# Patient Record
Sex: MALE | Race: WHITE | NOT HISPANIC OR LATINO | Employment: OTHER | URBAN - METROPOLITAN AREA
[De-identification: names, ages, dates, MRNs, and addresses within clinical notes are randomized per-mention and may not be internally consistent; named-entity substitution may affect disease eponyms.]

---

## 2017-02-11 ENCOUNTER — TRANSCRIBE ORDERS (OUTPATIENT)
Dept: ADMINISTRATIVE | Age: 82
End: 2017-02-11

## 2017-02-11 ENCOUNTER — LAB (OUTPATIENT)
Dept: LAB | Age: 82
End: 2017-02-11
Payer: COMMERCIAL

## 2017-02-11 DIAGNOSIS — I10 ESSENTIAL HYPERTENSION, MALIGNANT: ICD-10-CM

## 2017-02-11 DIAGNOSIS — I48.91 ATRIAL FIBRILLATION, UNSPECIFIED TYPE (HCC): Primary | ICD-10-CM

## 2017-02-11 DIAGNOSIS — E78.5 HYPERLIPIDEMIA, UNSPECIFIED HYPERLIPIDEMIA TYPE: ICD-10-CM

## 2017-02-11 DIAGNOSIS — E11.65 UNCONTROLLED TYPE 2 DIABETES MELLITUS WITH COMPLICATION, UNSPECIFIED LONG TERM INSULIN USE STATUS: Primary | ICD-10-CM

## 2017-02-11 DIAGNOSIS — E11.65 UNCONTROLLED TYPE 2 DIABETES MELLITUS WITH COMPLICATION, UNSPECIFIED LONG TERM INSULIN USE STATUS: ICD-10-CM

## 2017-02-11 DIAGNOSIS — I48.91 ATRIAL FIBRILLATION, UNSPECIFIED TYPE (HCC): ICD-10-CM

## 2017-02-11 DIAGNOSIS — E11.8 UNCONTROLLED TYPE 2 DIABETES MELLITUS WITH COMPLICATION, UNSPECIFIED LONG TERM INSULIN USE STATUS: ICD-10-CM

## 2017-02-11 DIAGNOSIS — E11.8 UNCONTROLLED TYPE 2 DIABETES MELLITUS WITH COMPLICATION, UNSPECIFIED LONG TERM INSULIN USE STATUS: Primary | ICD-10-CM

## 2017-02-11 LAB
ALBUMIN SERPL BCP-MCNC: 4 G/DL (ref 3.5–5)
ALP SERPL-CCNC: 65 U/L (ref 46–116)
ALT SERPL W P-5'-P-CCNC: 29 U/L (ref 12–78)
ANION GAP SERPL CALCULATED.3IONS-SCNC: 6 MMOL/L (ref 4–13)
AST SERPL W P-5'-P-CCNC: 17 U/L (ref 5–45)
BILIRUB SERPL-MCNC: 0.52 MG/DL (ref 0.2–1)
BUN SERPL-MCNC: 27 MG/DL (ref 5–25)
CALCIUM SERPL-MCNC: 9.2 MG/DL (ref 8.3–10.1)
CHLORIDE SERPL-SCNC: 103 MMOL/L (ref 100–108)
CHOLEST SERPL-MCNC: 180 MG/DL (ref 50–200)
CO2 SERPL-SCNC: 31 MMOL/L (ref 21–32)
CREAT SERPL-MCNC: 2.39 MG/DL (ref 0.6–1.3)
CRP SERPL QL: <3 MG/L
EST. AVERAGE GLUCOSE BLD GHB EST-MCNC: 186 MG/DL
GFR SERPL CREATININE-BSD FRML MDRD: 26.2 ML/MIN/1.73SQ M
GLUCOSE SERPL-MCNC: 183 MG/DL (ref 65–140)
HBA1C MFR BLD: 8.1 % (ref 4.2–6.3)
HDLC SERPL-MCNC: 29 MG/DL (ref 40–60)
INR PPP: 1.61 (ref 0.86–1.16)
POTASSIUM SERPL-SCNC: 4.9 MMOL/L (ref 3.5–5.3)
PROT SERPL-MCNC: 8.1 G/DL (ref 6.4–8.2)
PROTHROMBIN TIME: 19.1 SECONDS (ref 12–14.3)
SODIUM SERPL-SCNC: 140 MMOL/L (ref 136–145)
TRIGL SERPL-MCNC: 666 MG/DL
TSH SERPL DL<=0.05 MIU/L-ACNC: 3.91 UIU/ML (ref 0.36–3.74)

## 2017-02-11 PROCEDURE — 80061 LIPID PANEL: CPT

## 2017-02-11 PROCEDURE — 80053 COMPREHEN METABOLIC PANEL: CPT

## 2017-02-11 PROCEDURE — 84443 ASSAY THYROID STIM HORMONE: CPT

## 2017-02-11 PROCEDURE — 85610 PROTHROMBIN TIME: CPT

## 2017-02-11 PROCEDURE — 86140 C-REACTIVE PROTEIN: CPT

## 2017-02-11 PROCEDURE — 36415 COLL VENOUS BLD VENIPUNCTURE: CPT

## 2017-02-11 PROCEDURE — 83036 HEMOGLOBIN GLYCOSYLATED A1C: CPT

## 2017-02-22 ENCOUNTER — HOSPITAL ENCOUNTER (OUTPATIENT)
Dept: SLEEP CENTER | Facility: CLINIC | Age: 82
Discharge: HOME | End: 2017-02-22
Payer: COMMERCIAL

## 2017-02-22 ENCOUNTER — TRANSCRIBE ORDERS (OUTPATIENT)
Dept: SLEEP CENTER | Facility: CLINIC | Age: 82
End: 2017-02-22

## 2017-02-22 DIAGNOSIS — G47.33 OSA (OBSTRUCTIVE SLEEP APNEA): Primary | ICD-10-CM

## 2017-02-22 DIAGNOSIS — G47.33 OSA (OBSTRUCTIVE SLEEP APNEA): ICD-10-CM

## 2017-03-01 ENCOUNTER — LAB REQUISITION (OUTPATIENT)
Dept: LAB | Facility: HOSPITAL | Age: 82
End: 2017-03-01
Payer: COMMERCIAL

## 2017-03-01 DIAGNOSIS — D48.5 NEOPLASM OF UNCERTAIN BEHAVIOR OF SKIN: ICD-10-CM

## 2017-03-01 PROCEDURE — 88305 TISSUE EXAM BY PATHOLOGIST: CPT | Performed by: SPECIALIST

## 2017-03-07 ENCOUNTER — HOSPITAL ENCOUNTER (OUTPATIENT)
Dept: SLEEP CENTER | Facility: CLINIC | Age: 82
Discharge: HOME | End: 2017-03-07
Payer: MEDICARE

## 2017-03-07 DIAGNOSIS — G47.33 OSA (OBSTRUCTIVE SLEEP APNEA): ICD-10-CM

## 2017-03-11 ENCOUNTER — APPOINTMENT (OUTPATIENT)
Dept: LAB | Age: 82
End: 2017-03-11
Payer: COMMERCIAL

## 2017-03-11 DIAGNOSIS — N18.4 CHRONIC KIDNEY DISEASE, STAGE IV (SEVERE) (HCC): ICD-10-CM

## 2017-03-11 LAB
ALBUMIN SERPL BCP-MCNC: 3.7 G/DL (ref 3.5–5)
ANION GAP SERPL CALCULATED.3IONS-SCNC: 6 MMOL/L (ref 4–13)
BUN SERPL-MCNC: 33 MG/DL (ref 5–25)
CALCIUM SERPL-MCNC: 9.1 MG/DL (ref 8.3–10.1)
CHLORIDE SERPL-SCNC: 106 MMOL/L (ref 100–108)
CO2 SERPL-SCNC: 29 MMOL/L (ref 21–32)
CREAT SERPL-MCNC: 2.43 MG/DL (ref 0.6–1.3)
CREAT UR-MCNC: 120 MG/DL
ERYTHROCYTE [DISTWIDTH] IN BLOOD BY AUTOMATED COUNT: 13.3 % (ref 11.6–15.1)
GFR SERPL CREATININE-BSD FRML MDRD: 25.7 ML/MIN/1.73SQ M
GLUCOSE SERPL-MCNC: 151 MG/DL (ref 65–140)
HCT VFR BLD AUTO: 41.1 % (ref 36.5–49.3)
HGB BLD-MCNC: 14 G/DL (ref 12–17)
MCH RBC QN AUTO: 29.7 PG (ref 26.8–34.3)
MCHC RBC AUTO-ENTMCNC: 34.1 G/DL (ref 31.4–37.4)
MCV RBC AUTO: 87 FL (ref 82–98)
MICROALBUMIN UR-MCNC: 37.9 MG/L (ref 0–20)
MICROALBUMIN/CREAT 24H UR: 32 MG/G CREATININE (ref 0–30)
PHOSPHATE SERPL-MCNC: 3.1 MG/DL (ref 2.3–4.1)
PLATELET # BLD AUTO: 221 THOUSANDS/UL (ref 149–390)
PMV BLD AUTO: 10.4 FL (ref 8.9–12.7)
POTASSIUM SERPL-SCNC: 4.6 MMOL/L (ref 3.5–5.3)
PTH-INTACT SERPL-MCNC: 30.6 PG/ML (ref 14–72)
RBC # BLD AUTO: 4.72 MILLION/UL (ref 3.88–5.62)
SODIUM SERPL-SCNC: 141 MMOL/L (ref 136–145)
URATE SERPL-MCNC: 5.4 MG/DL (ref 4.2–8)
WBC # BLD AUTO: 5.49 THOUSAND/UL (ref 4.31–10.16)

## 2017-03-11 PROCEDURE — 82043 UR ALBUMIN QUANTITATIVE: CPT

## 2017-03-11 PROCEDURE — 82570 ASSAY OF URINE CREATININE: CPT

## 2017-03-11 PROCEDURE — 83970 ASSAY OF PARATHORMONE: CPT

## 2017-03-11 PROCEDURE — 85027 COMPLETE CBC AUTOMATED: CPT

## 2017-03-11 PROCEDURE — 84550 ASSAY OF BLOOD/URIC ACID: CPT

## 2017-03-11 PROCEDURE — 80069 RENAL FUNCTION PANEL: CPT

## 2017-03-18 ENCOUNTER — LAB (OUTPATIENT)
Dept: LAB | Age: 82
End: 2017-03-18
Payer: COMMERCIAL

## 2017-03-18 ENCOUNTER — TRANSCRIBE ORDERS (OUTPATIENT)
Dept: ADMINISTRATIVE | Age: 82
End: 2017-03-18

## 2017-03-18 DIAGNOSIS — I48.91 ATRIAL FIBRILLATION, UNSPECIFIED TYPE (HCC): ICD-10-CM

## 2017-03-18 DIAGNOSIS — I48.91 ATRIAL FIBRILLATION, UNSPECIFIED TYPE (HCC): Primary | ICD-10-CM

## 2017-03-18 LAB
INR PPP: 1.52 (ref 0.86–1.16)
PROTHROMBIN TIME: 18.3 SECONDS (ref 12–14.3)

## 2017-03-18 PROCEDURE — 36415 COLL VENOUS BLD VENIPUNCTURE: CPT

## 2017-03-18 PROCEDURE — 85610 PROTHROMBIN TIME: CPT

## 2017-03-20 ENCOUNTER — ALLSCRIPTS OFFICE VISIT (OUTPATIENT)
Dept: OTHER | Facility: OTHER | Age: 82
End: 2017-03-20

## 2017-03-21 ENCOUNTER — GENERIC CONVERSION - ENCOUNTER (OUTPATIENT)
Dept: OTHER | Facility: OTHER | Age: 82
End: 2017-03-21

## 2017-04-12 ENCOUNTER — HOSPITAL ENCOUNTER (OUTPATIENT)
Dept: SLEEP CENTER | Facility: CLINIC | Age: 82
Discharge: HOME | End: 2017-04-12
Payer: MEDICARE

## 2017-04-12 ENCOUNTER — TRANSCRIBE ORDERS (OUTPATIENT)
Dept: SLEEP CENTER | Facility: CLINIC | Age: 82
End: 2017-04-12

## 2017-04-12 DIAGNOSIS — G47.33 OSA (OBSTRUCTIVE SLEEP APNEA): Primary | ICD-10-CM

## 2017-04-12 DIAGNOSIS — G47.33 OSA (OBSTRUCTIVE SLEEP APNEA): ICD-10-CM

## 2017-04-21 ENCOUNTER — LAB (OUTPATIENT)
Dept: LAB | Age: 82
End: 2017-04-21
Payer: COMMERCIAL

## 2017-04-21 ENCOUNTER — TRANSCRIBE ORDERS (OUTPATIENT)
Dept: ADMINISTRATIVE | Age: 82
End: 2017-04-21

## 2017-04-21 DIAGNOSIS — I48.91 ATRIAL FIBRILLATION, UNSPECIFIED TYPE (HCC): ICD-10-CM

## 2017-04-21 DIAGNOSIS — I48.91 ATRIAL FIBRILLATION, UNSPECIFIED TYPE (HCC): Primary | ICD-10-CM

## 2017-04-21 LAB
INR PPP: 2.04 (ref 0.86–1.16)
PROTHROMBIN TIME: 22.8 SECONDS (ref 12–14.3)

## 2017-04-21 PROCEDURE — 36415 COLL VENOUS BLD VENIPUNCTURE: CPT

## 2017-04-21 PROCEDURE — 85610 PROTHROMBIN TIME: CPT

## 2017-05-01 ENCOUNTER — ALLSCRIPTS OFFICE VISIT (OUTPATIENT)
Dept: OTHER | Facility: OTHER | Age: 82
End: 2017-05-01

## 2017-05-10 ENCOUNTER — LAB REQUISITION (OUTPATIENT)
Dept: LAB | Facility: HOSPITAL | Age: 82
End: 2017-05-10
Payer: COMMERCIAL

## 2017-05-10 DIAGNOSIS — B35.2 TINEA MANUUM: ICD-10-CM

## 2017-05-10 PROCEDURE — 88341 IMHCHEM/IMCYTCHM EA ADD ANTB: CPT | Performed by: SPECIALIST

## 2017-05-10 PROCEDURE — 88342 IMHCHEM/IMCYTCHM 1ST ANTB: CPT | Performed by: SPECIALIST

## 2017-05-10 PROCEDURE — 88305 TISSUE EXAM BY PATHOLOGIST: CPT | Performed by: SPECIALIST

## 2017-05-10 PROCEDURE — 88312 SPECIAL STAINS GROUP 1: CPT | Performed by: SPECIALIST

## 2017-05-27 ENCOUNTER — TRANSCRIBE ORDERS (OUTPATIENT)
Dept: ADMINISTRATIVE | Age: 82
End: 2017-05-27

## 2017-05-27 ENCOUNTER — LAB (OUTPATIENT)
Dept: LAB | Age: 82
End: 2017-05-27
Payer: COMMERCIAL

## 2017-05-27 DIAGNOSIS — I48.91 ATRIAL FIBRILLATION, UNSPECIFIED TYPE (HCC): Primary | ICD-10-CM

## 2017-05-27 DIAGNOSIS — E78.5 OTHER AND UNSPECIFIED HYPERLIPIDEMIA: Primary | ICD-10-CM

## 2017-05-27 DIAGNOSIS — I48.91 ATRIAL FIBRILLATION, UNSPECIFIED TYPE (HCC): ICD-10-CM

## 2017-05-27 DIAGNOSIS — IMO0002 UNCONTROLLED TYPE 2 DIABETES MELLITUS WITH OTHER SPECIFIED COMPLICATION: ICD-10-CM

## 2017-05-27 DIAGNOSIS — E78.5 OTHER AND UNSPECIFIED HYPERLIPIDEMIA: ICD-10-CM

## 2017-05-27 LAB
ALBUMIN SERPL BCP-MCNC: 3.7 G/DL (ref 3.5–5)
ALP SERPL-CCNC: 56 U/L (ref 46–116)
ALT SERPL W P-5'-P-CCNC: 33 U/L (ref 12–78)
ANION GAP SERPL CALCULATED.3IONS-SCNC: 6 MMOL/L (ref 4–13)
AST SERPL W P-5'-P-CCNC: 19 U/L (ref 5–45)
BILIRUB SERPL-MCNC: 0.58 MG/DL (ref 0.2–1)
BUN SERPL-MCNC: 28 MG/DL (ref 5–25)
CALCIUM SERPL-MCNC: 9 MG/DL (ref 8.3–10.1)
CHLORIDE SERPL-SCNC: 103 MMOL/L (ref 100–108)
CHOLEST SERPL-MCNC: 174 MG/DL (ref 50–200)
CO2 SERPL-SCNC: 28 MMOL/L (ref 21–32)
CREAT SERPL-MCNC: 2.23 MG/DL (ref 0.6–1.3)
EST. AVERAGE GLUCOSE BLD GHB EST-MCNC: 194 MG/DL
GFR SERPL CREATININE-BSD FRML MDRD: 28.4 ML/MIN/1.73SQ M
GLUCOSE P FAST SERPL-MCNC: 153 MG/DL (ref 65–99)
HBA1C MFR BLD: 8.4 % (ref 4.2–6.3)
HDLC SERPL-MCNC: 27 MG/DL (ref 40–60)
INR PPP: 1.33 (ref 0.86–1.16)
POTASSIUM SERPL-SCNC: 4.4 MMOL/L (ref 3.5–5.3)
PROT SERPL-MCNC: 7.7 G/DL (ref 6.4–8.2)
PROTHROMBIN TIME: 16.6 SECONDS (ref 12.1–14.4)
SODIUM SERPL-SCNC: 137 MMOL/L (ref 136–145)
TRIGL SERPL-MCNC: 497 MG/DL

## 2017-05-27 PROCEDURE — 83036 HEMOGLOBIN GLYCOSYLATED A1C: CPT

## 2017-05-27 PROCEDURE — 80061 LIPID PANEL: CPT

## 2017-05-27 PROCEDURE — 85610 PROTHROMBIN TIME: CPT

## 2017-05-27 PROCEDURE — 80053 COMPREHEN METABOLIC PANEL: CPT

## 2017-05-27 PROCEDURE — 36415 COLL VENOUS BLD VENIPUNCTURE: CPT

## 2017-06-05 ENCOUNTER — TRANSCRIBE ORDERS (OUTPATIENT)
Dept: ADMINISTRATIVE | Age: 82
End: 2017-06-05

## 2017-06-05 ENCOUNTER — LAB (OUTPATIENT)
Dept: LAB | Age: 82
End: 2017-06-05
Payer: COMMERCIAL

## 2017-06-05 DIAGNOSIS — D63.8 CHRONIC DISEASE ANEMIA: ICD-10-CM

## 2017-06-05 DIAGNOSIS — D63.8 CHRONIC DISEASE ANEMIA: Primary | ICD-10-CM

## 2017-06-05 LAB
ERYTHROCYTE [DISTWIDTH] IN BLOOD BY AUTOMATED COUNT: 13.3 % (ref 11.6–15.1)
HCT VFR BLD AUTO: 37.5 % (ref 36.5–49.3)
HGB BLD-MCNC: 12.8 G/DL (ref 12–17)
MCH RBC QN AUTO: 29.5 PG (ref 26.8–34.3)
MCHC RBC AUTO-ENTMCNC: 34.1 G/DL (ref 31.4–37.4)
MCV RBC AUTO: 86 FL (ref 82–98)
PLATELET # BLD AUTO: 217 THOUSANDS/UL (ref 149–390)
PMV BLD AUTO: 10.6 FL (ref 8.9–12.7)
RBC # BLD AUTO: 4.34 MILLION/UL (ref 3.88–5.62)
WBC # BLD AUTO: 5.01 THOUSAND/UL (ref 4.31–10.16)

## 2017-06-05 PROCEDURE — 85027 COMPLETE CBC AUTOMATED: CPT

## 2017-06-05 PROCEDURE — 36415 COLL VENOUS BLD VENIPUNCTURE: CPT

## 2017-06-30 ENCOUNTER — TRANSCRIBE ORDERS (OUTPATIENT)
Dept: ADMINISTRATIVE | Age: 82
End: 2017-06-30

## 2017-06-30 ENCOUNTER — APPOINTMENT (OUTPATIENT)
Dept: LAB | Age: 82
End: 2017-06-30
Payer: COMMERCIAL

## 2017-06-30 DIAGNOSIS — I48.91 ATRIAL FIBRILLATION, UNSPECIFIED TYPE (HCC): Primary | ICD-10-CM

## 2017-06-30 DIAGNOSIS — I48.91 ATRIAL FIBRILLATION, UNSPECIFIED TYPE (HCC): ICD-10-CM

## 2017-06-30 LAB
INR PPP: 1.54 (ref 0.86–1.16)
PROTHROMBIN TIME: 18.6 SECONDS (ref 12.1–14.4)

## 2017-06-30 PROCEDURE — 85610 PROTHROMBIN TIME: CPT

## 2017-06-30 PROCEDURE — 36415 COLL VENOUS BLD VENIPUNCTURE: CPT

## 2017-07-03 DIAGNOSIS — N18.4 CHRONIC KIDNEY DISEASE, STAGE IV (SEVERE) (HCC): ICD-10-CM

## 2017-07-21 ENCOUNTER — TRANSCRIBE ORDERS (OUTPATIENT)
Dept: ADMINISTRATIVE | Age: 82
End: 2017-07-21

## 2017-07-21 ENCOUNTER — LAB (OUTPATIENT)
Dept: LAB | Age: 82
End: 2017-07-21
Payer: COMMERCIAL

## 2017-07-21 DIAGNOSIS — N18.4 CHRONIC KIDNEY DISEASE, STAGE IV (SEVERE) (HCC): ICD-10-CM

## 2017-07-21 LAB
ALBUMIN SERPL BCP-MCNC: 3.6 G/DL (ref 3.5–5)
ANION GAP SERPL CALCULATED.3IONS-SCNC: 9 MMOL/L (ref 4–13)
BUN SERPL-MCNC: 32 MG/DL (ref 5–25)
CALCIUM SERPL-MCNC: 9.1 MG/DL (ref 8.3–10.1)
CHLORIDE SERPL-SCNC: 102 MMOL/L (ref 100–108)
CO2 SERPL-SCNC: 26 MMOL/L (ref 21–32)
CREAT SERPL-MCNC: 2.59 MG/DL (ref 0.6–1.3)
CREAT UR-MCNC: 138 MG/DL
ERYTHROCYTE [DISTWIDTH] IN BLOOD BY AUTOMATED COUNT: 13.1 % (ref 11.6–15.1)
GFR SERPL CREATININE-BSD FRML MDRD: 23.9 ML/MIN/1.73SQ M
GLUCOSE P FAST SERPL-MCNC: 286 MG/DL (ref 65–99)
HCT VFR BLD AUTO: 38.3 % (ref 36.5–49.3)
HGB BLD-MCNC: 13.2 G/DL (ref 12–17)
MCH RBC QN AUTO: 29.6 PG (ref 26.8–34.3)
MCHC RBC AUTO-ENTMCNC: 34.5 G/DL (ref 31.4–37.4)
MCV RBC AUTO: 86 FL (ref 82–98)
MICROALBUMIN UR-MCNC: 34.1 MG/L (ref 0–20)
MICROALBUMIN/CREAT 24H UR: 25 MG/G CREATININE (ref 0–30)
PHOSPHATE SERPL-MCNC: 2.6 MG/DL (ref 2.3–4.1)
PLATELET # BLD AUTO: 267 THOUSANDS/UL (ref 149–390)
PMV BLD AUTO: 10.4 FL (ref 8.9–12.7)
POTASSIUM SERPL-SCNC: 4.7 MMOL/L (ref 3.5–5.3)
PTH-INTACT SERPL-MCNC: 26.2 PG/ML (ref 14–72)
RBC # BLD AUTO: 4.46 MILLION/UL (ref 3.88–5.62)
SODIUM SERPL-SCNC: 137 MMOL/L (ref 136–145)
URATE SERPL-MCNC: 5.5 MG/DL (ref 4.2–8)
WBC # BLD AUTO: 6.32 THOUSAND/UL (ref 4.31–10.16)

## 2017-07-21 PROCEDURE — 84550 ASSAY OF BLOOD/URIC ACID: CPT

## 2017-07-21 PROCEDURE — 36415 COLL VENOUS BLD VENIPUNCTURE: CPT

## 2017-07-21 PROCEDURE — 85027 COMPLETE CBC AUTOMATED: CPT

## 2017-07-21 PROCEDURE — 80069 RENAL FUNCTION PANEL: CPT

## 2017-07-21 PROCEDURE — 82570 ASSAY OF URINE CREATININE: CPT

## 2017-07-21 PROCEDURE — 82043 UR ALBUMIN QUANTITATIVE: CPT

## 2017-07-21 PROCEDURE — 83970 ASSAY OF PARATHORMONE: CPT

## 2017-07-24 ENCOUNTER — ALLSCRIPTS OFFICE VISIT (OUTPATIENT)
Dept: OTHER | Facility: OTHER | Age: 82
End: 2017-07-24

## 2017-10-07 ENCOUNTER — TRANSCRIBE ORDERS (OUTPATIENT)
Dept: ADMINISTRATIVE | Age: 82
End: 2017-10-07

## 2017-10-07 ENCOUNTER — LAB (OUTPATIENT)
Dept: LAB | Age: 82
End: 2017-10-07
Payer: COMMERCIAL

## 2017-10-07 DIAGNOSIS — I48.91 ATRIAL FIBRILLATION, UNSPECIFIED TYPE (HCC): ICD-10-CM

## 2017-10-07 DIAGNOSIS — I48.91 ATRIAL FIBRILLATION, UNSPECIFIED TYPE (HCC): Primary | ICD-10-CM

## 2017-10-07 LAB
INR PPP: 1.42 (ref 0.86–1.16)
PROTHROMBIN TIME: 17.4 SECONDS (ref 12.1–14.4)

## 2017-10-07 PROCEDURE — 36415 COLL VENOUS BLD VENIPUNCTURE: CPT

## 2017-10-07 PROCEDURE — 85610 PROTHROMBIN TIME: CPT

## 2017-10-14 ENCOUNTER — LAB (OUTPATIENT)
Dept: LAB | Age: 82
End: 2017-10-14
Payer: COMMERCIAL

## 2017-10-14 ENCOUNTER — TRANSCRIBE ORDERS (OUTPATIENT)
Dept: ADMINISTRATIVE | Age: 82
End: 2017-10-14

## 2017-10-14 DIAGNOSIS — E78.5 HYPERLIPEMIA, IDIOPATHIC FAMILIAL: ICD-10-CM

## 2017-10-14 DIAGNOSIS — I10 ESSENTIAL HYPERTENSION, MALIGNANT: ICD-10-CM

## 2017-10-14 DIAGNOSIS — E78.5 HYPERLIPEMIA, IDIOPATHIC FAMILIAL: Primary | ICD-10-CM

## 2017-10-14 LAB
ALBUMIN SERPL BCP-MCNC: 3.7 G/DL (ref 3.5–5)
ALP SERPL-CCNC: 58 U/L (ref 46–116)
ALT SERPL W P-5'-P-CCNC: 26 U/L (ref 12–78)
ANION GAP SERPL CALCULATED.3IONS-SCNC: 6 MMOL/L (ref 4–13)
AST SERPL W P-5'-P-CCNC: 18 U/L (ref 5–45)
BILIRUB SERPL-MCNC: 0.55 MG/DL (ref 0.2–1)
BUN SERPL-MCNC: 27 MG/DL (ref 5–25)
CALCIUM SERPL-MCNC: 9 MG/DL (ref 8.3–10.1)
CHLORIDE SERPL-SCNC: 103 MMOL/L (ref 100–108)
CHOLEST SERPL-MCNC: 157 MG/DL (ref 50–200)
CO2 SERPL-SCNC: 28 MMOL/L (ref 21–32)
CREAT SERPL-MCNC: 2.3 MG/DL (ref 0.6–1.3)
ERYTHROCYTE [DISTWIDTH] IN BLOOD BY AUTOMATED COUNT: 13.2 % (ref 11.6–15.1)
GFR SERPL CREATININE-BSD FRML MDRD: 25 ML/MIN/1.73SQ M
GLUCOSE P FAST SERPL-MCNC: 174 MG/DL (ref 65–99)
HCT VFR BLD AUTO: 39.6 % (ref 36.5–49.3)
HDLC SERPL-MCNC: 31 MG/DL (ref 40–60)
HGB BLD-MCNC: 13.9 G/DL (ref 12–17)
MCH RBC QN AUTO: 29.8 PG (ref 26.8–34.3)
MCHC RBC AUTO-ENTMCNC: 35.1 G/DL (ref 31.4–37.4)
MCV RBC AUTO: 85 FL (ref 82–98)
PLATELET # BLD AUTO: 261 THOUSANDS/UL (ref 149–390)
PMV BLD AUTO: 10.2 FL (ref 8.9–12.7)
POTASSIUM SERPL-SCNC: 4.5 MMOL/L (ref 3.5–5.3)
PROT SERPL-MCNC: 7.8 G/DL (ref 6.4–8.2)
RBC # BLD AUTO: 4.67 MILLION/UL (ref 3.88–5.62)
SODIUM SERPL-SCNC: 137 MMOL/L (ref 136–145)
TRIGL SERPL-MCNC: 421 MG/DL
WBC # BLD AUTO: 6.16 THOUSAND/UL (ref 4.31–10.16)

## 2017-10-14 PROCEDURE — 85027 COMPLETE CBC AUTOMATED: CPT

## 2017-10-14 PROCEDURE — 80061 LIPID PANEL: CPT

## 2017-10-14 PROCEDURE — 36415 COLL VENOUS BLD VENIPUNCTURE: CPT

## 2017-10-14 PROCEDURE — 80053 COMPREHEN METABOLIC PANEL: CPT

## 2017-11-04 ENCOUNTER — TRANSCRIBE ORDERS (OUTPATIENT)
Dept: ADMINISTRATIVE | Age: 82
End: 2017-11-04

## 2017-11-04 ENCOUNTER — LAB (OUTPATIENT)
Dept: LAB | Age: 82
End: 2017-11-04
Payer: COMMERCIAL

## 2017-11-04 DIAGNOSIS — N18.4 CHRONIC KIDNEY DISEASE, STAGE IV (SEVERE) (HCC): ICD-10-CM

## 2017-11-04 LAB
ALBUMIN SERPL BCP-MCNC: 3.5 G/DL (ref 3.5–5)
ANION GAP SERPL CALCULATED.3IONS-SCNC: 4 MMOL/L (ref 4–13)
BUN SERPL-MCNC: 31 MG/DL (ref 5–25)
CALCIUM SERPL-MCNC: 9 MG/DL (ref 8.3–10.1)
CHLORIDE SERPL-SCNC: 103 MMOL/L (ref 100–108)
CO2 SERPL-SCNC: 28 MMOL/L (ref 21–32)
CREAT SERPL-MCNC: 2.53 MG/DL (ref 0.6–1.3)
CREAT UR-MCNC: 101 MG/DL
ERYTHROCYTE [DISTWIDTH] IN BLOOD BY AUTOMATED COUNT: 13.1 % (ref 11.6–15.1)
GFR SERPL CREATININE-BSD FRML MDRD: 23 ML/MIN/1.73SQ M
GLUCOSE P FAST SERPL-MCNC: 315 MG/DL (ref 65–99)
HCT VFR BLD AUTO: 38.9 % (ref 36.5–49.3)
HGB BLD-MCNC: 13.1 G/DL (ref 12–17)
MCH RBC QN AUTO: 29 PG (ref 26.8–34.3)
MCHC RBC AUTO-ENTMCNC: 33.7 G/DL (ref 31.4–37.4)
MCV RBC AUTO: 86 FL (ref 82–98)
MICROALBUMIN UR-MCNC: 43.2 MG/L (ref 0–20)
MICROALBUMIN/CREAT 24H UR: 43 MG/G CREATININE (ref 0–30)
PHOSPHATE SERPL-MCNC: 2.6 MG/DL (ref 2.3–4.1)
PLATELET # BLD AUTO: 240 THOUSANDS/UL (ref 149–390)
PMV BLD AUTO: 10.2 FL (ref 8.9–12.7)
POTASSIUM SERPL-SCNC: 4.8 MMOL/L (ref 3.5–5.3)
PTH-INTACT SERPL-MCNC: 24.6 PG/ML (ref 14–72)
RBC # BLD AUTO: 4.51 MILLION/UL (ref 3.88–5.62)
SODIUM SERPL-SCNC: 135 MMOL/L (ref 136–145)
URATE SERPL-MCNC: 5.2 MG/DL (ref 4.2–8)
WBC # BLD AUTO: 6.21 THOUSAND/UL (ref 4.31–10.16)

## 2017-11-04 PROCEDURE — 82043 UR ALBUMIN QUANTITATIVE: CPT

## 2017-11-04 PROCEDURE — 84550 ASSAY OF BLOOD/URIC ACID: CPT

## 2017-11-04 PROCEDURE — 83970 ASSAY OF PARATHORMONE: CPT

## 2017-11-04 PROCEDURE — 85027 COMPLETE CBC AUTOMATED: CPT

## 2017-11-04 PROCEDURE — 36415 COLL VENOUS BLD VENIPUNCTURE: CPT

## 2017-11-04 PROCEDURE — 82570 ASSAY OF URINE CREATININE: CPT

## 2017-11-04 PROCEDURE — 80069 RENAL FUNCTION PANEL: CPT

## 2017-11-06 DIAGNOSIS — N18.4 CHRONIC KIDNEY DISEASE, STAGE IV (SEVERE) (HCC): ICD-10-CM

## 2017-11-08 ENCOUNTER — ALLSCRIPTS OFFICE VISIT (OUTPATIENT)
Dept: OTHER | Facility: OTHER | Age: 82
End: 2017-11-08

## 2017-11-10 NOTE — PROGRESS NOTES
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)    Plan  Depression    · Sertraline HCl - 50 MG Oral Tablet; TAKE 1 TABLET DAILY AS DIRECTED   Rx By: Yolanda Abrams; Dispense: 90 Days ; #:90 Tablet; Refill: 3;Depression; EVER = N; Verified Transmission to [address]; Last Updated By: System, [employer]; 11/8/2017 11:19:48 AM  Frontotemporal dementia    · Follow-up visit in 6 months Evaluation and Treatment  Follow-up  Status: Complete Done: [id number]   Ordered;Frontotemporal dementia; Ordered By: Yolanda Abrams Performed:  Due: 50WDT9562; Last Updated By: Charmaine Albert; 11/8/2017 11:17:15 AM    Discussion/Summary  Discussion Summary:   Pt has been stable since his last appt  He will continue donepezil, Namenda XR and sertraline  He will continue to stay active physically, mentally and socially  He will follow-up in 6 months  Counseling Documentation With Imm: The patient, patient's family was counseled regarding instructions for management,-- risk factor reductions,-- prognosis,-- patient and family education,-- impressions,-- risks and benefits of treatment options,-- importance of compliance with treatment  Chief Complaint  Chief Complaint Free Text Note Form: Patient present for follow up appointment regarding dementia      History of Present Illness  HPI: Floresita Tenorio is an 80year old right handed man with frontotemporal dementia who presents for follow up  To review, symptoms of memory loss began gradually and progressed  He was started on Aricept 5mg daily in 2009  Neuropsychological testing revealed changes suggestive of possible Alzheimer's disease versus frontotemporal dementia with the latter being more likely  FDG PET scan actually showed normal uptake, mild atrophy  is remains on Aricept 10mg daily and Namenda XR 28mg, and sertraline for anxiety and mood  Switching medications to am has helped with vivid dreams  He is tolerating the medications   His wife reports that short term memory is stable  He needs reminders  His wife administers his medications  His wife leaves ready made meals for him  He burned his hand in early April while trying to make spaghetti on the stove  He has no difficulty with bathing or dressing  He has not had any wandering or hallucinations  He is restless in sleep but not as bad as prior  He does nap during the day  He is no longer driving or handling finances  His wife does make sure he walks  He does sundown at times and gets carlos  Their house is up for sale with a plan to go into a smaller place  She would like him to go to a [facility] during the day to spend time with people but she doesnât have any way to get him there and back  wife is concerned about his renal function  She states that he does not watch what he eats when she is not home and his blood sugars have been poorly controlled  He does follow with Dr Brown See  PMH, PSH, SH, FH and ROS  Review of Systems  Neurological ROS:  Constitutional: recent weight gain-- and-- fatigue  HEENT: sinus problems,-- hearing loss-- and-- tinnitus  Cardiovascular:  no chest pain or pressure, no palpitations present, the heart rate was not rapid or irregular, no swelling in the arms or legs, no poor circulation  Respiratory:  no unusual or persistant cough, no shortness of breath with or without exertion  Gastrointestinal:  no nausea, no vomiting, no diarrhea, no abdominal pain, no changes in bowel habits, no melena, no loss of bowel control  Genitourinary: increased frequency  Musculoskeletal:  no arthralgias, no myalgias, no immobility or loss of function, no head/neck/back pain, no pain while walking  Integumentary  no masses, no rash, no skin lesions, no livedo reticularis  Psychiatric: mood swings  Endocrine   no unusual weight loss or gain, no excessive urination, no excessive thirst, no hair loss or gain, no hot or cold intolerance, no menstrual period change or irregularity, no loss of sexual ability or drive, no erection difficulty, no nipple discharge  Hematologic/Lymphatic:  no unusual bleeding, no tendency for easy bruising, no clotting skin or lumps  Neurological General: increased sleepiness  Neurological Mental Status: memory problems  Neurological Cranial Nerves:  no blurry or double vision, no loss of vision, no face drooping, no facial numbness or weakness, no taste or smell loss/changes, no hearing loss or ringing, no vertigo or dizziness, no dysphagia, no slurred speech  Neurological Motor findings include:  no tremor, no twitching, no cramping(pre/post exercise), no atrophy  Neurological Coordination:  no unsteadiness, no vertigo or dizziness, no clumsiness, no problems reaching for objects  Neurological Sensory:  no numbness, no pain, no tingling, does not fall when eyes closed or taking a shower  Neurological Gait:  no difficulty walking, not falling to one side, no sensation of being pushed, has not had falls  ROS Reviewed:   ROS reviewed  Active Problems  1  Acute URI (465 9) (J06 9)   2  Aftercare following surgery of the musculoskeletal system (V58 78) (Z47 89)   3  Allergic rhinitis (477 9) (J30 9)   4  Anticoagulant long-term use (V58 61) (Z79 01)   5  Arthritis (716 90) (M19 90)   6  Asthma (493 90) (J45 909)   7  Atrial fibrillation (427 31) (I48 91)   8  B12 deficiency (266 2) (E53 8)   9  Back pain (724 5) (M54 9)   10  Benign essential hypertension (401 1) (I10)   11  Benign prostate hyperplasia (600 00) (N40 0)   12  Bursitis of hip, unspecified laterality   13  Chronic kidney disease, stage 4 (severe) (585 4) (N18 4)   14  Chronic lumbar radiculopathy (724 4) (M54 16)   15  Controlled type 2 diabetes with renal manifestation (250 40) (E11 29)   16  Depression (311) (F32 9)   17  Diabetes mellitus (250 00) (E11 9)   18  Diabetic nephropathy (250 40,583 81) (E11 21)   19  Elevated prostate specific antigen (PSA) (790 93) (R97 20)   20   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,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)   21  GERD (gastroesophageal reflux disease) (530 81) (K21 9)   22  Hearing loss (389 9) (H91 90)   23  Hiatal hernia (553 3) (K44 9)   24  Hyperlipidemia (272 4) (E78 5)   25  Hypertension (401 9) (I10)   26  Limb pain (729 5) (M79 609)   27  Lower back pain (724 2) (M54 5)   28  Lower back pain (724 2) (M54 5)   29  Lumbar canal stenosis (724 02) (M48 061)   30  Nephropathy (583 9) (N28 9)   31  Nephrosclerosis (403 90,587) (I12 9)   32  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)   33  Organic impotence (607 84) (N52 9)   34  Piriformis syndrome, unspecified laterality (355 0) (G57 00)   35  Renal cell carcinoma, unspecified laterality (189 0) (C64 9)   36  Renal Insufficiency (593 9)   37  Sacral disorder (724 6) (M53 3)   38  Sacral insufficiency fracture (733 13) (M84 48XA)   39  Sacroiliitis (720 2) (M46 1)   40  Seasonal allergies (477 9) (J30 2)   41  Sleep apnea (780 57) (G47 30)   42  Solitary kidney, acquired (V45 73) (Z90 5)    Past Medical History  1  History of Anxiety (300 00) (F41 9)   2  History of Asthma (493 90) (J45 909)   3  History of Coronary Artery Disease (V12 59)   4  History of Diabetes Mellitus (250 00)   5  History of hypercholesterolemia (V12 29) (Z86 39)   6  History of hypertension (V12 59) (Z86 79)   7  History of kidney disease (V13 09) (Z87 448)   8  History of malignant melanoma of skin (V10 82) (Z85 820)   9  Personal history of kidney cancer (V10 52) (R48 061)    Surgical History  1  History of Cholecystectomy   2  History of Hernia Repair   3  History of Nephrectomy Left   4  History of Pacemaker Placement   5  History of Rotator Cuff Repair    Family History  Father    1  Family history of Brain Cancer (V16 8)   2  Family history of Lung Cancer (V16 1)  Family History    3  Family history of Diabetes Mellitus (V18 0)   4  Family history of Heart Disease (V17 49)   5  Family history of Hypertension (V17 49)   6   Family history of Pain Syndromes   7  Family history of Renal Cell Carcinoma (V16 51)   8  Family history of Stroke Syndrome (V17 1)    Social History     · Caffeine use (V49 89) (F15 90)   · Hearing loss (389 9) (H91 90)   · Marital History - Currently    · Never A Smoker   · Never Drank Alcohol   · No drug use   · Occupation: Retired    Current Meds   1  Claritin 10 MG Oral Tablet; TAKE 1 TABLET DAILY; Therapy: (Recorded:08Nov2017) to Recorded   2  Coconut Oil 1000 MG Oral Capsule; take 1 capsule daily; Therapy: (Recorded:08Nov2017) to Recorded   3  Coumadin 5 MG Oral Tablet; 2 5mg mon and fri, 5mg all other days; Therapy: (0472 51 11 42) to Recorded   4  Daily Multivitamin TABS; TAKE 1 TABLET DAILY; Therapy: (0472 51 11 42) to Recorded   5  Diovan 80 MG CAPS; TAKE 2 CAPSULE at night; Therapy: (0472 51 11 42) to Recorded   6  Donepezil HCl - 5 MG Oral Tablet; TAKE 1 TABLET TWICE DAILY  Requested for: 03Jpq9626; Last Rx:88Zef2183 Ordered   7  Fenofibrate 145 MG Oral Tablet; TAKE 1 TABLET DAILY; Therapy: (0472 51 11 42) to Recorded   8  Ferrous Sulfate 325 (65 Fe) MG Oral Tablet; TAKE 1 TABLET DAILY WITH FOOD; Therapy: (0472 51 11 42) to Recorded   9  Flonase 50 MCG/ACT SUSP; USE 2 SPRAYS IN EACH NOSTRIL; Therapy: (0472 51 11 42) to Recorded   10  Lantus 100 UNIT/ML Subcutaneous Solution; 30 units twice daily; Therapy: 77EWJ4286 to (Evaluate:55Odc3893)  Requested for: 35FJZ9488 Recorded   11  Lipitor 10 MG Oral Tablet; TAKE 1 TABLET DAILY; Therapy: (0472 51 11 42) to Recorded   12  Lovaza 1 GM Oral Capsule; TAKE 4 CAPSULES DAILY; Therapy: 37ZMZ3641 to Recorded   13  Magnesium 500 MG Oral Capsule; Take 1 tablet daily; Therapy: (0472 51 11 42) to Recorded   14  Metoprolol Succinate  MG Oral Tablet Extended Release 24 Hour; TAKE 1/2  TABLET IN THE AM AND 1 TABLET IN THE PM;  Therapy: (Recorded:08Nov2017) to Recorded   15   Namenda XR 28 MG Oral Capsule Extended Release 24 Hour; TAKE ONE CAPSULE BY  MOUTH EVERY DAY; Therapy: 01Apr2015 to (Last Rx:27Jun2017)  Requested for: 27Jun2017 Ordered   16  Norvasc 5 MG Oral Tablet; TAKE 1 TABLET TWICE DAILY; Therapy: 30TUZ6676 to Recorded   17  NovoLOG 100 UNIT/ML Subcutaneous Solution; 8 units twice daily; Therapy: (0523-7600548) to Recorded   18  Probiotic Oral Capsule; take 1 capsule daily; Therapy: 80PKS0988 to Recorded   19  Propafenone HCl - 225 MG Oral Tablet; TAKE 3 TABLET Daily; Therapy: (0523-7600548) to Recorded   20  Sertraline HCl - 50 MG Oral Tablet; TAKE 1 TABLET DAILY AS DIRECTED; Therapy: 83RPT0594 to [phone])  Requested for: 80QER3309; Last  DE:13ETE8448 Ordered   21  Vitamin B12 TABS; TAKE 1 TABLET DAILY AS DIRECTED; Therapy: (0523-7600548) to Recorded   22  Vitamin D 1000 UNIT CAPS; TAKE TWO TABLETS DAILY; Therapy: (0523-7600548) to Recorded   23  Xopenex 0 63 MG/3ML Inhalation Nebulization Solution; PRN; Therapy: (Recorded:08Nov2017) to Recorded    Allergies    1  Bextra TABS   2  Ambien TABS   3  Lyrica CAPS    Vitals  Signs   Recorded: 54AVO1700 10:51AM   Heart Rate: 90  Systolic: 604, LUE, Sitting  Diastolic: 72, LUE, Sitting  Height: 5 ft 11 in  Weight: 225 lb 3 oz  BMI Calculated: 31 41  BSA Calculated: 2 22  O2 Saturation: 95    Physical Exam   Constitutional  General appearance: No acute distress, well appearing and well nourished  Eyes  Ophthalmoscopic examination: Vision is grossly normal  Gross visual field testing by confrontation shows no abnormalities  EOMI in both eyes  Conjunctivae clear  Eyelids normal palpebral fissures equal  Orbits exhibit normal position  No discharge from the eyes  PERRL  Musculoskeletal  Gait and station: Normal gait, stance and balance  Muscle strength: Normal strength throughout  Muscle tone: No atrophy, abnormal movements, flaccidity, cogwheeling or spasticity  Involuntary movements: None observed     Neurologic  Orientation to person, place, and time: Normal    Recent and remote memory: Demonstrates normal memory  Attention span and concentration: Normal thought process and attention span  Language: Abnormal  -- MOCA 23/30(9/25/13); 22/30 ; 21/30 (3/26/13) Previously 23/30; 26/30 10/1/14; 21/30 (4/1/15); 20/30 (10/7/15); 22 (5/1/17)  Fund of knowledge: Normal vocabulary with appropriate knowledge of current events and past history  2nd cranial nerve: Normal    3rd, 4th, and 6th cranial nerves: Normal    5th cranial nerve: Normal    7th cranial nerve: Normal    8th cranial nerve: Normal    9th cranial nerve: Normal    11th cranial nerve: Normal    12th cranial nerve: Normal    Sensation: Normal   Sensory exam: intact pain and temperature sensation  Reflexes: Normal   Superficial/Primitive Reflexes: primitive reflexes were absent  Coordination: Normal   Coordination: no past-pointing-- and-- no finger to nose dysmetria  Attending Note  Collaborating Physician Note: Collaborating Note: I agree with the Advanced Practitioner note  I discussed the case with the Advanced Practitioner and reviewed the AP note      Future Appointments    Date/Time Provider Specialty Site   02/27/2018 08:30 AM Cardiology, [address] Namita Carney [facility]   12/12/2017 03:00 PM Cardiology, [facility]   11/30/2017 09:00 AM JASS Renner   Orthopedic Surgery [facility]   11/20/2017 09:00 AM Jessica Rodriguez DO Nephrology [place] [address] [address]       Signatures   Electronically signed by : Nighat Ellis [facility]; Nov 8 2017  2:53PM EST                       (Author)    Electronically signed by : Laure Salvador MD; Nov 9 2017  8:29AM EST                       (Author)

## 2017-11-20 ENCOUNTER — ALLSCRIPTS OFFICE VISIT (OUTPATIENT)
Dept: OTHER | Facility: OTHER | Age: 82
End: 2017-11-20

## 2017-11-21 NOTE — PROGRESS NOTES
Assessment  1  Chronic kidney disease, stage 4 (severe) (585 4) (N18 4)   2  Controlled type 2 diabetes with renal manifestation (250 40) (E11 29)   3  Nephrosclerosis (403 90,587) (I12 9)   4  Solitary kidney, acquired (V45 73) (Z90 5)    Plan  Chronic kidney disease, stage 4 (severe)    · (1) CBC/ PLT (NO DIFF); Status:Active; Requested for:05Mar2018; Perform:[facility]; ALJ:30UCR3617;BQNIVQP; Priority; For:Chronic kidney disease, stage 4 (severe); Ordered By:Filipe Valle;   · (1) PTH N-TERMINAL (INTACT); Status:Active; Requested for:05Mar2018; Perform:[facility]; KAMRON:78EDY9839;TWJBIZV; Priority; For:Chronic kidney disease, stage 4 (severe); Ordered By:Filipe Valle;   · (1) RENAL FUNCTION PANEL; Status:Active; Requested for:05Mar2018; Perform:[facility]; YTD:48BEK9169;ZQDFSPU; Priority; For:Chronic kidney disease, stage 4 (severe); Ordered By:Filipe Valle;   · (1) URINE PROTEIN CREATININE RATIO; Status:Active; Requested for:05Mar2018; Perform:[facility]; YEW:29XXE2201;FCTRFKZ; Priority;kidney disease, stage 4 (severe); Ordered By:Filipe Valle;   · Follow-up visit in 4 Months Evaluation and Treatment  Follow-up  Status: Hold For -Scheduling  Requested for: 30UUY4598   Ordered Priority;Chronic kidney disease, stage 4 (severe); Ordered By: Felisa Bernal Performed:  Due: 51ZQV5463    Discussion/Summary    Chronic kidney disease, stage IV, baseline creatinine between 2 3 in 2 5, current GFR 23  his kidney function has been fairly stable, again GFR between 23 and 25  His underlying disease is most likely due to diabetes as well as solitary kidney function  In any case his electrolytes appear fairly stable, his blood pressure is within goal  He has no evidence of anemia  His PTH is also within normal limits  At this point time I have not changed his current regimen   We will continue following serially, plan to see him in approximately 4 months with repeat laboratory studies that time  The patient was counseled regarding instructions for management,-- impressions  The patient has the current Goals: Continued stability of his chronic kidney disease  The patent has the current Barriers: Blood sugar control  Patient is unable to Self-Care:   Possible side effects of new medications were reviewed with the patient/guardian today  The treatment plan was reviewed with the patient/guardian  The patient/guardian understands and agrees with the treatment plan      Reason For Visit  chronic kidney disease      History of Present Illness  We had the pleasure of seeing Colleen Dejesus for nephrology follow-up secondary to previous diagnosis of stage 4 chronic kidney disease  to his wife, he has been doing well  There has been no hospitalizations or illnesses  His appetite has been normal  His blood sugars have been somewhat high, wife states that he snacks quite a bit at home  Denies any dizziness, lightheadedness or falls  No significant lower extremity swelling  Review of Systems   Constitutional: no fever-- and-- no fatigue  Integumentary: no rashes  Gastrointestinal: no nausea,-- no diarrhea-- and-- no vomiting  Respiratory: no shortness of breath  Cardiovascular: no chest pain-- and-- no lower extremity edema  Neurological: no lightheadedness-- and-- no dizziness  Genitourinary: no dysuria  Psychiatric: significant short-term memory loss, underlying dementia  Current Meds   1  Claritin 10 MG Oral Tablet; TAKE 1 TABLET DAILY; Therapy: (Recorded:08Nov2017) to Recorded   2  Coconut Oil 1000 MG Oral Capsule; take 1 capsule daily; Therapy: (Recorded:08Nov2017) to Recorded   3  Coumadin 5 MG Oral Tablet (Warfarin Sodium); 2 5mg mon and fri, 5mg all other days; Therapy: (0664 313 06 34) to Recorded   4  Daily Multivitamin TABS; TAKE 1 TABLET DAILY; Therapy: (0664 313 06 34) to Recorded   5  Diovan 80 MG CAPS; TAKE 2 CAPSULE at night;  Therapy: (450 45 295) to Recorded   6  Donepezil HCl - 5 MG Oral Tablet (Aricept); TAKE 1 TABLET TWICE DAILY  Requested for: 33SIV4203; Last Rx:11Gmk0096 Ordered   7  Fenofibrate 145 MG Oral Tablet; TAKE 1 TABLET DAILY; Therapy: (450 45 295) to Recorded   8  Ferrous Sulfate 325 (65 Fe) MG Oral Tablet; TAKE 1 TABLET DAILY WITH FOOD; Therapy: (450 45 295) to Recorded   9  Flonase 50 MCG/ACT SUSP (Fluticasone Propionate); USE 2 SPRAYS IN EACH NOSTRIL; Therapy: (450 45 295) to Recorded   10  Lantus 100 UNIT/ML Subcutaneous Solution; 30 units twice daily; Therapy: 18AKI5049 to (Evaluate:89Uln9442)  Requested for: 08KIH7982 Recorded   11  Lipitor 10 MG Oral Tablet (Atorvastatin Calcium); TAKE 1 TABLET DAILY; Therapy: (450 45 295) to Recorded   12  Lovaza 1 GM Oral Capsule (Omega-3-acid Ethyl Esters); TAKE 4 CAPSULES DAILY; Therapy: 93GWF9085 to Recorded   13  Magnesium 500 MG Oral Capsule; Take 1 tablet daily; Therapy: (450 45 295) to Recorded   14  Metoprolol Succinate  MG Oral Tablet Extended Release 24 Hour; TAKE 1/2  TABLET IN THE AM AND 1 TABLET IN THE PM;  Therapy: (Recorded:08Nov2017) to Recorded   15  Namenda XR 28 MG Oral Capsule Extended Release 24 Hour; TAKE ONE CAPSULE BY  MOUTH EVERY DAY; Therapy: 01Apr2015 to (Last Rx:27Jun2017)  Requested for: 83RQL7884 Ordered   16  Norvasc 5 MG Oral Tablet (AmLODIPine Besylate); TAKE 1 TABLET TWICE DAILY; Therapy: 77OTN2965 to Recorded   17  NovoLOG 100 UNIT/ML Subcutaneous Solution; 8 units twice daily; Therapy: (450 45 295) to Recorded   18  Probiotic Oral Capsule; take 1 capsule daily; Therapy: 17NMZ7009 to Recorded   19  Propafenone HCl - 225 MG Oral Tablet; TAKE 3 TABLET Daily; Therapy: (450 45 295) to Recorded   20  Sertraline HCl - 50 MG Oral Tablet; TAKE 1 TABLET DAILY AS DIRECTED; Therapy: 60IXP5050 to (Bernardine Myrtle)  Requested for: 84ISC0690; Last  Rx:08Nov2017 Ordered   21   Vitamin B12 TABS; TAKE 1 TABLET DAILY AS DIRECTED; Therapy: [phone]) to Recorded   22  Vitamin D 1000 UNIT CAPS; TAKE TWO TABLETS DAILY; Therapy: [phone]) to Recorded   23  Xopenex 0 63 MG/3ML Inhalation Nebulization Solution (Levalbuterol HCl); PRN; Therapy: [phone]) to Recorded    The medication list was reviewed and updated today  Allergies  1  Bextra TABS   2  Ambien TABS   3  Lyrica CAPS  4  Dust   5  Grass   6  Mold   7  Trees    Vitals  Vital Signs    Recorded: 20Nov2017 09:10AM   Heart Rate 78   Respiration 16   Systolic 154, LUE, Sitting   Diastolic 84, LUE, Sitting   Height 5 ft 11 in   Weight 225 lb 4 00 oz   BMI Calculated 31 42   BSA Calculated 2 21       Physical Exam   Constitutional: General appearance: No acute distress, well appearing and well nourished  Eyes: Anicteric sclerae  JVD:  No JVD present  Pulmonary: Respiratory effort: No increased work of breathing or signs of respiratory distress  -- Auscultation of lungs: Clear to auscultation  Cardiovascular: Auscultation of heart: Normal rate and rhythm, normal S1 and S2, without murmurs  Abdomen: Non-tender, no masses  Extremities: No cyanosis, clubbing or edema  Rash: No rash present  Neurologic: Non Focal     Psychiatric: Orientation to person, place, and time: Normal  -- and-- Mood and affect: Normal        Future Appointments    Date/Time Provider Specialty Site   02/27/2018 08:30 AM Cardiology, [address] Namita Carney [facility]   12/12/2017 03:00 PM Cardiology, [address] [place] [address]   11/30/2017 09:00 AM JASS Lock   Orthopedic Surgery [facility]       Signatures   Electronically signed by : Monica Bailey DO; Nov 20 2017  9:50AM EST                       (Author)

## 2017-12-12 ENCOUNTER — GENERIC CONVERSION - ENCOUNTER (OUTPATIENT)
Dept: OTHER | Facility: OTHER | Age: 82
End: 2017-12-12

## 2017-12-16 ENCOUNTER — OFFICE VISIT (OUTPATIENT)
Dept: URGENT CARE | Age: 82
End: 2017-12-16
Payer: COMMERCIAL

## 2017-12-16 ENCOUNTER — LAB (OUTPATIENT)
Dept: LAB | Age: 82
End: 2017-12-16
Payer: COMMERCIAL

## 2017-12-16 ENCOUNTER — TRANSCRIBE ORDERS (OUTPATIENT)
Dept: ADMINISTRATIVE | Age: 82
End: 2017-12-16

## 2017-12-16 DIAGNOSIS — R97.20 ELEVATED PROSTATE SPECIFIC ANTIGEN (PSA): ICD-10-CM

## 2017-12-16 DIAGNOSIS — Z79.02 LONG TERM (CURRENT) USE OF ANTITHROMBOTICS/ANTIPLATELETS: Primary | ICD-10-CM

## 2017-12-16 DIAGNOSIS — Z79.02 LONG TERM (CURRENT) USE OF ANTITHROMBOTICS/ANTIPLATELETS: ICD-10-CM

## 2017-12-16 LAB
INR PPP: 1.04 (ref 0.86–1.16)
PROTHROMBIN TIME: 13.6 SECONDS (ref 12.1–14.4)
PSA SERPL-MCNC: 3.5 NG/ML (ref 0–4)

## 2017-12-16 PROCEDURE — 99213 OFFICE O/P EST LOW 20 MIN: CPT | Performed by: FAMILY MEDICINE

## 2017-12-16 PROCEDURE — 84153 ASSAY OF PSA TOTAL: CPT

## 2017-12-16 PROCEDURE — 36415 COLL VENOUS BLD VENIPUNCTURE: CPT

## 2017-12-16 PROCEDURE — G0463 HOSPITAL OUTPT CLINIC VISIT: HCPCS | Performed by: FAMILY MEDICINE

## 2017-12-16 PROCEDURE — 85610 PROTHROMBIN TIME: CPT

## 2017-12-19 NOTE — PROGRESS NOTES
Assessment  1  URTI (acute upper respiratory infection) (465 9) (J06 9)   2  Sinusitis, acute (461 9) (J01 90)    Plan  Sinusitis, acute    · Amoxicillin-Pot Clavulanate 500-125 MG Oral Tablet; TAKE 1 TABLET EVERY 12HOURS UNTIL GONE   · Drink plenty of fluids ; Status:Complete;   Done: [id number]   · Resume activity to your tolerance ; Status:Complete;   Done: 90OJJ3207   · Use a cool mist humidifier in the room ; Status:Complete;   Done: 56HEL7236   · We suggest that you try a probiotic supplement ; Status:Complete;   Done: 77FYG6093    Discussion/Summary  Discussion Summary:   Continue over-the-counter medications as needed for symptomatic care  Medication Side Effects Reviewed: Possible side effects of new medications were reviewed with the patient/guardian today  Understands and agrees with treatment plan: The treatment plan was reviewed with the patient/guardian  The patient/guardian understands and agrees with the treatment plan   Counseling Documentation With Imm: The patient, patient's family was counseled regarding instructions for management,-- prognosis,-- patient and family education,-- impressions,-- risks and benefits of treatment options,-- importance of compliance with treatment  Follow Up Instructions: Follow Up with your Primary Care Provider in 3-4 days  If your symptoms worsen, go to the nearest [place] [address] Emergency Department  Chief Complaint  1  Cold Symptoms  Chief Complaint Free Text Note Form: pt reports   sinusitis 3 weeks ago and now has cold S/S with sinus congestion andpressure for several days      History of Present Illness  HPI: The patient was seen by his primary care physician about 3-4 weeks ago and diagnosed sinusitis and was given amoxicillin  Symptoms did improve slightly but they never fully resolved  He is having more congestion postnasal drip and sinus pressure  The patient is in chronic renal failure  The recent labs reviewed     Hospital Based Practices Required Assessment:  Pain Assessment  the patient states they have pain  The pain is located in the sinus pressure  The patient describes the pain as aching  (on a scale of 0 to 10, the patient rates the pain at 5 )  Abuse And Domestic Violence Screen   Yes, the patient is safe at home  -- The patient states no one is hurting them  Depression And Suicide Screen  No, the patient has not had thoughts of hurting themself  No, the patient has not felt depressed in the past 7 days  Prefered Language is  [place]  Primary Language is  English  Cold Symptoms: Natasha Christopher presents with complaints of cold symptoms  Associated symptoms include nasal congestion,-- post nasal drainage,-- scratchy throat,-- dry cough-- and-- facial pressure, but-- no sneezing,-- no runny nose,-- no sore throat,-- no hoarseness,-- no productive cough,-- no facial pain,-- no headache,-- no plugged ear(s),-- no ear pain,-- no swollen lymph nodes,-- no wheezing,-- no shortness of breath,-- no fatigue,-- no weakness,-- no nausea,-- no vomiting,-- no diarrhea,-- no fever-- and-- no chills  Review of Systems  Focused-Male:  Constitutional: as noted in HPI   ENT: as noted in HPI  Respiratory: as noted in HPI  Active Problems  1  Acute URI (465 9) (J06 9)   2  Aftercare following surgery of the musculoskeletal system (V58 78) (Z47 89)   3  Allergic rhinitis (477 9) (J30 9)   4  Anticoagulant long-term use (V58 61) (Z79 01)   5  Arthritis (716 90) (M19 90)   6  Asthma (493 90) (J45 909)   7  Atrial fibrillation (427 31) (I48 91)   8  B12 deficiency (266 2) (E53 8)   9  Back pain (724 5) (M54 9)   10  Benign essential hypertension (401 1) (I10)   11  Benign prostate hyperplasia (600 00) (N40 0)   12  Bursitis of hip, unspecified laterality   13  Chronic kidney disease, stage 4 (severe) (585 4) (N18 4)   14  Chronic lumbar radiculopathy (724 4) (M54 16)   15  Controlled type 2 diabetes with renal manifestation (250 40) (E11 29)   16  Depression (311) (F32 9)   17  Diabetes mellitus (250 00) (E11 9)   18  Diabetic nephropathy (250 40,583 81) (E11 21)   19  Elevated prostate specific antigen (PSA) (790 93) (R97 20)   20  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,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)   21  GERD (gastroesophageal reflux disease) (530 81) (K21 9)   22  Hearing loss (389 9) (H91 90)   23  Hiatal hernia (553 3) (K44 9)   24  Hyperlipidemia (272 4) (E78 5)   25  Hypertension (401 9) (I10)   26  Limb pain (729 5) (M79 609)   27  Lower back pain (724 2) (M54 5)   28  Lower back pain (724 2) (M54 5)   29  Lumbar canal stenosis (724 02) (M48 061)   30  Nephropathy (583 9) (N28 9)   31  Nephrosclerosis (403 90,587) (I12 9)   32  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)   33  Organic impotence (607 84) (N52 9)   34  Piriformis syndrome, unspecified laterality (355 0) (G57 00)   35  Renal cell carcinoma, unspecified laterality (189 0) (C64 9)   36  Renal Insufficiency (593 9)   37  Sacral disorder (724 6) (M53 3)   38  Sacral insufficiency fracture (733 13) (M84 48XA)   39  Sacroiliitis (720 2) (M46 1)   40  Seasonal allergies (477 9) (J30 2)   41  Sleep apnea (780 57) (G47 30)   42  Solitary kidney, acquired (V45 73) (Z90 5)    Past Medical History  1  History of Anxiety (300 00) (F41 9)   2  History of Asthma (493 90) (J45 909)   3  History of Coronary Artery Disease (V12 59)   4  History of Diabetes Mellitus (250 00)   5  History of hypercholesterolemia (V12 29) (Z86 39)   6  History of hypertension (V12 59) (Z86 79)   7  History of kidney disease (V13 09) (Z87 448)   8  History of malignant melanoma of skin (V10 82) (Z85 820)   9  Personal history of kidney cancer (V10 52) (Z85 528)  Active Problems And Past Medical History Reviewed: The active problems and past medical history were reviewed and updated today  Family History  Father    1  Family history of Brain Cancer (V16 8)   2  Family history of Lung Cancer (V16 1)  Family History    3   Family history of Diabetes Mellitus (V18 0)   4  Family history of Heart Disease (V17 49)   5  Family history of Hypertension (V17 49)   6  Family history of Pain Syndromes   7  Family history of Renal Cell Carcinoma (V16 51)   8  Family history of Stroke Syndrome (V17 1)  Family History Reviewed: The family history was reviewed and updated today  Social History   · Caffeine use (V49 89) (F15 90)   · Hearing loss (389 9) (H91 90)   · Marital History - Currently    · Never A Smoker   · Never Drank Alcohol   · No drug use   · Occupation: Retired  Social History Reviewed: The social history was reviewed and updated today  Surgical History  1  History of Cholecystectomy   2  History of Colonoscopy   3  History of Colonoscopy   4  History of Colonoscopy   5  History of Colonoscopy   6  History of Colonoscopy   7  History of Hernia Repair   8  History of Nephrectomy Left   9  History of Pacemaker Placement   10  History of Rotator Cuff Repair  Surgical History Reviewed: The surgical history was reviewed and updated today  Current Meds   1  Claritin 10 MG Oral Tablet; TAKE 1 TABLET DAILY; Therapy: (Recorded:08Nov2017) to Recorded   2  Coconut Oil 1000 MG Oral Capsule; take 1 capsule daily; Therapy: (Recorded:08Nov2017) to Recorded   3  Coumadin 5 MG Oral Tablet; 2 5mg mon and fri, 5mg all other days; Therapy: [phone]) to Recorded   4  Daily Multivitamin TABS; TAKE 1 TABLET DAILY; Therapy: [phone]) to Recorded   5  Diovan 80 MG CAPS; TAKE 2 CAPSULE at night; Therapy: [phone]) to Recorded   6  Donepezil HCl - 5 MG Oral Tablet; TAKE 1 TABLET TWICE DAILY  Requested for: 36TUC1360; Last Rx:67Egh1578 Ordered   7  Fenofibrate 145 MG Oral Tablet; TAKE 1 TABLET DAILY; Therapy: [phone]) to Recorded   8  Ferrous Sulfate 325 (65 Fe) MG Oral Tablet; TAKE 1 TABLET DAILY WITH FOOD; Therapy: [phone]) to Recorded   9   Flonase 50 MCG/ACT SUSP; USE 2 SPRAYS IN [facility] NOSTRIL; Therapy: (0914-6873026) to Recorded   10  Lantus 100 UNIT/ML Subcutaneous Solution; 30 units twice daily; Therapy: 94CMS9137 to (Evaluate:43Skh5319)  Requested for: 55JAF7252 Recorded   11  Lipitor 10 MG Oral Tablet; TAKE 1 TABLET DAILY; Therapy: (0914-6873026) to Recorded   12  Lovaza 1 GM Oral Capsule; TAKE 4 CAPSULES DAILY; Therapy: 85BQQ9601 to Recorded   13  Magnesium 500 MG Oral Capsule; Take 1 tablet daily; Therapy: (0914-6873026) to Recorded   14  Metoprolol Succinate  MG Oral Tablet Extended Release 24 Hour; TAKE 1/2  TABLET IN THE AM AND 1 TABLET IN THE PM;  Therapy: (Recorded:08Nov2017) to Recorded   15  Namenda XR 28 MG Oral Capsule Extended Release 24 Hour; TAKE ONE CAPSULE BY  MOUTH EVERY DAY; Therapy: 01Apr2015 to (Last Rx:27Jun2017)  Requested for: 44XPG2751 Ordered   16  Norvasc 5 MG Oral Tablet; TAKE 1 TABLET TWICE DAILY; Therapy: 36VYZ6424 to Recorded   17  NovoLOG 100 UNIT/ML Subcutaneous Solution; 8 units twice daily; Therapy: (0914-6873026) to Recorded   18  Probiotic Oral Capsule; take 1 capsule daily; Therapy: 96SSL9259 to Recorded   19  Propafenone HCl - 225 MG Oral Tablet; TAKE 3 TABLET Daily; Therapy: (0914-6873026) to Recorded   20  Sertraline HCl - 50 MG Oral Tablet; TAKE 1 TABLET DAILY AS DIRECTED; Therapy: 04RYK6757 to (Radha Hamilton)  Requested for: 58FZE6908; Last  Rx:08Nov2017 Ordered   21  Vitamin B12 TABS; TAKE 1 TABLET DAILY AS DIRECTED; Therapy: (0914-6873026) to Recorded   22  Vitamin D 1000 UNIT CAPS; TAKE TWO TABLETS DAILY; Therapy: (0914-6873026) to Recorded   23  Xopenex 0 63 MG/3ML Inhalation Nebulization Solution; PRN; Therapy: (0914-6873026) to Recorded  Medication List Reviewed: The medication list was reviewed and updated today  Allergies  1  Bextra TABS   2  Ambien TABS   3  Lyrica CAPS  4  Dust   5  Grass   6  Mold   7   Trees    Vitals  Signs   Recorded: 43HCG8277 11:40AM Temperature: 97 1 F, Tympanic  Heart Rate: 64, L Radial  Pulse Quality: Regular, L Radial  Respiration Quality: Normal  Respiration: 18  Systolic: 648, LUE, Sitting  Diastolic: 78, LUE, Sitting  Height: 5 ft 11 in  Weight: 225 lb   BMI Calculated: 31 38  BSA Calculated: 2 22  O2 Saturation: 95, RA  Pain Scale: 5/10    Physical Exam   Constitutional  General appearance: No acute distress, well appearing and well nourished  Eyes  Conjunctiva and lids: No swelling, erythema, or discharge  Pupils and irises: Equal, round and reactive to light  Ears, Nose, Mouth, and Throat  External inspection of ears and nose: Normal  -- TMs intact bilaterally with clear fluid in the middle ear bilaterally  No erythema  -- Bilateral nasal congestion and erythema  Mucopurulent drainage present bilaterally  -- Bilateral tonsillar erythema with no soft tissue swelling no exudate  Pulmonary  Respiratory effort: No increased work of breathing or signs of respiratory distress  Auscultation of lungs: Clear to auscultation  Lymphatic  Palpation of lymph nodes in neck: Abnormal   bilateral anterior cervical node enlargement, but-- no posterior cervical node enlargement,-- no submandibular node enlargement-- and-- no supraclavicular node enlargement  Psychiatric  Orientation to person, place and time: Normal    Mood and affect: Normal        Provider Comments  Provider Comments:   Patient placed on lower dose Augmentin 500 mg twice daily due to his estimated GFR at 26      Future Appointments    Date/Time Provider Specialty Site   02/27/2018 08:30 AM Cardiology, 2021 Namita Carney [facility]   03/01/2018 04:45 PM JASS Marion   Urology [facility] [place]     Signatures   Electronically signed by : Ace Velasquez, Puja Rosenberg; Dec 16 2017 12:37PM EST                       (Author)    Electronically signed by : Enmanuel Hewitt DO; Dec 18 2017  3:35PM EST                       (Co-author)

## 2018-01-06 ENCOUNTER — LAB (OUTPATIENT)
Dept: LAB | Age: 83
End: 2018-01-06
Payer: COMMERCIAL

## 2018-01-06 ENCOUNTER — TRANSCRIBE ORDERS (OUTPATIENT)
Dept: ADMINISTRATIVE | Age: 83
End: 2018-01-06

## 2018-01-06 DIAGNOSIS — Z92.29 HISTORY OF COUMADIN THERAPY: ICD-10-CM

## 2018-01-06 DIAGNOSIS — I48.0 PAF (PAROXYSMAL ATRIAL FIBRILLATION) (HCC): ICD-10-CM

## 2018-01-06 DIAGNOSIS — I48.0 PAF (PAROXYSMAL ATRIAL FIBRILLATION) (HCC): Primary | ICD-10-CM

## 2018-01-06 LAB
INR PPP: 2.37 (ref 0.86–1.16)
PROTHROMBIN TIME: 26.2 SECONDS (ref 12.1–14.4)

## 2018-01-06 PROCEDURE — 85610 PROTHROMBIN TIME: CPT

## 2018-01-06 PROCEDURE — 36415 COLL VENOUS BLD VENIPUNCTURE: CPT

## 2018-01-12 VITALS
WEIGHT: 222.38 LBS | SYSTOLIC BLOOD PRESSURE: 142 MMHG | DIASTOLIC BLOOD PRESSURE: 76 MMHG | BODY MASS INDEX: 31.13 KG/M2 | HEIGHT: 71 IN

## 2018-01-13 VITALS
DIASTOLIC BLOOD PRESSURE: 72 MMHG | BODY MASS INDEX: 31.52 KG/M2 | OXYGEN SATURATION: 95 % | WEIGHT: 225.19 LBS | HEART RATE: 90 BPM | SYSTOLIC BLOOD PRESSURE: 124 MMHG | HEIGHT: 71 IN

## 2018-01-13 VITALS
DIASTOLIC BLOOD PRESSURE: 78 MMHG | SYSTOLIC BLOOD PRESSURE: 122 MMHG | RESPIRATION RATE: 16 BRPM | HEART RATE: 72 BPM | HEIGHT: 71 IN | BODY MASS INDEX: 30.96 KG/M2 | WEIGHT: 221.13 LBS

## 2018-01-14 VITALS
SYSTOLIC BLOOD PRESSURE: 130 MMHG | DIASTOLIC BLOOD PRESSURE: 64 MMHG | HEIGHT: 71 IN | BODY MASS INDEX: 31.78 KG/M2 | HEART RATE: 75 BPM | WEIGHT: 227 LBS

## 2018-01-14 VITALS
HEIGHT: 71 IN | BODY MASS INDEX: 31.53 KG/M2 | SYSTOLIC BLOOD PRESSURE: 136 MMHG | RESPIRATION RATE: 16 BRPM | DIASTOLIC BLOOD PRESSURE: 84 MMHG | WEIGHT: 225.25 LBS | HEART RATE: 78 BPM

## 2018-01-18 NOTE — RESULT NOTES
Verified Results  [facility] [id number] 08:19AM Cristopher Gardiner Order Number: [id number]    - Patient Instructions: To schedule this appointment, please contact Central Scheduling at [phone]   Order Number: [id number]    - Patient Instructions: To schedule this appointment, please contact Central Scheduling at [phone]  Test Name Result Flag Reference   US KIDNEY AND BLADDER (Report)     RENAL ULTRASOUND     INDICATION: History of left nephrectomy for renal cell carcinoma  COMPARISON: 11/23/2011     TECHNIQUE:  Ultrasound of the retroperitoneum was performed with a curvilinear transducer utilizing volumetric sweeps and still imaging techniques  FINDINGS:     KIDNEYS:         Right kidney: 13 0 x 5 4 cm  Normal echogenicity and contour  No suspicious masses detected  Right renal cysts measuring up to 30 mm noted  No hydronephrosis  No shadowing calculi  No perinephric fluid collections  Left kidney: Surgically absent  Left renal bed grossly unremarkable  URETERS:   Nonvisualized  BLADDER:    Normally distended  No focal thickening or mass lesions  Right ureteral jet noted  Mild prostatomegaly is present  IMPRESSION:     Stable appearance status post left nephrectomy  Multiple right renal cysts  Workstation performed: [id number]     Signed by:    Rocael Schaefer MD   10/4/16

## 2018-01-23 VITALS
OXYGEN SATURATION: 95 % | SYSTOLIC BLOOD PRESSURE: 124 MMHG | WEIGHT: 225 LBS | RESPIRATION RATE: 18 BRPM | TEMPERATURE: 97.1 F | HEIGHT: 71 IN | HEART RATE: 64 BPM | BODY MASS INDEX: 31.5 KG/M2 | DIASTOLIC BLOOD PRESSURE: 78 MMHG

## 2018-02-07 ENCOUNTER — ANESTHESIA (OUTPATIENT)
Dept: GASTROENTEROLOGY | Facility: HOSPITAL | Age: 83
End: 2018-02-07
Payer: COMMERCIAL

## 2018-02-07 ENCOUNTER — ANESTHESIA EVENT (OUTPATIENT)
Dept: GASTROENTEROLOGY | Facility: HOSPITAL | Age: 83
End: 2018-02-07
Payer: COMMERCIAL

## 2018-02-07 ENCOUNTER — HOSPITAL ENCOUNTER (OUTPATIENT)
Facility: HOSPITAL | Age: 83
Setting detail: OUTPATIENT SURGERY
Discharge: HOME | End: 2018-02-07
Attending: INTERNAL MEDICINE | Admitting: INTERNAL MEDICINE
Payer: COMMERCIAL

## 2018-02-07 VITALS
TEMPERATURE: 97.7 F | WEIGHT: 216 LBS | HEIGHT: 71 IN | HEART RATE: 60 BPM | DIASTOLIC BLOOD PRESSURE: 62 MMHG | BODY MASS INDEX: 30.24 KG/M2 | RESPIRATION RATE: 20 BRPM | OXYGEN SATURATION: 93 % | SYSTOLIC BLOOD PRESSURE: 122 MMHG

## 2018-02-07 LAB — GLUCOSE SERPL-MCNC: 255 MG/DL (ref 65–140)

## 2018-02-07 PROCEDURE — 82948 REAGENT STRIP/BLOOD GLUCOSE: CPT

## 2018-02-07 PROCEDURE — PBSUR PB OPERATIVE NOTE CHARGE PLACEHOLDER: Performed by: INTERNAL MEDICINE

## 2018-02-07 RX ORDER — OMEGA-3-ACID ETHYL ESTERS 1 G/1
1 CAPSULE, LIQUID FILLED ORAL 4 TIMES DAILY
COMMUNITY
End: 2020-11-09

## 2018-02-07 RX ORDER — CHOLECALCIFEROL (VITAMIN D3) 125 MCG
500 CAPSULE ORAL DAILY
COMMUNITY
End: 2022-06-29 | Stop reason: ALTCHOICE

## 2018-02-07 RX ORDER — PROPOFOL 10 MG/ML
INJECTION, EMULSION INTRAVENOUS CONTINUOUS PRN
Status: DISCONTINUED | OUTPATIENT
Start: 2018-02-07 | End: 2018-02-07 | Stop reason: SURG

## 2018-02-07 RX ORDER — MELATONIN
2000 DAILY
COMMUNITY

## 2018-02-07 RX ORDER — PROPOFOL 10 MG/ML
INJECTION, EMULSION INTRAVENOUS AS NEEDED
Status: DISCONTINUED | OUTPATIENT
Start: 2018-02-07 | End: 2018-02-07 | Stop reason: SURG

## 2018-02-07 RX ORDER — METOPROLOL TARTRATE 100 MG/1
100 TABLET ORAL EVERY MORNING
COMMUNITY
End: 2020-07-13 | Stop reason: ALTCHOICE

## 2018-02-07 RX ORDER — AMLODIPINE BESYLATE 5 MG/1
2.5 TABLET ORAL DAILY
COMMUNITY
End: 2019-12-18 | Stop reason: SDUPTHER

## 2018-02-07 RX ORDER — SODIUM CHLORIDE 9 MG/ML
125 INJECTION, SOLUTION INTRAVENOUS CONTINUOUS
Status: DISCONTINUED | OUTPATIENT
Start: 2018-02-07 | End: 2018-02-07 | Stop reason: HOSPADM

## 2018-02-07 RX ORDER — AMOXICILLIN 500 MG/1
500 CAPSULE ORAL EVERY 12 HOURS SCHEDULED
COMMUNITY
End: 2019-05-06 | Stop reason: ALTCHOICE

## 2018-02-07 RX ORDER — MULTIVITAMIN
1 CAPSULE ORAL DAILY
COMMUNITY
End: 2020-11-10

## 2018-02-07 RX ORDER — FLUTICASONE PROPIONATE 50 MCG
1 SPRAY, SUSPENSION (ML) NASAL DAILY
COMMUNITY
End: 2022-06-29 | Stop reason: CLARIF

## 2018-02-07 RX ADMIN — PROPOFOL 100 MCG/KG/MIN: 10 INJECTION, EMULSION INTRAVENOUS at 09:04

## 2018-02-07 RX ADMIN — PROPOFOL 80 MG: 10 INJECTION, EMULSION INTRAVENOUS at 09:04

## 2018-02-07 RX ADMIN — SODIUM CHLORIDE 125 ML/HR: 0.9 INJECTION, SOLUTION INTRAVENOUS at 07:44

## 2018-02-07 RX ADMIN — TOPICAL ANESTHETIC 1 SPRAY: 200 SPRAY DENTAL; PERIODONTAL at 09:04

## 2018-02-07 RX ADMIN — PROPOFOL 20 MG: 10 INJECTION, EMULSION INTRAVENOUS at 09:06

## 2018-02-07 NOTE — ANESTHESIA PREPROCEDURE EVALUATION
Review of Systems/Medical History  Patient summary reviewed  Chart reviewed  No history of anesthetic complications     Cardiovascular  EKG reviewed, Pacemaker/AICD, Hyperlipidemia, Hypertension ,    Pulmonary  Asthma: , Sleep apnea CPAP,        GI/Hepatic       Single kidney,        Endo/Other  Diabetes type 2 ,      GYN       Hematology   Musculoskeletal       Neurology    TIA,   Comment: Frontal lobe dementia   Psychology           Physical Exam    Airway    Mallampati score: II  TM Distance: >3 FB  Neck ROM: full     Dental   No notable dental hx     Cardiovascular  Cardiovascular exam normal    Pulmonary  Pulmonary exam normal Breath sounds clear to auscultation,     Other Findings        Anesthesia Plan  ASA Score- 3     Anesthesia Type- IV sedation with anesthesia with ASA Monitors  Additional Monitors:   Airway Plan:         Plan Factors- Patient instructed to abstain from smoking on day of procedure  Patient did not smoke on day of surgery  Induction- intravenous  Postoperative Plan-     Informed Consent- Anesthetic plan and risks discussed with patient  I personally reviewed this patient with the CRNA  Discussed and agreed on the Anesthesia Plan with the CRNA             Lab Results   Component Value Date    WBC 6 21 11/04/2017    HGB 13 1 11/04/2017    HCT 38 9 11/04/2017    MCV 86 11/04/2017     11/04/2017     Lab Results   Component Value Date    GLUCOSE 151 (H) 03/11/2017    CALCIUM 9 0 11/04/2017     (L) 11/04/2017    K 4 8 11/04/2017    CO2 28 11/04/2017     11/04/2017    BUN 31 (H) 11/04/2017    CREATININE 2 53 (H) 11/04/2017     Lab Results   Component Value Date    INR 2 37 (H) 01/06/2018    INR 1 04 12/16/2017    INR 1 42 (H) 10/07/2017    PROTIME 26 2 (H) 01/06/2018    PROTIME 13 6 12/16/2017    PROTIME 17 4 (H) 10/07/2017     Lab Results   Component Value Date    PTT 64 (H) 08/08/2014     Type and Screen:  O        I, Dr Nima Fernandez, the attending physician, have personally seen and evaluated the patient prior to anesthetic care  I have reviewed the pre-anesthetic record, and other medical records if appropriate to the anesthetic care  If a CRNA is involved in the case, I have reviewed the CRNA assessment, if present, and agree  The patient is in a suitable condition to proceed with my formulated anesthetic plan

## 2018-02-07 NOTE — H&P
H&P EXAM - Outpatient Endoscopy   Hans Pelayo 80 y o  male MRN: [id number]    [address] LAB PRE/POST   Encounter: [id number]        John Le[address]  Plan:egd    Chief Complaint: difffucult swallowing    Physical Exam: nl   Chest: clear   Heart: regular    Achalasia

## 2018-02-07 NOTE — OP NOTE
**** GI/ENDOSCOPY REPORT ****     PATIENT NAME: Francine Melgar - VISIT ID:  Patient ID: [id number] YOB: 1935     INTRODUCTION: Esophagogastroduodenoscopy - A 80 male patient presents for   an outpatient Esophagogastroduodenoscopy at [address] [place] [address]  INDICATIONS: Botox for achalasia in the esophagus  CONSENT: The benefits, risks, and alternatives to the procedure were   discussed and informed consent was obtained from the patient  PREPARATION:  EKG, pulse, pulse oximetry and blood pressure were monitored   throughout the procedure  MEDICATIONS:     PROCEDURE:  The endoscope was passed without difficulty through the mouth   under direct visualization and advanced to the 2nd portion of the   duodenum  The scope was withdrawn and the mucosa was carefully examined  FINDINGS:   Esophagus: Achalasia  Drug delivery for therapy was performed   using 200u in20 injestions botulinum toxin (BOTOX)  Stomach: The stomach   appeared to be normal   Duodenum: The duodenum appeared to be normal      COMPLICATIONS: There were no complications  IMPRESSIONS: Achalasia  Drug delivery was performed  Normal stomach  Normal duodenum  RECOMMENDATIONS: Discharge home when standard parameters are met  Resume   regular diet as tolerated  Follow-up appointment with Dr Anselmo Valencia  ESTIMATED BLOOD LOSS:     PATHOLOGY SPECIMENS:     PROCEDURE CODES:     ICD-9 Codes:     ICD-10 Codes:     PERFORMED BY: JASS Sanderson  on 02/07/2018  Version 1, electronically signed by JASS Mackay , D O  on   02/07/2018 at 09:19

## 2018-02-07 NOTE — ANESTHESIA POSTPROCEDURE EVALUATION
Post-Op Assessment Note      CV Status:  Stable    Mental Status:  Lethargic    Hydration Status:  Euvolemic    PONV Controlled:  Controlled    Airway Patency:  Patent    Post Op Vitals Reviewed: Yes          Staff: AnesthesiologistKEENA           /68 (02/07/18 0923)    Temp      Pulse 66 (02/07/18 0923)   Resp 20 (02/07/18 0923)    SpO2 95 % (02/07/18 0923)

## 2018-02-17 ENCOUNTER — TRANSCRIBE ORDERS (OUTPATIENT)
Dept: ADMINISTRATIVE | Age: 83
End: 2018-02-17

## 2018-02-17 ENCOUNTER — LAB (OUTPATIENT)
Dept: LAB | Age: 83
End: 2018-02-17
Payer: COMMERCIAL

## 2018-02-17 DIAGNOSIS — I48.0 PAF (PAROXYSMAL ATRIAL FIBRILLATION) (HCC): ICD-10-CM

## 2018-02-17 DIAGNOSIS — I10 ESSENTIAL HYPERTENSION, MALIGNANT: ICD-10-CM

## 2018-02-17 DIAGNOSIS — E78.5 HYPERLIPIDEMIA, UNSPECIFIED HYPERLIPIDEMIA TYPE: Primary | ICD-10-CM

## 2018-02-17 DIAGNOSIS — E78.5 HYPERLIPIDEMIA, UNSPECIFIED HYPERLIPIDEMIA TYPE: ICD-10-CM

## 2018-02-17 LAB
ALBUMIN SERPL BCP-MCNC: 3.6 G/DL (ref 3.5–5)
ALP SERPL-CCNC: 59 U/L (ref 46–116)
ALT SERPL W P-5'-P-CCNC: 31 U/L (ref 12–78)
ANION GAP SERPL CALCULATED.3IONS-SCNC: 5 MMOL/L (ref 4–13)
AST SERPL W P-5'-P-CCNC: 19 U/L (ref 5–45)
BILIRUB SERPL-MCNC: 0.44 MG/DL (ref 0.2–1)
BUN SERPL-MCNC: 28 MG/DL (ref 5–25)
CALCIUM SERPL-MCNC: 9.3 MG/DL (ref 8.3–10.1)
CHLORIDE SERPL-SCNC: 103 MMOL/L (ref 100–108)
CHOLEST SERPL-MCNC: 164 MG/DL (ref 50–200)
CO2 SERPL-SCNC: 30 MMOL/L (ref 21–32)
CREAT SERPL-MCNC: 2.35 MG/DL (ref 0.6–1.3)
ERYTHROCYTE [DISTWIDTH] IN BLOOD BY AUTOMATED COUNT: 13.4 % (ref 11.6–15.1)
EST. AVERAGE GLUCOSE BLD GHB EST-MCNC: 200 MG/DL
GFR SERPL CREATININE-BSD FRML MDRD: 25 ML/MIN/1.73SQ M
GLUCOSE P FAST SERPL-MCNC: 192 MG/DL (ref 65–99)
HBA1C MFR BLD: 8.6 % (ref 4.2–6.3)
HCT VFR BLD AUTO: 40.3 % (ref 36.5–49.3)
HDLC SERPL-MCNC: 26 MG/DL (ref 40–60)
HGB BLD-MCNC: 14 G/DL (ref 12–17)
INR PPP: 2.22 (ref 0.86–1.16)
MCH RBC QN AUTO: 29.9 PG (ref 26.8–34.3)
MCHC RBC AUTO-ENTMCNC: 34.7 G/DL (ref 31.4–37.4)
MCV RBC AUTO: 86 FL (ref 82–98)
PLATELET # BLD AUTO: 243 THOUSANDS/UL (ref 149–390)
PMV BLD AUTO: 10.4 FL (ref 8.9–12.7)
POTASSIUM SERPL-SCNC: 4.5 MMOL/L (ref 3.5–5.3)
PROT SERPL-MCNC: 8.1 G/DL (ref 6.4–8.2)
PROTHROMBIN TIME: 24.9 SECONDS (ref 12.1–14.4)
RBC # BLD AUTO: 4.68 MILLION/UL (ref 3.88–5.62)
SODIUM SERPL-SCNC: 138 MMOL/L (ref 136–145)
TRIGL SERPL-MCNC: 490 MG/DL
WBC # BLD AUTO: 6.11 THOUSAND/UL (ref 4.31–10.16)

## 2018-02-17 PROCEDURE — 85610 PROTHROMBIN TIME: CPT

## 2018-02-17 PROCEDURE — 80061 LIPID PANEL: CPT

## 2018-02-17 PROCEDURE — 85027 COMPLETE CBC AUTOMATED: CPT

## 2018-02-17 PROCEDURE — 80053 COMPREHEN METABOLIC PANEL: CPT

## 2018-02-17 PROCEDURE — 83036 HEMOGLOBIN GLYCOSYLATED A1C: CPT

## 2018-02-17 PROCEDURE — 36415 COLL VENOUS BLD VENIPUNCTURE: CPT

## 2018-02-27 ENCOUNTER — IN-CLINIC DEVICE VISIT (OUTPATIENT)
Dept: CARDIOLOGY CLINIC | Facility: CLINIC | Age: 83
End: 2018-02-27
Payer: COMMERCIAL

## 2018-02-27 DIAGNOSIS — Z95.0 PRESENCE OF CARDIAC PACEMAKER: ICD-10-CM

## 2018-02-27 DIAGNOSIS — I49.5 SICK SINUS SYNDROME (HCC): Primary | ICD-10-CM

## 2018-02-27 PROCEDURE — 93280 PM DEVICE PROGR EVAL DUAL: CPT | Performed by: INTERNAL MEDICINE

## 2018-03-05 DIAGNOSIS — N18.4 CHRONIC KIDNEY DISEASE, STAGE IV (SEVERE) (HCC): ICD-10-CM

## 2018-03-09 ENCOUNTER — TELEPHONE (OUTPATIENT)
Dept: NEUROLOGY | Facility: CLINIC | Age: 83
End: 2018-03-09

## 2018-03-12 DIAGNOSIS — F03.90 DEMENTIA WITHOUT BEHAVIORAL DISTURBANCE, UNSPECIFIED DEMENTIA TYPE (HCC): Primary | ICD-10-CM

## 2018-03-12 RX ORDER — MEMANTINE HYDROCHLORIDE 28 MG/1
CAPSULE, EXTENDED RELEASE ORAL
Qty: 90 CAPSULE | Refills: 3 | Status: SHIPPED | OUTPATIENT
Start: 2018-03-12 | End: 2019-06-11 | Stop reason: SDUPTHER

## 2018-03-17 ENCOUNTER — LAB (OUTPATIENT)
Dept: LAB | Age: 83
End: 2018-03-17
Payer: COMMERCIAL

## 2018-03-17 ENCOUNTER — TRANSCRIBE ORDERS (OUTPATIENT)
Dept: ADMINISTRATIVE | Age: 83
End: 2018-03-17

## 2018-03-17 DIAGNOSIS — N18.4 CHRONIC KIDNEY DISEASE, STAGE IV (SEVERE) (HCC): ICD-10-CM

## 2018-03-17 DIAGNOSIS — I48.0 PAF (PAROXYSMAL ATRIAL FIBRILLATION) (HCC): ICD-10-CM

## 2018-03-17 DIAGNOSIS — I48.0 PAF (PAROXYSMAL ATRIAL FIBRILLATION) (HCC): Primary | ICD-10-CM

## 2018-03-17 LAB
ALBUMIN SERPL BCP-MCNC: 3.5 G/DL (ref 3.5–5)
ANION GAP SERPL CALCULATED.3IONS-SCNC: 7 MMOL/L (ref 4–13)
BUN SERPL-MCNC: 27 MG/DL (ref 5–25)
CALCIUM SERPL-MCNC: 9 MG/DL (ref 8.3–10.1)
CHLORIDE SERPL-SCNC: 102 MMOL/L (ref 100–108)
CO2 SERPL-SCNC: 27 MMOL/L (ref 21–32)
CREAT SERPL-MCNC: 2.31 MG/DL (ref 0.6–1.3)
CREAT UR-MCNC: 114 MG/DL
ERYTHROCYTE [DISTWIDTH] IN BLOOD BY AUTOMATED COUNT: 13.1 % (ref 11.6–15.1)
GFR SERPL CREATININE-BSD FRML MDRD: 25 ML/MIN/1.73SQ M
GLUCOSE SERPL-MCNC: 214 MG/DL (ref 65–140)
HCT VFR BLD AUTO: 38.8 % (ref 36.5–49.3)
HGB BLD-MCNC: 13.4 G/DL (ref 12–17)
INR PPP: 2.22 (ref 0.86–1.16)
MCH RBC QN AUTO: 29.3 PG (ref 26.8–34.3)
MCHC RBC AUTO-ENTMCNC: 34.5 G/DL (ref 31.4–37.4)
MCV RBC AUTO: 85 FL (ref 82–98)
PHOSPHATE SERPL-MCNC: 3 MG/DL (ref 2.3–4.1)
PLATELET # BLD AUTO: 230 THOUSANDS/UL (ref 149–390)
PMV BLD AUTO: 10.8 FL (ref 8.9–12.7)
POTASSIUM SERPL-SCNC: 4.3 MMOL/L (ref 3.5–5.3)
PROT UR-MCNC: 38 MG/DL
PROT/CREAT UR: 0.33 MG/G{CREAT} (ref 0–0.1)
PROTHROMBIN TIME: 24.9 SECONDS (ref 12.1–14.4)
PTH-INTACT SERPL-MCNC: 33.8 PG/ML (ref 18.4–80.1)
RBC # BLD AUTO: 4.58 MILLION/UL (ref 3.88–5.62)
SODIUM SERPL-SCNC: 136 MMOL/L (ref 136–145)
WBC # BLD AUTO: 6.35 THOUSAND/UL (ref 4.31–10.16)

## 2018-03-17 PROCEDURE — 83970 ASSAY OF PARATHORMONE: CPT

## 2018-03-17 PROCEDURE — 36415 COLL VENOUS BLD VENIPUNCTURE: CPT

## 2018-03-17 PROCEDURE — 84156 ASSAY OF PROTEIN URINE: CPT

## 2018-03-17 PROCEDURE — 82570 ASSAY OF URINE CREATININE: CPT

## 2018-03-17 PROCEDURE — 80069 RENAL FUNCTION PANEL: CPT

## 2018-03-17 PROCEDURE — 85610 PROTHROMBIN TIME: CPT

## 2018-03-17 PROCEDURE — 85027 COMPLETE CBC AUTOMATED: CPT

## 2018-03-27 ENCOUNTER — OFFICE VISIT (OUTPATIENT)
Dept: UROLOGY | Facility: AMBULATORY SURGERY CENTER | Age: 83
End: 2018-03-27
Payer: COMMERCIAL

## 2018-03-27 VITALS
HEART RATE: 90 BPM | SYSTOLIC BLOOD PRESSURE: 128 MMHG | DIASTOLIC BLOOD PRESSURE: 80 MMHG | HEIGHT: 71 IN | WEIGHT: 224.2 LBS | BODY MASS INDEX: 31.39 KG/M2

## 2018-03-27 DIAGNOSIS — N40.1 BENIGN PROSTATIC HYPERPLASIA WITH LOWER URINARY TRACT SYMPTOMS, SYMPTOM DETAILS UNSPECIFIED: Primary | ICD-10-CM

## 2018-03-27 PROCEDURE — 99214 OFFICE O/P EST MOD 30 MIN: CPT | Performed by: UROLOGY

## 2018-03-27 RX ORDER — METOPROLOL SUCCINATE 100 MG/1
TABLET, EXTENDED RELEASE ORAL
COMMUNITY
Start: 2018-01-31 | End: 2019-04-16 | Stop reason: SDUPTHER

## 2018-03-27 NOTE — PROGRESS NOTES
3/27/2018    Jesus Alberto Beavers  1935  [id number]    Discussion and Plan    Patient continues to do well with stable urinary pattern  PSA results are reviewed with patient and wife which does show gradual progression however given his age and medical comorbidities this will be observed  Examination was otherwise normal   Plan will be for follow-up in 1 year with PSA prior to visit  All questions answered at this time  1  Benign prostatic hyperplasia with lower urinary tract symptoms, symptom details unspecified  - PSA Total, Diagnostic; Future      Assessment      Patient Active Problem List   Diagnosis    Benign prostate hyperplasia    Elevated prostate specific antigen (PSA)       History of Present Illness    Conner Shankar is a 80 y o  male seen today in regards to a history of elevated PSA and BPH  Current PSA is 3 5  He has been doing well  He denies any hematuria, dysuria or nocturia  He reports his stream is good and he feels empty when he voids  Otherwise, he has no changes in his health  He has a history of a left nephrectomy secondary to [address] in 2005      Urinary Symptom Assessment        Past Medical History  Past Medical History:   Diagnosis Date    Anxiety     Aspiration of liquid     and food per wife if he is eating too fast or talking when eating    Asthma     as a child    Atrial fibrillation ([address]     CAD (coronary artery disease)     Cancer of kidney ([address] )     CPAP (continuous positive airway pressure) dependence     Dementia     Frontal lobe    Diabetes mellitus ([address] )     Hypercholesterolemia     Hyperlipidemia     Hypertension     Kidney disease     Melanoma ([address] )     left leg    Sleep apnea     wears CPAP    TIA (transient ischemic attack)        Past Social History  Past Surgical History:   Procedure Laterality Date    CARDIAC PACEMAKER PLACEMENT      CHOLECYSTECTOMY      COLONOSCOPY      HERNIA REPAIR      NEPHRECTOMY Left 2005    [place] ESOPHAGOGASTRODUODENOSCOPY SUBMUCOSAL INJECTION N/A 9/21/2016    Procedure: ESOPHAGOGASTRODUODENOSCOPY (EGD); Surgeon: Tadeo Mercado MD;  Location: [facility]; Service: Gastroenterology    [place] ESOPHAGOGASTRODUODENOSCOPY SUBMUCOSAL INJECTION N/A 2/7/2018    Procedure: ESOPHAGOGASTRODUODENOSCOPY (EGD); Surgeon: Tadeo Mercado MD;  Location: [facility]; Service: Gastroenterology    ROTATOR CUFF REPAIR      TONSILLECTOMY         Past Family History  Family History   Problem Relation Age of Onset    Brain cancer Father     Lung cancer Father     Diabetes Family     Heart disease Family     Hypertension Family     Cancer Family      renal cell carcinoma    Stroke Family        Past Social history  Social History     Social History    Marital status: /Civil Union     Spouse name: N/A    Number of children: N/A    Years of education: N/A     Occupational History    Not on file  Social History Main Topics    Smoking status: Never Smoker    Smokeless tobacco: Never Used    Alcohol use No    Drug use: No    Sexual activity: Not on file     Other Topics Concern    Not on file     Social History Narrative    No narrative on file       Current Medications  Current Outpatient Prescriptions   Medication Sig Dispense Refill    sertraline (ZOLOFT) 50 mg tablet Take 1 tablet by mouth daily      amLODIPine (NORVASC) 5 mg tablet Take 5 mg by mouth 2 (two) times a day      amoxicillin (AMOXIL) 500 mg capsule Take 500 mg by mouth every 12 (twelve) hours      atorvastatin (LIPITOR) 10 mg tablet Take 10 mg by mouth daily   cholecalciferol (VITAMIN D3) 1,000 units tablet Take 2,000 Units by mouth daily      cyanocobalamin (VITAMIN B-12) 500 mcg tablet Take 500 mcg by mouth daily      docusate sodium (COLACE) 100 mg capsule Take 100 mg by mouth 3 (three) times a day   donepezil (ARICEPT) 5 mg tablet Take 10 mg by mouth daily        fenofibrate (TRICOR) 145 mg tablet Take 145 mg by mouth daily        ferrous sulfate 325 (65 Fe) mg tablet Take 325 mg by mouth daily with breakfast       fluticasone (FLONASE) 50 mcg/act nasal spray 1 spray into each nostril daily      insulin aspart (NovoLOG) 100 units/mL injection Inject 8 Units under the skin 2 (two) times a day before meals   insulin glargine (LANTUS) 100 units/mL subcutaneous injection Inject 30 Units under the skin 2 (two) times a day        ipratropium (ATROVENT HFA) 17 mcg/act inhaler Inhale 2 puffs every 6 (six) hours   levalbuterol (XOPENEX) 0 63 mg/3 mL nebulizer solution Take 1 ampule by nebulization 2 (two) times a day as needed for wheezing   loratadine (CLARITIN) 10 mg tablet Take 10 mg by mouth daily   magnesium gluconate (MAGONATE) 500 mg tablet Take 500 mg by mouth daily   Memantine HCl ER (NAMENDA XR) 28 MG CP24 1 po qd 90 capsule 3    metoprolol succinate (TOPROL-XL) 100 mg 24 hr tablet       metoprolol tartrate (LOPRESSOR) 100 mg tablet Take 100 mg by mouth every evening        metoprolol tartrate (LOPRESSOR) 100 mg tablet Take 25 mg by mouth every morning      Multiple Vitamin (MULTIVITAMIN) capsule Take 1 capsule by mouth daily      omega-3-acid ethyl esters (LOVAZA) 1 g capsule Take 360 mg by mouth daily      Probiotic Product (PROBIOTIC COLON SUPPORT PO) Take 1 tablet by mouth daily      propafenone (RYTHMOL) 225 mg tablet Take 225 mg by mouth every 8 (eight) hours   valsartan (DIOVAN) 80 mg tablet Take 160 mg by mouth daily        warfarin (COUMADIN) 2 5 mg tablet Take 2 5 mg by mouth 3 (three) times a week Indications: Mon wed Fri  Aileen Valenzuela warfarin (COUMADIN) 5 mg tablet Take 5 mg by mouth 4 (four) times a day Indications: Tues  Thurs  , Sat , Sun  No current facility-administered medications for this visit          Allergies  Allergies   Allergen Reactions    Ambien [Zolpidem Tartrate]     Bextra [Valdecoxib] Hives    Dust Mite Extract     Lyrica [Pregabalin]     Mold Extract  [Trichophyton]     Pollen Extract     Tree Extract        Past Medical History, Social History, Family History, medications and allergies were reviewed  Review of Systems  Review of Systems   Constitutional: Negative  HENT: Negative  Eyes: Negative  Respiratory: Negative  Cardiovascular: Negative  Gastrointestinal: Negative  Endocrine: Negative  Genitourinary: Negative for decreased urine volume, difficulty urinating, hematuria and urgency  Musculoskeletal: Negative  Skin: Negative  Neurological: Negative  Hematological: Negative  Psychiatric/Behavioral: Negative  Vitals  There were no vitals filed for this visit  Physical Exam    Physical Exam   Constitutional: He is oriented to person, place, and time  He appears well-developed and well-nourished  HENT:   Head: Normocephalic and atraumatic  Eyes: Pupils are equal, round, and reactive to light  Neck: Normal range of motion  Cardiovascular: Normal rate, regular rhythm and normal heart sounds  Pulmonary/Chest: Effort normal and breath sounds normal  No accessory muscle usage  No respiratory distress  Abdominal: Soft  Normal appearance and bowel sounds are normal  There is no tenderness  Genitourinary: Rectum normal, prostate normal and penis normal  No penile tenderness  Genitourinary Comments: Greater than 40 g  No nodules   Musculoskeletal: Normal range of motion  Neurological: He is alert and oriented to person, place, and time  Skin: Skin is warm, dry and intact  Psychiatric: He has a normal mood and affect  His speech is normal  Cognition and memory are normal    Nursing note and vitals reviewed        Results    Lab Results   Component Value Date/Time    PSA 3 5 12/16/2017 11:12 AM    PSA 1 8 10/22/2015 12:34 PM     Lab Results   Component Value Date    GLUCOSE 214 (H) 03/17/2018    CALCIUM 9 0 03/17/2018     03/17/2018    K 4 3 03/17/2018    CO2 27 03/17/2018     03/17/2018    BUN 27 (H) 03/17/2018    CREATININE 2 31 (H) 03/17/2018     Lab Results   Component Value Date    WBC 6 35 03/17/2018    HGB 13 4 03/17/2018    HCT 38 8 03/17/2018    MCV 85 03/17/2018     03/17/2018       No results found for this or any previous visit (from the past 1 hour(s)) ]

## 2018-03-30 ENCOUNTER — OFFICE VISIT (OUTPATIENT)
Dept: NEPHROLOGY | Facility: CLINIC | Age: 83
End: 2018-03-30
Payer: COMMERCIAL

## 2018-03-30 VITALS
BODY MASS INDEX: 26.99 KG/M2 | HEIGHT: 71 IN | DIASTOLIC BLOOD PRESSURE: 68 MMHG | WEIGHT: 192.8 LBS | HEART RATE: 76 BPM | SYSTOLIC BLOOD PRESSURE: 122 MMHG

## 2018-03-30 DIAGNOSIS — N18.4 CKD (CHRONIC KIDNEY DISEASE), STAGE IV (HCC): Primary | ICD-10-CM

## 2018-03-30 DIAGNOSIS — R80.8 OTHER PROTEINURIA: ICD-10-CM

## 2018-03-30 DIAGNOSIS — IMO0002 SOLITARY KIDNEY: ICD-10-CM

## 2018-03-30 DIAGNOSIS — I10 ESSENTIAL HYPERTENSION: ICD-10-CM

## 2018-03-30 PROCEDURE — 99213 OFFICE O/P EST LOW 20 MIN: CPT | Performed by: PHYSICIAN ASSISTANT

## 2018-03-30 NOTE — PROGRESS NOTES
OFFICE FOLLOW UP - Nephrology   Dede Brito 80 y o  male MRN: [id number]       ASSESSMENT and PLAN:  Diagnoses and all orders for this visit:    CKD (chronic kidney disease), stage IV ([facility])  -     Protein / creatinine ratio, urine; Future  -     Phosphorus; Future  -     Basic metabolic panel; Future  -     PTH, intact; Future  -     CBC; Future    Essential hypertension    Other proteinuria    Solitary kidney      1  CKD stage 4:  Baseline creatinine around 2 3-2 5  He had recent blood work done on 03/17 and his renal function was stable with a creatinine of 2 31 and GFR stable at 25  His CKD is likely related to diabetic nephropathy in the setting of a solitary kidney  2  Hypertension:  Blood pressure well controlled  3  Proteinuria:  At goal with 0 3 g on last urine protein creatinine ratio  4  Mineral bone disease of CKD:  PTH and phosphorus acceptable  5  Solitary kidney:  Status post nephrectomy in 2005  6  Diabetes:  Uncontrolled with sugars ranging 200-300 recently at home  His wife is doing her best to control his diet but he will frequently snacks throughout the day  She is going to try to hide the snacks and sugary drinks so he cannot have access to them  Patient will follow up in four months Dr Madhu Mike with repeat labs prior to next visit       HPI: Dede Brito is a 80 y o  male who is here for CKD follow-up  According to the patient's wife he has been doing fairly well since our last visit except that his blood sugars have been uncontrolled  She states that she tries to leave out healthy snacks for him throughout the day and good meals but while she is at work he will often not eat his meals and just snack  She has been giving him extra insulin when his sugars very high  Blood pressure has been well controlled at home in the 120s/80s  No recent issues with edema  Wife is also hoping he can get out and walk more once the weather gets nicer    No urinary issues except he did have 1 episode of incontinence while she was in the grocery store the other day  ROS:   A complete review of systems was done  Pertinent positives and negatives as noted in the HPI, otherwise the review of systems is negative  Allergies: Ambien [zolpidem tartrate]; Bextra [valdecoxib]; Dust mite extract; Lyrica [pregabalin]; Mold extract  [trichophyton]; Pollen extract; and Tree extract    Medications:   Current Outpatient Prescriptions:     amLODIPine (NORVASC) 5 mg tablet, Take 5 mg by mouth 2 (two) times a day, Disp: , Rfl:     atorvastatin (LIPITOR) 10 mg tablet, Take 10 mg by mouth daily  , Disp: , Rfl:     cholecalciferol (VITAMIN D3) 1,000 units tablet, Take 2,000 Units by mouth daily, Disp: , Rfl:     cyanocobalamin (VITAMIN B-12) 500 mcg tablet, Take 500 mcg by mouth daily, Disp: , Rfl:     donepezil (ARICEPT) 5 mg tablet, Take 10 mg by mouth daily  , Disp: , Rfl:     fenofibrate (TRICOR) 145 mg tablet, Take 145 mg by mouth daily  , Disp: , Rfl:     ferrous sulfate 325 (65 Fe) mg tablet, Take 325 mg by mouth daily with breakfast , Disp: , Rfl:     fluticasone (FLONASE) 50 mcg/act nasal spray, 1 spray into each nostril daily, Disp: , Rfl:     insulin aspart (NovoLOG) 100 units/mL injection, Inject 8 Units under the skin 2 (two) times a day before meals  , Disp: , Rfl:     insulin glargine (LANTUS) 100 units/mL subcutaneous injection, Inject 30 Units under the skin 2 (two) times a day  , Disp: , Rfl:     loratadine (CLARITIN) 10 mg tablet, Take 10 mg by mouth daily  , Disp: , Rfl:     magnesium gluconate (MAGONATE) 500 mg tablet, Take 500 mg by mouth daily  , Disp: , Rfl:     Memantine HCl ER (NAMENDA XR) 28 MG CP24, 1 po qd, Disp: 90 capsule, Rfl: 3    metoprolol succinate (TOPROL-XL) 100 mg 24 hr tablet, , Disp: , Rfl:     metoprolol tartrate (LOPRESSOR) 100 mg tablet, Take 100 mg by mouth every evening  , Disp: , Rfl:     metoprolol tartrate (LOPRESSOR) 100 mg tablet, Take 25 mg by mouth every morning, Disp: , Rfl:     Multiple Vitamin (MULTIVITAMIN) capsule, Take 1 capsule by mouth daily, Disp: , Rfl:     omega-3-acid ethyl esters (LOVAZA) 1 g capsule, Take 360 mg by mouth daily, Disp: , Rfl:     Probiotic Product (PROBIOTIC COLON SUPPORT PO), Take 1 tablet by mouth daily, Disp: , Rfl:     propafenone (RYTHMOL) 225 mg tablet, Take 225 mg by mouth every 8 (eight) hours  , Disp: , Rfl:     sertraline (ZOLOFT) 50 mg tablet, Take 1 tablet by mouth daily, Disp: , Rfl:     valsartan (DIOVAN) 80 mg tablet, Take 160 mg by mouth daily  , Disp: , Rfl:     warfarin (COUMADIN) 2 5 mg tablet, Take 2 5 mg by mouth 3 (three) times a week Indications: Mon wed Fri   , Disp: , Rfl:     warfarin (COUMADIN) 5 mg tablet, Take 5 mg by mouth 4 (four) times a day Indications: Tues  Thurs  , Sat , Sun , Disp: , Rfl:     amoxicillin (AMOXIL) 500 mg capsule, Take 500 mg by mouth every 12 (twelve) hours, Disp: , Rfl:     docusate sodium (COLACE) 100 mg capsule, Take 100 mg by mouth 3 (three) times a day , Disp: , Rfl:     ipratropium (ATROVENT HFA) 17 mcg/act inhaler, Inhale 2 puffs every 6 (six) hours  , Disp: , Rfl:     levalbuterol (XOPENEX) 0 63 mg/3 mL nebulizer solution, Take 1 ampule by nebulization 2 (two) times a day as needed for wheezing , Disp: , Rfl:     Past Medical History:   Diagnosis Date    Anxiety     Aspiration of liquid     and food per wife if he is eating too fast or talking when eating    Asthma     as a child    Atrial fibrillation ([address]     CAD (coronary artery disease)     Cancer of kidney ([address]     CPAP (continuous positive airway pressure) dependence     Dementia     Frontal lobe    Diabetes mellitus ([address] )     Hypercholesterolemia     Hyperlipidemia     Hypertension     Kidney disease     [facility]     left leg    Sleep apnea     wears CPAP    TIA (transient ischemic attack)      Past Surgical History:   Procedure Laterality Date    CARDIAC PACEMAKER PLACEMENT      CHOLECYSTECTOMY      COLONOSCOPY      HERNIA REPAIR      NEPHRECTOMY Left 2005    [place] ESOPHAGOGASTRODUODENOSCOPY SUBMUCOSAL INJECTION N/A 9/21/2016    Procedure: ESOPHAGOGASTRODUODENOSCOPY (EGD); Surgeon: Harry Solorio MD;  [facility]; Service: Gastroenterology    [place] ESOPHAGOGASTRODUODENOSCOPY SUBMUCOSAL INJECTION N/A 2/7/2018    Procedure: ESOPHAGOGASTRODUODENOSCOPY (EGD); Surgeon: Harry Solorio MD;  [facility]; Service: Gastroenterology    ROTATOR CUFF REPAIR      TONSILLECTOMY       Family History   Problem Relation Age of Onset    Brain cancer Father     Lung cancer Father     Diabetes Family     Heart disease Family     Hypertension Family     Cancer Family      renal cell carcinoma    Stroke Family       reports that he has never smoked  He has never used smokeless tobacco  He reports that he does not drink alcohol or use drugs  Physical Exam:   Vitals:    03/30/18 1003   BP: 122/68   Pulse: 76   Weight: 87 5 kg (192 lb 12 8 oz)   Height: 5' 10 5" (1 791 m)     Body mass index is 27 27 kg/m²      General: no acute distress   Eyes: conjunctivae pink, anicteric sclerae  ENT: mucous membranes moist  Neck: supple, no JVD  Chest: clear to auscultation bilaterally with no wheezes, rale or rhochi  CVS: regular rate and rhythm   Abdomen: soft, non-tender, non-distended  Extremities: no lower extremity edema   Skin: no rash  Neuro: awake and alert       Lab Results:  Results for orders placed or performed in visit on 03/17/18   CBC   Result Value Ref Range    WBC 6 35 4 31 - 10 16 Thousand/uL    RBC 4 58 3 88 - 5 62 Million/uL    Hemoglobin 13 4 12 0 - 17 0 g/dL    Hematocrit 38 8 36 5 - 49 3 %    MCV 85 82 - 98 fL    MCH 29 3 26 8 - 34 3 pg    MCHC 34 5 31 4 - 37 4 g/dL    RDW 13 1 11 6 - 15 1 %    Platelets 154 269 - 881 Thousands/uL    MPV 10 8 8 9 - 12 7 fL   PTH, intact   Result Value Ref Range    PTH 33 8 18 4 - 80 1 pg/mL   Renal function panel   Result Value Ref Range Albumin 3 5 3 5 - 5 0 g/dL    Calcium 9 0 8 3 - 10 1 mg/dL    Phosphorus 3 0 2 3 - 4 1 mg/dL    Glucose 214 (H) 65 - 140 mg/dL    BUN 27 (H) 5 - 25 mg/dL    Creatinine 2 31 (H) 0 60 - 1 30 mg/dL    Sodium 136 136 - 145 mmol/L    Potassium 4 3 3 5 - 5 3 mmol/L    Chloride 102 100 - 108 mmol/L    CO2 27 21 - 32 mmol/L    Anion Gap 7 4 - 13 mmol/L    eGFR 25 ml/min/1 73sq m   Protein / creatinine ratio, urine   Result Value Ref Range    Creatinine, Ur 114 0 mg/dL    Protein Urine Random 38 mg/dL    Prot/Creat Ratio, Ur 0 33 (H) 0 00 - 0 10   Protime-INR   Result Value Ref Range    Protime 24 9 (H) 12 1 - 14 4 seconds    INR 2 22 (H) 0 86 - 1 16               Portions of the record may have been created with voice recognition software  Occasional wrong word or "sound a like" substitutions may have occurred due to the inherent limitations of voice recognition software  Read the chart carefully and recognize, using context, where substitutions have occurred  If you have any questions, please contact the dictating provider

## 2018-03-30 NOTE — LETTER
March 30, 2018     Lashnoda Wiley MD  [address] [address]    Patient: Ofelia Lou   YOB: 1935   Date of Visit: 3/30/2018       Dear Dr Eric Walker: Thank you for referring Donnel Goodpasture to me for evaluation  Below are my notes for this consultation  If you have questions, please do not hesitate to call me  I look forward to following your patient along with you  Sincerely,        Adria Dodson PA-C        CC: No Recipients  Adria Dodson PA-C  3/30/2018 10:41 AM  Sign at close encounter  OFFICE FOLLOW UP - Nephrology   Ofelia Lou 80 y o  male MRN: [id number]       ASSESSMENT and PLAN:  Diagnoses and all orders for this visit:    CKD (chronic kidney disease), stage IV ([facility])  -     Protein / creatinine ratio, urine; Future  -     Phosphorus; Future  -     Basic metabolic panel; Future  -     PTH, intact; Future  -     CBC; Future    Essential hypertension    Other proteinuria    Solitary kidney      1  CKD stage 4:  Baseline creatinine around 2 3-2 5  He had recent blood work done on 03/17 and his renal function was stable with a creatinine of 2 31 and GFR stable at 25  His CKD is likely related to diabetic nephropathy in the setting of a solitary kidney  2  Hypertension:  Blood pressure well controlled  3  Proteinuria:  At goal with 0 3 g on last urine protein creatinine ratio  4  Mineral bone disease of CKD:  PTH and phosphorus acceptable  5  Solitary kidney:  Status post nephrectomy in 2005  6  Diabetes:  Uncontrolled with sugars ranging 200-300 recently at home  His wife is doing her best to control his diet but he will frequently snacks throughout the day  She is going to try to hide the snacks and sugary drinks so he cannot have access to them  Patient will follow up in four months Dr Piedad Saldivar with repeat labs prior to next visit       HPI: Ofelia Lou is a 80 y o  male who is here for CKD follow-up      According to the patient's wife he has been doing fairly well since our last visit except that his blood sugars have been uncontrolled  She states that she tries to leave out healthy snacks for him throughout the day and good meals but while she is at work he will often not eat his meals and just snack  She has been giving him extra insulin when his sugars very high  Blood pressure has been well controlled at home in the 120s/80s  No recent issues with edema  Wife is also hoping he can get out and walk more once the weather gets nicer  No urinary issues except he did have 1 episode of incontinence while she was in the grocery store the other day  ROS:   A complete review of systems was done  Pertinent positives and negatives as noted in the HPI, otherwise the review of systems is negative  Allergies: Ambien [zolpidem tartrate]; Bextra [valdecoxib]; Dust mite extract; Lyrica [pregabalin]; Mold extract  [trichophyton]; Pollen extract; and Tree extract    Medications:   Current Outpatient Prescriptions:     amLODIPine (NORVASC) 5 mg tablet, Take 5 mg by mouth 2 (two) times a day, Disp: , Rfl:     atorvastatin (LIPITOR) 10 mg tablet, Take 10 mg by mouth daily  , Disp: , Rfl:     cholecalciferol (VITAMIN D3) 1,000 units tablet, Take 2,000 Units by mouth daily, Disp: , Rfl:     cyanocobalamin (VITAMIN B-12) 500 mcg tablet, Take 500 mcg by mouth daily, Disp: , Rfl:     donepezil (ARICEPT) 5 mg tablet, Take 10 mg by mouth daily  , Disp: , Rfl:     fenofibrate (TRICOR) 145 mg tablet, Take 145 mg by mouth daily  , Disp: , Rfl:     ferrous sulfate 325 (65 Fe) mg tablet, Take 325 mg by mouth daily with breakfast , Disp: , Rfl:     fluticasone (FLONASE) 50 mcg/act nasal spray, 1 spray into each nostril daily, Disp: , Rfl:     insulin aspart (NovoLOG) 100 units/mL injection, Inject 8 Units under the skin 2 (two) times a day before meals  , Disp: , Rfl:     insulin glargine (LANTUS) 100 units/mL subcutaneous injection, Inject 30 Units under the skin 2 (two) times a day  , Disp: , Rfl:     loratadine (CLARITIN) 10 mg tablet, Take 10 mg by mouth daily  , Disp: , Rfl:     magnesium gluconate (MAGONATE) 500 mg tablet, Take 500 mg by mouth daily  , Disp: , Rfl:     Memantine HCl ER (NAMENDA XR) 28 MG CP24, 1 po qd, Disp: 90 capsule, Rfl: 3    metoprolol succinate (TOPROL-XL) 100 mg 24 hr tablet, , Disp: , Rfl:     metoprolol tartrate (LOPRESSOR) 100 mg tablet, Take 100 mg by mouth every evening  , Disp: , Rfl:     metoprolol tartrate (LOPRESSOR) 100 mg tablet, Take 25 mg by mouth every morning, Disp: , Rfl:     Multiple Vitamin (MULTIVITAMIN) capsule, Take 1 capsule by mouth daily, Disp: , Rfl:     omega-3-acid ethyl esters (LOVAZA) 1 g capsule, Take 360 mg by mouth daily, Disp: , Rfl:     Probiotic Product (PROBIOTIC COLON SUPPORT PO), Take 1 tablet by mouth daily, Disp: , Rfl:     propafenone (RYTHMOL) 225 mg tablet, Take 225 mg by mouth every 8 (eight) hours  , Disp: , Rfl:     sertraline (ZOLOFT) 50 mg tablet, Take 1 tablet by mouth daily, Disp: , Rfl:     valsartan (DIOVAN) 80 mg tablet, Take 160 mg by mouth daily  , Disp: , Rfl:     warfarin (COUMADIN) 2 5 mg tablet, Take 2 5 mg by mouth 3 (three) times a week Indications: Mon wed Fri   , Disp: , Rfl:     warfarin (COUMADIN) 5 mg tablet, Take 5 mg by mouth 4 (four) times a day Indications: Tues Thurs  , Sat , Sun , Disp: , Rfl:     amoxicillin (AMOXIL) 500 mg capsule, Take 500 mg by mouth every 12 (twelve) hours, Disp: , Rfl:     docusate sodium (COLACE) 100 mg capsule, Take 100 mg by mouth 3 (three) times a day , Disp: , Rfl:     ipratropium (ATROVENT HFA) 17 mcg/act inhaler, Inhale 2 puffs every 6 (six) hours  , Disp: , Rfl:     levalbuterol (XOPENEX) 0 63 mg/3 mL nebulizer solution, Take 1 ampule by nebulization 2 (two) times a day as needed for wheezing , Disp: , Rfl:     Past Medical History:   Diagnosis Date    Anxiety     Aspiration of liquid     and food per wife if he is eating too fast or talking when eating    Asthma     as a child    [facility]     CAD (coronary artery disease)     Cancer of kidney [facility]     CPAP (continuous positive airway pressure) dependence     Dementia     Frontal lobe    Diabetes mellitus ([facility]     Hypercholesterolemia     Hyperlipidemia     Hypertension     Kidney disease     Melanoma ([facility]     left leg    Sleep apnea     wears CPAP    TIA (transient ischemic attack)      Past Surgical History:   Procedure Laterality Date    CARDIAC PACEMAKER PLACEMENT      CHOLECYSTECTOMY      COLONOSCOPY      HERNIA REPAIR      NEPHRECTOMY Left 2005    [place] ESOPHAGOGASTRODUODENOSCOPY SUBMUCOSAL INJECTION N/A 9/21/2016    Procedure: ESOPHAGOGASTRODUODENOSCOPY (EGD); Surgeon: Tadeo Mercado MD;  [facility]; Service: Gastroenterology    OK ESOPHAGOGASTRODUODENOSCOPY SUBMUCOSAL INJECTION N/A 2/7/2018    Procedure: ESOPHAGOGASTRODUODENOSCOPY (EGD); Surgeon: Tadeo Mercado MD;  [facility]; Service: Gastroenterology    ROTATOR CUFF REPAIR      TONSILLECTOMY       Family History   Problem Relation Age of Onset    Brain cancer Father     Lung cancer Father     Diabetes Family     Heart disease Family     Hypertension Family     Cancer Family      renal cell carcinoma    Stroke Family       reports that he has never smoked  He has never used smokeless tobacco  He reports that he does not drink alcohol or use drugs  Physical Exam:   Vitals:    03/30/18 1003   BP: 122/68   Pulse: 76   Weight: 87 5 kg (192 lb 12 8 oz)   Height: 5' 10 5" (1 791 m)     Body mass index is 27 27 kg/m²      General: no acute distress   Eyes: conjunctivae pink, anicteric sclerae  ENT: mucous membranes moist  Neck: supple, no JVD  Chest: clear to auscultation bilaterally with no wheezes, rale or rhochi  CVS: regular rate and rhythm   Abdomen: soft, non-tender, non-distended  Extremities: no lower extremity edema   Skin: no rash  Neuro: awake and alert Lab Results:  Results for orders placed or performed in visit on 03/17/18   CBC   Result Value Ref Range    WBC 6 35 4 31 - 10 16 Thousand/uL    RBC 4 58 3 88 - 5 62 Million/uL    Hemoglobin 13 4 12 0 - 17 0 g/dL    Hematocrit 38 8 36 5 - 49 3 %    MCV 85 82 - 98 fL    MCH 29 3 26 8 - 34 3 pg    MCHC 34 5 31 4 - 37 4 g/dL    RDW 13 1 11 6 - 15 1 %    Platelets 427 322 - 425 Thousands/uL    MPV 10 8 8 9 - 12 7 fL   PTH, intact   Result Value Ref Range    PTH 33 8 18 4 - 80 1 pg/mL   Renal function panel   Result Value Ref Range    Albumin 3 5 3 5 - 5 0 g/dL    Calcium 9 0 8 3 - 10 1 mg/dL    Phosphorus 3 0 2 3 - 4 1 mg/dL    Glucose 214 (H) 65 - 140 mg/dL    BUN 27 (H) 5 - 25 mg/dL    Creatinine 2 31 (H) 0 60 - 1 30 mg/dL    Sodium 136 136 - 145 mmol/L    Potassium 4 3 3 5 - 5 3 mmol/L    Chloride 102 100 - 108 mmol/L    CO2 27 21 - 32 mmol/L    Anion Gap 7 4 - 13 mmol/L    eGFR 25 ml/min/1 73sq m   Protein / creatinine ratio, urine   Result Value Ref Range    Creatinine, Ur 114 0 mg/dL    Protein Urine Random 38 mg/dL    Prot/Creat Ratio, Ur 0 33 (H) 0 00 - 0 10   Protime-INR   Result Value Ref Range    Protime 24 9 (H) 12 1 - 14 4 seconds    INR 2 22 (H) 0 86 - 1 16               Portions of the record may have been created with voice recognition software  Occasional wrong word or "sound a like" substitutions may have occurred due to the inherent limitations of voice recognition software  Read the chart carefully and recognize, using context, where substitutions have occurred  If you have any questions, please contact the dictating provider

## 2018-04-11 ENCOUNTER — LAB (OUTPATIENT)
Dept: LAB | Age: 83
End: 2018-04-11
Payer: COMMERCIAL

## 2018-04-11 DIAGNOSIS — I48.0 PAF (PAROXYSMAL ATRIAL FIBRILLATION) (HCC): ICD-10-CM

## 2018-04-11 LAB
INR PPP: 2.63 (ref 0.86–1.16)
PROTHROMBIN TIME: 28.4 SECONDS (ref 12.1–14.4)

## 2018-04-11 PROCEDURE — 85610 PROTHROMBIN TIME: CPT

## 2018-04-11 PROCEDURE — 36415 COLL VENOUS BLD VENIPUNCTURE: CPT

## 2018-04-16 ENCOUNTER — OFFICE VISIT (OUTPATIENT)
Dept: NEUROLOGY | Facility: CLINIC | Age: 83
End: 2018-04-16
Payer: COMMERCIAL

## 2018-04-16 VITALS
SYSTOLIC BLOOD PRESSURE: 147 MMHG | BODY MASS INDEX: 31.83 KG/M2 | WEIGHT: 225 LBS | HEART RATE: 101 BPM | DIASTOLIC BLOOD PRESSURE: 80 MMHG

## 2018-04-16 DIAGNOSIS — G31.09 FRONTOTEMPORAL DEMENTIA (HCC): Primary | ICD-10-CM

## 2018-04-16 DIAGNOSIS — F02.80 FRONTOTEMPORAL DEMENTIA (HCC): Primary | ICD-10-CM

## 2018-04-16 PROCEDURE — 99214 OFFICE O/P EST MOD 30 MIN: CPT | Performed by: NURSE PRACTITIONER

## 2018-04-16 NOTE — ASSESSMENT & PLAN NOTE
MOCA today was 16/30  He is overall stable and will remain on aricept, namenda, and sertraline  His wife is noting some increased mood swings, but does not feel that these are severe or frequent enough to warrant a change or new medication  Discussed possibility of increasing sertraline or adding another agent such as seroquel if needed, but she would like to hold off for now  She is considering having him go to an adult day center 1-2 days per week, which I think would be great as he is a very social person  She was encouraged to call with any new or worsening symptoms

## 2018-04-16 NOTE — PROGRESS NOTES
Patient ID: Michele Weaver is a 80 y o  male  Assessment/Plan:    Frontotemporal dementia  MOCA today was 16/30  He is overall stable and will remain on aricept, namenda, and sertraline  His wife is noting some increased mood swings, but does not feel that these are severe or frequent enough to warrant a change or new medication  Discussed possibility of increasing sertraline or adding another agent such as seroquel if needed, but she would like to hold off for now  She is considering having him go to an adult day center 1-2 days per week, which I think would be great as he is a very social person  She was encouraged to call with any new or worsening symptoms  Subjective:    Baltazar Ann is an 80year old right handed man with frontotemporal dementia who presents for follow up  To review, symptoms of memory loss began gradually and progressed  He was started on Aricept 5mg daily in 2009  Neuropsychological testing revealed changes suggestive of possible Alzheimer's disease versus frontotemporal dementia with the latter being more likely  FDG PET scan actually showed normal uptake, mild atrophy  [address] 23/30(9/25/13); 22/30 ; 21/30 (3/26/13) Previously 23/30; 26/30 10/1/14; 21/30 (4/1/15); 20/30 (10/7/15); 22 (5/1/17)  At his last visit he was felt to be stable and continued on Aricept, Namenda, and sertraline  Today Mr Honey Gomez presents for follow-up accompanied by his wife  They report that things are overall stable  He has some increase in his short term memory loss-he will need reminders as to where things are in the kitchen, when he previously could find things on his own  He remains independent with all ADLs, only needing occasional reminders  He is having some mood swings, which his wife can correlate to his not eating when she's not home  She is looking in to meals on wheels to come and bring him lunch  He did recently have an EGD with dilation, and he has had less choking while eating   He is sleeping well  No safety concerns at home currently  No changes in his walking and no recent falls  Objective:    Blood pressure 147/80, pulse 101, weight 102 kg (225 lb)  Physical Exam   Constitutional: He appears well-developed and well-nourished  HENT:   Head: Normocephalic  Eyes: EOM are normal  Pupils are equal, round, and reactive to light  Neurological: He has normal strength  Gait normal    Psychiatric: He has a normal mood and affect  His speech is normal and behavior is normal    Vitals reviewed  Neurological Exam    Mental Status  The patient is alert  His speech is normal  He has normal attention span and concentration  He follows multi-step commands with prompting  Cranial Nerves    CN II: The patient's visual acuity and visual fields are normal   CN III, IV, VI: The patient's pupils are equally round and reactive to light and ocular movements are normal   CN V: The patient has normal facial sensation  CN VII:  The patient has symmetric facial movement  CN VIII:  The patient's hearing is normal   CN IX, X: The patient has symmetric palate movement and normal gag reflex  CN XI: The patient's shoulder shrug strength is normal   CN XII: The patient's tongue is midline without atrophy or fasciculations  Motor   His strength is 5/5 throughout all four extremities  No bradykinesia or rigidity     Sensory  The patient's sensation is to light touch  Gait and Coordination  The patient has normal gait and station  ROS:    Review of Systems   Constitutional: Negative  HENT: Negative  Eyes: Negative  Respiratory: Negative  Cardiovascular: Negative  Gastrointestinal: Negative  Endocrine: Negative  Genitourinary: Negative  Musculoskeletal: Positive for back pain  Skin: Negative  Allergic/Immunologic: Negative  Neurological: Negative  Hematological: Negative  Psychiatric/Behavioral: Negative           Mood Swings

## 2018-04-16 NOTE — PATIENT INSTRUCTIONS
No changes to your medications today  Continue to keep your mind stimulated  Call with any new or worsening symptoms

## 2018-05-16 DIAGNOSIS — F03.90 DEMENTIA WITHOUT BEHAVIORAL DISTURBANCE, UNSPECIFIED DEMENTIA TYPE (HCC): Primary | ICD-10-CM

## 2018-05-17 RX ORDER — DONEPEZIL HYDROCHLORIDE 5 MG/1
TABLET, FILM COATED ORAL
Qty: 180 TABLET | Refills: 0 | Status: SHIPPED | OUTPATIENT
Start: 2018-05-17 | End: 2018-08-27 | Stop reason: SDUPTHER

## 2018-05-20 ENCOUNTER — TRANSCRIBE ORDERS (OUTPATIENT)
Dept: ADMINISTRATIVE | Age: 83
End: 2018-05-20

## 2018-05-20 ENCOUNTER — LAB (OUTPATIENT)
Dept: LAB | Age: 83
End: 2018-05-20
Payer: COMMERCIAL

## 2018-05-20 DIAGNOSIS — I48.0 PAF (PAROXYSMAL ATRIAL FIBRILLATION) (HCC): Primary | ICD-10-CM

## 2018-05-20 DIAGNOSIS — I48.0 PAF (PAROXYSMAL ATRIAL FIBRILLATION) (HCC): ICD-10-CM

## 2018-05-20 LAB
INR PPP: 2.89 (ref 0.86–1.17)
PROTHROMBIN TIME: 30.3 SECONDS (ref 11.8–14.2)

## 2018-05-20 PROCEDURE — 85610 PROTHROMBIN TIME: CPT

## 2018-05-20 PROCEDURE — 36415 COLL VENOUS BLD VENIPUNCTURE: CPT

## 2018-06-01 ENCOUNTER — IN-CLINIC DEVICE VISIT (OUTPATIENT)
Dept: CARDIOLOGY CLINIC | Facility: CLINIC | Age: 83
End: 2018-06-01
Payer: COMMERCIAL

## 2018-06-01 DIAGNOSIS — I49.5 SICK SINUS SYNDROME (HCC): Primary | ICD-10-CM

## 2018-06-01 DIAGNOSIS — I48.91 ATRIAL FIBRILLATION, UNSPECIFIED TYPE (HCC): ICD-10-CM

## 2018-06-01 DIAGNOSIS — Z95.0 CARDIAC PACEMAKER IN SITU: ICD-10-CM

## 2018-06-01 PROCEDURE — 93296 REM INTERROG EVL PM/IDS: CPT | Performed by: INTERNAL MEDICINE

## 2018-06-01 NOTE — PROGRESS NOTES
Results for orders placed or performed in visit on 06/01/18   Cardiac EP device report    Narrative    MDT DUAL PM - Lazara Salts DR FLANNERY: BATTERY VOLTAGE ADEQUATE (2 5 YRS)  AP-99%, -5%  ALL AVAILABLE LEAD PARAMETERS WITHIN NORMAL LIMITS  NO SIGNIFICANT HIGH RATE EPISODES  NORMAL DEVICE FUNCTION   GV

## 2018-06-04 NOTE — PROGRESS NOTES
[facility] Patient  ALMA DELIA/  PM     DEVICE INTERROGATED IN THE [place] OFFICE: BATTERY VOLTAGE ADEQUATE  AP 99 6%  0 5%  ALL LEAD PARAMETERS & TRENDS WITHIN NORMAL LIMITS  3 NEW AHRS NOTED, NO AFIB  PT ON PROPAFENONE, WARFARIN, & METO TART  EF 65% (2015)  NO PROGRAMMING CHANGES MADE TO DEVICE PARAMETERS  NORMAL DEVICE FUNCTION   NC

## 2018-06-06 ENCOUNTER — OFFICE VISIT (OUTPATIENT)
Dept: SLEEP CENTER | Facility: CLINIC | Age: 83
End: 2018-06-06
Payer: COMMERCIAL

## 2018-06-06 VITALS
BODY MASS INDEX: 30.66 KG/M2 | HEIGHT: 71 IN | WEIGHT: 219 LBS | HEART RATE: 69 BPM | SYSTOLIC BLOOD PRESSURE: 140 MMHG | DIASTOLIC BLOOD PRESSURE: 76 MMHG

## 2018-06-06 DIAGNOSIS — J31.0 RHINITIS, UNSPECIFIED TYPE: ICD-10-CM

## 2018-06-06 DIAGNOSIS — G47.33 OSA (OBSTRUCTIVE SLEEP APNEA): Primary | ICD-10-CM

## 2018-06-06 PROCEDURE — 99214 OFFICE O/P EST MOD 30 MIN: CPT | Performed by: INTERNAL MEDICINE

## 2018-06-06 RX ORDER — IPRATROPIUM BROMIDE 21 UG/1
2 SPRAY, METERED NASAL EVERY 12 HOURS
Qty: 30 ML | Refills: 0 | Status: SHIPPED | OUTPATIENT
Start: 2018-06-06 | End: 2020-08-13

## 2018-06-06 NOTE — PROGRESS NOTES
Progress Note - Sleep Center   Mike Williamson HVY:[id number] MRN: [id number]      Reason for Visit:  80 y o male here for annual follow-up    Assessment:  Doing well on current therapy of BiPAP 19/14 cm for mild MELQUIADES (AHI = 9 5)  The patient also has chronic rhinorrhea of unclear cause  I will start him on ipratropium nasal 0 03% every 6 hr as needed  Plan:  Continue same BiPAP  Ipratropium nasal 0 03% every 6 hr as needed for rhinorrhea    Follow up: One year    History of Present Illness:  History of MELQUIADES on PAP therapy  Fully compliant and deriving benefit  Historical Information    Past Medical History:   Past Medical History:   Diagnosis Date    Anxiety     Aspiration of liquid     and food per wife if he is eating too fast or talking when eating    Asthma     as a child    [facility]rial [facility]     CAD (coronary artery disease)     Cancer of kidney ([facility]     CPAP (continuous positive airway pressure) dependence     Dementia     Frontal lobe    Diabetes mellitus ([address]     Hypercholesterolemia     Hyperlipidemia     Hypertension     Kidney disease     Melanoma ([facility]     left leg    Sleep apnea     wears CPAP    TIA (transient ischemic attack)          Past Surgical History:   Past Surgical History:   Procedure Laterality Date    CARDIAC PACEMAKER PLACEMENT      CHOLECYSTECTOMY      COLONOSCOPY      HERNIA REPAIR      NEPHRECTOMY Left 2005    [place] ESOPHAGOGASTRODUODENOSCOPY SUBMUCOSAL INJECTION N/A 9/21/2016    Procedure: ESOPHAGOGASTRODUODENOSCOPY (EGD); Surgeon: Ward Archer MD;  [facility]; Service: Gastroenterology    SC ESOPHAGOGASTRODUODENOSCOPY SUBMUCOSAL INJECTION N/A 2/7/2018    Procedure: ESOPHAGOGASTRODUODENOSCOPY (EGD); Surgeon: Ward Archer MD;  [facility];   Service: Gastroenterology    ROTATOR CUFF REPAIR      TONSILLECTOMY         Social History:   Social History     Social History    Marital status: /Civil Union     Spouse name: N/A  Number of children: N/A    Years of education: N/A     Social History Main Topics    Smoking status: Never Smoker    Smokeless tobacco: Never Used    Alcohol use No    Drug use: No    Sexual activity: Not Asked     Other Topics Concern    None     Social History Narrative    None       Family History:   Family History   Problem Relation Age of Onset    Brain cancer Father     Lung cancer Father     Diabetes Family     Heart disease Family     Hypertension Family     Cancer Family      renal cell carcinoma    Stroke Family        Medications/Allergies:      Current Outpatient Prescriptions:     amLODIPine (NORVASC) 5 mg tablet, Take 5 mg by mouth 2 (two) times a day, Disp: , Rfl:     amoxicillin (AMOXIL) 500 mg capsule, Take 500 mg by mouth every 12 (twelve) hours, Disp: , Rfl:     atorvastatin (LIPITOR) 10 mg tablet, Take 10 mg by mouth daily  , Disp: , Rfl:     cholecalciferol (VITAMIN D3) 1,000 units tablet, Take 2,000 Units by mouth daily, Disp: , Rfl:     cyanocobalamin (VITAMIN B-12) 500 mcg tablet, Take 500 mcg by mouth daily, Disp: , Rfl:     docusate sodium (COLACE) 100 mg capsule, Take 100 mg by mouth 3 (three) times a day , Disp: , Rfl:     donepezil (ARICEPT) 5 mg tablet, TAKE 1 TABLET BY MOUTH TWICE DAILY  , Disp: 180 tablet, Rfl: 0    fenofibrate (TRICOR) 145 mg tablet, Take 145 mg by mouth daily  , Disp: , Rfl:     ferrous sulfate 325 (65 Fe) mg tablet, Take 325 mg by mouth daily with breakfast , Disp: , Rfl:     fluticasone (FLONASE) 50 mcg/act nasal spray, 1 spray into each nostril daily, Disp: , Rfl:     insulin aspart (NovoLOG) 100 units/mL injection, Inject 8 Units under the skin 2 (two) times a day before meals  , Disp: , Rfl:     insulin glargine (LANTUS) 100 units/mL subcutaneous injection, Inject 30 Units under the skin 2 (two) times a day  , Disp: , Rfl:     ipratropium (ATROVENT HFA) 17 mcg/act inhaler, Inhale 2 puffs every 6 (six) hours  , Disp: , Rfl:    levalbuterol (XOPENEX) 0 63 mg/3 mL nebulizer solution, Take 1 ampule by nebulization 2 (two) times a day as needed for wheezing , Disp: , Rfl:     loratadine (CLARITIN) 10 mg tablet, Take 10 mg by mouth daily  , Disp: , Rfl:     magnesium gluconate (MAGONATE) 500 mg tablet, Take 500 mg by mouth daily  , Disp: , Rfl:     Memantine HCl ER (NAMENDA XR) 28 MG CP24, 1 po qd, Disp: 90 capsule, Rfl: 3    metoprolol succinate (TOPROL-XL) 100 mg 24 hr tablet, , Disp: , Rfl:     metoprolol tartrate (LOPRESSOR) 100 mg tablet, Take 100 mg by mouth every evening  , Disp: , Rfl:     metoprolol tartrate (LOPRESSOR) 100 mg tablet, Take 25 mg by mouth every morning, Disp: , Rfl:     Multiple Vitamin (MULTIVITAMIN) capsule, Take 1 capsule by mouth daily, Disp: , Rfl:     omega-3-acid ethyl esters (LOVAZA) 1 g capsule, Take 360 mg by mouth daily, Disp: , Rfl:     Probiotic Product (PROBIOTIC COLON SUPPORT PO), Take 1 tablet by mouth daily, Disp: , Rfl:     propafenone (RYTHMOL) 225 mg tablet, Take 225 mg by mouth every 8 (eight) hours  , Disp: , Rfl:     sertraline (ZOLOFT) 50 mg tablet, Take 1 tablet by mouth daily, Disp: , Rfl:     valsartan (DIOVAN) 80 mg tablet, Take 160 mg by mouth daily  , Disp: , Rfl:     warfarin (COUMADIN) 2 5 mg tablet, Take 2 5 mg by mouth 3 (three) times a week Indications: Mon wed Fri   , Disp: , Rfl:     warfarin (COUMADIN) 5 mg tablet, Take 5 mg by mouth 4 (four) times a day Indications: Tues  Thurs  , Sat , Sun , Disp: , Rfl:     ipratropium (ATROVENT) 0 03 % nasal spray, 2 sprays into each nostril every 12 (twelve) hours, Disp: 30 mL, Rfl: 0      Review of Systems      Genitourinary difficulty with erection   Cardiology none   Gastrointestinal frequent heartburn/acid reflux   Neurology forgetfulness, poor concentration or confusion,  and difficulty with memory   Constitutional fatigue   Integumentary none   Psychiatry depression and mood change   Musculoskeletal muscle aches and back pain Pulmonary frequent cough and snoring   ENT throat clearing   Endocrine none   Hematological none           Objective      Vital Signs:   Vitals:    06/06/18 1100   BP: 140/76   Pulse: 69     [place] Sleepiness Scale: Total score: 4        Physical Exam:    General: Alert, appropriate, cooperative, overweight    Head: NC/AT    Skin: Warm, dry    Neuro: No motor abnormalities, cranial nerves appear intact    Extremity: No clubbing, cyanosis    PAP setting:  BiPAP 19/14 cm  DME Provider: [employer]    Counseling / Coordination of Care  Total clinic time spent today 15 minutes  Greater than 50% of total time was spent with the patient and / or family counseling and / or coordination of care  A description of the counseling / coordination of care: Discussed equipment and response to treatment  JASS Machado    Board Certified Sleep Specialist

## 2018-06-24 ENCOUNTER — APPOINTMENT (OUTPATIENT)
Dept: LAB | Age: 83
End: 2018-06-24
Payer: COMMERCIAL

## 2018-06-24 ENCOUNTER — TRANSCRIBE ORDERS (OUTPATIENT)
Dept: ADMINISTRATIVE | Age: 83
End: 2018-06-24

## 2018-06-24 ENCOUNTER — LAB (OUTPATIENT)
Dept: LAB | Age: 83
End: 2018-06-24
Payer: COMMERCIAL

## 2018-06-24 DIAGNOSIS — Z79.899 ENCOUNTER FOR MEDICATION MANAGEMENT: ICD-10-CM

## 2018-06-24 DIAGNOSIS — N18.30 CHRONIC KIDNEY DISEASE, STAGE III (MODERATE) (HCC): ICD-10-CM

## 2018-06-24 DIAGNOSIS — E11.65 UNCONTROLLED TYPE 2 DIABETES MELLITUS WITH HYPERGLYCEMIA, UNSPECIFIED WHETHER LONG TERM INSULIN USE: ICD-10-CM

## 2018-06-24 DIAGNOSIS — M54.5 LOW BACK PAIN, UNSPECIFIED BACK PAIN LATERALITY, UNSPECIFIED CHRONICITY, WITH SCIATICA PRESENCE UNSPECIFIED: Primary | ICD-10-CM

## 2018-06-24 DIAGNOSIS — E78.5 HYPERLIPIDEMIA, UNSPECIFIED HYPERLIPIDEMIA TYPE: ICD-10-CM

## 2018-06-24 DIAGNOSIS — Z79.899 ENCOUNTER FOR MEDICATION MANAGEMENT: Primary | ICD-10-CM

## 2018-06-24 LAB
ALBUMIN SERPL BCP-MCNC: 3.6 G/DL (ref 3.5–5)
ALP SERPL-CCNC: 53 U/L (ref 46–116)
ALT SERPL W P-5'-P-CCNC: 28 U/L (ref 12–78)
ANION GAP SERPL CALCULATED.3IONS-SCNC: 9 MMOL/L (ref 4–13)
AST SERPL W P-5'-P-CCNC: 20 U/L (ref 5–45)
BASOPHILS # BLD AUTO: 0.05 THOUSANDS/ΜL (ref 0–0.1)
BASOPHILS NFR BLD AUTO: 1 % (ref 0–1)
BILIRUB SERPL-MCNC: 0.53 MG/DL (ref 0.2–1)
BUN SERPL-MCNC: 26 MG/DL (ref 5–25)
CALCIUM SERPL-MCNC: 9 MG/DL (ref 8.3–10.1)
CHLORIDE SERPL-SCNC: 103 MMOL/L (ref 100–108)
CHOLEST SERPL-MCNC: 175 MG/DL (ref 50–200)
CO2 SERPL-SCNC: 27 MMOL/L (ref 21–32)
CREAT SERPL-MCNC: 2.22 MG/DL (ref 0.6–1.3)
EOSINOPHIL # BLD AUTO: 0.25 THOUSAND/ΜL (ref 0–0.61)
EOSINOPHIL NFR BLD AUTO: 4 % (ref 0–6)
ERYTHROCYTE [DISTWIDTH] IN BLOOD BY AUTOMATED COUNT: 12.8 % (ref 11.6–15.1)
EST. AVERAGE GLUCOSE BLD GHB EST-MCNC: 200 MG/DL
GFR SERPL CREATININE-BSD FRML MDRD: 26 ML/MIN/1.73SQ M
GLUCOSE P FAST SERPL-MCNC: 136 MG/DL (ref 65–99)
HBA1C MFR BLD: 8.6 % (ref 4.2–6.3)
HCT VFR BLD AUTO: 44.9 % (ref 36.5–49.3)
HDLC SERPL-MCNC: 25 MG/DL (ref 40–60)
HGB BLD-MCNC: 14.7 G/DL (ref 12–17)
IMM GRANULOCYTES # BLD AUTO: 0.01 THOUSAND/UL (ref 0–0.2)
IMM GRANULOCYTES NFR BLD AUTO: 0 % (ref 0–2)
INR PPP: 3.59 (ref 0.86–1.17)
LYMPHOCYTES # BLD AUTO: 1.89 THOUSANDS/ΜL (ref 0.6–4.47)
LYMPHOCYTES NFR BLD AUTO: 33 % (ref 14–44)
MCH RBC QN AUTO: 29 PG (ref 26.8–34.3)
MCHC RBC AUTO-ENTMCNC: 32.7 G/DL (ref 31.4–37.4)
MCV RBC AUTO: 89 FL (ref 82–98)
MONOCYTES # BLD AUTO: 0.47 THOUSAND/ΜL (ref 0.17–1.22)
MONOCYTES NFR BLD AUTO: 8 % (ref 4–12)
NEUTROPHILS # BLD AUTO: 3.01 THOUSANDS/ΜL (ref 1.85–7.62)
NEUTS SEG NFR BLD AUTO: 54 % (ref 43–75)
NONHDLC SERPL-MCNC: 150 MG/DL
NRBC BLD AUTO-RTO: 0 /100 WBCS
PLATELET # BLD AUTO: 264 THOUSANDS/UL (ref 149–390)
PMV BLD AUTO: 10.3 FL (ref 8.9–12.7)
POTASSIUM SERPL-SCNC: 4.3 MMOL/L (ref 3.5–5.3)
PROT SERPL-MCNC: 7.9 G/DL (ref 6.4–8.2)
PROTHROMBIN TIME: 35.8 SECONDS (ref 11.8–14.2)
RBC # BLD AUTO: 5.07 MILLION/UL (ref 3.88–5.62)
SODIUM SERPL-SCNC: 139 MMOL/L (ref 136–145)
TRIGL SERPL-MCNC: 567 MG/DL
WBC # BLD AUTO: 5.68 THOUSAND/UL (ref 4.31–10.16)

## 2018-06-24 PROCEDURE — 83036 HEMOGLOBIN GLYCOSYLATED A1C: CPT

## 2018-06-24 PROCEDURE — 85610 PROTHROMBIN TIME: CPT

## 2018-06-24 PROCEDURE — 85025 COMPLETE CBC W/AUTO DIFF WBC: CPT

## 2018-06-24 PROCEDURE — 36415 COLL VENOUS BLD VENIPUNCTURE: CPT

## 2018-06-24 PROCEDURE — 80061 LIPID PANEL: CPT

## 2018-06-24 PROCEDURE — 80053 COMPREHEN METABOLIC PANEL: CPT

## 2018-06-26 ENCOUNTER — EVALUATION (OUTPATIENT)
Dept: PHYSICAL THERAPY | Age: 83
End: 2018-06-26
Payer: COMMERCIAL

## 2018-06-26 DIAGNOSIS — M54.5 LOW BACK PAIN, UNSPECIFIED BACK PAIN LATERALITY, UNSPECIFIED CHRONICITY, WITH SCIATICA PRESENCE UNSPECIFIED: Primary | ICD-10-CM

## 2018-06-26 PROCEDURE — 97161 PT EVAL LOW COMPLEX 20 MIN: CPT | Performed by: PHYSICAL MEDICINE & REHABILITATION

## 2018-06-26 PROCEDURE — G8991 OTHER PT/OT GOAL STATUS: HCPCS | Performed by: PHYSICAL MEDICINE & REHABILITATION

## 2018-06-26 PROCEDURE — G8990 OTHER PT/OT CURRENT STATUS: HCPCS | Performed by: PHYSICAL MEDICINE & REHABILITATION

## 2018-06-26 NOTE — LETTER
2018    Robyn Acosta MD  [address] [address]    Patient: Farrukh Gonzales   YOB: 1935   Date of Visit: 2018     Encounter Diagnosis     ICD-10-CM    1  Low back pain, unspecified back pain laterality, unspecified chronicity, with sciatica presence unspecified M54 5        Dear Dr Tamara Pagan:    Please review the attached Plan of Care from Maria Luisa Benitez recent visit  Please verify that you agree therapy should continue by signing the attached document and sending it back to our office  If you have any questions or concerns, please don't hesitate to call  Sincerely,    Rickie Villagran, PT      Referring Provider:      I certify that I have read the below Plan of Care and certify the need for these services furnished under this plan of treatment while under my care  Robyn Acosta MD  [address] [place] [address] [facility]: [phone]          PT Evaluation     Today's date: 2018  Patient name: Farrukh Gonzales  : 1935  MRN: [id number]  Referring provider: Mitchell Frost MD  Dx:   Encounter Diagnosis     ICD-10-CM    1  Low back pain, unspecified back pain laterality, unspecified chronicity, with sciatica presence unspecified M54 5                   Assessment  Impairments: impaired physical strength and lacks appropriate home exercise program    Assessment details: Patient is a 80 y o  male presenting to therapy with pain, soft tissue restrictions, and decreased strength  Pattern of hip tightness/weakness suggests inactivity as contributing factor  Current deficits limit functional activity tolerance and ADL performance  Patient would benefit from skilled intervention to address current deficits and maximize function, maintain safe mobility   Thank you for the referral     Understanding of Dx/Px/POC: good   Prognosis: good    Goals  - Patient will report improved pain by at least 2 levels within 4 weeks  - Patient will transfer without pain increase  - Patient will achieve 90/90 with knee flexion within 10-15 degrees  - Patient will be independent with HEP by discharge  - Patient will improve FOTO score by at least 7 points by discharge        Subjective Evaluation    History of Present Illness  Date of surgery: 4 years  Mechanism of injury: Patient presents with complaints of low back pain of insidious onset  Per patient's wife patient spends much of the day in reclined position, recent decrease in general activity level  Laminectomy performed 4 years ago with notable improvement in previous symptoms  Currently patient reports right-sided LBP increased with transfers, prolonged positioning  Heat application reduces pain  Patient PMH significant for frontal lobe dementia, DM, h/o kidney CA     Quality of life: good    Pain  Pain scale: "not much"  Pain scale at highest: "when I get up, it hurts"    Patient Goals  Patient goals for therapy: decreased pain          Objective     Palpation     Additional Palpation Details  TTP and increased tone through right lumbar paraspinals, no TTP through posterior hip or change in sxs with PA glide through sacrum    Active Range of Motion     Lumbar   Flexion: 80 degrees   Extension: 30 degrees with pain    Additional Active Range of Motion Details  Lateral flexion and rotation grossly WFL without pain    General Comments     Lumbar Comments  Strength: R knee ext 4+, hip flex 4 LLE WNL  (+) carlie's, ely's positive for anterior hip tightness, increased lumbar rotation with mid-range hip IR/ER in prone  Hip: PROM ext to neutral, unable to isolate glut med during s/l abduction, increased TFL activation  Gait: Patient appears safe without AD during ambulation, slight trunk flexion and decreased heel strike noted  Balance: NBOS firm with minimal sway, minimal change in balance control with dual task          Precautions: Frontal lobe dementia, DM, h/o kidney CA    Daily Treatment Diary Manual              B hip PROM                                                                     Exercise Diary              Nustep             Sit to stands             Standing hip abd/ext             Lumbar roll outs with pball             HS stretch             Gastroc stretch             SLS             Clamshells                                                                                                                                                                             Modalities

## 2018-06-26 NOTE — PROGRESS NOTES
PT Evaluation     Today's date: 2018  Patient name: Renay Colon  : 1935  MRN: [id number]  Referring provider: Shelly Barrera MD  Dx:   Encounter Diagnosis     ICD-10-CM    1  Low back pain, unspecified back pain laterality, unspecified chronicity, with sciatica presence unspecified M54 5                   Assessment  Impairments: impaired physical strength and lacks appropriate home exercise program    Assessment details: Patient is a 80 y o  male presenting to therapy with pain, soft tissue restrictions, and decreased strength  Pattern of hip tightness/weakness suggests inactivity as contributing factor  Current deficits limit functional activity tolerance and ADL performance  Patient would benefit from skilled intervention to address current deficits and maximize function, maintain safe mobility  Thank you for the referral     Understanding of Dx/Px/POC: good   Prognosis: good    Goals  - Patient will report improved pain by at least 2 levels within 4 weeks  - Patient will transfer without pain increase  - Patient will achieve 90/90 with knee flexion within 10-15 degrees  - Patient will be independent with HEP by discharge  - Patient will improve FOTO score by at least 7 points by discharge        Subjective Evaluation    History of Present Illness  Date of surgery: 4 years  Mechanism of injury: Patient presents with complaints of low back pain of insidious onset  Per patient's wife patient spends much of the day in reclined position, recent decrease in general activity level  Laminectomy performed 4 years ago with notable improvement in previous symptoms  Currently patient reports right-sided LBP increased with transfers, prolonged positioning  Heat application reduces pain  Patient PMH significant for frontal lobe dementia, DM, h/o kidney CA     Quality of life: good    Pain  Pain scale: "not much"  Pain scale at highest: "when I get up, it hurts"    Patient Goals  Patient goals for therapy: decreased pain          Objective     Palpation     Additional Palpation Details  TTP and increased tone through right lumbar paraspinals, no TTP through posterior hip or change in sxs with PA glide through sacrum    Active Range of Motion     Lumbar   Flexion: 80 degrees   Extension: 30 degrees with pain    Additional Active Range of Motion Details  Lateral flexion and rotation grossly WFL without pain    General Comments     Lumbar Comments  Strength: R knee ext 4+, hip flex 4 LLE WNL  (+) carlie's, ely's positive for anterior hip tightness, increased lumbar rotation with mid-range hip IR/ER in prone  Hip: PROM ext to neutral, unable to isolate glut med during s/l abduction, increased TFL activation  Gait: Patient appears safe without AD during ambulation, slight trunk flexion and decreased heel strike noted  Balance: NBOS firm with minimal sway, minimal change in balance control with dual task          Precautions: Frontal lobe dementia, DM, h/o kidney CA    Daily Treatment Diary     Manual              B hip PROM                                                                     Exercise Diary              Nustep             Sit to stands             Standing hip abd/ext             Lumbar roll outs with pball             HS stretch             Gastroc stretch             SLS             Clamshells                                                                                                                                                                             Modalities

## 2018-06-29 ENCOUNTER — APPOINTMENT (OUTPATIENT)
Dept: PHYSICAL THERAPY | Age: 83
End: 2018-06-29
Payer: COMMERCIAL

## 2018-07-03 ENCOUNTER — OFFICE VISIT (OUTPATIENT)
Dept: PHYSICAL THERAPY | Age: 83
End: 2018-07-03
Payer: COMMERCIAL

## 2018-07-03 DIAGNOSIS — M54.5 LOW BACK PAIN, UNSPECIFIED BACK PAIN LATERALITY, UNSPECIFIED CHRONICITY, WITH SCIATICA PRESENCE UNSPECIFIED: Primary | ICD-10-CM

## 2018-07-03 PROCEDURE — 97140 MANUAL THERAPY 1/> REGIONS: CPT

## 2018-07-03 PROCEDURE — 97110 THERAPEUTIC EXERCISES: CPT

## 2018-07-06 ENCOUNTER — OFFICE VISIT (OUTPATIENT)
Dept: PHYSICAL THERAPY | Age: 83
End: 2018-07-06
Payer: COMMERCIAL

## 2018-07-06 DIAGNOSIS — M54.5 LOW BACK PAIN, UNSPECIFIED BACK PAIN LATERALITY, UNSPECIFIED CHRONICITY, WITH SCIATICA PRESENCE UNSPECIFIED: Primary | ICD-10-CM

## 2018-07-06 PROCEDURE — 97140 MANUAL THERAPY 1/> REGIONS: CPT

## 2018-07-06 PROCEDURE — 97110 THERAPEUTIC EXERCISES: CPT

## 2018-07-06 NOTE — PROGRESS NOTES
Daily Note     Today's date: 2018  Patient name: Arabella Brantley  : 1935  MRN: [id number]  Referring provider: Remi Shepherd MD  Dx:   Encounter Diagnosis     ICD-10-CM    1  Low back pain, unspecified back pain laterality, unspecified chronicity, with sciatica presence unspecified M54 5                   Subjective: Reports R shooting pain at times after sleeping with legs crossed  Objective: See treatment diary below  Manual   7/3  7/6                   B hip PROM  10min  10min                    s/l hip flex/ quad stretch  [id number]  [id number]                    90/90 stretch  man 10  man 10                                                                         Exercise Diary   7/3  7/6                   Lifecycle  10min  10m                   Sit to stands                       Standing hip abd/ext    2# x10 reps                   Lumbar roll outs with pball                       HS stretch  90/90 x10  90/90 x10                   Gastroc stretch                       SLS                       Clamshells                        LTR  10  10                    sktc  10sec x10  [id number]                    seated LB flexion  5sec x10                                                                                                                                                                                                                                                   Modalities                                                                                            Assessment: Tolerated treatment well  No LBP after session of R LE pain as well   Will monitor pt response and add LE strengthening as able for home      Plan: Progress as able

## 2018-07-11 ENCOUNTER — OFFICE VISIT (OUTPATIENT)
Dept: PHYSICAL THERAPY | Age: 83
End: 2018-07-11
Payer: COMMERCIAL

## 2018-07-11 DIAGNOSIS — M54.5 LOW BACK PAIN, UNSPECIFIED BACK PAIN LATERALITY, UNSPECIFIED CHRONICITY, WITH SCIATICA PRESENCE UNSPECIFIED: Primary | ICD-10-CM

## 2018-07-11 PROCEDURE — 97110 THERAPEUTIC EXERCISES: CPT | Performed by: PHYSICAL THERAPIST

## 2018-07-11 PROCEDURE — 97140 MANUAL THERAPY 1/> REGIONS: CPT | Performed by: PHYSICAL THERAPIST

## 2018-07-11 NOTE — PROGRESS NOTES
Daily Note     Today's date: 2018  Patient name: Kasi Quinones  : 1935  MRN: [id number]  Referring provider: Juan Helton MD  Dx:   Encounter Diagnosis     ICD-10-CM    1  Low back pain, unspecified back pain laterality, unspecified chronicity, with sciatica presence unspecified M54 5                   Subjective: No new complaints        Objective: See treatment diary below  Manual   7/3  7/6  7/11                 B hip PROM  10min  10min  10 min                  s/l hip flex/ quad stretch  [id number]  [id number]  15s x5                  90/90 stretch  man 10  man 10  man 10                                                                       Exercise Diary   7/3  7/6  7/11                 Lifecycle  10min  10m  10m                 Sit to stands                       Standing hip abd/ext    2# x10 reps  2# 2x15                 Lumbar roll outs with pball                       HS stretch  90/90 x10  90/90 x10  90/90 x10                 Gastroc stretch                       SLS                       Clamshells                        LTR  10  10  10                  sktc  10sec x10  [id number]  10s x10                  seated LB flexion  5sec x10    5s x10                                                                                                                                                                                                                                               Modalities                                                                                            Assessment: Good tolerance to ther ex      Plan: Progress as able

## 2018-07-13 ENCOUNTER — OFFICE VISIT (OUTPATIENT)
Dept: PHYSICAL THERAPY | Age: 83
End: 2018-07-13
Payer: COMMERCIAL

## 2018-07-13 DIAGNOSIS — M54.5 LOW BACK PAIN, UNSPECIFIED BACK PAIN LATERALITY, UNSPECIFIED CHRONICITY, WITH SCIATICA PRESENCE UNSPECIFIED: Primary | ICD-10-CM

## 2018-07-13 PROCEDURE — 97140 MANUAL THERAPY 1/> REGIONS: CPT | Performed by: PHYSICAL THERAPIST

## 2018-07-13 PROCEDURE — 97110 THERAPEUTIC EXERCISES: CPT | Performed by: PHYSICAL THERAPIST

## 2018-07-13 NOTE — PROGRESS NOTES
Daily Note     Today's date: 2018  Patient name: Cecy Shaikh  : 1935  MRN: [id number]  Referring provider: Cici Fox MD  Dx:   Encounter Diagnosis     ICD-10-CM    1  Low back pain, unspecified back pain laterality, unspecified chronicity, with sciatica presence unspecified M54 5                   Subjective: No new complaints        Objective: See treatment diary below  Manual   7/3  7/6  7/11  7/13               B hip PROM  10min  10min  10 min  10m                s/l hip flex/ quad stretch  30mmwe0  [id number]  15s x5  30s x5                90/90 stretch  man 10  man 10  man 10  10                                                                     Exercise Diary   7/3  7/6  7/11  7/13               Lifecycle  10min  10m  10m  12m               Sit to stands                       Standing hip abd/ext    2# x10 reps  2# 2x15  2# 2x15               Lumbar roll outs with pball                       HS stretch  90/90 x10  90/90 x10  90/90 x10                 Gastroc stretch                       SLS                       Clamshells                        LTR  10  10  10  10                sktc  10sec x10  [id number]  10s x10  10s x10                seated LB flexion  5sec x10    5s x10  5s x10                squats       2x10                                                                                                                                                                                                                     Modalities                                                                                            Assessment: Struggled with squats    Plan: Progress as able

## 2018-07-14 ENCOUNTER — LAB (OUTPATIENT)
Dept: LAB | Age: 83
End: 2018-07-14
Payer: COMMERCIAL

## 2018-07-14 DIAGNOSIS — N18.4 CKD (CHRONIC KIDNEY DISEASE), STAGE IV (HCC): ICD-10-CM

## 2018-07-14 DIAGNOSIS — Z79.899 ENCOUNTER FOR MEDICATION MANAGEMENT: ICD-10-CM

## 2018-07-14 LAB
ANION GAP SERPL CALCULATED.3IONS-SCNC: 6 MMOL/L (ref 4–13)
BUN SERPL-MCNC: 29 MG/DL (ref 5–25)
CALCIUM SERPL-MCNC: 9 MG/DL (ref 8.3–10.1)
CHLORIDE SERPL-SCNC: 103 MMOL/L (ref 100–108)
CO2 SERPL-SCNC: 28 MMOL/L (ref 21–32)
CREAT SERPL-MCNC: 2.27 MG/DL (ref 0.6–1.3)
ERYTHROCYTE [DISTWIDTH] IN BLOOD BY AUTOMATED COUNT: 12.8 % (ref 11.6–15.1)
GFR SERPL CREATININE-BSD FRML MDRD: 26 ML/MIN/1.73SQ M
GLUCOSE SERPL-MCNC: 300 MG/DL (ref 65–140)
HCT VFR BLD AUTO: 40.9 % (ref 36.5–49.3)
HGB BLD-MCNC: 13.4 G/DL (ref 12–17)
INR PPP: 2.59 (ref 0.86–1.17)
MCH RBC QN AUTO: 29.1 PG (ref 26.8–34.3)
MCHC RBC AUTO-ENTMCNC: 32.8 G/DL (ref 31.4–37.4)
MCV RBC AUTO: 89 FL (ref 82–98)
PHOSPHATE SERPL-MCNC: 3 MG/DL (ref 2.3–4.1)
PLATELET # BLD AUTO: 219 THOUSANDS/UL (ref 149–390)
PMV BLD AUTO: 10.4 FL (ref 8.9–12.7)
POTASSIUM SERPL-SCNC: 4.8 MMOL/L (ref 3.5–5.3)
PROTHROMBIN TIME: 27.8 SECONDS (ref 11.8–14.2)
PTH-INTACT SERPL-MCNC: 42.3 PG/ML (ref 18.4–80.1)
RBC # BLD AUTO: 4.6 MILLION/UL (ref 3.88–5.62)
SODIUM SERPL-SCNC: 137 MMOL/L (ref 136–145)
WBC # BLD AUTO: 5.85 THOUSAND/UL (ref 4.31–10.16)

## 2018-07-14 PROCEDURE — 85027 COMPLETE CBC AUTOMATED: CPT

## 2018-07-14 PROCEDURE — 85610 PROTHROMBIN TIME: CPT

## 2018-07-14 PROCEDURE — 84100 ASSAY OF PHOSPHORUS: CPT

## 2018-07-14 PROCEDURE — 36415 COLL VENOUS BLD VENIPUNCTURE: CPT

## 2018-07-14 PROCEDURE — 80048 BASIC METABOLIC PNL TOTAL CA: CPT

## 2018-07-14 PROCEDURE — 83970 ASSAY OF PARATHORMONE: CPT

## 2018-07-17 ENCOUNTER — OFFICE VISIT (OUTPATIENT)
Dept: PHYSICAL THERAPY | Age: 83
End: 2018-07-17
Payer: COMMERCIAL

## 2018-07-17 DIAGNOSIS — M54.5 LOW BACK PAIN, UNSPECIFIED BACK PAIN LATERALITY, UNSPECIFIED CHRONICITY, WITH SCIATICA PRESENCE UNSPECIFIED: Primary | ICD-10-CM

## 2018-07-17 PROCEDURE — 97110 THERAPEUTIC EXERCISES: CPT

## 2018-07-17 PROCEDURE — 97140 MANUAL THERAPY 1/> REGIONS: CPT

## 2018-07-17 NOTE — PROGRESS NOTES
Daily Note     Today's date: 2018  Patient name: Mayo Reid  : 1935  MRN: [id number]  Referring provider: Zina Barrera MD  Dx:   Encounter Diagnosis     ICD-10-CM    1  Low back pain, unspecified back pain laterality, unspecified chronicity, with sciatica presence unspecified M54 5                   Subjective: No new complaints  Reports LBP improved      Objective: See treatment diary below  Manual   7/3  7/6  7/11  7/13  7/17             B hip PROM  10min  10min  10 min  10m  10m              s/l hip flex/ quad stretch  [id number]  29wptl2  15s x5  30s x5  30sx5              90/90 stretch  man 10  man 10  man 10  10  10                                                                   Exercise Diary   7/3  7/6  7/11  7/13  7/17             Lifecycle  10min  10m  10m  12m  12m             Sit to stands                       Standing hip abd/ext    2# x10 reps  2# 2x15  2# 2x15  21/2# 2x15             Lumbar roll outs with pball                       HS stretch  90/90 x10  90/90 x10  90/90 x10    x10             Gastroc stretch                       SLS                       Clamshells                        LTR  10  10  10  10  10              sktc  10sec x10  [id number]  10s x10  10s x10  10sec x10              seated LB flexion  5sec x10    5s x10  5s x10  5sx10              squats       2x10  2x10                                                                                                                                                                                                                   Modalities                                                                                            Assessment: Struggled with squats   Pt with some leg buckling due to fatigue after LE ex    Plan: Progress as able

## 2018-07-20 ENCOUNTER — APPOINTMENT (OUTPATIENT)
Dept: PHYSICAL THERAPY | Age: 83
End: 2018-07-20
Payer: COMMERCIAL

## 2018-07-23 ENCOUNTER — OFFICE VISIT (OUTPATIENT)
Dept: NEPHROLOGY | Facility: CLINIC | Age: 83
End: 2018-07-23
Payer: COMMERCIAL

## 2018-07-23 VITALS
HEART RATE: 86 BPM | BODY MASS INDEX: 31 KG/M2 | WEIGHT: 221.4 LBS | HEIGHT: 71 IN | DIASTOLIC BLOOD PRESSURE: 70 MMHG | SYSTOLIC BLOOD PRESSURE: 137 MMHG

## 2018-07-23 DIAGNOSIS — N18.4 CKD (CHRONIC KIDNEY DISEASE), STAGE IV (HCC): Primary | ICD-10-CM

## 2018-07-23 DIAGNOSIS — E11.21 DIABETIC NEPHROPATHY ASSOCIATED WITH TYPE 2 DIABETES MELLITUS (HCC): ICD-10-CM

## 2018-07-23 DIAGNOSIS — I12.9 HYPERTENSIVE CHRONIC KIDNEY DISEASE WITH STAGE 1 THROUGH STAGE 4 CHRONIC KIDNEY DISEASE, OR UNSPECIFIED CHRONIC KIDNEY DISEASE: ICD-10-CM

## 2018-07-23 DIAGNOSIS — IMO0002 SOLITARY KIDNEY: ICD-10-CM

## 2018-07-23 DIAGNOSIS — N28.1 CYST OF KIDNEY, ACQUIRED: ICD-10-CM

## 2018-07-23 PROCEDURE — 99214 OFFICE O/P EST MOD 30 MIN: CPT | Performed by: INTERNAL MEDICINE

## 2018-07-23 NOTE — PROGRESS NOTES
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, previous protein creatinine ratio 0 33   3  Hypertension, under good control  4  Solitary kidney function, noted right renal cysts, repeat renal ultrasound    · Overall renal function remains fairly stable   · Continue with current antihypertensive regimen although they are going to check an to the valsartan to see if that is the 1 has been recalled  If it is will likely change him to either losartan or olmesartan  ·  given stability in renal function, plan to seem approximately 6 months with repeat laboratory studies at that time   · Given solitary kidney function, right renal cysts, repeat renal ultrasound      SUBJECTIVE / INTERVAL HISTORY:   he has been doing reasonably well  No recent illnesses or hospitalizations  Denies any significant chest pain shortness of breath or lower extremity swelling  Blood sugars at home have been running in the low 200s  Some slight worsening of his underlying dementia  Review of Systems      OBJECTIVE:  /70 (BP Location: Right arm, Patient Position: Sitting, Cuff Size: Large)   Pulse 86   Ht 5' 11" (1 803 m)   Wt 100 kg (221 lb 6 4 oz)   BMI 30 88 kg/m²     Physical Exam   Constitutional: He is oriented to person, place, and time  No distress  HENT:   Head: Normocephalic  Eyes: Conjunctivae are normal    Neck: Neck supple  Cardiovascular: Normal rate and regular rhythm  Pulmonary/Chest: Effort normal and breath sounds normal    Abdominal: Soft  He exhibits no distension  Musculoskeletal: He exhibits no edema  Neurological: He is alert and oriented to person, place, and time  Skin: Skin is warm and dry  Psychiatric: He has a normal mood and affect  Medications:    Current Outpatient Prescriptions:     amLODIPine (NORVASC) 5 mg tablet, Take 5 mg by mouth 2 (two) times a day, Disp: , Rfl:     amoxicillin (AMOXIL) 500 mg capsule, Take 500 mg by mouth every 12 (twelve) hours, Disp: , Rfl:     atorvastatin (LIPITOR) 10 mg tablet, Take 10 mg by mouth daily  , Disp: , Rfl:     cholecalciferol (VITAMIN D3) 1,000 units tablet, Take 2,000 Units by mouth daily, Disp: , Rfl:     cyanocobalamin (VITAMIN B-12) 500 mcg tablet, Take 500 mcg by mouth daily, Disp: , Rfl:     docusate sodium (COLACE) 100 mg capsule, Take 100 mg by mouth 3 (three) times a day , Disp: , Rfl:     donepezil (ARICEPT) 5 mg tablet, TAKE 1 TABLET BY MOUTH TWICE DAILY  , Disp: 180 tablet, Rfl: 0    fenofibrate (TRICOR) 145 mg tablet, Take 145 mg by mouth daily  , Disp: , Rfl:     ferrous sulfate 325 (65 Fe) mg tablet, Take 325 mg by mouth daily with breakfast , Disp: , Rfl:     fluticasone (FLONASE) 50 mcg/act nasal spray, 1 spray into each nostril daily, Disp: , Rfl:     insulin aspart (NovoLOG) 100 units/mL injection, Inject 8 Units under the skin 2 (two) times a day before meals  , Disp: , Rfl:     insulin glargine (LANTUS) 100 units/mL subcutaneous injection, Inject 30 Units under the skin 2 (two) times a day  , Disp: , Rfl:     ipratropium (ATROVENT HFA) 17 mcg/act inhaler, Inhale 2 puffs every 6 (six) hours  , Disp: , Rfl:     ipratropium (ATROVENT) 0 03 % nasal spray, 2 sprays into each nostril every 12 (twelve) hours, Disp: 30 mL, Rfl: 0    levalbuterol (XOPENEX) 0 63 mg/3 mL nebulizer solution, Take 1 ampule by nebulization 2 (two) times a day as needed for wheezing , Disp: , Rfl:     loratadine (CLARITIN) 10 mg tablet, Take 10 mg by mouth daily  , Disp: , Rfl:     magnesium gluconate (MAGONATE) 500 mg tablet, Take 500 mg by mouth daily  , Disp: , Rfl:     Memantine HCl ER (NAMENDA XR) 28 MG CP24, 1 po qd, Disp: 90 capsule, Rfl: 3    metoprolol succinate (TOPROL-XL) 100 mg 24 hr tablet, , Disp: , Rfl:     metoprolol tartrate (LOPRESSOR) 100 mg tablet, Take 100 mg by mouth every evening  , Disp: , Rfl:     metoprolol tartrate (LOPRESSOR) 100 mg tablet, Take 25 mg by mouth every morning, Disp: , Rfl:     Multiple Vitamin (MULTIVITAMIN) capsule, Take 1 capsule by mouth daily, Disp: , Rfl:     omega-3-acid ethyl esters (LOVAZA) 1 g capsule, Take 360 mg by mouth daily, Disp: , Rfl:     Probiotic Product (PROBIOTIC COLON SUPPORT PO), Take 1 tablet by mouth daily, Disp: , Rfl:     propafenone (RYTHMOL) 225 mg tablet, Take 225 mg by mouth every 8 (eight) hours  , Disp: , Rfl:     sertraline (ZOLOFT) 50 mg tablet, Take 1 tablet by mouth daily, Disp: , Rfl:     valsartan (DIOVAN) 80 mg tablet, Take 160 mg by mouth daily  , Disp: , Rfl:     warfarin (COUMADIN) 2 5 mg tablet, Take 2 5 mg by mouth 3 (three) times a week Indications: Mon wed Fri   , Disp: , Rfl:     warfarin (COUMADIN) 5 mg tablet, Take 5 mg by mouth 4 (four) times a day Indications: Tues Thurs  , Sat , Sun , Disp: , Rfl:     Laboratory Results:  Results for orders placed or performed in visit on 07/14/18   Phosphorus   Result Value Ref Range    Phosphorus 3 0 2 3 - 4 1 mg/dL   Basic metabolic panel   Result Value Ref Range    Sodium 137 136 - 145 mmol/L    Potassium 4 8 3 5 - 5 3 mmol/L    Chloride 103 100 - 108 mmol/L    CO2 28 21 - 32 mmol/L    Anion Gap 6 4 - 13 mmol/L    BUN 29 (H) 5 - 25 mg/dL    Creatinine 2 27 (H) 0 60 - 1 30 mg/dL    Glucose 300 (H) 65 - 140 mg/dL    Calcium 9 0 8 3 - 10 1 mg/dL    eGFR 26 ml/min/1 73sq m   PTH, intact   Result Value Ref Range    PTH 42 3 18 4 - 80 1 pg/mL   CBC   Result Value Ref Range    WBC 5 85 4 31 - 10 16 Thousand/uL    RBC 4 60 3 88 - 5 62 Million/uL    Hemoglobin 13 4 12 0 - 17 0 g/dL    Hematocrit 40 9 36 5 - 49 3 %    MCV 89 82 - 98 fL    MCH 29 1 26 8 - 34 3 pg    MCHC 32 8 31 4 - 37 4 g/dL    RDW 12 8 11 6 - 15 1 %    Platelets 645 879 - 168 Thousands/uL    MPV 10 4 8 9 - 12 7 fL   Protime-INR   Result Value Ref Range    Protime 27 8 (H) 11 8 - 14 2 seconds    INR 2 59 (H) 0 86 - 1 17

## 2018-07-23 NOTE — PATIENT INSTRUCTIONS
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, previous protein creatinine ratio 0 33   3  Hypertension, under good control  4  Solitary kidney function, noted right renal cysts, repeat renal ultrasound    · Overall renal function remains fairly stable   · Continue with current antihypertensive regimen although they are going to check an to the valsartan to see if that is the 1 has been recalled    If it is will likely change him to either losartan or olmesartan  ·  given stability in renal function, plan to seem approximately 6 months with repeat laboratory studies at that time   · Given solitary kidney function, right renal cysts, repeat renal ultrasound

## 2018-07-23 NOTE — LETTER
July 23, 2018     Leigh Ann Leach MD  [address] [address]    Patient: Cinthia Ulloa   YOB: 1935   Date of Visit: 7/23/2018     Dear Dr Oneil Valdovinos      Thank you for referring Kassidy Singleton to me for evaluation  Below are the relevant portions of my assessment and plan of care  If you have questions, please do not hesitate to call me  I look forward to following Mady Grant along with you  Sincerely,        Ayaan Lawrence,         CC: No Recipients    Progress Notes:    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, previous protein creatinine ratio 0 33   3  Hypertension, under good control  4  Solitary kidney function, noted right renal cysts, repeat renal ultrasound    · Overall renal function remains fairly stable   · Continue with current antihypertensive regimen although they are going to check an to the valsartan to see if that is the 1 has been recalled    If it is will likely change him to either losartan or olmesartan  ·  given stability in renal function, plan to seem approximately 6 months with repeat laboratory studies at that time   · Given solitary kidney function, right renal cysts, repeat renal ultrasound

## 2018-07-24 ENCOUNTER — TELEPHONE (OUTPATIENT)
Dept: NEPHROLOGY | Facility: CLINIC | Age: 83
End: 2018-07-24

## 2018-07-24 ENCOUNTER — OFFICE VISIT (OUTPATIENT)
Dept: PHYSICAL THERAPY | Age: 83
End: 2018-07-24
Payer: COMMERCIAL

## 2018-07-24 DIAGNOSIS — M54.5 LOW BACK PAIN, UNSPECIFIED BACK PAIN LATERALITY, UNSPECIFIED CHRONICITY, WITH SCIATICA PRESENCE UNSPECIFIED: Primary | ICD-10-CM

## 2018-07-24 PROCEDURE — 97110 THERAPEUTIC EXERCISES: CPT | Performed by: PHYSICAL THERAPIST

## 2018-07-24 PROCEDURE — 97140 MANUAL THERAPY 1/> REGIONS: CPT | Performed by: PHYSICAL THERAPIST

## 2018-07-24 PROCEDURE — G8991 OTHER PT/OT GOAL STATUS: HCPCS

## 2018-07-24 PROCEDURE — G8990 OTHER PT/OT CURRENT STATUS: HCPCS

## 2018-07-24 NOTE — PROGRESS NOTES
Daily Note     Today's date: 2018  Patient name: Bisi Friday  : 1935  MRN: [id number]  Referring provider: Delbert Guajardo MD  Dx:   Encounter Diagnosis     ICD-10-CM    1  Low back pain, unspecified back pain laterality, unspecified chronicity, with sciatica presence unspecified M54 5                   Subjective: No new complaints  Reports LBP improved  Gait normalized      Objective: See treatment diary below  Manual   7/3  7/6  7/11  7/13  7/17  7/24           B hip PROM  10min  10min  10 min  10m  10m  10m            s/l hip flex/ quad stretch  25oycb4  [id number]  15s x5  30s x5  30sx5  30sx5            90/90 stretch  man 10  man 10  man 10  10  10  10                                                                 Exercise Diary   7/3  7/6  7/11  7/13  7/17  7/24           Lifecycle  10min  10m  10m  12m  12m  15m           Sit to stands                       Standing hip abd/ext    2# x10 reps  2# 2x15  2# 2x15  21/2# 2x15  3# 2x15           Lumbar roll outs with pball                       HS stretch  90/90 x10  90/90 x10  90/90 x10    x10  10           Gastroc stretch                       SLS                       Clamshells                        LTR  10  10  10  10  10  10            sktc  10sec x10  [id number]  10s x10  10s x10  10sec x10  10sec x10            seated LB flexion  5sec x10    5s x10  5s x10  5sx10              squats       2x10  2x10              SAQ            # 4 x30                                                                                                                                                                                         Modalities                                                                                            Assessment:  Pt with some leg buckling due to fatigue after LE ex  No LBP noted with session      Plan: Progress as able

## 2018-07-24 NOTE — TELEPHONE ENCOUNTER
Received a call from [phone] [address] Rosenberg  She wanted to let you know that Dr Sheldon Bal with Cardiology prescribed Sheran Kobus Losartan 25mg daily at bedtime   If you have any additional questions/adjustments, she asked that you give her a call at work [phone] (direct line)

## 2018-07-27 ENCOUNTER — OFFICE VISIT (OUTPATIENT)
Dept: PHYSICAL THERAPY | Age: 83
End: 2018-07-27
Payer: COMMERCIAL

## 2018-07-27 DIAGNOSIS — M54.5 LOW BACK PAIN, UNSPECIFIED BACK PAIN LATERALITY, UNSPECIFIED CHRONICITY, WITH SCIATICA PRESENCE UNSPECIFIED: Primary | ICD-10-CM

## 2018-07-27 PROCEDURE — 97110 THERAPEUTIC EXERCISES: CPT | Performed by: PHYSICAL THERAPIST

## 2018-07-27 PROCEDURE — 97140 MANUAL THERAPY 1/> REGIONS: CPT | Performed by: PHYSICAL THERAPIST

## 2018-07-27 NOTE — PROGRESS NOTES
Daily Note     Today's date: 2018  Patient name: Nathaly Khalil  : 1935  MRN: [id number]  Referring provider: Alma Delia Paredes MD  Dx:   Encounter Diagnosis     ICD-10-CM    1  Low back pain, unspecified back pain laterality, unspecified chronicity, with sciatica presence unspecified M54 5        Start Time: 6845  Stop Time: 0947  Total time in clinic (min): 42 minutes    Subjective: Patient notes he is doing okay today overall, no new complaints  Objective: See treatment diary below  Precautions: Frontal lobe dementia, DM, h/o kidney CA    Manual   7/3  7/6  7/11  7/13  7/17  7/24  7/27         B hip PROM  10min  10min  10 min  10m  10m  10m  8'          s/l hip flex/ quad stretch  37bpjj6  06bkqk0  15s x5  30s x5  30sx5  30sx5  30" x5          90/90 stretch  man 10  man 10  man 10  10  10  10  10                                                               Exercise Diary   7/3  7/6  7/11  7/13  7/17  7/24  7/27         Lifecycle  10min  10m  10m  12m  12m  15m  12'         Sit to stands                       Standing hip abd/ext    2# x10 reps  2# 2x15  2# 2x15  21/2# 2x15  3# 2x15  3# 2x15         Lumbar roll outs with pball                       HS stretch  90/90 x10  90/90 x10  90/90 x10    x10  10  10         Gastroc stretch                       SLS                       Clamshells                        LTR  10  10  10  10  10  10  10          sktc  10sec x10  [id number]  10s x10  10s x10  10sec x10  10sec x10  10 x10"          seated LB flexion  5sec x10    5s x10  5s x10  5sx10              squats       2x10  2x10              SAQ            # 4 x30  4# x30                                                                                                                                                                                       Modalities                                                                                                       Assessment: Tolerated treatment well  Patient would benefit from continued PT  Plan: Continue per plan of care

## 2018-07-28 DIAGNOSIS — F32.A DEPRESSION, UNSPECIFIED DEPRESSION TYPE: Primary | ICD-10-CM

## 2018-07-31 ENCOUNTER — OFFICE VISIT (OUTPATIENT)
Dept: PHYSICAL THERAPY | Age: 83
End: 2018-07-31
Payer: COMMERCIAL

## 2018-07-31 DIAGNOSIS — M54.5 LOW BACK PAIN, UNSPECIFIED BACK PAIN LATERALITY, UNSPECIFIED CHRONICITY, WITH SCIATICA PRESENCE UNSPECIFIED: Primary | ICD-10-CM

## 2018-07-31 PROCEDURE — 97110 THERAPEUTIC EXERCISES: CPT

## 2018-07-31 PROCEDURE — 97140 MANUAL THERAPY 1/> REGIONS: CPT

## 2018-07-31 NOTE — PROGRESS NOTES
Daily Note     Today's date: 2018  Patient name: Rick Singh  : 1935  MRN: [id number]  Referring provider: Say Gabriel MD  Dx:   Encounter Diagnosis     ICD-10-CM    1  Low back pain, unspecified back pain laterality, unspecified chronicity, with sciatica presence unspecified M54 5                   Subjective: Patient wishes his wife would stretch his legs at home      Objective: See treatment diary below  Precautions: Frontal lobe dementia, DM, h/o kidney CA    Manual   7/3  7/6  7/11  7/13  7/17  7/24  7/27  7/31       B hip PROM  10min  10min  10 min  10m  10m  10m  8'  8"        s/l hip flex/ quad stretch  30ymqw3  24dygd0  15s x5  30s x5  30sx5  30sx5  30" x5  30"x5        90/90 stretch  man 10  man 10  man 10  10  10  10  10  10                                                             Exercise Diary   7/3  7/6  7/11  7/13  7/17  7/24  7/27  7/31       Lifecycle  10min  10m  10m  12m  12m  15m  12'  12       Sit to stands                       Standing hip abd/ext    2# x10 reps  2# 2x15  2# 2x15  21/2# 2x15  3# 2x15  3# 2x15  3# 2x15       Lumbar roll outs with pball                       HS stretch  90/90 x10  90/90 x10  90/90 x10    x10  10  10  man       Gastroc stretch                       SLS                       Clamshells                        LTR  10  10  10  10  10  10  10  10        sktc  10sec x10  [id number]  10s x10  10s x10  10sec x10  10sec x10  10 x10"  [id number]        seated LB flexion  5sec x10    5s x10  5s x10  5sx10  10  10  10        squats       2x10  2x10 20  20  20        SAQ            # 4 x30  4# x30  4#x30                                                                                                                                                                                     Modalities                                                                                                       Assessment: Tolerated treatment well   Patient would benefit from continued PT  Showing less loss of balance with household activities according to wife      Plan: Continue per plan of care

## 2018-08-02 ENCOUNTER — OFFICE VISIT (OUTPATIENT)
Dept: PHYSICAL THERAPY | Age: 83
End: 2018-08-02
Payer: COMMERCIAL

## 2018-08-02 DIAGNOSIS — M54.5 LOW BACK PAIN, UNSPECIFIED BACK PAIN LATERALITY, UNSPECIFIED CHRONICITY, WITH SCIATICA PRESENCE UNSPECIFIED: Primary | ICD-10-CM

## 2018-08-02 PROCEDURE — 97110 THERAPEUTIC EXERCISES: CPT | Performed by: PHYSICAL THERAPIST

## 2018-08-02 PROCEDURE — 97140 MANUAL THERAPY 1/> REGIONS: CPT | Performed by: PHYSICAL THERAPIST

## 2018-08-02 NOTE — PROGRESS NOTES
Daily Note     Today's date: 2018  Patient name: Eric Suggs  : 1935  MRN: [id number]  Referring provider: Courtney Celestin MD  Dx:   Encounter Diagnosis     ICD-10-CM    1  Low back pain, unspecified back pain laterality, unspecified chronicity, with sciatica presence unspecified M54 5                   Subjective: No new complaints  Wife reports he doesn't complain about his back anymore          Objective: See treatment diary below  Precautions: Frontal lobe dementia, DM, h/o kidney CA    Manual   7/3  7/6  7/11  7/13  7/17  7/24  7/27  7/31  8/2     B hip PROM  10min  10min  10 min  10m  10m  10m  8'  8"  10      s/l hip flex/ quad stretch  90eggz6  11zhyg8  15s x5  30s x5  30sx5  30sx5  30" x5  30"x5  30s x5      90/90 stretch  man 10  man 10  man 10  10  10  10  10  10  x10                                                           Exercise Diary   7/3  7/6  7/11  7/13  7/17  7/24  7/27  7/31  8/2     Lifecycle  10min  10m  10m  12m  12m  15m  12'  12 12min     Sit to stands                       Standing hip abd/ext    2# x10 reps  2# 2x15  2# 2x15  21/2# 2x15  3# 2x15  3# 2x15  3# 2x15  3# 3x10     Lumbar roll outs with pball                       HS stretch  90/90 x10  90/90 x10  90/90 x10    x10  10  10  man  man     Gastroc stretch                       SLS                       Clamshells                        LTR  10  10  10  10  10  10  10  10  10      sktc  10sec x10  [id number]  10s x10  10s x10  10sec x10  10sec x10  10 x10"  50mfij48  10s x10      seated LB flexion  5sec x10    5s x10  5s x10  5sx10  10  10  10  10      squats       2x10  2x10 20  20  20  20      SAQ            # 4 x30  4# x30  4#x30  4# 3x10      side stepping                  3# x10                                                                                                                                                           Modalities                                                                                                       Assessment: Good progress noted with all aspects of PT  Plan: Continue per plan of care

## 2018-08-09 ENCOUNTER — APPOINTMENT (OUTPATIENT)
Dept: PHYSICAL THERAPY | Age: 83
End: 2018-08-09
Payer: COMMERCIAL

## 2018-08-16 ENCOUNTER — APPOINTMENT (OUTPATIENT)
Dept: PHYSICAL THERAPY | Age: 83
End: 2018-08-16
Payer: COMMERCIAL

## 2018-08-17 ENCOUNTER — LAB (OUTPATIENT)
Dept: LAB | Age: 83
End: 2018-08-17
Payer: COMMERCIAL

## 2018-08-17 ENCOUNTER — TRANSCRIBE ORDERS (OUTPATIENT)
Dept: ADMINISTRATIVE | Age: 83
End: 2018-08-17

## 2018-08-17 DIAGNOSIS — Z79.899 NEED FOR PROPHYLACTIC CHEMOTHERAPY: ICD-10-CM

## 2018-08-17 DIAGNOSIS — Z79.899 NEED FOR PROPHYLACTIC CHEMOTHERAPY: Primary | ICD-10-CM

## 2018-08-17 LAB
INR PPP: 2.6 (ref 0.86–1.17)
PROTHROMBIN TIME: 27.9 SECONDS (ref 11.8–14.2)

## 2018-08-17 PROCEDURE — 85610 PROTHROMBIN TIME: CPT

## 2018-08-17 PROCEDURE — 36415 COLL VENOUS BLD VENIPUNCTURE: CPT

## 2018-08-22 ENCOUNTER — OFFICE VISIT (OUTPATIENT)
Dept: PHYSICAL THERAPY | Age: 83
End: 2018-08-22
Payer: COMMERCIAL

## 2018-08-22 DIAGNOSIS — M54.5 LOW BACK PAIN, UNSPECIFIED BACK PAIN LATERALITY, UNSPECIFIED CHRONICITY, WITH SCIATICA PRESENCE UNSPECIFIED: Primary | ICD-10-CM

## 2018-08-22 PROCEDURE — 97140 MANUAL THERAPY 1/> REGIONS: CPT | Performed by: PHYSICAL THERAPIST

## 2018-08-22 PROCEDURE — 97110 THERAPEUTIC EXERCISES: CPT | Performed by: PHYSICAL THERAPIST

## 2018-08-22 NOTE — PROGRESS NOTES
Daily Note     Today's date: 2018  Patient name: Michele Weaver  : 1935  MRN: [id number]  Referring provider: Andrew Go MD  Dx:   Encounter Diagnosis     ICD-10-CM    1  Low back pain, unspecified back pain laterality, unspecified chronicity, with sciatica presence unspecified M54 5                   Subjective: No new complaints          Objective: See treatment diary below  Precautions: Frontal lobe dementia, DM, h/o kidney CA    Manual   7/3  7/6  7/11  7/13  7/17  7/24  7/27  7/31  8/2  8/22   B hip PROM  10min  10min  10 min  10m  10m  10m  8'  8"  10      s/l hip flex/ quad stretch  91yhhh9  34uess3  15s x5  30s x5  30sx5  30sx5  30" x5  30"x5  30s x5  30s x5    90/90 stretch  man 10  man 10  man 10  10  10  10  10  10  x10  B 20                                                         Exercise Diary   7/3  7/6  7/11  7/13  7/17  7/24  7/27  7/31  8/2  8/22   Lifecycle  10min  10m  10m  12m  12m  15m  12'  12 12min  12m   Sit to stands                       Standing hip abd/ext    2# x10 reps  2# 2x15  2# 2x15  21/2# 2x15  3# 2x15  3# 2x15  3# 2x15  3# 3x10  3# 3x10   Lumbar roll outs with pball                       HS stretch  90/90 x10  90/90 x10  90/90 x10    x10  10  10  man  man  man   Gastroc stretch                       SLS                       Clamshells                        LTR  10  10  10  10  10  10  10  10  10  10    sktc  10sec x10  [id number]  10s x10  10s x10  10sec x10  10sec x10  10 x10"  03umqu18  10s x10  10s x10    seated LB flexion  5sec x10    5s x10  5s x10  5sx10  10  10  10  10  10    squats       2x10  2x10 20  20  20  20  2x10    SAQ            # 4 x30  4# x30  4#x30  4# 3x10  4# 3x10    side stepping                  3# x10  3# x3                                                                                                                                                         Modalities                                                                                                       Assessment: Good tolerance after absence  Plan: Continue per plan of care

## 2018-08-27 DIAGNOSIS — F03.90 DEMENTIA WITHOUT BEHAVIORAL DISTURBANCE, UNSPECIFIED DEMENTIA TYPE (HCC): ICD-10-CM

## 2018-08-27 RX ORDER — DONEPEZIL HYDROCHLORIDE 10 MG/1
10 TABLET, FILM COATED ORAL
Qty: 90 TABLET | Refills: 3 | Status: SHIPPED | OUTPATIENT
Start: 2018-08-27 | End: 2019-06-13 | Stop reason: SDUPTHER

## 2018-08-30 ENCOUNTER — OFFICE VISIT (OUTPATIENT)
Dept: PHYSICAL THERAPY | Age: 83
End: 2018-08-30
Payer: COMMERCIAL

## 2018-08-30 DIAGNOSIS — M54.5 LOW BACK PAIN, UNSPECIFIED BACK PAIN LATERALITY, UNSPECIFIED CHRONICITY, WITH SCIATICA PRESENCE UNSPECIFIED: Primary | ICD-10-CM

## 2018-08-30 PROCEDURE — G8992 OTHER PT/OT  D/C STATUS: HCPCS | Performed by: PHYSICAL THERAPIST

## 2018-08-30 PROCEDURE — G8991 OTHER PT/OT GOAL STATUS: HCPCS | Performed by: PHYSICAL THERAPIST

## 2018-08-30 PROCEDURE — 97140 MANUAL THERAPY 1/> REGIONS: CPT | Performed by: PHYSICAL THERAPIST

## 2018-08-30 PROCEDURE — 97110 THERAPEUTIC EXERCISES: CPT | Performed by: PHYSICAL THERAPIST

## 2018-08-30 NOTE — PROGRESS NOTES
Daily Note     Today's date: 2018  Patient name: Aruna Bishop  : 1935  MRN: [id number]  Referring provider: Whitney Wheat MD  Dx:   Encounter Diagnosis     ICD-10-CM    1  Low back pain, unspecified back pain laterality, unspecified chronicity, with sciatica presence unspecified M54 5                   Subjective: Denies LBP    Objective: See treatment diary below  Precautions: Frontal lobe dementia, DM, h/o kidney CA    Manual      B hip PROM    10min  10 min  10m  10m  10m  8'  8"  10      s/l hip flex/ quad stretch  45gftf4  68xmqs4  15s x5  30s x5  30sx5  30sx5  30" x5  30"x5  30s x5  30s x5    90/90 stretch  man 10  man 10  man 10  10  10  10  10  10  x10  B 20                                                         Exercise Diary      Lifecycle  10min  10m  10m  12m  12m  15m  12'  12 12min  12m   Sit to stands                       Standing hip abd/ext  3# 3x10  2# x10 reps  2# 2x15  2# 2x15  21/2# 2x15  3# 2x15  3# 2x15  3# 2x15  3# 3x10  3# 3x10   Lumbar roll outs with pball                       HS stretch  90/90 x10  90/90 x10  90/90 x10    x10  10  10  man  man  man   Gastroc stretch                       SLS                       Clamshells                        LTR  10  10  10  10  10  10  10  10  10  10    sktc  10sec x10  [id number]  10s x10  10s x10  10sec x10  10sec x10  10 x10"  84qajq15  10s x10  10s x10    seated LB flexion  5sec x10    5s x10  5s x10  5sx10  10  10  10  10  10    squats       2x10  2x10 20  20  20  20  2x10    SAQ  4# 3x10          # 4 x30  4# x30  4#x30  4# 3x10  4# 3x10    side stepping                  3# x10  3# x3                                                                                                                                                         Modalities                                                                                                       Assessment: All formal PT goals have been achieved  Plan: D/C to [facility]

## 2018-09-04 ENCOUNTER — REMOTE DEVICE CLINIC VISIT (OUTPATIENT)
Dept: CARDIOLOGY CLINIC | Facility: CLINIC | Age: 83
End: 2018-09-04
Payer: COMMERCIAL

## 2018-09-04 DIAGNOSIS — Z95.0 CARDIAC PACEMAKER IN SITU: ICD-10-CM

## 2018-09-04 DIAGNOSIS — I49.5 SICK SINUS SYNDROME (HCC): ICD-10-CM

## 2018-09-04 DIAGNOSIS — I48.91 ATRIAL FIBRILLATION, UNSPECIFIED TYPE (HCC): Primary | ICD-10-CM

## 2018-09-04 PROCEDURE — 93296 REM INTERROG EVL PM/IDS: CPT | Performed by: INTERNAL MEDICINE

## 2018-09-04 NOTE — PROGRESS NOTES
Results for orders placed or performed in visit on 09/04/18   Cardiac EP device report    Narrative    MDT DUAL PM - Pavan FLANNERY: BATTERY VOLTAGE ADEQUATE (2 5 YRS)  AP-99%, -3%  ALL AVAILABLE LEAD PARAMETERS WITHIN NORMAL LIMITS  NO SIGNIFICANT HIGH RATE EPISODES  NORMAL DEVICE FUNCTION   GV

## 2018-09-29 ENCOUNTER — APPOINTMENT (OUTPATIENT)
Dept: LAB | Age: 83
End: 2018-09-29
Payer: COMMERCIAL

## 2018-09-29 ENCOUNTER — LAB (OUTPATIENT)
Dept: LAB | Age: 83
End: 2018-09-29
Payer: COMMERCIAL

## 2018-09-29 ENCOUNTER — TRANSCRIBE ORDERS (OUTPATIENT)
Dept: ADMINISTRATIVE | Age: 83
End: 2018-09-29

## 2018-09-29 DIAGNOSIS — I48.0 PAF (PAROXYSMAL ATRIAL FIBRILLATION) (HCC): ICD-10-CM

## 2018-09-29 DIAGNOSIS — I48.0 PAF (PAROXYSMAL ATRIAL FIBRILLATION) (HCC): Primary | ICD-10-CM

## 2018-09-29 LAB
INR PPP: 2.75 (ref 0.86–1.17)
INR PPP: 2.75 (ref 0.86–1.17)
PROTHROMBIN TIME: 29.1 SECONDS (ref 11.8–14.2)

## 2018-09-29 PROCEDURE — 36415 COLL VENOUS BLD VENIPUNCTURE: CPT

## 2018-09-29 PROCEDURE — 85610 PROTHROMBIN TIME: CPT

## 2018-10-11 RX ORDER — BLOOD SUGAR DIAGNOSTIC
STRIP MISCELLANEOUS
COMMUNITY
Start: 2018-07-30 | End: 2021-01-11

## 2018-10-11 RX ORDER — LOSARTAN POTASSIUM 25 MG/1
TABLET ORAL
COMMUNITY
Start: 2018-07-24 | End: 2019-04-21 | Stop reason: SDUPTHER

## 2018-10-11 RX ORDER — INSULIN ASPART 100 [IU]/ML
8 INJECTION, SOLUTION INTRAVENOUS; SUBCUTANEOUS 2 TIMES DAILY WITH MEALS
COMMUNITY
Start: 2018-07-18 | End: 2021-06-22 | Stop reason: SDUPTHER

## 2018-10-11 RX ORDER — LANCETS 33 GAUGE
EACH MISCELLANEOUS
COMMUNITY
Start: 2018-07-08 | End: 2022-06-29 | Stop reason: CLARIF

## 2018-10-16 ENCOUNTER — OFFICE VISIT (OUTPATIENT)
Dept: NEUROLOGY | Facility: CLINIC | Age: 83
End: 2018-10-16
Payer: COMMERCIAL

## 2018-10-16 VITALS
BODY MASS INDEX: 30.65 KG/M2 | HEART RATE: 97 BPM | SYSTOLIC BLOOD PRESSURE: 128 MMHG | HEIGHT: 71 IN | WEIGHT: 218.9 LBS | DIASTOLIC BLOOD PRESSURE: 70 MMHG

## 2018-10-16 DIAGNOSIS — F02.80 FRONTOTEMPORAL DEMENTIA (HCC): Primary | ICD-10-CM

## 2018-10-16 DIAGNOSIS — G31.09 FRONTOTEMPORAL DEMENTIA (HCC): Primary | ICD-10-CM

## 2018-10-16 PROCEDURE — 99214 OFFICE O/P EST MOD 30 MIN: CPT | Performed by: NURSE PRACTITIONER

## 2018-10-16 NOTE — PROGRESS NOTES
Patient ID: Jesus Alberto Beavers is a 80 y o  male  Assessment/Plan:    Frontotemporal dementia  MOCA improved today at 20/30, was 16/30 at his last visit  His wife is noticing some continued short term memory deficits, but he remains independent in self care and ADLs and can help around the house/follow directions without reminders or prompts  No safety concerns currently  His wife is noticing that he is moodier than he had been, but overall remains pleasant with a good sense of humor for the majority  He is currently having issues with his blood sugars being high as well as a continued decline in his kidney function, which may also be contributing  Will hold off on any adjustments in medications at this time, but if mood continues to be an issue and his sugars get under better control, could consider increasing zoloft  He will remain on aricept and namenda  He was encouraged to keep his mind active and engaged and to call with any new or worsening symptoms  Subjective:    Randee Osgood is an 80year old right handed man with frontotemporal dementia who presents for follow up  To review, symptoms of memory loss began gradually and progressed  He was started on Aricept 5mg daily in 2009  Neuropsychological testing revealed changes suggestive of possible Alzheimer's disease versus frontotemporal dementia with the latter being more likely  FDG PET scan actually showed normal uptake, mild atrophy  [address] 23/30(9/25/13); 22/30 ; 21/30 (3/26/13) Previously 23/30; 26/30 10/1/14; 21/30 (4/1/15); 20/30 (10/7/15); 22 (5/1/17)  At his last visit he was felt to be stable and continued on Aricept, Namenda, and sertraline  His MOCA was 16/30  His wife was noting some increased mood swings and we discussed possibly increasing sertraline or adding another agent such as seroquel, but she wanted to hold off on any changes at that time  Today Mr Ned Sullivan presents for follow-up accompanied by his wife   He is doing ok but his wife tells me that he is having more difficulty with short term memory  He asks her daily if his mother and father   He is having some increased mood changes as well but she also notes that he has been having a lot of difficulty with his blood sugars (they have been high), but this is being addressed by his endocrinologist  He is also having decreased kidney function, and is being watched closely by his nephrologist  He is crankier at night, but not necessarily more confused  He remains independent in ADLs and self care, only needing occasional reminders  He sleeps well but does have some vivid dreams  He is not scared by his dreams and they do not wake him up  He moves his legs a lot  They are giving his the aricept at night which his wife feels has helped with daytime sleepiness  He continues to do crossword puzzles and read the paper during the day  He helps with household chores and can follow directions  He continues to have a good sense of humor  No safety concerns  He is not wandering out of the house  No hallucinations  The following portions of the patient's history were reviewed and updated as appropriate: past family history, past social history and past surgical history  Objective:    Blood pressure 128/70, pulse 97, height 5' 10 5" (1 791 m), weight 99 3 kg (218 lb 14 4 oz)  Physical Exam   Constitutional: He appears well-developed and well-nourished  HENT:   Head: Normocephalic  Eyes: Pupils are equal, round, and reactive to light  Lids are normal    Neurological: He is alert  He has normal strength  Psychiatric: He has a normal mood and affect  His speech is normal and behavior is normal    Pleasant, very witty and joking   Vitals reviewed  Neurological Exam  Mental Status  Awake and alert  Speech is normal  Language is fluent with no aphasia  Attention and concentration are normal  Fund of knowledge is appropriate for level of education      Cranial Nerves  CN II: Visual acuity is normal  Visual fields full to confrontation  CN III, IV, VI: Extraocular movements intact bilaterally  Normal lids and orbits bilaterally  Pupils equal round and reactive to light bilaterally  CN V: Facial sensation is normal   CN VII: Full and symmetric facial movement  CN VIII: Hearing is normal   CN IX, X: Palate elevates symmetrically  Normal gag reflex  CN XI: Shoulder shrug strength is normal   CN XII: Tongue midline without atrophy or fasciculations  Motor   Strength is 5/5 throughout all four extremities  Sensory  Light touch is normal in upper and lower extremities  Coordination  Right: Finger-to-nose normal   Left: Finger-to-nose normal     Gait  Casual gait is normal including stance, stride, and arm swing  Able to rise from chair without using arms  ROS:    Review of Systems   Constitutional: Negative  HENT: Positive for congestion, ear discharge (bleeding out of left ear seeing ent ), hearing loss and postnasal drip  Eyes: Negative  Respiratory: Positive for cough  Cardiovascular: Negative  Gastrointestinal: Negative  Endocrine: Negative  Genitourinary: Positive for frequency (due to kidney issues )  Musculoskeletal: Positive for gait problem (stiffness)  Skin: Negative  Allergic/Immunologic: Positive for environmental allergies (seasonal)  Hematological: Bruises/bleeds easily  Psychiatric/Behavioral: Positive for agitation

## 2018-10-16 NOTE — PATIENT INSTRUCTIONS
1  Continue on Namenda XR 28mg daily  2  Continue on aricept 10mg daily at bedtime  3  Continue on sertraline 50mg daily  If mood continues to be an issue after his sugars are straightened out, we can consider increasing this  4  Continue to keep your mind active and engaged  Consider looking into an adult day program to keep him busy and socially engaged  5  Call with any new or worsening symptoms

## 2018-10-16 NOTE — ASSESSMENT & PLAN NOTE
MOCA improved today at 20/30, was 16/30 at his last visit  His wife is noticing some continued short term memory deficits, but he remains independent in self care and ADLs and can help around the house/follow directions without reminders or prompts  No safety concerns currently  His wife is noticing that he is moodier than he had been, but overall remains pleasant with a good sense of humor for the majority  He is currently having issues with his blood sugars being high as well as a continued decline in his kidney function, which may also be contributing  Will hold off on any adjustments in medications at this time, but if mood continues to be an issue and his sugars get under better control, could consider increasing zoloft  He will remain on aricept and namenda  He was encouraged to keep his mind active and engaged and to call with any new or worsening symptoms

## 2018-10-20 ENCOUNTER — TRANSCRIBE ORDERS (OUTPATIENT)
Dept: ADMINISTRATIVE | Age: 83
End: 2018-10-20

## 2018-10-20 ENCOUNTER — LAB (OUTPATIENT)
Dept: LAB | Age: 83
End: 2018-10-20
Payer: COMMERCIAL

## 2018-10-20 DIAGNOSIS — E78.5 HYPERLIPIDEMIA, UNSPECIFIED HYPERLIPIDEMIA TYPE: Primary | ICD-10-CM

## 2018-10-20 DIAGNOSIS — E78.5 HYPERLIPIDEMIA, UNSPECIFIED HYPERLIPIDEMIA TYPE: ICD-10-CM

## 2018-10-20 LAB
BASOPHILS # BLD AUTO: 0.06 THOUSANDS/ΜL (ref 0–0.1)
BASOPHILS NFR BLD AUTO: 1 % (ref 0–1)
CHOLEST SERPL-MCNC: 137 MG/DL (ref 50–200)
EOSINOPHIL # BLD AUTO: 0.27 THOUSAND/ΜL (ref 0–0.61)
EOSINOPHIL NFR BLD AUTO: 4 % (ref 0–6)
ERYTHROCYTE [DISTWIDTH] IN BLOOD BY AUTOMATED COUNT: 12.3 % (ref 11.6–15.1)
HCT VFR BLD AUTO: 43.2 % (ref 36.5–49.3)
HDLC SERPL-MCNC: 23 MG/DL (ref 40–60)
HGB BLD-MCNC: 14.5 G/DL (ref 12–17)
IMM GRANULOCYTES # BLD AUTO: 0.01 THOUSAND/UL (ref 0–0.2)
IMM GRANULOCYTES NFR BLD AUTO: 0 % (ref 0–2)
INR PPP: 2.64 (ref 0.86–1.17)
LYMPHOCYTES # BLD AUTO: 1.78 THOUSANDS/ΜL (ref 0.6–4.47)
LYMPHOCYTES NFR BLD AUTO: 29 % (ref 14–44)
MCH RBC QN AUTO: 29.4 PG (ref 26.8–34.3)
MCHC RBC AUTO-ENTMCNC: 33.6 G/DL (ref 31.4–37.4)
MCV RBC AUTO: 88 FL (ref 82–98)
MONOCYTES # BLD AUTO: 0.55 THOUSAND/ΜL (ref 0.17–1.22)
MONOCYTES NFR BLD AUTO: 9 % (ref 4–12)
NEUTROPHILS # BLD AUTO: 3.49 THOUSANDS/ΜL (ref 1.85–7.62)
NEUTS SEG NFR BLD AUTO: 57 % (ref 43–75)
NONHDLC SERPL-MCNC: 114 MG/DL
NRBC BLD AUTO-RTO: 0 /100 WBCS
PLATELET # BLD AUTO: 311 THOUSANDS/UL (ref 149–390)
PMV BLD AUTO: 10.3 FL (ref 8.9–12.7)
PROTHROMBIN TIME: 28.2 SECONDS (ref 11.8–14.2)
RBC # BLD AUTO: 4.93 MILLION/UL (ref 3.88–5.62)
TRIGL SERPL-MCNC: 457 MG/DL
WBC # BLD AUTO: 6.16 THOUSAND/UL (ref 4.31–10.16)

## 2018-10-20 PROCEDURE — 36415 COLL VENOUS BLD VENIPUNCTURE: CPT

## 2018-10-20 PROCEDURE — 85025 COMPLETE CBC W/AUTO DIFF WBC: CPT

## 2018-10-20 PROCEDURE — 85610 PROTHROMBIN TIME: CPT

## 2018-10-20 PROCEDURE — 80061 LIPID PANEL: CPT

## 2018-11-05 ENCOUNTER — ANTICOAG VISIT (OUTPATIENT)
Dept: CARDIOLOGY CLINIC | Facility: CLINIC | Age: 83
End: 2018-11-05

## 2018-11-06 DIAGNOSIS — F32.A DEPRESSION, UNSPECIFIED DEPRESSION TYPE: ICD-10-CM

## 2018-11-16 ENCOUNTER — TELEPHONE (OUTPATIENT)
Dept: NEUROLOGY | Facility: CLINIC | Age: 83
End: 2018-11-16

## 2018-11-16 NOTE — TELEPHONE ENCOUNTER
Patient's wife states his behavior has gotten more irritable and things he says "aren't very nice" and very carlos, ongoing for a few months but has gotten even worse since last office visit in October  Denies any recent illness  Denies any hallucinations  Denies any other sxs  She thinks he's "sun downing" at night and is requesting a med for this  Current meds:  zoloft 50 mg in Am  Donepezil 1 tab at bed  memantine 28 mg at bed    Please advise    [phone]

## 2018-11-19 NOTE — TELEPHONE ENCOUNTER
Stuart Su,    It is not a new complaint and often times even when they bring it up, they are Susan March with the exact details and how often it is happening  The only thing that I had discussed at the last visit was possibly increasing the zoloft  Otherwise, I do not think there is much else to do at this time

## 2018-11-19 NOTE — TELEPHONE ENCOUNTER
Azael Hollow, I haven't met these folks yet  It doesn't sound like this is new at all  Wasn't sure if you discussed anything specific with them or if you think this can wait until I meet them in clinic

## 2018-11-20 ENCOUNTER — LAB (OUTPATIENT)
Dept: LAB | Age: 83
End: 2018-11-20
Payer: COMMERCIAL

## 2018-11-20 ENCOUNTER — TRANSCRIBE ORDERS (OUTPATIENT)
Dept: ADMINISTRATIVE | Age: 83
End: 2018-11-20

## 2018-11-20 DIAGNOSIS — I48.91 ATRIAL FIBRILLATION, UNSPECIFIED TYPE (HCC): Primary | ICD-10-CM

## 2018-11-20 DIAGNOSIS — F32.A DEPRESSION, UNSPECIFIED DEPRESSION TYPE: ICD-10-CM

## 2018-11-20 DIAGNOSIS — I48.91 ATRIAL FIBRILLATION, UNSPECIFIED TYPE (HCC): ICD-10-CM

## 2018-11-20 LAB
INR PPP: 2.35 (ref 0.86–1.17)
PROTHROMBIN TIME: 25.8 SECONDS (ref 11.8–14.2)

## 2018-11-20 PROCEDURE — 36415 COLL VENOUS BLD VENIPUNCTURE: CPT

## 2018-11-20 PROCEDURE — 85610 PROTHROMBIN TIME: CPT

## 2018-11-20 NOTE — TELEPHONE ENCOUNTER
Pt's wife returned call  Agreeable to increase  Requesting Rx be sent to [facility]

## 2018-11-28 ENCOUNTER — TELEPHONE (OUTPATIENT)
Dept: CARDIOLOGY CLINIC | Facility: CLINIC | Age: 83
End: 2018-11-28

## 2018-11-28 NOTE — TELEPHONE ENCOUNTER
PATIENT'S WIFE CALLED WITH UPDATED BLOOD PRESSURE READINGS  11/26/18    143/77  PULSE 80  11/27/18    158/81  PULSE 77  11/28/18    144/80  PULSE 83  THESE READINGS ARE ALL IN THE MORNING  SHE IS ASKING IF SHE SHOULD BE GIVING HIM ALL HIS BP MEDICATIONS IN THE EVENING OR SPLIT THEM MORNING AND EVENING    PLEASE CALL HER

## 2018-11-29 ENCOUNTER — TELEPHONE (OUTPATIENT)
Dept: CARDIOLOGY CLINIC | Facility: CLINIC | Age: 83
End: 2018-11-29

## 2018-11-29 NOTE — TELEPHONE ENCOUNTER
PATIENT'S WIFE CALLED AGAIN THIS MORNING HIS BLOOD PRESSURE /79 PULSE 83  SHOULD SHE CHANGE TIMES SHE GIVES HIM THE BP MEDICATIONS    PLEASE CALL HER AT HOME OR LEAVE A MESSAGE

## 2018-12-04 ENCOUNTER — REMOTE DEVICE CLINIC VISIT (OUTPATIENT)
Dept: CARDIOLOGY CLINIC | Facility: CLINIC | Age: 83
End: 2018-12-04
Payer: COMMERCIAL

## 2018-12-04 DIAGNOSIS — Z95.0 CARDIAC PACEMAKER: ICD-10-CM

## 2018-12-04 DIAGNOSIS — I48.0 PAROXYSMAL ATRIAL FIBRILLATION (HCC): Primary | ICD-10-CM

## 2018-12-04 DIAGNOSIS — I49.5 SICK SINUS SYNDROME (HCC): ICD-10-CM

## 2018-12-04 PROCEDURE — 93296 REM INTERROG EVL PM/IDS: CPT | Performed by: INTERNAL MEDICINE

## 2018-12-04 PROCEDURE — 93294 REM INTERROG EVL PM/LDLS PM: CPT | Performed by: INTERNAL MEDICINE

## 2018-12-04 NOTE — PROGRESS NOTES
Results for orders placed or performed in visit on 12/04/18   Cardiac EP device report    Narrative    MDT DUAL PM - ALMA DELIA PT   Cynthia Primer TRANSMISSION: BATTERY VOLTAGE ADEQUATE (2 5 YRS)  AP - 99%  - 2%  ALL AVAILABLE LEAD PARAMETERS APPEAR WITHIN NORMAL LIMITS & STABLE  2 AHR EPISODES WITH LONGEST DURATION @ ~ 42 HRS  AVAIL EGRM/MARKERS SHOWING COMP  ATRIAL PACING, PAT  HX: PAF & PT ON WARFARIN, METOPROLOL SUCC  TOTAL AF BURDEN <0 1%  NORMAL DEVICE FUNCTION    eb

## 2018-12-12 ENCOUNTER — OFFICE VISIT (OUTPATIENT)
Dept: CARDIOLOGY CLINIC | Facility: CLINIC | Age: 83
End: 2018-12-12
Payer: COMMERCIAL

## 2018-12-12 VITALS
RESPIRATION RATE: 18 BRPM | HEIGHT: 70 IN | DIASTOLIC BLOOD PRESSURE: 75 MMHG | SYSTOLIC BLOOD PRESSURE: 125 MMHG | BODY MASS INDEX: 31.78 KG/M2 | WEIGHT: 222 LBS | HEART RATE: 70 BPM

## 2018-12-12 DIAGNOSIS — I48.0 PAROXYSMAL ATRIAL FIBRILLATION (HCC): ICD-10-CM

## 2018-12-12 DIAGNOSIS — I10 ESSENTIAL HYPERTENSION: Primary | ICD-10-CM

## 2018-12-12 DIAGNOSIS — E78.2 MIXED HYPERLIPIDEMIA: ICD-10-CM

## 2018-12-12 PROCEDURE — 99213 OFFICE O/P EST LOW 20 MIN: CPT | Performed by: INTERNAL MEDICINE

## 2018-12-12 NOTE — ASSESSMENT & PLAN NOTE
Paroxysmal atrial fibrillation  Currently in sinus rhythm  Patient will continue the same regimen of medication in this regard

## 2018-12-12 NOTE — LETTER
December 12, 2018     Cecilio Rodríguez MD  [address] [address]    Patient: Adina Elmore   YOB: 1935   Date of Visit: 12/12/2018       Dear Dr Nirav Pimentel: Thank you for referring Fernando Vo to me for evaluation  Below are my notes for this consultation  If you have questions, please do not hesitate to call me  I look forward to following your patient along with you  Sincerely,        Geovany Ventura MD        CC: No Recipients  Geovany Ventura MD  12/12/2018  9:38 AM  Sign at close encounter  Assessment/Plan:    Essential hypertension  Hypertension, stable  Patient will continue current medications  Paroxysmal atrial fibrillation (HCC)  Paroxysmal atrial fibrillation  Currently in sinus rhythm  Patient will continue the same regimen of medication in this regard  Mixed hyperlipidemia  Hyperlipidemia, stable on current regimen of medications  Diagnoses and all orders for this visit:    Essential hypertension    Paroxysmal atrial fibrillation ([facility] [address] )    Mixed hyperlipidemia          Subjective:  Feels well  Patient ID: Adina Elmore is a 80 y o  male  Patient presented to this office for cardiac follow-up  He is known to have a history of paroxysmal atrial fibrillation, hypertension and hyperlipidemia  He also has chronic renal insufficiency  He denies any symptoms of chest pain, shortness of breath, palpitation, dizziness or lightheadedness  He denies any leg edema  Atrial Fibrillation   Symptoms are negative for chest pain, dizziness, palpitations and shortness of breath  Past medical history includes atrial fibrillation         The following portions of the patient's history were reviewed and updated as appropriate: allergies, current medications, past family history, past medical history, past social history, past surgical history and problem list     Review of Systems   Respiratory: Negative for apnea, cough, chest tightness, shortness of breath and wheezing  Cardiovascular: Negative for chest pain, palpitations and leg swelling  Gastrointestinal: Negative for abdominal pain  Neurological: Negative for dizziness and light-headedness  Objective:  Stable cardiac-wise  /75   Pulse 70   Resp 18   Ht 5' 10" (1 778 m)   Wt 101 kg (222 lb)   BMI 31 85 kg/m²           Physical Exam   Constitutional: He is oriented to person, place, and time  He appears well-developed and well-nourished  No distress  HENT:   Head: Normocephalic  Eyes: Pupils are equal, round, and reactive to light  Neck: Normal range of motion  No JVD present  No thyromegaly present  Cardiovascular: Normal rate, regular rhythm, S1 normal and S2 normal   Exam reveals no gallop and no friction rub  Murmur heard  Crescendo systolic murmur is present with a grade of 1/6   Pulmonary/Chest: Effort normal and breath sounds normal  No respiratory distress  He has no wheezes  He has no rales  He exhibits no tenderness  Abdominal: Soft  Musculoskeletal: Normal range of motion  He exhibits no edema, tenderness or deformity  Neurological: He is alert and oriented to person, place, and time  Skin: Skin is warm and dry  He is not diaphoretic  Psychiatric: He has a normal mood and affect  Vitals reviewed

## 2018-12-26 ENCOUNTER — ANTICOAG VISIT (OUTPATIENT)
Dept: CARDIOLOGY CLINIC | Facility: CLINIC | Age: 83
End: 2018-12-26

## 2018-12-26 DIAGNOSIS — I48.0 PAROXYSMAL ATRIAL FIBRILLATION (HCC): ICD-10-CM

## 2018-12-26 NOTE — PROGRESS NOTES
12/28/18, tc to pt, spoke with wife, reports pt had inr done 11/20/18  Will continue current dose, inr due 1 month   tuyet

## 2018-12-29 ENCOUNTER — LAB (OUTPATIENT)
Dept: LAB | Age: 83
End: 2018-12-29
Payer: COMMERCIAL

## 2018-12-29 DIAGNOSIS — I48.0 PAF (PAROXYSMAL ATRIAL FIBRILLATION) (HCC): ICD-10-CM

## 2018-12-29 LAB
INR PPP: 2.58 (ref 0.86–1.17)
PROTHROMBIN TIME: 27.7 SECONDS (ref 11.8–14.2)

## 2018-12-29 PROCEDURE — 85610 PROTHROMBIN TIME: CPT

## 2018-12-29 PROCEDURE — 36415 COLL VENOUS BLD VENIPUNCTURE: CPT

## 2018-12-31 ENCOUNTER — ANTICOAG VISIT (OUTPATIENT)
Dept: CARDIOLOGY CLINIC | Facility: CLINIC | Age: 83
End: 2018-12-31

## 2018-12-31 DIAGNOSIS — I48.0 PAROXYSMAL ATRIAL FIBRILLATION (HCC): ICD-10-CM

## 2018-12-31 NOTE — PROGRESS NOTES
12/31/18, TC TO PT'S WIFE KEN  CALLED WORK PHONE, CONTINUE CURRENT DOSE, INR DUE 1 MONTH  ALSO CALLED HER CELL, LEFT SAME INSTRUCTIONS ON CELL PHONE  AS WELL AS , SPOKE WITH PATIENT ON HIS HOME PHONE NUMBER   tuyet

## 2019-01-07 ENCOUNTER — TELEPHONE (OUTPATIENT)
Dept: CARDIOLOGY CLINIC | Facility: CLINIC | Age: 84
End: 2019-01-07

## 2019-01-07 NOTE — TELEPHONE ENCOUNTER
PATIENTS WIFE CALLED MR JIM'S BP HAS BEEN HIGH IN THE MORNING   150/87 /82    SHE IS ASKING SHOULD SHE GIVE HIM MEDICATIONS IN THE MORNING  PLEASE CALL HER AT WORK [phone]

## 2019-01-24 ENCOUNTER — APPOINTMENT (OUTPATIENT)
Dept: LAB | Age: 84
End: 2019-01-24
Payer: COMMERCIAL

## 2019-01-24 ENCOUNTER — ANTICOAG VISIT (OUTPATIENT)
Dept: CARDIOLOGY CLINIC | Facility: CLINIC | Age: 84
End: 2019-01-24

## 2019-01-24 ENCOUNTER — TRANSCRIBE ORDERS (OUTPATIENT)
Dept: ADMINISTRATIVE | Age: 84
End: 2019-01-24

## 2019-01-24 ENCOUNTER — LAB (OUTPATIENT)
Dept: LAB | Age: 84
End: 2019-01-24
Payer: COMMERCIAL

## 2019-01-24 ENCOUNTER — HOSPITAL ENCOUNTER (OUTPATIENT)
Dept: RADIOLOGY | Age: 84
Discharge: HOME | End: 2019-01-24
Payer: COMMERCIAL

## 2019-01-24 DIAGNOSIS — I48.0 PAROXYSMAL ATRIAL FIBRILLATION (HCC): ICD-10-CM

## 2019-01-24 DIAGNOSIS — I48.91 ATRIAL FIBRILLATION, UNSPECIFIED TYPE (HCC): ICD-10-CM

## 2019-01-24 DIAGNOSIS — I12.9 HYPERTENSIVE CHRONIC KIDNEY DISEASE WITH STAGE 1 THROUGH STAGE 4 CHRONIC KIDNEY DISEASE, OR UNSPECIFIED CHRONIC KIDNEY DISEASE: ICD-10-CM

## 2019-01-24 DIAGNOSIS — N28.1 CYST OF KIDNEY, ACQUIRED: ICD-10-CM

## 2019-01-24 DIAGNOSIS — N18.4 CKD (CHRONIC KIDNEY DISEASE), STAGE IV (HCC): ICD-10-CM

## 2019-01-24 DIAGNOSIS — I48.91 ATRIAL FIBRILLATION, UNSPECIFIED TYPE (HCC): Primary | ICD-10-CM

## 2019-01-24 DIAGNOSIS — IMO0002 SOLITARY KIDNEY: ICD-10-CM

## 2019-01-24 DIAGNOSIS — E11.21 DIABETIC NEPHROPATHY ASSOCIATED WITH TYPE 2 DIABETES MELLITUS (HCC): ICD-10-CM

## 2019-01-24 LAB
ALBUMIN SERPL BCP-MCNC: 3.5 G/DL (ref 3.5–5)
ANION GAP SERPL CALCULATED.3IONS-SCNC: 5 MMOL/L (ref 4–13)
BUN SERPL-MCNC: 33 MG/DL (ref 5–25)
CALCIUM SERPL-MCNC: 8.8 MG/DL (ref 8.3–10.1)
CHLORIDE SERPL-SCNC: 101 MMOL/L (ref 100–108)
CO2 SERPL-SCNC: 26 MMOL/L (ref 21–32)
CREAT SERPL-MCNC: 2.61 MG/DL (ref 0.6–1.3)
CREAT UR-MCNC: 112 MG/DL
ERYTHROCYTE [DISTWIDTH] IN BLOOD BY AUTOMATED COUNT: 12.6 % (ref 11.6–15.1)
GFR SERPL CREATININE-BSD FRML MDRD: 22 ML/MIN/1.73SQ M
GLUCOSE P FAST SERPL-MCNC: 247 MG/DL (ref 65–99)
HCT VFR BLD AUTO: 41.2 % (ref 36.5–49.3)
HGB BLD-MCNC: 13.9 G/DL (ref 12–17)
INR PPP: 2.34 (ref 0.86–1.17)
MCH RBC QN AUTO: 29.8 PG (ref 26.8–34.3)
MCHC RBC AUTO-ENTMCNC: 33.7 G/DL (ref 31.4–37.4)
MCV RBC AUTO: 88 FL (ref 82–98)
MICROALBUMIN UR-MCNC: 39.9 MG/L (ref 0–20)
MICROALBUMIN/CREAT 24H UR: 36 MG/G CREATININE (ref 0–30)
PHOSPHATE SERPL-MCNC: 3.2 MG/DL (ref 2.3–4.1)
PLATELET # BLD AUTO: 243 THOUSANDS/UL (ref 149–390)
PMV BLD AUTO: 10.6 FL (ref 8.9–12.7)
POTASSIUM SERPL-SCNC: 4.6 MMOL/L (ref 3.5–5.3)
PROTHROMBIN TIME: 25.7 SECONDS (ref 11.8–14.2)
PTH-INTACT SERPL-MCNC: 34.2 PG/ML (ref 18.4–80.1)
RBC # BLD AUTO: 4.67 MILLION/UL (ref 3.88–5.62)
SODIUM SERPL-SCNC: 132 MMOL/L (ref 136–145)
WBC # BLD AUTO: 6.17 THOUSAND/UL (ref 4.31–10.16)

## 2019-01-24 PROCEDURE — 82570 ASSAY OF URINE CREATININE: CPT

## 2019-01-24 PROCEDURE — 85027 COMPLETE CBC AUTOMATED: CPT

## 2019-01-24 PROCEDURE — 83970 ASSAY OF PARATHORMONE: CPT

## 2019-01-24 PROCEDURE — 80069 RENAL FUNCTION PANEL: CPT

## 2019-01-24 PROCEDURE — 36415 COLL VENOUS BLD VENIPUNCTURE: CPT

## 2019-01-24 PROCEDURE — 76770 US EXAM ABDO BACK WALL COMP: CPT

## 2019-01-24 PROCEDURE — 82043 UR ALBUMIN QUANTITATIVE: CPT

## 2019-01-24 PROCEDURE — 85610 PROTHROMBIN TIME: CPT

## 2019-01-24 NOTE — PROGRESS NOTES
1/24/19, tc to pt's wife, continue current dose, inr due 4 weeks  Pt to call if any chnages in health , medication or bleeding   tuyet

## 2019-01-30 ENCOUNTER — TELEPHONE (OUTPATIENT)
Dept: SLEEP CENTER | Facility: CLINIC | Age: 84
End: 2019-01-30

## 2019-01-30 NOTE — TELEPHONE ENCOUNTER
Pt wife called, asking for a script for a new CPAP machine for patient  States he received his machine 2014, Jostin Palomo advised her to call to get our office for script     Next OV 6/5/19    Please advise

## 2019-01-31 DIAGNOSIS — G47.33 OSA (OBSTRUCTIVE SLEEP APNEA): Primary | ICD-10-CM

## 2019-02-01 ENCOUNTER — TELEPHONE (OUTPATIENT)
Dept: NEPHROLOGY | Facility: CLINIC | Age: 84
End: 2019-02-01

## 2019-02-01 NOTE — TELEPHONE ENCOUNTER
LM for Cuco Cooley (wife) that script was available, questioning if she would like us to fax to [place]?

## 2019-02-01 NOTE — TELEPHONE ENCOUNTER
I spoke with Abdon Chau, she is requesting we fax script to [place]      Script, demographics, reports and office note faxed to [place] at [phone]

## 2019-02-04 ENCOUNTER — OFFICE VISIT (OUTPATIENT)
Dept: NEPHROLOGY | Facility: CLINIC | Age: 84
End: 2019-02-04
Payer: COMMERCIAL

## 2019-02-04 VITALS
SYSTOLIC BLOOD PRESSURE: 130 MMHG | DIASTOLIC BLOOD PRESSURE: 72 MMHG | BODY MASS INDEX: 31.78 KG/M2 | HEIGHT: 70 IN | WEIGHT: 222 LBS

## 2019-02-04 DIAGNOSIS — N18.4 TYPE 2 DIABETES MELLITUS WITH STAGE 4 CHRONIC KIDNEY DISEASE, WITH LONG-TERM CURRENT USE OF INSULIN (HCC): ICD-10-CM

## 2019-02-04 DIAGNOSIS — IMO0002 SOLITARY KIDNEY: ICD-10-CM

## 2019-02-04 DIAGNOSIS — Z79.4 TYPE 2 DIABETES MELLITUS WITH STAGE 4 CHRONIC KIDNEY DISEASE, WITH LONG-TERM CURRENT USE OF INSULIN (HCC): ICD-10-CM

## 2019-02-04 DIAGNOSIS — I12.9 HYPERTENSIVE CHRONIC KIDNEY DISEASE WITH STAGE 1 THROUGH STAGE 4 CHRONIC KIDNEY DISEASE, OR UNSPECIFIED CHRONIC KIDNEY DISEASE: ICD-10-CM

## 2019-02-04 DIAGNOSIS — E11.22 TYPE 2 DIABETES MELLITUS WITH STAGE 4 CHRONIC KIDNEY DISEASE, WITH LONG-TERM CURRENT USE OF INSULIN (HCC): ICD-10-CM

## 2019-02-04 DIAGNOSIS — N18.4 CKD (CHRONIC KIDNEY DISEASE), STAGE IV (HCC): Primary | ICD-10-CM

## 2019-02-04 PROBLEM — E11.29 TYPE 2 DIABETES MELLITUS WITH KIDNEY COMPLICATION, WITH LONG-TERM CURRENT USE OF INSULIN (HCC): Status: ACTIVE | Noted: 2019-02-04

## 2019-02-04 PROBLEM — E11.9 DIABETES MELLITUS (HCC): Status: ACTIVE | Noted: 2019-02-04

## 2019-02-04 PROCEDURE — 99214 OFFICE O/P EST MOD 30 MIN: CPT | Performed by: INTERNAL MEDICINE

## 2019-02-04 RX ORDER — ECHINACEA PURPUREA EXTRACT 125 MG
1 TABLET ORAL
COMMUNITY
Start: 2018-10-31 | End: 2022-08-09

## 2019-02-04 NOTE — PATIENT INSTRUCTIONS
ASSESSMENT and PLAN:  1  Chronic kidney disease stage 4, baseline creatinine low to mid 2s, most recent creatinine 2 6 with an estimated GFR of 22  2  Diabetic nephropathy with associated proteinuria, last hemoglobin A1c 8 6  3  Hypertension, stable, continue with current regimen  4  Frontotemporal dementia  5  Bilateral renal cysts, recent ultrasound shows stability    · Unfortunately renal function has worsened, creatinine at 2 6 with an estimated GFR of 22 again most likely secondary to diabetic nephropathy  · Blood pressure appears fairly stable would continue with current regimen  · Continue to work on better blood sugar control  · No changes in current regimen, follow-up in 3 months  · We did speak about dialysis, given his dementia and age, would not proceed with dialysis if renal function continues to decline, wife is agreeable at this time

## 2019-02-04 NOTE — PROGRESS NOTES
NEPHROLOGY OUTPATIENT PROGRESS NOTE   Jayda Monsalve 80 y o  male MRN: [id number]  Reason for visit:  Chronic kidney disease    ASSESSMENT and PLAN:  1  Chronic kidney disease stage 4, baseline creatinine low to mid 2s, most recent creatinine 2 6 with an estimated GFR of 22  2  Diabetic nephropathy with associated proteinuria, last hemoglobin A1c 8 6  3  Hypertension, stable, continue with current regimen  4  Frontotemporal dementia  5  Bilateral renal cysts, recent ultrasound shows stability    · Unfortunately renal function has worsened, creatinine at 2 6 with an estimated GFR of 22 again most likely secondary to diabetic nephropathy  · Blood pressure appears fairly stable would continue with current regimen  · Continue to work on better blood sugar control  · No changes in current regimen, follow-up in 3 months  · We did speak about dialysis, given his dementia and age, would not proceed with dialysis, wife is agreeable at this time  SUBJECTIVE / INTERVAL HISTORY:  He has been doing reasonably well  He denies any recent hospitalizations or illnesses  His sugars unfortunately have been high at home, does eat a fair amount of sweets  Denies any chest pain shortness of breath  Denies any lower extremity swelling  Denies any abdominal pain  Review of Systems      OBJECTIVE:  /72 (BP Location: Left arm, Patient Position: Sitting, Cuff Size: Large)   Ht 5' 10" (1 778 m)   Wt 101 kg (222 lb)   BMI 31 85 kg/m²   Vitals:    02/04/19 1001   Weight: 101 kg (222 lb)       Physical Exam   Constitutional: No distress  HENT:   Head: Normocephalic  Eyes: No scleral icterus  Neck: Neck supple  Cardiovascular: Normal rate and regular rhythm  Pulmonary/Chest: Effort normal and breath sounds normal    Abdominal: Soft  He exhibits no distension  Musculoskeletal: He exhibits no edema  Neurological: He is alert  Skin: Skin is warm and dry           Medications:    Current Outpatient Prescriptions:   amLODIPine (NORVASC) 5 mg tablet, Take 2 5 mg by mouth daily  , Disp: , Rfl:     amoxicillin (AMOXIL) 500 mg capsule, Take 500 mg by mouth every 12 (twelve) hours, Disp: , Rfl:     atorvastatin (LIPITOR) 10 mg tablet, Take 10 mg by mouth daily  , Disp: , Rfl:     cholecalciferol (VITAMIN D3) 1,000 units tablet, Take 2,000 Units by mouth daily, Disp: , Rfl:     cyanocobalamin (VITAMIN B-12) 500 mcg tablet, Take 500 mcg by mouth daily, Disp: , Rfl:     donepezil (ARICEPT) 10 mg tablet, Take 1 tablet (10 mg total) by mouth daily at bedtime, Disp: 90 tablet, Rfl: 3    fenofibrate (TRICOR) 145 mg tablet, Take 145 mg by mouth daily  , Disp: , Rfl:     ferrous sulfate 325 (65 Fe) mg tablet, Take 325 mg by mouth daily with breakfast , Disp: , Rfl:     fluticasone (FLONASE) 50 mcg/act nasal spray, 1 spray into each nostril daily, Disp: , Rfl:     insulin aspart (NovoLOG) 100 units/mL injection, Inject 8 Units under the skin 2 (two) times a day before meals  , Disp: , Rfl:     insulin glargine (LANTUS) 100 units/mL subcutaneous injection, Inject 30 Units under the skin 2 (two) times a day  , Disp: , Rfl:     ipratropium (ATROVENT HFA) 17 mcg/act inhaler, Inhale 2 puffs every 6 (six) hours  , Disp: , Rfl:     ipratropium (ATROVENT) 0 03 % nasal spray, 2 sprays into each nostril every 12 (twelve) hours, Disp: 30 mL, Rfl: 0    losartan (COZAAR) 25 mg tablet, , Disp: , Rfl:     magnesium gluconate (MAGONATE) 500 mg tablet, Take 500 mg by mouth daily  , Disp: , Rfl:     Memantine HCl ER (NAMENDA XR) 28 MG CP24, 1 po qd, Disp: 90 capsule, Rfl: 3    metoprolol succinate (TOPROL-XL) 100 mg 24 hr tablet, , Disp: , Rfl:     metoprolol tartrate (LOPRESSOR) 100 mg tablet, Take by mouth every morning  , Disp: , Rfl:     Multiple Vitamin (MULTIVITAMIN) capsule, Take 1 capsule by mouth daily, Disp: , Rfl:     NOVOFINE 32G X 6 MM MISC, , Disp: , Rfl:     NOVOLOG FLEXPEN 100 units/mL injection pen, , Disp: , Rfl:    omega-3-acid ethyl esters (LOVAZA) 1 g capsule, Take 360 mg by mouth daily, Disp: , Rfl:     ONETOUCH DELICA LANCETS 87X MISC, , Disp: , Rfl:     ONETOUCH VERIO test strip, , Disp: , Rfl:     Probiotic Product (PROBIOTIC COLON SUPPORT PO), Take 1 tablet by mouth daily, Disp: , Rfl:     propafenone (RYTHMOL) 225 mg tablet, Take 225 mg by mouth every 8 (eight) hours  , Disp: , Rfl:     sertraline (ZOLOFT) 50 mg tablet, Take 1 5 tablets (75 mg total) by mouth daily, Disp: 135 tablet, Rfl: 3    sodium chloride (OCEAN) 0 65 % nasal spray, 1 spray into each nostril, Disp: , Rfl:     warfarin (COUMADIN) 2 5 mg tablet, Take 2 5 mg by mouth 3 (three) times a week Indications: Mon  wed  Fri   , Disp: , Rfl:     warfarin (COUMADIN) 5 mg tablet, Take 5 mg by mouth 4 (four) times a day Indications: Tues  Thurs  , Sat , Sun , Disp: , Rfl:     docusate sodium (COLACE) 100 mg capsule, Take 100 mg by mouth 3 (three) times a day , Disp: , Rfl:     levalbuterol (XOPENEX) 0 63 mg/3 mL nebulizer solution, Take 1 ampule by nebulization 2 (two) times a day as needed for wheezing , Disp: , Rfl:     loratadine (CLARITIN) 10 mg tablet, Take 10 mg by mouth daily  , Disp: , Rfl:     metoprolol tartrate (LOPRESSOR) 100 mg tablet, Take 100 mg by mouth every evening  , Disp: , Rfl:     valsartan (DIOVAN) 80 mg tablet, Take 160 mg by mouth daily  , Disp: , Rfl:     Laboratory Results:  Results for orders placed or performed in visit on 01/24/19   Protime-INR   Result Value Ref Range    Protime 25 7 (H) 11 8 - 14 2 seconds    INR 2 34 (H) 0 86 - 1 17

## 2019-02-04 NOTE — LETTER
February 4, 2019     Ripley Hammans, MD  [address] [address]    Patient: Jesus Alberto Beavers   YOB: 1935   Date of Visit: 2/4/2019     Dear Dr Mariana Gutierrez Recipients      Thank you for referring Joanie Bowmangood to me for evaluation  Below are the relevant portions of my assessment and plan of care  If you have questions, please do not hesitate to call me  I look forward to following Conner Shankar along with you  Sincerely,        Peterson Camacho,         CC: No Recipients    Progress Notes:    ASSESSMENT and PLAN:  1  Chronic kidney disease stage 4, baseline creatinine low to mid 2s, most recent creatinine 2 6 with an estimated GFR of 22  2  Diabetic nephropathy with associated proteinuria, last hemoglobin A1c 8 6  3  Hypertension, stable, continue with current regimen  4  Frontotemporal dementia  5  Bilateral renal cysts, recent ultrasound shows stability    · Unfortunately renal function has worsened, creatinine at 2 6 with an estimated GFR of 22 again most likely secondary to diabetic nephropathy  · Blood pressure appears fairly stable would continue with current regimen  · Continue to work on better blood sugar control  · No changes in current regimen, follow-up in 3 months  · We did speak about dialysis, given his dementia and age, would not proceed with dialysis, wife is agreeable at this time

## 2019-02-05 ENCOUNTER — TELEPHONE (OUTPATIENT)
Dept: CARDIOLOGY CLINIC | Facility: CLINIC | Age: 84
End: 2019-02-05

## 2019-02-05 NOTE — TELEPHONE ENCOUNTER
PATIENT'S BLOOD PRESSURE HAS BEEN RUNNING 152/78 HR 85, 136/79  HR 83  HE ALSO SAW DR Sarah Gambino THIS WEEK AND HIS KIDNEY FUNCTION IS 22%  PLEASE CALL HIS WIFE KEN WHO WANTS TO TALK TO YOU ABOUT HIS AMLODIPINE DOSE

## 2019-02-27 ENCOUNTER — TRANSCRIBE ORDERS (OUTPATIENT)
Dept: ADMINISTRATIVE | Age: 84
End: 2019-02-27

## 2019-02-27 ENCOUNTER — APPOINTMENT (OUTPATIENT)
Dept: LAB | Age: 84
End: 2019-02-27
Payer: COMMERCIAL

## 2019-02-27 DIAGNOSIS — D68.9 COUMADIN RESISTANCE (HCC): Primary | ICD-10-CM

## 2019-02-27 DIAGNOSIS — D68.9 COUMADIN RESISTANCE (HCC): ICD-10-CM

## 2019-02-27 DIAGNOSIS — Z79.01 COUMADIN RESISTANCE (HCC): Primary | ICD-10-CM

## 2019-02-27 DIAGNOSIS — Z79.01 COUMADIN RESISTANCE (HCC): ICD-10-CM

## 2019-02-27 LAB
INR PPP: 2.4 (ref 0.86–1.17)
INR PPP: 2.43 (ref 0.86–1.17)
PROTHROMBIN TIME: 26.5 SECONDS (ref 11.8–14.2)

## 2019-02-27 PROCEDURE — 85610 PROTHROMBIN TIME: CPT

## 2019-02-27 PROCEDURE — 36415 COLL VENOUS BLD VENIPUNCTURE: CPT

## 2019-02-28 ENCOUNTER — ANTICOAG VISIT (OUTPATIENT)
Dept: CARDIOLOGY CLINIC | Facility: CLINIC | Age: 84
End: 2019-02-28

## 2019-02-28 DIAGNOSIS — I48.0 PAROXYSMAL ATRIAL FIBRILLATION (HCC): ICD-10-CM

## 2019-02-28 NOTE — PROGRESS NOTES
2/28/19, tc from wife, glenna, she will continue current dose, of 5 mg m/f, 2 5 mg other days of the week, inr due 4 weeks   tuyet

## 2019-02-28 NOTE — PROGRESS NOTES
Phone call to wife who was not at home and  states she is still at work   I called her work number and left message for [place] Ifrahmadalyn to continue same dose of coumadin and another INR in one month   nelson

## 2019-03-05 ENCOUNTER — IN-CLINIC DEVICE VISIT (OUTPATIENT)
Dept: CARDIOLOGY CLINIC | Facility: CLINIC | Age: 84
End: 2019-03-05
Payer: COMMERCIAL

## 2019-03-05 DIAGNOSIS — Z95.0 CARDIAC PACEMAKER IN SITU: ICD-10-CM

## 2019-03-05 DIAGNOSIS — I48.0 PAROXYSMAL ATRIAL FIBRILLATION (HCC): Primary | ICD-10-CM

## 2019-03-05 PROCEDURE — 93280 PM DEVICE PROGR EVAL DUAL: CPT | Performed by: INTERNAL MEDICINE

## 2019-03-05 NOTE — PROGRESS NOTES
Results for orders placed or performed in visit on 03/05/19   Cardiac EP device report    Narrative    MDT DUAL PM - [facility] PT   DEVICE INTERROGATED IN THE [place] OFFICE: BATTERY VOLTAGE ADEQUATE  AP 99%  2%  ALL AVAILABLE LEAD PARAMETERS WITHIN NORMAL LIMITS  NO NEW SIGNIFICANT HIGH RATE EPISODES  NO PROGRAMMING CHANGES MADE TO DEVICE PARAMETERS  NORMAL DEVICE FUNCTION   NC

## 2019-03-12 ENCOUNTER — OFFICE VISIT (OUTPATIENT)
Dept: CARDIOLOGY CLINIC | Facility: CLINIC | Age: 84
End: 2019-03-12
Payer: COMMERCIAL

## 2019-03-12 VITALS
BODY MASS INDEX: 31.98 KG/M2 | DIASTOLIC BLOOD PRESSURE: 70 MMHG | HEART RATE: 70 BPM | SYSTOLIC BLOOD PRESSURE: 120 MMHG | WEIGHT: 223.4 LBS | HEIGHT: 70 IN

## 2019-03-12 DIAGNOSIS — E78.2 MIXED HYPERLIPIDEMIA: ICD-10-CM

## 2019-03-12 DIAGNOSIS — I48.0 PAROXYSMAL ATRIAL FIBRILLATION (HCC): ICD-10-CM

## 2019-03-12 DIAGNOSIS — I10 ESSENTIAL HYPERTENSION: Primary | ICD-10-CM

## 2019-03-12 DIAGNOSIS — I25.10 CORONARY ARTERY DISEASE INVOLVING NATIVE CORONARY ARTERY OF NATIVE HEART WITHOUT ANGINA PECTORIS: ICD-10-CM

## 2019-03-12 PROCEDURE — 99213 OFFICE O/P EST LOW 20 MIN: CPT | Performed by: INTERNAL MEDICINE

## 2019-03-12 NOTE — PROGRESS NOTES
Assessment/Plan:    Coronary artery disease involving native coronary artery of native heart without angina pectoris  Coronary artery disease, stable with no symptoms of angina or any signs of overt congestive heart failure  We will continue the same medications  Paroxysmal atrial fibrillation (HCC)  Paroxysmal atrial fibrillation, currently in regular rhythm  We will continue the same meds    Essential hypertension  Hypertension, adequately controlled  Mixed hyperlipidemia  Hyperlipidemia, stable  We will continue atorvastatin  Diagnoses and all orders for this visit:    Essential hypertension    Paroxysmal atrial fibrillation ([address] )    Coronary artery disease involving native coronary artery of native heart without angina pectoris    Mixed hyperlipidemia          Subjective:  Feels well  Patient ID: Alan Amado is a 80 y o  male  The patient presented to this office for the purpose of cardiac follow-up  He has a history of coronary artery disease and paroxysmal atrial fibrillation as well as hypertension and hyperlipidemia  He has been feeling well from the cardiac standpoint denying any symptoms of chest pain, shortness of breath, palpitation, dizziness or lightheadedness  He has no leg edema  The following portions of the patient's history were reviewed and updated as appropriate: allergies, current medications, past family history, past medical history, past social history, past surgical history and problem list     Review of Systems   Respiratory: Negative for apnea, cough, chest tightness, shortness of breath and wheezing  Cardiovascular: Negative for chest pain, palpitations and leg swelling  Gastrointestinal: Negative for abdominal pain  Neurological: Negative for dizziness and light-headedness  Objective:  Stable cardiac-wise        /70 (BP Location: Left arm, Patient Position: Sitting)   Pulse 70   Ht 5' 10" (1 778 m)   Wt 101 kg (223 lb 6 4 oz) BMI 32 05 kg/m²          Physical Exam   Constitutional: He is oriented to person, place, and time  He appears well-developed and well-nourished  No distress  HENT:   Head: Normocephalic  Eyes: Pupils are equal, round, and reactive to light  Neck: Normal range of motion  No JVD present  No thyromegaly present  Cardiovascular: Normal rate, regular rhythm, S1 normal and S2 normal  Exam reveals no gallop and no friction rub  Murmur heard  Crescendo systolic murmur is present with a grade of 1/6  Pulmonary/Chest: Effort normal and breath sounds normal  No respiratory distress  He has no wheezes  He has no rales  He exhibits no tenderness  Abdominal: Soft  Musculoskeletal: Normal range of motion  He exhibits no edema, tenderness or deformity  Neurological: He is alert and oriented to person, place, and time  Skin: Skin is warm and dry  He is not diaphoretic  Psychiatric: He has a normal mood and affect  Vitals reviewed

## 2019-03-12 NOTE — LETTER
March 12, 2019     Jigar Lee MD  [address] [address]    Patient: Mariposa Kiran   YOB: 1935   Date of Visit: 3/12/2019       Dear Dr Meghna Packer: Thank you for referring Esteban Tucker to me for evaluation  Below are my notes for this consultation  If you have questions, please do not hesitate to call me  I look forward to following your patient along with you  Sincerely,        Hill Moreland MD        CC: No Recipients  Hill Moreland MD  3/12/2019  9:39 AM  Sign at close encounter  Assessment/Plan:    Coronary artery disease involving native coronary artery of native heart without angina pectoris  Coronary artery disease, stable with no symptoms of angina or any signs of overt congestive heart failure  We will continue the same medications  Paroxysmal atrial fibrillation (HCC)  Paroxysmal atrial fibrillation, currently in regular rhythm  We will continue the same meds    Essential hypertension  Hypertension, adequately controlled  Mixed hyperlipidemia  Hyperlipidemia, stable  We will continue atorvastatin  Diagnoses and all orders for this visit:    Essential hypertension    Paroxysmal atrial fibrillation ([address] )    Coronary artery disease involving native coronary artery of native heart without angina pectoris    Mixed hyperlipidemia          Subjective:  Feels well  Patient ID: Mariposa Kiran is a 80 y o  male  The patient presented to this office for the purpose of cardiac follow-up  He has a history of coronary artery disease and paroxysmal atrial fibrillation as well as hypertension and hyperlipidemia  He has been feeling well from the cardiac standpoint denying any symptoms of chest pain, shortness of breath, palpitation, dizziness or lightheadedness  He has no leg edema        The following portions of the patient's history were reviewed and updated as appropriate: allergies, current medications, past family history, past medical history, past social history, past surgical history and problem list     Review of Systems   Respiratory: Negative for apnea, cough, chest tightness, shortness of breath and wheezing  Cardiovascular: Negative for chest pain, palpitations and leg swelling  Gastrointestinal: Negative for abdominal pain  Neurological: Negative for dizziness and light-headedness  Objective:  Stable cardiac-wise  /70 (BP Location: Left arm, Patient Position: Sitting)   Pulse 70   Ht 5' 10" (1 778 m)   Wt 101 kg (223 lb 6 4 oz)   BMI 32 05 kg/m²           Physical Exam   Constitutional: He is oriented to person, place, and time  He appears well-developed and well-nourished  No distress  HENT:   Head: Normocephalic  Eyes: Pupils are equal, round, and reactive to light  Neck: Normal range of motion  No JVD present  No thyromegaly present  Cardiovascular: Normal rate, regular rhythm, S1 normal and S2 normal  Exam reveals no gallop and no friction rub  Murmur heard  Crescendo systolic murmur is present with a grade of 1/6  Pulmonary/Chest: Effort normal and breath sounds normal  No respiratory distress  He has no wheezes  He has no rales  He exhibits no tenderness  Abdominal: Soft  Musculoskeletal: Normal range of motion  He exhibits no edema, tenderness or deformity  Neurological: He is alert and oriented to person, place, and time  Skin: Skin is warm and dry  He is not diaphoretic  Psychiatric: He has a normal mood and affect  Vitals reviewed

## 2019-03-12 NOTE — ASSESSMENT & PLAN NOTE
Coronary artery disease, stable with no symptoms of angina or any signs of overt congestive heart failure  We will continue the same medications

## 2019-03-23 ENCOUNTER — APPOINTMENT (OUTPATIENT)
Dept: LAB | Age: 84
End: 2019-03-23
Payer: COMMERCIAL

## 2019-03-23 DIAGNOSIS — D68.9 COUMADIN RESISTANCE (HCC): ICD-10-CM

## 2019-03-23 DIAGNOSIS — Z79.01 COUMADIN RESISTANCE (HCC): ICD-10-CM

## 2019-03-23 DIAGNOSIS — N40.1 BENIGN PROSTATIC HYPERPLASIA WITH LOWER URINARY TRACT SYMPTOMS, SYMPTOM DETAILS UNSPECIFIED: ICD-10-CM

## 2019-03-23 LAB
INR PPP: 2.55 (ref 0.86–1.17)
PROTHROMBIN TIME: 27.5 SECONDS (ref 11.8–14.2)
PSA SERPL-MCNC: 1.8 NG/ML (ref 0–4)

## 2019-03-23 PROCEDURE — 85610 PROTHROMBIN TIME: CPT

## 2019-03-23 PROCEDURE — 84153 ASSAY OF PSA TOTAL: CPT

## 2019-03-23 PROCEDURE — 36415 COLL VENOUS BLD VENIPUNCTURE: CPT

## 2019-03-25 ENCOUNTER — ANTICOAG VISIT (OUTPATIENT)
Dept: CARDIOLOGY CLINIC | Facility: CLINIC | Age: 84
End: 2019-03-25

## 2019-03-25 DIAGNOSIS — I48.0 PAROXYSMAL ATRIAL FIBRILLATION (HCC): ICD-10-CM

## 2019-03-25 NOTE — PROGRESS NOTES
3/25/19, tc to pt's wife glenna, attempted call x 2 , lm to call our office regarding inr results  Called to home phone, left message on answering machine, continue current dose, inr due 4 weeks   tuyet

## 2019-03-25 NOTE — PROGRESS NOTES
3/26/2019    Rosi Wesley  1935  [id number]      Assessment  -BPH with lower urinary tract symptoms  -Routine prostate cancer screening  -History of left RCC s/p nephrectomy (2005)    Discussion/Plan  Chris Acosta is a 80 y o  male being managed by Dr Karen Sanchez        -We reviewed results of recent PSA which is 1 8, previously 3 5 (12/2017)  His PSA has returned back to baseline  Patient doing well without any urinary complaints  We discussed that given his stable PSA and advanced aged, we can discontinue routine prostate cancer screening per AUA guidelines  Patient wife are amenable with this plan  He will continue to follow under nephrology for management of CKD 4 and history of left nephrectomy  Patient was instructed to call with any issues  -All questions answered, patient and wife agrees with plan      History of Present Illness  80 y o  male with a history of BPH with LUTS and routine prostate cancer screening presents today for follow up  His last PSA 12/16/17 was 3 5  Patient reports occasional lower urinary tract symptoms of frequency, however he does not find bothersome  He denies any episodes of gross hematuria or dysuria  He continues to follow under nephrology for management of chronic kidney disease stage 4  No overall changes to his health since last office visit  Review of Systems  Review of Systems   Constitutional: Negative  HENT: Negative  Respiratory: Negative  Cardiovascular: Negative  Gastrointestinal: Negative  Genitourinary: Negative for decreased urine volume, difficulty urinating, dysuria, flank pain, hematuria and urgency  Frequency: Occasional    Musculoskeletal: Negative  Skin: Negative  Neurological: Negative  Psychiatric/Behavioral: Negative  AUA SYMPTOM SCORE      Most Recent Value   AUA SYMPTOM SCORE   How often have you had a sensation of not emptying your bladder completely after you finished urinating?   4   How often have you had to urinate again less than two hours after you finished urinating? 3   How often have you found you stopped and started again several times when you urinate? 1   How often have you found it difficult to postpone urination? 0   How often have you had a weak urinary stream?  1   How often have you had to push or strain to begin urination? 0   How many times did you most typically get up to urinate from the time you went to bed at night until the time you got up in the morning? 1   Quality of Life: If you were to spend the rest of your life with your urinary condition just the way it is now, how would you feel about that?  2   AUA SYMPTOM SCORE  10            Past Medical History  Past Medical History:   Diagnosis Date    Anxiety     Aspiration of liquid     and food per wife if he is eating too fast or talking when eating    Asthma     as a child    [facility]rial fibrillation ([facility] )     CAD (coronary artery disease)     [facility] )     COPD (chronic obstructive pulmonary disease) ([facility] )     CPAP (continuous positive airway pressure) dependence     Dementia     Frontal lobe    Diabetes mellitus ([facility] )     DJD (degenerative joint disease)     Hypercholesterolemia     Hyperlipidemia     Hypertension     Kidney disease     Melanoma ([facility] )     left leg    Obesity     Sleep apnea     wears CPAP    TIA (transient ischemic attack)        Past Social History  Past Surgical History:   Procedure Laterality Date    CARDIAC PACEMAKER PLACEMENT      CHOLECYSTECTOMY      COLONOSCOPY      HERNIA REPAIR      NEPHRECTOMY Left 2005    VT ESOPHAGOGASTRODUODENOSCOPY SUBMUCOSAL INJECTION N/A 9/21/2016    Procedure: ESOPHAGOGASTRODUODENOSCOPY (EGD); Surgeon: Dee Dee Patterson MD;  [facility]; Service: Gastroenterology    VT ESOPHAGOGASTRODUODENOSCOPY SUBMUCOSAL INJECTION N/A 2/7/2018    Procedure: ESOPHAGOGASTRODUODENOSCOPY (EGD); Surgeon: Dee Dee Patterson MD;  [facility];   Service: Gastroenterology  ROTATOR CUFF REPAIR      TONSILLECTOMY         Past Family History  Family History   Problem Relation Age of Onset    Brain cancer Father     Lung cancer Father     Diabetes Family     Heart disease Family     Hypertension Family     Cancer Family         renal cell carcinoma    Stroke Family        Past Social history  Social History     Socioeconomic History    Marital status: /Civil Union     Spouse name: Not on file    Number of children: Not on file    Years of education: Not on file    Highest education level: Not on file   Occupational History    Not on file   Social Needs    Financial resource strain: Not on file    Food insecurity:     Worry: Not on file     Inability: Not on file    Transportation needs:     Medical: Not on file     Non-medical: Not on file   Tobacco Use    Smoking status: Never Smoker    Smokeless tobacco: Never Used   Substance and Sexual Activity    Alcohol use: No    Drug use: No    Sexual activity: Not on file   Lifestyle    Physical activity:     Days per week: Not on file     Minutes per session: Not on file    Stress: Not on file   Relationships    Social connections:     Talks on phone: Not on file     Gets together: Not on file     Attends Sikh service: Not on file     Active member of club or organization: Not on file     Attends meetings of clubs or organizations: Not on file     Relationship status: Not on file    Intimate partner violence:     Fear of current or ex partner: Not on file     Emotionally abused: Not on file     Physically abused: Not on file     Forced sexual activity: Not on file   Other Topics Concern    Not on file   Social History Narrative    Not on file       Current Medications  Current Outpatient Medications   Medication Sig Dispense Refill    amLODIPine (NORVASC) 5 mg tablet Take 2 5 mg by mouth daily        amoxicillin (AMOXIL) 500 mg capsule Take 500 mg by mouth every 12 (twelve) hours      atorvastatin (LIPITOR) 10 mg tablet Take 10 mg by mouth daily   cholecalciferol (VITAMIN D3) 1,000 units tablet Take 2,000 Units by mouth daily      cyanocobalamin (VITAMIN B-12) 500 mcg tablet Take 500 mcg by mouth daily      docusate sodium (COLACE) 100 mg capsule Take 100 mg by mouth 3 (three) times a day   donepezil (ARICEPT) 10 mg tablet Take 1 tablet (10 mg total) by mouth daily at bedtime 90 tablet 3    fenofibrate (TRICOR) 145 mg tablet Take 145 mg by mouth daily   ferrous sulfate 325 (65 Fe) mg tablet Take 325 mg by mouth daily with breakfast       fluticasone (FLONASE) 50 mcg/act nasal spray 1 spray into each nostril daily      insulin aspart (NovoLOG) 100 units/mL injection Inject 8 Units under the skin 2 (two) times a day before meals   insulin glargine (LANTUS) 100 units/mL subcutaneous injection Inject 30 Units under the skin 2 (two) times a day        ipratropium (ATROVENT HFA) 17 mcg/act inhaler Inhale 2 puffs every 6 (six) hours   ipratropium (ATROVENT) 0 03 % nasal spray 2 sprays into each nostril every 12 (twelve) hours 30 mL 0    levalbuterol (XOPENEX) 0 63 mg/3 mL nebulizer solution Take 1 ampule by nebulization 2 (two) times a day as needed for wheezing   loratadine (CLARITIN) 10 mg tablet Take 10 mg by mouth daily   losartan (COZAAR) 25 mg tablet       magnesium gluconate (MAGONATE) 500 mg tablet Take 500 mg by mouth daily        Memantine HCl ER (NAMENDA XR) 28 MG CP24 1 po qd 90 capsule 3    metoprolol succinate (TOPROL-XL) 100 mg 24 hr tablet       metoprolol tartrate (LOPRESSOR) 100 mg tablet Take by mouth every morning        Multiple Vitamin (MULTIVITAMIN) capsule Take 1 capsule by mouth daily      NOVOFINE 32G X 6 MM MISC       NOVOLOG FLEXPEN 100 units/mL injection pen       omega-3-acid ethyl esters (LOVAZA) 1 g capsule Take 360 mg by mouth daily      ONETOUCH DELICA LANCETS 85E MISC       ONETOUCH VERIO test strip       Probiotic Product (PROBIOTIC COLON SUPPORT PO) Take 1 tablet by mouth daily      propafenone (RYTHMOL) 225 mg tablet Take 225 mg by mouth every 8 (eight) hours   sertraline (ZOLOFT) 50 mg tablet Take 1 5 tablets (75 mg total) by mouth daily 135 tablet 3    sodium chloride (OCEAN) 0 65 % nasal spray 1 spray into each nostril      valsartan (DIOVAN) 80 mg tablet Take 160 mg by mouth daily        warfarin (COUMADIN) 2 5 mg tablet Take 2 5 mg by mouth 3 (three) times a week Indications: Mon wed Fri  Dolph Aw warfarin (COUMADIN) 5 mg tablet Take 5 mg by mouth 4 (four) times a day Indications: Tues  Thurs  , Sat , Sun  No current facility-administered medications for this visit  Allergies  Allergies   Allergen Reactions    Ambien [Zolpidem Tartrate]     Bextra [Valdecoxib] Hives    Dust Mite Extract     Lyrica [Pregabalin]     Mold Extract  [Trichophyton]     Pollen Extract     Tree Extract        Past Medical History, Social History, Family History, medications and allergies were reviewed  Vitals  There were no vitals filed for this visit  Physical Exam  Physical Exam   Constitutional: He is oriented to person, place, and time  He appears well-developed and well-nourished  HENT:   Head: Normocephalic  [place]     Eyes: Pupils are equal, round, and reactive to light  Neck: Normal range of motion  Cardiovascular: Normal rate and regular rhythm  Pulmonary/Chest: Effort normal    Abdominal: Soft  Normal appearance  There is no CVA tenderness  Musculoskeletal: Normal range of motion  Neurological: He is alert and oriented to person, place, and time  Forgetful     Skin: Skin is warm and dry  Psychiatric: He has a normal mood and affect   His behavior is normal  Judgment and thought content normal        Results    I have personally reviewed all pertinent lab results and reviewed with patient  Lab Results   Component Value Date    PSA 1 8 03/23/2019    PSA 3 5 12/16/2017    PSA 2 4 11/12/2016 Lab Results   Component Value Date    GLUCOSE 157 (H) 01/02/2016    CALCIUM 8 8 01/24/2019     01/02/2016    K 4 6 01/24/2019    CO2 26 01/24/2019     01/24/2019    BUN 33 (H) 01/24/2019    CREATININE 2 61 (H) 01/24/2019     Lab Results   Component Value Date    WBC 6 17 01/24/2019    HGB 13 9 01/24/2019    HCT 41 2 01/24/2019    MCV 88 01/24/2019     01/24/2019     No results found for this or any previous visit (from the past 1 hour(s))

## 2019-03-26 ENCOUNTER — OFFICE VISIT (OUTPATIENT)
Dept: UROLOGY | Facility: AMBULATORY SURGERY CENTER | Age: 84
End: 2019-03-26
Payer: COMMERCIAL

## 2019-03-26 ENCOUNTER — TELEPHONE (OUTPATIENT)
Dept: CARDIOLOGY CLINIC | Facility: CLINIC | Age: 84
End: 2019-03-26

## 2019-03-26 VITALS
SYSTOLIC BLOOD PRESSURE: 118 MMHG | BODY MASS INDEX: 31.78 KG/M2 | HEIGHT: 70 IN | HEART RATE: 80 BPM | WEIGHT: 222 LBS | DIASTOLIC BLOOD PRESSURE: 72 MMHG

## 2019-03-26 DIAGNOSIS — N40.1 BENIGN PROSTATIC HYPERPLASIA WITH LOWER URINARY TRACT SYMPTOMS, SYMPTOM DETAILS UNSPECIFIED: Primary | ICD-10-CM

## 2019-03-26 DIAGNOSIS — Z90.5 HISTORY OF NEPHRECTOMY: ICD-10-CM

## 2019-03-26 DIAGNOSIS — R97.20 ELEVATED PSA: ICD-10-CM

## 2019-03-26 PROCEDURE — 99213 OFFICE O/P EST LOW 20 MIN: CPT | Performed by: NURSE PRACTITIONER

## 2019-03-26 NOTE — TELEPHONE ENCOUNTER
Patient is having an EGD with Botox and Dr Colleen Paulson would like a note stating it is okay for Dr Monica Valdez to hold his Coumadin 2 days prior  Please fax to  [phone]    Thank you

## 2019-03-26 NOTE — LETTER
March 26, 2019     DO Deedee Rich [address]  [address]  [address] [place] [address]    Patient: Kirti Teague   YOB: 1935   Date of Visit: 3/26/2019       Dear Dr Malu Woods:    Thank you for referring Adenike Hickman to me for evaluation  Below are my notes for this consultation  If you have questions, please do not hesitate to call me  I look forward to following your patient along with you  Sincerely,        SHADY Fleming        CC: No Recipients  SHADY Fleming  3/26/2019 10:06 AM  Sign at close encounter  3/26/2019    Kirti Teague  1935  [id number]      Assessment  -BPH with lower urinary tract symptoms  -Routine prostate cancer screening  -History of left RCC s/p nephrectomy (2005)    Helen Mayer is a 80 y o  male being managed by Dr Catherine Penny        -We reviewed results of recent PSA which is 1 8, previously 3 5 (12/2017)  His PSA has returned back to baseline  Patient doing well without any urinary complaints  We discussed that given his stable PSA and advanced aged, we can discontinue routine prostate cancer screening per AUA guidelines  Patient wife are amenable with this plan  He will continue to follow under nephrology for management of CKD 4 and history of left nephrectomy  Patient was instructed to call with any issues  -All questions answered, patient and wife agrees with plan      History of Present Illness  80 y o  male with a history of BPH with LUTS and routine prostate cancer screening presents today for follow up  His last PSA 12/16/17 was 3 5  Patient reports occasional lower urinary tract symptoms of frequency, however he does not find bothersome  He denies any episodes of gross hematuria or dysuria  He continues to follow under nephrology for management of chronic kidney disease stage 4  No overall changes to his health since last office visit  Review of Systems  Review of Systems   Constitutional: Negative  HENT: Negative  Respiratory: Negative  Cardiovascular: Negative  Gastrointestinal: Negative  Genitourinary: Negative for decreased urine volume, difficulty urinating, dysuria, flank pain, hematuria and urgency  Frequency: Occasional    Musculoskeletal: Negative  Skin: Negative  Neurological: Negative  Psychiatric/Behavioral: Negative  AUA SYMPTOM SCORE      Most Recent Value   AUA SYMPTOM SCORE   How often have you had a sensation of not emptying your bladder completely after you finished urinating? 4   How often have you had to urinate again less than two hours after you finished urinating? 3   How often have you found you stopped and started again several times when you urinate? 1   How often have you found it difficult to postpone urination? 0   How often have you had a weak urinary stream?  1   How often have you had to push or strain to begin urination? 0   How many times did you most typically get up to urinate from the time you went to bed at night until the time you got up in the morning?   1   Quality of Life: If you were to spend the rest of your life with your urinary condition just the way it is now, how would you feel about that?  2   AUA SYMPTOM SCORE  10            Past Medical History  Past Medical History:   Diagnosis Date    Anxiety     Aspiration of liquid     and food per wife if he is eating too fast or talking when eating    Asthma     as a child    [facility]     CAD (coronary artery disease)     Cancer of kidney ([facility] )     COPD (chronic obstructive pulmonary disease) ([facility] )     CPAP (continuous positive airway pressure) dependence     Dementia     Frontal lobe    Diabetes mellitus ([facility] )     DJD (degenerative joint disease)     Hypercholesterolemia     Hyperlipidemia     Hypertension     Kidney disease     Melanoma ([facility] )     left leg    Obesity     Sleep apnea     wears CPAP    TIA (transient ischemic attack)        Past Social History  Past Surgical History:   Procedure Laterality Date    CARDIAC PACEMAKER PLACEMENT      CHOLECYSTECTOMY      COLONOSCOPY      HERNIA REPAIR      NEPHRECTOMY Left 2005    AR ESOPHAGOGASTRODUODENOSCOPY SUBMUCOSAL INJECTION N/A 9/21/2016    Procedure: ESOPHAGOGASTRODUODENOSCOPY (EGD); Surgeon: Smiley Gerardo MD;  [facility]; Service: Gastroenterology    AR ESOPHAGOGASTRODUODENOSCOPY SUBMUCOSAL INJECTION N/A 2/7/2018    Procedure: ESOPHAGOGASTRODUODENOSCOPY (EGD); Surgeon: Smiley Gerardo MD;  [facility];   Service: Gastroenterology    ROTATOR CUFF REPAIR      TONSILLECTOMY         Past Family History  Family History   Problem Relation Age of Onset    Brain cancer Father     Lung cancer Father     Diabetes Family     Heart disease Family     Hypertension Family     Cancer Family         renal cell carcinoma    Stroke Family        Past Social history  Social History     Socioeconomic History    Marital status: /Civil Union     Spouse name: Not on file    Number of children: Not on file    Years of education: Not on file    Highest education level: Not on file   Occupational History    Not on file   Social Needs    Financial resource strain: Not on file    Food insecurity:     Worry: Not on file     Inability: Not on file    Transportation needs:     Medical: Not on file     Non-medical: Not on file   Tobacco Use    Smoking status: Never Smoker    Smokeless tobacco: Never Used   Substance and Sexual Activity    Alcohol use: No    Drug use: No    Sexual activity: Not on file   Lifestyle    Physical activity:     Days per week: Not on file     Minutes per session: Not on file    Stress: Not on file   Relationships    Social connections:     Talks on phone: Not on file     Gets together: Not on file     Attends Advent service: Not on file     Active member of club or organization: Not on file     Attends meetings of clubs or organizations: Not on file Relationship status: Not on file    Intimate partner violence:     Fear of current or ex partner: Not on file     Emotionally abused: Not on file     Physically abused: Not on file     Forced sexual activity: Not on file   Other Topics Concern    Not on file   Social History Narrative    Not on file       Current Medications  Current Outpatient Medications   Medication Sig Dispense Refill    amLODIPine (NORVASC) 5 mg tablet Take 2 5 mg by mouth daily        amoxicillin (AMOXIL) 500 mg capsule Take 500 mg by mouth every 12 (twelve) hours      atorvastatin (LIPITOR) 10 mg tablet Take 10 mg by mouth daily   cholecalciferol (VITAMIN D3) 1,000 units tablet Take 2,000 Units by mouth daily      cyanocobalamin (VITAMIN B-12) 500 mcg tablet Take 500 mcg by mouth daily      docusate sodium (COLACE) 100 mg capsule Take 100 mg by mouth 3 (three) times a day   donepezil (ARICEPT) 10 mg tablet Take 1 tablet (10 mg total) by mouth daily at bedtime 90 tablet 3    fenofibrate (TRICOR) 145 mg tablet Take 145 mg by mouth daily   ferrous sulfate 325 (65 Fe) mg tablet Take 325 mg by mouth daily with breakfast       fluticasone (FLONASE) 50 mcg/act nasal spray 1 spray into each nostril daily      insulin aspart (NovoLOG) 100 units/mL injection Inject 8 Units under the skin 2 (two) times a day before meals   insulin glargine (LANTUS) 100 units/mL subcutaneous injection Inject 30 Units under the skin 2 (two) times a day        ipratropium (ATROVENT HFA) 17 mcg/act inhaler Inhale 2 puffs every 6 (six) hours   ipratropium (ATROVENT) 0 03 % nasal spray 2 sprays into each nostril every 12 (twelve) hours 30 mL 0    levalbuterol (XOPENEX) 0 63 mg/3 mL nebulizer solution Take 1 ampule by nebulization 2 (two) times a day as needed for wheezing   loratadine (CLARITIN) 10 mg tablet Take 10 mg by mouth daily        losartan (COZAAR) 25 mg tablet       magnesium gluconate (MAGONATE) 500 mg tablet Take 500 mg by mouth daily   Memantine HCl ER (NAMENDA XR) 28 MG CP24 1 po qd 90 capsule 3    metoprolol succinate (TOPROL-XL) 100 mg 24 hr tablet       metoprolol tartrate (LOPRESSOR) 100 mg tablet Take by mouth every morning        Multiple Vitamin (MULTIVITAMIN) capsule Take 1 capsule by mouth daily      NOVOFINE 32G X 6 MM MISC       NOVOLOG FLEXPEN 100 units/mL injection pen       omega-3-acid ethyl esters (LOVAZA) 1 g capsule Take 360 mg by mouth daily      ONETOUCH DELICA LANCETS 81X MISC       ONETOUCH VERIO test strip       Probiotic Product (PROBIOTIC COLON SUPPORT PO) Take 1 tablet by mouth daily      propafenone (RYTHMOL) 225 mg tablet Take 225 mg by mouth every 8 (eight) hours   sertraline (ZOLOFT) 50 mg tablet Take 1 5 tablets (75 mg total) by mouth daily 135 tablet 3    sodium chloride (OCEAN) 0 65 % nasal spray 1 spray into each nostril      valsartan (DIOVAN) 80 mg tablet Take 160 mg by mouth daily        warfarin (COUMADIN) 2 5 mg tablet Take 2 5 mg by mouth 3 (three) times a week Indications: Mon  wed  Fri  William Last warfarin (COUMADIN) 5 mg tablet Take 5 mg by mouth 4 (four) times a day Indications: Tues  Thurs  , Sat , Sun  No current facility-administered medications for this visit  Allergies  Allergies   Allergen Reactions    Ambien [Zolpidem Tartrate]     Bextra [Valdecoxib] Hives    Dust Mite Extract     Lyrica [Pregabalin]     Mold Extract  [Trichophyton]     Pollen Extract     Tree Extract        Past Medical History, Social History, Family History, medications and allergies were reviewed  Vitals  There were no vitals filed for this visit  Physical Exam  Physical Exam   Constitutional: He is oriented to person, place, and time  He appears well-developed and well-nourished  HENT:   Head: Normocephalic  [place]     Eyes: Pupils are equal, round, and reactive to light  Neck: Normal range of motion  Cardiovascular: Normal rate and regular rhythm  Pulmonary/Chest: Effort normal    Abdominal: Soft  Normal appearance  There is no CVA tenderness  Musculoskeletal: Normal range of motion  Neurological: He is alert and oriented to person, place, and time  Forgetful     Skin: Skin is warm and dry  Psychiatric: He has a normal mood and affect  His behavior is normal  Judgment and thought content normal        Results    I have personally reviewed all pertinent lab results and reviewed with patient  Lab Results   Component Value Date    PSA 1 8 03/23/2019    PSA 3 5 12/16/2017    PSA 2 4 11/12/2016     Lab Results   Component Value Date    GLUCOSE 157 (H) 01/02/2016    CALCIUM 8 8 01/24/2019     01/02/2016    K 4 6 01/24/2019    CO2 26 01/24/2019     01/24/2019    BUN 33 (H) 01/24/2019    CREATININE 2 61 (H) 01/24/2019     Lab Results   Component Value Date    WBC 6 17 01/24/2019    HGB 13 9 01/24/2019    HCT 41 2 01/24/2019    MCV 88 01/24/2019     01/24/2019     No results found for this or any previous visit (from the past 1 hour(s))

## 2019-03-26 NOTE — PROGRESS NOTES
3/26/19, reviewed chart, noted pt is planning egd 4/3  Per dr Ziyad Thomas, ok to hold up to 5 days before egd   tuyet

## 2019-03-30 ENCOUNTER — TRANSCRIBE ORDERS (OUTPATIENT)
Dept: ADMINISTRATIVE | Age: 84
End: 2019-03-30

## 2019-03-30 ENCOUNTER — APPOINTMENT (OUTPATIENT)
Dept: LAB | Age: 84
End: 2019-03-30
Payer: COMMERCIAL

## 2019-03-30 DIAGNOSIS — I15.9 SECONDARY HYPERTENSION: ICD-10-CM

## 2019-03-30 DIAGNOSIS — E78.5 HYPERLIPIDEMIA, UNSPECIFIED HYPERLIPIDEMIA TYPE: Primary | ICD-10-CM

## 2019-03-30 DIAGNOSIS — E78.5 HYPERLIPIDEMIA, UNSPECIFIED HYPERLIPIDEMIA TYPE: ICD-10-CM

## 2019-03-30 LAB
ALBUMIN SERPL BCP-MCNC: 3.6 G/DL (ref 3.5–5)
ALP SERPL-CCNC: 55 U/L (ref 46–116)
ALT SERPL W P-5'-P-CCNC: 34 U/L (ref 12–78)
ANION GAP SERPL CALCULATED.3IONS-SCNC: 4 MMOL/L (ref 4–13)
AST SERPL W P-5'-P-CCNC: 23 U/L (ref 5–45)
BILIRUB SERPL-MCNC: 0.56 MG/DL (ref 0.2–1)
BUN SERPL-MCNC: 28 MG/DL (ref 5–25)
CALCIUM SERPL-MCNC: 9.1 MG/DL (ref 8.3–10.1)
CHLORIDE SERPL-SCNC: 106 MMOL/L (ref 100–108)
CHOLEST SERPL-MCNC: 176 MG/DL (ref 50–200)
CO2 SERPL-SCNC: 27 MMOL/L (ref 21–32)
CREAT SERPL-MCNC: 2.36 MG/DL (ref 0.6–1.3)
GFR SERPL CREATININE-BSD FRML MDRD: 25 ML/MIN/1.73SQ M
GLUCOSE P FAST SERPL-MCNC: 175 MG/DL (ref 65–99)
HDLC SERPL-MCNC: 24 MG/DL (ref 40–60)
NONHDLC SERPL-MCNC: 152 MG/DL
POTASSIUM SERPL-SCNC: 4.2 MMOL/L (ref 3.5–5.3)
PROT SERPL-MCNC: 7.7 G/DL (ref 6.4–8.2)
SODIUM SERPL-SCNC: 137 MMOL/L (ref 136–145)
TRIGL SERPL-MCNC: 630 MG/DL

## 2019-03-30 PROCEDURE — 80061 LIPID PANEL: CPT

## 2019-03-30 PROCEDURE — 36415 COLL VENOUS BLD VENIPUNCTURE: CPT

## 2019-03-30 PROCEDURE — 80053 COMPREHEN METABOLIC PANEL: CPT

## 2019-04-02 ENCOUNTER — ANESTHESIA EVENT (OUTPATIENT)
Dept: GASTROENTEROLOGY | Facility: HOSPITAL | Age: 84
End: 2019-04-02
Payer: COMMERCIAL

## 2019-04-03 ENCOUNTER — HOSPITAL ENCOUNTER (OUTPATIENT)
Facility: HOSPITAL | Age: 84
Setting detail: OUTPATIENT SURGERY
Discharge: HOME | End: 2019-04-03
Attending: INTERNAL MEDICINE | Admitting: INTERNAL MEDICINE
Payer: COMMERCIAL

## 2019-04-03 ENCOUNTER — ANESTHESIA (OUTPATIENT)
Dept: GASTROENTEROLOGY | Facility: HOSPITAL | Age: 84
End: 2019-04-03
Payer: COMMERCIAL

## 2019-04-03 VITALS
DIASTOLIC BLOOD PRESSURE: 69 MMHG | HEIGHT: 70 IN | WEIGHT: 223 LBS | HEART RATE: 60 BPM | OXYGEN SATURATION: 96 % | RESPIRATION RATE: 18 BRPM | TEMPERATURE: 98 F | BODY MASS INDEX: 31.92 KG/M2 | SYSTOLIC BLOOD PRESSURE: 132 MMHG

## 2019-04-03 LAB — GLUCOSE SERPL-MCNC: 137 MG/DL (ref 65–140)

## 2019-04-03 PROCEDURE — 82948 REAGENT STRIP/BLOOD GLUCOSE: CPT

## 2019-04-03 RX ORDER — SODIUM CHLORIDE 9 MG/ML
50 INJECTION, SOLUTION INTRAVENOUS CONTINUOUS
Status: DISCONTINUED | OUTPATIENT
Start: 2019-04-03 | End: 2019-04-03 | Stop reason: HOSPADM

## 2019-04-03 RX ORDER — LIDOCAINE HYDROCHLORIDE 10 MG/ML
0.5 INJECTION, SOLUTION INFILTRATION; PERINEURAL ONCE AS NEEDED
Status: DISCONTINUED | OUTPATIENT
Start: 2019-04-03 | End: 2019-04-03 | Stop reason: HOSPADM

## 2019-04-03 RX ORDER — PROPOFOL 10 MG/ML
INJECTION, EMULSION INTRAVENOUS AS NEEDED
Status: DISCONTINUED | OUTPATIENT
Start: 2019-04-03 | End: 2019-04-03 | Stop reason: SURG

## 2019-04-03 RX ORDER — PROPOFOL 10 MG/ML
INJECTION, EMULSION INTRAVENOUS CONTINUOUS PRN
Status: DISCONTINUED | OUTPATIENT
Start: 2019-04-03 | End: 2019-04-03 | Stop reason: SURG

## 2019-04-03 RX ORDER — LIDOCAINE HYDROCHLORIDE 10 MG/ML
INJECTION, SOLUTION INFILTRATION; PERINEURAL AS NEEDED
Status: DISCONTINUED | OUTPATIENT
Start: 2019-04-03 | End: 2019-04-03 | Stop reason: SURG

## 2019-04-03 RX ADMIN — PROPOFOL 120 MCG/KG/MIN: 10 INJECTION, EMULSION INTRAVENOUS at 11:38

## 2019-04-03 RX ADMIN — LIDOCAINE HYDROCHLORIDE 70 MG: 10 INJECTION, SOLUTION INFILTRATION; PERINEURAL at 11:37

## 2019-04-03 RX ADMIN — PROPOFOL 80 MG: 10 INJECTION, EMULSION INTRAVENOUS at 11:37

## 2019-04-03 RX ADMIN — SODIUM CHLORIDE 50 ML/HR: 0.9 INJECTION, SOLUTION INTRAVENOUS at 11:26

## 2019-04-03 RX ADMIN — SODIUM CHLORIDE 200 UNITS: 9 INJECTION, SOLUTION INTRAMUSCULAR; INTRAVENOUS; SUBCUTANEOUS at 11:50

## 2019-04-15 ENCOUNTER — TELEPHONE (OUTPATIENT)
Dept: CARDIOLOGY CLINIC | Facility: CLINIC | Age: 84
End: 2019-04-15

## 2019-04-16 DIAGNOSIS — I48.0 PAROXYSMAL ATRIAL FIBRILLATION (HCC): Primary | ICD-10-CM

## 2019-04-17 ENCOUNTER — OFFICE VISIT (OUTPATIENT)
Dept: NEUROLOGY | Facility: CLINIC | Age: 84
End: 2019-04-17
Payer: COMMERCIAL

## 2019-04-17 VITALS
SYSTOLIC BLOOD PRESSURE: 130 MMHG | DIASTOLIC BLOOD PRESSURE: 64 MMHG | HEART RATE: 94 BPM | HEIGHT: 70 IN | WEIGHT: 221 LBS | BODY MASS INDEX: 31.64 KG/M2

## 2019-04-17 DIAGNOSIS — F02.80 FRONTOTEMPORAL DEMENTIA (HCC): Primary | ICD-10-CM

## 2019-04-17 DIAGNOSIS — G31.09 FRONTOTEMPORAL DEMENTIA (HCC): Primary | ICD-10-CM

## 2019-04-17 DIAGNOSIS — F33.0 MILD EPISODE OF RECURRENT MAJOR DEPRESSIVE DISORDER (HCC): ICD-10-CM

## 2019-04-17 PROCEDURE — 99214 OFFICE O/P EST MOD 30 MIN: CPT | Performed by: PSYCHIATRY & NEUROLOGY

## 2019-04-17 RX ORDER — SERTRALINE HYDROCHLORIDE 100 MG/1
100 TABLET, FILM COATED ORAL DAILY
Qty: 30 TABLET | Refills: 5 | Status: SHIPPED | OUTPATIENT
Start: 2019-04-17 | End: 2019-10-06 | Stop reason: SDUPTHER

## 2019-04-17 RX ORDER — MULTIVITAMIN WITH IRON
500 TABLET ORAL DAILY
COMMUNITY

## 2019-04-17 RX ORDER — METOPROLOL SUCCINATE 100 MG/1
TABLET, EXTENDED RELEASE ORAL
Qty: 135 TABLET | Refills: 0 | Status: SHIPPED | OUTPATIENT
Start: 2019-04-17 | End: 2019-05-06 | Stop reason: SDUPTHER

## 2019-04-21 DIAGNOSIS — I10 ESSENTIAL HYPERTENSION: Primary | ICD-10-CM

## 2019-04-22 RX ORDER — LOSARTAN POTASSIUM 25 MG/1
TABLET ORAL
Qty: 90 TABLET | Refills: 0 | Status: SHIPPED | OUTPATIENT
Start: 2019-04-22 | End: 2019-10-16 | Stop reason: SDUPTHER

## 2019-04-23 ENCOUNTER — TELEPHONE (OUTPATIENT)
Dept: CARDIOLOGY CLINIC | Facility: CLINIC | Age: 84
End: 2019-04-23

## 2019-04-28 ENCOUNTER — TRANSCRIBE ORDERS (OUTPATIENT)
Dept: ADMINISTRATIVE | Age: 84
End: 2019-04-28

## 2019-04-28 ENCOUNTER — LAB (OUTPATIENT)
Dept: LAB | Age: 84
End: 2019-04-28
Payer: COMMERCIAL

## 2019-04-28 DIAGNOSIS — I48.0 PAF (PAROXYSMAL ATRIAL FIBRILLATION) (HCC): Primary | ICD-10-CM

## 2019-04-28 DIAGNOSIS — I48.0 PAF (PAROXYSMAL ATRIAL FIBRILLATION) (HCC): ICD-10-CM

## 2019-04-28 LAB
INR PPP: 2.99 (ref 0.86–1.17)
PROTHROMBIN TIME: 31.1 SECONDS (ref 11.8–14.2)

## 2019-04-28 PROCEDURE — 36415 COLL VENOUS BLD VENIPUNCTURE: CPT

## 2019-04-28 PROCEDURE — 85610 PROTHROMBIN TIME: CPT

## 2019-04-29 ENCOUNTER — ANTICOAG VISIT (OUTPATIENT)
Dept: CARDIOLOGY CLINIC | Facility: CLINIC | Age: 84
End: 2019-04-29

## 2019-04-29 DIAGNOSIS — I48.0 PAROXYSMAL ATRIAL FIBRILLATION (HCC): ICD-10-CM

## 2019-05-04 ENCOUNTER — LAB (OUTPATIENT)
Dept: LAB | Age: 84
End: 2019-05-04
Payer: COMMERCIAL

## 2019-05-04 DIAGNOSIS — IMO0002 SOLITARY KIDNEY: ICD-10-CM

## 2019-05-04 DIAGNOSIS — N18.4 CKD (CHRONIC KIDNEY DISEASE), STAGE IV (HCC): ICD-10-CM

## 2019-05-04 DIAGNOSIS — I12.9 HYPERTENSIVE CHRONIC KIDNEY DISEASE WITH STAGE 1 THROUGH STAGE 4 CHRONIC KIDNEY DISEASE, OR UNSPECIFIED CHRONIC KIDNEY DISEASE: ICD-10-CM

## 2019-05-04 DIAGNOSIS — Z79.4 TYPE 2 DIABETES MELLITUS WITH STAGE 4 CHRONIC KIDNEY DISEASE, WITH LONG-TERM CURRENT USE OF INSULIN (HCC): ICD-10-CM

## 2019-05-04 DIAGNOSIS — N18.4 TYPE 2 DIABETES MELLITUS WITH STAGE 4 CHRONIC KIDNEY DISEASE, WITH LONG-TERM CURRENT USE OF INSULIN (HCC): ICD-10-CM

## 2019-05-04 DIAGNOSIS — E11.22 TYPE 2 DIABETES MELLITUS WITH STAGE 4 CHRONIC KIDNEY DISEASE, WITH LONG-TERM CURRENT USE OF INSULIN (HCC): ICD-10-CM

## 2019-05-04 LAB
ALBUMIN SERPL BCP-MCNC: 3.7 G/DL (ref 3.5–5)
ANION GAP SERPL CALCULATED.3IONS-SCNC: 4 MMOL/L (ref 4–13)
BUN SERPL-MCNC: 34 MG/DL (ref 5–25)
CALCIUM SERPL-MCNC: 9.1 MG/DL (ref 8.3–10.1)
CHLORIDE SERPL-SCNC: 105 MMOL/L (ref 100–108)
CO2 SERPL-SCNC: 27 MMOL/L (ref 21–32)
CREAT SERPL-MCNC: 2.63 MG/DL (ref 0.6–1.3)
CREAT UR-MCNC: 97.4 MG/DL
ERYTHROCYTE [DISTWIDTH] IN BLOOD BY AUTOMATED COUNT: 12.6 % (ref 11.6–15.1)
GFR SERPL CREATININE-BSD FRML MDRD: 22 ML/MIN/1.73SQ M
GLUCOSE P FAST SERPL-MCNC: 205 MG/DL (ref 65–99)
HCT VFR BLD AUTO: 41.7 % (ref 36.5–49.3)
HGB BLD-MCNC: 14.1 G/DL (ref 12–17)
MCH RBC QN AUTO: 29.9 PG (ref 26.8–34.3)
MCHC RBC AUTO-ENTMCNC: 33.8 G/DL (ref 31.4–37.4)
MCV RBC AUTO: 89 FL (ref 82–98)
MICROALBUMIN UR-MCNC: 61.5 MG/L (ref 0–20)
MICROALBUMIN/CREAT 24H UR: 63 MG/G CREATININE (ref 0–30)
PHOSPHATE SERPL-MCNC: 3.3 MG/DL (ref 2.3–4.1)
PLATELET # BLD AUTO: 226 THOUSANDS/UL (ref 149–390)
PMV BLD AUTO: 10.5 FL (ref 8.9–12.7)
POTASSIUM SERPL-SCNC: 4.3 MMOL/L (ref 3.5–5.3)
PTH-INTACT SERPL-MCNC: 35.9 PG/ML (ref 18.4–80.1)
RBC # BLD AUTO: 4.71 MILLION/UL (ref 3.88–5.62)
SODIUM SERPL-SCNC: 136 MMOL/L (ref 136–145)
URATE SERPL-MCNC: 5 MG/DL (ref 4.2–8)
WBC # BLD AUTO: 5.61 THOUSAND/UL (ref 4.31–10.16)

## 2019-05-04 PROCEDURE — 36415 COLL VENOUS BLD VENIPUNCTURE: CPT

## 2019-05-04 PROCEDURE — 82570 ASSAY OF URINE CREATININE: CPT

## 2019-05-04 PROCEDURE — 83970 ASSAY OF PARATHORMONE: CPT

## 2019-05-04 PROCEDURE — 85027 COMPLETE CBC AUTOMATED: CPT

## 2019-05-04 PROCEDURE — 82043 UR ALBUMIN QUANTITATIVE: CPT

## 2019-05-04 PROCEDURE — 84550 ASSAY OF BLOOD/URIC ACID: CPT

## 2019-05-04 PROCEDURE — 80069 RENAL FUNCTION PANEL: CPT

## 2019-05-06 ENCOUNTER — OFFICE VISIT (OUTPATIENT)
Dept: NEPHROLOGY | Facility: CLINIC | Age: 84
End: 2019-05-06
Payer: COMMERCIAL

## 2019-05-06 VITALS
HEART RATE: 79 BPM | SYSTOLIC BLOOD PRESSURE: 127 MMHG | RESPIRATION RATE: 16 BRPM | DIASTOLIC BLOOD PRESSURE: 58 MMHG | HEIGHT: 70 IN | WEIGHT: 220.5 LBS | BODY MASS INDEX: 31.57 KG/M2

## 2019-05-06 DIAGNOSIS — I12.9 HYPERTENSIVE CHRONIC KIDNEY DISEASE WITH STAGE 1 THROUGH STAGE 4 CHRONIC KIDNEY DISEASE, OR UNSPECIFIED CHRONIC KIDNEY DISEASE: ICD-10-CM

## 2019-05-06 DIAGNOSIS — IMO0002 SOLITARY KIDNEY: ICD-10-CM

## 2019-05-06 DIAGNOSIS — N18.4 TYPE 2 DIABETES MELLITUS WITH STAGE 4 CHRONIC KIDNEY DISEASE, WITH LONG-TERM CURRENT USE OF INSULIN (HCC): ICD-10-CM

## 2019-05-06 DIAGNOSIS — N18.4 CKD (CHRONIC KIDNEY DISEASE), STAGE IV (HCC): Primary | ICD-10-CM

## 2019-05-06 DIAGNOSIS — E11.22 TYPE 2 DIABETES MELLITUS WITH STAGE 4 CHRONIC KIDNEY DISEASE, WITH LONG-TERM CURRENT USE OF INSULIN (HCC): ICD-10-CM

## 2019-05-06 DIAGNOSIS — Z79.4 TYPE 2 DIABETES MELLITUS WITH STAGE 4 CHRONIC KIDNEY DISEASE, WITH LONG-TERM CURRENT USE OF INSULIN (HCC): ICD-10-CM

## 2019-05-06 PROCEDURE — 99214 OFFICE O/P EST MOD 30 MIN: CPT | Performed by: INTERNAL MEDICINE

## 2019-05-07 DIAGNOSIS — I10 ESSENTIAL HYPERTENSION: Primary | ICD-10-CM

## 2019-05-08 RX ORDER — AMLODIPINE BESYLATE 2.5 MG/1
TABLET ORAL
Qty: 90 TABLET | Refills: 0 | Status: SHIPPED | OUTPATIENT
Start: 2019-05-08 | End: 2019-10-31 | Stop reason: SDUPTHER

## 2019-06-05 ENCOUNTER — OFFICE VISIT (OUTPATIENT)
Dept: SLEEP CENTER | Facility: CLINIC | Age: 84
End: 2019-06-05
Payer: COMMERCIAL

## 2019-06-05 VITALS
SYSTOLIC BLOOD PRESSURE: 122 MMHG | DIASTOLIC BLOOD PRESSURE: 64 MMHG | HEIGHT: 71 IN | WEIGHT: 220 LBS | BODY MASS INDEX: 30.8 KG/M2

## 2019-06-05 DIAGNOSIS — G47.33 OSA (OBSTRUCTIVE SLEEP APNEA): Primary | ICD-10-CM

## 2019-06-05 PROCEDURE — 99213 OFFICE O/P EST LOW 20 MIN: CPT | Performed by: INTERNAL MEDICINE

## 2019-06-06 ENCOUNTER — TELEPHONE (OUTPATIENT)
Dept: CARDIOLOGY CLINIC | Facility: CLINIC | Age: 84
End: 2019-06-06

## 2019-06-06 ENCOUNTER — APPOINTMENT (OUTPATIENT)
Dept: LAB | Age: 84
End: 2019-06-06
Payer: COMMERCIAL

## 2019-06-06 DIAGNOSIS — I48.0 PAROXYSMAL ATRIAL FIBRILLATION (HCC): Primary | ICD-10-CM

## 2019-06-06 LAB
INR PPP: 2.81 (ref 0.86–1.17)
PROTHROMBIN TIME: 29.6 SECONDS (ref 11.8–14.2)

## 2019-06-06 PROCEDURE — 85610 PROTHROMBIN TIME: CPT

## 2019-06-06 PROCEDURE — 36415 COLL VENOUS BLD VENIPUNCTURE: CPT

## 2019-06-07 ENCOUNTER — TELEPHONE (OUTPATIENT)
Dept: CARDIOLOGY CLINIC | Facility: CLINIC | Age: 84
End: 2019-06-07

## 2019-06-07 ENCOUNTER — ANTICOAG VISIT (OUTPATIENT)
Dept: CARDIOLOGY CLINIC | Facility: CLINIC | Age: 84
End: 2019-06-07

## 2019-06-07 ENCOUNTER — REMOTE DEVICE CLINIC VISIT (OUTPATIENT)
Dept: CARDIOLOGY CLINIC | Facility: CLINIC | Age: 84
End: 2019-06-07
Payer: COMMERCIAL

## 2019-06-07 DIAGNOSIS — I48.0 PAROXYSMAL ATRIAL FIBRILLATION (HCC): ICD-10-CM

## 2019-06-07 DIAGNOSIS — I49.5 SICK SINUS SYNDROME (HCC): Primary | ICD-10-CM

## 2019-06-07 DIAGNOSIS — Z95.0 CARDIAC PACEMAKER IN SITU: ICD-10-CM

## 2019-06-07 DIAGNOSIS — I48.0 PAROXYSMAL ATRIAL FIBRILLATION (HCC): Primary | ICD-10-CM

## 2019-06-07 PROCEDURE — 93296 REM INTERROG EVL PM/IDS: CPT | Performed by: INTERNAL MEDICINE

## 2019-06-07 PROCEDURE — 93294 REM INTERROG EVL PM/LDLS PM: CPT | Performed by: INTERNAL MEDICINE

## 2019-06-11 DIAGNOSIS — F03.90 DEMENTIA WITHOUT BEHAVIORAL DISTURBANCE, UNSPECIFIED DEMENTIA TYPE (HCC): ICD-10-CM

## 2019-06-11 RX ORDER — MEMANTINE HYDROCHLORIDE 28 MG/1
CAPSULE, EXTENDED RELEASE ORAL
Qty: 90 CAPSULE | Refills: 3 | Status: SHIPPED | OUTPATIENT
Start: 2019-06-11 | End: 2020-03-02

## 2019-06-13 DIAGNOSIS — F03.90 DEMENTIA WITHOUT BEHAVIORAL DISTURBANCE, UNSPECIFIED DEMENTIA TYPE (HCC): ICD-10-CM

## 2019-06-13 RX ORDER — DONEPEZIL HYDROCHLORIDE 10 MG/1
10 TABLET, FILM COATED ORAL
Qty: 90 TABLET | Refills: 3 | Status: SHIPPED | OUTPATIENT
Start: 2019-06-13 | End: 2020-08-25

## 2019-06-27 ENCOUNTER — TELEPHONE (OUTPATIENT)
Dept: NEPHROLOGY | Facility: CLINIC | Age: 84
End: 2019-06-27

## 2019-07-08 ENCOUNTER — TELEPHONE (OUTPATIENT)
Dept: CARDIOLOGY CLINIC | Facility: CLINIC | Age: 84
End: 2019-07-08

## 2019-07-08 DIAGNOSIS — I48.0 PAROXYSMAL ATRIAL FIBRILLATION (HCC): Primary | ICD-10-CM

## 2019-07-14 ENCOUNTER — APPOINTMENT (OUTPATIENT)
Dept: LAB | Age: 84
End: 2019-07-14
Payer: COMMERCIAL

## 2019-07-14 LAB
INR PPP: 3.06 (ref 0.84–1.19)
PROTHROMBIN TIME: 31.1 SECONDS (ref 11.6–14.5)

## 2019-07-14 PROCEDURE — 36415 COLL VENOUS BLD VENIPUNCTURE: CPT | Performed by: INTERNAL MEDICINE

## 2019-07-14 PROCEDURE — 85610 PROTHROMBIN TIME: CPT | Performed by: INTERNAL MEDICINE

## 2019-07-15 ENCOUNTER — ANTICOAG VISIT (OUTPATIENT)
Dept: CARDIOLOGY CLINIC | Facility: CLINIC | Age: 84
End: 2019-07-15

## 2019-07-15 DIAGNOSIS — I48.0 PAROXYSMAL ATRIAL FIBRILLATION (HCC): ICD-10-CM

## 2019-07-15 NOTE — PROGRESS NOTES
7/15/19, tc to pt's wife at work, denies any changes in health or medication  Notes bruising with injection this am  Will decrease dose, 5 mg fri, 2 5 mg other days of the week, inr due 2 week   tuyet

## 2019-07-16 ENCOUNTER — OFFICE VISIT (OUTPATIENT)
Dept: CARDIOLOGY CLINIC | Facility: CLINIC | Age: 84
End: 2019-07-16
Payer: COMMERCIAL

## 2019-07-16 VITALS
RESPIRATION RATE: 18 BRPM | WEIGHT: 221.4 LBS | BODY MASS INDEX: 31 KG/M2 | DIASTOLIC BLOOD PRESSURE: 70 MMHG | HEIGHT: 71 IN | HEART RATE: 70 BPM | SYSTOLIC BLOOD PRESSURE: 130 MMHG

## 2019-07-16 DIAGNOSIS — E78.2 MIXED HYPERLIPIDEMIA: ICD-10-CM

## 2019-07-16 DIAGNOSIS — I25.10 CORONARY ARTERY DISEASE INVOLVING NATIVE CORONARY ARTERY OF NATIVE HEART WITHOUT ANGINA PECTORIS: Primary | ICD-10-CM

## 2019-07-16 DIAGNOSIS — I48.0 PAROXYSMAL ATRIAL FIBRILLATION (HCC): ICD-10-CM

## 2019-07-16 DIAGNOSIS — I10 ESSENTIAL HYPERTENSION: ICD-10-CM

## 2019-07-16 PROCEDURE — 99213 OFFICE O/P EST LOW 20 MIN: CPT | Performed by: INTERNAL MEDICINE

## 2019-07-16 PROCEDURE — 3075F SYST BP GE 130 - 139MM HG: CPT | Performed by: INTERNAL MEDICINE

## 2019-07-16 NOTE — PROGRESS NOTES
Assessment/Plan:    Coronary artery disease involving native coronary artery of native heart without angina pectoris  Coronary artery disease, stable with no symptoms of angina or signs of heart failure  We will continue    Paroxysmal atrial fibrillation (HCC)  Paroxysmal atrial fibrillation, stable  The patient is in regular rhythm  Essential hypertension  Hypertension, stable and adequately controlled  Mixed hyperlipidemia  Hyperlipidemia, stable  Diagnoses and all orders for this visit:    Coronary artery disease involving native coronary artery of native heart without angina pectoris    Paroxysmal atrial fibrillation (HCC)    Essential hypertension    Mixed hyperlipidemia          Subjective:  Feels well  Patient ID: Mike Williamson is a 80 y o  male  The patient presented to this office for the purpose of cardiac follow-up  He has a known history of coronary artery disease, paroxysmal atrial fibrillation with amanda-tachy features  He also has a history of hypertension and hyperlipidemia as well as renal insufficiency  Is however feeling rather well denying any symptoms of chest pain, shortness of breath, palpitation, dizziness or lightheadedness  He has no leg edema  The following portions of the patient's history were reviewed and updated as appropriate: allergies, current medications, past family history, past medical history, past social history, past surgical history and problem list     Review of Systems   Respiratory: Negative for apnea, cough, chest tightness, shortness of breath and wheezing  Cardiovascular: Negative for chest pain, palpitations and leg swelling  Gastrointestinal: Negative for abdominal pain  Neurological: Negative for dizziness and light-headedness  Objective:  Stable cardiac-wise        /70 (BP Location: Right arm, Patient Position: Sitting)   Pulse 70   Resp 18   Ht 5' 10 5" (1 791 m)   Wt 100 kg (221 lb 6 4 oz)   BMI 31 32 kg/m² Physical Exam   Constitutional: He is oriented to person, place, and time  He appears well-developed and well-nourished  No distress  HENT:   Head: Normocephalic  Eyes: Pupils are equal, round, and reactive to light  Neck: Normal range of motion  No JVD present  No thyromegaly present  Cardiovascular: Normal rate, regular rhythm, S1 normal and S2 normal  Exam reveals no gallop and no friction rub  Murmur heard  Crescendo systolic murmur is present with a grade of 1/6  Pulmonary/Chest: Effort normal and breath sounds normal  No respiratory distress  He has no wheezes  He has no rales  He exhibits no tenderness  Abdominal: Soft  Musculoskeletal: Normal range of motion  He exhibits no edema, tenderness or deformity  Neurological: He is alert and oriented to person, place, and time  Skin: Skin is warm and dry  He is not diaphoretic  Psychiatric: He has a normal mood and affect  Vitals reviewed

## 2019-07-16 NOTE — ASSESSMENT & PLAN NOTE
Coronary artery disease, stable with no symptoms of angina or signs of heart failure    We will continue

## 2019-07-16 NOTE — LETTER
July 16, 2019     Sarbjit Guerra MD  [address] [address]    Patient: Cecy Shaikh   YOB: 1935   Date of Visit: 7/16/2019       Dear Dr Natalia Gaspar: Thank you for referring Juaquin Merlos to me for evaluation  Below are my notes for this consultation  If you have questions, please do not hesitate to call me  I look forward to following your patient along with you  Sincerely,        Dorina Patel MD        CC: No Recipients  Dorina Patel MD  7/16/2019  9:16 AM  Sign at close encounter  Assessment/Plan:    Coronary artery disease involving native coronary artery of native heart without angina pectoris  Coronary artery disease, stable with no symptoms of angina or signs of heart failure  We will continue    Paroxysmal atrial fibrillation (HCC)  Paroxysmal atrial fibrillation, stable  The patient is in regular rhythm  Essential hypertension  Hypertension, stable and adequately controlled  Mixed hyperlipidemia  Hyperlipidemia, stable  Diagnoses and all orders for this visit:    Coronary artery disease involving native coronary artery of native heart without angina pectoris    Paroxysmal atrial fibrillation (HCC)    Essential hypertension    Mixed hyperlipidemia          Subjective:  Feels well  Patient ID: Cecy Shaikh is a 80 y o  male  The patient presented to this office for the purpose of cardiac follow-up  He has a known history of coronary artery disease, paroxysmal atrial fibrillation with amanda-tachy features  He also has a history of hypertension and hyperlipidemia as well as renal insufficiency  Is however feeling rather well denying any symptoms of chest pain, shortness of breath, palpitation, dizziness or lightheadedness  He has no leg edema        The following portions of the patient's history were reviewed and updated as appropriate: allergies, current medications, past family history, past medical history, past social history, past surgical history and problem list     Review of Systems   Respiratory: Negative for apnea, cough, chest tightness, shortness of breath and wheezing  Cardiovascular: Negative for chest pain, palpitations and leg swelling  Gastrointestinal: Negative for abdominal pain  Neurological: Negative for dizziness and light-headedness  Objective:  Stable cardiac-wise  /70 (BP Location: Right arm, Patient Position: Sitting)   Pulse 70   Resp 18   Ht 5' 10 5" (1 791 m)   Wt 100 kg (221 lb 6 4 oz)   BMI 31 32 kg/m²           Physical Exam   Constitutional: He is oriented to person, place, and time  He appears well-developed and well-nourished  No distress  HENT:   Head: Normocephalic  Eyes: Pupils are equal, round, and reactive to light  Neck: Normal range of motion  No JVD present  No thyromegaly present  Cardiovascular: Normal rate, regular rhythm, S1 normal and S2 normal  Exam reveals no gallop and no friction rub  Murmur heard  Crescendo systolic murmur is present with a grade of 1/6  Pulmonary/Chest: Effort normal and breath sounds normal  No respiratory distress  He has no wheezes  He has no rales  He exhibits no tenderness  Abdominal: Soft  Musculoskeletal: Normal range of motion  He exhibits no edema, tenderness or deformity  Neurological: He is alert and oriented to person, place, and time  Skin: Skin is warm and dry  He is not diaphoretic  Psychiatric: He has a normal mood and affect  Vitals reviewed

## 2019-08-01 ENCOUNTER — APPOINTMENT (OUTPATIENT)
Dept: LAB | Age: 84
End: 2019-08-01
Payer: COMMERCIAL

## 2019-08-01 DIAGNOSIS — E11.22 TYPE 2 DIABETES MELLITUS WITH STAGE 4 CHRONIC KIDNEY DISEASE, WITH LONG-TERM CURRENT USE OF INSULIN (HCC): ICD-10-CM

## 2019-08-01 DIAGNOSIS — N18.4 CKD (CHRONIC KIDNEY DISEASE), STAGE IV (HCC): ICD-10-CM

## 2019-08-01 DIAGNOSIS — Z79.4 TYPE 2 DIABETES MELLITUS WITH STAGE 4 CHRONIC KIDNEY DISEASE, WITH LONG-TERM CURRENT USE OF INSULIN (HCC): ICD-10-CM

## 2019-08-01 DIAGNOSIS — I48.0 PAROXYSMAL ATRIAL FIBRILLATION (HCC): ICD-10-CM

## 2019-08-01 DIAGNOSIS — N18.4 TYPE 2 DIABETES MELLITUS WITH STAGE 4 CHRONIC KIDNEY DISEASE, WITH LONG-TERM CURRENT USE OF INSULIN (HCC): ICD-10-CM

## 2019-08-01 DIAGNOSIS — IMO0002 SOLITARY KIDNEY: ICD-10-CM

## 2019-08-01 DIAGNOSIS — I12.9 HYPERTENSIVE CHRONIC KIDNEY DISEASE WITH STAGE 1 THROUGH STAGE 4 CHRONIC KIDNEY DISEASE, OR UNSPECIFIED CHRONIC KIDNEY DISEASE: ICD-10-CM

## 2019-08-01 LAB
ALBUMIN SERPL BCP-MCNC: 3.4 G/DL (ref 3.5–5)
ANION GAP SERPL CALCULATED.3IONS-SCNC: 7 MMOL/L (ref 4–13)
BUN SERPL-MCNC: 23 MG/DL (ref 5–25)
CALCIUM SERPL-MCNC: 8.8 MG/DL (ref 8.3–10.1)
CHLORIDE SERPL-SCNC: 104 MMOL/L (ref 100–108)
CO2 SERPL-SCNC: 28 MMOL/L (ref 21–32)
CREAT SERPL-MCNC: 2.4 MG/DL (ref 0.6–1.3)
ERYTHROCYTE [DISTWIDTH] IN BLOOD BY AUTOMATED COUNT: 12.6 % (ref 11.6–15.1)
GFR SERPL CREATININE-BSD FRML MDRD: 24 ML/MIN/1.73SQ M
GLUCOSE SERPL-MCNC: 259 MG/DL (ref 65–140)
HCT VFR BLD AUTO: 40.8 % (ref 36.5–49.3)
HGB BLD-MCNC: 13.5 G/DL (ref 12–17)
INR PPP: 2.99 (ref 0.84–1.19)
MCH RBC QN AUTO: 29.2 PG (ref 26.8–34.3)
MCHC RBC AUTO-ENTMCNC: 33.1 G/DL (ref 31.4–37.4)
MCV RBC AUTO: 88 FL (ref 82–98)
PHOSPHATE SERPL-MCNC: 2.9 MG/DL (ref 2.3–4.1)
PLATELET # BLD AUTO: 228 THOUSANDS/UL (ref 149–390)
PMV BLD AUTO: 10.7 FL (ref 8.9–12.7)
POTASSIUM SERPL-SCNC: 4.2 MMOL/L (ref 3.5–5.3)
PROTHROMBIN TIME: 30.5 SECONDS (ref 11.6–14.5)
RBC # BLD AUTO: 4.62 MILLION/UL (ref 3.88–5.62)
SODIUM SERPL-SCNC: 139 MMOL/L (ref 136–145)
WBC # BLD AUTO: 5.37 THOUSAND/UL (ref 4.31–10.16)

## 2019-08-01 PROCEDURE — 83970 ASSAY OF PARATHORMONE: CPT

## 2019-08-01 PROCEDURE — 82570 ASSAY OF URINE CREATININE: CPT

## 2019-08-01 PROCEDURE — 85610 PROTHROMBIN TIME: CPT

## 2019-08-01 PROCEDURE — 80069 RENAL FUNCTION PANEL: CPT

## 2019-08-01 PROCEDURE — 85027 COMPLETE CBC AUTOMATED: CPT

## 2019-08-01 PROCEDURE — 36415 COLL VENOUS BLD VENIPUNCTURE: CPT

## 2019-08-01 PROCEDURE — 82043 UR ALBUMIN QUANTITATIVE: CPT

## 2019-08-02 ENCOUNTER — ANTICOAG VISIT (OUTPATIENT)
Dept: CARDIOLOGY CLINIC | Facility: CLINIC | Age: 84
End: 2019-08-02

## 2019-08-02 DIAGNOSIS — I48.0 PAROXYSMAL ATRIAL FIBRILLATION (HCC): ICD-10-CM

## 2019-08-02 LAB
CREAT UR-MCNC: 96.2 MG/DL
MICROALBUMIN UR-MCNC: 59.5 MG/L (ref 0–20)
MICROALBUMIN/CREAT 24H UR: 62 MG/G CREATININE (ref 0–30)
PTH-INTACT SERPL-MCNC: 30.7 PG/ML (ref 18.4–80.1)

## 2019-08-02 NOTE — PROGRESS NOTES
8/2/19, tc to pt's wife, will continue current dose, inr due 3 weeks  Pt will call if any questions or concerns   tuyet

## 2019-08-03 DIAGNOSIS — I48.0 PAROXYSMAL ATRIAL FIBRILLATION (HCC): Primary | ICD-10-CM

## 2019-08-03 RX ORDER — PROPAFENONE HYDROCHLORIDE 225 MG/1
TABLET, FILM COATED ORAL
Qty: 270 TABLET | Refills: 0 | Status: SHIPPED | OUTPATIENT
Start: 2019-08-03 | End: 2019-11-07 | Stop reason: SDUPTHER

## 2019-08-11 ENCOUNTER — APPOINTMENT (OUTPATIENT)
Dept: LAB | Age: 84
End: 2019-08-11
Payer: COMMERCIAL

## 2019-08-11 DIAGNOSIS — I48.0 PAROXYSMAL ATRIAL FIBRILLATION (HCC): ICD-10-CM

## 2019-08-11 LAB
INR PPP: 2.62 (ref 0.84–1.19)
PROTHROMBIN TIME: 27.5 SECONDS (ref 11.6–14.5)

## 2019-08-11 PROCEDURE — 85610 PROTHROMBIN TIME: CPT

## 2019-08-11 PROCEDURE — 36415 COLL VENOUS BLD VENIPUNCTURE: CPT

## 2019-08-14 ENCOUNTER — TELEPHONE (OUTPATIENT)
Dept: NEPHROLOGY | Facility: CLINIC | Age: 84
End: 2019-08-14

## 2019-08-14 NOTE — TELEPHONE ENCOUNTER
Called Jerri and let her know that Jason Brown doesn't need any additional bw prior to his appt on 8/26

## 2019-08-14 NOTE — TELEPHONE ENCOUNTER
Pts wife called the office stating that he doesn't have any slips to get his blood work done before his next appointment I also looked in chart and didn't see any orders they use a MANASA Daniels once the orders have been placed they will go before his appointment to get them done

## 2019-08-26 ENCOUNTER — OFFICE VISIT (OUTPATIENT)
Dept: NEPHROLOGY | Facility: CLINIC | Age: 84
End: 2019-08-26
Payer: COMMERCIAL

## 2019-08-26 VITALS
DIASTOLIC BLOOD PRESSURE: 58 MMHG | WEIGHT: 217.4 LBS | HEIGHT: 71 IN | RESPIRATION RATE: 18 BRPM | SYSTOLIC BLOOD PRESSURE: 118 MMHG | BODY MASS INDEX: 30.44 KG/M2 | HEART RATE: 87 BPM

## 2019-08-26 DIAGNOSIS — Z79.4 TYPE 2 DIABETES MELLITUS WITH STAGE 4 CHRONIC KIDNEY DISEASE, WITH LONG-TERM CURRENT USE OF INSULIN (HCC): ICD-10-CM

## 2019-08-26 DIAGNOSIS — N18.4 TYPE 2 DIABETES MELLITUS WITH STAGE 4 CHRONIC KIDNEY DISEASE, WITH LONG-TERM CURRENT USE OF INSULIN (HCC): ICD-10-CM

## 2019-08-26 DIAGNOSIS — IMO0002 SOLITARY KIDNEY: ICD-10-CM

## 2019-08-26 DIAGNOSIS — I12.9 HYPERTENSIVE CHRONIC KIDNEY DISEASE WITH STAGE 1 THROUGH STAGE 4 CHRONIC KIDNEY DISEASE, OR UNSPECIFIED CHRONIC KIDNEY DISEASE: ICD-10-CM

## 2019-08-26 DIAGNOSIS — E11.22 TYPE 2 DIABETES MELLITUS WITH STAGE 4 CHRONIC KIDNEY DISEASE, WITH LONG-TERM CURRENT USE OF INSULIN (HCC): ICD-10-CM

## 2019-08-26 DIAGNOSIS — N18.4 CKD (CHRONIC KIDNEY DISEASE), STAGE IV (HCC): Primary | ICD-10-CM

## 2019-08-26 PROCEDURE — 99214 OFFICE O/P EST MOD 30 MIN: CPT | Performed by: INTERNAL MEDICINE

## 2019-08-26 NOTE — PROGRESS NOTES
NEPHROLOGY OUTPATIENT PROGRESS NOTE   Christyne Friday 80 y o  male MRN: [id number]  Reason for visit:  Chronic kidney disease    ASSESSMENT and PLAN:  1  Chronic kidney disease, stage IV, baseline creatinine 2 4-2 6, estimated GFR currently 24, underlying disease most likely secondary to diabetic nephropathy  2  Hypertension, blood pressure well controlled, continue with current regimen  3  Diabetes, encourage compliance with diabetic diet  4  History of atrial fibrillation, INR 2 6, follows with Cardiology  5  Mineral bone disorder, PTH within range at 30 7, calcium 8 8, phosphorus 2 9    · Overall renal function remains stable  · No changes in current regimen, blood pressure appears stable  · Follow-up in approximately 3 months with repeat laboratory studies at that time  SUBJECTIVE / INTERVAL HISTORY:  He has been doing well  Denies any recent hospitalizations or illnesses  Blood pressures at home have been ranging in the 110-130 range  Denies any significant dizziness lightheadedness or falls  Blood sugars have been running at high at home but has been difficult to control his diet given his dementia      Review of Systems      OBJECTIVE:  /58 (BP Location: Left arm, Patient Position: Sitting, Cuff Size: Large)   Pulse 87   Resp 18   Ht 5' 11" (1 803 m)   Wt 98 6 kg (217 lb 6 4 oz)   BMI 30 32 kg/m²   Vitals:    08/26/19 1058   Weight: 98 6 kg (217 lb 6 4 oz)       Physical Exam   Constitutional: No distress  HENT:   Head: Normocephalic  Eyes: No scleral icterus  Neck: Neck supple  Cardiovascular: Normal rate and regular rhythm  Pulmonary/Chest: Effort normal and breath sounds normal    Abdominal: Soft  Bowel sounds are normal    Musculoskeletal: He exhibits no edema  Neurological: He is alert  Skin: Skin is warm and dry           Medications:    Current Outpatient Medications:     amLODIPine (NORVASC) 2 5 mg tablet, TAKE 1 TABLET DAILY, Disp: 90 tablet, Rfl: 0    amLODIPine (NORVASC) 5 mg tablet, Take 2 5 mg by mouth daily  , Disp: , Rfl:     atorvastatin (LIPITOR) 10 mg tablet, Take 10 mg by mouth daily  , Disp: , Rfl:     cholecalciferol (VITAMIN D3) 1,000 units tablet, Take 2,000 Units by mouth daily, Disp: , Rfl:     cyanocobalamin (VITAMIN B-12) 500 mcg tablet, Take 500 mcg by mouth daily, Disp: , Rfl:     docusate sodium (COLACE) 100 mg capsule, Take 100 mg by mouth 3 (three) times a day , Disp: , Rfl:     donepezil (ARICEPT) 10 mg tablet, Take 1 tablet (10 mg total) by mouth daily at bedtime, Disp: 90 tablet, Rfl: 3    fenofibrate (TRICOR) 145 mg tablet, Take 145 mg by mouth daily  , Disp: , Rfl:     ferrous sulfate 325 (65 Fe) mg tablet, Take 325 mg by mouth as needed , Disp: , Rfl:     fluticasone (FLONASE) 50 mcg/act nasal spray, 1 spray into each nostril daily, Disp: , Rfl:     insulin aspart (NovoLOG) 100 units/mL injection, Inject 8 Units under the skin 2 (two) times a day before meals  , Disp: , Rfl:     insulin glargine (LANTUS) 100 units/mL subcutaneous injection, Inject 40 Units under the skin 2 (two) times a day , Disp: , Rfl:     ipratropium (ATROVENT HFA) 17 mcg/act inhaler, Inhale 2 puffs every 6 (six) hours  , Disp: , Rfl:     ipratropium (ATROVENT) 0 03 % nasal spray, 2 sprays into each nostril every 12 (twelve) hours, Disp: 30 mL, Rfl: 0    levalbuterol (XOPENEX) 0 63 mg/3 mL nebulizer solution, Take 1 ampule by nebulization 2 (two) times a day as needed for wheezing , Disp: , Rfl:     loratadine (CLARITIN) 10 mg tablet, Take 10 mg by mouth daily  , Disp: , Rfl:     losartan (COZAAR) 25 mg tablet, TAKE 1 TABLET DAILY, Disp: 90 tablet, Rfl: 0    Magnesium 250 MG TABS, Take by mouth daily, Disp: , Rfl:     magnesium gluconate (MAGONATE) 500 mg tablet, Take 500 mg by mouth daily  , Disp: , Rfl:     Memantine HCl ER (NAMENDA XR) 28 MG CP24, 1 po qd, Disp: 90 capsule, Rfl: 3    metoprolol tartrate (LOPRESSOR) 100 mg tablet, Take 100 mg by mouth every morning , Disp: , Rfl:     Multiple Vitamin (MULTIVITAMIN) capsule, Take 1 capsule by mouth daily, Disp: , Rfl:     NOVOFINE 32G X 6 MM MISC, , Disp: , Rfl:     NOVOLOG FLEXPEN 100 units/mL injection pen, 8 Units 2 (two) times a day with meals , Disp: , Rfl:     omega-3-acid ethyl esters (LOVAZA) 1 g capsule, Take 1 g by mouth 4 (four) times a day , Disp: , Rfl:     ONETOUCH DELICA LANCETS 22W MISC, , Disp: , Rfl:     ONETOUCH VERIO test strip, , Disp: , Rfl:     Probiotic Product (PROBIOTIC COLON SUPPORT PO), Take 1 tablet by mouth daily, Disp: , Rfl:     propafenone (RYTHMOL) 225 mg tablet, TAKE ONE TABLET BY MOUTH 3 TIMES A DAY, Disp: 270 tablet, Rfl: 0    sertraline (ZOLOFT) 100 mg tablet, Take 1 tablet (100 mg total) by mouth daily (Patient taking differently: Take 50 mg by mouth daily ), Disp: 30 tablet, Rfl: 5    sodium chloride (OCEAN) 0 65 % nasal spray, 1 spray into each nostril, Disp: , Rfl:     valsartan (DIOVAN) 80 mg tablet, Take 160 mg by mouth daily  , Disp: , Rfl:     warfarin (COUMADIN) 2 5 mg tablet, Take 2 5 mg by mouth 3 (three) times a week Indications: Mon wed Fri   , Disp: , Rfl:     warfarin (COUMADIN) 5 mg tablet, Take 5 mg by mouth 4 (four) times a day Indications: Tues  Thurs  , Sat , Sun , Disp: , Rfl:     Laboratory Results:  Results for orders placed or performed in visit on 08/11/19   Protime-INR   Result Value Ref Range    Protime 27 5 (H) 11 6 - 14 5 seconds    INR 2 62 (H) 0 84 - 1 19

## 2019-08-26 NOTE — LETTER
August 26, 2019     Khari Mondragon MD  [id number] [address]    Patient: Frankie Tabor   YOB: 1935   Date of Visit: 8/26/2019       Dear Dr Eva Obrien: Thank you for referring Tash Dobbins to me for evaluation  Below are the relevant portions of my assessment and plan of care  If you have questions, please do not hesitate to call me  I look forward to following Jayme Park along with you  Sincerely,        Clari Ruano,         CC: No Recipients                       ASSESSMENT and PLAN:  1  Chronic kidney disease, stage IV, baseline creatinine 2 4-2 6, estimated GFR currently 24, underlying disease most likely secondary to diabetic nephropathy  2  Hypertension, blood pressure well controlled, continue with current regimen  3  Diabetes, encourage compliance with diabetic diet  4  History of atrial fibrillation, INR 2 6, follows with Cardiology  5  Mineral bone disorder, PTH within range at 30 7, calcium 8 8, phosphorus 2 9    · Overall renal function remains stable  · No changes in current regimen, blood pressure appears stable  · Follow-up in approximately 3 months with repeat laboratory studies at that time

## 2019-09-09 ENCOUNTER — REMOTE DEVICE CLINIC VISIT (OUTPATIENT)
Dept: CARDIOLOGY CLINIC | Facility: CLINIC | Age: 84
End: 2019-09-09
Payer: COMMERCIAL

## 2019-09-09 DIAGNOSIS — Z95.0 CARDIAC PACEMAKER IN SITU: Primary | ICD-10-CM

## 2019-09-09 PROCEDURE — 93296 REM INTERROG EVL PM/IDS: CPT | Performed by: INTERNAL MEDICINE

## 2019-09-09 PROCEDURE — 93294 REM INTERROG EVL PM/LDLS PM: CPT | Performed by: INTERNAL MEDICINE

## 2019-09-10 ENCOUNTER — APPOINTMENT (OUTPATIENT)
Dept: LAB | Age: 84
End: 2019-09-10

## 2019-09-10 ENCOUNTER — TRANSCRIBE ORDERS (OUTPATIENT)
Dept: LAB | Age: 84
End: 2019-09-10

## 2019-09-10 DIAGNOSIS — N18.4 TYPE 2 DIABETES MELLITUS WITH STAGE 4 CHRONIC KIDNEY DISEASE, WITH LONG-TERM CURRENT USE OF INSULIN (HCC): ICD-10-CM

## 2019-09-10 DIAGNOSIS — IMO0002 SOLITARY KIDNEY: ICD-10-CM

## 2019-09-10 DIAGNOSIS — N18.4 CKD (CHRONIC KIDNEY DISEASE), STAGE IV (HCC): ICD-10-CM

## 2019-09-10 DIAGNOSIS — Z79.4 TYPE 2 DIABETES MELLITUS WITH STAGE 4 CHRONIC KIDNEY DISEASE, WITH LONG-TERM CURRENT USE OF INSULIN (HCC): ICD-10-CM

## 2019-09-10 DIAGNOSIS — Z00.8 HEALTH EXAMINATION IN POPULATION SURVEY: Primary | ICD-10-CM

## 2019-09-10 DIAGNOSIS — I12.9 HYPERTENSIVE CHRONIC KIDNEY DISEASE WITH STAGE 1 THROUGH STAGE 4 CHRONIC KIDNEY DISEASE, OR UNSPECIFIED CHRONIC KIDNEY DISEASE: ICD-10-CM

## 2019-09-10 DIAGNOSIS — Z00.8 HEALTH EXAMINATION IN POPULATION SURVEY: ICD-10-CM

## 2019-09-10 DIAGNOSIS — E11.22 TYPE 2 DIABETES MELLITUS WITH STAGE 4 CHRONIC KIDNEY DISEASE, WITH LONG-TERM CURRENT USE OF INSULIN (HCC): ICD-10-CM

## 2019-09-10 LAB
CHOLEST SERPL-MCNC: 136 MG/DL (ref 50–200)
EST. AVERAGE GLUCOSE BLD GHB EST-MCNC: 197 MG/DL
HBA1C MFR BLD: 8.5 % (ref 4.2–6.3)
HDLC SERPL-MCNC: 16 MG/DL (ref 40–60)
NONHDLC SERPL-MCNC: 120 MG/DL
TRIGL SERPL-MCNC: 622 MG/DL

## 2019-09-10 PROCEDURE — 80061 LIPID PANEL: CPT

## 2019-09-10 PROCEDURE — 83036 HEMOGLOBIN GLYCOSYLATED A1C: CPT

## 2019-09-10 NOTE — PROGRESS NOTES
Results for orders placed or performed in visit on 09/09/19   Cardiac EP device report    Narrative    MDT DUAL PM - ALMA DELIA Andre TRANSMISSION: BATTERY STATUS "OK"; 17 MONTHS  AP 99%  0 6%  ALL AVAILABLE LEAD PARAMETERS WITHIN NORMAL LIMITS  NO SIGNIFICANT HIGH RATE EPISODES  NORMAL DEVICE FUNCTION   NC

## 2019-09-11 ENCOUNTER — ANTICOAG VISIT (OUTPATIENT)
Dept: CARDIOLOGY CLINIC | Facility: CLINIC | Age: 84
End: 2019-09-11

## 2019-09-11 ENCOUNTER — TELEPHONE (OUTPATIENT)
Dept: CARDIOLOGY CLINIC | Facility: CLINIC | Age: 84
End: 2019-09-11

## 2019-09-11 DIAGNOSIS — I48.0 PAROXYSMAL ATRIAL FIBRILLATION (HCC): ICD-10-CM

## 2019-09-13 ENCOUNTER — TRANSCRIBE ORDERS (OUTPATIENT)
Dept: ADMINISTRATIVE | Facility: HOSPITAL | Age: 84
End: 2019-09-13

## 2019-09-13 DIAGNOSIS — K22.0 ACHALASIA OF ESOPHAGUS: Primary | ICD-10-CM

## 2019-09-13 DIAGNOSIS — I48.0 PAROXYSMAL ATRIAL FIBRILLATION (HCC): Primary | ICD-10-CM

## 2019-09-14 RX ORDER — WARFARIN SODIUM 5 MG/1
TABLET ORAL
Qty: 90 TABLET | Refills: 0 | Status: SHIPPED | OUTPATIENT
Start: 2019-09-14 | End: 2020-02-19 | Stop reason: SDUPTHER

## 2019-09-16 ENCOUNTER — LAB (OUTPATIENT)
Dept: LAB | Age: 84
End: 2019-09-16
Payer: COMMERCIAL

## 2019-09-16 ENCOUNTER — HOSPITAL ENCOUNTER (OUTPATIENT)
Dept: RADIOLOGY | Facility: HOSPITAL | Age: 84
Discharge: HOME | End: 2019-09-16
Attending: INTERNAL MEDICINE
Payer: COMMERCIAL

## 2019-09-16 DIAGNOSIS — I48.0 PAROXYSMAL ATRIAL FIBRILLATION (HCC): ICD-10-CM

## 2019-09-16 DIAGNOSIS — K22.0 ACHALASIA OF ESOPHAGUS: ICD-10-CM

## 2019-09-16 PROCEDURE — 74220 X-RAY XM ESOPHAGUS 1CNTRST: CPT

## 2019-09-17 ENCOUNTER — ANTICOAG VISIT (OUTPATIENT)
Dept: CARDIOLOGY CLINIC | Facility: CLINIC | Age: 84
End: 2019-09-17

## 2019-09-17 DIAGNOSIS — I48.0 PAROXYSMAL ATRIAL FIBRILLATION (HCC): ICD-10-CM

## 2019-09-17 NOTE — PROGRESS NOTES
9/17/19, tc to patient's wife, will continue current dose, inr due 4 weeks  pt had barium swallow done yesterday

## 2019-09-18 ENCOUNTER — TELEPHONE (OUTPATIENT)
Dept: CARDIOLOGY CLINIC | Facility: CLINIC | Age: 84
End: 2019-09-18

## 2019-09-24 ENCOUNTER — APPOINTMENT (OUTPATIENT)
Dept: LAB | Age: 84
End: 2019-09-24
Payer: COMMERCIAL

## 2019-09-24 DIAGNOSIS — I48.0 PAROXYSMAL ATRIAL FIBRILLATION (HCC): ICD-10-CM

## 2019-09-24 LAB
INR PPP: 2.42 (ref 0.84–1.19)
PROTHROMBIN TIME: 25.8 SECONDS (ref 11.6–14.5)

## 2019-09-24 PROCEDURE — 36415 COLL VENOUS BLD VENIPUNCTURE: CPT

## 2019-09-24 PROCEDURE — 85610 PROTHROMBIN TIME: CPT

## 2019-09-25 ENCOUNTER — ANTICOAG VISIT (OUTPATIENT)
Dept: CARDIOLOGY CLINIC | Facility: CLINIC | Age: 84
End: 2019-09-25

## 2019-09-25 DIAGNOSIS — I48.0 PAROXYSMAL ATRIAL FIBRILLATION (HCC): ICD-10-CM

## 2019-09-25 NOTE — PROGRESS NOTES
9/25/19, tc to pt,'parminder, called home , left message on answering machine, continue current dose, inr due 4 weeks  Wife to call if any questions   tuyet

## 2019-10-01 ENCOUNTER — TELEPHONE (OUTPATIENT)
Dept: CARDIOLOGY CLINIC | Facility: CLINIC | Age: 84
End: 2019-10-01

## 2019-10-01 ENCOUNTER — TELEPHONE (OUTPATIENT)
Dept: SLEEP CENTER | Facility: CLINIC | Age: 84
End: 2019-10-01

## 2019-10-01 DIAGNOSIS — G47.33 OSA (OBSTRUCTIVE SLEEP APNEA): Primary | ICD-10-CM

## 2019-10-01 NOTE — TELEPHONE ENCOUNTER
Patient Julia Kaur  1935 BP yesterday was 126/77 pulse 90 and today   /74 and pulse 90  Kirby Weber his wife would like you to call her back today; she will be able to get reached at 9-9:30 today at [phone]

## 2019-10-05 ENCOUNTER — LAB (OUTPATIENT)
Dept: LAB | Age: 84
End: 2019-10-05
Payer: COMMERCIAL

## 2019-10-05 DIAGNOSIS — I48.0 PAROXYSMAL ATRIAL FIBRILLATION (HCC): ICD-10-CM

## 2019-10-05 LAB
INR PPP: 2.21 (ref 0.84–1.19)
PROTHROMBIN TIME: 24 SECONDS (ref 11.6–14.5)

## 2019-10-05 PROCEDURE — 85610 PROTHROMBIN TIME: CPT

## 2019-10-05 PROCEDURE — 36415 COLL VENOUS BLD VENIPUNCTURE: CPT

## 2019-10-06 DIAGNOSIS — F33.0 MILD EPISODE OF RECURRENT MAJOR DEPRESSIVE DISORDER (HCC): ICD-10-CM

## 2019-10-07 ENCOUNTER — ANTICOAG VISIT (OUTPATIENT)
Dept: CARDIOLOGY CLINIC | Facility: CLINIC | Age: 84
End: 2019-10-07

## 2019-10-07 DIAGNOSIS — I48.0 PAROXYSMAL ATRIAL FIBRILLATION (HCC): ICD-10-CM

## 2019-10-07 RX ORDER — SERTRALINE HYDROCHLORIDE 100 MG/1
100 TABLET, FILM COATED ORAL DAILY
Qty: 90 TABLET | Refills: 3 | Status: SHIPPED | OUTPATIENT
Start: 2019-10-07 | End: 2020-11-18 | Stop reason: SDUPTHER

## 2019-10-08 DIAGNOSIS — I10 ESSENTIAL HYPERTENSION: Primary | ICD-10-CM

## 2019-10-09 RX ORDER — METOPROLOL SUCCINATE 100 MG/1
TABLET, EXTENDED RELEASE ORAL
Qty: 135 TABLET | Refills: 0 | Status: SHIPPED | OUTPATIENT
Start: 2019-10-09 | End: 2020-03-26 | Stop reason: SDUPTHER

## 2019-10-16 DIAGNOSIS — I10 ESSENTIAL HYPERTENSION: ICD-10-CM

## 2019-10-17 DIAGNOSIS — I10 ESSENTIAL HYPERTENSION: ICD-10-CM

## 2019-10-17 RX ORDER — LOSARTAN POTASSIUM 25 MG/1
TABLET ORAL
Qty: 90 TABLET | Refills: 0 | Status: SHIPPED | OUTPATIENT
Start: 2019-10-17 | End: 2019-10-17 | Stop reason: SDUPTHER

## 2019-10-22 RX ORDER — LOSARTAN POTASSIUM 25 MG/1
TABLET ORAL
Qty: 90 TABLET | Refills: 3 | Status: SHIPPED | OUTPATIENT
Start: 2019-10-22 | End: 2021-01-13

## 2019-10-27 ENCOUNTER — LAB (OUTPATIENT)
Dept: LAB | Age: 84
End: 2019-10-27
Payer: COMMERCIAL

## 2019-10-27 DIAGNOSIS — I48.0 PAROXYSMAL ATRIAL FIBRILLATION (HCC): ICD-10-CM

## 2019-10-27 LAB
INR PPP: 2.77 (ref 0.84–1.19)
PROTHROMBIN TIME: 28.7 SECONDS (ref 11.6–14.5)

## 2019-10-27 PROCEDURE — 36415 COLL VENOUS BLD VENIPUNCTURE: CPT

## 2019-10-27 PROCEDURE — 85610 PROTHROMBIN TIME: CPT

## 2019-10-28 ENCOUNTER — ANTICOAG VISIT (OUTPATIENT)
Dept: CARDIOLOGY CLINIC | Facility: CLINIC | Age: 84
End: 2019-10-28

## 2019-10-28 DIAGNOSIS — I48.0 PAROXYSMAL ATRIAL FIBRILLATION (HCC): ICD-10-CM

## 2019-10-31 DIAGNOSIS — I10 ESSENTIAL HYPERTENSION: ICD-10-CM

## 2019-11-02 RX ORDER — AMLODIPINE BESYLATE 2.5 MG/1
TABLET ORAL
Qty: 90 TABLET | Refills: 0 | Status: SHIPPED | OUTPATIENT
Start: 2019-11-02 | End: 2020-01-12

## 2019-11-07 DIAGNOSIS — I48.0 PAROXYSMAL ATRIAL FIBRILLATION (HCC): ICD-10-CM

## 2019-11-08 RX ORDER — PROPAFENONE HYDROCHLORIDE 225 MG/1
TABLET, FILM COATED ORAL
Qty: 270 TABLET | Refills: 0 | Status: SHIPPED | OUTPATIENT
Start: 2019-11-08 | End: 2020-02-18 | Stop reason: SDUPTHER

## 2019-11-12 ENCOUNTER — TELEPHONE (OUTPATIENT)
Dept: CARDIOLOGY CLINIC | Facility: CLINIC | Age: 84
End: 2019-11-12

## 2019-11-12 DIAGNOSIS — I48.0 PAROXYSMAL ATRIAL FIBRILLATION (HCC): Primary | ICD-10-CM

## 2019-11-14 ENCOUNTER — TELEPHONE (OUTPATIENT)
Dept: CARDIOLOGY CLINIC | Facility: CLINIC | Age: 84
End: 2019-11-14

## 2019-11-14 NOTE — TELEPHONE ENCOUNTER
Patient's wife called, Mr Larry Collier BP the last 2 mornings were 143/73 pulse 80 and this morning was 144/74 pulse 82  She is asking if you want her to increase any of his medications with these readings    Please call her at work  [phone]

## 2019-11-21 ENCOUNTER — APPOINTMENT (OUTPATIENT)
Dept: LAB | Age: 84
End: 2019-11-21
Payer: COMMERCIAL

## 2019-11-21 ENCOUNTER — TRANSCRIBE ORDERS (OUTPATIENT)
Dept: ADMINISTRATIVE | Age: 84
End: 2019-11-21

## 2019-11-21 DIAGNOSIS — N18.4 CHRONIC RENAL DISEASE, STAGE IV (HCC): ICD-10-CM

## 2019-11-21 DIAGNOSIS — Z79.4 TYPE 2 DIABETES MELLITUS WITH STAGE 4 CHRONIC KIDNEY DISEASE, WITH LONG-TERM CURRENT USE OF INSULIN (HCC): ICD-10-CM

## 2019-11-21 DIAGNOSIS — E11.22 TYPE 2 DIABETES MELLITUS WITH STAGE 4 CHRONIC KIDNEY DISEASE, WITH LONG-TERM CURRENT USE OF INSULIN (HCC): ICD-10-CM

## 2019-11-21 DIAGNOSIS — N18.4 CHRONIC RENAL DISEASE, STAGE IV (HCC): Primary | ICD-10-CM

## 2019-11-21 DIAGNOSIS — I48.0 PAROXYSMAL ATRIAL FIBRILLATION (HCC): ICD-10-CM

## 2019-11-21 DIAGNOSIS — N18.4 TYPE 2 DIABETES MELLITUS WITH STAGE 4 CHRONIC KIDNEY DISEASE, WITH LONG-TERM CURRENT USE OF INSULIN (HCC): ICD-10-CM

## 2019-11-21 LAB
ALBUMIN SERPL BCP-MCNC: 3.7 G/DL (ref 3.5–5)
ANION GAP SERPL CALCULATED.3IONS-SCNC: 9 MMOL/L (ref 4–13)
BUN SERPL-MCNC: 32 MG/DL (ref 5–25)
CALCIUM SERPL-MCNC: 9.3 MG/DL (ref 8.3–10.1)
CHLORIDE SERPL-SCNC: 105 MMOL/L (ref 100–108)
CO2 SERPL-SCNC: 26 MMOL/L (ref 21–32)
CREAT SERPL-MCNC: 2.36 MG/DL (ref 0.6–1.3)
ERYTHROCYTE [DISTWIDTH] IN BLOOD BY AUTOMATED COUNT: 13.1 % (ref 11.6–15.1)
GFR SERPL CREATININE-BSD FRML MDRD: 24 ML/MIN/1.73SQ M
GLUCOSE SERPL-MCNC: 229 MG/DL (ref 65–140)
HCT VFR BLD AUTO: 42.3 % (ref 36.5–49.3)
HGB BLD-MCNC: 13.8 G/DL (ref 12–17)
INR PPP: 1.59 (ref 0.84–1.19)
MCH RBC QN AUTO: 29 PG (ref 26.8–34.3)
MCHC RBC AUTO-ENTMCNC: 32.6 G/DL (ref 31.4–37.4)
MCV RBC AUTO: 89 FL (ref 82–98)
PHOSPHATE SERPL-MCNC: 3.1 MG/DL (ref 2.3–4.1)
PLATELET # BLD AUTO: 282 THOUSANDS/UL (ref 149–390)
PMV BLD AUTO: 10.6 FL (ref 8.9–12.7)
POTASSIUM SERPL-SCNC: 4.7 MMOL/L (ref 3.5–5.3)
PROTHROMBIN TIME: 18.5 SECONDS (ref 11.6–14.5)
RBC # BLD AUTO: 4.76 MILLION/UL (ref 3.88–5.62)
SODIUM SERPL-SCNC: 140 MMOL/L (ref 136–145)
URATE SERPL-MCNC: 5.1 MG/DL (ref 4.2–8)
WBC # BLD AUTO: 8.52 THOUSAND/UL (ref 4.31–10.16)

## 2019-11-21 PROCEDURE — 82043 UR ALBUMIN QUANTITATIVE: CPT

## 2019-11-21 PROCEDURE — 36415 COLL VENOUS BLD VENIPUNCTURE: CPT

## 2019-11-21 PROCEDURE — 85610 PROTHROMBIN TIME: CPT

## 2019-11-21 PROCEDURE — 82570 ASSAY OF URINE CREATININE: CPT

## 2019-11-21 PROCEDURE — 84550 ASSAY OF BLOOD/URIC ACID: CPT

## 2019-11-21 PROCEDURE — 83970 ASSAY OF PARATHORMONE: CPT

## 2019-11-21 PROCEDURE — 80069 RENAL FUNCTION PANEL: CPT

## 2019-11-21 PROCEDURE — 85027 COMPLETE CBC AUTOMATED: CPT

## 2019-11-22 ENCOUNTER — ANTICOAG VISIT (OUTPATIENT)
Dept: CARDIOLOGY CLINIC | Facility: CLINIC | Age: 84
End: 2019-11-22

## 2019-11-22 DIAGNOSIS — I48.0 PAROXYSMAL ATRIAL FIBRILLATION (HCC): ICD-10-CM

## 2019-11-22 LAB
CREAT UR-MCNC: 116 MG/DL
MICROALBUMIN UR-MCNC: 83.5 MG/L (ref 0–20)
MICROALBUMIN/CREAT 24H UR: 72 MG/G CREATININE (ref 0–30)
PTH-INTACT SERPL-MCNC: 36.4 PG/ML (ref 18.4–80.1)

## 2019-11-22 NOTE — PROGRESS NOTES
11/22/19, tc to wife, glenna, reports he took 2 gm of amoxicillin this week as pre op for dental appointment  Will take 5 mg x 2 days, then resume current dose, inr due 2 weeks   tuyet

## 2019-12-04 ENCOUNTER — LAB (OUTPATIENT)
Dept: LAB | Age: 84
End: 2019-12-04
Payer: COMMERCIAL

## 2019-12-04 ENCOUNTER — TRANSCRIBE ORDERS (OUTPATIENT)
Dept: ADMINISTRATIVE | Age: 84
End: 2019-12-04

## 2019-12-04 DIAGNOSIS — I48.0 PAROXYSMAL ATRIAL FIBRILLATION (HCC): ICD-10-CM

## 2019-12-04 DIAGNOSIS — N39.0 URINARY TRACT INFECTION WITHOUT HEMATURIA, SITE UNSPECIFIED: Primary | ICD-10-CM

## 2019-12-04 LAB
BACTERIA UR QL AUTO: NORMAL /HPF
BILIRUB UR QL STRIP: NEGATIVE
CLARITY UR: CLEAR
COLOR UR: YELLOW
GLUCOSE UR STRIP-MCNC: ABNORMAL MG/DL
HGB UR QL STRIP.AUTO: NEGATIVE
HYALINE CASTS #/AREA URNS LPF: NORMAL /LPF
INR PPP: 1.93 (ref 0.84–1.19)
KETONES UR STRIP-MCNC: NEGATIVE MG/DL
LEUKOCYTE ESTERASE UR QL STRIP: NEGATIVE
NITRITE UR QL STRIP: NEGATIVE
NON-SQ EPI CELLS URNS QL MICRO: NORMAL /HPF
PH UR STRIP.AUTO: 6 [PH]
PROT UR STRIP-MCNC: ABNORMAL MG/DL
PROTHROMBIN TIME: 21.6 SECONDS (ref 11.6–14.5)
RBC #/AREA URNS AUTO: NORMAL /HPF
SP GR UR STRIP.AUTO: 1.02 (ref 1–1.03)
UROBILINOGEN UR QL STRIP.AUTO: 0.2 E.U./DL
WBC #/AREA URNS AUTO: NORMAL /HPF

## 2019-12-04 PROCEDURE — 81001 URINALYSIS AUTO W/SCOPE: CPT | Performed by: INTERNAL MEDICINE

## 2019-12-04 PROCEDURE — 85610 PROTHROMBIN TIME: CPT

## 2019-12-04 PROCEDURE — 36415 COLL VENOUS BLD VENIPUNCTURE: CPT

## 2019-12-05 ENCOUNTER — ANTICOAG VISIT (OUTPATIENT)
Dept: CARDIOLOGY CLINIC | Facility: CLINIC | Age: 84
End: 2019-12-05

## 2019-12-05 DIAGNOSIS — I48.0 PAROXYSMAL ATRIAL FIBRILLATION (HCC): ICD-10-CM

## 2019-12-12 ENCOUNTER — TELEPHONE (OUTPATIENT)
Dept: CARDIOLOGY CLINIC | Facility: CLINIC | Age: 84
End: 2019-12-12

## 2019-12-12 ENCOUNTER — OFFICE VISIT (OUTPATIENT)
Dept: NEPHROLOGY | Facility: CLINIC | Age: 84
End: 2019-12-12
Payer: COMMERCIAL

## 2019-12-12 ENCOUNTER — REMOTE DEVICE CLINIC VISIT (OUTPATIENT)
Dept: CARDIOLOGY CLINIC | Facility: CLINIC | Age: 84
End: 2019-12-12
Payer: COMMERCIAL

## 2019-12-12 VITALS
BODY MASS INDEX: 30.75 KG/M2 | SYSTOLIC BLOOD PRESSURE: 123 MMHG | DIASTOLIC BLOOD PRESSURE: 62 MMHG | WEIGHT: 214.8 LBS | HEART RATE: 67 BPM | RESPIRATION RATE: 16 BRPM | HEIGHT: 70 IN

## 2019-12-12 DIAGNOSIS — Z79.4 TYPE 2 DIABETES MELLITUS WITH STAGE 4 CHRONIC KIDNEY DISEASE, WITH LONG-TERM CURRENT USE OF INSULIN (HCC): ICD-10-CM

## 2019-12-12 DIAGNOSIS — I12.9 HYPERTENSIVE CHRONIC KIDNEY DISEASE WITH STAGE 1 THROUGH STAGE 4 CHRONIC KIDNEY DISEASE, OR UNSPECIFIED CHRONIC KIDNEY DISEASE: ICD-10-CM

## 2019-12-12 DIAGNOSIS — IMO0002 SOLITARY KIDNEY: ICD-10-CM

## 2019-12-12 DIAGNOSIS — E11.22 TYPE 2 DIABETES MELLITUS WITH STAGE 4 CHRONIC KIDNEY DISEASE, WITH LONG-TERM CURRENT USE OF INSULIN (HCC): ICD-10-CM

## 2019-12-12 DIAGNOSIS — Z95.0 CARDIAC PACEMAKER: Primary | ICD-10-CM

## 2019-12-12 DIAGNOSIS — N18.4 CKD (CHRONIC KIDNEY DISEASE), STAGE IV (HCC): Primary | ICD-10-CM

## 2019-12-12 DIAGNOSIS — N18.4 TYPE 2 DIABETES MELLITUS WITH STAGE 4 CHRONIC KIDNEY DISEASE, WITH LONG-TERM CURRENT USE OF INSULIN (HCC): ICD-10-CM

## 2019-12-12 PROCEDURE — 99214 OFFICE O/P EST MOD 30 MIN: CPT | Performed by: INTERNAL MEDICINE

## 2019-12-12 PROCEDURE — 93294 REM INTERROG EVL PM/LDLS PM: CPT | Performed by: INTERNAL MEDICINE

## 2019-12-12 PROCEDURE — 93296 REM INTERROG EVL PM/IDS: CPT | Performed by: INTERNAL MEDICINE

## 2019-12-12 NOTE — TELEPHONE ENCOUNTER
Pt is having an EGD  Pt's wife would like to know for how long to stop the blood thinner  Please call her   Thank you

## 2019-12-12 NOTE — LETTER
December 12, 2019     Venkata Kelly MD  [address] [address]    Patient: Rafy Mcnally   YOB: 1935   Date of Visit: 12/12/2019       Dear Dr Shaheen Duckworth: Thank you for referring Shannon Ingram to me for evaluation  Below are the relevant portions of my assessment and plan of care  If you have questions, please do not hesitate to call me  I look forward to following Александр Bauer along with you  Sincerely,        Janet Vela DO        CC: No Recipients                         ASSESSMENT and PLAN:  1  Chronic kidney disease, stage IV, creatinine stable at 2 36 estimated GFR of 24, underlying disease likely diabetic nephropathy, noted microalbuminuria, urinalysis, microscopy bland  2  Diabetes, recent hemoglobin A1c of 8 5  3  Hypertension, blood pressure appears well controlled, home readings in the 120s to 130s  4  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

## 2019-12-12 NOTE — PROGRESS NOTES
MDT DUAL PM - ALMA DELIA PT   Claryce Duet TRANSMISSION: BATTERY VOLTAGE ADEQUATE ( ~ 17 MOS  )  AP 99%  <1%  ALL AVAILABLE LEAD PARAMETERS WITHIN NORMAL LIMITS  NO SIGNIFICANT HIGH RATE EPISODES   PACEMAKER FUNCTIONING APPROPRIATELY     EB

## 2019-12-12 NOTE — PROGRESS NOTES
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  SUBJECTIVE / INTERVAL HISTORY:  Seen examined  He has been doing well  Denies any hospitalizations or illnesses  Denies any chest pain shortness of breath  Denies any abdominal pain  No reports of nausea vomiting diarrhea  Unfortunately diet is not well restricted  Sugars at times well over 300  Denies any dizziness lightheadedness or falling  Occasional urinary frequency  Review of Systems      OBJECTIVE:  /62 (BP Location: Right arm, Patient Position: Sitting, Cuff Size: Large)   Pulse 67   Resp 16   Ht 5' 10" (1 778 m)   Wt 97 4 kg (214 lb 12 8 oz)   BMI 30 82 kg/m²   Vitals:    12/12/19 0912   Weight: 97 4 kg (214 lb 12 8 oz)       Physical Exam   Constitutional: No distress  HENT:   Head: Normocephalic  Eyes: No scleral icterus  Neck: Neck supple  Cardiovascular: Normal rate and regular rhythm  Pulmonary/Chest: Effort normal and breath sounds normal    Abdominal: Soft  He exhibits distension   There is no tenderness  Musculoskeletal: He exhibits no edema or tenderness  Neurological: He is alert  Skin: Skin is warm and dry  No rash noted  Medications:    Current Outpatient Medications:     amLODIPine (NORVASC) 2 5 mg tablet, TAKE 1 TABLET DAILY, Disp: 90 tablet, Rfl: 0    amLODIPine (NORVASC) 5 mg tablet, Take 2 5 mg by mouth daily  , Disp: , Rfl:     atorvastatin (LIPITOR) 10 mg tablet, Take 10 mg by mouth daily  , Disp: , Rfl:     cholecalciferol (VITAMIN D3) 1,000 units tablet, Take 2,000 Units by mouth daily, Disp: , Rfl:     cyanocobalamin (VITAMIN B-12) 500 mcg tablet, Take 500 mcg by mouth daily, Disp: , Rfl:     donepezil (ARICEPT) 10 mg tablet, Take 1 tablet (10 mg total) by mouth daily at bedtime, Disp: 90 tablet, Rfl: 3    fenofibrate (TRICOR) 145 mg tablet, Take 145 mg by mouth daily  , Disp: , Rfl:     ferrous sulfate 325 (65 Fe) mg tablet, Take 325 mg by mouth as needed , Disp: , Rfl:     fluticasone (FLONASE) 50 mcg/act nasal spray, 1 spray into each nostril daily, Disp: , Rfl:     insulin aspart (NovoLOG) 100 units/mL injection, Inject 8 Units under the skin 2 (two) times a day before meals  , Disp: , Rfl:     insulin glargine (LANTUS) 100 units/mL subcutaneous injection, Inject 40 Units under the skin 2 (two) times a day , Disp: , Rfl:     levalbuterol (XOPENEX) 0 63 mg/3 mL nebulizer solution, Take 1 ampule by nebulization 2 (two) times a day as needed for wheezing , Disp: , Rfl:     losartan (COZAAR) 25 mg tablet, TAKE 1 TABLET DAILY, Disp: 90 tablet, Rfl: 3    Magnesium 250 MG TABS, Take by mouth daily, Disp: , Rfl:     magnesium gluconate (MAGONATE) 500 mg tablet, Take 500 mg by mouth daily  , Disp: , Rfl:     Memantine HCl ER (NAMENDA XR) 28 MG CP24, 1 po qd, Disp: 90 capsule, Rfl: 3    metoprolol succinate (TOPROL-XL) 100 mg 24 hr tablet, 1 1/2 (one and a half) Tablet(s) daily, Disp: 135 tablet, Rfl: 0    metoprolol tartrate (LOPRESSOR) 100 mg tablet, Take 100 mg by mouth every morning , Disp: , Rfl:     Multiple Vitamin (MULTIVITAMIN) capsule, Take 1 capsule by mouth daily, Disp: , Rfl:     NOVOFINE 32G X 6 MM MISC, , Disp: , Rfl:     NOVOLOG FLEXPEN 100 units/mL injection pen, 8 Units 2 (two) times a day with meals , Disp: , Rfl:     omega-3-acid ethyl esters (LOVAZA) 1 g capsule, Take 1 g by mouth 4 (four) times a day , Disp: , Rfl:     ONETOUCH DELICA LANCETS 80Q MISC, , Disp: , Rfl:     ONETOUCH VERIO test strip, , Disp: , Rfl:     Probiotic Product (PROBIOTIC COLON SUPPORT PO), Take 1 tablet by mouth daily, Disp: , Rfl:     propafenone (RYTHMOL) 225 mg tablet, TAKE ONE TABLET BY MOUTH 3 TIMES A DAY, Disp: 270 tablet, Rfl: 0    sertraline (ZOLOFT) 100 mg tablet, Take 1 tablet (100 mg total) by mouth daily, Disp: 90 tablet, Rfl: 3    sodium chloride (OCEAN) 0 65 % nasal spray, 1 spray into each nostril, Disp: , Rfl:     warfarin (COUMADIN) 2 5 mg tablet, Take 2 5 mg by mouth 3 (three) times a week Indications: Mon wed Fri   , Disp: , Rfl:     warfarin (COUMADIN) 5 mg tablet, 1 Tablet(s) by mouth daily, Disp: 90 tablet, Rfl: 0    docusate sodium (COLACE) 100 mg capsule, Take 100 mg by mouth 3 (three) times a day , Disp: , Rfl:     ipratropium (ATROVENT HFA) 17 mcg/act inhaler, Inhale 2 puffs every 6 (six) hours  , Disp: , Rfl:     ipratropium (ATROVENT) 0 03 % nasal spray, 2 sprays into each nostril every 12 (twelve) hours (Patient not taking: Reported on 12/12/2019), Disp: 30 mL, Rfl: 0    loratadine (CLARITIN) 10 mg tablet, Take 10 mg by mouth daily  , Disp: , Rfl:     valsartan (DIOVAN) 80 mg tablet, Take 160 mg by mouth daily  , Disp: , Rfl:     Laboratory Results:  Results for orders placed or performed in visit on 12/04/19   Protime-INR   Result Value Ref Range    Protime 21 6 (H) 11 6 - 14 5 seconds    INR 1 93 (H) 0 84 - 1 19

## 2019-12-12 NOTE — LETTER
December 12, 2019     Patient: Lucrecia Joe   YOB: 1935   Date of Visit: 12/12/2019       To Whom it May Concern:    Leidy Daltonorman is under my professional care  He was seen in my office on 12/12/2019  If you have any questions or concerns, please don't hesitate to call           Sincerely,      Bobby Campoverde, DO

## 2019-12-13 ENCOUNTER — APPOINTMENT (OUTPATIENT)
Dept: LAB | Age: 84
End: 2019-12-13
Payer: COMMERCIAL

## 2019-12-13 LAB
INR PPP: 1.77 (ref 0.84–1.19)
PROTHROMBIN TIME: 20.1 SECONDS (ref 11.6–14.5)

## 2019-12-13 PROCEDURE — 85610 PROTHROMBIN TIME: CPT | Performed by: INTERNAL MEDICINE

## 2019-12-13 PROCEDURE — 36415 COLL VENOUS BLD VENIPUNCTURE: CPT | Performed by: INTERNAL MEDICINE

## 2019-12-16 ENCOUNTER — ANTICOAG VISIT (OUTPATIENT)
Dept: CARDIOLOGY CLINIC | Facility: CLINIC | Age: 84
End: 2019-12-16

## 2019-12-16 DIAGNOSIS — I48.0 PAROXYSMAL ATRIAL FIBRILLATION (HCC): ICD-10-CM

## 2019-12-16 NOTE — PROGRESS NOTES
12/17/19, tc to pt's wife, glenna, she reports no missed doses  Pt started hold x 4 days for edg on 12/18  Advised to increase dose, on 12/18 if ok with GI , 5 mg m/w/f, 2 5 mg other days of the week, inr due 12/20   tuyet

## 2019-12-17 ENCOUNTER — ANESTHESIA EVENT (OUTPATIENT)
Dept: GASTROENTEROLOGY | Facility: HOSPITAL | Age: 84
End: 2019-12-17

## 2019-12-18 ENCOUNTER — HOSPITAL ENCOUNTER (OUTPATIENT)
Dept: GASTROENTEROLOGY | Facility: HOSPITAL | Age: 84
Setting detail: OUTPATIENT SURGERY
Discharge: HOME | End: 2019-12-18
Attending: INTERNAL MEDICINE | Admitting: INTERNAL MEDICINE
Payer: COMMERCIAL

## 2019-12-18 ENCOUNTER — ANESTHESIA (OUTPATIENT)
Dept: GASTROENTEROLOGY | Facility: HOSPITAL | Age: 84
End: 2019-12-18

## 2019-12-18 VITALS
DIASTOLIC BLOOD PRESSURE: 57 MMHG | HEIGHT: 70 IN | BODY MASS INDEX: 30.64 KG/M2 | TEMPERATURE: 98.1 F | RESPIRATION RATE: 18 BRPM | HEART RATE: 63 BPM | OXYGEN SATURATION: 93 % | SYSTOLIC BLOOD PRESSURE: 106 MMHG | WEIGHT: 214 LBS

## 2019-12-18 DIAGNOSIS — K22.0 ACHALASIA: ICD-10-CM

## 2019-12-18 RX ORDER — GLYCOPYRROLATE 0.2 MG/ML
INJECTION INTRAMUSCULAR; INTRAVENOUS AS NEEDED
Status: DISCONTINUED | OUTPATIENT
Start: 2019-12-18 | End: 2019-12-18 | Stop reason: SURG

## 2019-12-18 RX ORDER — SODIUM CHLORIDE 9 MG/ML
125 INJECTION, SOLUTION INTRAVENOUS CONTINUOUS
Status: DISCONTINUED | OUTPATIENT
Start: 2019-12-18 | End: 2019-12-22 | Stop reason: HOSPADM

## 2019-12-18 RX ORDER — LIDOCAINE HYDROCHLORIDE 10 MG/ML
INJECTION, SOLUTION EPIDURAL; INFILTRATION; INTRACAUDAL; PERINEURAL AS NEEDED
Status: DISCONTINUED | OUTPATIENT
Start: 2019-12-18 | End: 2019-12-18 | Stop reason: SURG

## 2019-12-18 RX ORDER — PROPOFOL 10 MG/ML
INJECTION, EMULSION INTRAVENOUS AS NEEDED
Status: DISCONTINUED | OUTPATIENT
Start: 2019-12-18 | End: 2019-12-18 | Stop reason: SURG

## 2019-12-18 RX ORDER — PROPOFOL 10 MG/ML
INJECTION, EMULSION INTRAVENOUS CONTINUOUS PRN
Status: DISCONTINUED | OUTPATIENT
Start: 2019-12-18 | End: 2019-12-18 | Stop reason: SURG

## 2019-12-18 RX ADMIN — SODIUM CHLORIDE 200 UNITS: 9 INJECTION, SOLUTION INTRAMUSCULAR; INTRAVENOUS; SUBCUTANEOUS at 10:30

## 2019-12-18 RX ADMIN — LIDOCAINE HYDROCHLORIDE 50 MG: 10 INJECTION, SOLUTION EPIDURAL; INFILTRATION; INTRACAUDAL; PERINEURAL at 10:26

## 2019-12-18 RX ADMIN — GLYCOPYRROLATE 0.1 MG: 0.2 INJECTION, SOLUTION INTRAMUSCULAR; INTRAVENOUS at 10:26

## 2019-12-18 RX ADMIN — SODIUM CHLORIDE 125 ML/HR: 0.9 INJECTION, SOLUTION INTRAVENOUS at 09:52

## 2019-12-18 RX ADMIN — PROPOFOL 50 MG: 10 INJECTION, EMULSION INTRAVENOUS at 10:26

## 2019-12-18 RX ADMIN — PROPOFOL 120 MCG/KG/MIN: 10 INJECTION, EMULSION INTRAVENOUS at 10:26

## 2019-12-18 NOTE — ANESTHESIA POSTPROCEDURE EVALUATION
Post-Op Assessment Note    CV Status:  Stable    Pain management: adequate     Mental Status:  Alert and awake   Hydration Status:  Euvolemic   PONV Controlled:  Controlled   Airway Patency:  Patent   Post Op Vitals Reviewed: Yes      Staff: CRNA           BP   114/63   Temp      Pulse  72   Resp   16   SpO2   98 RA

## 2019-12-18 NOTE — H&P
H&P EXAM - Outpatient Endoscopy   Pam Muñoz 80 y o  male MRN: [id number]    [facility] GI LAB PRE/POST   Encounter: [id number]        Impression:  Dysphagia    Plan:  EGD with Botox    Chief Complaint:  Dysphagia solids and liquids    Physical Exam:  Alert   Chest:  Clear   Heart:  Regular

## 2019-12-18 NOTE — ANESTHESIA PREPROCEDURE EVALUATION
Review of Systems/Medical History  Patient summary reviewed  Chart reviewed  No history of anesthetic complications     Cardiovascular  Exercise tolerance (METS): >4,  Pacemaker/AICD, Hyperlipidemia, Hypertension , CAD , Dysrhythmias , atrial fibrillation,    Pulmonary  COPD , Asthma , Sleep apnea ,        GI/Hepatic  Negative GI/hepatic ROS          Kidney disease CKD, Chronic kidney disease stage 4, Prostatic disorder, benign prostatic hyperplasia       Endo/Other  Diabetes type 2 Insulin,      GYN  Negative gynecology ROS          Hematology  Negative hematology ROS   Coagulation disorder currently taking oral anticoagulants,   Comment: On Warfarin  Last taken 4 days ago  Musculoskeletal    Arthritis     Neurology    TIA,    Psychology   Anxiety,   Dementia       Device Report 12/11/19:     MDT DUAL PM - [facility] PT   CARELINK TRANSMISSION: BATTERY VOLTAGE ADEQUATE ( ~ 17 MOS  )  AP 99%  <1%  ALL AVAILABLE LEAD PARAMETERS WITHIN NORMAL LIMITS  NO SIGNIFICANT HIGH RATE EPISODES  PACEMAKER FUNCTIONING APPROPRIATELY     EB    ECHO 12/18/15:     LEFT VENTRICLE:  Size was normal   Systolic function was normal  Ejection fraction was estimated in the range of  65 % to 70 %  There were no regional wall motion abnormalities  There was mild concentric hypertrophy  The ratio of early ventricular filling to atrial contraction velocities was  within the normal range      LEFT ATRIUM:  The atrium was mildly dilated      MITRAL VALVE:  There was trace regurgitation      AORTIC VALVE:  There was mild regurgitation      TRICUSPID VALVE:  There was mild regurgitation    Pulmonary artery systolic pressure was within the normal range      PULMONIC VALVE:  There was trace regurgitation        Physical Exam    Airway    Mallampati score: II  TM Distance: >3 FB  Neck ROM: full     Dental       Cardiovascular  Rhythm: regular, Rate: normal, Cardiovascular exam normal    Pulmonary  Pulmonary exam normal Breath sounds clear to auscultation,     Other Findings        Anesthesia Plan  ASA Score- 3     Anesthesia Type- general with ASA Monitors  Additional Monitors:   Airway Plan:         Plan Factors-  Patient did not smoke on day of surgery  Induction- intravenous  Postoperative Plan-     Informed Consent- Anesthetic plan and risks discussed with patient  I personally reviewed this patient with the CRNA  Discussed and agreed on the Anesthesia Plan with the CRNA  Nicholas Armenta

## 2019-12-19 NOTE — PROGRESS NOTES
12/19/19, tc from wife, had egd done yesterday, advised if ok with gi, restart warfarin as 5 mg m/w/f, 2 5 mg other days of the week, inr due 1 week post restarting   tuyet

## 2019-12-26 ENCOUNTER — APPOINTMENT (OUTPATIENT)
Dept: LAB | Age: 84
End: 2019-12-26
Payer: COMMERCIAL

## 2019-12-26 DIAGNOSIS — I48.0 PAROXYSMAL ATRIAL FIBRILLATION (HCC): ICD-10-CM

## 2019-12-26 LAB
INR PPP: 1.81 (ref 0.84–1.19)
PROTHROMBIN TIME: 20.5 SECONDS (ref 11.6–14.5)

## 2019-12-26 PROCEDURE — 36415 COLL VENOUS BLD VENIPUNCTURE: CPT

## 2019-12-26 PROCEDURE — 85610 PROTHROMBIN TIME: CPT

## 2019-12-27 ENCOUNTER — ANTICOAG VISIT (OUTPATIENT)
Dept: CARDIOLOGY CLINIC | Facility: CLINIC | Age: 84
End: 2019-12-27

## 2019-12-27 DIAGNOSIS — I48.0 PAROXYSMAL ATRIAL FIBRILLATION (HCC): ICD-10-CM

## 2019-12-27 NOTE — PROGRESS NOTES
12/27/19, tc to patient's wife, called work number, left message that I would call home answering machine, will continue current dose, inr due 1 week   tuyet

## 2019-12-31 ENCOUNTER — TELEPHONE (OUTPATIENT)
Dept: CARDIOLOGY CLINIC | Facility: CLINIC | Age: 84
End: 2019-12-31

## 2019-12-31 NOTE — PROGRESS NOTES
Tc from wife, reviewed instructions, per wife,  has deminsa call cell phone,if not available on work number

## 2020-01-10 ENCOUNTER — APPOINTMENT (OUTPATIENT)
Dept: LAB | Age: 85
End: 2020-01-10
Payer: COMMERCIAL

## 2020-01-10 DIAGNOSIS — I48.0 PAROXYSMAL ATRIAL FIBRILLATION (HCC): ICD-10-CM

## 2020-01-10 LAB
INR PPP: 2.04 (ref 0.84–1.19)
PROTHROMBIN TIME: 22.5 SECONDS (ref 11.6–14.5)

## 2020-01-10 PROCEDURE — 85610 PROTHROMBIN TIME: CPT

## 2020-01-10 PROCEDURE — 36415 COLL VENOUS BLD VENIPUNCTURE: CPT

## 2020-01-11 DIAGNOSIS — I10 ESSENTIAL HYPERTENSION: ICD-10-CM

## 2020-01-12 RX ORDER — AMLODIPINE BESYLATE 2.5 MG/1
TABLET ORAL
Qty: 90 TABLET | Refills: 0 | Status: SHIPPED | OUTPATIENT
Start: 2020-01-12 | End: 2020-03-25

## 2020-01-13 ENCOUNTER — ANTICOAG VISIT (OUTPATIENT)
Dept: CARDIOLOGY CLINIC | Facility: CLINIC | Age: 85
End: 2020-01-13

## 2020-01-13 DIAGNOSIS — I48.0 PAROXYSMAL ATRIAL FIBRILLATION (HCC): ICD-10-CM

## 2020-01-13 NOTE — PROGRESS NOTES
1/13/20, tc to patient's wife, glenna, tried calling work number, message was she was out of office  Called to wife cell, left message to continue current dose, inr due 2 weeks  tuyet

## 2020-01-16 ENCOUNTER — OFFICE VISIT (OUTPATIENT)
Dept: CARDIOLOGY CLINIC | Facility: CLINIC | Age: 85
End: 2020-01-16
Payer: COMMERCIAL

## 2020-01-16 VITALS
BODY MASS INDEX: 30.41 KG/M2 | HEART RATE: 70 BPM | HEIGHT: 70 IN | SYSTOLIC BLOOD PRESSURE: 115 MMHG | WEIGHT: 212.4 LBS | RESPIRATION RATE: 18 BRPM | DIASTOLIC BLOOD PRESSURE: 75 MMHG | OXYGEN SATURATION: 99 %

## 2020-01-16 DIAGNOSIS — I48.0 PAROXYSMAL ATRIAL FIBRILLATION (HCC): ICD-10-CM

## 2020-01-16 DIAGNOSIS — I25.10 CORONARY ARTERY DISEASE INVOLVING NATIVE CORONARY ARTERY OF NATIVE HEART WITHOUT ANGINA PECTORIS: Primary | ICD-10-CM

## 2020-01-16 DIAGNOSIS — I10 ESSENTIAL HYPERTENSION: ICD-10-CM

## 2020-01-16 PROCEDURE — 99213 OFFICE O/P EST LOW 20 MIN: CPT | Performed by: INTERNAL MEDICINE

## 2020-01-16 NOTE — LETTER
January 16, 2020     Referral [address] [place] [id number]    Patient: Selvin Dunaway   YOB: 1935   Date of Visit: 1/16/2020       Dear Dr Cristobal Champ:    Thank you for referring Roberfarzaneh Manzanares to me for evaluation  Below are my notes for this consultation  If you have questions, please do not hesitate to call me  I look forward to following your patient along with you  Sincerely,        Agata Lombardi MD        CC: MD Agata Martinez MD  1/16/2020  9:47 AM  Sign at close encounter  Assessment/Plan:    Essential hypertension  Hypertension, stable and adequately controlled  Paroxysmal atrial fibrillation (HCC)  Paroxysmal atrial fibrillation status post permanent pacemaker  The patient has been in regular rhythm  We will continue present medications  Coronary artery disease involving native coronary artery of native heart without angina pectoris  Coronary artery disease, stable with no symptoms of angina or signs of heart failure  Mixed hyperlipidemia  Hyperlipidemia, stable  We will continue fenofibrate and atorvastatin  Diagnoses and all orders for this visit:    Coronary artery disease involving native coronary artery of native heart without angina pectoris    Paroxysmal atrial fibrillation (HCC)    Essential hypertension          Subjective:  Feels well  Patient ID: Selvin Dunaway is a 80 y o  male  The patient presented to this office for the purpose of cardiac follow-up  He is known to have history of coronary artery disease with paroxysmal atrial fibrillation and amanda-tachy syndrome status post permanent pacemaker  Has history of hypertension and hyperlipidemia as well as chronic renal insufficiency  He has been feeling well denying any symptoms of chest pain, shortness of breath, palpitation, dizziness or lightheadedness  He has no leg edema        The following portions of the patient's history were reviewed and updated as appropriate: allergies, current medications, past family history, past medical history, past social history, past surgical history and problem list     Review of Systems   Respiratory: Negative for apnea, cough, chest tightness, shortness of breath and wheezing  Cardiovascular: Negative for chest pain, palpitations and leg swelling  Gastrointestinal: Negative for abdominal pain  Neurological: Negative for dizziness and light-headedness  Psychiatric/Behavioral: Negative  Objective:  Stable cardiac-wise  /75 (BP Location: Left arm, Patient Position: Sitting)   Pulse 70   Resp 18   Ht 5' 10" (1 778 m)   Wt 96 3 kg (212 lb 6 4 oz)   SpO2 99%   BMI 30 48 kg/m²           Physical Exam   Constitutional: He is oriented to person, place, and time  He appears well-developed and well-nourished  No distress  HENT:   Head: Normocephalic  Eyes: Pupils are equal, round, and reactive to light  Neck: Normal range of motion  No JVD present  No thyromegaly present  Cardiovascular: Normal rate, regular rhythm, S1 normal and S2 normal  Exam reveals no gallop and no friction rub  Murmur heard  Crescendo systolic murmur is present with a grade of 1/6  Pulmonary/Chest: Effort normal and breath sounds normal  No respiratory distress  He has no wheezes  He has no rales  He exhibits no tenderness  Abdominal: Soft  Musculoskeletal: Normal range of motion  He exhibits no edema, tenderness or deformity  Neurological: He is alert and oriented to person, place, and time  Skin: Skin is warm and dry  He is not diaphoretic  Psychiatric: He has a normal mood and affect  Vitals reviewed

## 2020-01-16 NOTE — PROGRESS NOTES
Assessment/Plan:    Essential hypertension  Hypertension, stable and adequately controlled  Paroxysmal atrial fibrillation (HCC)  Paroxysmal atrial fibrillation status post permanent pacemaker  The patient has been in regular rhythm  We will continue present medications  Coronary artery disease involving native coronary artery of native heart without angina pectoris  Coronary artery disease, stable with no symptoms of angina or signs of heart failure  Mixed hyperlipidemia  Hyperlipidemia, stable  We will continue fenofibrate and atorvastatin  Diagnoses and all orders for this visit:    Coronary artery disease involving native coronary artery of native heart without angina pectoris    Paroxysmal atrial fibrillation (HCC)    Essential hypertension          Subjective:  Feels well  Patient ID: Olvin Kruger is a 80 y o  male  The patient presented to this office for the purpose of cardiac follow-up  He is known to have history of coronary artery disease with paroxysmal atrial fibrillation and amanda-tachy syndrome status post permanent pacemaker  Has history of hypertension and hyperlipidemia as well as chronic renal insufficiency  He has been feeling well denying any symptoms of chest pain, shortness of breath, palpitation, dizziness or lightheadedness  He has no leg edema  The following portions of the patient's history were reviewed and updated as appropriate: allergies, current medications, past family history, past medical history, past social history, past surgical history and problem list     Review of Systems   Respiratory: Negative for apnea, cough, chest tightness, shortness of breath and wheezing  Cardiovascular: Negative for chest pain, palpitations and leg swelling  Gastrointestinal: Negative for abdominal pain  Neurological: Negative for dizziness and light-headedness  Psychiatric/Behavioral: Negative  Objective:  Stable cardiac-wise        /75 (BP Location: Left arm, Patient Position: Sitting)   Pulse 70   Resp 18   Ht 5' 10" (1 778 m)   Wt 96 3 kg (212 lb 6 4 oz)   SpO2 99%   BMI 30 48 kg/m²          Physical Exam   Constitutional: He is oriented to person, place, and time  He appears well-developed and well-nourished  No distress  HENT:   Head: Normocephalic  Eyes: Pupils are equal, round, and reactive to light  Neck: Normal range of motion  No JVD present  No thyromegaly present  Cardiovascular: Normal rate, regular rhythm, S1 normal and S2 normal  Exam reveals no gallop and no friction rub  Murmur heard  Crescendo systolic murmur is present with a grade of 1/6  Pulmonary/Chest: Effort normal and breath sounds normal  No respiratory distress  He has no wheezes  He has no rales  He exhibits no tenderness  Abdominal: Soft  Musculoskeletal: Normal range of motion  He exhibits no edema, tenderness or deformity  Neurological: He is alert and oriented to person, place, and time  Skin: Skin is warm and dry  He is not diaphoretic  Psychiatric: He has a normal mood and affect  Vitals reviewed

## 2020-01-16 NOTE — ASSESSMENT & PLAN NOTE
Paroxysmal atrial fibrillation status post permanent pacemaker  The patient has been in regular rhythm  We will continue present medications

## 2020-02-08 ENCOUNTER — TRANSCRIBE ORDERS (OUTPATIENT)
Dept: ADMINISTRATIVE | Age: 85
End: 2020-02-08

## 2020-02-08 ENCOUNTER — APPOINTMENT (OUTPATIENT)
Dept: LAB | Age: 85
End: 2020-02-08
Payer: COMMERCIAL

## 2020-02-08 DIAGNOSIS — I10 ESSENTIAL HYPERTENSION, MALIGNANT: ICD-10-CM

## 2020-02-08 DIAGNOSIS — E78.2 MIXED HYPERLIPIDEMIA: ICD-10-CM

## 2020-02-08 DIAGNOSIS — E11.649 UNCONTROLLED TYPE 2 DIABETES MELLITUS WITH HYPOGLYCEMIA, UNSPECIFIED HYPOGLYCEMIA COMA STATUS (HCC): Primary | ICD-10-CM

## 2020-02-08 DIAGNOSIS — I48.0 PAROXYSMAL ATRIAL FIBRILLATION (HCC): ICD-10-CM

## 2020-02-08 DIAGNOSIS — E11.649 UNCONTROLLED TYPE 2 DIABETES MELLITUS WITH HYPOGLYCEMIA, UNSPECIFIED HYPOGLYCEMIA COMA STATUS (HCC): ICD-10-CM

## 2020-02-08 LAB
ALBUMIN SERPL BCP-MCNC: 3.6 G/DL (ref 3.5–5)
ALP SERPL-CCNC: 60 U/L (ref 46–116)
ALT SERPL W P-5'-P-CCNC: 33 U/L (ref 12–78)
ANION GAP SERPL CALCULATED.3IONS-SCNC: 6 MMOL/L (ref 4–13)
AST SERPL W P-5'-P-CCNC: 20 U/L (ref 5–45)
BILIRUB SERPL-MCNC: 0.49 MG/DL (ref 0.2–1)
BUN SERPL-MCNC: 24 MG/DL (ref 5–25)
CALCIUM SERPL-MCNC: 9.3 MG/DL (ref 8.3–10.1)
CHLORIDE SERPL-SCNC: 105 MMOL/L (ref 100–108)
CHOLEST SERPL-MCNC: 176 MG/DL (ref 50–200)
CO2 SERPL-SCNC: 29 MMOL/L (ref 21–32)
CREAT SERPL-MCNC: 2.11 MG/DL (ref 0.6–1.3)
EST. AVERAGE GLUCOSE BLD GHB EST-MCNC: 189 MG/DL
GFR SERPL CREATININE-BSD FRML MDRD: 28 ML/MIN/1.73SQ M
GLUCOSE P FAST SERPL-MCNC: 150 MG/DL (ref 65–99)
HBA1C MFR BLD: 8.2 % (ref 4.2–6.3)
HDLC SERPL-MCNC: 25 MG/DL
INR PPP: 1.83 (ref 0.84–1.19)
NONHDLC SERPL-MCNC: 151 MG/DL
POTASSIUM SERPL-SCNC: 4.6 MMOL/L (ref 3.5–5.3)
PROT SERPL-MCNC: 8.4 G/DL (ref 6.4–8.2)
PROTHROMBIN TIME: 20.7 SECONDS (ref 11.6–14.5)
SODIUM SERPL-SCNC: 140 MMOL/L (ref 136–145)
TRIGL SERPL-MCNC: 481 MG/DL

## 2020-02-08 PROCEDURE — 80053 COMPREHEN METABOLIC PANEL: CPT

## 2020-02-08 PROCEDURE — 83036 HEMOGLOBIN GLYCOSYLATED A1C: CPT

## 2020-02-08 PROCEDURE — 36415 COLL VENOUS BLD VENIPUNCTURE: CPT

## 2020-02-08 PROCEDURE — 80061 LIPID PANEL: CPT

## 2020-02-08 PROCEDURE — 85610 PROTHROMBIN TIME: CPT

## 2020-02-10 ENCOUNTER — ANTICOAG VISIT (OUTPATIENT)
Dept: CARDIOLOGY CLINIC | Facility: CLINIC | Age: 85
End: 2020-02-10

## 2020-02-10 DIAGNOSIS — I48.0 PAROXYSMAL ATRIAL FIBRILLATION (HCC): ICD-10-CM

## 2020-02-10 NOTE — PROGRESS NOTES
Called left message for wife at work pt to take scheduled 5 mg of coumadin today and to please return call for farther instructions with slight adjustment   Wife called back and will take 2/5 MWF 5 others  Recheck 2/29/20

## 2020-02-18 DIAGNOSIS — I48.0 PAROXYSMAL ATRIAL FIBRILLATION (HCC): ICD-10-CM

## 2020-02-18 RX ORDER — PROPAFENONE HYDROCHLORIDE 225 MG/1
225 TABLET, FILM COATED ORAL 3 TIMES DAILY
Qty: 270 TABLET | Refills: 3 | Status: SHIPPED | OUTPATIENT
Start: 2020-02-18 | End: 2020-11-20 | Stop reason: SDUPTHER

## 2020-02-19 ENCOUNTER — TELEPHONE (OUTPATIENT)
Dept: NEUROLOGY | Facility: CLINIC | Age: 85
End: 2020-02-19

## 2020-02-19 DIAGNOSIS — I48.0 PAROXYSMAL ATRIAL FIBRILLATION (HCC): ICD-10-CM

## 2020-02-19 RX ORDER — WARFARIN SODIUM 5 MG/1
5 TABLET ORAL DAILY
Qty: 90 TABLET | Refills: 3 | Status: SHIPPED | OUTPATIENT
Start: 2020-02-19 | End: 2021-05-30

## 2020-02-19 NOTE — TELEPHONE ENCOUNTER
Spoke with pt's wife  She states that he is taking donepezil at night  He has nightmares and moves around a lot at night  Reviewed chart and dosing was to be changed to morning due to these side effects  Reviewed with pt's wife that he can take the medication in the morning  Se verbalized understanding  Reviewed time, place and provider with pt for follow-up in April

## 2020-02-19 NOTE — TELEPHONE ENCOUNTER
Patients wife calling to discuss medication concerns  Feels patient is having difficulty with donepezil  She is asking if you could give her a call directly to discuss       Gilma Malagon - [phone]

## 2020-02-28 ENCOUNTER — TELEPHONE (OUTPATIENT)
Dept: NEPHROLOGY | Facility: CLINIC | Age: 85
End: 2020-02-28

## 2020-02-28 NOTE — TELEPHONE ENCOUNTER
Left message for Danie Reich reminding her of the patient's appointment on 3/5/20 with Dr Akhil Lazo and there is blood work to be done for the appointment

## 2020-03-01 ENCOUNTER — APPOINTMENT (OUTPATIENT)
Dept: LAB | Age: 85
End: 2020-03-01
Payer: COMMERCIAL

## 2020-03-01 DIAGNOSIS — N18.4 CKD (CHRONIC KIDNEY DISEASE), STAGE IV (HCC): ICD-10-CM

## 2020-03-01 DIAGNOSIS — I12.9 HYPERTENSIVE CHRONIC KIDNEY DISEASE WITH STAGE 1 THROUGH STAGE 4 CHRONIC KIDNEY DISEASE, OR UNSPECIFIED CHRONIC KIDNEY DISEASE: ICD-10-CM

## 2020-03-01 DIAGNOSIS — F03.90 DEMENTIA WITHOUT BEHAVIORAL DISTURBANCE, UNSPECIFIED DEMENTIA TYPE (HCC): ICD-10-CM

## 2020-03-01 DIAGNOSIS — N18.4 TYPE 2 DIABETES MELLITUS WITH STAGE 4 CHRONIC KIDNEY DISEASE, WITH LONG-TERM CURRENT USE OF INSULIN (HCC): ICD-10-CM

## 2020-03-01 DIAGNOSIS — E11.22 TYPE 2 DIABETES MELLITUS WITH STAGE 4 CHRONIC KIDNEY DISEASE, WITH LONG-TERM CURRENT USE OF INSULIN (HCC): ICD-10-CM

## 2020-03-01 DIAGNOSIS — IMO0002 SOLITARY KIDNEY: ICD-10-CM

## 2020-03-01 DIAGNOSIS — Z79.4 TYPE 2 DIABETES MELLITUS WITH STAGE 4 CHRONIC KIDNEY DISEASE, WITH LONG-TERM CURRENT USE OF INSULIN (HCC): ICD-10-CM

## 2020-03-01 LAB
ALBUMIN SERPL BCP-MCNC: 3.5 G/DL (ref 3.5–5)
ANION GAP SERPL CALCULATED.3IONS-SCNC: 6 MMOL/L (ref 4–13)
BUN SERPL-MCNC: 32 MG/DL (ref 5–25)
CALCIUM SERPL-MCNC: 9.2 MG/DL (ref 8.3–10.1)
CHLORIDE SERPL-SCNC: 107 MMOL/L (ref 100–108)
CO2 SERPL-SCNC: 28 MMOL/L (ref 21–32)
CREAT SERPL-MCNC: 2.24 MG/DL (ref 0.6–1.3)
CREAT UR-MCNC: 126 MG/DL
ERYTHROCYTE [DISTWIDTH] IN BLOOD BY AUTOMATED COUNT: 13.1 % (ref 11.6–15.1)
GFR SERPL CREATININE-BSD FRML MDRD: 26 ML/MIN/1.73SQ M
GLUCOSE SERPL-MCNC: 243 MG/DL (ref 65–140)
HCT VFR BLD AUTO: 40.6 % (ref 36.5–49.3)
HGB BLD-MCNC: 13.3 G/DL (ref 12–17)
MCH RBC QN AUTO: 29 PG (ref 26.8–34.3)
MCHC RBC AUTO-ENTMCNC: 32.8 G/DL (ref 31.4–37.4)
MCV RBC AUTO: 89 FL (ref 82–98)
MICROALBUMIN UR-MCNC: 115 MG/L (ref 0–20)
MICROALBUMIN/CREAT 24H UR: 91 MG/G CREATININE (ref 0–30)
PHOSPHATE SERPL-MCNC: 2.8 MG/DL (ref 2.3–4.1)
PLATELET # BLD AUTO: 235 THOUSANDS/UL (ref 149–390)
PMV BLD AUTO: 10.4 FL (ref 8.9–12.7)
POTASSIUM SERPL-SCNC: 4.6 MMOL/L (ref 3.5–5.3)
PTH-INTACT SERPL-MCNC: 38.7 PG/ML (ref 18.4–80.1)
RBC # BLD AUTO: 4.58 MILLION/UL (ref 3.88–5.62)
SODIUM SERPL-SCNC: 141 MMOL/L (ref 136–145)
URATE SERPL-MCNC: 5.3 MG/DL (ref 4.2–8)
WBC # BLD AUTO: 9.09 THOUSAND/UL (ref 4.31–10.16)

## 2020-03-01 PROCEDURE — 82570 ASSAY OF URINE CREATININE: CPT | Performed by: INTERNAL MEDICINE

## 2020-03-01 PROCEDURE — 85027 COMPLETE CBC AUTOMATED: CPT

## 2020-03-01 PROCEDURE — 83970 ASSAY OF PARATHORMONE: CPT

## 2020-03-01 PROCEDURE — 82043 UR ALBUMIN QUANTITATIVE: CPT | Performed by: INTERNAL MEDICINE

## 2020-03-01 PROCEDURE — 84550 ASSAY OF BLOOD/URIC ACID: CPT

## 2020-03-01 PROCEDURE — 80069 RENAL FUNCTION PANEL: CPT

## 2020-03-02 ENCOUNTER — ANTICOAG VISIT (OUTPATIENT)
Dept: CARDIOLOGY CLINIC | Facility: CLINIC | Age: 85
End: 2020-03-02

## 2020-03-02 DIAGNOSIS — I48.0 PAROXYSMAL ATRIAL FIBRILLATION (HCC): ICD-10-CM

## 2020-03-02 RX ORDER — MEMANTINE HYDROCHLORIDE 28 MG/1
CAPSULE, EXTENDED RELEASE ORAL
Qty: 90 CAPSULE | Refills: 3 | Status: SHIPPED | OUTPATIENT
Start: 2020-03-02 | End: 2021-06-01 | Stop reason: SDUPTHER

## 2020-03-09 ENCOUNTER — APPOINTMENT (EMERGENCY)
Dept: RADIOLOGY | Facility: HOSPITAL | Age: 85
End: 2020-03-09
Payer: COMMERCIAL

## 2020-03-09 ENCOUNTER — HOSPITAL ENCOUNTER (EMERGENCY)
Facility: HOSPITAL | Age: 85
Discharge: HOME | End: 2020-03-09
Attending: EMERGENCY MEDICINE | Admitting: EMERGENCY MEDICINE
Payer: COMMERCIAL

## 2020-03-09 ENCOUNTER — OFFICE VISIT (OUTPATIENT)
Dept: URGENT CARE | Age: 85
End: 2020-03-09
Payer: COMMERCIAL

## 2020-03-09 VITALS
HEART RATE: 64 BPM | DIASTOLIC BLOOD PRESSURE: 72 MMHG | OXYGEN SATURATION: 93 % | TEMPERATURE: 97.9 F | SYSTOLIC BLOOD PRESSURE: 142 MMHG | WEIGHT: 205 LBS | RESPIRATION RATE: 19 BRPM | BODY MASS INDEX: 28.59 KG/M2

## 2020-03-09 VITALS
SYSTOLIC BLOOD PRESSURE: 114 MMHG | RESPIRATION RATE: 16 BRPM | BODY MASS INDEX: 29.68 KG/M2 | TEMPERATURE: 97.8 F | HEIGHT: 71 IN | DIASTOLIC BLOOD PRESSURE: 58 MMHG | HEART RATE: 64 BPM | WEIGHT: 212 LBS | OXYGEN SATURATION: 94 %
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
## 2020-03-09 DIAGNOSIS — S09.90XA INJURY OF HEAD, INITIAL ENCOUNTER: Primary | ICD-10-CM

## 2020-03-09 DIAGNOSIS — S09.90XA INJURY OF HEAD, INITIAL ENCOUNTER: ICD-10-CM

## 2020-03-09 PROCEDURE — 99213 OFFICE O/P EST LOW 20 MIN: CPT | Performed by: PHYSICIAN ASSISTANT

## 2020-03-09 PROCEDURE — 99284 EMERGENCY DEPT VISIT MOD MDM: CPT

## 2020-03-09 PROCEDURE — 70450 CT HEAD/BRAIN W/O DYE: CPT

## 2020-03-09 PROCEDURE — G0463 HOSPITAL OUTPT CLINIC VISIT: HCPCS | Performed by: PHYSICIAN ASSISTANT

## 2020-03-09 PROCEDURE — 99284 EMERGENCY DEPT VISIT MOD MDM: CPT | Performed by: EMERGENCY MEDICINE

## 2020-03-09 PROCEDURE — 72125 CT NECK SPINE W/O DYE: CPT

## 2020-03-10 ENCOUNTER — IN-CLINIC DEVICE VISIT (OUTPATIENT)
Dept: CARDIOLOGY CLINIC | Facility: CLINIC | Age: 85
End: 2020-03-10
Payer: COMMERCIAL

## 2020-03-10 DIAGNOSIS — Z95.0 PACEMAKER: Primary | ICD-10-CM

## 2020-03-10 PROCEDURE — 93280 PM DEVICE PROGR EVAL DUAL: CPT | Performed by: INTERNAL MEDICINE

## 2020-03-10 NOTE — ED PROVIDER NOTES
History  Chief Complaint   Patient presents with   Raquel Tomlinson     pt wrestling his grandson at home and hit his head on a wall, pt on Coumadin, small contusion noted to the occipital area of his head, no LOC, no dizziness, Dr Zelaya Zoroastrianism aware of pt upon arrival     HPI    59-year-old male presents as a walk-in for evaluation after a fall  Wife says patient was wrestling his grandson at home when he fell backwards against the wall  Wife says patient did not have LOC and was able to stand up with assistance and ambulate afterwards  Wife says patient has a wound to the back of his head that had slight bleeding  Patient is on Coumadin  Patient complained of head and neck pain afterwards  Event occurred around 6pm  Patient currently denies any complaints at this time  He denies dizziness, lightheadedness, visual changes, chest pain, shortness of breath, nausea, vomiting, abdominal pain, back pain, or extremity weakness or numbness/tingling  Prior to Admission Medications   Prescriptions Last Dose Informant Patient Reported? Taking? Magnesium 250 MG TABS  Spouse/Significant Other Yes No   Sig: Take by mouth daily   Memantine HCl ER 28 MG CP24   No No   Sig: TAKE ONE CAPSULE BY MOUTH EVERY DAY   Multiple Vitamin (MULTIVITAMIN) capsule  Spouse/Significant Other Yes No   Sig: Take 1 capsule by mouth daily   NOVOFINE 32G X 6 MM MISC  Spouse/Significant Other Yes No   NOVOLOG FLEXPEN 100 units/mL injection pen  Spouse/Significant Other Yes No   Si Units 2 (two) times a day with meals    ONETOUCH DELICA LANCETS 95L MISC  Spouse/Significant Other Yes No   ONETOUCH VERIO test strip  Spouse/Significant Other Yes No   Probiotic Product (PROBIOTIC COLON SUPPORT PO)  Spouse/Significant Other Yes No   Sig: Take 1 tablet by mouth daily   amLODIPine (NORVASC) 2 5 mg tablet   No No   Sig: TAKE 1 TABLET DAILY   atorvastatin (LIPITOR) 10 mg tablet  Spouse/Significant Other Yes No   Sig: Take 10 mg by mouth daily     cholecalciferol (VITAMIN D3) 1,000 units tablet  Spouse/Significant Other Yes No   Sig: Take 2,000 Units by mouth daily   cyanocobalamin (VITAMIN B-12) 500 mcg tablet  Spouse/Significant Other Yes No   Sig: Take 500 mcg by mouth daily   docusate sodium (COLACE) 100 mg capsule  Spouse/Significant Other Yes No   Sig: Take 100 mg by mouth 3 (three) times a day  donepezil (ARICEPT) 10 mg tablet  Spouse/Significant Other No No   Sig: Take 1 tablet (10 mg total) by mouth daily at bedtime   fenofibrate (TRICOR) 145 mg tablet  Spouse/Significant Other Yes No   Sig: Take 145 mg by mouth daily  ferrous sulfate 325 (65 Fe) mg tablet  Spouse/Significant Other Yes No   Sig: Take 325 mg by mouth as needed    fluticasone (FLONASE) 50 mcg/act nasal spray  Spouse/Significant Other Yes No   Si spray into each nostril daily   insulin aspart (NovoLOG) 100 units/mL injection  Spouse/Significant Other Yes No   Sig: Inject 8 Units under the skin 2 (two) times a day before meals    insulin glargine (LANTUS) 100 units/mL subcutaneous injection  Spouse/Significant Other Yes No   Sig: Inject 28 Units under the skin 2 (two) times a day    ipratropium (ATROVENT HFA) 17 mcg/act inhaler  Spouse/Significant Other Yes No   Sig: Inhale 2 puffs every 6 (six) hours  ipratropium (ATROVENT) 0 03 % nasal spray  Spouse/Significant Other No No   Si sprays into each nostril every 12 (twelve) hours   levalbuterol (XOPENEX) 0 63 mg/3 mL nebulizer solution  Spouse/Significant Other Yes No   Sig: Take 1 ampule by nebulization 2 (two) times a day as needed for wheezing    loratadine (CLARITIN) 10 mg tablet  Spouse/Significant Other Yes No   Sig: Take 10 mg by mouth daily  losartan (COZAAR) 25 mg tablet  Spouse/Significant Other No No   Sig: TAKE 1 TABLET DAILY   magnesium gluconate (MAGONATE) 500 mg tablet  Spouse/Significant Other Yes No   Sig: Take 500 mg by mouth daily     metoprolol succinate (TOPROL-XL) 100 mg 24 hr tablet  Spouse/Significant Other No No Si 1/2 (one and a half) Tablet(s) daily   metoprolol tartrate (LOPRESSOR) 100 mg tablet  Spouse/Significant Other Yes No   Sig: Take 100 mg by mouth every morning    omega-3-acid ethyl esters (LOVAZA) 1 g capsule  Spouse/Significant Other Yes No   Sig: Take 1 g by mouth 4 (four) times a day    propafenone (RYTHMOL) 225 mg tablet   No No   Sig: Take 1 tablet (225 mg total) by mouth 3 (three) times a day   sertraline (ZOLOFT) 100 mg tablet  Spouse/Significant Other No No   Sig: Take 1 tablet (100 mg total) by mouth daily   sodium chloride (OCEAN) 0 65 % nasal spray  Spouse/Significant Other Yes No   Si spray into each nostril   valsartan (DIOVAN) 80 mg tablet  Spouse/Significant Other Yes No   Sig: Take 160 mg by mouth daily     warfarin (COUMADIN) 2 5 mg tablet  Spouse/Significant Other Yes No   Sig: Take 2 5 mg by mouth 3 (three) times a week Indications:       warfarin (COUMADIN) 5 mg tablet   No No   Sig: Take 1 tablet (5 mg total) by mouth daily      Facility-Administered Medications: None       Past Medical History:   Diagnosis Date    Anxiety     Aspiration of liquid     and food per wife if he is eating too fast or talking when eating    Asthma     as a child    Atrial fibrillation ([facility] [place] [address] [facility]     CAD (coronary artery disease)     Cancer of kidney ([facility] [place] [address] [facility]     COPD (chronic obstructive pulmonary disease) ([facility] [place] [address] [facility]     CPAP (continuous positive airway pressure) dependence     Dementia ([facility] [place] [address] [facility]     Frontal lobe    Dementia ([facility] [place] [address] [facility]     Diabetes mellitus ([facility] [place] [address] [facility]     DJD (degenerative joint disease)     Hypercholesterolemia     Hyperlipidemia     Hypertension     Kidney disease     Melanoma ([facility] [place] [address] [facility]     left leg    Obesity     Sleep apnea     wears CPAP    TIA (transient ischemic attack)        Past Surgical History:   Procedure Laterality Date    CARDIAC PACEMAKER PLACEMENT      CHOLECYSTECTOMY      COLONOSCOPY      HERNIA REPAIR      NEPHRECTOMY Left     [place] ESOPHAGOGASTRODUODENOSCOPY SUBMUCOSAL INJECTION N/A 9/21/2016    Procedure: ESOPHAGOGASTRODUODENOSCOPY (EGD); Surgeon: Anson Powers MD;  Location: [facility]; Service: Gastroenterology    IA ESOPHAGOGASTRODUODENOSCOPY SUBMUCOSAL INJECTION N/A 2/7/2018    Procedure: ESOPHAGOGASTRODUODENOSCOPY (EGD); Surgeon: Anson Powers MD;  Location: [facility]; Service: Gastroenterology    IA ESOPHAGOGASTRODUODENOSCOPY SUBMUCOSAL INJECTION N/A 4/3/2019    Procedure: ESOPHAGOGASTRODUODENOSCOPY (EGD) WITH BOTOX;  Surgeon: Anson Powers MD;  Location: [facility]; Service: Gastroenterology    ROTATOR CUFF REPAIR      TONSILLECTOMY         Family History   Problem Relation Age of Onset    Brain cancer Father     Lung cancer Father     Diabetes Family     Heart disease Family     Hypertension Family     Cancer Family         renal cell carcinoma    Stroke Family     Colon cancer Son      I have reviewed and agree with the history as documented  E-Cigarette/Vaping     E-Cigarette/Vaping Substances     Social History     Tobacco Use    Smoking status: Never Smoker    Smokeless tobacco: Never Used   Substance Use Topics    Alcohol use: No    Drug use: No        Review of Systems   Constitutional: Negative for chills, diaphoresis, fatigue and fever  HENT: Negative for congestion, rhinorrhea and sore throat  Eyes: Negative for photophobia and visual disturbance  Respiratory: Negative for cough, chest tightness and shortness of breath  Cardiovascular: Negative for chest pain and palpitations  Gastrointestinal: Negative for abdominal pain, blood in stool, constipation, diarrhea, nausea and vomiting  Genitourinary: Negative for dysuria, frequency and hematuria  Musculoskeletal: Positive for neck pain  Negative for back pain, gait problem, myalgias and neck stiffness  Skin: Positive for wound  Negative for pallor and rash  Neurological: Positive for headaches  Negative for weakness, light-headedness and numbness     Hematological: Negative for adenopathy  Does not bruise/bleed easily  All other systems reviewed and are negative  Physical Exam  ED Triage Vitals [03/09/20 2139]   Temperature Pulse Respirations Blood Pressure SpO2   97 9 °F (36 6 °C) 88 22 160/88 94 %      Temp Source Heart Rate Source Patient Position - Orthostatic VS BP Location FiO2 (%)   Oral Monitor Sitting Right arm --      Pain Score       No Pain             Orthostatic Vital Signs  Vitals:    03/09/20 2139 03/09/20 2200 03/09/20 2230   BP: 160/88 141/69 142/72   Pulse: 88 67 64   Patient Position - Orthostatic VS: Sitting Lying        Physical Exam   Constitutional: He is oriented to person, place, and time  No distress  Patient alert and oriented, appears well and non-toxic, in no acute distress    HENT:   Head:       Mouth/Throat: Oropharynx is clear and moist    Small abrasion over nasal bridge   Eyes: Pupils are equal, round, and reactive to light  EOM are normal    Neck: Normal range of motion  Neck supple  No C-spine tenderness on palpation   Cardiovascular: Normal rate, regular rhythm, normal heart sounds and intact distal pulses  Pulmonary/Chest: Effort normal and breath sounds normal  No respiratory distress  He exhibits no tenderness  Abdominal: Soft  Bowel sounds are normal  He exhibits no distension  There is no tenderness  Musculoskeletal: Normal range of motion  No T or L-spine tenderness on palpation   Lymphadenopathy:     He has no cervical adenopathy  Neurological: He is alert and oriented to person, place, and time  Skin: Skin is warm and dry  Capillary refill takes less than 2 seconds  No rash noted  He is not diaphoretic  No erythema  No pallor  Psychiatric: He has a normal mood and affect  His behavior is normal  Judgment and thought content normal    Nursing note and vitals reviewed        ED Medications  Medications - No data to display    Diagnostic Studies  Results Reviewed     None                 CT head without contrast   Final Result by Emily Torres MD (03/09 2234)      No acute intracranial abnormality  I personally discussed this study with Etienne Becerril on 3/9/2020 at 10:33 PM                      Workstation performed: [id number]         CT cervical spine without contrast   Final Result by Emily Torres MD (03/09 2233)      No cervical spine fracture or traumatic malalignment  I personally discussed this study with Etienne Becerril on 3/9/2020 at 10:33 PM                       Workstation performed: [id number]               Procedures  Procedures      ED Course           Identification of Seniors at Risk      Most Recent Value   (ISAR) Identification of Seniors at Risk   Before the illness or injury that brought you to the Emergency, did you need someone to help you on a regular basis? 0 Filed at: 03/09/2020 2140   In the last 24 hours, have you needed more help than usual?  1 Filed at: 03/09/2020 2140   Have you been hospitalized for one or more nights during the past 6 months? 0 Filed at: 03/09/2020 2140   In general, do you see well?  0 Filed at: 03/09/2020 2140   In general, do you have serious problems with your memory? 0 Filed at: 03/09/2020 2140   Do you take more than three different medications every day? 1 Filed at: 03/09/2020 2140   ISAR Score  2 Filed at: 03/09/2020 2140                          MDM  Number of Diagnoses or Management Options  Fall from standing, initial encounter:   Injury of head, initial encounter:   Diagnosis management comments: 80-year-old male presents for evaluation s/p witnessed mechanical fall at home on Coumadin  Patient is in no acute distress appears clinically well  Patient is hemodynamically stable  Patient placed in C-collar  Will obtain a CT head and C-spine          Disposition  Final diagnoses:   Fall from standing, initial encounter   Injury of head, initial encounter     Time reflects when diagnosis was documented in both MDM as applicable and the Disposition within this note     Time User Action Codes Description Comment    3/9/2020 10:48 PM Regina Freire Add [T45  XXXA] Fall from standing, initial encounter     3/9/2020 10:48 PM Regina Freire Add [S09 90XA] Injury of head, initial encounter       ED Disposition     ED Disposition Condition Date/Time Comment    Discharge Stable Mon Mar 9, 2020 10:48 PM Fernando Ching discharge to home/self care  Follow-up Information     Follow up With Specialties Details Why Contact Info    Deborah Elmore MD Internal Medicine Go to  As needed, If symptoms worsen [address] [facility]  [phone]            Discharge Medication List as of 3/9/2020 10:49 PM      CONTINUE these medications which have NOT CHANGED    Details   amLODIPine (NORVASC) 2 5 mg tablet TAKE 1 TABLET DAILY, Normal      atorvastatin (LIPITOR) 10 mg tablet Take 10 mg by mouth daily  , Historical Med      cholecalciferol (VITAMIN D3) 1,000 units tablet Take 2,000 Units by mouth daily, Historical Med      cyanocobalamin (VITAMIN B-12) 500 mcg tablet Take 500 mcg by mouth daily, Historical Med      docusate sodium (COLACE) 100 mg capsule Take 100 mg by mouth 3 (three) times a day , Historical Med      donepezil (ARICEPT) 10 mg tablet Take 1 tablet (10 mg total) by mouth daily at bedtime, Starting Thu 6/13/2019, Normal      fenofibrate (TRICOR) 145 mg tablet Take 145 mg by mouth daily  , Historical Med      ferrous sulfate 325 (65 Fe) mg tablet Take 325 mg by mouth as needed , Historical Med      fluticasone (FLONASE) 50 mcg/act nasal spray 1 spray into each nostril daily, Historical Med      insulin aspart (NovoLOG) 100 units/mL injection Inject 8 Units under the skin 2 (two) times a day before meals , Historical Med      insulin glargine (LANTUS) 100 units/mL subcutaneous injection Inject 28 Units under the skin 2 (two) times a day , Historical Med      ipratropium (ATROVENT HFA) 17 mcg/act inhaler Inhale 2 puffs every 6 (six) hours  , Historical Med      ipratropium (ATROVENT) 0 03 % nasal spray 2 sprays into each nostril every 12 (twelve) hours, Starting Wed 6/6/2018, Normal      levalbuterol (XOPENEX) 0 63 mg/3 mL nebulizer solution Take 1 ampule by nebulization 2 (two) times a day as needed for wheezing , Historical Med      loratadine (CLARITIN) 10 mg tablet Take 10 mg by mouth daily  , Historical Med      losartan (COZAAR) 25 mg tablet TAKE 1 TABLET DAILY, Normal      Magnesium 250 MG TABS Take by mouth daily, Historical Med      magnesium gluconate (MAGONATE) 500 mg tablet Take 500 mg by mouth daily  , Historical Med      Memantine HCl ER 28 MG CP24 TAKE ONE CAPSULE BY MOUTH EVERY DAY, Normal      metoprolol succinate (TOPROL-XL) 100 mg 24 hr tablet 1 1/2 (one and a half) Tablet(s) daily, Normal      metoprolol tartrate (LOPRESSOR) 100 mg tablet Take 100 mg by mouth every morning , Historical Med      Multiple Vitamin (MULTIVITAMIN) capsule Take 1 capsule by mouth daily, Historical Med      NOVOFINE 32G X 6 MM MISC Starting Thu 8/16/2018, Historical Med      NOVOLOG FLEXPEN 100 units/mL injection pen 8 Units 2 (two) times a day with meals , Starting Wed 7/18/2018, Historical Med      omega-3-acid ethyl esters (LOVAZA) 1 g capsule Take 1 g by mouth 4 (four) times a day , Historical Med      Tim Bigger LANCETS 93N MISC Starting Sun 7/8/2018, Historical Med      ONETOUCH VERIO test strip Historical Med      Probiotic Product (PROBIOTIC COLON SUPPORT PO) Take 1 tablet by mouth daily, Historical Med      propafenone (RYTHMOL) 225 mg tablet Take 1 tablet (225 mg total) by mouth 3 (three) times a day, Starting Tue 2/18/2020, Normal      sertraline (ZOLOFT) 100 mg tablet Take 1 tablet (100 mg total) by mouth daily, Starting Mon 10/7/2019, Normal      sodium chloride (OCEAN) 0 65 % nasal spray 1 spray into each nostril, Starting Wed 10/31/2018, Until Thu 12/12/2019, Historical Med      valsartan (DIOVAN) 80 mg tablet Take 160 mg by mouth daily  , Historical Med      !! warfarin (COUMADIN) 2 5 mg tablet Take 2 5 mg by mouth 3 (three) times a week Indications: Mon wed Fri   , Historical Med      !! warfarin (COUMADIN) 5 mg tablet Take 1 tablet (5 mg total) by mouth daily, Starting Wed 2/19/2020, Normal       !! - Potential duplicate medications found  Please discuss with provider  No discharge procedures on file  PDMP Review     None           ED Provider  Attending physically available and evaluated Selvin Dunaway I managed the patient along with the ED Attending      Electronically Signed by         Gurvinder Gallagher DO  03/10/20 0021

## 2020-03-10 NOTE — PROGRESS NOTES
Results for orders placed or performed in visit on 03/10/20   Cardiac EP device report    Narrative    MDT-DUAL CHAMBER PPM (AAIR-DDDR MODE)ALMA DELIA PT   DEVICE INTERROGATED IN THE [place] OFFICE  BATTERY ADEQUATE (12 MONTHS)  AP 99%;  1%  ALL LEAD PARAMETERS WITHIN NORMAL LIMITS  NO EPISODES  PT  TAKES METOPROLOL SUCC  & WARFARIN  NO PROGRAMMING CHANGES MADE TO DEVICE PARAMETERS  NORMAL DEVICE FUNCTION   PL

## 2020-03-10 NOTE — PROGRESS NOTES
[address] [facility]        NAME: Bonilla Henley is a 80 y o  male  : 1935    MRN: [id number]  DATE: 2020  TIME: 9:53 PM    Assessment and Plan   Injury of head, initial encounter [S09 90XA]  1  Injury of head, initial encounter  Transfer to other facility         Patient Instructions       Follow up with PCP in 3-5 days  Proceed to  ER if symptoms worsen  Chief Complaint     Chief Complaint   Patient presents with    Head Injury     Pt fell onto the floor playing with his grandson and hit the back of his head on the wall  Pt is on blood thinners  Pt has a headache and neck pain  Denies dizziness, confusion or vision changes  History of Present Illness       Patient here for evaluation of a head injury she sustained prior to arrival   Patient playing with his grandson when he fell backwards and his head on the wall  He denies any loss of consciousness but does have a headache and neck pain  Patient is on Coumadin  Review of Systems   Review of Systems   Constitutional: Negative  Musculoskeletal: Positive for neck pain  Neurological: Positive for headaches  Negative for dizziness, tremors, seizures, syncope, facial asymmetry, speech difficulty, weakness, light-headedness and numbness  Current Medications       Current Outpatient Medications:     amLODIPine (NORVASC) 2 5 mg tablet, TAKE 1 TABLET DAILY, Disp: 90 tablet, Rfl: 0    atorvastatin (LIPITOR) 10 mg tablet, Take 10 mg by mouth daily  , Disp: , Rfl:     cholecalciferol (VITAMIN D3) 1,000 units tablet, Take 2,000 Units by mouth daily, Disp: , Rfl:     cyanocobalamin (VITAMIN B-12) 500 mcg tablet, Take 500 mcg by mouth daily, Disp: , Rfl:     docusate sodium (COLACE) 100 mg capsule, Take 100 mg by mouth 3 (three) times a day , Disp: , Rfl:     donepezil (ARICEPT) 10 mg tablet, Take 1 tablet (10 mg total) by mouth daily at bedtime, Disp: 90 tablet, Rfl: 3    fenofibrate (TRICOR) 145 mg tablet, Take 145 mg by mouth daily  , Disp: , Rfl:     ferrous sulfate 325 (65 Fe) mg tablet, Take 325 mg by mouth as needed , Disp: , Rfl:     fluticasone (FLONASE) 50 mcg/act nasal spray, 1 spray into each nostril daily, Disp: , Rfl:     insulin aspart (NovoLOG) 100 units/mL injection, Inject 8 Units under the skin 2 (two) times a day before meals , Disp: , Rfl:     insulin glargine (LANTUS) 100 units/mL subcutaneous injection, Inject 28 Units under the skin 2 (two) times a day , Disp: , Rfl:     ipratropium (ATROVENT HFA) 17 mcg/act inhaler, Inhale 2 puffs every 6 (six) hours  , Disp: , Rfl:     ipratropium (ATROVENT) 0 03 % nasal spray, 2 sprays into each nostril every 12 (twelve) hours, Disp: 30 mL, Rfl: 0    levalbuterol (XOPENEX) 0 63 mg/3 mL nebulizer solution, Take 1 ampule by nebulization 2 (two) times a day as needed for wheezing , Disp: , Rfl:     loratadine (CLARITIN) 10 mg tablet, Take 10 mg by mouth daily  , Disp: , Rfl:     losartan (COZAAR) 25 mg tablet, TAKE 1 TABLET DAILY, Disp: 90 tablet, Rfl: 3    Magnesium 250 MG TABS, Take by mouth daily, Disp: , Rfl:     magnesium gluconate (MAGONATE) 500 mg tablet, Take 500 mg by mouth daily  , Disp: , Rfl:     Memantine HCl ER 28 MG CP24, TAKE ONE CAPSULE BY MOUTH EVERY DAY, Disp: 90 capsule, Rfl: 3    metoprolol succinate (TOPROL-XL) 100 mg 24 hr tablet, 1 1/2 (one and a half) Tablet(s) daily, Disp: 135 tablet, Rfl: 0    metoprolol tartrate (LOPRESSOR) 100 mg tablet, Take 100 mg by mouth every morning , Disp: , Rfl:     Multiple Vitamin (MULTIVITAMIN) capsule, Take 1 capsule by mouth daily, Disp: , Rfl:     NOVOFINE 32G X 6 MM MISC, , Disp: , Rfl:     NOVOLOG FLEXPEN 100 units/mL injection pen, 8 Units 2 (two) times a day with meals , Disp: , Rfl:     omega-3-acid ethyl esters (LOVAZA) 1 g capsule, Take 1 g by mouth 4 (four) times a day , Disp: , Rfl:     ONETOUCH DELICA LANCETS 91R MISC, , Disp: , Rfl:     ONETOUCH VERIO test strip, , Disp: , Rfl:     Probiotic Product (PROBIOTIC COLON SUPPORT PO), Take 1 tablet by mouth daily, Disp: , Rfl:     propafenone (RYTHMOL) 225 mg tablet, Take 1 tablet (225 mg total) by mouth 3 (three) times a day, Disp: 270 tablet, Rfl: 3    sertraline (ZOLOFT) 100 mg tablet, Take 1 tablet (100 mg total) by mouth daily, Disp: 90 tablet, Rfl: 3    valsartan (DIOVAN) 80 mg tablet, Take 160 mg by mouth daily  , Disp: , Rfl:     warfarin (COUMADIN) 2 5 mg tablet, Take 2 5 mg by mouth 3 (three) times a week Indications: Mon wed Fri   , Disp: , Rfl:     warfarin (COUMADIN) 5 mg tablet, Take 1 tablet (5 mg total) by mouth daily, Disp: 90 tablet, Rfl: 3    sodium chloride (OCEAN) 0 65 % nasal spray, 1 spray into each nostril, Disp: , Rfl:     Current Allergies     Allergies as of 03/09/2020 - Reviewed 03/09/2020   Allergen Reaction Noted    Ambien [zolpidem tartrate] Hallucinations 09/21/2016    Bextra [valdecoxib] Hives 04/29/2013    Dust mite extract Sneezing 11/08/2017    Lyrica [pregabalin] Confusion 03/20/2016    Mold extract [trichophyton] Sneezing 11/08/2017    Pollen extract Sneezing 11/08/2017    Tree extract Other (See Comments) and Sneezing 11/08/2017            The following portions of the patient's history were reviewed and updated as appropriate: allergies, current medications, past family history, past medical history, past social history, past surgical history and problem list      Past Medical History:   Diagnosis Date    Anxiety     Aspiration of liquid     and food per wife if he is eating too fast or talking when eating    Asthma     as a child    Atrial fibrillation ([address] )     CAD (coronary artery disease)     Cancer [facility] [address] [facility]     COPD (chronic obstructive pulmonary disease) ([address] )     CPAP (continuous positive airway pressure) dependence     Dementia ([address] )     Frontal lobe    Dementia ([address]     Diabetes mellitus ([address] )     DJD (degenerative joint disease)     Hypercholesterolemia     Hyperlipidemia  Hypertension     Kidney disease     Melanoma ([facility] [address] [facility]     left leg    Obesity     Sleep apnea     wears CPAP    TIA (transient ischemic attack)        Past Surgical History:   Procedure Laterality Date    CARDIAC PACEMAKER PLACEMENT      CHOLECYSTECTOMY      COLONOSCOPY      HERNIA REPAIR      NEPHRECTOMY Left 2005    NE ESOPHAGOGASTRODUODENOSCOPY SUBMUCOSAL INJECTION N/A 9/21/2016    Procedure: ESOPHAGOGASTRODUODENOSCOPY (EGD); Surgeon: Gideon Bird MD;  [facility]; Service: Gastroenterology    NE ESOPHAGOGASTRODUODENOSCOPY SUBMUCOSAL INJECTION N/A 2/7/2018    Procedure: ESOPHAGOGASTRODUODENOSCOPY (EGD); Surgeon: Gideon Bird MD;  [facility]; Service: Gastroenterology    NE ESOPHAGOGASTRODUODENOSCOPY SUBMUCOSAL INJECTION N/A 4/3/2019    Procedure: ESOPHAGOGASTRODUODENOSCOPY (EGD) WITH BOTOX;  Surgeon: Gideon Bird MD;  [facility]; Service: Gastroenterology    ROTATOR CUFF REPAIR      TONSILLECTOMY         Family History   Problem Relation Age of Onset    Brain cancer Father     Lung cancer Father     Diabetes Family     Heart disease Family     Hypertension Family     Cancer Family         renal cell carcinoma    Stroke Family     Colon cancer Son          Medications have been verified  Objective   /58 (BP Location: Left arm, Patient Position: Sitting)   Pulse 64   Temp 97 8 °F (36 6 °C) (Temporal)   Resp 16   Ht 5' 11" (1 803 m)   Wt 96 2 kg (212 lb)   SpO2 94%   BMI 29 57 kg/m²        Physical Exam     Physical Exam   Constitutional: He is oriented to person, place, and time  He appears well-developed and well-nourished  No distress  HENT:   Soft tissue swelling and ecchymosis of the posterior occiput  Tenderness over the area with tenderness along the cervical paravertebral muscles  Eyes: Pupils are equal, round, and reactive to light   Conjunctivae and EOM are normal    Musculoskeletal:   Strength 5/5 bilaterally   Neurological: He is alert and oriented to person, place, and time  No cranial nerve deficit or sensory deficit  He exhibits normal muscle tone  Coordination normal    Skin: Skin is warm and dry  He is not diaphoretic  Psychiatric: He has a normal mood and affect  His behavior is normal  Judgment and thought content normal    Nursing note and vitals reviewed

## 2020-03-11 ENCOUNTER — TELEPHONE (OUTPATIENT)
Dept: CARDIOLOGY CLINIC | Facility: CLINIC | Age: 85
End: 2020-03-11

## 2020-03-11 NOTE — ED ATTENDING ATTESTATION
3/9/2020  IDemetria MD, saw and evaluated the patient  I have discussed the patient with the resident/non-physician practitioner and agree with the resident's/non-physician practitioner's findings, Plan of Care, and MDM as documented in the resident's/non-physician practitioner's note, except where noted  All available labs and Radiology studies were reviewed  I was present for key portions of any procedure(s) performed by the resident/non-physician practitioner and I was immediately available to provide assistance  At this point I agree with the current assessment done in the Emergency Department  I have conducted an independent evaluation of this patient a history and physical is as follows:    72-year-old man presenting after mechanical fall  No loss of consciousness  Patient is on warfarin  There is a hematoma and abrasion to the occiput  No C-spine tenderness  Heart regular rate rhythm, no murmurs rubs or gallops  Lungs clear to auscultation bilaterally  Will get imaging, update tetanus      ED Course         Critical Care Time  Procedures

## 2020-03-25 ENCOUNTER — TELEPHONE (OUTPATIENT)
Dept: CARDIOLOGY CLINIC | Facility: CLINIC | Age: 85
End: 2020-03-25

## 2020-03-25 DIAGNOSIS — I10 ESSENTIAL HYPERTENSION: ICD-10-CM

## 2020-03-25 RX ORDER — AMLODIPINE BESYLATE 2.5 MG/1
TABLET ORAL
Qty: 90 TABLET | Refills: 0 | Status: SHIPPED | OUTPATIENT
Start: 2020-03-25 | End: 2020-06-14

## 2020-03-26 DIAGNOSIS — I10 ESSENTIAL HYPERTENSION: ICD-10-CM

## 2020-03-26 RX ORDER — METOPROLOL SUCCINATE 100 MG/1
150 TABLET, EXTENDED RELEASE ORAL DAILY
Qty: 135 TABLET | Refills: 3 | Status: SHIPPED | OUTPATIENT
Start: 2020-03-26 | End: 2020-07-13 | Stop reason: ALTCHOICE

## 2020-03-26 RX ORDER — METOPROLOL SUCCINATE 100 MG/1
TABLET, EXTENDED RELEASE ORAL
Qty: 135 TABLET | Refills: 0 | OUTPATIENT
Start: 2020-03-26

## 2020-04-07 ENCOUNTER — TELEPHONE (OUTPATIENT)
Dept: NEPHROLOGY | Facility: CLINIC | Age: 85
End: 2020-04-07

## 2020-04-14 ENCOUNTER — TELEPHONE (OUTPATIENT)
Dept: NEPHROLOGY | Facility: CLINIC | Age: 85
End: 2020-04-14

## 2020-04-14 NOTE — TELEPHONE ENCOUNTER
Provider: DR. KEBEDE  Caller: BALDOMERO LEY  Relationship to Patient: PT'S DAUGHTER  Phone Number: [phone]    Reason for Call: PT'S DAUGHTER CALLED TO RESCHEDULE PT'S APPT DUE TO THE COVID19. I HAD OFFER A VIDEO VISIT OR TELEPHONE VIST AND PT DOES NOT HAVE INTERNET AND NO FAMILY MEMBER IS GOING IN PT'S HOME, ONLY DROPPING ESSENTIAL SUPPLY OFF ON THE [place]. PT'S DAUGHTER WANTED THE APPT TO BE RESCHEDULE FOR  6- AT 10:30AM AND IT HAS BEEN RESCHEDULE TO THIS APPT TIME AND DATE.     Pt's wife called to report her husbands BP was 150/72 and pulse 77 this am

## 2020-04-15 ENCOUNTER — TELEMEDICINE (OUTPATIENT)
Dept: NEPHROLOGY | Facility: CLINIC | Age: 85
End: 2020-04-15
Payer: COMMERCIAL

## 2020-04-15 DIAGNOSIS — N18.4 CKD (CHRONIC KIDNEY DISEASE), STAGE IV (HCC): Primary | ICD-10-CM

## 2020-04-15 DIAGNOSIS — N18.4 TYPE 2 DIABETES MELLITUS WITH STAGE 4 CHRONIC KIDNEY DISEASE, WITH LONG-TERM CURRENT USE OF INSULIN (HCC): ICD-10-CM

## 2020-04-15 DIAGNOSIS — I12.9 HYPERTENSIVE CHRONIC KIDNEY DISEASE WITH STAGE 1 THROUGH STAGE 4 CHRONIC KIDNEY DISEASE, OR UNSPECIFIED CHRONIC KIDNEY DISEASE: ICD-10-CM

## 2020-04-15 DIAGNOSIS — IMO0002 SOLITARY KIDNEY: ICD-10-CM

## 2020-04-15 DIAGNOSIS — E11.22 TYPE 2 DIABETES MELLITUS WITH STAGE 4 CHRONIC KIDNEY DISEASE, WITH LONG-TERM CURRENT USE OF INSULIN (HCC): ICD-10-CM

## 2020-04-15 DIAGNOSIS — Z79.4 TYPE 2 DIABETES MELLITUS WITH STAGE 4 CHRONIC KIDNEY DISEASE, WITH LONG-TERM CURRENT USE OF INSULIN (HCC): ICD-10-CM

## 2020-04-15 PROCEDURE — G2012 BRIEF CHECK IN BY MD/QHP: HCPCS | Performed by: INTERNAL MEDICINE

## 2020-04-22 ENCOUNTER — TELEMEDICINE (OUTPATIENT)
Dept: NEUROLOGY | Facility: CLINIC | Age: 85
End: 2020-04-22
Payer: COMMERCIAL

## 2020-04-22 DIAGNOSIS — G31.09 FRONTOTEMPORAL DEMENTIA (HCC): Primary | ICD-10-CM

## 2020-04-22 DIAGNOSIS — F02.80 FRONTOTEMPORAL DEMENTIA (HCC): Primary | ICD-10-CM

## 2020-04-22 PROCEDURE — G2012 BRIEF CHECK IN BY MD/QHP: HCPCS | Performed by: PSYCHIATRY & NEUROLOGY

## 2020-05-14 ENCOUNTER — TELEPHONE (OUTPATIENT)
Dept: CARDIOLOGY CLINIC | Facility: CLINIC | Age: 85
End: 2020-05-14

## 2020-05-15 ENCOUNTER — ANTICOAG VISIT (OUTPATIENT)
Dept: CARDIOLOGY CLINIC | Facility: CLINIC | Age: 85
End: 2020-05-15

## 2020-05-15 ENCOUNTER — APPOINTMENT (OUTPATIENT)
Dept: LAB | Age: 85
End: 2020-05-15
Payer: COMMERCIAL

## 2020-05-15 DIAGNOSIS — I48.0 PAROXYSMAL ATRIAL FIBRILLATION (HCC): ICD-10-CM

## 2020-05-29 ENCOUNTER — APPOINTMENT (OUTPATIENT)
Dept: LAB | Age: 85
End: 2020-05-29
Payer: COMMERCIAL

## 2020-06-01 ENCOUNTER — ANTICOAG VISIT (OUTPATIENT)
Dept: CARDIOLOGY CLINIC | Facility: CLINIC | Age: 85
End: 2020-06-01

## 2020-06-01 DIAGNOSIS — I48.0 PAROXYSMAL ATRIAL FIBRILLATION (HCC): ICD-10-CM

## 2020-06-05 ENCOUNTER — TRANSCRIBE ORDERS (OUTPATIENT)
Dept: ADMINISTRATIVE | Age: 85
End: 2020-06-05

## 2020-06-05 ENCOUNTER — APPOINTMENT (OUTPATIENT)
Dept: LAB | Age: 85
End: 2020-06-05
Payer: COMMERCIAL

## 2020-06-05 DIAGNOSIS — U07.1 COVID-19: Primary | ICD-10-CM

## 2020-06-05 DIAGNOSIS — U07.1 COVID-19: ICD-10-CM

## 2020-06-05 PROCEDURE — 86769 SARS-COV-2 COVID-19 ANTIBODY: CPT

## 2020-06-05 PROCEDURE — 36415 COLL VENOUS BLD VENIPUNCTURE: CPT

## 2020-06-07 LAB — SARS-COV-2 IGG SERPL QL IA: NEGATIVE

## 2020-06-08 ENCOUNTER — REMOTE DEVICE CLINIC VISIT (OUTPATIENT)
Dept: CARDIOLOGY CLINIC | Facility: CLINIC | Age: 85
End: 2020-06-08
Payer: COMMERCIAL

## 2020-06-08 DIAGNOSIS — Z95.0 PRESENCE OF PERMANENT CARDIAC PACEMAKER: Primary | ICD-10-CM

## 2020-06-08 PROCEDURE — 93296 REM INTERROG EVL PM/IDS: CPT | Performed by: INTERNAL MEDICINE

## 2020-06-08 PROCEDURE — 93294 REM INTERROG EVL PM/LDLS PM: CPT | Performed by: INTERNAL MEDICINE

## 2020-06-11 ENCOUNTER — TELEPHONE (OUTPATIENT)
Dept: CARDIOLOGY CLINIC | Facility: CLINIC | Age: 85
End: 2020-06-11

## 2020-06-13 ENCOUNTER — TELEPHONE (OUTPATIENT)
Dept: OTHER | Facility: OTHER | Age: 85
End: 2020-06-13

## 2020-06-13 DIAGNOSIS — I10 ESSENTIAL HYPERTENSION: ICD-10-CM

## 2020-06-14 DIAGNOSIS — I10 ESSENTIAL HYPERTENSION: ICD-10-CM

## 2020-06-14 RX ORDER — AMLODIPINE BESYLATE 2.5 MG/1
TABLET ORAL
Qty: 90 TABLET | Refills: 0 | Status: SHIPPED | OUTPATIENT
Start: 2020-06-14 | End: 2020-06-15

## 2020-06-15 ENCOUNTER — TELEPHONE (OUTPATIENT)
Dept: CARDIOLOGY CLINIC | Facility: CLINIC | Age: 85
End: 2020-06-15

## 2020-06-15 DIAGNOSIS — I48.0 PAROXYSMAL ATRIAL FIBRILLATION (HCC): Primary | ICD-10-CM

## 2020-06-15 RX ORDER — AMLODIPINE BESYLATE 2.5 MG/1
TABLET ORAL
Qty: 90 TABLET | Refills: 0 | Status: SHIPPED | OUTPATIENT
Start: 2020-06-15 | End: 2020-06-17 | Stop reason: SDUPTHER

## 2020-06-16 ENCOUNTER — ANTICOAG VISIT (OUTPATIENT)
Dept: CARDIOLOGY CLINIC | Facility: CLINIC | Age: 85
End: 2020-06-16

## 2020-06-16 ENCOUNTER — TRANSCRIBE ORDERS (OUTPATIENT)
Dept: ADMINISTRATIVE | Age: 85
End: 2020-06-16

## 2020-06-16 ENCOUNTER — LAB (OUTPATIENT)
Dept: LAB | Age: 85
End: 2020-06-16
Payer: COMMERCIAL

## 2020-06-16 DIAGNOSIS — E11.649 UNCONTROLLED TYPE 2 DIABETES MELLITUS WITH HYPOGLYCEMIA, UNSPECIFIED HYPOGLYCEMIA COMA STATUS (HCC): Primary | ICD-10-CM

## 2020-06-16 DIAGNOSIS — I48.0 PAROXYSMAL ATRIAL FIBRILLATION (HCC): ICD-10-CM

## 2020-06-16 DIAGNOSIS — E11.649 UNCONTROLLED TYPE 2 DIABETES MELLITUS WITH HYPOGLYCEMIA, UNSPECIFIED HYPOGLYCEMIA COMA STATUS (HCC): ICD-10-CM

## 2020-06-16 LAB
ALBUMIN SERPL BCP-MCNC: 3.3 G/DL (ref 3.5–5)
ALP SERPL-CCNC: 54 U/L (ref 46–116)
ALT SERPL W P-5'-P-CCNC: 25 U/L (ref 12–78)
ANION GAP SERPL CALCULATED.3IONS-SCNC: 3 MMOL/L (ref 4–13)
AST SERPL W P-5'-P-CCNC: 18 U/L (ref 5–45)
BACTERIA UR QL AUTO: ABNORMAL /HPF
BASOPHILS # BLD AUTO: 0.06 THOUSANDS/ΜL (ref 0–0.1)
BASOPHILS NFR BLD AUTO: 1 % (ref 0–1)
BILIRUB SERPL-MCNC: 0.61 MG/DL (ref 0.2–1)
BILIRUB UR QL STRIP: NEGATIVE
BUN SERPL-MCNC: 25 MG/DL (ref 5–25)
CALCIUM SERPL-MCNC: 9.4 MG/DL (ref 8.3–10.1)
CHLORIDE SERPL-SCNC: 106 MMOL/L (ref 100–108)
CLARITY UR: CLEAR
CO2 SERPL-SCNC: 28 MMOL/L (ref 21–32)
COLOR UR: YELLOW
CREAT SERPL-MCNC: 2.2 MG/DL (ref 0.6–1.3)
EOSINOPHIL # BLD AUTO: 0.36 THOUSAND/ΜL (ref 0–0.61)
EOSINOPHIL NFR BLD AUTO: 5 % (ref 0–6)
ERYTHROCYTE [DISTWIDTH] IN BLOOD BY AUTOMATED COUNT: 13.2 % (ref 11.6–15.1)
EST. AVERAGE GLUCOSE BLD GHB EST-MCNC: 194 MG/DL
GFR SERPL CREATININE-BSD FRML MDRD: 26 ML/MIN/1.73SQ M
GLUCOSE P FAST SERPL-MCNC: 114 MG/DL (ref 65–99)
GLUCOSE UR STRIP-MCNC: NEGATIVE MG/DL
HBA1C MFR BLD: 8.4 %
HCT VFR BLD AUTO: 41.4 % (ref 36.5–49.3)
HGB BLD-MCNC: 13.7 G/DL (ref 12–17)
HGB UR QL STRIP.AUTO: NEGATIVE
HYALINE CASTS #/AREA URNS LPF: ABNORMAL /LPF
IMM GRANULOCYTES # BLD AUTO: 0.01 THOUSAND/UL (ref 0–0.2)
IMM GRANULOCYTES NFR BLD AUTO: 0 % (ref 0–2)
INR PPP: 3.04 (ref 0.84–1.19)
KETONES UR STRIP-MCNC: NEGATIVE MG/DL
LEUKOCYTE ESTERASE UR QL STRIP: NEGATIVE
LYMPHOCYTES # BLD AUTO: 2.34 THOUSANDS/ΜL (ref 0.6–4.47)
LYMPHOCYTES NFR BLD AUTO: 31 % (ref 14–44)
MCH RBC QN AUTO: 28.8 PG (ref 26.8–34.3)
MCHC RBC AUTO-ENTMCNC: 33.1 G/DL (ref 31.4–37.4)
MCV RBC AUTO: 87 FL (ref 82–98)
MONOCYTES # BLD AUTO: 0.58 THOUSAND/ΜL (ref 0.17–1.22)
MONOCYTES NFR BLD AUTO: 8 % (ref 4–12)
NEUTROPHILS # BLD AUTO: 4.24 THOUSANDS/ΜL (ref 1.85–7.62)
NEUTS SEG NFR BLD AUTO: 55 % (ref 43–75)
NITRITE UR QL STRIP: NEGATIVE
NON-SQ EPI CELLS URNS QL MICRO: ABNORMAL /HPF
NRBC BLD AUTO-RTO: 0 /100 WBCS
PH UR STRIP.AUTO: 6.5 [PH]
PLATELET # BLD AUTO: 255 THOUSANDS/UL (ref 149–390)
PMV BLD AUTO: 10.4 FL (ref 8.9–12.7)
POTASSIUM SERPL-SCNC: 4.3 MMOL/L (ref 3.5–5.3)
PROT SERPL-MCNC: 8 G/DL (ref 6.4–8.2)
PROT UR STRIP-MCNC: ABNORMAL MG/DL
PROTHROMBIN TIME: 30.9 SECONDS (ref 11.6–14.5)
RBC # BLD AUTO: 4.75 MILLION/UL (ref 3.88–5.62)
RBC #/AREA URNS AUTO: ABNORMAL /HPF
SODIUM SERPL-SCNC: 137 MMOL/L (ref 136–145)
SP GR UR STRIP.AUTO: 1.02 (ref 1–1.03)
UROBILINOGEN UR QL STRIP.AUTO: 0.2 E.U./DL
WBC # BLD AUTO: 7.59 THOUSAND/UL (ref 4.31–10.16)
WBC #/AREA URNS AUTO: ABNORMAL /HPF

## 2020-06-16 PROCEDURE — 85610 PROTHROMBIN TIME: CPT

## 2020-06-16 PROCEDURE — 36415 COLL VENOUS BLD VENIPUNCTURE: CPT

## 2020-06-16 PROCEDURE — 81001 URINALYSIS AUTO W/SCOPE: CPT | Performed by: INTERNAL MEDICINE

## 2020-06-16 PROCEDURE — 83036 HEMOGLOBIN GLYCOSYLATED A1C: CPT

## 2020-06-16 PROCEDURE — 80053 COMPREHEN METABOLIC PANEL: CPT

## 2020-06-16 PROCEDURE — 85025 COMPLETE CBC W/AUTO DIFF WBC: CPT

## 2020-06-17 DIAGNOSIS — I10 ESSENTIAL HYPERTENSION: ICD-10-CM

## 2020-06-17 RX ORDER — AMLODIPINE BESYLATE 2.5 MG/1
2.5 TABLET ORAL DAILY
Qty: 90 TABLET | Refills: 3 | Status: SHIPPED | OUTPATIENT
Start: 2020-06-17 | End: 2020-10-07 | Stop reason: SDUPTHER

## 2020-06-22 ENCOUNTER — APPOINTMENT (OUTPATIENT)
Dept: LAB | Age: 85
End: 2020-06-22
Payer: COMMERCIAL

## 2020-06-22 DIAGNOSIS — I48.0 PAROXYSMAL ATRIAL FIBRILLATION (HCC): ICD-10-CM

## 2020-06-22 LAB
INR PPP: 2.69 (ref 0.84–1.19)
PROTHROMBIN TIME: 28.1 SECONDS (ref 11.6–14.5)

## 2020-06-22 PROCEDURE — 36415 COLL VENOUS BLD VENIPUNCTURE: CPT

## 2020-06-22 PROCEDURE — 85610 PROTHROMBIN TIME: CPT

## 2020-06-23 ENCOUNTER — ANTICOAG VISIT (OUTPATIENT)
Dept: CARDIOLOGY CLINIC | Facility: CLINIC | Age: 85
End: 2020-06-23

## 2020-06-23 DIAGNOSIS — I48.0 PAROXYSMAL ATRIAL FIBRILLATION (HCC): ICD-10-CM

## 2020-06-30 ENCOUNTER — APPOINTMENT (OUTPATIENT)
Dept: LAB | Age: 85
End: 2020-06-30
Payer: COMMERCIAL

## 2020-06-30 DIAGNOSIS — I48.0 PAROXYSMAL ATRIAL FIBRILLATION (HCC): ICD-10-CM

## 2020-06-30 LAB
INR PPP: 4 (ref 0.84–1.19)
PROTHROMBIN TIME: 38.6 SECONDS (ref 11.6–14.5)

## 2020-06-30 PROCEDURE — 36415 COLL VENOUS BLD VENIPUNCTURE: CPT

## 2020-06-30 PROCEDURE — 85610 PROTHROMBIN TIME: CPT

## 2020-07-01 ENCOUNTER — ANTICOAG VISIT (OUTPATIENT)
Dept: CARDIOLOGY CLINIC | Facility: CLINIC | Age: 85
End: 2020-07-01

## 2020-07-01 DIAGNOSIS — I48.0 PAROXYSMAL ATRIAL FIBRILLATION (HCC): ICD-10-CM

## 2020-07-01 NOTE — PROGRESS NOTES
7/1/20, tc to pt, will hold x 2 days, then decrease dose, 5 mg sun/wed, 2 5 mg other days of the week, inr due 2 weeks  denies any bleeding or medication changes

## 2020-07-06 ENCOUNTER — TELEPHONE (OUTPATIENT)
Dept: NEUROLOGY | Facility: CLINIC | Age: 85
End: 2020-07-06

## 2020-07-06 NOTE — TELEPHONE ENCOUNTER
Called and spoke to wife, Andrew Cha, she is questioning increases on 's memory med, Aricept and Namenda, per Dr Viktoriya Aguirre okay to bring patient in to discuss with Miranda Maloney  Please reach out with any questions or concerns

## 2020-07-08 ENCOUNTER — APPOINTMENT (OUTPATIENT)
Dept: LAB | Age: 85
End: 2020-07-08
Payer: COMMERCIAL

## 2020-07-08 DIAGNOSIS — I48.0 PAROXYSMAL ATRIAL FIBRILLATION (HCC): ICD-10-CM

## 2020-07-09 ENCOUNTER — ANTICOAG VISIT (OUTPATIENT)
Dept: CARDIOLOGY CLINIC | Facility: CLINIC | Age: 85
End: 2020-07-09

## 2020-07-09 DIAGNOSIS — I48.0 PAROXYSMAL ATRIAL FIBRILLATION (HCC): ICD-10-CM

## 2020-07-13 ENCOUNTER — OFFICE VISIT (OUTPATIENT)
Dept: CARDIOLOGY CLINIC | Facility: CLINIC | Age: 85
End: 2020-07-13
Payer: COMMERCIAL

## 2020-07-13 VITALS
WEIGHT: 220 LBS | HEART RATE: 65 BPM | DIASTOLIC BLOOD PRESSURE: 80 MMHG | BODY MASS INDEX: 30.8 KG/M2 | OXYGEN SATURATION: 95 % | HEIGHT: 71 IN | SYSTOLIC BLOOD PRESSURE: 130 MMHG

## 2020-07-13 DIAGNOSIS — I25.10 CORONARY ARTERY DISEASE INVOLVING NATIVE CORONARY ARTERY OF NATIVE HEART WITHOUT ANGINA PECTORIS: Primary | ICD-10-CM

## 2020-07-13 DIAGNOSIS — E78.2 MIXED HYPERLIPIDEMIA: ICD-10-CM

## 2020-07-13 DIAGNOSIS — I48.0 PAROXYSMAL ATRIAL FIBRILLATION (HCC): ICD-10-CM

## 2020-07-13 DIAGNOSIS — I10 ESSENTIAL HYPERTENSION: ICD-10-CM

## 2020-07-13 PROCEDURE — 99213 OFFICE O/P EST LOW 20 MIN: CPT | Performed by: INTERNAL MEDICINE

## 2020-07-13 NOTE — ASSESSMENT & PLAN NOTE
Paroxysmal atrial fibrillation, stable  The patient is in regular rhythm  The pacemaker generator is showing and of service parameters  The patient will be referred for generator replacement

## 2020-07-13 NOTE — PROGRESS NOTES
Assessment/Plan:    Mixed hyperlipidemia    Hyperlipidemia, stable  The patient will continue atorvastatin at 10 mg daily  He will also continue fenofibrate 145 mg daily  Coronary artery disease involving native coronary artery of native heart without angina pectoris    Coronary artery disease, stable with no symptoms of angina or signs of heart failure  Paroxysmal atrial fibrillation (HCC)    Paroxysmal atrial fibrillation, stable  The patient is in regular rhythm  The pacemaker generator is showing and of service parameters  The patient will be referred for generator replacement  Essential hypertension    Hypertension, stable and adequately controlled  Diagnoses and all orders for this visit:    Coronary artery disease involving native coronary artery of native heart without angina pectoris    Paroxysmal atrial fibrillation [facility])  -     Ambulatory referral to Cardiac Electrophysiology; Future    Essential hypertension    Mixed hyperlipidemia          Subjective:   Feels well  Patient ID: Lazarus Duran is a 80 y o  male  The patient presented to this office for the purpose of cardiac follow-up  He has a known history of coronary artery disease with hypertension hyperlipidemia as well paroxysmal atrial fibrillation  The patient has been feeling well from the cardiac standpoint denying any symptoms of chest pain, shortness of breath, palpitation, dizziness or lightheadedness  Has no leg edema  The following portions of the patient's history were reviewed and updated as appropriate: allergies, current medications, past family history, past medical history, past social history, past surgical history and problem list     Review of Systems   Respiratory: Negative for apnea, cough, chest tightness, shortness of breath and wheezing  Cardiovascular: Negative for chest pain, palpitations and leg swelling  Gastrointestinal: Negative for abdominal pain     Neurological: Negative for dizziness and light-headedness  Hematological: Negative  Psychiatric/Behavioral: Negative  Objective:  Stable cardiac-wise  /80   Pulse 65   Ht 5' 11" (1 803 m)   Wt 99 8 kg (220 lb)   SpO2 95%   BMI 30 68 kg/m²          Physical Exam   Constitutional: He is oriented to person, place, and time  He appears well-developed and well-nourished  No distress  HENT:   Head: Normocephalic  Eyes: Pupils are equal, round, and reactive to light  Neck: Normal range of motion  No JVD present  No thyromegaly present  Cardiovascular: Normal rate, regular rhythm, S1 normal and S2 normal  Exam reveals no gallop and no friction rub  Murmur heard  Crescendo systolic murmur is present with a grade of 1/6  Pulmonary/Chest: Effort normal and breath sounds normal  No respiratory distress  He has no wheezes  He has no rales  He exhibits no tenderness  Abdominal: Soft  Musculoskeletal: Normal range of motion  He exhibits no edema, tenderness or deformity  Neurological: He is alert and oriented to person, place, and time  Skin: Skin is warm and dry  He is not diaphoretic  Psychiatric: He has a normal mood and affect  Vitals reviewed

## 2020-07-13 NOTE — PATIENT INSTRUCTIONS
The patient is advised to continue the same medications  The patient will be referred for generator replacement

## 2020-07-13 NOTE — ASSESSMENT & PLAN NOTE
Hyperlipidemia, stable  The patient will continue atorvastatin at 10 mg daily  He will also continue fenofibrate 145 mg daily

## 2020-07-13 NOTE — LETTER
July 13, 2020     Gregoria Umanzor MD  [address] [facility]    Patient: Nickie Kahn   YOB: 1935   Date of Visit: 7/13/2020       Dear Dr Lino Chaudhary: Thank you for referring Shasha Sanchez to me for evaluation  Below are my notes for this consultation  If you have questions, please do not hesitate to call me  I look forward to following your patient along with you  Sincerely,        Marissa Delgado MD        CC: Coleen Fu, DO Marissa Delgado MD  7/13/2020  2:53 PM  Sign at close encounter  Assessment/Plan:    Mixed hyperlipidemia    Hyperlipidemia, stable  The patient will continue atorvastatin at 10 mg daily  He will also continue fenofibrate 145 mg daily  Coronary artery disease involving native coronary artery of native heart without angina pectoris    Coronary artery disease, stable with no symptoms of angina or signs of heart failure  Paroxysmal atrial fibrillation (HCC)    Paroxysmal atrial fibrillation, stable  The patient is in regular rhythm  The pacemaker generator is showing and of service parameters  The patient will be referred for generator replacement  Essential hypertension    Hypertension, stable and adequately controlled  Diagnoses and all orders for this visit:    Coronary artery disease involving native coronary artery of native heart without angina pectoris    Paroxysmal atrial fibrillation [facility])  -     Ambulatory referral to Cardiac Electrophysiology; Future    Essential hypertension    Mixed hyperlipidemia          Subjective:   Feels well  Patient ID: Nickie Kahn is a 80 y o  male  The patient presented to this office for the purpose of cardiac follow-up  He has a known history of coronary artery disease with hypertension hyperlipidemia as well paroxysmal atrial fibrillation    The patient has been feeling well from the cardiac standpoint denying any symptoms of chest pain, shortness of breath, palpitation, dizziness or lightheadedness  Has no leg edema  The following portions of the patient's history were reviewed and updated as appropriate: allergies, current medications, past family history, past medical history, past social history, past surgical history and problem list     Review of Systems   Respiratory: Negative for apnea, cough, chest tightness, shortness of breath and wheezing  Cardiovascular: Negative for chest pain, palpitations and leg swelling  Gastrointestinal: Negative for abdominal pain  Neurological: Negative for dizziness and light-headedness  Hematological: Negative  Psychiatric/Behavioral: Negative  Objective:  Stable cardiac-wise  /80   Pulse 65   Ht 5' 11" (1 803 m)   Wt 99 8 kg (220 lb)   SpO2 95%   BMI 30 68 kg/m²           Physical Exam   Constitutional: He is oriented to person, place, and time  He appears well-developed and well-nourished  No distress  HENT:   Head: Normocephalic  Eyes: Pupils are equal, round, and reactive to light  Neck: Normal range of motion  No JVD present  No thyromegaly present  Cardiovascular: Normal rate, regular rhythm, S1 normal and S2 normal  Exam reveals no gallop and no friction rub  Murmur heard  Crescendo systolic murmur is present with a grade of 1/6  Pulmonary/Chest: Effort normal and breath sounds normal  No respiratory distress  He has no wheezes  He has no rales  He exhibits no tenderness  Abdominal: Soft  Musculoskeletal: Normal range of motion  He exhibits no edema, tenderness or deformity  Neurological: He is alert and oriented to person, place, and time  Skin: Skin is warm and dry  He is not diaphoretic  Psychiatric: He has a normal mood and affect  Vitals reviewed

## 2020-07-14 ENCOUNTER — TELEPHONE (OUTPATIENT)
Dept: GASTROENTEROLOGY | Facility: AMBULARY SURGERY CENTER | Age: 85
End: 2020-07-14

## 2020-07-15 ENCOUNTER — APPOINTMENT (OUTPATIENT)
Dept: LAB | Age: 85
End: 2020-07-15
Payer: COMMERCIAL

## 2020-07-15 DIAGNOSIS — Z79.4 TYPE 2 DIABETES MELLITUS WITH STAGE 4 CHRONIC KIDNEY DISEASE, WITH LONG-TERM CURRENT USE OF INSULIN (HCC): ICD-10-CM

## 2020-07-15 DIAGNOSIS — IMO0002 SOLITARY KIDNEY: ICD-10-CM

## 2020-07-15 DIAGNOSIS — N18.4 CKD (CHRONIC KIDNEY DISEASE), STAGE IV (HCC): ICD-10-CM

## 2020-07-15 DIAGNOSIS — I48.0 PAROXYSMAL ATRIAL FIBRILLATION (HCC): ICD-10-CM

## 2020-07-15 DIAGNOSIS — N18.4 TYPE 2 DIABETES MELLITUS WITH STAGE 4 CHRONIC KIDNEY DISEASE, WITH LONG-TERM CURRENT USE OF INSULIN (HCC): ICD-10-CM

## 2020-07-15 DIAGNOSIS — I12.9 HYPERTENSIVE CHRONIC KIDNEY DISEASE WITH STAGE 1 THROUGH STAGE 4 CHRONIC KIDNEY DISEASE, OR UNSPECIFIED CHRONIC KIDNEY DISEASE: ICD-10-CM

## 2020-07-15 DIAGNOSIS — E11.22 TYPE 2 DIABETES MELLITUS WITH STAGE 4 CHRONIC KIDNEY DISEASE, WITH LONG-TERM CURRENT USE OF INSULIN (HCC): ICD-10-CM

## 2020-07-15 LAB
ALBUMIN SERPL BCP-MCNC: 3.1 G/DL (ref 3.5–5)
ANION GAP SERPL CALCULATED.3IONS-SCNC: 5 MMOL/L (ref 4–13)
BUN SERPL-MCNC: 26 MG/DL (ref 5–25)
CALCIUM SERPL-MCNC: 9.6 MG/DL (ref 8.3–10.1)
CHLORIDE SERPL-SCNC: 107 MMOL/L (ref 100–108)
CO2 SERPL-SCNC: 27 MMOL/L (ref 21–32)
CREAT SERPL-MCNC: 2.56 MG/DL (ref 0.6–1.3)
CREAT UR-MCNC: 106 MG/DL
ERYTHROCYTE [DISTWIDTH] IN BLOOD BY AUTOMATED COUNT: 13.8 % (ref 11.6–15.1)
GFR SERPL CREATININE-BSD FRML MDRD: 22 ML/MIN/1.73SQ M
GLUCOSE P FAST SERPL-MCNC: 178 MG/DL (ref 65–99)
HCT VFR BLD AUTO: 43 % (ref 36.5–49.3)
HGB BLD-MCNC: 13.8 G/DL (ref 12–17)
INR PPP: 3.18 (ref 0.84–1.19)
MCH RBC QN AUTO: 29.7 PG (ref 26.8–34.3)
MCHC RBC AUTO-ENTMCNC: 32.1 G/DL (ref 31.4–37.4)
MCV RBC AUTO: 93 FL (ref 82–98)
MICROALBUMIN UR-MCNC: 130 MG/L (ref 0–20)
MICROALBUMIN/CREAT 24H UR: 123 MG/G CREATININE (ref 0–30)
PHOSPHATE SERPL-MCNC: 3.6 MG/DL (ref 2.3–4.1)
PLATELET # BLD AUTO: 261 THOUSANDS/UL (ref 149–390)
PMV BLD AUTO: 10.8 FL (ref 8.9–12.7)
POTASSIUM SERPL-SCNC: 4.7 MMOL/L (ref 3.5–5.3)
PROTHROMBIN TIME: 32.3 SECONDS (ref 11.6–14.5)
PTH-INTACT SERPL-MCNC: 45.5 PG/ML (ref 18.4–80.1)
RBC # BLD AUTO: 4.65 MILLION/UL (ref 3.88–5.62)
SODIUM SERPL-SCNC: 139 MMOL/L (ref 136–145)
URATE SERPL-MCNC: 5.2 MG/DL (ref 4.2–8)
WBC # BLD AUTO: 7.45 THOUSAND/UL (ref 4.31–10.16)

## 2020-07-15 PROCEDURE — 83970 ASSAY OF PARATHORMONE: CPT

## 2020-07-15 PROCEDURE — 85610 PROTHROMBIN TIME: CPT

## 2020-07-15 PROCEDURE — 36415 COLL VENOUS BLD VENIPUNCTURE: CPT

## 2020-07-15 PROCEDURE — 84550 ASSAY OF BLOOD/URIC ACID: CPT

## 2020-07-15 PROCEDURE — 85027 COMPLETE CBC AUTOMATED: CPT

## 2020-07-15 PROCEDURE — 82570 ASSAY OF URINE CREATININE: CPT

## 2020-07-15 PROCEDURE — 82043 UR ALBUMIN QUANTITATIVE: CPT

## 2020-07-15 PROCEDURE — 80069 RENAL FUNCTION PANEL: CPT

## 2020-07-16 ENCOUNTER — CONSULT (OUTPATIENT)
Dept: GASTROENTEROLOGY | Facility: AMBULARY SURGERY CENTER | Age: 85
End: 2020-07-16
Payer: COMMERCIAL

## 2020-07-16 ENCOUNTER — ANTICOAG VISIT (OUTPATIENT)
Dept: CARDIOLOGY CLINIC | Facility: CLINIC | Age: 85
End: 2020-07-16

## 2020-07-16 VITALS
TEMPERATURE: 99.5 F | WEIGHT: 219.8 LBS | RESPIRATION RATE: 18 BRPM | HEART RATE: 80 BPM | HEIGHT: 71 IN | BODY MASS INDEX: 30.77 KG/M2

## 2020-07-16 DIAGNOSIS — K59.00 CONSTIPATION, UNSPECIFIED CONSTIPATION TYPE: ICD-10-CM

## 2020-07-16 DIAGNOSIS — I48.0 PAROXYSMAL ATRIAL FIBRILLATION (HCC): ICD-10-CM

## 2020-07-16 DIAGNOSIS — R13.14 PHARYNGOESOPHAGEAL DYSPHAGIA: Primary | ICD-10-CM

## 2020-07-16 DIAGNOSIS — K22.0 ACHALASIA: ICD-10-CM

## 2020-07-16 PROCEDURE — 99203 OFFICE O/P NEW LOW 30 MIN: CPT | Performed by: INTERNAL MEDICINE

## 2020-07-16 NOTE — PROGRESS NOTES
[facility] - Outpatient Consultation  Ofelia Lou 80 y o  male MRN: [id number]  Encounter: [id number]          ASSESSMENT AND PLAN:      1  Dysphagia  - wife states he was diagnosed with achalasia (cannot find esophageal manometry)  - last EGD with botox 12/2019  - schedule for EGD with botox (be scheduled hospital as patient has MELQUIADES)  - as he also reports choking/coughing with swallowing recommend modified barium swallow    2  Constipation  - increase dietary fiber (fruits and veggies), increase hydration with water and increase activity/exercise  - start fiber supplement (metamucil, citrucel, or benefiber) daily   - continue probiotics    3  Co-morbidities: T2DM, HTN, HL, pAfib, CAD, dementia, BPH, CKD, MELQUIADES on CPAP    Follow up in 6 months    ____________________________________________________________________  HPI:  49-year-old man who presents to establish care for difficulty swallowing presumed to be due to achalasia  Co-morbidities: T2DM, HTN, HL, pAfib, CAD, dementia, BPH, CKD, MELQUIADES on CPAP    Difficulty swallowing  - began 5 years ago  - choking and coughing when swallowing  - this occurs 2-3x per week  - no food sticking  - no odynophagia  - no weight loss  - no heartburn and acid reflux  - wife thinks he had esophageal manometry in the past and diagnosed with achalasia  - usually had improvement of symptoms after EGD with botox for achalasia  Constipation  - 1 BM daily  - not straining    ROS: No abd pain, nausea, vomiting, diarrhea, constipation, hematemesis, melena, hematochezia, early satiety, appetite changes, wt loss  REVIEW OF SYSTEMS:    CONSTITUTIONAL: Denies any fever, chills, rigors, and weight loss  HEENT: No earache or tinnitus  Denies hearing loss or visual disturbances  CARDIOVASCULAR: No chest pain or palpitations  RESPIRATORY: Denies any cough, hemoptysis, shortness of breath or dyspnea on exertion    GASTROINTESTINAL: As noted in the History of Present Illness  GENITOURINARY: No problems with urination  Denies any hematuria or dysuria  NEUROLOGIC: No dizziness or vertigo, denies headaches  MUSCULOSKELETAL: Denies any muscle or joint pain  SKIN: Denies skin rashes or itching  ENDOCRINE: Denies excessive thirst  Denies intolerance to heat or cold  PSYCHOSOCIAL: Denies depression or anxiety  Denies any recent memory loss  Historical Information   Past Medical History:   Diagnosis Date    Anxiety     Aspiration of liquid     and food per wife if he is eating too fast or talking when eating    Asthma     as a child    [facility] fibrillation [facility] )     CAD (coronary artery disease)     Cancer of kidney ([facility] )     COPD (chronic obstructive pulmonary disease) ([place] [address] )     CPAP (continuous positive airway pressure) dependence     Dementia ([place] [address] )     Frontal lobe    Dementia ([place] [address] )     Diabetes mellitus ([place] [address] )     DJD (degenerative joint disease)     Hypercholesterolemia     Hyperlipidemia     Hypertension     Kidney disease     Melanoma ([place] [address] )     left leg    Obesity     Sleep apnea     wears CPAP    TIA (transient ischemic attack)      Past Surgical History:   Procedure Laterality Date    CARDIAC PACEMAKER PLACEMENT      CHOLECYSTECTOMY      COLONOSCOPY      HERNIA REPAIR      NEPHRECTOMY Left 2005    AR ESOPHAGOGASTRODUODENOSCOPY SUBMUCOSAL INJECTION N/A 9/21/2016    Procedure: ESOPHAGOGASTRODUODENOSCOPY (EGD); Surgeon: Smiley Gerardo MD;  [facility]; Service: Gastroenterology    AR ESOPHAGOGASTRODUODENOSCOPY SUBMUCOSAL INJECTION N/A 2/7/2018    Procedure: ESOPHAGOGASTRODUODENOSCOPY (EGD); Surgeon: Smiley Gerardo MD;  [facility]; Service: Gastroenterology    AR ESOPHAGOGASTRODUODENOSCOPY SUBMUCOSAL INJECTION N/A 4/3/2019    Procedure: ESOPHAGOGASTRODUODENOSCOPY (EGD) WITH BOTOX;  Surgeon: Smiley Gerardo MD;  [facility];   Service: Gastroenterology    ROTATOR CUFF REPAIR      TONSILLECTOMY       Social History   Social History     Substance and Sexual Activity   Alcohol Use No     Social History     Substance and Sexual Activity   Drug Use No     Social History     Tobacco Use   Smoking Status Never Smoker   Smokeless Tobacco Never Used     Family History   Problem Relation Age of Onset    Brain cancer Father     Lung cancer Father     Diabetes Family     Heart disease Family     Hypertension Family     Cancer Family         renal cell carcinoma    Stroke Family     Colon cancer Son        Meds/Allergies       Current Outpatient Medications:     amLODIPine (NORVASC) 2 5 mg tablet    atorvastatin (LIPITOR) 10 mg tablet    cholecalciferol (VITAMIN D3) 1,000 units tablet    cyanocobalamin (VITAMIN B-12) 500 mcg tablet    docusate sodium (COLACE) 100 mg capsule    donepezil (ARICEPT) 10 mg tablet    fenofibrate (TRICOR) 145 mg tablet    ferrous sulfate 325 (65 Fe) mg tablet    fluticasone (FLONASE) 50 mcg/act nasal spray    insulin aspart (NovoLOG) 100 units/mL injection    insulin glargine (LANTUS) 100 units/mL subcutaneous injection    ipratropium (ATROVENT HFA) 17 mcg/act inhaler    ipratropium (ATROVENT) 0 03 % nasal spray    levalbuterol (XOPENEX) 0 63 mg/3 mL nebulizer solution    loratadine (CLARITIN) 10 mg tablet    losartan (COZAAR) 25 mg tablet    Magnesium 250 MG TABS    magnesium gluconate (MAGONATE) 500 mg tablet    Memantine HCl ER 28 MG CP24    Multiple Vitamin (MULTIVITAMIN) capsule    NOVOFINE 32G X 6 MM MISC    NOVOLOG FLEXPEN 100 units/mL injection pen    omega-3-acid ethyl esters (LOVAZA) 1 g capsule    ONETOUCH DELICA LANCETS 60H MISC    ONETOUCH VERIO test strip    Probiotic Product (PROBIOTIC COLON SUPPORT PO)    propafenone (RYTHMOL) 225 mg tablet    sertraline (ZOLOFT) 100 mg tablet    sodium chloride (OCEAN) 0 65 % nasal spray    warfarin (COUMADIN) 2 5 mg tablet    warfarin (COUMADIN) 5 mg tablet    Allergies   Allergen Reactions    Ambien [Zolpidem Tartrate] Hallucinations    Bextra [Valdecoxib] Hives    Dust Mite Extract Sneezing    Lyrica [Pregabalin] Confusion    Mold Extract [Trichophyton] Sneezing    Pollen Extract Sneezing    Tree Extract Other (See Comments) and Sneezing     congestion           Objective     Pulse 80   Temp 99 5 °F (37 5 °C)   Resp 18   Ht 5' 11" (1 803 m)   Wt 99 7 kg (219 lb 12 8 oz)   BMI 30 66 kg/m²         PHYSICAL EXAM:      General Appearance:   Alert, cooperative, no distress   HEENT:   Normocephalic, atraumatic, anicteric  Neck:  Supple, symmetrical, trachea midline   Lungs:   Clear to auscultation bilaterally; no rales, rhonchi or wheezing; respirations unlabored    Heart[de-identified]   Regular rate and rhythm; no murmur, rub, or gallop  Abdomen:   Soft, non-tender, non-distended; normal bowel sounds; no masses, no organomegaly    Rectal:   Deferred   Neuro:   Alert, oriented, no gross deficits, normal strength and tone   Extremities:  No cyanosis, clubbing or edema    Psych:  Normal mood and affect    Skin:  No jaundice, rashes, or lesions    Lymph nodes:  No palpable cervical lymphadenopathy        Lab Results:   No visits with results within 1 Day(s) from this visit     Latest known visit with results is:   Appointment on 07/15/2020   Component Date Value    WBC 07/15/2020 7 45     RBC 07/15/2020 4 65     Hemoglobin 07/15/2020 13 8     Hematocrit 07/15/2020 43 0     MCV 07/15/2020 93     MCH 07/15/2020 29 7     MCHC 07/15/2020 32 1     RDW 07/15/2020 13 8     Platelets 29/73/1494 261     MPV 07/15/2020 10 8     Albumin 07/15/2020 3 1*    Calcium 07/15/2020 9 6     Phosphorus 07/15/2020 3 6     BUN 07/15/2020 26*    Creatinine 07/15/2020 2 56*    Sodium 07/15/2020 139     Potassium 07/15/2020 4 7     Chloride 07/15/2020 107     CO2 07/15/2020 27     ANION GAP 07/15/2020 5     eGFR 07/15/2020 22     Glucose, Fasting 07/15/2020 178*    PTH 07/15/2020 45 5     Creatinine, Ur 07/15/2020 106 0     Microalbum  ,U,Random 07/15/2020 130 0*    Microalb Creat Ratio 07/15/2020 123*    Uric Acid 07/15/2020 5 2     Protime 07/15/2020 32 3*    INR 07/15/2020 3 18*         Radiology Results:   No results found      Endoscopies:

## 2020-07-16 NOTE — PATIENT INSTRUCTIONS
Difficulty swallowing  - schedule for EGD with botox  - schedule modified barium swallow    Constipation  - increase dietary fiber (fruits and veggies), increase hydration with water and increase activity/exercise  - start fiber supplement (metamucil, citrucel, or benefiber) daily (over the counter, generic)  - continue probiotics    Follow up in 6 months

## 2020-07-27 ENCOUNTER — OFFICE VISIT (OUTPATIENT)
Dept: NEPHROLOGY | Facility: CLINIC | Age: 85
End: 2020-07-27
Payer: COMMERCIAL

## 2020-07-27 VITALS
RESPIRATION RATE: 16 BRPM | BODY MASS INDEX: 30.24 KG/M2 | DIASTOLIC BLOOD PRESSURE: 74 MMHG | WEIGHT: 216 LBS | HEIGHT: 71 IN | HEART RATE: 65 BPM | TEMPERATURE: 97.6 F | SYSTOLIC BLOOD PRESSURE: 122 MMHG

## 2020-07-27 DIAGNOSIS — I12.9 HYPERTENSIVE CHRONIC KIDNEY DISEASE WITH STAGE 1 THROUGH STAGE 4 CHRONIC KIDNEY DISEASE, OR UNSPECIFIED CHRONIC KIDNEY DISEASE: ICD-10-CM

## 2020-07-27 DIAGNOSIS — N18.4 TYPE 2 DIABETES MELLITUS WITH STAGE 4 CHRONIC KIDNEY DISEASE, WITH LONG-TERM CURRENT USE OF INSULIN (HCC): ICD-10-CM

## 2020-07-27 DIAGNOSIS — E11.22 TYPE 2 DIABETES MELLITUS WITH STAGE 4 CHRONIC KIDNEY DISEASE, WITH LONG-TERM CURRENT USE OF INSULIN (HCC): ICD-10-CM

## 2020-07-27 DIAGNOSIS — Z79.4 TYPE 2 DIABETES MELLITUS WITH STAGE 4 CHRONIC KIDNEY DISEASE, WITH LONG-TERM CURRENT USE OF INSULIN (HCC): ICD-10-CM

## 2020-07-27 DIAGNOSIS — N18.4 CKD (CHRONIC KIDNEY DISEASE), STAGE IV (HCC): Primary | ICD-10-CM

## 2020-07-27 DIAGNOSIS — IMO0002 SOLITARY KIDNEY: ICD-10-CM

## 2020-07-27 DIAGNOSIS — E78.2 MIXED HYPERLIPIDEMIA: ICD-10-CM

## 2020-07-27 PROCEDURE — 99214 OFFICE O/P EST MOD 30 MIN: CPT | Performed by: INTERNAL MEDICINE

## 2020-07-27 RX ORDER — FEXOFENADINE HCL 180 MG/1
180 TABLET ORAL DAILY
COMMUNITY
End: 2022-04-13 | Stop reason: HOSPADM

## 2020-07-27 NOTE — PROGRESS NOTES
NEPHROLOGY OUTPATIENT PROGRESS NOTE   Rosita Corey 80 y o  male MRN: [id number]  Reason for visit:  Chronic kidney disease    ASSESSMENT and PLAN:  1  Chronic kidney disease, stage IV, baseline creatinine between 2 2 in 2 6 most recent creatinine 2 5 with an estimated GFR 22, underlying disease most likely secondary to diabetes plus hypertension  2  Diabetes with associated nephropathy most recent hemoglobin A1c 8 4  3  CKD associated mineral bone disorder, PTH within normal range 45 5  4  Hypertension, blood pressure appears well controlled  5  Dyslipidemia, continue with current statin dosing  6  Dementia, unfortunate overall worsening    · Overall renal function remains fairly stable  · No changes in current regimen  · Given age and underlying dementia, would be conservative in management  Dialysis may not improve quality of life  · Plan to see him in approximately 3 months with repeat laboratory studies at that time  SUBJECTIVE / INTERVAL HISTORY:  Seen examined  Patient doing reasonably well  No recent hospitalizations or illnesses  Denies any chest pain shortness of breath or swelling  Denies any appetite changes  Blood sugars have been very fluctuant at home  According to his wife, dimension has slowly worsened  OBJECTIVE:  /74 (BP Location: Left arm, Patient Position: Sitting, Cuff Size: Standard)   Pulse 65   Temp 97 6 °F (36 4 °C) (Tympanic)   Resp 16   Ht 5' 11" (1 803 m)   Wt 98 kg (216 lb)   BMI 30 13 kg/m²   Vitals:    07/27/20 0933   Weight: 98 kg (216 lb)       Physical Exam   Constitutional: No distress  Eyes: No scleral icterus  Neck: No thyromegaly present  Cardiovascular: Normal rate and regular rhythm  Pulmonary/Chest: Effort normal and breath sounds normal    Abdominal: Soft  He exhibits distension  There is no tenderness  Musculoskeletal: He exhibits no edema or deformity  Neurological: He is alert  Skin: Skin is warm and dry  No rash noted  Medications:    Current Outpatient Medications:     amLODIPine (NORVASC) 2 5 mg tablet, Take 1 tablet (2 5 mg total) by mouth daily, Disp: 90 tablet, Rfl: 3    atorvastatin (LIPITOR) 10 mg tablet, Take 10 mg by mouth daily  , Disp: , Rfl:     cholecalciferol (VITAMIN D3) 1,000 units tablet, Take 2,000 Units by mouth daily, Disp: , Rfl:     cyanocobalamin (VITAMIN B-12) 500 mcg tablet, Take 500 mcg by mouth daily, Disp: , Rfl:     docusate sodium (COLACE) 100 mg capsule, Take 100 mg by mouth 3 (three) times a day , Disp: , Rfl:     donepezil (ARICEPT) 10 mg tablet, Take 1 tablet (10 mg total) by mouth daily at bedtime (Patient taking differently: Take 10 mg by mouth daily ), Disp: 90 tablet, Rfl: 3    fenofibrate (TRICOR) 145 mg tablet, Take 145 mg by mouth daily  , Disp: , Rfl:     ferrous sulfate 325 (65 Fe) mg tablet, Take 325 mg by mouth as needed , Disp: , Rfl:     fexofenadine (ALLEGRA) 180 MG tablet, Take 180 mg by mouth daily, Disp: , Rfl:     fluticasone (FLONASE) 50 mcg/act nasal spray, 1 spray into each nostril daily, Disp: , Rfl:     insulin aspart (NovoLOG) 100 units/mL injection, Inject 8 Units under the skin 2 (two) times a day before meals , Disp: , Rfl:     insulin glargine (LANTUS) 100 units/mL subcutaneous injection, Inject 30 Units under the skin 2 (two) times a day , Disp: , Rfl:     ipratropium (ATROVENT HFA) 17 mcg/act inhaler, Inhale 2 puffs every 6 (six) hours  , Disp: , Rfl:     ipratropium (ATROVENT) 0 03 % nasal spray, 2 sprays into each nostril every 12 (twelve) hours, Disp: 30 mL, Rfl: 0    levalbuterol (XOPENEX) 0 63 mg/3 mL nebulizer solution, Take 1 ampule by nebulization 2 (two) times a day as needed for wheezing , Disp: , Rfl:     losartan (COZAAR) 25 mg tablet, TAKE 1 TABLET DAILY, Disp: 90 tablet, Rfl: 3    Magnesium 250 MG TABS, Take 500 mg by mouth daily , Disp: , Rfl:     Memantine HCl ER 28 MG CP24, TAKE ONE CAPSULE BY MOUTH EVERY DAY, Disp: 90 capsule, Rfl: 3    Multiple Vitamin (MULTIVITAMIN) capsule, Take 1 capsule by mouth daily, Disp: , Rfl:     NOVOFINE 32G X 6 MM MISC, , Disp: , Rfl:     NOVOLOG FLEXPEN 100 units/mL injection pen, 8 Units 2 (two) times a day with meals , Disp: , Rfl:     omega-3-acid ethyl esters (LOVAZA) 1 g capsule, Take 1 g by mouth 4 (four) times a day , Disp: , Rfl:     ONETOUCH DELICA LANCETS 30U MISC, , Disp: , Rfl:     ONETOUCH VERIO test strip, , Disp: , Rfl:     Probiotic Product (PROBIOTIC COLON SUPPORT PO), Take 1 tablet by mouth daily, Disp: , Rfl:     propafenone (RYTHMOL) 225 mg tablet, Take 1 tablet (225 mg total) by mouth 3 (three) times a day, Disp: 270 tablet, Rfl: 3    sertraline (ZOLOFT) 100 mg tablet, Take 1 tablet (100 mg total) by mouth daily, Disp: 90 tablet, Rfl: 3    warfarin (COUMADIN) 2 5 mg tablet, Take 2 5 mg by mouth 3 (three) times a week Indications: Mon wed Fri   , Disp: , Rfl:     warfarin (COUMADIN) 5 mg tablet, Take 1 tablet (5 mg total) by mouth daily (Patient taking differently: Take 5 mg by mouth daily Monday friday), Disp: 90 tablet, Rfl: 3    loratadine (CLARITIN) 10 mg tablet, Take 10 mg by mouth daily  , Disp: , Rfl:     magnesium gluconate (MAGONATE) 500 mg tablet, Take 500 mg by mouth daily  , Disp: , Rfl:     sodium chloride (OCEAN) 0 65 % nasal spray, 1 spray into each nostril, Disp: , Rfl:     Laboratory Results:  Results for orders placed or performed in visit on 07/15/20   CBC   Result Value Ref Range    WBC 7 45 4 31 - 10 16 Thousand/uL    RBC 4 65 3 88 - 5 62 Million/uL    Hemoglobin 13 8 12 0 - 17 0 g/dL    Hematocrit 43 0 36 5 - 49 3 %    MCV 93 82 - 98 fL    MCH 29 7 26 8 - 34 3 pg    MCHC 32 1 31 4 - 37 4 g/dL    RDW 13 8 11 6 - 15 1 %    Platelets 637 195 - 211 Thousands/uL    MPV 10 8 8 9 - 12 7 fL   Renal function panel   Result Value Ref Range    Albumin 3 1 (L) 3 5 - 5 0 g/dL    Calcium 9 6 8 3 - 10 1 mg/dL    Phosphorus 3 6 2 3 - 4 1 mg/dL    BUN 26 (H) 5 - 25 mg/dL Creatinine 2 56 (H) 0 60 - 1 30 mg/dL    Sodium 139 136 - 145 mmol/L    Potassium 4 7 3 5 - 5 3 mmol/L    Chloride 107 100 - 108 mmol/L    CO2 27 21 - 32 mmol/L    ANION GAP 5 4 - 13 mmol/L    eGFR 22 ml/min/1 73sq m    Glucose, Fasting 178 (H) 65 - 99 mg/dL   PTH, intact   Result Value Ref Range    PTH 45 5 18 4 - 80 1 pg/mL   Microalbumin / creatinine urine ratio   Result Value Ref Range    Creatinine, Ur 106 0 mg/dL    Microalbum  ,U,Random 130 0 (H) 0 0 - 20 0 mg/L    Microalb Creat Ratio 123 (H) 0 - 30 mg/g creatinine   Uric acid   Result Value Ref Range    Uric Acid 5 2 4 2 - 8 0 mg/dL   Protime-INR   Result Value Ref Range    Protime 32 3 (H) 11 6 - 14 5 seconds    INR 3 18 (H) 0 84 - 1 19

## 2020-07-27 NOTE — LETTER
July 27, 2020     Tony Phillips MD  [address] [address]    Patient: Vale López   YOB: 1935   Date of Visit: 7/27/2020       Dear Dr Carla Gilliland: Thank you for referring Jeremy Khalil to me for evaluation  Below are the relevant portions of my assessment and plan of care  If you have questions, please do not hesitate to call me  I look forward to following [facility] along with you  Sincerely,        Alpheus Dance,         CC: No Recipients                         ASSESSMENT and PLAN:  1  Chronic kidney disease, stage IV, baseline creatinine between 2 2 in 2 6 most recent creatinine 2 5 with an estimated GFR 22, underlying disease most likely secondary to diabetes plus hypertension  2  Diabetes with associated nephropathy most recent hemoglobin A1c  4  3  CKD associated mineral bone disorder, PTH within normal range 45 5  4  Hypertension, blood pressure appears well controlled  5  Dyslipidemia, continue with current statin dosing  6  Dementia, unfortunate overall worsening    · Overall renal function remains fairly stable  · No changes in current regimen  · Given age and underlying dementia, would be conservative in management  Dialysis may not improve quality of life  · Plan to see him in approximately 3 months with repeat laboratory studies at that time

## 2020-07-29 ENCOUNTER — APPOINTMENT (OUTPATIENT)
Dept: LAB | Age: 85
End: 2020-07-29
Payer: COMMERCIAL

## 2020-07-29 DIAGNOSIS — I48.0 PAROXYSMAL ATRIAL FIBRILLATION (HCC): ICD-10-CM

## 2020-07-30 ENCOUNTER — ANTICOAG VISIT (OUTPATIENT)
Dept: CARDIOLOGY CLINIC | Facility: CLINIC | Age: 85
End: 2020-07-30

## 2020-07-30 DIAGNOSIS — I48.0 PAROXYSMAL ATRIAL FIBRILLATION (HCC): ICD-10-CM

## 2020-08-01 DIAGNOSIS — Z11.59 SCREENING FOR VIRAL DISEASE: ICD-10-CM

## 2020-08-01 PROCEDURE — U0003 INFECTIOUS AGENT DETECTION BY NUCLEIC ACID (DNA OR RNA); SEVERE ACUTE RESPIRATORY SYNDROME CORONAVIRUS 2 (SARS-COV-2) (CORONAVIRUS DISEASE [COVID-19]), AMPLIFIED PROBE TECHNIQUE, MAKING USE OF HIGH THROUGHPUT TECHNOLOGIES AS DESCRIBED BY CMS-2020-01-R: HCPCS

## 2020-08-02 LAB — SARS-COV-2 RNA SPEC QL NAA+PROBE: NOT DETECTED

## 2020-08-06 ENCOUNTER — HOSPITAL ENCOUNTER (OUTPATIENT)
Dept: RADIOLOGY | Facility: HOSPITAL | Age: 85
Discharge: HOME | End: 2020-08-06
Attending: INTERNAL MEDICINE
Payer: COMMERCIAL

## 2020-08-06 DIAGNOSIS — R13.14 PHARYNGOESOPHAGEAL DYSPHAGIA: ICD-10-CM

## 2020-08-06 PROCEDURE — 74230 X-RAY XM SWLNG FUNCJ C+: CPT

## 2020-08-06 PROCEDURE — 92611 MOTION FLUOROSCOPY/SWALLOW: CPT

## 2020-08-06 NOTE — PROCEDURES
Video Swallow Study      Patient Name: Vale López  Today's Date: 8/6/2020        Past Medical History  Past Medical History:   Diagnosis Date    Anxiety     Aspiration of liquid     and food per wife if he is eating too fast or talking when eating    Asthma     as a child    [facility]     CAD (coronary artery disease)     [facility]     COPD (chronic obstructive pulmonary disease) (HCC)     CPAP (continuous positive airway pressure) dependence     [facility]     Frontal lobe    Dementia ([facility] )     Diabetes mellitus ([facility] )     DJD (degenerative joint disease)     Hypercholesterolemia     Hyperlipidemia     Hypertension     Kidney disease     Melanoma ([facility] )     left leg    Obesity     Sleep apnea     wears CPAP    TIA (transient ischemic attack)         Past Surgical History  Past Surgical History:   Procedure Laterality Date    CARDIAC PACEMAKER PLACEMENT      CHOLECYSTECTOMY      COLONOSCOPY      HERNIA REPAIR      NEPHRECTOMY Left 2005    VA ESOPHAGOGASTRODUODENOSCOPY SUBMUCOSAL INJECTION N/A 9/21/2016    Procedure: ESOPHAGOGASTRODUODENOSCOPY (EGD); Surgeon: Karina Colon MD;  [facility]; Service: Gastroenterology    VA ESOPHAGOGASTRODUODENOSCOPY SUBMUCOSAL INJECTION N/A 2/7/2018    Procedure: ESOPHAGOGASTRODUODENOSCOPY (EGD); Surgeon: Karina Colon MD;  [facility]; Service: Gastroenterology    VA ESOPHAGOGASTRODUODENOSCOPY SUBMUCOSAL INJECTION N/A 4/3/2019    Procedure: ESOPHAGOGASTRODUODENOSCOPY (EGD) WITH BOTOX;  Surgeon: Karina Colon MD;  [facility]; Service: Gastroenterology    ROTATOR CUFF REPAIR      TONSILLECTOMY           General Information:    81 yo gentleman referred to [facility]  for a VBS by Dr William Dee  for dysphagia w/  c/o coughing and choking episodes, does not happen all the time  Wife feels the patient eats too fast and talks while eating       Cognition: WFL    Speech/Swallow Mech: Oral motor movements appeared  WNL; Dentition was upper partial; Respiratory Status:WNL on RA;   Current diet: regular w/ thin liquids  Prior VBS none    Pt was seen in radiology for a Video Barium Swallow Study, seated in the upright position and viewed laterally with the following consistencies: puree, soft/solid, hard solid, HTL, NTL, thin liquids, barium pill w/ water   Results are as follows:     **Images are available for review on PACS          Oral Stage: mild   pt w/ adequate bolus retrieval  Slow, decreased oral processing of soft and hard solids  Pt w/ good oral control, slow transfer of foods > liquids  Mild oral residue noted  ** pt continuously talking throughout study with food in his mouth  Pharyngeal Stage: WFL   swallow initiation was timely, pharyngeal constriction was mildly weak at times, suspect related to talking and effort of swallow  Mild pharyngeal residue noted  No penetration or aspiration observed  Barium pill w/ water passed through pharynx w/o difficulty  Esophageal Stage:   briefly assessed; pill stasis noted at LES, also corkscrew appearance of distal esophagus, ? Presbyesophagus  Pt is scheduled for EGD  Assessment Summary:   Mild oropharnygeal dysphagia- pt tend to talk while eating, suspect decreased pharyngeal constriction at times w/ mild pharyngeal residue noted  No penetration or aspiration observed  Diagnosis/Prognosis:            Recommendations:   cont regular diet w/ thin liquids   meds as tolerated   Minimize talking and distractions during meals    Aspiration precautions    April Johnathon Hopkins MA CCC-SLP  Speech Patholgist  [place] license # [id number] [facility] [id number]  [place] license # [id number]  Available via [employer]

## 2020-08-07 ENCOUNTER — TELEPHONE (OUTPATIENT)
Dept: GASTROENTEROLOGY | Facility: AMBULARY SURGERY CENTER | Age: 85
End: 2020-08-07

## 2020-08-07 ENCOUNTER — TELEPHONE (OUTPATIENT)
Dept: CARDIOLOGY CLINIC | Facility: CLINIC | Age: 85
End: 2020-08-07

## 2020-08-07 NOTE — TELEPHONE ENCOUNTER
Pt is rescheduled to 8/13/2020 due to coumadin hold/dr melgar's pt on for 8/13/2020 at [facility]/will call pt's wife Kirt Kim re: Hold request faxed to dr Katerine Hyatt in dr Govind Blackwell office aware

## 2020-08-07 NOTE — TELEPHONE ENCOUNTER
Dr Elia Cabrera pt:  Received a call from GI, just now, asking to hold coumadin starting tomorrow for total of 5 days for EGD on Thursday   Please advise

## 2020-08-12 ENCOUNTER — ANESTHESIA EVENT (OUTPATIENT)
Dept: GASTROENTEROLOGY | Facility: HOSPITAL | Age: 85
End: 2020-08-12

## 2020-08-13 ENCOUNTER — HOSPITAL ENCOUNTER (OUTPATIENT)
Dept: GASTROENTEROLOGY | Facility: HOSPITAL | Age: 85
Setting detail: OUTPATIENT SURGERY
Discharge: HOME | End: 2020-08-13
Attending: INTERNAL MEDICINE | Admitting: INTERNAL MEDICINE
Payer: COMMERCIAL

## 2020-08-13 ENCOUNTER — ANESTHESIA (OUTPATIENT)
Dept: GASTROENTEROLOGY | Facility: HOSPITAL | Age: 85
End: 2020-08-13

## 2020-08-13 VITALS
DIASTOLIC BLOOD PRESSURE: 72 MMHG | BODY MASS INDEX: 30.24 KG/M2 | OXYGEN SATURATION: 93 % | WEIGHT: 216 LBS | HEART RATE: 65 BPM | SYSTOLIC BLOOD PRESSURE: 123 MMHG | TEMPERATURE: 97 F | HEIGHT: 71 IN | RESPIRATION RATE: 16 BRPM

## 2020-08-13 DIAGNOSIS — K22.0 ACHALASIA: ICD-10-CM

## 2020-08-13 DIAGNOSIS — K59.00 CONSTIPATION, UNSPECIFIED CONSTIPATION TYPE: ICD-10-CM

## 2020-08-13 DIAGNOSIS — R13.14 PHARYNGOESOPHAGEAL DYSPHAGIA: ICD-10-CM

## 2020-08-13 PROBLEM — Z95.0 PACEMAKER: Status: ACTIVE | Noted: 2020-08-13

## 2020-08-13 PROBLEM — D68.9 COAGULATION DISORDER (HCC): Status: ACTIVE | Noted: 2020-08-13

## 2020-08-13 LAB — GLUCOSE SERPL-MCNC: 124 MG/DL (ref 65–140)

## 2020-08-13 PROCEDURE — 43236 UPPR GI SCOPE W/SUBMUC INJ: CPT | Performed by: INTERNAL MEDICINE

## 2020-08-13 PROCEDURE — 43239 EGD BIOPSY SINGLE/MULTIPLE: CPT | Performed by: INTERNAL MEDICINE

## 2020-08-13 PROCEDURE — 88305 TISSUE EXAM BY PATHOLOGIST: CPT | Performed by: PATHOLOGY

## 2020-08-13 PROCEDURE — 82948 REAGENT STRIP/BLOOD GLUCOSE: CPT

## 2020-08-13 RX ORDER — PROPOFOL 10 MG/ML
INJECTION, EMULSION INTRAVENOUS AS NEEDED
Status: DISCONTINUED | OUTPATIENT
Start: 2020-08-13 | End: 2020-08-13

## 2020-08-13 RX ORDER — GLYCOPYRROLATE 0.2 MG/ML
INJECTION INTRAMUSCULAR; INTRAVENOUS AS NEEDED
Status: DISCONTINUED | OUTPATIENT
Start: 2020-08-13 | End: 2020-08-13

## 2020-08-13 RX ORDER — SODIUM CHLORIDE, SODIUM LACTATE, POTASSIUM CHLORIDE, CALCIUM CHLORIDE 600; 310; 30; 20 MG/100ML; MG/100ML; MG/100ML; MG/100ML
INJECTION, SOLUTION INTRAVENOUS CONTINUOUS PRN
Status: DISCONTINUED | OUTPATIENT
Start: 2020-08-13 | End: 2020-08-13

## 2020-08-13 RX ORDER — LIDOCAINE HYDROCHLORIDE 10 MG/ML
INJECTION, SOLUTION EPIDURAL; INFILTRATION; INTRACAUDAL; PERINEURAL AS NEEDED
Status: DISCONTINUED | OUTPATIENT
Start: 2020-08-13 | End: 2020-08-13

## 2020-08-13 RX ADMIN — SODIUM CHLORIDE, SODIUM LACTATE, POTASSIUM CHLORIDE, AND CALCIUM CHLORIDE: .6; .31; .03; .02 INJECTION, SOLUTION INTRAVENOUS at 08:11

## 2020-08-13 RX ADMIN — PROPOFOL 40 MG: 10 INJECTION, EMULSION INTRAVENOUS at 08:15

## 2020-08-13 RX ADMIN — LIDOCAINE HYDROCHLORIDE 50 MG: 10 INJECTION, SOLUTION EPIDURAL; INFILTRATION; INTRACAUDAL; PERINEURAL at 08:15

## 2020-08-13 RX ADMIN — PROPOFOL 20 MG: 10 INJECTION, EMULSION INTRAVENOUS at 08:17

## 2020-08-13 RX ADMIN — PROPOFOL 20 MG: 10 INJECTION, EMULSION INTRAVENOUS at 08:19

## 2020-08-13 RX ADMIN — ONABOTULINUMTOXINA 100 UNITS: 100 INJECTION, POWDER, LYOPHILIZED, FOR SOLUTION INTRADERMAL; INTRAMUSCULAR at 08:23

## 2020-08-13 RX ADMIN — PROPOFOL 20 MG: 10 INJECTION, EMULSION INTRAVENOUS at 08:18

## 2020-08-13 RX ADMIN — GLYCOPYRROLATE 0.1 MG: 0.2 INJECTION, SOLUTION INTRAMUSCULAR; INTRAVENOUS at 08:15

## 2020-08-13 NOTE — H&P
History and Physical - [facility]  Edmond Flores 80 y o  male MRN: [id number]                  HPI: Edmond Flores is a 80y o  year old male who presents for EGD with botox for achalasia  Co-morbidities: T2DM, HTN, HL, pAfib, CAD, dementia, BPH, CKD, MELQUIADES on CPAP      REVIEW OF SYSTEMS: Per the HPI, and otherwise unremarkable  Historical Information   Past Medical History:   Diagnosis Date    Anxiety     Aspiration of liquid     and food per wife if he is eating too fast or talking when eating    Asthma     as a child    [facility]rial fibrillation [facility]     CAD (coronary artery disease)     Cancer of kidney ([facility]     COPD (chronic obstructive pulmonary disease) ([facility]     CPAP (continuous positive airway pressure) dependence     Dementia ([facility] )     Frontal lobe    Dementia ([facility]     Diabetes mellitus ([facility]     DJD (degenerative joint disease)     Hypercholesterolemia     Hyperlipidemia     Hypertension     Kidney disease     Melanoma ([facility]     left leg    Obesity     Sleep apnea     wears CPAP    TIA (transient ischemic attack)      Past Surgical History:   Procedure Laterality Date    CARDIAC PACEMAKER PLACEMENT      CHOLECYSTECTOMY      COLONOSCOPY      HERNIA REPAIR      NEPHRECTOMY Left 2005    CA ESOPHAGOGASTRODUODENOSCOPY SUBMUCOSAL INJECTION N/A 9/21/2016    Procedure: ESOPHAGOGASTRODUODENOSCOPY (EGD); Surgeon: Luis Manuel Sanches MD;  Location: [facility]; Service: Gastroenterology    CA ESOPHAGOGASTRODUODENOSCOPY SUBMUCOSAL INJECTION N/A 2/7/2018    Procedure: ESOPHAGOGASTRODUODENOSCOPY (EGD); Surgeon: Luis Manuel Sanches MD;  Location: [facility]; Service: Gastroenterology    CA ESOPHAGOGASTRODUODENOSCOPY SUBMUCOSAL INJECTION N/A 4/3/2019    Procedure: ESOPHAGOGASTRODUODENOSCOPY (EGD) WITH BOTOX;  Surgeon: uLis Manuel Sanches MD;  Location: [facility];   Service: Gastroenterology    ROTATOR CUFF REPAIR      TONSILLECTOMY       Social History   Social History     Substance and Sexual Activity   Alcohol Use No     Social History     Substance and Sexual Activity   Drug Use No     Social History     Tobacco Use   Smoking Status Never Smoker   Smokeless Tobacco Never Used     Family History   Problem Relation Age of Onset    Brain cancer Father     Lung cancer Father     Diabetes Family     Heart disease Family     Hypertension Family     Cancer Family         renal cell carcinoma    Stroke Family     Colon cancer Son        Meds/Allergies     (Not in a hospital admission)      Allergies   Allergen Reactions    Ambien [Zolpidem Tartrate] Hallucinations    Bextra [Valdecoxib] Hives    Dust Mite Extract Sneezing    Lyrica [Pregabalin] Confusion    Mold Extract [Trichophyton] Sneezing    Pollen Extract Sneezing    Tree Extract Other (See Comments) and Sneezing     congestion       Objective     There were no vitals taken for this visit  PHYSICAL EXAM    Gen: NAD  CV: RRR  CHEST: Clear  ABD: soft, NT/ND  EXT: no edema      ASSESSMENT/PLAN:  This is a 80y o  year old male here for EGD with botox for achalasia, and he is stable and optimized for his procedure      [facility], MD

## 2020-08-13 NOTE — ANESTHESIA PREPROCEDURE EVALUATION
Procedure:  EGD    Relevant Problems   CARDIO   (+) Coronary artery disease involving native coronary artery of native heart without angina pectoris   (+) Essential hypertension   (+) Mixed hyperlipidemia   (+) Paroxysmal atrial fibrillation (HCC)      ENDO   (+) Type 2 diabetes mellitus with kidney complication, with long-term current use of insulin (HCC)      /RENAL   (+) Benign prostate hyperplasia   (+) CKD (chronic kidney disease), stage IV (HCC)   (+) 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, or unspecified chronic kidney disease   (+) Solitary kidney      HEMATOLOGY   (+) Coagulation disorder ([address] )      On Coumadin, last took 5 days ago  Echo 2015:     SUMMARY     LEFT VENTRICLE:  Size was normal   Systolic function was normal  Ejection fraction was estimated in the range of  65 % to 70 %  There were no regional wall motion abnormalities  There was mild concentric hypertrophy  The ratio of early ventricular filling to atrial contraction velocities was  within the normal range      LEFT ATRIUM:  The atrium was mildly dilated      MITRAL VALVE:  There was trace regurgitation      AORTIC VALVE:  There was mild regurgitation      TRICUSPID VALVE:  There was mild regurgitation  Pulmonary artery systolic pressure was within the normal range      PULMONIC VALVE:  There was trace regurgitation  Physical Exam    Airway    Mallampati score: II  TM Distance: >3 FB  Neck ROM: full     Dental   upper dentures and lower dentures,     Cardiovascular  Rhythm: regular, Rate: normal, Cardiovascular exam normal    Pulmonary  Pulmonary exam normal     Other Findings        Anesthesia Plan  ASA Score- 3     Anesthesia Type- IV sedation with anesthesia with ASA Monitors  Additional Monitors:   Airway Plan:           Plan Factors-Exercise tolerance (METS): >4 METS  Chart reviewed  EKG reviewed  Patient summary reviewed  Patient is not a current smoker   Patient did not smoke on day of surgery  Induction- intravenous  Postoperative Plan-     Informed Consent- Anesthetic plan and risks discussed with patient  I personally reviewed this patient with the CRNA  Discussed and agreed on the Anesthesia Plan with the CRNA  Kim Cunningham

## 2020-08-13 NOTE — ANESTHESIA POSTPROCEDURE EVALUATION
Post-Op Assessment Note    CV Status:  Stable  Pain Score: 0    Pain management: adequate     Mental Status:  Somnolent and arousable   Hydration Status:  Euvolemic   PONV Controlled:  Controlled   Airway Patency:  Patent      Post Op Vitals Reviewed: Yes      Staff: CRNA         No complications documented      BP   97/58   Temp     Pulse     Resp      SpO2   91

## 2020-08-14 ENCOUNTER — TELEPHONE (OUTPATIENT)
Dept: GASTROENTEROLOGY | Facility: AMBULARY SURGERY CENTER | Age: 85
End: 2020-08-14

## 2020-08-17 ENCOUNTER — CONSULT (OUTPATIENT)
Dept: CARDIOLOGY CLINIC | Facility: CLINIC | Age: 85
End: 2020-08-17
Payer: COMMERCIAL

## 2020-08-17 ENCOUNTER — APPOINTMENT (OUTPATIENT)
Dept: LAB | Age: 85
End: 2020-08-17
Payer: COMMERCIAL

## 2020-08-17 VITALS
WEIGHT: 217.5 LBS | BODY MASS INDEX: 30.45 KG/M2 | SYSTOLIC BLOOD PRESSURE: 126 MMHG | DIASTOLIC BLOOD PRESSURE: 72 MMHG | HEART RATE: 70 BPM | HEIGHT: 71 IN

## 2020-08-17 DIAGNOSIS — Z79.899 LONG TERM CURRENT USE OF ANTIARRHYTHMIC MEDICAL THERAPY: ICD-10-CM

## 2020-08-17 DIAGNOSIS — I48.0 PAROXYSMAL ATRIAL FIBRILLATION (HCC): Primary | ICD-10-CM

## 2020-08-17 DIAGNOSIS — I48.0 PAROXYSMAL ATRIAL FIBRILLATION (HCC): ICD-10-CM

## 2020-08-17 DIAGNOSIS — D68.9 COAGULATION DISORDER (HCC): ICD-10-CM

## 2020-08-17 DIAGNOSIS — Z95.0 PACEMAKER: Primary | ICD-10-CM

## 2020-08-17 LAB
ALBUMIN SERPL BCP-MCNC: 3.5 G/DL (ref 3.5–5)
ALP SERPL-CCNC: 47 U/L (ref 46–116)
ALT SERPL W P-5'-P-CCNC: 19 U/L (ref 12–78)
ANION GAP SERPL CALCULATED.3IONS-SCNC: 3 MMOL/L (ref 4–13)
AST SERPL W P-5'-P-CCNC: 14 U/L (ref 5–45)
BASOPHILS # BLD AUTO: 0.06 THOUSANDS/ΜL (ref 0–0.1)
BASOPHILS NFR BLD AUTO: 1 % (ref 0–1)
BILIRUB SERPL-MCNC: 0.42 MG/DL (ref 0.2–1)
BUN SERPL-MCNC: 26 MG/DL (ref 5–25)
CALCIUM SERPL-MCNC: 9.3 MG/DL (ref 8.3–10.1)
CHLORIDE SERPL-SCNC: 107 MMOL/L (ref 100–108)
CO2 SERPL-SCNC: 29 MMOL/L (ref 21–32)
CREAT SERPL-MCNC: 2.26 MG/DL (ref 0.6–1.3)
EOSINOPHIL # BLD AUTO: 0.29 THOUSAND/ΜL (ref 0–0.61)
EOSINOPHIL NFR BLD AUTO: 4 % (ref 0–6)
ERYTHROCYTE [DISTWIDTH] IN BLOOD BY AUTOMATED COUNT: 13.2 % (ref 11.6–15.1)
GFR SERPL CREATININE-BSD FRML MDRD: 25 ML/MIN/1.73SQ M
GLUCOSE P FAST SERPL-MCNC: 169 MG/DL (ref 65–99)
HCT VFR BLD AUTO: 44.3 % (ref 36.5–49.3)
HGB BLD-MCNC: 13.7 G/DL (ref 12–17)
IMM GRANULOCYTES # BLD AUTO: 0.01 THOUSAND/UL (ref 0–0.2)
IMM GRANULOCYTES NFR BLD AUTO: 0 % (ref 0–2)
INR PPP: 1.66 (ref 0.84–1.19)
LYMPHOCYTES # BLD AUTO: 1.63 THOUSANDS/ΜL (ref 0.6–4.47)
LYMPHOCYTES NFR BLD AUTO: 23 % (ref 14–44)
MCH RBC QN AUTO: 28.5 PG (ref 26.8–34.3)
MCHC RBC AUTO-ENTMCNC: 30.9 G/DL (ref 31.4–37.4)
MCV RBC AUTO: 92 FL (ref 82–98)
MONOCYTES # BLD AUTO: 0.6 THOUSAND/ΜL (ref 0.17–1.22)
MONOCYTES NFR BLD AUTO: 9 % (ref 4–12)
NEUTROPHILS # BLD AUTO: 4.44 THOUSANDS/ΜL (ref 1.85–7.62)
NEUTS SEG NFR BLD AUTO: 63 % (ref 43–75)
NRBC BLD AUTO-RTO: 0 /100 WBCS
PLATELET # BLD AUTO: 245 THOUSANDS/UL (ref 149–390)
PMV BLD AUTO: 10.5 FL (ref 8.9–12.7)
POTASSIUM SERPL-SCNC: 5 MMOL/L (ref 3.5–5.3)
PROT SERPL-MCNC: 7.9 G/DL (ref 6.4–8.2)
PROTHROMBIN TIME: 19.5 SECONDS (ref 11.6–14.5)
RBC # BLD AUTO: 4.8 MILLION/UL (ref 3.88–5.62)
SODIUM SERPL-SCNC: 139 MMOL/L (ref 136–145)
WBC # BLD AUTO: 7.03 THOUSAND/UL (ref 4.31–10.16)

## 2020-08-17 PROCEDURE — 85610 PROTHROMBIN TIME: CPT

## 2020-08-17 PROCEDURE — 99204 OFFICE O/P NEW MOD 45 MIN: CPT | Performed by: INTERNAL MEDICINE

## 2020-08-17 PROCEDURE — 85025 COMPLETE CBC W/AUTO DIFF WBC: CPT

## 2020-08-17 PROCEDURE — 80053 COMPREHEN METABOLIC PANEL: CPT

## 2020-08-17 NOTE — H&P (VIEW-ONLY)
EPS Consultation/New Patient Evaluation - [place] 80 y o  male MRN: [id number]           ASSESSMENT:  1  Pacemaker     2  Paroxysmal atrial fibrillation (HCC)     3  Coagulation disorder ([address] )     4  Long term current use of antiarrhythmic medical therapy             PLAN:  1  S/p Medtronic dual chamber pacemaker, working appropriately and reached GUSTAVO in July 1, 2020  Discussed Generator change procedure with all risks and benefits  Answered all questions  Proceed with generator change      2  PAF   LVEF 65-70% from echo in December 2015   On Propafenone 225 mg three time daily and anticoagulated with Coumadin  Hold Coumadin three days prior    3  Hypertension well controlled      4  Hyperlipidemia  Tolerating statin therapy     informed consent for procedure obtained from wife and patient today            CC/HPI:   G[place] is a 80 y o  male who presents to the office today for an EP consultation  His pacemaker has reached elective replacement index he is more tired and sleeping more        ROS:  Negative for complaints with the exception of fatigue needing to sleep more all other 12 point ROS negative       Objective:     Vitals: Blood pressure 126/72, pulse 70, height 5' 11" (1 803 m), weight 98 7 kg (217 lb 8 oz)  , Body mass index is 30 34 kg/m²  ,        Physical Exam:    GEN: [place] appears well, alert and oriented x 3, pleasant and cooperative   HEENT: pupils equal, round, and reactive to light; extraocular muscles intact  NECK: supple, no carotid bruits   HEART: regular rhythm, normal S1 and S2, no murmurs, clicks, gallops or rubs   LUNGS: clear to auscultation bilaterally; no wheezes, rales, or rhonchi   ABDOMEN: normal bowel sounds, soft, no tenderness, no distention  EXTREMITIES: peripheral pulses normal; no clubbing, cyanosis, or edema  NEURO: no focal findings   SKIN: normal without suspicious lesions on exposed skin    Medications:      Current Outpatient Medications:    amLODIPine (NORVASC) 2 5 mg tablet, Take 1 tablet (2 5 mg total) by mouth daily, Disp: 90 tablet, Rfl: 3    atorvastatin (LIPITOR) 10 mg tablet, Take 10 mg by mouth daily  , Disp: , Rfl:     cholecalciferol (VITAMIN D3) 1,000 units tablet, Take 2,000 Units by mouth daily, Disp: , Rfl:     cyanocobalamin (VITAMIN B-12) 500 mcg tablet, Take 500 mcg by mouth daily, Disp: , Rfl:     docusate sodium (COLACE) 100 mg capsule, Take 100 mg by mouth 3 (three) times a day , Disp: , Rfl:     donepezil (ARICEPT) 10 mg tablet, Take 1 tablet (10 mg total) by mouth daily at bedtime (Patient taking differently: Take 10 mg by mouth daily ), Disp: 90 tablet, Rfl: 3    fenofibrate (TRICOR) 145 mg tablet, Take 145 mg by mouth daily  , Disp: , Rfl:     ferrous sulfate 325 (65 Fe) mg tablet, Take 325 mg by mouth as needed , Disp: , Rfl:     fexofenadine (ALLEGRA) 180 MG tablet, Take 180 mg by mouth daily, Disp: , Rfl:     fluticasone (FLONASE) 50 mcg/act nasal spray, 1 spray into each nostril daily, Disp: , Rfl:     insulin aspart (NovoLOG) 100 units/mL injection, Inject 8 Units under the skin 2 (two) times a day before meals , Disp: , Rfl:     insulin glargine (LANTUS) 100 units/mL subcutaneous injection, Inject 30 Units under the skin 2 (two) times a day , Disp: , Rfl:     ipratropium (ATROVENT HFA) 17 mcg/act inhaler, Inhale 2 puffs every 6 (six) hours  , Disp: , Rfl:     levalbuterol (XOPENEX) 0 63 mg/3 mL nebulizer solution, Take 1 ampule by nebulization 2 (two) times a day as needed for wheezing , Disp: , Rfl:     losartan (COZAAR) 25 mg tablet, TAKE 1 TABLET DAILY, Disp: 90 tablet, Rfl: 3    Magnesium 250 MG TABS, Take 500 mg by mouth daily , Disp: , Rfl:     magnesium gluconate (MAGONATE) 500 mg tablet, Take 500 mg by mouth daily  , Disp: , Rfl:     Memantine HCl ER 28 MG CP24, TAKE ONE CAPSULE BY MOUTH EVERY DAY, Disp: 90 capsule, Rfl: 3    Multiple Vitamin (MULTIVITAMIN) capsule, Take 1 capsule by mouth daily, Disp: , Rfl:     NOVOFINE 32G X 6 MM MISC, , Disp: , Rfl:     NOVOLOG FLEXPEN 100 units/mL injection pen, 8 Units 2 (two) times a day with meals , Disp: , Rfl:     omega-3-acid ethyl esters (LOVAZA) 1 g capsule, Take 1 g by mouth 4 (four) times a day , Disp: , Rfl:     ONETOUCH DELICA LANCETS 18D MISC, , Disp: , Rfl:     ONETOUCH VERIO test strip, , Disp: , Rfl:     Probiotic Product (PROBIOTIC COLON SUPPORT PO), Take 1 tablet by mouth daily, Disp: , Rfl:     propafenone (RYTHMOL) 225 mg tablet, Take 1 tablet (225 mg total) by mouth 3 (three) times a day, Disp: 270 tablet, Rfl: 3    sertraline (ZOLOFT) 100 mg tablet, Take 1 tablet (100 mg total) by mouth daily, Disp: 90 tablet, Rfl: 3    sodium chloride (OCEAN) 0 65 % nasal spray, 1 spray into each nostril, Disp: , Rfl:     warfarin (COUMADIN) 2 5 mg tablet, Take 2 5 mg by mouth 3 (three) times a week Indications: Mon wed Fri   , Disp: , Rfl:     warfarin (COUMADIN) 5 mg tablet, Take 1 tablet (5 mg total) by mouth daily (Patient taking differently: Take 5 mg by mouth daily Monday friday), Disp: 90 tablet, Rfl: 3  No current facility-administered medications for this visit        Family History   Problem Relation Age of Onset    Brain cancer Father     Lung cancer Father     Diabetes Family     Heart disease Family     Hypertension Family     Cancer Family         renal cell carcinoma    Stroke Family     Colon cancer Son      Social History     Socioeconomic History    Marital status: /Civil Union     Spouse name: Not on file    Number of children: Not on file    Years of education: Not on file    Highest education level: Not on file   Occupational History    Not on file   Social Needs    Financial resource strain: Not on file    Food insecurity     Worry: Not on file     Inability: Not on file    Transportation needs     Medical: Not on file     Non-medical: Not on file   Tobacco Use    Smoking status: Never Smoker    Smokeless tobacco: Never Used   Substance and Sexual Activity    Alcohol use: No    Drug use: No    Sexual activity: Not on file   Lifestyle    Physical activity     Days per week: Not on file     Minutes per session: Not on file    Stress: Not on file   Relationships    Social connections     Talks on phone: Not on file     Gets together: Not on file     Attends Restorationism service: Not on file     Active member of club or organization: Not on file     Attends meetings of clubs or organizations: Not on file     Relationship status: Not on file    Intimate partner violence     Fear of current or ex partner: Not on file     Emotionally abused: Not on file     Physically abused: Not on file     Forced sexual activity: Not on file   Other Topics Concern    Not on file   Social History Narrative    Not on file     Social History     Tobacco Use   Smoking Status Never Smoker   Smokeless Tobacco Never Used     Social History     Substance and Sexual Activity   Alcohol Use No       Labs & Results:  Below is the patient's most recent value for Albumin, ALT, AST, BUN, Calcium, Chloride, Cholesterol, CO2, Creatinine, GFR, Glucose, HDL, Hematocrit, Hemoglobin, Hemoglobin A1C, LDL, Magnesium, Phosphorus, Platelets, Potassium, PSA, Sodium, Triglycerides, and WBC  Lab Results   Component Value Date    ALT 25 06/16/2020    AST 18 06/16/2020    BUN 26 (H) 07/15/2020    CALCIUM 9 6 07/15/2020     07/15/2020    CHOL 169 12/19/2015    CO2 27 07/15/2020    CREATININE 2 56 (H) 07/15/2020    HDL 25 (L) 02/08/2020    HCT 43 0 07/15/2020    HGB 13 8 07/15/2020    HGBA1C 8 4 (H) 06/16/2020    MG 2 3 01/02/2016    PHOS 3 6 07/15/2020     07/15/2020    K 4 7 07/15/2020    PSA 1 8 03/23/2019     01/02/2016    TRIG 481 (H) 02/08/2020    WBC 7 45 07/15/2020     Note: for a comprehensive list of the patient's lab results, access the Results Review activity              Cardiac testing:   Results for orders placed in visit on 12/15/15   Echo complete with contrast if indicated    Narrative [address] [facility], [address] [place] [address]  [phone]    Transthoracic Echocardiogram  2D, M-mode, Doppler, and Color Doppler    Study date:  18-Dec-2015    Patient: Pauly Vela  MR number: [id number]  Account number: [de-identified]  : 1935  Age: [de-identified] years  Gender: Male  Status: Outpatient  Location: [address] [place] [address] [facility] [place]  Height: 70 in  Weight: 213 6 lb  BP: 128/ 72 mmHg    Indications: Assess left ventricular function  Diagnoses: I11 9 - Hypertensive heart disease without heart failure    Sonographer:  FERNANDO East  Interpreting Physician:  Cass Espino MD  Referring Physician:  Lisa Pagan MD    SUMMARY    LEFT VENTRICLE:  Size was normal   Systolic function was normal  Ejection fraction was estimated in the range of  65 % to 70 %  There were no regional wall motion abnormalities  There was mild concentric hypertrophy  The ratio of early ventricular filling to atrial contraction velocities was  within the normal range  LEFT ATRIUM:  The atrium was mildly dilated  MITRAL VALVE:  There was trace regurgitation  AORTIC VALVE:  There was mild regurgitation  TRICUSPID VALVE:  There was mild regurgitation  Pulmonary artery systolic pressure was within the normal range  PULMONIC VALVE:  There was trace regurgitation  HISTORY: PRIOR HISTORY: Risk factors: hypertension  PRIOR PROCEDURES: Pacemaker  implantation  PROCEDURE: The study was performed in the [address]  This was a routine study  The transthoracic approach was used  The study  included complete 2D imaging, M-mode, complete spectral Doppler, and color  Doppler  Image quality was adequate  LEFT VENTRICLE: Size was normal  Systolic function was normal  Ejection  fraction was estimated in the range of 65 % to 70 %  There were no regional  wall motion abnormalities   There was mild concentric hypertrophy  DOPPLER: The  ratio of early ventricular filling to atrial contraction velocities was within  the normal range  RIGHT VENTRICLE: The size was normal  Systolic function was normal  Wall  thickness was normal     LEFT ATRIUM: The atrium was mildly dilated  RIGHT ATRIUM: Size was normal     MITRAL VALVE: There was minimal thickening  DOPPLER: There was trace  regurgitation  AORTIC VALVE: The valve was trileaflet  Leaflets exhibited mildly increased  thickness and normal cuspal separation  DOPPLER: There was mild regurgitation  TRICUSPID VALVE: DOPPLER: There was mild regurgitation  Pulmonary artery  systolic pressure was within the normal range  PULMONIC VALVE: DOPPLER: There was trace regurgitation  PERICARDIUM: There was no pericardial effusion  AORTA: The root exhibited normal size  SYSTEMIC VEINS: IVC: The inferior vena cava was normal in size  SYSTEM MEASUREMENT TABLES    2D  %FS: 34 35 %  Ao Diam: 3 83 cm  EDV(Teich): 97 67 ml  EF(Cube): 71 71 %  EF(Teich): 63 46 %  ESV(Cube): 27 66 ml  ESV(Teich): 35 69 ml  IVSd: 1 33 cm  LA Area: 20 4 cm2  LA Diam: 3 21 cm  LVEDV MOD A4C: 102 15 ml  LVEF MOD A4C: 64 38 %  LVESV MOD A4C: 36 39 ml  LVIDd: 4 61 cm  LVIDs: 3 02 cm  LVLd A4C: 8 67 cm  LVLs A4C: 6 87 cm  LVPWd: 1 17 cm  RA Area: 18 39 cm2  RV Diam : 2 81 cm  SI(Cube): 32 61 ml/m2  SI(Teich): 28 83 ml/m2  SV MOD A4C: 65 76 ml  SV(Cube): 70 1 ml  SV(Teich): 61 98 ml    CW  TR Vmax: 2 4 m/s  TR maxP 98 mmHg    MM  TAPSE: 2 36 cm    PW  E': 0 06 m/s  E/E': 10 98  MV A Yousif: 0 65 m/s  MV Dec [place]: 1 87 m/s2  MV DecT: 323 31 ms  MV E Yousif: 0 6 m/s  MV E/A Ratio: 0 92    [employer] Accredited Echocardiography Laboratory    Prepared and electronically signed by    Benjamin Parsons MD  Signed 21-Dec-2015 09:46:13       No results found for this or any previous visit  No results found for this or any previous visit    No results found for this or any previous visit

## 2020-08-17 NOTE — PROGRESS NOTES
EPS Consultation/New Patient Evaluation - Brad Morrissey 80 y o  male MRN: [id number]           ASSESSMENT:  1  Pacemaker     2  Paroxysmal atrial fibrillation (HCC)     3  Coagulation disorder ([address] )     4  Long term current use of antiarrhythmic medical therapy             PLAN:  1  S/p Medtronic dual chamber pacemaker, working appropriately and reached GUSTAVO in July 1, 2020  Discussed Generator change procedure with all risks and benefits  Answered all questions  Proceed with generator change      2  PAF   LVEF 65-70% from echo in December 2015   On Propafenone 225 mg three time daily and anticoagulated with Coumadin  Hold Coumadin three days prior    3  Hypertension well controlled      4  Hyperlipidemia  Tolerating statin therapy     informed consent for procedure obtained from wife and patient today            CC/HPI:   Brad Morrissey is a 80 y o  male who presents to the office today for an EP consultation  His pacemaker has reached elective replacement index he is more tired and sleeping more        ROS:  Negative for complaints with the exception of fatigue needing to sleep more all other 12 point ROS negative       Objective:     Vitals: Blood pressure 126/72, pulse 70, height 5' 11" (1 803 m), weight 98 7 kg (217 lb 8 oz)  , Body mass index is 30 34 kg/m²  ,        Physical Exam:    GEN: Brad Morrissey appears well, alert and oriented x 3, pleasant and cooperative   HEENT: pupils equal, round, and reactive to light; extraocular muscles intact  NECK: supple, no carotid bruits   HEART: regular rhythm, normal S1 and S2, no murmurs, clicks, gallops or rubs   LUNGS: clear to auscultation bilaterally; no wheezes, rales, or rhonchi   ABDOMEN: normal bowel sounds, soft, no tenderness, no distention  EXTREMITIES: peripheral pulses normal; no clubbing, cyanosis, or edema  NEURO: no focal findings   SKIN: normal without suspicious lesions on exposed skin    Medications:      Current Outpatient Medications:    amLODIPine (NORVASC) 2 5 mg tablet, Take 1 tablet (2 5 mg total) by mouth daily, Disp: 90 tablet, Rfl: 3    atorvastatin (LIPITOR) 10 mg tablet, Take 10 mg by mouth daily  , Disp: , Rfl:     cholecalciferol (VITAMIN D3) 1,000 units tablet, Take 2,000 Units by mouth daily, Disp: , Rfl:     cyanocobalamin (VITAMIN B-12) 500 mcg tablet, Take 500 mcg by mouth daily, Disp: , Rfl:     docusate sodium (COLACE) 100 mg capsule, Take 100 mg by mouth 3 (three) times a day , Disp: , Rfl:     donepezil (ARICEPT) 10 mg tablet, Take 1 tablet (10 mg total) by mouth daily at bedtime (Patient taking differently: Take 10 mg by mouth daily ), Disp: 90 tablet, Rfl: 3    fenofibrate (TRICOR) 145 mg tablet, Take 145 mg by mouth daily  , Disp: , Rfl:     ferrous sulfate 325 (65 Fe) mg tablet, Take 325 mg by mouth as needed , Disp: , Rfl:     fexofenadine (ALLEGRA) 180 MG tablet, Take 180 mg by mouth daily, Disp: , Rfl:     fluticasone (FLONASE) 50 mcg/act nasal spray, 1 spray into each nostril daily, Disp: , Rfl:     insulin aspart (NovoLOG) 100 units/mL injection, Inject 8 Units under the skin 2 (two) times a day before meals , Disp: , Rfl:     insulin glargine (LANTUS) 100 units/mL subcutaneous injection, Inject 30 Units under the skin 2 (two) times a day , Disp: , Rfl:     ipratropium (ATROVENT HFA) 17 mcg/act inhaler, Inhale 2 puffs every 6 (six) hours  , Disp: , Rfl:     levalbuterol (XOPENEX) 0 63 mg/3 mL nebulizer solution, Take 1 ampule by nebulization 2 (two) times a day as needed for wheezing , Disp: , Rfl:     losartan (COZAAR) 25 mg tablet, TAKE 1 TABLET DAILY, Disp: 90 tablet, Rfl: 3    Magnesium 250 MG TABS, Take 500 mg by mouth daily , Disp: , Rfl:     magnesium gluconate (MAGONATE) 500 mg tablet, Take 500 mg by mouth daily  , Disp: , Rfl:     Memantine HCl ER 28 MG CP24, TAKE ONE CAPSULE BY MOUTH EVERY DAY, Disp: 90 capsule, Rfl: 3    Multiple Vitamin (MULTIVITAMIN) capsule, Take 1 capsule by mouth daily, Disp: , Rfl:     NOVOFINE 32G X 6 MM MISC, , Disp: , Rfl:     NOVOLOG FLEXPEN 100 units/mL injection pen, 8 Units 2 (two) times a day with meals , Disp: , Rfl:     omega-3-acid ethyl esters (LOVAZA) 1 g capsule, Take 1 g by mouth 4 (four) times a day , Disp: , Rfl:     ONETOUCH DELICA LANCETS 81K MISC, , Disp: , Rfl:     ONETOUCH VERIO test strip, , Disp: , Rfl:     Probiotic Product (PROBIOTIC COLON SUPPORT PO), Take 1 tablet by mouth daily, Disp: , Rfl:     propafenone (RYTHMOL) 225 mg tablet, Take 1 tablet (225 mg total) by mouth 3 (three) times a day, Disp: 270 tablet, Rfl: 3    sertraline (ZOLOFT) 100 mg tablet, Take 1 tablet (100 mg total) by mouth daily, Disp: 90 tablet, Rfl: 3    sodium chloride (OCEAN) 0 65 % nasal spray, 1 spray into each nostril, Disp: , Rfl:     warfarin (COUMADIN) 2 5 mg tablet, Take 2 5 mg by mouth 3 (three) times a week Indications: Mon wed Fri   , Disp: , Rfl:     warfarin (COUMADIN) 5 mg tablet, Take 1 tablet (5 mg total) by mouth daily (Patient taking differently: Take 5 mg by mouth daily Monday friday), Disp: 90 tablet, Rfl: 3  No current facility-administered medications for this visit        Family History   Problem Relation Age of Onset    Brain cancer Father     Lung cancer Father     Diabetes Family     Heart disease Family     Hypertension Family     Cancer Family         renal cell carcinoma    Stroke Family     Colon cancer Son      Social History     Socioeconomic History    Marital status: /Civil Union     Spouse name: Not on file    Number of children: Not on file    Years of education: Not on file    Highest education level: Not on file   Occupational History    Not on file   Social Needs    Financial resource strain: Not on file    Food insecurity     Worry: Not on file     Inability: Not on file    Transportation needs     Medical: Not on file     Non-medical: Not on file   Tobacco Use    Smoking status: Never Smoker    Smokeless tobacco: Never Used   Substance and Sexual Activity    Alcohol use: No    Drug use: No    Sexual activity: Not on file   Lifestyle    Physical activity     Days per week: Not on file     Minutes per session: Not on file    Stress: Not on file   Relationships    Social connections     Talks on phone: Not on file     Gets together: Not on file     Attends [employer] service: Not on file     Active member of club or organization: Not on file     Attends meetings of clubs or organizations: Not on file     Relationship status: Not on file    Intimate partner violence     Fear of current or ex partner: Not on file     Emotionally abused: Not on file     Physically abused: Not on file     Forced sexual activity: Not on file   Other Topics Concern    Not on file   Social History Narrative    Not on file     Social History     Tobacco Use   Smoking Status Never Smoker   Smokeless Tobacco Never Used     Social History     Substance and Sexual Activity   Alcohol Use No       Labs & Results:  Below is the patient's most recent value for Albumin, ALT, AST, BUN, Calcium, Chloride, Cholesterol, CO2, Creatinine, GFR, Glucose, HDL, Hematocrit, Hemoglobin, Hemoglobin A1C, LDL, Magnesium, Phosphorus, Platelets, Potassium, PSA, Sodium, Triglycerides, and WBC  Lab Results   Component Value Date    ALT 25 06/16/2020    AST 18 06/16/2020    BUN 26 (H) 07/15/2020    CALCIUM 9 6 07/15/2020     07/15/2020    CHOL 169 12/19/2015    CO2 27 07/15/2020    CREATININE 2 56 (H) 07/15/2020    HDL 25 (L) 02/08/2020    HCT 43 0 07/15/2020    HGB 13 8 07/15/2020    HGBA1C 8 4 (H) 06/16/2020    MG 2 3 01/02/2016    PHOS 3 6 07/15/2020     07/15/2020    K 4 7 07/15/2020    PSA 1 8 03/23/2019     01/02/2016    TRIG 481 (H) 02/08/2020    WBC 7 45 07/15/2020     Note: for a comprehensive list of the patient's lab results, access the Results Review activity              Cardiac testing:   Results for orders placed in visit on 12/15/15   Echo complete with contrast if indicated    Narrative [address]  [address]  [phone]    Transthoracic Echocardiogram  2D, M-mode, Doppler, and Color Doppler    Study date:  18-Dec-2015    Patient: Ethan Stephen  MR number: [id number]  Account number: [de-identified]  : 1935  Age: [de-identified] years  Gender: Male  Status: Outpatient  Location: [address] [place] [address] [facility] [place]  Height: 70 in  Weight: 213 6 lb  BP: 128/ 72 mmHg    Indications: Assess left ventricular function  Diagnoses: I11 9 - Hypertensive heart disease without heart failure    Sonographer:  FERNANDO Ingram  Interpreting Physician:  Sadi Marquez MD  Referring Physician:  Reanna Velasquez MD    SUMMARY    LEFT VENTRICLE:  Size was normal   Systolic function was normal  Ejection fraction was estimated in the range of  65 % to 70 %  There were no regional wall motion abnormalities  There was mild concentric hypertrophy  The ratio of early ventricular filling to atrial contraction velocities was  within the normal range  LEFT ATRIUM:  The atrium was mildly dilated  MITRAL VALVE:  There was trace regurgitation  AORTIC VALVE:  There was mild regurgitation  TRICUSPID VALVE:  There was mild regurgitation  Pulmonary artery systolic pressure was within the normal range  PULMONIC VALVE:  There was trace regurgitation  HISTORY: PRIOR HISTORY: Risk factors: hypertension  PRIOR PROCEDURES: Pacemaker  implantation  PROCEDURE: The study was performed in the [address]  This was a routine study  The transthoracic approach was used  The study  included complete 2D imaging, M-mode, complete spectral Doppler, and color  Doppler  Image quality was adequate  LEFT VENTRICLE: Size was normal  Systolic function was normal  Ejection  fraction was estimated in the range of 65 % to 70 %  There were no regional  wall motion abnormalities   There was mild concentric hypertrophy  DOPPLER: The  ratio of early ventricular filling to atrial contraction velocities was within  the normal range  RIGHT VENTRICLE: The size was normal  Systolic function was normal  Wall  thickness was normal     LEFT ATRIUM: The atrium was mildly dilated  RIGHT ATRIUM: Size was normal     MITRAL VALVE: There was minimal thickening  DOPPLER: There was trace  regurgitation  AORTIC VALVE: The valve was trileaflet  Leaflets exhibited mildly increased  thickness and normal cuspal separation  DOPPLER: There was mild regurgitation  TRICUSPID VALVE: DOPPLER: There was mild regurgitation  Pulmonary artery  systolic pressure was within the normal range  PULMONIC VALVE: DOPPLER: There was trace regurgitation  PERICARDIUM: There was no pericardial effusion  AORTA: The root exhibited normal size  SYSTEMIC VEINS: IVC: The inferior vena cava was normal in size  SYSTEM MEASUREMENT TABLES    2D  %FS: 34 35 %  Ao Diam: 3 83 cm  EDV(Teich): 97 67 ml  EF(Cube): 71 71 %  EF(Teich): 63 46 %  ESV(Cube): 27 66 ml  ESV(Teich): 35 69 ml  IVSd: 1 33 cm  LA Area: 20 4 cm2  LA Diam: 3 21 cm  LVEDV MOD A4C: 102 15 ml  LVEF MOD A4C: 64 38 %  LVESV MOD A4C: 36 39 ml  LVIDd: 4 61 cm  LVIDs: 3 02 cm  LVLd A4C: 8 67 cm  LVLs A4C: 6 87 cm  LVPWd: 1 17 cm  RA Area: 18 39 cm2  RV Diam : 2 81 cm  SI(Cube): 32 61 ml/m2  SI(Teich): 28 83 ml/m2  SV MOD A4C: 65 76 ml  SV(Cube): 70 1 ml  SV(Teich): 61 98 ml    CW  TR Vmax: 2 4 m/s  TR maxP 98 mmHg    MM  TAPSE: 2 36 cm    PW  E': 0 06 m/s  E/E': 10 98  MV A Yousif: 0 65 m/s  MV Dec [place]: 1 87 m/s2  MV DecT: 323 31 ms  MV E Yousif: 0 6 m/s  MV E/A Ratio: 0 92    [employer] Accredited Echocardiography Laboratory    Prepared and electronically signed by    Qiana Sparks MD  Signed 21-Dec-2015 09:46:13       No results found for this or any previous visit  No results found for this or any previous visit    No results found for this or any previous visit

## 2020-08-18 ENCOUNTER — TELEPHONE (OUTPATIENT)
Dept: CARDIOLOGY CLINIC | Facility: CLINIC | Age: 85
End: 2020-08-18

## 2020-08-18 DIAGNOSIS — Z95.0 PACEMAKER: Primary | ICD-10-CM

## 2020-08-18 NOTE — TELEPHONE ENCOUNTER
COVID Pre-Visit Screening     1  Is this a family member screening? Yes  2  Have you traveled outside of your state in the past 2 weeks? No  3  Do you presently have a fever or flu-like symptoms? No  4  Do you have symptoms of an upper respiratory infection like runny nose, sore throat, or cough? No  5  Are you suffering from new headache that you have not had in the past?  No  6  Do you have/have you experienced any new shortness of breath recently? No  7  Do you have any new diarrhea, nausea or vomiting? No  8  Have you been in contact with anyone who has been sick or diagnosed with COVID-19? No  9  Do you have any new loss of taste or smell? No  10  Are you able to wear a mask without a valve for the entire visit? Yes    Patient schedule for Gen change at [facility] on 8/21/20 with Dr Rigoberto Haywood  Patient wife aware of general instructions and medications holds  Please Jihan Frias can you check for insurance approval     Dr Nile Thomas patient wife mentioned she have fever staring last night, chills and headache, she called her PCP who sent her to have the COVID test done, she is waiting on results, so at this point we're not sure if possible we need to reschedule this patient procedure

## 2020-08-19 ENCOUNTER — ANTICOAG VISIT (OUTPATIENT)
Dept: CARDIOLOGY CLINIC | Facility: CLINIC | Age: 85
End: 2020-08-19

## 2020-08-19 ENCOUNTER — TELEPHONE (OUTPATIENT)
Dept: CARDIOLOGY CLINIC | Facility: CLINIC | Age: 85
End: 2020-08-19

## 2020-08-19 DIAGNOSIS — I48.0 PAROXYSMAL ATRIAL FIBRILLATION (HCC): ICD-10-CM

## 2020-08-19 NOTE — TELEPHONE ENCOUNTER
Pt's wife called, stated that Monica called her and told her the pt's INR was 1 6  She wanted to know if the pt would need to retest and what would the hold be prior to his gen change  He was scheduled to have the change done on 08/21 but was rescheduled for 09/01 due to the wife being tested for COVID

## 2020-08-19 NOTE — TELEPHONE ENCOUNTER
I did mentioned the warfarin holds yesterday also mail out to the patient with a new script or INR test   I called again and informed

## 2020-08-19 NOTE — PROGRESS NOTES
8/19/20, tc to pt's wife glenna, reports pt had egd done 8/13/20, had held warfarin 5 days prior to egd  Pt was to have pacemaker generator change done 8/21, so wife did not restart warfarin after egd  Wife reports she was tested for covid and is now waiting test results  Pacemaker generator change was cancelled until 9/1 due to waiting for covid results for wife  Will restart warfarin at previous dose, inr due 1 week, will have inr done 8/28 included with pre admission testing  Per wife and notes  Pt will hold warfarin 3 days prior to generator change

## 2020-08-21 ENCOUNTER — TELEPHONE (OUTPATIENT)
Dept: CARDIOLOGY CLINIC | Facility: CLINIC | Age: 85
End: 2020-08-21

## 2020-08-21 NOTE — TELEPHONE ENCOUNTER
Patient's wife called stating that patient is feeling extremely fatigued  His PM is scheduled for 9/1/20  This had to be moved out due to patient's wife being testing for exposure to [place]  Her test results are negative, however  Due to how patient is feeling, I put in a message to see if procedure can be moved to next week  BP today 104/69  HR 65  Patient's general cardiologist is Dr Elizabeth Nelson, would you like me to have them call his office to see if any changes are necessary in BP medication, as this is much lower than he normally runs  Patient saw Dr Kishan Morales in consult 8/17/20  Patient is on amlodipine 2 5mg daily, losartan 25mg daily for BP control

## 2020-08-24 ENCOUNTER — TRANSCRIBE ORDERS (OUTPATIENT)
Dept: ADMINISTRATIVE | Age: 85
End: 2020-08-24

## 2020-08-24 ENCOUNTER — APPOINTMENT (OUTPATIENT)
Dept: LAB | Age: 85
End: 2020-08-24
Payer: COMMERCIAL

## 2020-08-24 DIAGNOSIS — IMO0002 SOLITARY KIDNEY: ICD-10-CM

## 2020-08-24 DIAGNOSIS — E11.22 TYPE 2 DIABETES MELLITUS WITH STAGE 4 CHRONIC KIDNEY DISEASE, WITH LONG-TERM CURRENT USE OF INSULIN (HCC): ICD-10-CM

## 2020-08-24 DIAGNOSIS — N18.4 TYPE 2 DIABETES MELLITUS WITH STAGE 4 CHRONIC KIDNEY DISEASE, WITH LONG-TERM CURRENT USE OF INSULIN (HCC): ICD-10-CM

## 2020-08-24 DIAGNOSIS — Z79.4 TYPE 2 DIABETES MELLITUS WITH STAGE 4 CHRONIC KIDNEY DISEASE, WITH LONG-TERM CURRENT USE OF INSULIN (HCC): ICD-10-CM

## 2020-08-24 DIAGNOSIS — N18.4 CKD (CHRONIC KIDNEY DISEASE), STAGE IV (HCC): ICD-10-CM

## 2020-08-24 DIAGNOSIS — I48.0 PAROXYSMAL ATRIAL FIBRILLATION (HCC): ICD-10-CM

## 2020-08-24 DIAGNOSIS — I48.0 PAROXYSMAL ATRIAL FIBRILLATION (HCC): Primary | ICD-10-CM

## 2020-08-24 DIAGNOSIS — I12.9 HYPERTENSIVE CHRONIC KIDNEY DISEASE WITH STAGE 1 THROUGH STAGE 4 CHRONIC KIDNEY DISEASE, OR UNSPECIFIED CHRONIC KIDNEY DISEASE: ICD-10-CM

## 2020-08-24 DIAGNOSIS — E78.2 MIXED HYPERLIPIDEMIA: ICD-10-CM

## 2020-08-24 LAB
ALBUMIN SERPL BCP-MCNC: 3.5 G/DL (ref 3.5–5)
ALP SERPL-CCNC: 46 U/L (ref 46–116)
ALT SERPL W P-5'-P-CCNC: 20 U/L (ref 12–78)
ANION GAP SERPL CALCULATED.3IONS-SCNC: 5 MMOL/L (ref 4–13)
AST SERPL W P-5'-P-CCNC: 15 U/L (ref 5–45)
BASOPHILS # BLD AUTO: 0.07 THOUSANDS/ΜL (ref 0–0.1)
BASOPHILS NFR BLD AUTO: 1 % (ref 0–1)
BILIRUB SERPL-MCNC: 0.54 MG/DL (ref 0.2–1)
BUN SERPL-MCNC: 26 MG/DL (ref 5–25)
CALCIUM SERPL-MCNC: 9.1 MG/DL (ref 8.3–10.1)
CHLORIDE SERPL-SCNC: 109 MMOL/L (ref 100–108)
CO2 SERPL-SCNC: 27 MMOL/L (ref 21–32)
CREAT SERPL-MCNC: 2.21 MG/DL (ref 0.6–1.3)
CREAT UR-MCNC: 93.6 MG/DL
EOSINOPHIL # BLD AUTO: 0.21 THOUSAND/ΜL (ref 0–0.61)
EOSINOPHIL NFR BLD AUTO: 3 % (ref 0–6)
ERYTHROCYTE [DISTWIDTH] IN BLOOD BY AUTOMATED COUNT: 13.3 % (ref 11.6–15.1)
GFR SERPL CREATININE-BSD FRML MDRD: 26 ML/MIN/1.73SQ M
GLUCOSE P FAST SERPL-MCNC: 77 MG/DL (ref 65–99)
HCT VFR BLD AUTO: 45.3 % (ref 36.5–49.3)
HGB BLD-MCNC: 14.5 G/DL (ref 12–17)
IMM GRANULOCYTES # BLD AUTO: 0.01 THOUSAND/UL (ref 0–0.2)
IMM GRANULOCYTES NFR BLD AUTO: 0 % (ref 0–2)
INR PPP: 2.45 (ref 0.84–1.19)
LYMPHOCYTES # BLD AUTO: 1.6 THOUSANDS/ΜL (ref 0.6–4.47)
LYMPHOCYTES NFR BLD AUTO: 25 % (ref 14–44)
MCH RBC QN AUTO: 29.1 PG (ref 26.8–34.3)
MCHC RBC AUTO-ENTMCNC: 32 G/DL (ref 31.4–37.4)
MCV RBC AUTO: 91 FL (ref 82–98)
MONOCYTES # BLD AUTO: 0.53 THOUSAND/ΜL (ref 0.17–1.22)
MONOCYTES NFR BLD AUTO: 8 % (ref 4–12)
NEUTROPHILS # BLD AUTO: 3.91 THOUSANDS/ΜL (ref 1.85–7.62)
NEUTS SEG NFR BLD AUTO: 63 % (ref 43–75)
NRBC BLD AUTO-RTO: 0 /100 WBCS
PLATELET # BLD AUTO: 261 THOUSANDS/UL (ref 149–390)
PMV BLD AUTO: 10.5 FL (ref 8.9–12.7)
POTASSIUM SERPL-SCNC: 4.3 MMOL/L (ref 3.5–5.3)
PROT SERPL-MCNC: 8.2 G/DL (ref 6.4–8.2)
PROT UR-MCNC: 69 MG/DL
PROT/CREAT UR: 0.74 MG/G{CREAT} (ref 0–0.1)
PROTHROMBIN TIME: 26.4 SECONDS (ref 11.6–14.5)
RBC # BLD AUTO: 4.99 MILLION/UL (ref 3.88–5.62)
SODIUM SERPL-SCNC: 141 MMOL/L (ref 136–145)
WBC # BLD AUTO: 6.33 THOUSAND/UL (ref 4.31–10.16)

## 2020-08-24 PROCEDURE — 85025 COMPLETE CBC W/AUTO DIFF WBC: CPT

## 2020-08-24 PROCEDURE — 82570 ASSAY OF URINE CREATININE: CPT

## 2020-08-24 PROCEDURE — 36415 COLL VENOUS BLD VENIPUNCTURE: CPT

## 2020-08-24 PROCEDURE — 84156 ASSAY OF PROTEIN URINE: CPT

## 2020-08-24 PROCEDURE — 80053 COMPREHEN METABOLIC PANEL: CPT

## 2020-08-24 PROCEDURE — 85610 PROTHROMBIN TIME: CPT

## 2020-08-25 ENCOUNTER — TELEPHONE (OUTPATIENT)
Dept: INPATIENT UNIT | Facility: HOSPITAL | Age: 85
End: 2020-08-25

## 2020-08-25 ENCOUNTER — TELEPHONE (OUTPATIENT)
Dept: NEPHROLOGY | Facility: CLINIC | Age: 85
End: 2020-08-25

## 2020-08-25 ENCOUNTER — ANTICOAG VISIT (OUTPATIENT)
Dept: CARDIOLOGY CLINIC | Facility: CLINIC | Age: 85
End: 2020-08-25

## 2020-08-25 DIAGNOSIS — F03.90 DEMENTIA WITHOUT BEHAVIORAL DISTURBANCE, UNSPECIFIED DEMENTIA TYPE (HCC): ICD-10-CM

## 2020-08-25 DIAGNOSIS — I48.0 PAROXYSMAL ATRIAL FIBRILLATION (HCC): ICD-10-CM

## 2020-08-25 RX ORDER — CHLORHEXIDINE GLUCONATE 0.12 MG/ML
15 RINSE ORAL ONCE
Status: CANCELLED | OUTPATIENT
Start: 2020-08-25 | End: 2020-08-25

## 2020-08-25 RX ORDER — DONEPEZIL HYDROCHLORIDE 10 MG/1
TABLET, FILM COATED ORAL
Qty: 90 TABLET | Refills: 3 | Status: SHIPPED | OUTPATIENT
Start: 2020-08-25 | End: 2021-06-01 | Stop reason: SDUPTHER

## 2020-08-25 RX ORDER — CEFAZOLIN SODIUM 2 G/50ML
2000 SOLUTION INTRAVENOUS ONCE
Status: CANCELLED | OUTPATIENT
Start: 2020-08-25 | End: 2020-08-25

## 2020-08-25 NOTE — PROGRESS NOTES
Tc to wife, glenna  Reports patient stopped warfarin, last dose Sunday, held mon/tues for pacer generator change wed, 8/26  instructed wife,  if ok per surgery, restart at current dose post procedure, , inr due one week post restarting warfarin

## 2020-08-26 ENCOUNTER — ANESTHESIA (OUTPATIENT)
Dept: NON INVASIVE DIAGNOSTICS | Facility: HOSPITAL | Age: 85
End: 2020-08-26
Payer: COMMERCIAL

## 2020-08-26 ENCOUNTER — ANESTHESIA EVENT (OUTPATIENT)
Dept: NON INVASIVE DIAGNOSTICS | Facility: HOSPITAL | Age: 85
End: 2020-08-26
Payer: COMMERCIAL

## 2020-08-26 ENCOUNTER — HOSPITAL ENCOUNTER (OUTPATIENT)
Dept: NON INVASIVE DIAGNOSTICS | Facility: HOSPITAL | Age: 85
Discharge: HOME | End: 2020-08-26
Attending: INTERNAL MEDICINE | Admitting: INTERNAL MEDICINE
Payer: COMMERCIAL

## 2020-08-26 VITALS
SYSTOLIC BLOOD PRESSURE: 154 MMHG | DIASTOLIC BLOOD PRESSURE: 82 MMHG | WEIGHT: 213 LBS | HEART RATE: 60 BPM | RESPIRATION RATE: 18 BRPM | BODY MASS INDEX: 29.82 KG/M2 | HEIGHT: 71 IN | OXYGEN SATURATION: 93 % | TEMPERATURE: 98.2 F

## 2020-08-26 DIAGNOSIS — Z45.010 PACEMAKER AT END OF BATTERY LIFE: Primary | ICD-10-CM

## 2020-08-26 DIAGNOSIS — I48.0 PAROXYSMAL ATRIAL FIBRILLATION (HCC): ICD-10-CM

## 2020-08-26 LAB
ANION GAP SERPL CALCULATED.3IONS-SCNC: 5 MMOL/L (ref 4–13)
ATRIAL RATE: 69 BPM
BUN SERPL-MCNC: 28 MG/DL (ref 5–25)
CALCIUM SERPL-MCNC: 9.1 MG/DL (ref 8.3–10.1)
CHLORIDE SERPL-SCNC: 109 MMOL/L (ref 100–108)
CO2 SERPL-SCNC: 27 MMOL/L (ref 21–32)
CREAT SERPL-MCNC: 2.14 MG/DL (ref 0.6–1.3)
ERYTHROCYTE [DISTWIDTH] IN BLOOD BY AUTOMATED COUNT: 13.2 % (ref 11.6–15.1)
GFR SERPL CREATININE-BSD FRML MDRD: 27 ML/MIN/1.73SQ M
GLUCOSE P FAST SERPL-MCNC: 137 MG/DL (ref 65–99)
GLUCOSE SERPL-MCNC: 137 MG/DL (ref 65–140)
HCT VFR BLD AUTO: 43.4 % (ref 36.5–49.3)
HGB BLD-MCNC: 14.1 G/DL (ref 12–17)
INR PPP: 2.02 (ref 0.84–1.19)
MCH RBC QN AUTO: 29.1 PG (ref 26.8–34.3)
MCHC RBC AUTO-ENTMCNC: 32.5 G/DL (ref 31.4–37.4)
MCV RBC AUTO: 90 FL (ref 82–98)
PLATELET # BLD AUTO: 227 THOUSANDS/UL (ref 149–390)
PMV BLD AUTO: 10 FL (ref 8.9–12.7)
POTASSIUM SERPL-SCNC: 4.3 MMOL/L (ref 3.5–5.3)
PROTHROMBIN TIME: 22.8 SECONDS (ref 11.6–14.5)
QRS AXIS: -84 DEGREES
QRSD INTERVAL: 160 MS
QT INTERVAL: 450 MS
QTC INTERVAL: 468 MS
RBC # BLD AUTO: 4.85 MILLION/UL (ref 3.88–5.62)
SODIUM SERPL-SCNC: 141 MMOL/L (ref 136–145)
T WAVE AXIS: 89 DEGREES
VENTRICULAR RATE: 65 BPM
WBC # BLD AUTO: 7.28 THOUSAND/UL (ref 4.31–10.16)

## 2020-08-26 PROCEDURE — 33228 REMV&REPLC PM GEN DUAL LEAD: CPT | Performed by: INTERNAL MEDICINE

## 2020-08-26 PROCEDURE — 85027 COMPLETE CBC AUTOMATED: CPT | Performed by: PHYSICIAN ASSISTANT

## 2020-08-26 PROCEDURE — C1785 PMKR, DUAL, RATE-RESP: HCPCS

## 2020-08-26 PROCEDURE — 93010 ELECTROCARDIOGRAM REPORT: CPT | Performed by: INTERNAL MEDICINE

## 2020-08-26 PROCEDURE — 85610 PROTHROMBIN TIME: CPT | Performed by: PHYSICIAN ASSISTANT

## 2020-08-26 PROCEDURE — 93005 ELECTROCARDIOGRAM TRACING: CPT

## 2020-08-26 PROCEDURE — 80048 BASIC METABOLIC PNL TOTAL CA: CPT | Performed by: PHYSICIAN ASSISTANT

## 2020-08-26 RX ORDER — LIDOCAINE HYDROCHLORIDE 10 MG/ML
INJECTION, SOLUTION EPIDURAL; INFILTRATION; INTRACAUDAL; PERINEURAL CODE/TRAUMA/SEDATION MEDICATION
Status: COMPLETED | OUTPATIENT
Start: 2020-08-26 | End: 2020-08-26

## 2020-08-26 RX ORDER — METOPROLOL TARTRATE 100 MG/1
100 TABLET ORAL
COMMUNITY
End: 2020-09-25 | Stop reason: SDUPTHER

## 2020-08-26 RX ORDER — ACETAMINOPHEN 325 MG/1
650 TABLET ORAL EVERY 4 HOURS PRN
Status: DISCONTINUED | OUTPATIENT
Start: 2020-08-26 | End: 2020-08-26 | Stop reason: HOSPADM

## 2020-08-26 RX ORDER — CHLORHEXIDINE GLUCONATE 0.12 MG/ML
15 RINSE ORAL ONCE
Status: DISCONTINUED | OUTPATIENT
Start: 2020-08-26 | End: 2020-08-26 | Stop reason: HOSPADM

## 2020-08-26 RX ORDER — METOPROLOL TARTRATE 50 MG/1
50 TABLET, FILM COATED ORAL
COMMUNITY
End: 2022-02-09 | Stop reason: SDUPTHER

## 2020-08-26 RX ORDER — SODIUM CHLORIDE 9 MG/ML
INJECTION, SOLUTION INTRAVENOUS CONTINUOUS PRN
Status: DISCONTINUED | OUTPATIENT
Start: 2020-08-26 | End: 2020-08-26

## 2020-08-26 RX ORDER — CEFAZOLIN SODIUM 2 G/50ML
SOLUTION INTRAVENOUS AS NEEDED
Status: DISCONTINUED | OUTPATIENT
Start: 2020-08-26 | End: 2020-08-26

## 2020-08-26 RX ORDER — GENTAMICIN SULFATE 40 MG/ML
INJECTION, SOLUTION INTRAMUSCULAR; INTRAVENOUS CODE/TRAUMA/SEDATION MEDICATION
Status: COMPLETED | OUTPATIENT
Start: 2020-08-26 | End: 2020-08-26

## 2020-08-26 RX ORDER — PROPOFOL 10 MG/ML
INJECTION, EMULSION INTRAVENOUS CONTINUOUS PRN
Status: DISCONTINUED | OUTPATIENT
Start: 2020-08-26 | End: 2020-08-26

## 2020-08-26 RX ADMIN — SODIUM CHLORIDE: 9 INJECTION, SOLUTION INTRAVENOUS at 10:32

## 2020-08-26 RX ADMIN — LIDOCAINE HYDROCHLORIDE 20 ML: 10 INJECTION, SOLUTION EPIDURAL; INFILTRATION; INTRACAUDAL; PERINEURAL at 11:25

## 2020-08-26 RX ADMIN — CEFAZOLIN SODIUM 2000 MG: 2 SOLUTION INTRAVENOUS at 11:00

## 2020-08-26 RX ADMIN — PROPOFOL 50 MCG/KG/MIN: 10 INJECTION, EMULSION INTRAVENOUS at 11:10

## 2020-08-26 RX ADMIN — GENTAMICIN SULFATE 80 MG: 40 INJECTION, SOLUTION INTRAMUSCULAR; INTRAVENOUS at 11:29

## 2020-08-26 NOTE — DISCHARGE INSTRUCTIONS
Please refer to post pacemaker implantation discharge instructions and restrictions and your pacemaker booklet/temporary card  Keep incision dry for one week  Do not use lotions/powders/creams on incision  Leave outer bandage in place for 1 week - it is water proof, and as long as it is fully adhered to your skin you may shower with it  If it appears as though the bandage is coming off and/or there is any communication to the area of device incision, please then keep the whole area dry for the remaining week  After 1 week, please remove by pulling all edges away from the center of the bandage  Please call the office if you notice redness, swelling, bleeding, or drainage from incision or if you develop fevers  AFTER PACEMAKER CARE:    If you have any questions, please call [phone] to speak with a nurse (8:30am-4pm, or [phone] after hours)  For appointments, please call [phone]  WHAT YOU SHOULD KNOW:   A pacemaker is a small, battery-powered device that is placed under your skin in your upper chest area with wires placed through a vein that lead directly into the heart  It helps regulate your heart rate and prevent your heart from beating too slowly  AFTER YOU LEAVE:     Medicines:     · Pain medicine: You may need medicine to take away or decrease pain  ¨ Learn how to take your medicine  Ask what medicine and how much you should take  Be sure you know how, when, and how often to take it  Usually Over the counter pain medicine is sufficient to control pain (Acetominophen or Ibuprofen) Ask your doctor if you may take these  If this does not control your pain, narcotic pain killers may be prescribed, please call if you need prescription  ¨ Do not wait until the pain is severe before you take your medicine  Tell caregivers if your pain does not decrease  ¨ Pain medicine can make you dizzy or sleepy   Prevent falls by calling someone when you get out of bed or if you need help  Take your medicine as directed  Call your healthcare provider if you think your medicine is not helping or if you have side effects  Tell him if you are allergic to any medicine  Follow up with your cardiologist after your procedure: You will need a follow-up visit approximately 2 weeks after you leave the hospital  Your cardiologist will check your wound and make sure that your pacemaker is working correctly  Follow the instructions to check your pacemaker: Your cardiologist or primary healthcare provider will check your pacemaker and the battery regularly  He will use a computer to check your pacemaker over the telephone or wireless device which will be given to you  Pacemaker batteries usually last 8 to 10 years  The pacemaker unit will be replaced when the battery gets low  This is a simpler procedure than the original one to implant your pacemaker  Wound care:  Keep your incision dry for one week  Do not use lotions/powders/creams on incision  Leave outer bandage in place for 1 week - it is water proof, and as long as it is fully adhered to your skin you may shower with it  If it appears as though the bandage is coming off and/or there is any communication to the area of device incision, please then keep the whole area dry for the remaining week  After 1 week, please remove by pulling all edges away from the center of the bandage  Please call the office if you notice redness, swelling, bleeding, or drainage from incision or if you develop fevers  Activity:   · You may resume your normal activity following a generator change  · Driving: you are able to drive 48 hours post PPM implant   · Sports:  Ask your caregiver when it is okay to play tennis, golf, basketball, or any sport that requires you to lift your arms  Do not play full contact sports, such as football, that could damage your pacemaker   Ask your cardiologist or primary healthcare provider how much and what kinds of physical activity are safe for you  Living with a pacemaker:   · Tell all caregivers you have a pacemaker: This includes surgeons, radiologists, and medical technicians  You may want to wear a medical alert ID bracelet or necklace that states that you have a pacemaker  · Carry your pacemaker ID card: Make sure you receive a pacemaker ID card  Carry it with you at all times  It lists important information about your pacemaker  Show it to airport security if you travel  · Avoid electrical interference:  Avoid welding equipment and other equipment with large magnets or electric fields  These things could interfere with how your pacemaker works  Use your cell phone on the ear opposite from your pacemaker  Do not carry your cell phone in your shirt pocket over your chest      · Some Pacemakers are MRI safe  Ask you doctor if it is safe to proceed with MRI and let the radiologist and staff know you have a pacemaker  · Do not touch the skin around your pacemaker: This can cause damage to the lead wires or move the pacemaker unit from where it should be  Contact your cardiologist or primary healthcare provider if:   · The area around your pacemaker has increasing amount of pain after surgery  The pain should improve over first few days after implantation  · The skin around your stitches has increasing redness, swelling, or has drainage  This may mean that you have an infection  · You have a fever  · You have chills, a cough, and feel weak or achy  These are also signs of infection  · Your feet or ankles are more swollen than your baseline  · Your Heart rate is less than 50 beats per minute     Seek care immediately if:   · Your bandage becomes soaked with blood  · Your pacemaker is swelling rapidly    · Your stitches open up  · You feel your heart suddenly beating very slowly or quickly  · You become too weak or dizzy to stand, or you pass out       · Your arm or leg feels warm, tender, and painful  It may look swollen and red  · You have chest pain that does not go away with rest or medicine  · You feel lightheaded, short of breath, and have chest pain  · You cough up blood  © 2014 [address] is for End User's use only and may not be sold, redistributed or otherwise used for commercial purposes  All illustrations and images included in CareNotes® are the copyrighted property of [employer] , [employer]  or Quentin Casanova  The above information is an  only  It is not intended as medical advice for individual conditions or treatments  Talk to your doctor, nurse or pharmacist before following any medical regimen to see if it is safe and effective for you

## 2020-08-26 NOTE — ANESTHESIA POSTPROCEDURE EVALUATION
Post-Op Assessment Note    CV Status:  Stable  Pain Score: 0    Pain management: adequate     Mental Status:  Alert and awake   Hydration Status:  Euvolemic   PONV Controlled:  Controlled   Airway Patency:  Patent      Post Op Vitals Reviewed: Yes      Staff: CRNA         No complications documented      /74 (08/26/20 1147)    Temp      Pulse 60 (08/26/20 1147)   Resp   20   SpO2 96 % (08/26/20 1147)

## 2020-08-26 NOTE — ANESTHESIA PREPROCEDURE EVALUATION
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, or unspecified chronic kidney disease   (+) Solitary kidney      HEMATOLOGY   (+) Coagulation disorder ([facility] [address] )        Physical Exam    Airway    Mallampati score: I  TM Distance: >3 FB  Neck ROM: limited     Dental       Cardiovascular  Rhythm: regular, Rate: normal,     Pulmonary  Breath sounds clear to auscultation,     Other Findings        Anesthesia Plan  ASA Score- 3     Anesthesia Type- IV sedation with anesthesia with ASA Monitors  Additional Monitors:   Airway Plan:     Comment: Discussed risks/benefits, including medication reactions, awareness, aspiration, and serious/life threatening complications  Plan to maintain native airway with IVGA, monitored with EtCO2  Plan Factors-Exercise tolerance (METS): <4 METS  Chart reviewed  Patient summary reviewed  Patient is not a current smoker  Patient instructed to abstain from smoking on day of procedure  Patient did not smoke on day of surgery  Induction- intravenous  Postoperative Plan- Plan for postoperative opioid use  Planned trial extubation    Informed Consent- Anesthetic plan and risks discussed with patient and spouse  I personally reviewed this patient with the CRNA  Discussed and agreed on the Anesthesia Plan with the CRNA  Geoff Manifold

## 2020-09-01 ENCOUNTER — TELEPHONE (OUTPATIENT)
Dept: GASTROENTEROLOGY | Facility: AMBULARY SURGERY CENTER | Age: 85
End: 2020-09-01

## 2020-09-02 ENCOUNTER — TELEPHONE (OUTPATIENT)
Dept: OTHER | Facility: OTHER | Age: 85
End: 2020-09-02

## 2020-09-02 ENCOUNTER — TELEPHONE (OUTPATIENT)
Dept: CARDIOLOGY CLINIC | Facility: CLINIC | Age: 85
End: 2020-09-02

## 2020-09-02 ENCOUNTER — APPOINTMENT (OUTPATIENT)
Dept: LAB | Age: 85
End: 2020-09-02
Payer: COMMERCIAL

## 2020-09-02 DIAGNOSIS — I48.0 PAROXYSMAL ATRIAL FIBRILLATION (HCC): ICD-10-CM

## 2020-09-02 LAB
INR PPP: 1.51 (ref 0.84–1.19)
PROTHROMBIN TIME: 18.2 SECONDS (ref 11.6–14.5)

## 2020-09-02 PROCEDURE — 85610 PROTHROMBIN TIME: CPT

## 2020-09-02 PROCEDURE — 36415 COLL VENOUS BLD VENIPUNCTURE: CPT

## 2020-09-02 NOTE — TELEPHONE ENCOUNTER
Tati:    [phone]/ PT Checoven Deal  [id number] [id number]/ PT recently had a pacemaker put in, and he endured a fall this evening onto a brick sidewalk  His incision looked alright, and his vitals were fine  However, he was sweating profusely after his fall  His wife just wants to ensure he is alright

## 2020-09-02 NOTE — TELEPHONE ENCOUNTER
Patient's wife called today to stating Mr Alan Ratliff blood pressure has been high since yesterday  She did give him an extra dose of amlodipine 2 5mg today  She would like to know if that was ok with you  Yesterdays BP was 147/73 HR59 and this morning the BP Is 154/72 HR59   Please advise, thank you

## 2020-09-03 ENCOUNTER — TELEPHONE (OUTPATIENT)
Dept: CARDIOLOGY CLINIC | Facility: CLINIC | Age: 85
End: 2020-09-03

## 2020-09-03 ENCOUNTER — ANTICOAG VISIT (OUTPATIENT)
Dept: CARDIOLOGY CLINIC | Facility: CLINIC | Age: 85
End: 2020-09-03

## 2020-09-03 DIAGNOSIS — I48.0 PAROXYSMAL ATRIAL FIBRILLATION (HCC): ICD-10-CM

## 2020-09-03 NOTE — PROGRESS NOTES
Tc to wife, glenna, will take 5 mg today in place of 2 5 mg , then resume current dose, pt had held warfarin 2-3 days last week for device change  Wife reports he feel x 2 , deneis injury  Advised to call dr Jeramie Sawant to discuss, she gives additional dose of amlodipine when b/p is above 480 systolic

## 2020-09-03 NOTE — TELEPHONE ENCOUNTER
Wife called states pt has had several falls last night because he was dizzy  /78 hr 69 she gave and extra 2 5 mg amlodipine this am  He has been doing fine all day  will give his regular dose of 2 5 mg amlodipine tonight  Did advise that she take his BP first before the next dose  Verbally understood  Did talk with DR Urrutia Gone last night  She just wanted to make sure you knew about the medication      Thank you

## 2020-09-03 NOTE — TELEPHONE ENCOUNTER
In looking through his chart, Dr April Lee has only seen the patient due to his device needing a generator change  It does not appear that we changed his blood pressure medication during his stay at the hospital either  I would reach out to his general cardiologist, Dr Tiffany Figueroa to see if he feels any change to his antihypertensives are needed

## 2020-09-09 ENCOUNTER — OFFICE VISIT (OUTPATIENT)
Dept: INTERNAL MEDICINE CLINIC | Facility: CLINIC | Age: 85
End: 2020-09-09
Payer: COMMERCIAL

## 2020-09-09 ENCOUNTER — IN-CLINIC DEVICE VISIT (OUTPATIENT)
Dept: CARDIOLOGY CLINIC | Facility: CLINIC | Age: 85
End: 2020-09-09

## 2020-09-09 VITALS
HEIGHT: 71 IN | WEIGHT: 204 LBS | SYSTOLIC BLOOD PRESSURE: 118 MMHG | BODY MASS INDEX: 28.56 KG/M2 | HEART RATE: 64 BPM | DIASTOLIC BLOOD PRESSURE: 78 MMHG | TEMPERATURE: 97.3 F

## 2020-09-09 DIAGNOSIS — E78.2 MIXED HYPERLIPIDEMIA: ICD-10-CM

## 2020-09-09 DIAGNOSIS — E11.22 TYPE 2 DIABETES MELLITUS WITH STAGE 4 CHRONIC KIDNEY DISEASE, WITH LONG-TERM CURRENT USE OF INSULIN (HCC): Primary | ICD-10-CM

## 2020-09-09 DIAGNOSIS — Z23 NEED FOR INFLUENZA VACCINATION: ICD-10-CM

## 2020-09-09 DIAGNOSIS — R97.20 ELEVATED PROSTATE SPECIFIC ANTIGEN (PSA): ICD-10-CM

## 2020-09-09 DIAGNOSIS — R53.1 WEAKNESS: ICD-10-CM

## 2020-09-09 DIAGNOSIS — Z79.4 TYPE 2 DIABETES MELLITUS WITH STAGE 4 CHRONIC KIDNEY DISEASE, WITH LONG-TERM CURRENT USE OF INSULIN (HCC): Primary | ICD-10-CM

## 2020-09-09 DIAGNOSIS — I12.9 HYPERTENSIVE CHRONIC KIDNEY DISEASE WITH STAGE 1 THROUGH STAGE 4 CHRONIC KIDNEY DISEASE, OR UNSPECIFIED CHRONIC KIDNEY DISEASE: ICD-10-CM

## 2020-09-09 DIAGNOSIS — I48.0 PAROXYSMAL ATRIAL FIBRILLATION (HCC): ICD-10-CM

## 2020-09-09 DIAGNOSIS — Z95.0 CARDIAC PACEMAKER IN SITU: Primary | ICD-10-CM

## 2020-09-09 DIAGNOSIS — N40.1 BENIGN PROSTATIC HYPERPLASIA WITH LOWER URINARY TRACT SYMPTOMS, SYMPTOM DETAILS UNSPECIFIED: ICD-10-CM

## 2020-09-09 DIAGNOSIS — N18.4 TYPE 2 DIABETES MELLITUS WITH STAGE 4 CHRONIC KIDNEY DISEASE, WITH LONG-TERM CURRENT USE OF INSULIN (HCC): Primary | ICD-10-CM

## 2020-09-09 DIAGNOSIS — I10 ESSENTIAL HYPERTENSION: ICD-10-CM

## 2020-09-09 PROCEDURE — G0008 ADMIN INFLUENZA VIRUS VAC: HCPCS | Performed by: INTERNAL MEDICINE

## 2020-09-09 PROCEDURE — 99214 OFFICE O/P EST MOD 30 MIN: CPT | Performed by: INTERNAL MEDICINE

## 2020-09-09 PROCEDURE — 99024 POSTOP FOLLOW-UP VISIT: CPT | Performed by: INTERNAL MEDICINE

## 2020-09-09 PROCEDURE — 90653 IIV ADJUVANT VACCINE IM: CPT

## 2020-09-09 NOTE — PROGRESS NOTES
Results for orders placed or performed in visit on 09/09/20   Cardiac EP device report    Narrative    MDT-DUAL CHAMBER PPM (AAIR-DDDR MODE)/ NOT MRI CONDITIONALKARA PT   DEVICE INTERROGATED IN THE [facility] OFFICE  BATTERY VOLTAGE ADEQUATE (13 7 YRS)  AP-74%, <0 1%  ALL LEAD PARAMETERS WITHIN NORMAL LIMITS  NO SIGNIFICANT HIGH RATE EPISODES  HISTOGRAMS FLAT- PROGRAMMED RATE RESPONSE ON  NORMAL DEVICE FUNCTION  WOUND CHECK: INCISION CLEAN AND DRY WITH EDGES APPROXIMATED; WOUND CARE AND RESTRICTIONS REVIEWED WITH PATIENT   GV

## 2020-09-11 NOTE — TELEPHONE ENCOUNTER
Post gen change 8/26/2020  Jerri called again today, asking to s/w you    Stated BP still elevated  Pt takes Norvasc in the pm and has been taking an extra 2 5mg  Yesterday in the am 158/80, 69   Today  150/81, [address] asking to s/w you  C/b# [phone]

## 2020-09-14 ENCOUNTER — TELEPHONE (OUTPATIENT)
Dept: INTERNAL MEDICINE CLINIC | Facility: CLINIC | Age: 85
End: 2020-09-14

## 2020-09-14 ENCOUNTER — TELEPHONE (OUTPATIENT)
Dept: CARDIOLOGY CLINIC | Facility: CLINIC | Age: 85
End: 2020-09-14

## 2020-09-16 ENCOUNTER — APPOINTMENT (OUTPATIENT)
Dept: LAB | Age: 85
End: 2020-09-16
Payer: COMMERCIAL

## 2020-09-16 DIAGNOSIS — I48.0 PAROXYSMAL ATRIAL FIBRILLATION (HCC): ICD-10-CM

## 2020-09-16 LAB
INR PPP: 2.16 (ref 0.84–1.19)
PROTHROMBIN TIME: 24 SECONDS (ref 11.6–14.5)

## 2020-09-16 PROCEDURE — 85610 PROTHROMBIN TIME: CPT

## 2020-09-16 PROCEDURE — 36415 COLL VENOUS BLD VENIPUNCTURE: CPT

## 2020-09-17 ENCOUNTER — ANTICOAG VISIT (OUTPATIENT)
Dept: CARDIOLOGY CLINIC | Facility: CLINIC | Age: 85
End: 2020-09-17

## 2020-09-17 DIAGNOSIS — I48.0 PAROXYSMAL ATRIAL FIBRILLATION (HCC): ICD-10-CM

## 2020-09-24 ENCOUNTER — TELEPHONE (OUTPATIENT)
Dept: CARDIOLOGY CLINIC | Facility: CLINIC | Age: 85
End: 2020-09-24

## 2020-09-24 NOTE — TELEPHONE ENCOUNTER
Pt's wife called the nurse line  She reports Ayaan's BP is 164/89 this AM   She gave him an extra 2 5 mg of amlodipine  She would like you to call her  I let her know you are not in the office today  She is at her work until 5:30 today  Ph: [phone]

## 2020-09-25 DIAGNOSIS — I48.91 ATRIAL FIBRILLATION, UNSPECIFIED TYPE (HCC): Primary | ICD-10-CM

## 2020-09-26 RX ORDER — METOPROLOL TARTRATE 100 MG/1
100 TABLET ORAL
Qty: 90 TABLET | Refills: 2 | Status: SHIPPED | OUTPATIENT
Start: 2020-09-26 | End: 2021-06-23

## 2020-09-30 ENCOUNTER — APPOINTMENT (OUTPATIENT)
Dept: LAB | Age: 85
End: 2020-09-30
Payer: COMMERCIAL

## 2020-09-30 ENCOUNTER — OFFICE VISIT (OUTPATIENT)
Dept: NEUROLOGY | Facility: CLINIC | Age: 85
End: 2020-09-30
Payer: COMMERCIAL

## 2020-09-30 ENCOUNTER — OFFICE VISIT (OUTPATIENT)
Dept: SLEEP CENTER | Facility: CLINIC | Age: 85
End: 2020-09-30
Payer: COMMERCIAL

## 2020-09-30 VITALS
TEMPERATURE: 97.2 F | SYSTOLIC BLOOD PRESSURE: 122 MMHG | DIASTOLIC BLOOD PRESSURE: 66 MMHG | HEART RATE: 73 BPM | BODY MASS INDEX: 29.48 KG/M2 | WEIGHT: 210.6 LBS | HEIGHT: 71 IN

## 2020-09-30 VITALS
TEMPERATURE: 97.4 F | HEART RATE: 76 BPM | BODY MASS INDEX: 29.41 KG/M2 | RESPIRATION RATE: 16 BRPM | SYSTOLIC BLOOD PRESSURE: 116 MMHG | DIASTOLIC BLOOD PRESSURE: 63 MMHG | HEIGHT: 71 IN | WEIGHT: 210.1 LBS

## 2020-09-30 DIAGNOSIS — I48.0 PAROXYSMAL ATRIAL FIBRILLATION (HCC): ICD-10-CM

## 2020-09-30 DIAGNOSIS — N18.4 TYPE 2 DIABETES MELLITUS WITH STAGE 4 CHRONIC KIDNEY DISEASE, WITH LONG-TERM CURRENT USE OF INSULIN (HCC): ICD-10-CM

## 2020-09-30 DIAGNOSIS — Z79.4 TYPE 2 DIABETES MELLITUS WITH STAGE 4 CHRONIC KIDNEY DISEASE, WITH LONG-TERM CURRENT USE OF INSULIN (HCC): ICD-10-CM

## 2020-09-30 DIAGNOSIS — E78.2 MIXED HYPERLIPIDEMIA: ICD-10-CM

## 2020-09-30 DIAGNOSIS — R29.6 RECURRENT FALLS: Primary | ICD-10-CM

## 2020-09-30 DIAGNOSIS — F02.80 FRONTOTEMPORAL DEMENTIA (HCC): ICD-10-CM

## 2020-09-30 DIAGNOSIS — E11.22 TYPE 2 DIABETES MELLITUS WITH STAGE 4 CHRONIC KIDNEY DISEASE, WITH LONG-TERM CURRENT USE OF INSULIN (HCC): ICD-10-CM

## 2020-09-30 DIAGNOSIS — G31.09 FRONTOTEMPORAL DEMENTIA (HCC): ICD-10-CM

## 2020-09-30 DIAGNOSIS — IMO0002 SOLITARY KIDNEY: ICD-10-CM

## 2020-09-30 DIAGNOSIS — G47.33 OSA (OBSTRUCTIVE SLEEP APNEA): Primary | ICD-10-CM

## 2020-09-30 DIAGNOSIS — I12.9 HYPERTENSIVE CHRONIC KIDNEY DISEASE WITH STAGE 1 THROUGH STAGE 4 CHRONIC KIDNEY DISEASE, OR UNSPECIFIED CHRONIC KIDNEY DISEASE: ICD-10-CM

## 2020-09-30 DIAGNOSIS — N18.4 CKD (CHRONIC KIDNEY DISEASE), STAGE IV (HCC): ICD-10-CM

## 2020-09-30 LAB
ALBUMIN SERPL BCP-MCNC: 3.7 G/DL (ref 3.5–5)
ANION GAP SERPL CALCULATED.3IONS-SCNC: 4 MMOL/L (ref 4–13)
BUN SERPL-MCNC: 26 MG/DL (ref 5–25)
CALCIUM SERPL-MCNC: 9.7 MG/DL (ref 8.3–10.1)
CHLORIDE SERPL-SCNC: 104 MMOL/L (ref 100–108)
CO2 SERPL-SCNC: 29 MMOL/L (ref 21–32)
CREAT SERPL-MCNC: 2.18 MG/DL (ref 0.6–1.3)
ERYTHROCYTE [DISTWIDTH] IN BLOOD BY AUTOMATED COUNT: 13.2 % (ref 11.6–15.1)
GFR SERPL CREATININE-BSD FRML MDRD: 27 ML/MIN/1.73SQ M
GLUCOSE SERPL-MCNC: 170 MG/DL (ref 65–140)
HCT VFR BLD AUTO: 42.8 % (ref 36.5–49.3)
HGB BLD-MCNC: 14.3 G/DL (ref 12–17)
INR PPP: 1.94 (ref 0.84–1.19)
MCH RBC QN AUTO: 28.5 PG (ref 26.8–34.3)
MCHC RBC AUTO-ENTMCNC: 33.4 G/DL (ref 31.4–37.4)
MCV RBC AUTO: 85 FL (ref 82–98)
PHOSPHATE SERPL-MCNC: 3 MG/DL (ref 2.3–4.1)
PLATELET # BLD AUTO: 249 THOUSANDS/UL (ref 149–390)
PMV BLD AUTO: 10.4 FL (ref 8.9–12.7)
POTASSIUM SERPL-SCNC: 4.5 MMOL/L (ref 3.5–5.3)
PROTHROMBIN TIME: 22.1 SECONDS (ref 11.6–14.5)
PTH-INTACT SERPL-MCNC: 25.2 PG/ML (ref 18.4–80.1)
RBC # BLD AUTO: 5.01 MILLION/UL (ref 3.88–5.62)
SODIUM SERPL-SCNC: 137 MMOL/L (ref 136–145)
URATE SERPL-MCNC: 4.7 MG/DL (ref 4.2–8)
WBC # BLD AUTO: 7.79 THOUSAND/UL (ref 4.31–10.16)

## 2020-09-30 PROCEDURE — 80069 RENAL FUNCTION PANEL: CPT

## 2020-09-30 PROCEDURE — 85610 PROTHROMBIN TIME: CPT

## 2020-09-30 PROCEDURE — 99214 OFFICE O/P EST MOD 30 MIN: CPT | Performed by: INTERNAL MEDICINE

## 2020-09-30 PROCEDURE — 99214 OFFICE O/P EST MOD 30 MIN: CPT | Performed by: PHYSICIAN ASSISTANT

## 2020-09-30 PROCEDURE — 84550 ASSAY OF BLOOD/URIC ACID: CPT

## 2020-09-30 PROCEDURE — 83970 ASSAY OF PARATHORMONE: CPT

## 2020-09-30 PROCEDURE — 36415 COLL VENOUS BLD VENIPUNCTURE: CPT

## 2020-09-30 PROCEDURE — 85027 COMPLETE CBC AUTOMATED: CPT

## 2020-09-30 RX ORDER — PEN NEEDLE, DIABETIC 29 G X1/2"
NEEDLE, DISPOSABLE MISCELLANEOUS
COMMUNITY
Start: 2020-09-12 | End: 2021-01-11

## 2020-09-30 RX ORDER — AZELASTINE HCL 205.5 UG/1
SPRAY NASAL
COMMUNITY
Start: 2020-08-25 | End: 2020-11-09

## 2020-09-30 NOTE — PROGRESS NOTES
Patient ID: Grayson Saldana is a 80 y o  male  Assessment/Plan:    Recurrent falls  Patient's wife with concerns for recurrent falls over the last few weeks  Per the patient he has been feeling some intermittent dizziness and off balance  Does state this is worse with standing quickly  He is on Coumadin currently an although he denies hitting his head with any falls we will obtain a CT scan of his head at this time  This will also allow us to monitor for any evidence of strokes, he is unable to obtain MRI due to his pacemaker  No focal deficits on exam other than difficulty with tandem gait  I did discuss the option of physical therapy for balance however at this time he would prefer to hold off  In the interim I have suggested that he try and stands slowly from a seated position and given self a few seconds before starting to walk  Should also try and increase his fluid intake  He will discuss this further with Cardiology at his next follow-up  Frontotemporal dementia  Patient continues to have some progression however the wife feels as though he is overall stable  Scored a 19/30 on memory testing in the office today which is stable from his prior visits  He remains on both Aricept and Namenda  Will not make any changes at this time  Subjective:    Johana Shay is an 80year old right handed man with CKD, DM, MELQUIADES on BiPAP, afib on coumadin, s/p PPM who presents in follow up for frontotemporal dementia  To review, symptoms of memory loss began gradually around 2013  He was started on Aricept in 2009  Neuropsychological testing revealed changes suggestive of possible Alzheimer's disease versus frontotemporal dementia with the latter being more likely  FDG-PET scan actually showed normal uptake, mild atrophy  At his last visit with Dr John Flores he continued to have slowly progressive cognitive deterioration  No significant change since the prior visit  No changes were made      INTERVAL HISTORY:  The wife called with some concerns regarding some recent falls  He has not hit his head with any falls  He does take Coumadin  He feel once of the car, once in the nguyen upstairs and once in the driveway  He will feel off balance at times mainly if he stands quickly  He is trying to stand slower which is helpful  He denies feeling that there is any weakness of the legs  No sensory changes  He recently got a walker to use when out of the house  He wakes very tired in the AM and they are following with the sleep center later today  Current medications for memory and mood:   - Aricept 10mg daily (bad dreams when given at night)  - Namenda XR 28mg  - Zoloft 100mg daily      POA filed, wife with daughter in [place] as alternate       I personally reviewed and updated the ROS  Objective:    Blood pressure 116/63, pulse 76, temperature (!) 97 4 °F (36 3 °C), temperature source Temporal, resp  rate 16, height 5' 10 5" (1 791 m), weight 95 3 kg (210 lb 1 6 oz)  Physical Exam  Constitutional:       General: He is awake  Appearance: Normal appearance  HENT:      Right Ear: Hearing normal       Left Ear: Hearing normal    Eyes:      General: Lids are normal       Extraocular Movements: Extraocular movements intact  Pupils: Pupils are equal, round, and reactive to light  Pulmonary:      Effort: Pulmonary effort is normal    Neurological:      Mental Status: He is alert  Deep Tendon Reflexes: Strength normal          Neurological Exam  Mental Status  Awake and alert  Oriented only to person  Able to name objects  Able to perform serial calculations  MoCA 19/30 - 9/30/20     MoCA 20/30 - 10/16/18  MoCA 19/30 - 4/17/19   MMSE 15/26 - 4/22/20     Cranial Nerves  CN III, IV, VI: Extraocular movements intact bilaterally  Normal lids and orbits bilaterally  Pupils equal round and reactive to light bilaterally    CN V:  Right: Facial sensation is normal   Left: Facial sensation is normal on the left  CN VII:  Right: There is no facial weakness  Left: There is no facial weakness  CN VIII:  Right: Hearing is normal   Left: Hearing is normal   CN XI: Shoulder shrug strength is normal     Motor   Strength is 5/5 throughout all four extremities  Sensory  Light touch is normal in upper and lower extremities  Coordination  Right: Finger-to-nose normal   Left: Finger-to-nose normal     Gait  Able to arise from chair without issues  Overall good stride  He did have difficulty with tandem with multiple step offs           ROS:    Review of Systems   Constitutional: Positive for fatigue  Negative for appetite change and fever  HENT: Negative  Negative for hearing loss, tinnitus, trouble swallowing and voice change  Eyes: Negative  Negative for photophobia and pain  Respiratory: Negative  Negative for shortness of breath  Cardiovascular: Negative  Negative for palpitations  Gastrointestinal: Negative  Negative for nausea and vomiting  Endocrine: Negative  Negative for cold intolerance  Genitourinary: Negative  Negative for dysuria, frequency and urgency  Musculoskeletal: Positive for gait problem (Frequent falls)  Negative for myalgias and neck pain  Skin: Negative  Negative for rash  Allergic/Immunologic: Negative  Neurological: Negative for dizziness, tremors, seizures, syncope, facial asymmetry, speech difficulty, weakness, light-headedness, numbness and headaches  Forgetfulness   Hematological: Negative  Does not bruise/bleed easily  Psychiatric/Behavioral: Positive for agitation [facility]), confusion and sleep disturbance  Negative for hallucinations

## 2020-09-30 NOTE — ASSESSMENT & PLAN NOTE
Patient continues to have some progression however the wife feels as though he is overall stable  Scored a 19/30 on memory testing in the office today which is stable from his prior visits  He remains on both Aricept and Namenda  Will not make any changes at this time

## 2020-09-30 NOTE — PROGRESS NOTES
Progress Note - Sleep Center   Mariposa Kiran HLY:[birth year] MRN: [id number]      Reason for Visit:  80 y  o male here for annual follow-up    Assessment:  Doing well on current therapy of BiPAP 19/14 cm for mild MELQUIADES (AHI = 9 5) the patient has been breathing through his mouth while using his nasal CPAP  I have ordered a chin strap  Plan:  Continue same    Follow up: One year    History of Present Illness:  History of MELQUIADES on PAP therapy  Fully compliant and deriving benefit  According to the patient's wife, the patient's mental status has continued to decline      Review of Systems      Genitourinary need to urinate more than twice a night   Cardiology none   Gastrointestinal frequent heartburn/acid reflux   Neurology frequent headaches, muscle weakness, forgetfulness and difficulty with memory   Constitutional fatigue and weight change   Integumentary rash or dry skin   Psychiatry aggressiveness or irritability and mood change   Musculoskeletal joint pain, muscle aches and back pain   Pulmonary none   ENT throat clearing and ringing in ears   Endocrine frequent urination   Hematological none         I have reviewed and updated the review of systems as necessary      Historical Information    Past Medical History:   Past Medical History:   Diagnosis Date    Anxiety     Aspiration of liquid     and food per wife if he is eating too fast or talking when eating    Asthma     as a child    Atrial fibrillation ([address]     CAD (coronary artery disease)     Cancer of kidney ([address] )     COPD (chronic obstructive pulmonary disease) ([address]     CPAP (continuous positive airway pressure) dependence     Dementia ([address] )     Frontal lobe    Dementia ([address]     Diabetes mellitus ([address] )     DJD (degenerative joint disease)     Hypercholesterolemia     Hyperlipidemia     Hypertension     Kidney disease     Melanoma ([address] )     left leg    Obesity     Sleep apnea     wears CPAP    TIA (transient ischemic attack) Past Surgical History:   Past Surgical History:   Procedure Laterality Date    CARDIAC PACEMAKER PLACEMENT      CHOLECYSTECTOMY      COLONOSCOPY      HERNIA REPAIR      NEPHRECTOMY Left 2005    MT ESOPHAGOGASTRODUODENOSCOPY SUBMUCOSAL INJECTION N/A 9/21/2016    Procedure: ESOPHAGOGASTRODUODENOSCOPY (EGD); Surgeon: Dee Dee Patterson MD;  Location: [facility]; Service: Gastroenterology    MT ESOPHAGOGASTRODUODENOSCOPY SUBMUCOSAL INJECTION N/A 2/7/2018    Procedure: ESOPHAGOGASTRODUODENOSCOPY (EGD); Surgeon: Dee Dee Patterson MD;  [facility]; Service: Gastroenterology    MT ESOPHAGOGASTRODUODENOSCOPY SUBMUCOSAL INJECTION N/A 4/3/2019    Procedure: ESOPHAGOGASTRODUODENOSCOPY (EGD) WITH BOTOX;  Surgeon: Dee Dee Patterson MD;  [facility];   Service: Gastroenterology    ROTATOR CUFF REPAIR      TONSILLECTOMY         Social History:   Social History     Socioeconomic History    Marital status: /Civil Union     Spouse name: Not on file    Number of children: Not on file    Years of education: Not on file    Highest education level: Not on file   Occupational History    Not on file   Social Needs    Financial resource strain: Not on file    Food insecurity     Worry: Not on file     Inability: Not on file    Transportation needs     Medical: Not on file     Non-medical: Not on file   Tobacco Use    Smoking status: Never Smoker    Smokeless tobacco: Never Used   Substance and Sexual Activity    Alcohol use: No    Drug use: No    Sexual activity: Not on file   Lifestyle    Physical activity     Days per week: Not on file     Minutes per session: Not on file    Stress: Not on file   Relationships    Social connections     Talks on phone: Not on file     Gets together: Not on file     Attends [employer] service: Not on file     Active member of club or organization: Not on file     Attends meetings of clubs or organizations: Not on file     Relationship status: Not on file   Martin Gallegos Intimate partner violence     Fear of current or ex partner: Not on file     Emotionally abused: Not on file     Physically abused: Not on file     Forced sexual activity: Not on file   Other Topics Concern    Not on file   Social History Narrative    Not on file       Family History:   Family History   Problem Relation Age of Onset    Brain cancer Father     Lung cancer Father     Diabetes Family     Heart disease Family     Hypertension Family     Cancer Family         renal cell carcinoma    Stroke Family     Colon cancer Son        Medications/Allergies:      Current Outpatient Medications:     amLODIPine (NORVASC) 2 5 mg tablet, Take 1 tablet (2 5 mg total) by mouth daily (Patient taking differently: Take 5 mg by mouth daily ), Disp: 90 tablet, Rfl: 3    atorvastatin (LIPITOR) 10 mg tablet, Take 10 mg by mouth daily  , Disp: , Rfl:     Azelastine HCl 0 15 % SOLN, , Disp: , Rfl:     BD Insulin Syringe U/F 30G X 1/2" 0 5 ML MISC, , Disp: , Rfl:     cholecalciferol (VITAMIN D3) 1,000 units tablet, Take 2,000 Units by mouth daily, Disp: , Rfl:     cyanocobalamin (VITAMIN B-12) 500 mcg tablet, Take 500 mcg by mouth daily, Disp: , Rfl:     docusate sodium (COLACE) 100 mg capsule, Take 100 mg by mouth as needed , Disp: , Rfl:     donepezil (ARICEPT) 10 mg tablet, TAKE 1 TABLET DAILY AT BEDTIME, Disp: 90 tablet, Rfl: 3    fenofibrate (TRICOR) 145 mg tablet, Take 145 mg by mouth daily  , Disp: , Rfl:     ferrous sulfate 325 (65 Fe) mg tablet, Take 325 mg by mouth as needed , Disp: , Rfl:     fexofenadine (ALLEGRA) 180 MG tablet, Take 180 mg by mouth daily, Disp: , Rfl:     fluticasone (FLONASE) 50 mcg/act nasal spray, 1 spray into each nostril daily, Disp: , Rfl:     insulin aspart (NovoLOG) 100 units/mL injection, Inject 8 Units under the skin 2 (two) times a day before meals , Disp: , Rfl:     insulin glargine (LANTUS) 100 units/mL subcutaneous injection, Inject 30 Units under the skin 2 (two) times a day , Disp: , Rfl:     ipratropium (ATROVENT HFA) 17 mcg/act inhaler, Inhale 2 puffs every 6 (six) hours  , Disp: , Rfl:     levalbuterol (XOPENEX) 0 63 mg/3 mL nebulizer solution, Take 1 ampule by nebulization 2 (two) times a day as needed for wheezing , Disp: , Rfl:     losartan (COZAAR) 25 mg tablet, TAKE 1 TABLET DAILY, Disp: 90 tablet, Rfl: 3    Magnesium 250 MG TABS, Take 500 mg by mouth daily , Disp: , Rfl:     magnesium gluconate (MAGONATE) 500 mg tablet, Take 500 mg by mouth daily  , Disp: , Rfl:     Memantine HCl ER 28 MG CP24, TAKE ONE CAPSULE BY MOUTH EVERY DAY, Disp: 90 capsule, Rfl: 3    metoprolol tartrate (LOPRESSOR) 100 mg tablet, Take 1 tablet (100 mg total) by mouth daily at bedtime, Disp: 90 tablet, Rfl: 2    metoprolol tartrate (LOPRESSOR) 50 mg tablet, Take 50 mg by mouth daily in the early morning, Disp: , Rfl:     Multiple Vitamin (MULTIVITAMIN) capsule, Take 1 capsule by mouth daily, Disp: , Rfl:     NOVOFINE 32G X 6 MM MISC, , Disp: , Rfl:     NOVOLOG FLEXPEN 100 units/mL injection pen, 8 Units 2 (two) times a day with meals , Disp: , Rfl:     omega-3-acid ethyl esters (LOVAZA) 1 g capsule, Take 1 g by mouth 4 (four) times a day , Disp: , Rfl:     ONETOUCH DELICA LANCETS 77X MISC, , Disp: , Rfl:     ONETOUCH VERIO test strip, , Disp: , Rfl:     Probiotic Product (PROBIOTIC COLON SUPPORT PO), Take 1 tablet by mouth daily, Disp: , Rfl:     propafenone (RYTHMOL) 225 mg tablet, Take 1 tablet (225 mg total) by mouth 3 (three) times a day, Disp: 270 tablet, Rfl: 3    sertraline (ZOLOFT) 100 mg tablet, Take 1 tablet (100 mg total) by mouth daily, Disp: 90 tablet, Rfl: 3    sodium chloride (OCEAN) 0 65 % nasal spray, 1 spray into each nostril, Disp: , Rfl:     warfarin (COUMADIN) 2 5 mg tablet, Take 2 5 mg by mouth 3 (three) times a week Indications: Mon wed Fri   , Disp: , Rfl:     warfarin (COUMADIN) 5 mg tablet, Take 1 tablet (5 mg total) by mouth daily (Patient taking differently: Take 5 mg by mouth daily Monday friday), Disp: 90 tablet, Rfl: 3          Objective      Vital Signs:   Vitals:    09/30/20 1400   BP: 122/66   Pulse: 73   Temp: (!) 97 2 °F (36 2 °C)     [place] Sleepiness Scale: Total score: 6        Physical Exam:    General: Alert, appropriate, cooperative, overweight    Head: NC/AT    Skin: Warm, dry    Neuro: No motor abnormalities, cranial nerves appear intact    Extremity: No clubbing, cyanosis      DME Provider: [employer]        Counseling / Coordination of Care   I have spent 15 minutes with the patient today in which greater than 50% of this time was spent in counseling/coordination of care regarding: equipment and compliance  Board Certified Sleep Specialist    Portions of the record may have been created with voice recognition software  Occasional wrong word or "sound a like" substitutions may have occurred due to the inherent limitations of voice recognition software  Read the chart carefully and recognize, using context, where substitutions have occurred

## 2020-09-30 NOTE — ASSESSMENT & PLAN NOTE
Patient's wife with concerns for recurrent falls over the last few weeks  Per the patient he has been feeling some intermittent dizziness and off balance  Does state this is worse with standing quickly  He is on Coumadin currently an although he denies hitting his head with any falls we will obtain a CT scan of his head at this time  This will also allow us to monitor for any evidence of strokes, he is unable to obtain MRI due to his pacemaker  No focal deficits on exam other than difficulty with tandem gait  I did discuss the option of physical therapy for balance however at this time he would prefer to hold off  In the interim I have suggested that he try and stands slowly from a seated position and given self a few seconds before starting to walk  Should also try and increase his fluid intake  He will discuss this further with Cardiology at his next follow-up

## 2020-10-01 ENCOUNTER — TELEPHONE (OUTPATIENT)
Dept: SLEEP CENTER | Facility: CLINIC | Age: 85
End: 2020-10-01

## 2020-10-01 ENCOUNTER — ANTICOAG VISIT (OUTPATIENT)
Dept: CARDIOLOGY CLINIC | Facility: CLINIC | Age: 85
End: 2020-10-01

## 2020-10-01 DIAGNOSIS — I48.0 PAROXYSMAL ATRIAL FIBRILLATION (HCC): ICD-10-CM

## 2020-10-07 DIAGNOSIS — I10 ESSENTIAL HYPERTENSION: ICD-10-CM

## 2020-10-07 RX ORDER — AMLODIPINE BESYLATE 5 MG/1
5 TABLET ORAL DAILY
Qty: 90 TABLET | Refills: 3 | Status: SHIPPED | OUTPATIENT
Start: 2020-10-07 | End: 2021-02-09

## 2020-10-12 ENCOUNTER — HOSPITAL ENCOUNTER (OUTPATIENT)
Dept: RADIOLOGY | Age: 85
Discharge: HOME | End: 2020-10-12
Payer: COMMERCIAL

## 2020-10-12 ENCOUNTER — LAB (OUTPATIENT)
Dept: LAB | Age: 85
End: 2020-10-12
Payer: COMMERCIAL

## 2020-10-12 ENCOUNTER — TELEPHONE (OUTPATIENT)
Dept: NEUROLOGY | Facility: CLINIC | Age: 85
End: 2020-10-12

## 2020-10-12 DIAGNOSIS — I48.0 PAROXYSMAL ATRIAL FIBRILLATION (HCC): ICD-10-CM

## 2020-10-12 DIAGNOSIS — R29.6 RECURRENT FALLS: ICD-10-CM

## 2020-10-12 LAB
INR PPP: 2.04 (ref 0.84–1.19)
PROTHROMBIN TIME: 22.9 SECONDS (ref 11.6–14.5)

## 2020-10-12 PROCEDURE — 36415 COLL VENOUS BLD VENIPUNCTURE: CPT

## 2020-10-12 PROCEDURE — 85610 PROTHROMBIN TIME: CPT

## 2020-10-12 PROCEDURE — G1004 CDSM NDSC: HCPCS

## 2020-10-12 PROCEDURE — 70450 CT HEAD/BRAIN W/O DYE: CPT

## 2020-10-13 ENCOUNTER — ANTICOAG VISIT (OUTPATIENT)
Dept: CARDIOLOGY CLINIC | Facility: CLINIC | Age: 85
End: 2020-10-13

## 2020-10-13 ENCOUNTER — TELEPHONE (OUTPATIENT)
Dept: NEUROLOGY | Facility: CLINIC | Age: 85
End: 2020-10-13

## 2020-10-13 DIAGNOSIS — I48.0 PAROXYSMAL ATRIAL FIBRILLATION (HCC): ICD-10-CM

## 2020-10-16 ENCOUNTER — TELEPHONE (OUTPATIENT)
Dept: NEPHROLOGY | Facility: CLINIC | Age: 85
End: 2020-10-16

## 2020-11-09 ENCOUNTER — ANTICOAG VISIT (OUTPATIENT)
Dept: CARDIOLOGY CLINIC | Facility: CLINIC | Age: 85
End: 2020-11-09

## 2020-11-09 ENCOUNTER — LAB (OUTPATIENT)
Dept: LAB | Age: 85
End: 2020-11-09
Payer: COMMERCIAL

## 2020-11-09 ENCOUNTER — TRANSCRIBE ORDERS (OUTPATIENT)
Dept: ADMINISTRATIVE | Age: 85
End: 2020-11-09

## 2020-11-09 ENCOUNTER — OFFICE VISIT (OUTPATIENT)
Dept: INTERNAL MEDICINE CLINIC | Facility: CLINIC | Age: 85
End: 2020-11-09
Payer: MEDICARE

## 2020-11-09 VITALS
OXYGEN SATURATION: 98 % | HEIGHT: 70 IN | BODY MASS INDEX: 29.49 KG/M2 | SYSTOLIC BLOOD PRESSURE: 130 MMHG | DIASTOLIC BLOOD PRESSURE: 76 MMHG | WEIGHT: 206 LBS | TEMPERATURE: 97 F | HEART RATE: 61 BPM

## 2020-11-09 DIAGNOSIS — N18.6 TYPE 2 DIABETES MELLITUS WITH ESRD (END-STAGE RENAL DISEASE) (HCC): ICD-10-CM

## 2020-11-09 DIAGNOSIS — E11.22 TYPE 2 DIABETES MELLITUS WITH STAGE 4 CHRONIC KIDNEY DISEASE, WITH LONG-TERM CURRENT USE OF INSULIN (HCC): ICD-10-CM

## 2020-11-09 DIAGNOSIS — Q60.0 UNILATERAL AGENESIS OF KIDNEY: ICD-10-CM

## 2020-11-09 DIAGNOSIS — N18.4 CHRONIC KIDNEY DISEASE, STAGE IV (SEVERE) (HCC): ICD-10-CM

## 2020-11-09 DIAGNOSIS — I48.0 PAROXYSMAL ATRIAL FIBRILLATION (HCC): ICD-10-CM

## 2020-11-09 DIAGNOSIS — Z79.4 TYPE 2 DIABETES MELLITUS WITH STAGE 4 CHRONIC KIDNEY DISEASE, WITH LONG-TERM CURRENT USE OF INSULIN (HCC): ICD-10-CM

## 2020-11-09 DIAGNOSIS — E11.22 TYPE 2 DIABETES MELLITUS WITH ESRD (END-STAGE RENAL DISEASE) (HCC): ICD-10-CM

## 2020-11-09 DIAGNOSIS — N18.4 CHRONIC KIDNEY DISEASE, STAGE IV (SEVERE) (HCC): Primary | ICD-10-CM

## 2020-11-09 DIAGNOSIS — E78.2 MIXED HYPERLIPIDEMIA: ICD-10-CM

## 2020-11-09 DIAGNOSIS — I48.0 PAROXYSMAL ATRIAL FIBRILLATION (HCC): Primary | ICD-10-CM

## 2020-11-09 DIAGNOSIS — Z79.4 ENCOUNTER FOR LONG-TERM (CURRENT) USE OF INSULIN (HCC): ICD-10-CM

## 2020-11-09 DIAGNOSIS — I12.0 PARENCHYMAL RENAL HYPERTENSION, STAGE 5 CHRONIC KIDNEY DISEASE OR END STAGE RENAL DISEASE (HCC): ICD-10-CM

## 2020-11-09 DIAGNOSIS — F02.80 FRONTOTEMPORAL DEMENTIA (HCC): ICD-10-CM

## 2020-11-09 DIAGNOSIS — I25.10 CORONARY ARTERY DISEASE INVOLVING NATIVE CORONARY ARTERY OF NATIVE HEART WITHOUT ANGINA PECTORIS: ICD-10-CM

## 2020-11-09 DIAGNOSIS — N18.4 TYPE 2 DIABETES MELLITUS WITH STAGE 4 CHRONIC KIDNEY DISEASE, WITH LONG-TERM CURRENT USE OF INSULIN (HCC): ICD-10-CM

## 2020-11-09 DIAGNOSIS — G31.09 FRONTOTEMPORAL DEMENTIA (HCC): ICD-10-CM

## 2020-11-09 DIAGNOSIS — G47.33 OSA (OBSTRUCTIVE SLEEP APNEA): ICD-10-CM

## 2020-11-09 LAB
ALBUMIN SERPL BCP-MCNC: 3.6 G/DL (ref 3.5–5)
ANION GAP SERPL CALCULATED.3IONS-SCNC: 5 MMOL/L (ref 4–13)
BUN SERPL-MCNC: 20 MG/DL (ref 5–25)
CALCIUM SERPL-MCNC: 9.6 MG/DL (ref 8.3–10.1)
CHLORIDE SERPL-SCNC: 105 MMOL/L (ref 100–108)
CO2 SERPL-SCNC: 27 MMOL/L (ref 21–32)
CREAT SERPL-MCNC: 2.05 MG/DL (ref 0.6–1.3)
CREAT UR-MCNC: 95.9 MG/DL
ERYTHROCYTE [DISTWIDTH] IN BLOOD BY AUTOMATED COUNT: 13.8 % (ref 11.6–15.1)
GFR SERPL CREATININE-BSD FRML MDRD: 29 ML/MIN/1.73SQ M
GLUCOSE SERPL-MCNC: 171 MG/DL (ref 65–140)
HCT VFR BLD AUTO: 41.6 % (ref 36.5–49.3)
HGB BLD-MCNC: 13.9 G/DL (ref 12–17)
INR PPP: 1.9 (ref 0.84–1.19)
MCH RBC QN AUTO: 28.8 PG (ref 26.8–34.3)
MCHC RBC AUTO-ENTMCNC: 33.4 G/DL (ref 31.4–37.4)
MCV RBC AUTO: 86 FL (ref 82–98)
PHOSPHATE SERPL-MCNC: 3.2 MG/DL (ref 2.3–4.1)
PLATELET # BLD AUTO: 265 THOUSANDS/UL (ref 149–390)
PMV BLD AUTO: 10.1 FL (ref 8.9–12.7)
POTASSIUM SERPL-SCNC: 4.2 MMOL/L (ref 3.5–5.3)
PROT UR-MCNC: 45 MG/DL
PROT/CREAT UR: 0.47 MG/G{CREAT} (ref 0–0.1)
PROTHROMBIN TIME: 21.7 SECONDS (ref 11.6–14.5)
PTH-INTACT SERPL-MCNC: 20.2 PG/ML (ref 18.4–80.1)
RBC # BLD AUTO: 4.83 MILLION/UL (ref 3.88–5.62)
SODIUM SERPL-SCNC: 137 MMOL/L (ref 136–145)
URATE SERPL-MCNC: 5.1 MG/DL (ref 4.2–8)
WBC # BLD AUTO: 7.99 THOUSAND/UL (ref 4.31–10.16)

## 2020-11-09 PROCEDURE — 83970 ASSAY OF PARATHORMONE: CPT

## 2020-11-09 PROCEDURE — 85027 COMPLETE CBC AUTOMATED: CPT

## 2020-11-09 PROCEDURE — 80069 RENAL FUNCTION PANEL: CPT

## 2020-11-09 PROCEDURE — 85610 PROTHROMBIN TIME: CPT

## 2020-11-09 PROCEDURE — 99214 OFFICE O/P EST MOD 30 MIN: CPT | Performed by: INTERNAL MEDICINE

## 2020-11-09 PROCEDURE — 82570 ASSAY OF URINE CREATININE: CPT | Performed by: INTERNAL MEDICINE

## 2020-11-09 PROCEDURE — 36415 COLL VENOUS BLD VENIPUNCTURE: CPT

## 2020-11-09 PROCEDURE — 84156 ASSAY OF PROTEIN URINE: CPT | Performed by: INTERNAL MEDICINE

## 2020-11-09 PROCEDURE — 84550 ASSAY OF BLOOD/URIC ACID: CPT

## 2020-11-10 ENCOUNTER — OFFICE VISIT (OUTPATIENT)
Dept: NEPHROLOGY | Facility: CLINIC | Age: 85
End: 2020-11-10
Payer: COMMERCIAL

## 2020-11-10 VITALS
TEMPERATURE: 98 F | DIASTOLIC BLOOD PRESSURE: 70 MMHG | BODY MASS INDEX: 30.09 KG/M2 | HEART RATE: 66 BPM | WEIGHT: 210.2 LBS | SYSTOLIC BLOOD PRESSURE: 122 MMHG | HEIGHT: 70 IN

## 2020-11-10 DIAGNOSIS — N18.4 CKD (CHRONIC KIDNEY DISEASE), STAGE IV (HCC): Primary | ICD-10-CM

## 2020-11-10 DIAGNOSIS — I12.9 HYPERTENSIVE CHRONIC KIDNEY DISEASE WITH STAGE 1 THROUGH STAGE 4 CHRONIC KIDNEY DISEASE, OR UNSPECIFIED CHRONIC KIDNEY DISEASE: ICD-10-CM

## 2020-11-10 DIAGNOSIS — Z79.4 TYPE 2 DIABETES MELLITUS WITH STAGE 4 CHRONIC KIDNEY DISEASE, WITH LONG-TERM CURRENT USE OF INSULIN (HCC): ICD-10-CM

## 2020-11-10 DIAGNOSIS — IMO0002 SOLITARY KIDNEY: ICD-10-CM

## 2020-11-10 DIAGNOSIS — N18.4 TYPE 2 DIABETES MELLITUS WITH STAGE 4 CHRONIC KIDNEY DISEASE, WITH LONG-TERM CURRENT USE OF INSULIN (HCC): ICD-10-CM

## 2020-11-10 DIAGNOSIS — E11.22 TYPE 2 DIABETES MELLITUS WITH STAGE 4 CHRONIC KIDNEY DISEASE, WITH LONG-TERM CURRENT USE OF INSULIN (HCC): ICD-10-CM

## 2020-11-10 PROCEDURE — 99213 OFFICE O/P EST LOW 20 MIN: CPT | Performed by: PHYSICIAN ASSISTANT

## 2020-11-17 DIAGNOSIS — E78.5 HYPERLIPIDEMIA, UNSPECIFIED HYPERLIPIDEMIA TYPE: Primary | ICD-10-CM

## 2020-11-17 RX ORDER — FENOFIBRATE 145 MG/1
TABLET, COATED ORAL
Qty: 90 TABLET | Refills: 0 | Status: SHIPPED | OUTPATIENT
Start: 2020-11-17 | End: 2021-02-15

## 2020-11-18 DIAGNOSIS — F33.0 MILD EPISODE OF RECURRENT MAJOR DEPRESSIVE DISORDER (HCC): ICD-10-CM

## 2020-11-18 RX ORDER — SERTRALINE HYDROCHLORIDE 100 MG/1
100 TABLET, FILM COATED ORAL DAILY
Qty: 90 TABLET | Refills: 3 | Status: SHIPPED | OUTPATIENT
Start: 2020-11-18 | End: 2021-11-11

## 2020-11-20 DIAGNOSIS — I48.0 PAROXYSMAL ATRIAL FIBRILLATION (HCC): ICD-10-CM

## 2020-11-20 LAB
LEFT EYE DIABETIC RETINOPATHY: NORMAL
RIGHT EYE DIABETIC RETINOPATHY: NORMAL
SEVERITY (EYE EXAM): NORMAL

## 2020-11-20 RX ORDER — PROPAFENONE HYDROCHLORIDE 225 MG/1
225 TABLET, FILM COATED ORAL 3 TIMES DAILY
Qty: 270 TABLET | Refills: 3 | Status: SHIPPED | OUTPATIENT
Start: 2020-11-20 | End: 2021-03-08 | Stop reason: SDUPTHER

## 2020-12-04 ENCOUNTER — LAB (OUTPATIENT)
Dept: LAB | Age: 85
End: 2020-12-04
Payer: COMMERCIAL

## 2020-12-04 ENCOUNTER — TELEPHONE (OUTPATIENT)
Dept: INTERNAL MEDICINE CLINIC | Facility: CLINIC | Age: 85
End: 2020-12-04

## 2020-12-04 DIAGNOSIS — I48.0 PAROXYSMAL ATRIAL FIBRILLATION (HCC): ICD-10-CM

## 2020-12-04 LAB
INR PPP: 2.28 (ref 0.84–1.19)
PROTHROMBIN TIME: 25 SECONDS (ref 11.6–14.5)

## 2020-12-04 PROCEDURE — 85610 PROTHROMBIN TIME: CPT

## 2020-12-04 PROCEDURE — 36415 COLL VENOUS BLD VENIPUNCTURE: CPT

## 2020-12-07 ENCOUNTER — ANTICOAG VISIT (OUTPATIENT)
Dept: CARDIOLOGY CLINIC | Facility: CLINIC | Age: 85
End: 2020-12-07

## 2020-12-07 ENCOUNTER — TELEPHONE (OUTPATIENT)
Dept: CARDIOLOGY CLINIC | Facility: CLINIC | Age: 85
End: 2020-12-07

## 2020-12-07 DIAGNOSIS — I48.0 PAROXYSMAL ATRIAL FIBRILLATION (HCC): ICD-10-CM

## 2020-12-15 ENCOUNTER — REMOTE DEVICE CLINIC VISIT (OUTPATIENT)
Dept: CARDIOLOGY CLINIC | Facility: CLINIC | Age: 85
End: 2020-12-15
Payer: COMMERCIAL

## 2020-12-15 ENCOUNTER — TELEPHONE (OUTPATIENT)
Dept: CARDIOLOGY CLINIC | Facility: CLINIC | Age: 85
End: 2020-12-15

## 2020-12-15 DIAGNOSIS — Z95.0 PACEMAKER: Primary | ICD-10-CM

## 2020-12-15 PROCEDURE — 93294 REM INTERROG EVL PM/LDLS PM: CPT | Performed by: INTERNAL MEDICINE

## 2020-12-15 PROCEDURE — 93296 REM INTERROG EVL PM/IDS: CPT | Performed by: INTERNAL MEDICINE

## 2020-12-20 ENCOUNTER — LAB (OUTPATIENT)
Dept: LAB | Age: 85
End: 2020-12-20
Payer: COMMERCIAL

## 2020-12-20 DIAGNOSIS — I48.0 PAROXYSMAL ATRIAL FIBRILLATION (HCC): ICD-10-CM

## 2020-12-20 LAB
INR PPP: 3.47 (ref 0.84–1.19)
PROTHROMBIN TIME: 34.6 SECONDS (ref 11.6–14.5)

## 2020-12-20 PROCEDURE — 85610 PROTHROMBIN TIME: CPT

## 2020-12-20 PROCEDURE — 36415 COLL VENOUS BLD VENIPUNCTURE: CPT

## 2020-12-21 ENCOUNTER — ANTICOAG VISIT (OUTPATIENT)
Dept: CARDIOLOGY CLINIC | Facility: CLINIC | Age: 85
End: 2020-12-21

## 2020-12-21 DIAGNOSIS — I48.0 PAROXYSMAL ATRIAL FIBRILLATION (HCC): ICD-10-CM

## 2020-12-31 DIAGNOSIS — E78.5 HYPERLIPIDEMIA, UNSPECIFIED HYPERLIPIDEMIA TYPE: Primary | ICD-10-CM

## 2020-12-31 RX ORDER — ATORVASTATIN CALCIUM 10 MG/1
10 TABLET, FILM COATED ORAL DAILY
Qty: 90 TABLET | Refills: 0 | Status: SHIPPED | OUTPATIENT
Start: 2020-12-31 | End: 2021-04-09

## 2021-01-06 ENCOUNTER — TELEPHONE (OUTPATIENT)
Dept: NEPHROLOGY | Facility: CLINIC | Age: 86
End: 2021-01-06

## 2021-01-08 DIAGNOSIS — E11.40 TYPE 2 DIABETES MELLITUS WITH DIABETIC NEUROPATHY, WITH LONG-TERM CURRENT USE OF INSULIN (HCC): Primary | ICD-10-CM

## 2021-01-08 DIAGNOSIS — Z79.4 TYPE 2 DIABETES MELLITUS WITH DIABETIC NEUROPATHY, WITH LONG-TERM CURRENT USE OF INSULIN (HCC): Primary | ICD-10-CM

## 2021-01-11 DIAGNOSIS — Z79.4 TYPE 2 DIABETES MELLITUS WITH DIABETIC NEUROPATHY, WITH LONG-TERM CURRENT USE OF INSULIN (HCC): Primary | ICD-10-CM

## 2021-01-11 DIAGNOSIS — E11.40 TYPE 2 DIABETES MELLITUS WITH DIABETIC NEUROPATHY, WITH LONG-TERM CURRENT USE OF INSULIN (HCC): Primary | ICD-10-CM

## 2021-01-11 RX ORDER — BLOOD SUGAR DIAGNOSTIC
STRIP MISCELLANEOUS
Qty: 300 EACH | Refills: 3 | Status: SHIPPED | OUTPATIENT
Start: 2021-01-11 | End: 2022-02-11

## 2021-01-11 RX ORDER — PEN NEEDLE, DIABETIC 29 G X1/2"
NEEDLE, DISPOSABLE MISCELLANEOUS
Qty: 200 EACH | Refills: 0 | Status: SHIPPED | OUTPATIENT
Start: 2021-01-11 | End: 2021-04-09

## 2021-01-13 ENCOUNTER — LAB (OUTPATIENT)
Dept: LAB | Age: 86
End: 2021-01-13
Payer: COMMERCIAL

## 2021-01-13 ENCOUNTER — TELEPHONE (OUTPATIENT)
Dept: CARDIOLOGY CLINIC | Facility: CLINIC | Age: 86
End: 2021-01-13

## 2021-01-13 DIAGNOSIS — I10 ESSENTIAL HYPERTENSION: ICD-10-CM

## 2021-01-13 DIAGNOSIS — I48.0 PAROXYSMAL ATRIAL FIBRILLATION (HCC): ICD-10-CM

## 2021-01-13 LAB
INR PPP: 3.05 (ref 0.84–1.19)
PROTHROMBIN TIME: 31.3 SECONDS (ref 11.6–14.5)

## 2021-01-13 PROCEDURE — 85610 PROTHROMBIN TIME: CPT

## 2021-01-13 PROCEDURE — 36415 COLL VENOUS BLD VENIPUNCTURE: CPT

## 2021-01-13 RX ORDER — LOSARTAN POTASSIUM 25 MG/1
TABLET ORAL
Qty: 90 TABLET | Refills: 4 | Status: SHIPPED | OUTPATIENT
Start: 2021-01-13 | End: 2022-02-09 | Stop reason: SDUPTHER

## 2021-01-13 NOTE — TELEPHONE ENCOUNTER
Patient's wife called his BP in the morning have been running 158-79, 159/80  She would like to know if she should increase his Amlodipine   Please call

## 2021-01-14 ENCOUNTER — ANTICOAG VISIT (OUTPATIENT)
Dept: CARDIOLOGY CLINIC | Facility: CLINIC | Age: 86
End: 2021-01-14

## 2021-01-14 DIAGNOSIS — I48.0 PAROXYSMAL ATRIAL FIBRILLATION (HCC): ICD-10-CM

## 2021-01-14 NOTE — PROGRESS NOTES
Tc to wife, glenna, left message on cell phone, continue current dose, inr due 2 weeks  Verbal shift change report given to Santi Rosenberg (oncoming nurse) by Karel Morillo (offgoing nurse). Report included the following information SBAR and ED Summary.

## 2021-01-22 ENCOUNTER — IMMUNIZATIONS (OUTPATIENT)
Dept: FAMILY MEDICINE CLINIC | Facility: HOSPITAL | Age: 86
End: 2021-01-22

## 2021-01-22 DIAGNOSIS — Z23 ENCOUNTER FOR IMMUNIZATION: Primary | ICD-10-CM

## 2021-01-22 DIAGNOSIS — E11.40 TYPE 2 DIABETES MELLITUS WITH DIABETIC NEUROPATHY, WITH LONG-TERM CURRENT USE OF INSULIN (HCC): Primary | ICD-10-CM

## 2021-01-22 DIAGNOSIS — Z79.4 TYPE 2 DIABETES MELLITUS WITH DIABETIC NEUROPATHY, WITH LONG-TERM CURRENT USE OF INSULIN (HCC): Primary | ICD-10-CM

## 2021-01-22 PROCEDURE — 0001A SARS-COV-2 / COVID-19 MRNA VACCINE (PFIZER-BIONTECH) 30 MCG: CPT

## 2021-01-22 PROCEDURE — 91300 SARS-COV-2 / COVID-19 MRNA VACCINE (PFIZER-BIONTECH) 30 MCG: CPT

## 2021-01-22 RX ORDER — INSULIN GLARGINE 100 [IU]/ML
INJECTION, SOLUTION SUBCUTANEOUS
Qty: 30 ML | Refills: 0 | Status: SHIPPED | OUTPATIENT
Start: 2021-01-22 | End: 2021-04-29

## 2021-01-25 ENCOUNTER — OFFICE VISIT (OUTPATIENT)
Dept: CARDIOLOGY CLINIC | Facility: CLINIC | Age: 86
End: 2021-01-25
Payer: COMMERCIAL

## 2021-01-25 VITALS
SYSTOLIC BLOOD PRESSURE: 126 MMHG | HEIGHT: 70 IN | BODY MASS INDEX: 30.49 KG/M2 | DIASTOLIC BLOOD PRESSURE: 68 MMHG | HEART RATE: 65 BPM | WEIGHT: 213 LBS | OXYGEN SATURATION: 97 %

## 2021-01-25 DIAGNOSIS — I48.0 PAROXYSMAL ATRIAL FIBRILLATION (HCC): ICD-10-CM

## 2021-01-25 DIAGNOSIS — I25.10 CORONARY ARTERY DISEASE INVOLVING NATIVE CORONARY ARTERY OF NATIVE HEART WITHOUT ANGINA PECTORIS: Primary | ICD-10-CM

## 2021-01-25 DIAGNOSIS — I10 ESSENTIAL HYPERTENSION: ICD-10-CM

## 2021-01-25 DIAGNOSIS — E78.2 MIXED HYPERLIPIDEMIA: ICD-10-CM

## 2021-01-25 PROCEDURE — 99214 OFFICE O/P EST MOD 30 MIN: CPT | Performed by: INTERNAL MEDICINE

## 2021-01-25 NOTE — ASSESSMENT & PLAN NOTE
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

## 2021-01-25 NOTE — LETTER
January 25, 2021     Dayday Chou MD  [address] [address]    Patient: Shannon Lopez   YOB: 1935   Date of Visit: 1/25/2021       Dear Dr Selvin Roberts: Thank you for referring Talyapennie Bowden to me for evaluation  Below are my notes for this consultation  If you have questions, please do not hesitate to call me  I look forward to following your patient along with you  Sincerely,        Adryan Ewing MD        CC: No Recipients  Adryan Ewing MD  1/25/2021  9:59 AM  Sign when Signing Visit  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  The patient is asymptomatic and been in regular rhythm  Coronary artery disease involving native coronary artery of native heart without angina pectoris    Coronary artery disease, stable with no symptoms of or signs heart failure    Mixed hyperlipidemia   Hyperlipidemia, stable  The patient will continue atorvastatin 10 mg daily fenofibrate at 145 mg daily  Diagnoses and all orders for this visit:    Coronary artery disease involving native coronary artery of native heart without angina pectoris    Paroxysmal atrial fibrillation (HCC)    Essential hypertension    Mixed hyperlipidemia          Subjective:   Feels well  Patient ID: Shannon Lopez is a 80 y o  male  The patient presented to this office for the purpose of cardiac follow-up  He has a known history of coronary artery with paroxysmal atrial fibrillation well as hypertension hyperlipidemia and anxiety disorder  The patient has been feeling rather well denying any symptoms of chest pain, shortness of palpitation, dizziness or lightheadedness  He has no leg edema        The following portions of the patient's history were reviewed and updated as appropriate: allergies, current medications, past family history, past medical history, past social history, past surgical history and problem list     Review of Systems   Respiratory: Negative for apnea, cough, chest tightness, shortness of breath and wheezing  Cardiovascular: Negative for chest pain, palpitations and leg swelling  Gastrointestinal: Negative for abdominal pain  Neurological: Negative for dizziness and light-headedness  Psychiatric/Behavioral: Negative  Objective:  Stable cardiac-wise  /68 (BP Location: Left arm, Patient Position: Sitting, Cuff Size: Standard)   Pulse 65   Ht 5' 10" (1 778 m)   Wt 96 6 kg (213 lb)   SpO2 97%   BMI 30 56 kg/m²          Physical Exam  Vitals signs reviewed  Constitutional:       General: He is not in acute distress  Appearance: He is well-developed  He is not diaphoretic  HENT:      Head: Normocephalic  Eyes:      Pupils: Pupils are equal, round, and reactive to light  Neck:      Musculoskeletal: Normal range of motion  Thyroid: No thyromegaly  Vascular: No JVD  Cardiovascular:      Rate and Rhythm: Normal rate and regular rhythm  Heart sounds: S1 normal and S2 normal  No murmur  No friction rub  No gallop  Pulmonary:      Effort: Pulmonary effort is normal  No respiratory distress  Breath sounds: Normal breath sounds  No wheezing or rales  Chest:      Chest wall: No tenderness  Abdominal:      Palpations: Abdomen is soft  Musculoskeletal: Normal range of motion  General: No tenderness or deformity  Right lower leg: No edema  Left lower leg: No edema  Skin:     General: Skin is warm and dry  Neurological:      Mental Status: He is alert and oriented to person, place, and time     Psychiatric:         Mood and Affect: Mood normal          Behavior: Behavior normal

## 2021-01-25 NOTE — ASSESSMENT & PLAN NOTE
Hyperlipidemia, stable  The patient will continue atorvastatin 10 mg daily fenofibrate at 145 mg daily

## 2021-01-25 NOTE — PROGRESS NOTES
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  The patient is asymptomatic and been in regular rhythm  Coronary artery disease involving native coronary artery of native heart without angina pectoris    Coronary artery disease, stable with no symptoms of or signs heart failure    Mixed hyperlipidemia   Hyperlipidemia, stable  The patient will continue atorvastatin 10 mg daily fenofibrate at 145 mg daily  Diagnoses and all orders for this visit:    Coronary artery disease involving native coronary artery of native heart without angina pectoris    Paroxysmal atrial fibrillation (HCC)    Essential hypertension    Mixed hyperlipidemia          Subjective:   Feels well  Patient ID: Adriana Gandhi is a 80 y o  male  The patient presented to this office for the purpose of cardiac follow-up  He has a known history of coronary artery with paroxysmal atrial fibrillation well as hypertension hyperlipidemia and anxiety disorder  The patient has been feeling rather well denying any symptoms of chest pain, shortness of palpitation, dizziness or lightheadedness  He has no leg edema  The following portions of the patient's history were reviewed and updated as appropriate: allergies, current medications, past family history, past medical history, past social history, past surgical history and problem list     Review of Systems   Respiratory: Negative for apnea, cough, chest tightness, shortness of breath and wheezing  Cardiovascular: Negative for chest pain, palpitations and leg swelling  Gastrointestinal: Negative for abdominal pain  Neurological: Negative for dizziness and light-headedness  Psychiatric/Behavioral: Negative  Objective:  Stable cardiac-wise        /68 (BP Location: Left arm, Patient Position: Sitting, Cuff Size: Standard)   Pulse 65   Ht 5' 10" (1 778 m)   Wt 96 6 kg (213 lb)   SpO2 97%   BMI 30 56 kg/m²          Physical Exam  Vitals signs reviewed  Constitutional:       General: He is not in acute distress  Appearance: He is well-developed  He is not diaphoretic  HENT:      Head: Normocephalic  Eyes:      Pupils: Pupils are equal, round, and reactive to light  Neck:      Musculoskeletal: Normal range of motion  Thyroid: No thyromegaly  Vascular: No JVD  Cardiovascular:      Rate and Rhythm: Normal rate and regular rhythm  Heart sounds: S1 normal and S2 normal  No murmur  No friction rub  No gallop  Pulmonary:      Effort: Pulmonary effort is normal  No respiratory distress  Breath sounds: Normal breath sounds  No wheezing or rales  Chest:      Chest wall: No tenderness  Abdominal:      Palpations: Abdomen is soft  Musculoskeletal: Normal range of motion  General: No tenderness or deformity  Right lower leg: No edema  Left lower leg: No edema  Skin:     General: Skin is warm and dry  Neurological:      Mental Status: He is alert and oriented to person, place, and time     Psychiatric:         Mood and Affect: Mood normal          Behavior: Behavior normal

## 2021-01-30 ENCOUNTER — APPOINTMENT (OUTPATIENT)
Dept: LAB | Age: 86
End: 2021-01-30
Payer: COMMERCIAL

## 2021-01-30 DIAGNOSIS — I48.0 PAROXYSMAL ATRIAL FIBRILLATION (HCC): ICD-10-CM

## 2021-01-30 DIAGNOSIS — N18.4 CKD (CHRONIC KIDNEY DISEASE), STAGE IV (HCC): ICD-10-CM

## 2021-01-30 LAB
ANION GAP SERPL CALCULATED.3IONS-SCNC: 4 MMOL/L (ref 4–13)
BASOPHILS # BLD AUTO: 0.03 THOUSANDS/ΜL (ref 0–0.1)
BASOPHILS NFR BLD AUTO: 1 % (ref 0–1)
BUN SERPL-MCNC: 21 MG/DL (ref 5–25)
CALCIUM SERPL-MCNC: 9.4 MG/DL (ref 8.3–10.1)
CHLORIDE SERPL-SCNC: 107 MMOL/L (ref 100–108)
CO2 SERPL-SCNC: 27 MMOL/L (ref 21–32)
CREAT SERPL-MCNC: 2.13 MG/DL (ref 0.6–1.3)
CREAT UR-MCNC: 99.7 MG/DL
EOSINOPHIL # BLD AUTO: 0.31 THOUSAND/ΜL (ref 0–0.61)
EOSINOPHIL NFR BLD AUTO: 5 % (ref 0–6)
ERYTHROCYTE [DISTWIDTH] IN BLOOD BY AUTOMATED COUNT: 13 % (ref 11.6–15.1)
GFR SERPL CREATININE-BSD FRML MDRD: 27 ML/MIN/1.73SQ M
GLUCOSE SERPL-MCNC: 198 MG/DL (ref 65–140)
HCT VFR BLD AUTO: 40.9 % (ref 36.5–49.3)
HGB BLD-MCNC: 13.5 G/DL (ref 12–17)
IMM GRANULOCYTES # BLD AUTO: 0.01 THOUSAND/UL (ref 0–0.2)
IMM GRANULOCYTES NFR BLD AUTO: 0 % (ref 0–2)
INR PPP: 2.47 (ref 0.84–1.19)
LYMPHOCYTES # BLD AUTO: 1.98 THOUSANDS/ΜL (ref 0.6–4.47)
LYMPHOCYTES NFR BLD AUTO: 31 % (ref 14–44)
MCH RBC QN AUTO: 29.3 PG (ref 26.8–34.3)
MCHC RBC AUTO-ENTMCNC: 33 G/DL (ref 31.4–37.4)
MCV RBC AUTO: 89 FL (ref 82–98)
MONOCYTES # BLD AUTO: 0.66 THOUSAND/ΜL (ref 0.17–1.22)
MONOCYTES NFR BLD AUTO: 10 % (ref 4–12)
NEUTROPHILS # BLD AUTO: 3.4 THOUSANDS/ΜL (ref 1.85–7.62)
NEUTS SEG NFR BLD AUTO: 53 % (ref 43–75)
NRBC BLD AUTO-RTO: 0 /100 WBCS
PHOSPHATE SERPL-MCNC: 2.8 MG/DL (ref 2.3–4.1)
PLATELET # BLD AUTO: 241 THOUSANDS/UL (ref 149–390)
PMV BLD AUTO: 9.8 FL (ref 8.9–12.7)
POTASSIUM SERPL-SCNC: 4.5 MMOL/L (ref 3.5–5.3)
PROT UR-MCNC: 82 MG/DL
PROT/CREAT UR: 0.82 MG/G{CREAT} (ref 0–0.1)
PROTHROMBIN TIME: 26.6 SECONDS (ref 11.6–14.5)
PTH-INTACT SERPL-MCNC: 28.6 PG/ML (ref 18.4–80.1)
RBC # BLD AUTO: 4.6 MILLION/UL (ref 3.88–5.62)
SODIUM SERPL-SCNC: 138 MMOL/L (ref 136–145)
WBC # BLD AUTO: 6.39 THOUSAND/UL (ref 4.31–10.16)

## 2021-01-30 PROCEDURE — 82570 ASSAY OF URINE CREATININE: CPT

## 2021-01-30 PROCEDURE — 85025 COMPLETE CBC W/AUTO DIFF WBC: CPT

## 2021-01-30 PROCEDURE — 80048 BASIC METABOLIC PNL TOTAL CA: CPT

## 2021-01-30 PROCEDURE — 83970 ASSAY OF PARATHORMONE: CPT

## 2021-01-30 PROCEDURE — 84156 ASSAY OF PROTEIN URINE: CPT

## 2021-01-30 PROCEDURE — 36415 COLL VENOUS BLD VENIPUNCTURE: CPT

## 2021-01-30 PROCEDURE — 84100 ASSAY OF PHOSPHORUS: CPT

## 2021-01-30 PROCEDURE — 85610 PROTHROMBIN TIME: CPT

## 2021-02-01 ENCOUNTER — ANTICOAG VISIT (OUTPATIENT)
Dept: CARDIOLOGY CLINIC | Facility: CLINIC | Age: 86
End: 2021-02-01

## 2021-02-01 DIAGNOSIS — I48.0 PAROXYSMAL ATRIAL FIBRILLATION (HCC): ICD-10-CM

## 2021-02-09 ENCOUNTER — OFFICE VISIT (OUTPATIENT)
Dept: NEPHROLOGY | Facility: CLINIC | Age: 86
End: 2021-02-09
Payer: COMMERCIAL

## 2021-02-09 VITALS
BODY MASS INDEX: 30.29 KG/M2 | SYSTOLIC BLOOD PRESSURE: 120 MMHG | HEART RATE: 74 BPM | HEIGHT: 70 IN | RESPIRATION RATE: 18 BRPM | DIASTOLIC BLOOD PRESSURE: 72 MMHG | WEIGHT: 211.6 LBS

## 2021-02-09 DIAGNOSIS — I10 ESSENTIAL HYPERTENSION: ICD-10-CM

## 2021-02-09 DIAGNOSIS — Z79.4 TYPE 2 DIABETES MELLITUS WITH STAGE 4 CHRONIC KIDNEY DISEASE, WITH LONG-TERM CURRENT USE OF INSULIN (HCC): Primary | ICD-10-CM

## 2021-02-09 DIAGNOSIS — N18.4 CKD (CHRONIC KIDNEY DISEASE), STAGE IV (HCC): ICD-10-CM

## 2021-02-09 DIAGNOSIS — N28.1 CYST OF KIDNEY, ACQUIRED: ICD-10-CM

## 2021-02-09 DIAGNOSIS — I12.9 HYPERTENSIVE CHRONIC KIDNEY DISEASE WITH STAGE 1 THROUGH STAGE 4 CHRONIC KIDNEY DISEASE, OR UNSPECIFIED CHRONIC KIDNEY DISEASE: ICD-10-CM

## 2021-02-09 DIAGNOSIS — N18.4 TYPE 2 DIABETES MELLITUS WITH STAGE 4 CHRONIC KIDNEY DISEASE, WITH LONG-TERM CURRENT USE OF INSULIN (HCC): Primary | ICD-10-CM

## 2021-02-09 DIAGNOSIS — IMO0002 SOLITARY KIDNEY: ICD-10-CM

## 2021-02-09 DIAGNOSIS — E11.22 TYPE 2 DIABETES MELLITUS WITH STAGE 4 CHRONIC KIDNEY DISEASE, WITH LONG-TERM CURRENT USE OF INSULIN (HCC): Primary | ICD-10-CM

## 2021-02-09 PROCEDURE — 99214 OFFICE O/P EST MOD 30 MIN: CPT | Performed by: INTERNAL MEDICINE

## 2021-02-09 RX ORDER — AMLODIPINE BESYLATE 2.5 MG/1
2.5 TABLET ORAL DAILY
Qty: 30 TABLET | Refills: 6 | Status: SHIPPED | OUTPATIENT
Start: 2021-02-09 | End: 2021-11-15

## 2021-02-09 NOTE — LETTER
February 9, 2021     Constantino Hilliard MD  [address] [address]    Patient: Angella Ross   YOB: 1935   Date of Visit: 2/9/2021       Dear Dr Kelby Blanco: Thank you for referring Silva Mendes to me for evaluation  Below are the relevant portions of my assessment and plan of care  If you have questions, please do not hesitate to call me  I look forward to following Maureen Garcialand along with you  Sincerely,        Alix Wallace,         CC: No Recipients                           ASSESSMENT and PLAN:  1  Chronic kidney disease, stage IV, baseline creatinine in the low 2s most recent EGFR 27 with a creatinine of 2 13, etiology secondary to solitary kidney function, hypertension, diabetic nephropathy  2  Diabetes with associated nephropathy, last hemoglobin A1c 8 4  3  Hypertension, appears fairly stable, reduce extra amlodipine dose to 2 5 mg  4  Solitary kidney function  5  Noted renal cysts, repeat ultrasound in 6 months  6  Atrial fibrillation, following with Cardiology    · Overall renal function remains quite stable  · When he takes the 5 mg amlodipine for systolic blood pressure greater than 130, his wife reports increasing tiredness and fatigue  Reduce to 2 5 mg as needed  · Repeat renal ultrasound in 6 months  · Check laboratory studies in 3 months, assuming they are stable, follow-up in 6 months with repeat labs at that time

## 2021-02-09 NOTE — PROGRESS NOTES
NEPHROLOGY OUTPATIENT PROGRESS NOTE   Concetta Garibay 80 y o  male MRN: [id number]  Reason for visit:  Chronic kidney disease    ASSESSMENT and PLAN:  1  Chronic kidney disease, stage IV, baseline creatinine in the low 2s most recent EGFR 27 with a creatinine of 2 13, etiology secondary to solitary kidney function, hypertension, diabetic nephropathy  2  Diabetes with associated nephropathy, last hemoglobin A1c 8 4  3  Hypertension, appears fairly stable, reduce extra amlodipine dose to 2 5 mg  4  Solitary kidney function  5  Noted renal cysts, repeat ultrasound in 6 months  6  Atrial fibrillation, following with Cardiology    · Overall renal function remains quite stable  · When he takes the 5 mg amlodipine for systolic blood pressure greater than 130, his wife reports increasing tiredness and fatigue  Reduce to 2 5 mg as needed  · Repeat renal ultrasound in 6 months  · Check laboratory studies in 3 months, assuming they are stable, follow-up in 6 months with repeat labs at that time  SUBJECTIVE / INTERVAL HISTORY:  He has been doing well  Denies any recent hospitalizations or illnesses  No reports of chest pain shortness of breath or significant swelling  His appetite has been normal   Blood pressures at home have been running in the 140s to 150s      OBJECTIVE:  /72 (BP Location: Left arm, Patient Position: Sitting, Cuff Size: Large)   Pulse 74   Resp 18   Ht 5' 10" (1 778 m)   Wt 96 kg (211 lb 9 6 oz)   BMI 30 36 kg/m²   Vitals:    02/09/21 0929   Weight: 96 kg (211 lb 9 6 oz)       Physical Exam  Constitutional:       Appearance: He is not ill-appearing  HENT:      Head: Normocephalic and atraumatic  Eyes:      General: No scleral icterus  Cardiovascular:      Rate and Rhythm: Normal rate and regular rhythm  Pulmonary:      Breath sounds: Normal breath sounds  Abdominal:      General: There is no distension  Palpations: Abdomen is soft  Tenderness:  There is no abdominal tenderness  Musculoskeletal:      Right lower leg: No edema  Left lower leg: No edema  Skin:     General: Skin is warm and dry  Findings: No rash  Neurological:      Mental Status: He is alert  Mental status is at baseline  Medications:    Current Outpatient Medications:     amLODIPine (NORVASC) 5 mg tablet, Take 1 tablet (5 mg total) by mouth daily (Patient taking differently: Take 5 mg by mouth daily Taking prn for SBP >130), Disp: 90 tablet, Rfl: 3    atorvastatin (LIPITOR) 10 mg tablet, Take 1 tablet (10 mg total) by mouth daily, Disp: 90 tablet, Rfl: 0    BD Insulin Syringe U/F 30G X 1/2" 0 5 ML MISC, USE AS DIRECTED WITH INSULIN, Disp: 200 each, Rfl: 0    cholecalciferol (VITAMIN D3) 1,000 units tablet, Take 2,000 Units by mouth daily, Disp: , Rfl:     cyanocobalamin (VITAMIN B-12) 500 mcg tablet, Take 500 mcg by mouth daily, Disp: , Rfl:     docusate sodium (COLACE) 100 mg capsule, Take 100 mg by mouth as needed , Disp: , Rfl:     donepezil (ARICEPT) 10 mg tablet, TAKE 1 TABLET DAILY AT BEDTIME, Disp: 90 tablet, Rfl: 3    fenofibrate (TRICOR) 145 mg tablet, TAKE 1 TABLET BY MOUTH DAILY, Disp: 90 tablet, Rfl: 0    ferrous sulfate 325 (65 Fe) mg tablet, Take 325 mg by mouth as needed , Disp: , Rfl:     fexofenadine (ALLEGRA) 180 MG tablet, Take 180 mg by mouth daily, Disp: , Rfl:     fluticasone (FLONASE) 50 mcg/act nasal spray, 1 spray into each nostril daily, Disp: , Rfl:     glucose blood (OneTouch Verio) test strip, USE TO TEST 3 TIMES A DAY, Disp: 300 each, Rfl: 3    insulin aspart (NovoLOG) 100 units/mL injection, Inject 8 Units under the skin 2 (two) times a day before meals , Disp: , Rfl:     ipratropium (ATROVENT HFA) 17 mcg/act inhaler, Inhale 2 puffs every 6 (six) hours  , Disp: , Rfl:     Lantus 100 UNIT/ML subcutaneous injection, INJECT 30 UNITS TWICE A DAY, Disp: 30 mL, Rfl: 0    levalbuterol (XOPENEX) 0 63 mg/3 mL nebulizer solution, Take 1 ampule by nebulization 2 (two) times a day as needed for wheezing , Disp: , Rfl:     losartan (COZAAR) 25 mg tablet, TAKE 1 TABLET BY MOUTH DAILY, Disp: 90 tablet, Rfl: 4    Magnesium 250 MG TABS, Take 500 mg by mouth daily , Disp: , Rfl:     Memantine HCl ER 28 MG CP24, TAKE ONE CAPSULE BY MOUTH EVERY DAY, Disp: 90 capsule, Rfl: 3    metoprolol tartrate (LOPRESSOR) 100 mg tablet, Take 1 tablet (100 mg total) by mouth daily at bedtime, Disp: 90 tablet, Rfl: 2    metoprolol tartrate (LOPRESSOR) 50 mg tablet, Take 50 mg by mouth daily in the early morning, Disp: , Rfl:     NOVOFINE 32G X 6 MM MISC, , Disp: , Rfl:     NOVOLOG FLEXPEN 100 units/mL injection pen, 8 Units 2 (two) times a day with meals , Disp: , Rfl:     ONETOUCH DELICA LANCETS 49Y MISC, , Disp: , Rfl:     Probiotic Product (PROBIOTIC COLON SUPPORT PO), Take 1 tablet by mouth daily, Disp: , Rfl:     propafenone (RYTHMOL) 225 mg tablet, Take 1 tablet (225 mg total) by mouth 3 (three) times a day, Disp: 270 tablet, Rfl: 3    sertraline (ZOLOFT) 100 mg tablet, Take 1 tablet (100 mg total) by mouth daily, Disp: 90 tablet, Rfl: 3    warfarin (COUMADIN) 2 5 mg tablet, Take 2 5 mg by mouth 3 (three) times a week Indications: Mon wed Fri   , Disp: , Rfl:     warfarin (COUMADIN) 5 mg tablet, Take 1 tablet (5 mg total) by mouth daily (Patient taking differently: Take 5 mg by mouth daily Monday friday), Disp: 90 tablet, Rfl: 3    sodium chloride (OCEAN) 0 65 % nasal spray, 1 spray into each nostril, Disp: , Rfl:     Laboratory Results:  Results for orders placed or performed in visit on 01/30/21   Protime-INR   Result Value Ref Range    Protime 26 6 (H) 11 6 - 14 5 seconds    INR 2 47 (H) 0 84 - 1 95   Basic metabolic panel   Result Value Ref Range    Sodium 138 136 - 145 mmol/L    Potassium 4 5 3 5 - 5 3 mmol/L    Chloride 107 100 - 108 mmol/L    CO2 27 21 - 32 mmol/L    ANION GAP 4 4 - 13 mmol/L    BUN 21 5 - 25 mg/dL    Creatinine 2 13 (H) 0 60 - 1 30 mg/dL Glucose 198 (H) 65 - 140 mg/dL    Calcium 9 4 8 3 - 10 1 mg/dL    eGFR 27 ml/min/1 73sq m   CBC and differential   Result Value Ref Range    WBC 6 39 4 31 - 10 16 Thousand/uL    RBC 4 60 3 88 - 5 62 Million/uL    Hemoglobin 13 5 12 0 - 17 0 g/dL    Hematocrit 40 9 36 5 - 49 3 %    MCV 89 82 - 98 fL    MCH 29 3 26 8 - 34 3 pg    MCHC 33 0 31 4 - 37 4 g/dL    RDW 13 0 11 6 - 15 1 %    MPV 9 8 8 9 - 12 7 fL    Platelets 612 624 - 015 Thousands/uL    nRBC 0 /100 WBCs    Neutrophils Relative 53 43 - 75 %    Immat GRANS % 0 0 - 2 %    Lymphocytes Relative 31 14 - 44 %    Monocytes Relative 10 4 - 12 %    Eosinophils Relative 5 0 - 6 %    Basophils Relative 1 0 - 1 %    Neutrophils Absolute 3 40 1 85 - 7 62 Thousands/µL    Immature Grans Absolute 0 01 0 00 - 0 20 Thousand/uL    Lymphocytes Absolute 1 98 0 60 - 4 47 Thousands/µL    Monocytes Absolute 0 66 0 17 - 1 22 Thousand/µL    Eosinophils Absolute 0 31 0 00 - 0 61 Thousand/µL    Basophils Absolute 0 03 0 00 - 0 10 Thousands/µL   Phosphorus   Result Value Ref Range    Phosphorus 2 8 2 3 - 4 1 mg/dL   PTH, intact   Result Value Ref Range    PTH 28 6 18 4 - 80 1 pg/mL   Protein / creatinine ratio, urine   Result Value Ref Range    Creatinine, Ur 99 7 mg/dL    Protein Urine Random 82 mg/dL    Prot/Creat Ratio, Ur 0 82 (H) 0 00 - 0 10

## 2021-02-11 ENCOUNTER — IMMUNIZATIONS (OUTPATIENT)
Dept: FAMILY MEDICINE CLINIC | Facility: HOSPITAL | Age: 86
End: 2021-02-11

## 2021-02-11 DIAGNOSIS — Z23 ENCOUNTER FOR IMMUNIZATION: Primary | ICD-10-CM

## 2021-02-11 PROCEDURE — 0002A SARS-COV-2 / COVID-19 MRNA VACCINE (PFIZER-BIONTECH) 30 MCG: CPT

## 2021-02-11 PROCEDURE — 91300 SARS-COV-2 / COVID-19 MRNA VACCINE (PFIZER-BIONTECH) 30 MCG: CPT

## 2021-02-13 DIAGNOSIS — E78.5 HYPERLIPIDEMIA, UNSPECIFIED HYPERLIPIDEMIA TYPE: ICD-10-CM

## 2021-02-15 RX ORDER — FENOFIBRATE 145 MG/1
TABLET, COATED ORAL
Qty: 90 TABLET | Refills: 0 | Status: SHIPPED | OUTPATIENT
Start: 2021-02-15 | End: 2021-05-17

## 2021-02-20 ENCOUNTER — LAB (OUTPATIENT)
Dept: LAB | Age: 86
End: 2021-02-20
Payer: COMMERCIAL

## 2021-02-20 DIAGNOSIS — I48.0 PAROXYSMAL ATRIAL FIBRILLATION (HCC): ICD-10-CM

## 2021-02-20 LAB
INR PPP: 2.21 (ref 0.84–1.19)
PROTHROMBIN TIME: 24.4 SECONDS (ref 11.6–14.5)

## 2021-02-20 PROCEDURE — 36415 COLL VENOUS BLD VENIPUNCTURE: CPT

## 2021-02-20 PROCEDURE — 85610 PROTHROMBIN TIME: CPT

## 2021-02-22 ENCOUNTER — ANTICOAG VISIT (OUTPATIENT)
Dept: CARDIOLOGY CLINIC | Facility: CLINIC | Age: 86
End: 2021-02-22

## 2021-02-22 DIAGNOSIS — I48.0 PAROXYSMAL ATRIAL FIBRILLATION (HCC): ICD-10-CM

## 2021-02-26 ENCOUNTER — TELEPHONE (OUTPATIENT)
Dept: ADMINISTRATIVE | Facility: OTHER | Age: 86
End: 2021-02-26

## 2021-02-26 ENCOUNTER — OFFICE VISIT (OUTPATIENT)
Dept: INTERNAL MEDICINE CLINIC | Facility: CLINIC | Age: 86
End: 2021-02-26
Payer: COMMERCIAL

## 2021-02-26 VITALS
OXYGEN SATURATION: 97 % | DIASTOLIC BLOOD PRESSURE: 70 MMHG | HEIGHT: 70 IN | SYSTOLIC BLOOD PRESSURE: 130 MMHG | BODY MASS INDEX: 29.92 KG/M2 | WEIGHT: 209 LBS | TEMPERATURE: 97.8 F | HEART RATE: 72 BPM

## 2021-02-26 DIAGNOSIS — R53.1 WEAKNESS: ICD-10-CM

## 2021-02-26 DIAGNOSIS — I73.9 PAD (PERIPHERAL ARTERY DISEASE) (HCC): ICD-10-CM

## 2021-02-26 DIAGNOSIS — E11.40 TYPE 2 DIABETES MELLITUS WITH DIABETIC NEUROPATHY, WITH LONG-TERM CURRENT USE OF INSULIN (HCC): ICD-10-CM

## 2021-02-26 DIAGNOSIS — E78.5 HYPERLIPIDEMIA, UNSPECIFIED HYPERLIPIDEMIA TYPE: Primary | ICD-10-CM

## 2021-02-26 DIAGNOSIS — F02.80 FRONTOTEMPORAL DEMENTIA (HCC): ICD-10-CM

## 2021-02-26 DIAGNOSIS — I25.10 CORONARY ARTERY DISEASE INVOLVING NATIVE CORONARY ARTERY OF NATIVE HEART WITHOUT ANGINA PECTORIS: ICD-10-CM

## 2021-02-26 DIAGNOSIS — I48.0 PAROXYSMAL ATRIAL FIBRILLATION (HCC): ICD-10-CM

## 2021-02-26 DIAGNOSIS — Z79.4 TYPE 2 DIABETES MELLITUS WITH DIABETIC NEUROPATHY, WITH LONG-TERM CURRENT USE OF INSULIN (HCC): ICD-10-CM

## 2021-02-26 DIAGNOSIS — G31.09 FRONTOTEMPORAL DEMENTIA (HCC): ICD-10-CM

## 2021-02-26 DIAGNOSIS — Z23 ENCOUNTER FOR IMMUNIZATION: ICD-10-CM

## 2021-02-26 PROCEDURE — 90750 HZV VACC RECOMBINANT IM: CPT

## 2021-02-26 PROCEDURE — 90471 IMMUNIZATION ADMIN: CPT

## 2021-02-26 PROCEDURE — 99214 OFFICE O/P EST MOD 30 MIN: CPT | Performed by: INTERNAL MEDICINE

## 2021-02-26 NOTE — TELEPHONE ENCOUNTER
Upon review of the In Basket request we received fax back from [facility] and they state:  11/23/2020 diabetic letter was faxed  They did not fill out our form  Any additional questions or concerns should be emailed to the Practice Liaisons via [email or web address] email, please do not reply via In Basket      Thank you  Ashvin Elizondo MA

## 2021-02-26 NOTE — LETTER
Diabetic Eye Exam Form    Date Requested: 21  Patient: Katherin Huynh  Patient : 1935   Referring Provider: Isa Rubio MD    Dilated Retinal Exam, Optomap-Iris Exam, or Fundus Photography Done         Yes ([place] one above)         No     Date of Diabetic Eye Exam ______________________________  Left Eye      Exam did show retinopathy    Exam did not show retinopathy         Mild       Moderate       None       Proliferative       Severe     Right Eye     Exam did show retinopathy    Exam did not show retinopathy         Mild       Moderate       None       Proliferative       Severe     Comments  Patient stated exam done 2020  Thank you  Practice Providing Exam ______________________________________________    Exam Performed By (print name) _______________________________________      Provider Signature ___________________________________________________      These reports are needed for  compliance    Please fax this completed form and a copy of the Diabetic Eye Exam report to our office located at [place] [address] as soon as possible to [phone] attention Vilma Cooks: Phone [phone]    We thank you for your assistance in treating our mutual patient

## 2021-02-26 NOTE — TELEPHONE ENCOUNTER
----- Message from Leland Arteaga sent at 2/26/2021 11:06 AM EST -----  Regarding: DM EYE EXAM  02/26/21 11:06 AM    Awilda, our patient Tay Arango has had Diabetic Eye Exam completed/performed  Please assist in updating the patient chart by making an External outreach to [address] [place] [address], [facility] facility located in  [facility], [address]    The date of service is 12/2020      Thank you,  Leland Arteaga  [address]

## 2021-02-26 NOTE — PROGRESS NOTES
Diabetic Foot Exam    Patient's shoes and socks removed  Right Foot/Ankle   Right Foot Inspection  Skin Exam: skin normal and skin intact no dry skin, no warmth, no callus, no erythema, no maceration, no abnormal color, no pre-ulcer, no ulcer and no callus                          Toe Exam: ROM and strength within normal limits  Sensory       Monofilament testing: intact  Vascular  Capillary refills: < 3 seconds  The right DP pulse is 0  The right PT pulse is 1+  Left Foot/Ankle  Left Foot Inspection  Skin Exam: skin normal and skin intactno dry skin, no warmth, no erythema, no maceration, normal color, no pre-ulcer, no ulcer and no callus                         Toe Exam: ROM and strength within normal limits                   Sensory       Monofilament: intact  Vascular  Capillary refills: < 3 seconds  The left DP pulse is 0  The left PT pulse is 1+  Assign Risk Category:  No deformity present;  Loss of protective sensation; Weak pulses       Risk: 2

## 2021-02-26 NOTE — PATIENT INSTRUCTIONS

## 2021-02-26 NOTE — TELEPHONE ENCOUNTER
Upon review of the In Basket request and the patient's chart, initial outreach has been made via fax, please see Contacts section for details       Thank you  Mervin Thacker MA

## 2021-02-26 NOTE — PROGRESS NOTES
Assessment/Plan:           1  Hyperlipidemia, unspecified hyperlipidemia type    2  Type 2 diabetes mellitus with diabetic neuropathy, with long-term current use of insulin ([facility])    3  Paroxysmal atrial fibrillation ([facility] )    4  Coronary artery disease involving native coronary artery of native heart without angina pectoris    5  Weakness  -     Ambulatory referral to Physical Therapy; Future    6  PAD (peripheral artery disease) ([facility])  -     VAS lower limb arterial duplex, complete bilateral; Future; Expected date: 02/26/2021    7  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[facility] )    8  Encounter for immunization  -     Zoster Vaccine Recombinant IM           1  Hyperlipidemia, unspecified hyperlipidemia type      2  Type 2 diabetes mellitus with diabetic neuropathy, with long-term current use of insulin ([facility])      3  Paroxysmal atrial fibrillation ([facility] )      4  Coronary artery disease involving native coronary artery of native heart without angina pectoris      5  Weakness      6  PAD (peripheral artery disease) ([facility])             Subjective:      Patient ID: Bonilla Henley is a 80 y o  male  HPI    The following portions of the patient's history were reviewed and updated as appropriate: He  has a past medical history of Allergic rhinitis, Anxiety, Aspiration of liquid, Asthma, Atrial fibrillation ([address] ), CAD (coronary artery disease), Cancer of kidney ([address] ), COPD (chronic obstructive pulmonary disease) ([address] ), CPAP (continuous positive airway pressure) dependence, Dementia ([address] ), Dementia ([address] ), Diabetes mellitus ([address] ), Diabetes mellitus, type 2 ([address] ), DJD (degenerative joint disease), Hypercholesterolemia, Hyperlipidemia, Hypertension, Incisional hernia, Kidney disease, Melanoma ([address] ), Obesity, Sleep apnea, and TIA (transient ischemic attack)    He   Patient Active Problem List    Diagnosis Date Noted    Recurrent falls 09/30/2020    Long term current use of antiarrhythmic medical therapy 08/17/2020    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 08/13/2020    Coagulation disorder ([facility] ) 08/13/2020    Head injury 03/09/2020    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, or unspecified chronic kidney disease 05/06/2019    Coronary artery disease involving native coronary artery of native heart without angina pectoris 03/12/2019    Type 2 diabetes mellitus with kidney complication, with long-term current use of insulin ([facility] ) 02/04/2019    Paroxysmal atrial fibrillation ([place] [id number] ) 12/12/2018    Mixed hyperlipidemia 12/12/2018    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[place] [address] ) 04/16/2018    CKD (chronic kidney disease), stage IV ([place] [address] ) 03/30/2018    Essential hypertension 03/30/2018    Solitary kidney 03/30/2018    Benign prostate hyperplasia 10/27/2014    Elevated prostate specific antigen (PSA) 06/18/2013     He  has a past surgical history that includes Cardiac pacemaker placement; Nephrectomy (Left, 2005); pr esophagogastroduodenoscopy submucosal injection (N/A, 9/21/2016); Tonsillectomy; Cholecystectomy; pr esophagogastroduodenoscopy submucosal injection (N/A, 2/7/2018); Colonoscopy; Rotator cuff repair; pr esophagogastroduodenoscopy submucosal injection (N/A, 4/3/2019); and Hernia repair  His family history includes Brain cancer in his father; Cancer in his family; Colon cancer in his son; Diabetes in his family; Heart disease in his family; Hypertension in his family; Lung cancer in his father; Stroke in his family  He  reports that he has never smoked  He has never used smokeless tobacco  He reports that he does not drink alcohol or use drugs    Current Outpatient Medications   Medication Sig Dispense Refill    amLODIPine (NORVASC) 2 5 mg tablet Take 1 tablet (2 5 mg total) by mouth daily Taking prn for SBP >130 30 tablet 6    atorvastatin (LIPITOR) 10 mg tablet Take 1 tablet (10 mg total) by mouth daily 90 tablet 0    BD Insulin Syringe U/F 30G X 1/2" 0 5 ML MISC USE AS DIRECTED WITH INSULIN 200 each 0    cholecalciferol (VITAMIN D3) 1,000 units tablet Take 2,000 Units by mouth daily      cyanocobalamin (VITAMIN B-12) 500 mcg tablet Take 500 mcg by mouth daily      docusate sodium (COLACE) 100 mg capsule Take 100 mg by mouth as needed       donepezil (ARICEPT) 10 mg tablet TAKE 1 TABLET DAILY AT BEDTIME 90 tablet 3    fenofibrate (TRICOR) 145 mg tablet TAKE ONE TABLET BY MOUTH EVERY DAY 90 tablet 0    ferrous sulfate 325 (65 Fe) mg tablet Take 325 mg by mouth as needed       fexofenadine (ALLEGRA) 180 MG tablet Take 180 mg by mouth daily      fluticasone (FLONASE) 50 mcg/act nasal spray 1 spray into each nostril daily      glucose blood (OneTouch Verio) test strip USE TO TEST 3 TIMES A  each 3    insulin aspart (NovoLOG) 100 units/mL injection Inject 8 Units under the skin 2 (two) times a day before meals       ipratropium (ATROVENT HFA) 17 mcg/act inhaler Inhale 2 puffs every 6 (six) hours   Lantus 100 UNIT/ML subcutaneous injection INJECT 30 UNITS TWICE A DAY 30 mL 0    levalbuterol (XOPENEX) 0 63 mg/3 mL nebulizer solution Take 1 ampule by nebulization 2 (two) times a day as needed for wheezing        losartan (COZAAR) 25 mg tablet TAKE 1 TABLET BY MOUTH DAILY 90 tablet 4    Magnesium 250 MG TABS Take 500 mg by mouth daily       Memantine HCl ER 28 MG CP24 TAKE ONE CAPSULE BY MOUTH EVERY DAY 90 capsule 3    metoprolol tartrate (LOPRESSOR) 100 mg tablet Take 1 tablet (100 mg total) by mouth daily at bedtime 90 tablet 2    metoprolol tartrate (LOPRESSOR) 50 mg tablet Take 50 mg by mouth daily in the early morning      NOVOFINE 32G X 6 MM MISC       NOVOLOG FLEXPEN 100 units/mL injection pen 8 Units 2 (two) times a day with meals       ONETOUCH DELICA LANCETS 02Q MISC       Probiotic Product (PROBIOTIC COLON SUPPORT PO) Take 1 tablet by mouth daily      propafenone (RYTHMOL) 225 mg tablet Take 1 tablet (225 mg total) by mouth 3 (three) times a day 270 tablet 3    sertraline (ZOLOFT) 100 mg tablet Take 1 tablet (100 mg total) by mouth daily 90 tablet 3    warfarin (COUMADIN) 2 5 mg tablet Take 2 5 mg by mouth 3 (three) times a week Indications: Mon wed Fri  Sonda Lips warfarin (COUMADIN) 5 mg tablet Take 1 tablet (5 mg total) by mouth daily (Patient taking differently: Take 5 mg by mouth daily Monday friday) 90 tablet 3    sodium chloride (OCEAN) 0 65 % nasal spray 1 spray into each nostril       No current facility-administered medications for this visit  Current Outpatient Medications on File Prior to Visit   Medication Sig    amLODIPine (NORVASC) 2 5 mg tablet Take 1 tablet (2 5 mg total) by mouth daily Taking prn for SBP >130    atorvastatin (LIPITOR) 10 mg tablet Take 1 tablet (10 mg total) by mouth daily    BD Insulin Syringe U/F 30G X 1/2" 0 5 ML MISC USE AS DIRECTED WITH INSULIN    cholecalciferol (VITAMIN D3) 1,000 units tablet Take 2,000 Units by mouth daily    cyanocobalamin (VITAMIN B-12) 500 mcg tablet Take 500 mcg by mouth daily    docusate sodium (COLACE) 100 mg capsule Take 100 mg by mouth as needed     donepezil (ARICEPT) 10 mg tablet TAKE 1 TABLET DAILY AT BEDTIME    fenofibrate (TRICOR) 145 mg tablet TAKE ONE TABLET BY MOUTH EVERY DAY    ferrous sulfate 325 (65 Fe) mg tablet Take 325 mg by mouth as needed     fexofenadine (ALLEGRA) 180 MG tablet Take 180 mg by mouth daily    fluticasone (FLONASE) 50 mcg/act nasal spray 1 spray into each nostril daily    glucose blood (OneTouch Verio) test strip USE TO TEST 3 TIMES A DAY    insulin aspart (NovoLOG) 100 units/mL injection Inject 8 Units under the skin 2 (two) times a day before meals     ipratropium (ATROVENT HFA) 17 mcg/act inhaler Inhale 2 puffs every 6 (six) hours   Lantus 100 UNIT/ML subcutaneous injection INJECT 30 UNITS TWICE A DAY    levalbuterol (XOPENEX) 0 63 mg/3 mL nebulizer solution Take 1 ampule by nebulization 2 (two) times a day as needed for wheezing      losartan (COZAAR) 25 mg tablet TAKE 1 TABLET BY MOUTH DAILY    Magnesium 250 MG TABS Take 500 mg by mouth daily     Memantine HCl ER 28 MG CP24 TAKE ONE CAPSULE BY MOUTH EVERY DAY    metoprolol tartrate (LOPRESSOR) 100 mg tablet Take 1 tablet (100 mg total) by mouth daily at bedtime    metoprolol tartrate (LOPRESSOR) 50 mg tablet Take 50 mg by mouth daily in the early morning    NOVOFINE 32G X 6 MM MISC     NOVOLOG FLEXPEN 100 units/mL injection pen 8 Units 2 (two) times a day with meals     ONETOUCH DELICA LANCETS 82W MISC     Probiotic Product (PROBIOTIC COLON SUPPORT PO) Take 1 tablet by mouth daily    propafenone (RYTHMOL) 225 mg tablet Take 1 tablet (225 mg total) by mouth 3 (three) times a day    sertraline (ZOLOFT) 100 mg tablet Take 1 tablet (100 mg total) by mouth daily    warfarin (COUMADIN) 2 5 mg tablet Take 2 5 mg by mouth 3 (three) times a week Indications: Mon wed Fri  Torres Levi warfarin (COUMADIN) 5 mg tablet Take 1 tablet (5 mg total) by mouth daily (Patient taking differently: Take 5 mg by mouth daily Monday friday)    sodium chloride (OCEAN) 0 65 % nasal spray 1 spray into each nostril     No current facility-administered medications on file prior to visit  He is allergic to Francis Schein tartrate]; bextra [valdecoxib]; dust mite extract; lyrica [pregabalin]; mold extract [trichophyton]; pollen extract; and tree extract       Review of Systems   Constitutional: Negative for appetite change, chills, fatigue and fever  HENT: Negative for sore throat and trouble swallowing  Eyes: Negative for redness  Respiratory: Negative for shortness of breath  Cardiovascular: Negative for chest pain and palpitations  Gastrointestinal: Negative for abdominal pain, constipation and diarrhea  Genitourinary: Negative for dysuria and hematuria  Musculoskeletal: Positive for back pain and gait problem  Negative for neck pain  Skin: Negative for rash  Neurological: Negative for seizures, weakness and headaches  Hematological: Negative for adenopathy  Psychiatric/Behavioral: Negative for confusion  The patient is not nervous/anxious            Objective:      /70 (BP Location: Right arm, Patient Position: Sitting, Cuff Size: Standard)   Pulse 72   Temp 97 8 °F (36 6 °C) (Temporal)   Ht 5' 10" (1 778 m)   Wt 94 8 kg (209 lb)   SpO2 97%   BMI 29 99 kg/m²     Recent Results (from the past 1344 hour(s))   Protime-INR    Collection Time: 01/13/21  7:08 PM   Result Value Ref Range    Protime 31 3 (H) 11 6 - 14 5 seconds    INR 3 05 (H) 0 84 - 1 19   Protime-INR    Collection Time: 01/30/21 11:41 AM   Result Value Ref Range    Protime 26 6 (H) 11 6 - 14 5 seconds    INR 2 47 (H) 0 84 - 3 72   Basic metabolic panel    Collection Time: 01/30/21 11:41 AM   Result Value Ref Range    Sodium 138 136 - 145 mmol/L    Potassium 4 5 3 5 - 5 3 mmol/L    Chloride 107 100 - 108 mmol/L    CO2 27 21 - 32 mmol/L    ANION GAP 4 4 - 13 mmol/L    BUN 21 5 - 25 mg/dL    Creatinine 2 13 (H) 0 60 - 1 30 mg/dL    Glucose 198 (H) 65 - 140 mg/dL    Calcium 9 4 8 3 - 10 1 mg/dL    eGFR 27 ml/min/1 73sq m   CBC and differential    Collection Time: 01/30/21 11:41 AM   Result Value Ref Range    WBC 6 39 4 31 - 10 16 Thousand/uL    RBC 4 60 3 88 - 5 62 Million/uL    Hemoglobin 13 5 12 0 - 17 0 g/dL    Hematocrit 40 9 36 5 - 49 3 %    MCV 89 82 - 98 fL    MCH 29 3 26 8 - 34 3 pg    MCHC 33 0 31 4 - 37 4 g/dL    RDW 13 0 11 6 - 15 1 %    MPV 9 8 8 9 - 12 7 fL    Platelets 834 466 - 418 Thousands/uL    nRBC 0 /100 WBCs    Neutrophils Relative 53 43 - 75 %    Immat GRANS % 0 0 - 2 %    Lymphocytes Relative 31 14 - 44 %    Monocytes Relative 10 4 - 12 %    Eosinophils Relative 5 0 - 6 %    Basophils Relative 1 0 - 1 %    Neutrophils Absolute 3 40 1 85 - 7 62 Thousands/µL    Immature Grans Absolute 0 01 0 00 - 0 20 Thousand/uL    Lymphocytes Absolute 1 98 0 60 - 4 47 Thousands/µL    Monocytes Absolute 0 66 0 17 - 1 22 Thousand/µL    Eosinophils Absolute 0 31 0 00 - 0 61 Thousand/µL    Basophils Absolute 0 03 0 00 - 0 10 Thousands/µL   Phosphorus    Collection Time: 01/30/21 11:41 AM   Result Value Ref Range    Phosphorus 2 8 2 3 - 4 1 mg/dL   PTH, intact    Collection Time: 01/30/21 11:41 AM   Result Value Ref Range    PTH 28 6 18 4 - 80 1 pg/mL   Protein / creatinine ratio, urine    Collection Time: 01/30/21 11:41 AM   Result Value Ref Range    Creatinine, Ur 99 7 mg/dL    Protein Urine Random 82 mg/dL    Prot/Creat Ratio, Ur 0 82 (H) 0 00 - 0 10   Protime-INR    Collection Time: 02/20/21 10:54 AM   Result Value Ref Range    Protime 24 4 (H) 11 6 - 14 5 seconds    INR 2 21 (H) 0 84 - 1 19        Physical Exam  Constitutional:       General: He is not in acute distress  Appearance: Normal appearance  HENT:      Head: Normocephalic and atraumatic  Nose: Nose normal       Mouth/Throat:      Mouth: Mucous membranes are moist    Eyes:      Extraocular Movements: Extraocular movements intact  Pupils: Pupils are equal, round, and reactive to light  Neck:      Musculoskeletal: Normal range of motion and neck supple  Cardiovascular:      Rate and Rhythm: Normal rate and regular rhythm  Pulses: Normal pulses  Heart sounds: Normal heart sounds  No murmur  No friction rub  Pulmonary:      Effort: Pulmonary effort is normal  No respiratory distress  Breath sounds: Normal breath sounds  No wheezing  Abdominal:      General: Abdomen is flat  Bowel sounds are normal  There is no distension  Palpations: Abdomen is soft  There is no mass  Tenderness: There is no abdominal tenderness  There is no guarding  Musculoskeletal: Normal range of motion  Skin:     General: Skin is warm and dry  Neurological:      General: No focal deficit present  Mental Status: He is alert and oriented to person, place, and time  Mental status is at baseline  Cranial Nerves: No cranial nerve deficit        Gait: Gait abnormal  Psychiatric:         Mood and Affect: Mood normal          Behavior: Behavior normal        BMI Counseling: Body mass index is 29 99 kg/m²  The BMI is above normal  Nutrition recommendations include reducing portion sizes, decreasing overall calorie intake and 3-5 servings of fruits/vegetables daily

## 2021-03-08 DIAGNOSIS — I48.0 PAROXYSMAL ATRIAL FIBRILLATION (HCC): ICD-10-CM

## 2021-03-08 RX ORDER — PROPAFENONE HYDROCHLORIDE 225 MG/1
225 TABLET, FILM COATED ORAL 3 TIMES DAILY
Qty: 270 TABLET | Refills: 3 | Status: SHIPPED | OUTPATIENT
Start: 2021-03-08 | End: 2021-06-22 | Stop reason: SDUPTHER

## 2021-03-11 ENCOUNTER — APPOINTMENT (OUTPATIENT)
Dept: LAB | Age: 86
End: 2021-03-11
Payer: COMMERCIAL

## 2021-03-11 DIAGNOSIS — I48.0 PAROXYSMAL ATRIAL FIBRILLATION (HCC): ICD-10-CM

## 2021-03-11 LAB
INR PPP: 1.88 (ref 0.84–1.19)
PROTHROMBIN TIME: 21.5 SECONDS (ref 11.6–14.5)

## 2021-03-11 PROCEDURE — 36415 COLL VENOUS BLD VENIPUNCTURE: CPT

## 2021-03-11 PROCEDURE — 85610 PROTHROMBIN TIME: CPT

## 2021-03-12 ENCOUNTER — ANTICOAG VISIT (OUTPATIENT)
Dept: CARDIOLOGY CLINIC | Facility: CLINIC | Age: 86
End: 2021-03-12

## 2021-03-12 DIAGNOSIS — I48.0 PAROXYSMAL ATRIAL FIBRILLATION (HCC): ICD-10-CM

## 2021-03-12 NOTE — PROGRESS NOTES
Tc to pt, spoke with wife, glenna, she reports he took 2 5 mg yesterday in place of 5 mg, will switch days 5 mg sun/fri, 2 5 mg , inr due 7- 10 days

## 2021-03-16 ENCOUNTER — REMOTE DEVICE CLINIC VISIT (OUTPATIENT)
Dept: CARDIOLOGY CLINIC | Facility: CLINIC | Age: 86
End: 2021-03-16
Payer: COMMERCIAL

## 2021-03-16 DIAGNOSIS — Z95.0 CARDIAC PACEMAKER IN SITU: Primary | ICD-10-CM

## 2021-03-16 PROCEDURE — 93294 REM INTERROG EVL PM/LDLS PM: CPT | Performed by: INTERNAL MEDICINE

## 2021-03-16 PROCEDURE — 93296 REM INTERROG EVL PM/IDS: CPT | Performed by: INTERNAL MEDICINE

## 2021-03-16 NOTE — PROGRESS NOTES
Results for orders placed or performed in visit on 03/16/21   Cardiac EP device report    Narrative    MDT-DUAL CHAMBER PPM (AAIR-DDDR MODE)/ NOT MRI CONDITIONALKARA PT   CARELINK TRANSMISSION: BATTERY VOLTAGE ADEQUATE (12 8 YRS)  AP 66 8%  <0 1%  ALL AVAILABLE LEAD PARAMETERS WITHIN NORMAL LIMITS  NO SIGNIFICANT HIGH RATE EPISODES  PACEMAKER FUNCTIONING APPROPRIATELY    EBS

## 2021-03-18 ENCOUNTER — HOSPITAL ENCOUNTER (OUTPATIENT)
Dept: NON INVASIVE DIAGNOSTICS | Facility: CLINIC | Age: 86
Discharge: HOME | End: 2021-03-18
Payer: COMMERCIAL

## 2021-03-18 DIAGNOSIS — I73.9 PAD (PERIPHERAL ARTERY DISEASE) (HCC): ICD-10-CM

## 2021-03-18 PROCEDURE — 93923 UPR/LXTR ART STDY 3+ LVLS: CPT

## 2021-03-18 PROCEDURE — 93925 LOWER EXTREMITY STUDY: CPT

## 2021-03-18 PROCEDURE — 93925 LOWER EXTREMITY STUDY: CPT | Performed by: SURGERY

## 2021-03-18 PROCEDURE — 93922 UPR/L XTREMITY ART 2 LEVELS: CPT | Performed by: SURGERY

## 2021-04-05 NOTE — TELEPHONE ENCOUNTER
Phone call to patient regarding INR reminder  Patient asked multiple questions and seemed not to understand what I was talking about  Tried to explain INR and wafain but patient confused and thought he needed to come right to our office  I explained he needed to go to a lab for INR draw and he asked what lab? Not sure if I got through to him at all

## 2021-04-08 DIAGNOSIS — Z79.4 TYPE 2 DIABETES MELLITUS WITH DIABETIC NEUROPATHY, WITH LONG-TERM CURRENT USE OF INSULIN (HCC): ICD-10-CM

## 2021-04-08 DIAGNOSIS — E11.40 TYPE 2 DIABETES MELLITUS WITH DIABETIC NEUROPATHY, WITH LONG-TERM CURRENT USE OF INSULIN (HCC): ICD-10-CM

## 2021-04-09 DIAGNOSIS — E78.5 HYPERLIPIDEMIA, UNSPECIFIED HYPERLIPIDEMIA TYPE: ICD-10-CM

## 2021-04-09 RX ORDER — PEN NEEDLE, DIABETIC 29 G X1/2"
NEEDLE, DISPOSABLE MISCELLANEOUS
Qty: 200 EACH | Refills: 0 | Status: SHIPPED | OUTPATIENT
Start: 2021-04-09 | End: 2021-12-18

## 2021-04-09 RX ORDER — ATORVASTATIN CALCIUM 10 MG/1
TABLET, FILM COATED ORAL
Qty: 90 TABLET | Refills: 0 | Status: SHIPPED | OUTPATIENT
Start: 2021-04-09 | End: 2021-07-15

## 2021-04-10 ENCOUNTER — APPOINTMENT (OUTPATIENT)
Dept: LAB | Age: 86
End: 2021-04-10
Payer: COMMERCIAL

## 2021-04-10 DIAGNOSIS — I48.0 PAROXYSMAL ATRIAL FIBRILLATION (HCC): ICD-10-CM

## 2021-04-10 LAB
INR PPP: 1.89 (ref 0.84–1.19)
PROTHROMBIN TIME: 21.6 SECONDS (ref 11.6–14.5)

## 2021-04-10 PROCEDURE — 85610 PROTHROMBIN TIME: CPT

## 2021-04-10 PROCEDURE — 36415 COLL VENOUS BLD VENIPUNCTURE: CPT

## 2021-04-12 ENCOUNTER — ANTICOAG VISIT (OUTPATIENT)
Dept: CARDIOLOGY CLINIC | Facility: CLINIC | Age: 86
End: 2021-04-12

## 2021-04-12 DIAGNOSIS — I48.0 PAROXYSMAL ATRIAL FIBRILLATION (HCC): ICD-10-CM

## 2021-04-12 NOTE — PROGRESS NOTES
Tc to wife, glenna, will increase dose, 5 mg sun/wed/fri, 2 5 mg other days of the week, inr due 2 weeks

## 2021-04-13 NOTE — PROGRESS NOTES
Assessment/Plan:    Essential hypertension  Hypertension, stable  Patient will continue current medications  Paroxysmal atrial fibrillation (HCC)  Paroxysmal atrial fibrillation  Currently in sinus rhythm  Patient will continue the same regimen of medication in this regard  Mixed hyperlipidemia  Hyperlipidemia, stable on current regimen of medications  Diagnoses and all orders for this visit:    Essential hypertension    Paroxysmal atrial fibrillation ([address] )    Mixed hyperlipidemia          Subjective:  Feels well  Patient ID: Jayda Monsalve is a 80 y o  male  Patient presented to this office for cardiac follow-up  He is known to have a history of paroxysmal atrial fibrillation, hypertension and hyperlipidemia  He also has chronic renal insufficiency  He denies any symptoms of chest pain, shortness of breath, palpitation, dizziness or lightheadedness  He denies any leg edema  Atrial Fibrillation   Symptoms are negative for chest pain, dizziness, palpitations and shortness of breath  Past medical history includes atrial fibrillation  The following portions of the patient's history were reviewed and updated as appropriate: allergies, current medications, past family history, past medical history, past social history, past surgical history and problem list     Review of Systems   Respiratory: Negative for apnea, cough, chest tightness, shortness of breath and wheezing  Cardiovascular: Negative for chest pain, palpitations and leg swelling  Gastrointestinal: Negative for abdominal pain  Neurological: Negative for dizziness and light-headedness  Objective:  Stable cardiac-wise  /75   Pulse 70   Resp 18   Ht 5' 10" (1 778 m)   Wt 101 kg (222 lb)   BMI 31 85 kg/m²          Physical Exam   Constitutional: He is oriented to person, place, and time  He appears well-developed and well-nourished  No distress  HENT:   Head: Normocephalic     Eyes: Pupils are equal, round, and reactive to light  Neck: Normal range of motion  No JVD present  No thyromegaly present  Cardiovascular: Normal rate, regular rhythm, S1 normal and S2 normal   Exam reveals no gallop and no friction rub  Murmur heard  Crescendo systolic murmur is present with a grade of 1/6   Pulmonary/Chest: Effort normal and breath sounds normal  No respiratory distress  He has no wheezes  He has no rales  He exhibits no tenderness  Abdominal: Soft  Musculoskeletal: Normal range of motion  He exhibits no edema, tenderness or deformity  Neurological: He is alert and oriented to person, place, and time  Skin: Skin is warm and dry  He is not diaphoretic  Psychiatric: He has a normal mood and affect  Vitals reviewed  none

## 2021-04-23 ENCOUNTER — APPOINTMENT (OUTPATIENT)
Dept: LAB | Age: 86
End: 2021-04-23
Payer: COMMERCIAL

## 2021-04-23 DIAGNOSIS — I48.0 PAROXYSMAL ATRIAL FIBRILLATION (HCC): ICD-10-CM

## 2021-04-23 LAB
INR PPP: 2.27 (ref 0.84–1.19)
PROTHROMBIN TIME: 25 SECONDS (ref 11.6–14.5)

## 2021-04-23 PROCEDURE — 85610 PROTHROMBIN TIME: CPT

## 2021-04-23 PROCEDURE — 36415 COLL VENOUS BLD VENIPUNCTURE: CPT

## 2021-04-26 ENCOUNTER — ANTICOAG VISIT (OUTPATIENT)
Dept: CARDIOLOGY CLINIC | Facility: CLINIC | Age: 86
End: 2021-04-26

## 2021-04-26 DIAGNOSIS — I48.0 PAROXYSMAL ATRIAL FIBRILLATION (HCC): ICD-10-CM

## 2021-04-29 DIAGNOSIS — E11.40 TYPE 2 DIABETES MELLITUS WITH DIABETIC NEUROPATHY, WITH LONG-TERM CURRENT USE OF INSULIN (HCC): ICD-10-CM

## 2021-04-29 DIAGNOSIS — Z79.4 TYPE 2 DIABETES MELLITUS WITH DIABETIC NEUROPATHY, WITH LONG-TERM CURRENT USE OF INSULIN (HCC): ICD-10-CM

## 2021-04-29 RX ORDER — INSULIN GLARGINE 100 [IU]/ML
INJECTION, SOLUTION SUBCUTANEOUS
Qty: 30 ML | Refills: 0 | Status: SHIPPED | OUTPATIENT
Start: 2021-04-29 | End: 2021-06-01 | Stop reason: SDUPTHER

## 2021-05-07 DIAGNOSIS — E11.69 TYPE 2 DIABETES MELLITUS WITH OTHER SPECIFIED COMPLICATION, WITH LONG-TERM CURRENT USE OF INSULIN (HCC): Primary | ICD-10-CM

## 2021-05-07 DIAGNOSIS — Z79.4 TYPE 2 DIABETES MELLITUS WITH OTHER SPECIFIED COMPLICATION, WITH LONG-TERM CURRENT USE OF INSULIN (HCC): Primary | ICD-10-CM

## 2021-05-15 ENCOUNTER — APPOINTMENT (OUTPATIENT)
Dept: LAB | Age: 86
End: 2021-05-15
Payer: COMMERCIAL

## 2021-05-15 DIAGNOSIS — I12.9 HYPERTENSIVE CHRONIC KIDNEY DISEASE WITH STAGE 1 THROUGH STAGE 4 CHRONIC KIDNEY DISEASE, OR UNSPECIFIED CHRONIC KIDNEY DISEASE: ICD-10-CM

## 2021-05-15 DIAGNOSIS — E11.22 TYPE 2 DIABETES MELLITUS WITH STAGE 4 CHRONIC KIDNEY DISEASE, WITH LONG-TERM CURRENT USE OF INSULIN (HCC): ICD-10-CM

## 2021-05-15 DIAGNOSIS — N28.1 CYST OF KIDNEY, ACQUIRED: ICD-10-CM

## 2021-05-15 DIAGNOSIS — N18.4 CKD (CHRONIC KIDNEY DISEASE), STAGE IV (HCC): ICD-10-CM

## 2021-05-15 DIAGNOSIS — Z79.4 TYPE 2 DIABETES MELLITUS WITH STAGE 4 CHRONIC KIDNEY DISEASE, WITH LONG-TERM CURRENT USE OF INSULIN (HCC): ICD-10-CM

## 2021-05-15 DIAGNOSIS — IMO0002 SOLITARY KIDNEY: ICD-10-CM

## 2021-05-15 DIAGNOSIS — I48.0 PAROXYSMAL ATRIAL FIBRILLATION (HCC): ICD-10-CM

## 2021-05-15 DIAGNOSIS — N18.4 TYPE 2 DIABETES MELLITUS WITH STAGE 4 CHRONIC KIDNEY DISEASE, WITH LONG-TERM CURRENT USE OF INSULIN (HCC): ICD-10-CM

## 2021-05-15 LAB
ALBUMIN SERPL BCP-MCNC: 3.7 G/DL (ref 3.5–5)
ANION GAP SERPL CALCULATED.3IONS-SCNC: 4 MMOL/L (ref 4–13)
BUN SERPL-MCNC: 28 MG/DL (ref 5–25)
CALCIUM SERPL-MCNC: 9.6 MG/DL (ref 8.3–10.1)
CHLORIDE SERPL-SCNC: 107 MMOL/L (ref 100–108)
CO2 SERPL-SCNC: 29 MMOL/L (ref 21–32)
CREAT SERPL-MCNC: 2.22 MG/DL (ref 0.6–1.3)
ERYTHROCYTE [DISTWIDTH] IN BLOOD BY AUTOMATED COUNT: 12.9 % (ref 11.6–15.1)
GFR SERPL CREATININE-BSD FRML MDRD: 26 ML/MIN/1.73SQ M
GLUCOSE P FAST SERPL-MCNC: 89 MG/DL (ref 65–99)
HCT VFR BLD AUTO: 43.2 % (ref 36.5–49.3)
HGB BLD-MCNC: 14.3 G/DL (ref 12–17)
INR PPP: 1.78 (ref 0.84–1.19)
MCH RBC QN AUTO: 29.2 PG (ref 26.8–34.3)
MCHC RBC AUTO-ENTMCNC: 33.1 G/DL (ref 31.4–37.4)
MCV RBC AUTO: 88 FL (ref 82–98)
PHOSPHATE SERPL-MCNC: 3.1 MG/DL (ref 2.3–4.1)
PLATELET # BLD AUTO: 276 THOUSANDS/UL (ref 149–390)
PMV BLD AUTO: 10.4 FL (ref 8.9–12.7)
POTASSIUM SERPL-SCNC: 4.5 MMOL/L (ref 3.5–5.3)
PROTHROMBIN TIME: 20.7 SECONDS (ref 11.6–14.5)
PTH-INTACT SERPL-MCNC: 22.8 PG/ML (ref 18.4–80.1)
RBC # BLD AUTO: 4.9 MILLION/UL (ref 3.88–5.62)
SODIUM SERPL-SCNC: 140 MMOL/L (ref 136–145)
WBC # BLD AUTO: 7.97 THOUSAND/UL (ref 4.31–10.16)

## 2021-05-15 PROCEDURE — 36415 COLL VENOUS BLD VENIPUNCTURE: CPT

## 2021-05-15 PROCEDURE — 85027 COMPLETE CBC AUTOMATED: CPT

## 2021-05-15 PROCEDURE — 85610 PROTHROMBIN TIME: CPT

## 2021-05-15 PROCEDURE — 80069 RENAL FUNCTION PANEL: CPT

## 2021-05-15 PROCEDURE — 83970 ASSAY OF PARATHORMONE: CPT

## 2021-05-16 DIAGNOSIS — E78.5 HYPERLIPIDEMIA, UNSPECIFIED HYPERLIPIDEMIA TYPE: ICD-10-CM

## 2021-05-17 ENCOUNTER — TELEPHONE (OUTPATIENT)
Dept: CARDIOLOGY CLINIC | Facility: CLINIC | Age: 86
End: 2021-05-17

## 2021-05-17 ENCOUNTER — ANTICOAG VISIT (OUTPATIENT)
Dept: CARDIOLOGY CLINIC | Facility: CLINIC | Age: 86
End: 2021-05-17

## 2021-05-17 DIAGNOSIS — I48.0 PAROXYSMAL ATRIAL FIBRILLATION (HCC): ICD-10-CM

## 2021-05-17 RX ORDER — FENOFIBRATE 145 MG/1
TABLET, COATED ORAL
Qty: 90 TABLET | Refills: 0 | Status: SHIPPED | OUTPATIENT
Start: 2021-05-17 | End: 2021-05-24

## 2021-05-17 NOTE — PROGRESS NOTES
Tc to glenna, left message on cell phone, increase dose, 5 mg sun, mon Wed,fri, 2 5 mg tues/th/sat, inr due 5/26

## 2021-05-17 NOTE — TELEPHONE ENCOUNTER
Pt's wife called,( [phone]), she said she will be home until 5:30 pm today  I quess I do not have your office's correct #  Can you task it to me?         Thank you

## 2021-05-17 NOTE — PROGRESS NOTES
5/17/21, message from [place] office , regarding wife's phone number to call, called to wife, reveiwed instructions with wife  Per wife call cell phone  With results

## 2021-05-22 DIAGNOSIS — E78.5 HYPERLIPIDEMIA, UNSPECIFIED HYPERLIPIDEMIA TYPE: ICD-10-CM

## 2021-05-24 RX ORDER — FENOFIBRATE 145 MG/1
TABLET, COATED ORAL
Qty: 90 TABLET | Refills: 0 | Status: SHIPPED | OUTPATIENT
Start: 2021-05-24 | End: 2022-07-19 | Stop reason: SDUPTHER

## 2021-05-25 ENCOUNTER — OFFICE VISIT (OUTPATIENT)
Dept: INTERNAL MEDICINE CLINIC | Facility: CLINIC | Age: 86
End: 2021-05-25
Payer: COMMERCIAL

## 2021-05-25 VITALS
WEIGHT: 210 LBS | HEIGHT: 70 IN | TEMPERATURE: 97.6 F | OXYGEN SATURATION: 97 % | BODY MASS INDEX: 30.06 KG/M2 | DIASTOLIC BLOOD PRESSURE: 75 MMHG | HEART RATE: 71 BPM | SYSTOLIC BLOOD PRESSURE: 138 MMHG

## 2021-05-25 DIAGNOSIS — E11.69 TYPE 2 DIABETES MELLITUS WITH OTHER SPECIFIED COMPLICATION, WITH LONG-TERM CURRENT USE OF INSULIN (HCC): Primary | ICD-10-CM

## 2021-05-25 DIAGNOSIS — G31.09 FRONTOTEMPORAL DEMENTIA (HCC): ICD-10-CM

## 2021-05-25 DIAGNOSIS — I48.0 PAROXYSMAL ATRIAL FIBRILLATION (HCC): ICD-10-CM

## 2021-05-25 DIAGNOSIS — E78.5 HYPERLIPIDEMIA, UNSPECIFIED HYPERLIPIDEMIA TYPE: ICD-10-CM

## 2021-05-25 DIAGNOSIS — F02.80 FRONTOTEMPORAL DEMENTIA (HCC): ICD-10-CM

## 2021-05-25 DIAGNOSIS — R26.9 ABNORMAL GAIT: ICD-10-CM

## 2021-05-25 DIAGNOSIS — I25.10 CORONARY ARTERY DISEASE INVOLVING NATIVE CORONARY ARTERY OF NATIVE HEART WITHOUT ANGINA PECTORIS: ICD-10-CM

## 2021-05-25 DIAGNOSIS — N18.4 CKD (CHRONIC KIDNEY DISEASE), STAGE IV (HCC): ICD-10-CM

## 2021-05-25 DIAGNOSIS — Z79.4 TYPE 2 DIABETES MELLITUS WITH OTHER SPECIFIED COMPLICATION, WITH LONG-TERM CURRENT USE OF INSULIN (HCC): Primary | ICD-10-CM

## 2021-05-25 PROCEDURE — 99214 OFFICE O/P EST MOD 30 MIN: CPT | Performed by: INTERNAL MEDICINE

## 2021-05-25 NOTE — PROGRESS NOTES
Assessment/Plan: This is a 57-year-old gentleman with history of diabetes mellitus hypertension hyperlipidemia COPD coronary artery disease atrial fibrillation pacemaker frontotemporal dementia sleep apnea TIA obesity and ambulatory dysfunction  His general condition has been worsening  His wife is taking care of him at home and has not been getting any help from his daughters  He is somewhat carlos and his behavior can fluctuate  1  Type 2 diabetes mellitus with other specified complication, with long-term current use of insulin (HCC)  Comments:  Home blood sugars and pressures were reviewed  Orders:  -     Hemoglobin A1C; Future  -     Comprehensive metabolic panel; Future  -     Microalbumin / creatinine urine ratio    2  Hyperlipidemia, unspecified hyperlipidemia type    3  Paroxysmal atrial fibrillation (HCC)  Comments:  Rate is controlled and he is on chronic warfarin therapy  4  Coronary artery disease involving native coronary artery of native heart without angina pectoris    5  Abnormal gait  Comments:  Physical therapy recommended and exercises at the gym were also advised  Home exercise pedal encouraged    6  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[facility] )  Comments: This is progressing and he is needing more care which his wife is providing at home  7  CKD (chronic kidney disease), stage IV ([facility] )  Comments:  Labs are being monitored stay off nephrotoxic agents  There are no diagnoses linked to this encounter  Subjective:      Patient ID: Manjeet Carver is a 80 y o  male  This is a 57-year-old gentleman with history of diabetes mellitus hypertension hyperlipidemia COPD coronary artery disease atrial fibrillation pacemaker frontotemporal dementia sleep apnea TIA obesity and ambulatory dysfunction  His general condition has been worsening  His wife is taking care of him at home and has not been getting any help from his daughters    He is somewhat carlos and his behavior can fluctuate  The following portions of the patient's history were reviewed and updated as appropriate: He  has a past medical history of Allergic rhinitis, Anxiety, Aspiration of liquid, Asthma, Atrial fibrillation ([facility] ), CAD (coronary artery disease), Cancer of kidney ([facility] ), COPD (chronic obstructive pulmonary disease) ([facility] ), CPAP (continuous positive airway pressure) dependence, Dementia ([facility] ), Dementia ([facility] ), Diabetes mellitus ([facility] ), Diabetes mellitus, type 2 ([facility] ), DJD (degenerative joint disease), Hypercholesterolemia, Hyperlipidemia, Hypertension, Incisional hernia, Kidney disease, Melanoma ([facility] ), Obesity, Sleep apnea, and TIA (transient ischemic attack)  He   Patient Active Problem List    Diagnosis Date Noted    Dementia [facility])     Recurrent falls 09/30/2020    Long term current use of antiarrhythmic medical therapy 08/17/2020    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 08/13/2020    Coagulation disorder ([facility] ) 08/13/2020    Head injury 03/09/2020    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, or unspecified chronic kidney disease 05/06/2019    Coronary artery disease involving native coronary artery of native heart without angina pectoris 03/12/2019    Type 2 diabetes mellitus with kidney complication, with long-term current use of insulin ([facility] ) 02/04/2019    Paroxysmal atrial fibrillation ([facility] ) 12/12/2018    Mixed hyperlipidemia 12/12/2018    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[facility] ) 04/16/2018    CKD (chronic kidney disease), stage IV ([facility] ) 03/30/2018    Essential hypertension 03/30/2018    Solitary kidney 03/30/2018    Benign prostate hyperplasia 10/27/2014    Elevated prostate specific antigen (PSA) 06/18/2013     He  has a past surgical history that includes Cardiac pacemaker placement; Nephrectomy (Left, 2005); pr esophagogastroduodenoscopy submucosal injection (N/A, 9/21/2016); Tonsillectomy;  Cholecystectomy; pr esophagogastroduodenoscopy submucosal injection (N/A, 2/7/2018); Colonoscopy; Rotator cuff repair; pr esophagogastroduodenoscopy submucosal injection (N/A, 4/3/2019); and Hernia repair  His family history includes Brain cancer in his father; Cancer in his family; Colon cancer in his son; Diabetes in his family; Heart disease in his family; Hypertension in his family; Lung cancer in his father; Stroke in his family  He  reports that he has never smoked  He has never used smokeless tobacco  He reports that he does not drink alcohol or use drugs  Current Outpatient Medications   Medication Sig Dispense Refill    amLODIPine (NORVASC) 2 5 mg tablet Take 1 tablet (2 5 mg total) by mouth daily Taking prn for SBP >130 30 tablet 6    atorvastatin (LIPITOR) 10 mg tablet TAKE ONE TABLET BY MOUTH DAILY 90 tablet 0    BD Insulin Syringe U/F 30G X 1/2" 0 5 ML MISC USE AS DIRECTED WITH INSULIN 200 each 0    cholecalciferol (VITAMIN D3) 1,000 units tablet Take 2,000 Units by mouth daily      cyanocobalamin (VITAMIN B-12) 500 mcg tablet Take 500 mcg by mouth daily      docusate sodium (COLACE) 100 mg capsule Take 100 mg by mouth as needed       fenofibrate (TRICOR) 145 mg tablet TAKE ONE TABLET BY MOUTH EVERY DAY 90 tablet 0    ferrous sulfate 325 (65 Fe) mg tablet Take 325 mg by mouth as needed       fexofenadine (ALLEGRA) 180 MG tablet Take 180 mg by mouth daily      fluticasone (FLONASE) 50 mcg/act nasal spray 1 spray into each nostril daily      glucose blood (OneTouch Verio) test strip USE TO TEST 3 TIMES A  each 3    insulin aspart (NovoLOG) 100 units/mL injection Inject 8 Units under the skin 2 (two) times a day before meals       ipratropium (ATROVENT HFA) 17 mcg/act inhaler Inhale 2 puffs every 6 (six) hours   levalbuterol (XOPENEX) 0 63 mg/3 mL nebulizer solution Take 1 ampule by nebulization 2 (two) times a day as needed for wheezing        losartan (COZAAR) 25 mg tablet TAKE 1 TABLET BY MOUTH DAILY 90 tablet 4    Magnesium 250 MG TABS Take 500 mg by mouth daily       metoprolol tartrate (LOPRESSOR) 100 mg tablet Take 1 tablet (100 mg total) by mouth daily at bedtime 90 tablet 2    metoprolol tartrate (LOPRESSOR) 50 mg tablet Take 50 mg by mouth daily in the early morning      NovoFine 32G X 6 MM MISC USE TO INJECT 8 UNITS AT LUNCH AND 8 UNITS AT BEDTIME (TWICE DAILY) 200 each 0    NOVOLOG FLEXPEN 100 units/mL injection pen 8 Units 2 (two) times a day with meals       ONETOUCH DELICA LANCETS 91M MISC       Probiotic Product (PROBIOTIC COLON SUPPORT PO) Take 1 tablet by mouth daily      propafenone (RYTHMOL) 225 mg tablet Take 1 tablet (225 mg total) by mouth 3 (three) times a day 270 tablet 3    sertraline (ZOLOFT) 100 mg tablet Take 1 tablet (100 mg total) by mouth daily 90 tablet 3    sodium chloride (OCEAN) 0 65 % nasal spray 1 spray into each nostril      warfarin (COUMADIN) 2 5 mg tablet Take 2 5 mg by mouth 3 (three) times a week Indications: Mon wed Fri         donepezil (ARICEPT) 10 mg tablet Take 1 tablet (10 mg total) by mouth daily at bedtime 90 tablet 3    insulin glargine (Lantus) 100 units/mL subcutaneous injection Inject 30 units twice a day 30 mL 3    Memantine HCl ER 28 MG CP24 TAKE ONE CAPSULE BY MOUTH EVERY DAY 90 capsule 3    warfarin (COUMADIN) 5 mg tablet Take 1 tablet (5 mg total) by mouth daily 90 tablet 0     No current facility-administered medications for this visit        Current Outpatient Medications on File Prior to Visit   Medication Sig    amLODIPine (NORVASC) 2 5 mg tablet Take 1 tablet (2 5 mg total) by mouth daily Taking prn for SBP >130    atorvastatin (LIPITOR) 10 mg tablet TAKE ONE TABLET BY MOUTH DAILY    BD Insulin Syringe U/F 30G X 1/2" 0 5 ML MISC USE AS DIRECTED WITH INSULIN    cholecalciferol (VITAMIN D3) 1,000 units tablet Take 2,000 Units by mouth daily    cyanocobalamin (VITAMIN B-12) 500 mcg tablet Take 500 mcg by mouth daily    docusate sodium (COLACE) 100 mg capsule Take 100 mg by mouth as needed     fenofibrate (TRICOR) 145 mg tablet TAKE ONE TABLET BY MOUTH EVERY DAY    ferrous sulfate 325 (65 Fe) mg tablet Take 325 mg by mouth as needed     fexofenadine (ALLEGRA) 180 MG tablet Take 180 mg by mouth daily    fluticasone (FLONASE) 50 mcg/act nasal spray 1 spray into each nostril daily    glucose blood (OneTouch Verio) test strip USE TO TEST 3 TIMES A DAY    insulin aspart (NovoLOG) 100 units/mL injection Inject 8 Units under the skin 2 (two) times a day before meals     ipratropium (ATROVENT HFA) 17 mcg/act inhaler Inhale 2 puffs every 6 (six) hours   levalbuterol (XOPENEX) 0 63 mg/3 mL nebulizer solution Take 1 ampule by nebulization 2 (two) times a day as needed for wheezing   losartan (COZAAR) 25 mg tablet TAKE 1 TABLET BY MOUTH DAILY    Magnesium 250 MG TABS Take 500 mg by mouth daily     metoprolol tartrate (LOPRESSOR) 100 mg tablet Take 1 tablet (100 mg total) by mouth daily at bedtime    metoprolol tartrate (LOPRESSOR) 50 mg tablet Take 50 mg by mouth daily in the early morning    NovoFine 32G X 6 MM MISC USE TO INJECT 8 UNITS AT LUNCH AND 8 UNITS AT BEDTIME (TWICE DAILY)    NOVOLOG FLEXPEN 100 units/mL injection pen 8 Units 2 (two) times a day with meals     ONETOUCH DELICA LANCETS 01B MISC     Probiotic Product (PROBIOTIC COLON SUPPORT PO) Take 1 tablet by mouth daily    propafenone (RYTHMOL) 225 mg tablet Take 1 tablet (225 mg total) by mouth 3 (three) times a day    sertraline (ZOLOFT) 100 mg tablet Take 1 tablet (100 mg total) by mouth daily    sodium chloride (OCEAN) 0 65 % nasal spray 1 spray into each nostril    warfarin (COUMADIN) 2 5 mg tablet Take 2 5 mg by mouth 3 (three) times a week Indications: Mon  wed  Fri        No current facility-administered medications on file prior to visit        He is allergic to Francis Schein tartrate]; bextra [valdecoxib]; dust mite extract; lyrica [pregabalin]; mold extract [trichophyton]; pollen extract; and tree extract       Review of Systems   Constitutional: Negative for appetite change, chills, fatigue and fever  HENT: Negative for sore throat and trouble swallowing  Eyes: Negative for redness  Respiratory: Negative for shortness of breath  Cardiovascular: Negative for chest pain and palpitations  Gastrointestinal: Negative for abdominal pain, constipation and diarrhea  Genitourinary: Negative for dysuria and hematuria  Musculoskeletal: Positive for gait problem  Negative for back pain and neck pain  Skin: Negative for rash  Neurological: Negative for seizures, weakness and headaches  Hematological: Negative for adenopathy  Psychiatric/Behavioral: Positive for behavioral problems and decreased concentration  Negative for confusion  The patient is not nervous/anxious            Objective:      /75 (BP Location: Right arm, Patient Position: Sitting, Cuff Size: Standard)   Pulse 71   Temp 97 6 °F (36 4 °C) (Skin)   Ht 5' 10" (1 778 m)   Wt 95 3 kg (210 lb)   SpO2 97%   BMI 30 13 kg/m²     Recent Results (from the past 1344 hour(s))   Protime-INR    Collection Time: 04/23/21  6:25 PM   Result Value Ref Range    Protime 25 0 (H) 11 6 - 14 5 seconds    INR 2 27 (H) 0 84 - 1 19   Protime-INR    Collection Time: 05/15/21 10:40 AM   Result Value Ref Range    Protime 20 7 (H) 11 6 - 14 5 seconds    INR 1 78 (H) 0 84 - 1 19   CBC    Collection Time: 05/15/21 10:40 AM   Result Value Ref Range    WBC 7 97 4 31 - 10 16 Thousand/uL    RBC 4 90 3 88 - 5 62 Million/uL    Hemoglobin 14 3 12 0 - 17 0 g/dL    Hematocrit 43 2 36 5 - 49 3 %    MCV 88 82 - 98 fL    MCH 29 2 26 8 - 34 3 pg    MCHC 33 1 31 4 - 37 4 g/dL    RDW 12 9 11 6 - 15 1 %    Platelets 205 661 - 042 Thousands/uL    MPV 10 4 8 9 - 12 7 fL   Renal function panel    Collection Time: 05/15/21 10:40 AM   Result Value Ref Range    Albumin 3 7 3 5 - 5 0 g/dL    Calcium 9 6 8 3 - 10 1 mg/dL    Phosphorus 3 1 2 3 - 4 1 mg/dL    BUN 28 (H) 5 - 25 mg/dL    Creatinine 2 22 (H) 0 60 - 1 30 mg/dL    Sodium 140 136 - 145 mmol/L    Potassium 4 5 3 5 - 5 3 mmol/L    Chloride 107 100 - 108 mmol/L    CO2 29 21 - 32 mmol/L    ANION GAP 4 4 - 13 mmol/L    eGFR 26 ml/min/1 73sq m    Glucose, Fasting 89 65 - 99 mg/dL   PTH, intact    Collection Time: 05/15/21 10:40 AM   Result Value Ref Range    PTH 22 8 18 4 - 80 1 pg/mL   Comprehensive metabolic panel    Collection Time: 06/06/21 10:39 AM   Result Value Ref Range    Sodium 139 136 - 145 mmol/L    Potassium 4 2 3 5 - 5 3 mmol/L    Chloride 106 100 - 108 mmol/L    CO2 30 21 - 32 mmol/L    ANION GAP 3 (L) 4 - 13 mmol/L    BUN 23 5 - 25 mg/dL    Creatinine 2 15 (H) 0 60 - 1 30 mg/dL    Glucose, Fasting 192 (H) 65 - 99 mg/dL    Calcium 9 6 8 3 - 10 1 mg/dL    AST 12 5 - 45 U/L    ALT 25 12 - 78 U/L    Alkaline Phosphatase 58 46 - 116 U/L    Total Protein 8 3 (H) 6 4 - 8 2 g/dL    Albumin 3 6 3 5 - 5 0 g/dL    Total Bilirubin 0 63 0 20 - 1 00 mg/dL    eGFR 27 ml/min/1 73sq m        Physical Exam  Constitutional:       General: He is not in acute distress  Appearance: Normal appearance  HENT:      Head: Normocephalic and atraumatic  Right Ear: Tympanic membrane normal       Left Ear: Tympanic membrane normal       Nose: Nose normal       Mouth/Throat:      Mouth: Mucous membranes are moist    Eyes:      Extraocular Movements: Extraocular movements intact  Pupils: Pupils are equal, round, and reactive to light  Neck:      Musculoskeletal: Normal range of motion and neck supple  Cardiovascular:      Rate and Rhythm: Normal rate and regular rhythm  Pulses: Normal pulses  Heart sounds: Normal heart sounds  No murmur  No friction rub  Pulmonary:      Effort: Pulmonary effort is normal  No respiratory distress  Breath sounds: Normal breath sounds  No wheezing  Abdominal:      General: Abdomen is flat  Bowel sounds are normal  There is no distension  Palpations: Abdomen is soft  There is no mass  Tenderness: There is no abdominal tenderness  There is no guarding  Musculoskeletal: Normal range of motion  Skin:     General: Skin is warm and dry  Neurological:      General: No focal deficit present  Mental Status: He is alert and oriented to person, place, and time  Mental status is at baseline  Cranial Nerves: No cranial nerve deficit  Gait: Gait abnormal       Comments: Forgetfulness present  Psychiatric:      Comments: Has labile mood

## 2021-05-26 ENCOUNTER — TELEPHONE (OUTPATIENT)
Dept: NEPHROLOGY | Facility: CLINIC | Age: 86
End: 2021-05-26

## 2021-05-27 ENCOUNTER — TELEPHONE (OUTPATIENT)
Dept: ADMINISTRATIVE | Facility: OTHER | Age: 86
End: 2021-05-27

## 2021-05-27 NOTE — TELEPHONE ENCOUNTER
----- Message from Bela Alford sent at 5/25/2021  4:35 PM EDT -----  Regarding: dm eye exam  05/25/21 4:35 PM    Hello, our patient Grayson Saldana has had Diabetic Eye Exam completed/performed  Please assist in updating the patient chart by making an External outreach to [address] [place] [address] facility located in [address]  The date of service is 1/26/21      Thank you,  Bela Alford  [address] [facility] [address]

## 2021-05-27 NOTE — LETTER
Diabetic Eye Exam Form    Date Requested: 21  Patient: Jayda Monsalve  Patient : 1935   Referring Provider: Brice Litten, MD    Dilated Retinal Exam, Optomap-Iris Exam, or Fundus Photography Done         Yes ([place] one above)         No     Date of Diabetic Eye Exam ______________________________  Left Eye      Exam did show retinopathy    Exam did not show retinopathy         Mild       Moderate       None       Proliferative       Severe     Right Eye     Exam did show retinopathy    Exam did not show retinopathy         Mild       Moderate       None       Proliferative       Severe     Comments __________________________________________________________    Practice Providing Exam ______________________________________________    Exam Performed By (print name) _______________________________________      Provider Signature ___________________________________________________      These reports are needed for  compliance    Please fax this completed form and a copy of the Diabetic Eye Exam report to our office located at [place] [address] as soon as possible to [phone] attention Sophy: Phone [phone]    We thank you for your assistance in treating our mutual patient

## 2021-05-27 NOTE — TELEPHONE ENCOUNTER
Upon review of the In Basket request and the patient's chart, initial outreach has been made via fax, please see Contacts section for details       Thank you  Darin Garcia MA

## 2021-05-27 NOTE — LETTER
Diabetic Eye Exam Form    Date Requested: 21  Patient: Manjeet Carver  Patient : 1935   Referring Provider: Otoniel Solis MD    Dilated Retinal Exam, Optomap-Iris Exam, or Fundus Photography Done         Yes ([place] one above)         No     Date of Diabetic Eye Exam ______________________________  Left Eye      Exam did show retinopathy    Exam did not show retinopathy         Mild       Moderate       None       Proliferative       Severe     Right Eye     Exam did show retinopathy    Exam did not show retinopathy         Mild       Moderate       None       Proliferative       Severe     Comments __________________________________________________________    Practice Providing Exam ______________________________________________    Exam Performed By (print name) _______________________________________      Provider Signature ___________________________________________________      These reports are needed for  compliance    Please fax this completed form and a copy of the Diabetic Eye Exam report to our office located at [place] [address] as soon as possible to [phone] attention Sophy: Phone [phone]    We thank you for your assistance in treating our mutual patient

## 2021-05-29 DIAGNOSIS — I48.0 PAROXYSMAL ATRIAL FIBRILLATION (HCC): ICD-10-CM

## 2021-05-30 DIAGNOSIS — F03.90 DEMENTIA WITHOUT BEHAVIORAL DISTURBANCE, UNSPECIFIED DEMENTIA TYPE (HCC): ICD-10-CM

## 2021-05-30 RX ORDER — WARFARIN SODIUM 5 MG/1
5 TABLET ORAL DAILY
Qty: 90 TABLET | Refills: 0 | Status: SHIPPED | OUTPATIENT
Start: 2021-05-30 | End: 2021-12-02

## 2021-06-01 DIAGNOSIS — E11.40 TYPE 2 DIABETES MELLITUS WITH DIABETIC NEUROPATHY, WITH LONG-TERM CURRENT USE OF INSULIN (HCC): ICD-10-CM

## 2021-06-01 DIAGNOSIS — F03.90 DEMENTIA WITHOUT BEHAVIORAL DISTURBANCE, UNSPECIFIED DEMENTIA TYPE (HCC): ICD-10-CM

## 2021-06-01 DIAGNOSIS — Z79.4 TYPE 2 DIABETES MELLITUS WITH DIABETIC NEUROPATHY, WITH LONG-TERM CURRENT USE OF INSULIN (HCC): ICD-10-CM

## 2021-06-01 RX ORDER — MEMANTINE HYDROCHLORIDE 28 MG/1
CAPSULE, EXTENDED RELEASE ORAL
Qty: 90 CAPSULE | Refills: 3 | Status: SHIPPED | OUTPATIENT
Start: 2021-06-01 | End: 2022-03-17 | Stop reason: SDUPTHER

## 2021-06-01 RX ORDER — INSULIN GLARGINE 100 [IU]/ML
INJECTION, SOLUTION SUBCUTANEOUS
Qty: 30 ML | Refills: 3 | Status: SHIPPED | OUTPATIENT
Start: 2021-06-01 | End: 2021-06-22 | Stop reason: SDUPTHER

## 2021-06-01 RX ORDER — DONEPEZIL HYDROCHLORIDE 10 MG/1
10 TABLET, FILM COATED ORAL
Qty: 90 TABLET | Refills: 3 | Status: SHIPPED | OUTPATIENT
Start: 2021-06-01 | End: 2022-06-10 | Stop reason: SDUPTHER

## 2021-06-01 NOTE — TELEPHONE ENCOUNTER
Left another message to schedule pt's follow up with Dr Dulce Brown   This is the 2nd attempt a letter will be sent out

## 2021-06-01 NOTE — TELEPHONE ENCOUNTER
As a follow-up, a second attempt has been made for outreach via fax, please see Contacts section for details      Thank you  Francois Harrell MA

## 2021-06-01 NOTE — TELEPHONE ENCOUNTER
As a follow-up, a second attempt has been made for outreach via fax, please see Contacts section for details      Thank you  Aroldo Haile MA

## 2021-06-02 RX ORDER — DONEPEZIL HYDROCHLORIDE 10 MG/1
TABLET, FILM COATED ORAL
Qty: 90 TABLET | Refills: 0 | OUTPATIENT
Start: 2021-06-02

## 2021-06-02 RX ORDER — MEMANTINE HYDROCHLORIDE 28 MG/1
CAPSULE, EXTENDED RELEASE ORAL
Qty: 90 CAPSULE | Refills: 0 | OUTPATIENT
Start: 2021-06-02

## 2021-06-06 ENCOUNTER — APPOINTMENT (OUTPATIENT)
Dept: LAB | Age: 86
End: 2021-06-06
Payer: COMMERCIAL

## 2021-06-06 DIAGNOSIS — Z79.4 TYPE 2 DIABETES MELLITUS WITH OTHER SPECIFIED COMPLICATION, WITH LONG-TERM CURRENT USE OF INSULIN (HCC): ICD-10-CM

## 2021-06-06 DIAGNOSIS — E11.69 TYPE 2 DIABETES MELLITUS WITH OTHER SPECIFIED COMPLICATION, WITH LONG-TERM CURRENT USE OF INSULIN (HCC): ICD-10-CM

## 2021-06-06 LAB
ALBUMIN SERPL BCP-MCNC: 3.6 G/DL (ref 3.5–5)
ALP SERPL-CCNC: 58 U/L (ref 46–116)
ALT SERPL W P-5'-P-CCNC: 25 U/L (ref 12–78)
ANION GAP SERPL CALCULATED.3IONS-SCNC: 3 MMOL/L (ref 4–13)
AST SERPL W P-5'-P-CCNC: 12 U/L (ref 5–45)
BILIRUB SERPL-MCNC: 0.63 MG/DL (ref 0.2–1)
BUN SERPL-MCNC: 23 MG/DL (ref 5–25)
CALCIUM SERPL-MCNC: 9.6 MG/DL (ref 8.3–10.1)
CHLORIDE SERPL-SCNC: 106 MMOL/L (ref 100–108)
CO2 SERPL-SCNC: 30 MMOL/L (ref 21–32)
CREAT SERPL-MCNC: 2.15 MG/DL (ref 0.6–1.3)
CREAT UR-MCNC: 143 MG/DL
EST. AVERAGE GLUCOSE BLD GHB EST-MCNC: 192 MG/DL
GFR SERPL CREATININE-BSD FRML MDRD: 27 ML/MIN/1.73SQ M
GLUCOSE P FAST SERPL-MCNC: 192 MG/DL (ref 65–99)
HBA1C MFR BLD: 8.3 %
MICROALBUMIN UR-MCNC: 332 MG/L (ref 0–20)
MICROALBUMIN/CREAT 24H UR: 232 MG/G CREATININE (ref 0–30)
POTASSIUM SERPL-SCNC: 4.2 MMOL/L (ref 3.5–5.3)
PROT SERPL-MCNC: 8.3 G/DL (ref 6.4–8.2)
SODIUM SERPL-SCNC: 139 MMOL/L (ref 136–145)

## 2021-06-06 PROCEDURE — 80053 COMPREHEN METABOLIC PANEL: CPT

## 2021-06-06 PROCEDURE — 82043 UR ALBUMIN QUANTITATIVE: CPT | Performed by: INTERNAL MEDICINE

## 2021-06-06 PROCEDURE — 83036 HEMOGLOBIN GLYCOSYLATED A1C: CPT

## 2021-06-06 PROCEDURE — 36415 COLL VENOUS BLD VENIPUNCTURE: CPT

## 2021-06-06 PROCEDURE — 82570 ASSAY OF URINE CREATININE: CPT | Performed by: INTERNAL MEDICINE

## 2021-06-09 NOTE — TELEPHONE ENCOUNTER
As a final attempt, a third outreach has been made via telephone call  Please see Contacts section for details  This encounter will be closed and completed by end of day  Should we receive the requested information because of previous outreach attempts, the requested patient's chart will be updated appropriately       Thank you  Alex Martinez MA

## 2021-06-15 ENCOUNTER — REMOTE DEVICE CLINIC VISIT (OUTPATIENT)
Dept: CARDIOLOGY CLINIC | Facility: CLINIC | Age: 86
End: 2021-06-15
Payer: COMMERCIAL

## 2021-06-15 DIAGNOSIS — Z95.0 CARDIAC PACEMAKER IN SITU: Primary | ICD-10-CM

## 2021-06-15 PROCEDURE — 93296 REM INTERROG EVL PM/IDS: CPT | Performed by: INTERNAL MEDICINE

## 2021-06-15 PROCEDURE — 93294 REM INTERROG EVL PM/LDLS PM: CPT | Performed by: INTERNAL MEDICINE

## 2021-06-15 NOTE — PROGRESS NOTES
Results for orders placed or performed in visit on 06/15/21   Cardiac EP device report    Narrative    MDT-DUAL CHAMBER PPM (AAIR-DDDR MODE)/ NOT MRI CONDITIONALKARA PT   CARELINK TRANSMISSION: BATTERY STATUS "12 YRS " AP 80%  0%  ALL AVAILABLE LEAD PARAMETERS WITHIN NORMAL LIMITS  NO SIGNIFICANT HIGH RATE EPISODES  NORMAL DEVICE FUNCTION  NC         Current Outpatient Medications:     amLODIPine (NORVASC) 2 5 mg tablet, Take 1 tablet (2 5 mg total) by mouth daily Taking prn for SBP >130, Disp: 30 tablet, Rfl: 6    atorvastatin (LIPITOR) 10 mg tablet, TAKE ONE TABLET BY MOUTH DAILY, Disp: 90 tablet, Rfl: 0    BD Insulin Syringe U/F 30G X 1/2" 0 5 ML MISC, USE AS DIRECTED WITH INSULIN, Disp: 200 each, Rfl: 0    cholecalciferol (VITAMIN D3) 1,000 units tablet, Take 2,000 Units by mouth daily, Disp: , Rfl:     cyanocobalamin (VITAMIN B-12) 500 mcg tablet, Take 500 mcg by mouth daily, Disp: , Rfl:     docusate sodium (COLACE) 100 mg capsule, Take 100 mg by mouth as needed , Disp: , Rfl:     donepezil (ARICEPT) 10 mg tablet, Take 1 tablet (10 mg total) by mouth daily at bedtime, Disp: 90 tablet, Rfl: 3    fenofibrate (TRICOR) 145 mg tablet, TAKE ONE TABLET BY MOUTH EVERY DAY, Disp: 90 tablet, Rfl: 0    ferrous sulfate 325 (65 Fe) mg tablet, Take 325 mg by mouth as needed , Disp: , Rfl:     fexofenadine (ALLEGRA) 180 MG tablet, Take 180 mg by mouth daily, Disp: , Rfl:     fluticasone (FLONASE) 50 mcg/act nasal spray, 1 spray into each nostril daily, Disp: , Rfl:     glucose blood (OneTouch Verio) test strip, USE TO TEST 3 TIMES A DAY, Disp: 300 each, Rfl: 3    insulin aspart (NovoLOG) 100 units/mL injection, Inject 8 Units under the skin 2 (two) times a day before meals , Disp: , Rfl:     insulin glargine (Lantus) 100 units/mL subcutaneous injection, Inject 30 units twice a day, Disp: 30 mL, Rfl: 3    ipratropium (ATROVENT HFA) 17 mcg/act inhaler, Inhale 2 puffs every 6 (six) hours  , Disp: , Rfl:    levalbuterol (XOPENEX) 0 63 mg/3 mL nebulizer solution, Take 1 ampule by nebulization 2 (two) times a day as needed for wheezing , Disp: , Rfl:     losartan (COZAAR) 25 mg tablet, TAKE 1 TABLET BY MOUTH DAILY, Disp: 90 tablet, Rfl: 4    Magnesium 250 MG TABS, Take 500 mg by mouth daily , Disp: , Rfl:     Memantine HCl ER 28 MG CP24, TAKE ONE CAPSULE BY MOUTH EVERY DAY, Disp: 90 capsule, Rfl: 3    metoprolol tartrate (LOPRESSOR) 100 mg tablet, Take 1 tablet (100 mg total) by mouth daily at bedtime, Disp: 90 tablet, Rfl: 2    metoprolol tartrate (LOPRESSOR) 50 mg tablet, Take 50 mg by mouth daily in the early morning, Disp: , Rfl:     NovoFine 32G X 6 MM MISC, USE TO INJECT 8 UNITS AT LUNCH AND 8 UNITS AT BEDTIME (TWICE DAILY), Disp: 200 each, Rfl: 0    NOVOLOG FLEXPEN 100 units/mL injection pen, 8 Units 2 (two) times a day with meals , Disp: , Rfl:     ONETOUCH DELICA LANCETS 43J MISC, , Disp: , Rfl:     Probiotic Product (PROBIOTIC COLON SUPPORT PO), Take 1 tablet by mouth daily, Disp: , Rfl:     propafenone (RYTHMOL) 225 mg tablet, Take 1 tablet (225 mg total) by mouth 3 (three) times a day, Disp: 270 tablet, Rfl: 3    sertraline (ZOLOFT) 100 mg tablet, Take 1 tablet (100 mg total) by mouth daily, Disp: 90 tablet, Rfl: 3    sodium chloride (OCEAN) 0 65 % nasal spray, 1 spray into each nostril, Disp: , Rfl:     warfarin (COUMADIN) 2 5 mg tablet, Take 2 5 mg by mouth 3 (three) times a week Indications: Mon wed Fri   , Disp: , Rfl:     warfarin (COUMADIN) 5 mg tablet, Take 1 tablet (5 mg total) by mouth daily, Disp: 90 tablet, Rfl: 0

## 2021-06-16 ENCOUNTER — TELEPHONE (OUTPATIENT)
Dept: INTERNAL MEDICINE CLINIC | Facility: CLINIC | Age: 86
End: 2021-06-16

## 2021-06-16 NOTE — TELEPHONE ENCOUNTER
Loree Tyler dont know  why  this  message came to me  , it should have gone  to [facility]  This  is  Dr Willem Peraza  patient    Nesha Wheat

## 2021-06-16 NOTE — TELEPHONE ENCOUNTER
As per Dr Artie Rubio pt needs appt, l/m for Jerri for her to call and set up because she cancelled last appt

## 2021-06-16 NOTE — TELEPHONE ENCOUNTER
Pt's wife needs us to write a letter stating that pt has dementia and is no longer able manage his finaces, is this okay

## 2021-06-16 NOTE — TELEPHONE ENCOUNTER
Suraj Joya is calling, left VM  and wants to know if you need the letter for social security  I know nothing about this

## 2021-06-21 ENCOUNTER — OFFICE VISIT (OUTPATIENT)
Dept: INTERNAL MEDICINE CLINIC | Facility: CLINIC | Age: 86
End: 2021-06-21
Payer: COMMERCIAL

## 2021-06-21 ENCOUNTER — TELEPHONE (OUTPATIENT)
Dept: CARDIOLOGY CLINIC | Facility: CLINIC | Age: 86
End: 2021-06-21

## 2021-06-21 VITALS
SYSTOLIC BLOOD PRESSURE: 133 MMHG | DIASTOLIC BLOOD PRESSURE: 76 MMHG | OXYGEN SATURATION: 96 % | HEART RATE: 75 BPM | BODY MASS INDEX: 29.78 KG/M2 | WEIGHT: 208 LBS | HEIGHT: 70 IN | TEMPERATURE: 97.8 F

## 2021-06-21 DIAGNOSIS — Z79.4 TYPE 2 DIABETES MELLITUS WITH DIABETIC NEUROPATHY, WITH LONG-TERM CURRENT USE OF INSULIN (HCC): ICD-10-CM

## 2021-06-21 DIAGNOSIS — N18.4 CKD (CHRONIC KIDNEY DISEASE), STAGE IV (HCC): ICD-10-CM

## 2021-06-21 DIAGNOSIS — F03.90 DEMENTIA WITHOUT BEHAVIORAL DISTURBANCE, UNSPECIFIED DEMENTIA TYPE (HCC): Primary | ICD-10-CM

## 2021-06-21 DIAGNOSIS — E11.40 TYPE 2 DIABETES MELLITUS WITH DIABETIC NEUROPATHY, WITH LONG-TERM CURRENT USE OF INSULIN (HCC): ICD-10-CM

## 2021-06-21 DIAGNOSIS — E78.5 HYPERLIPIDEMIA, UNSPECIFIED HYPERLIPIDEMIA TYPE: ICD-10-CM

## 2021-06-21 DIAGNOSIS — I48.0 PAROXYSMAL ATRIAL FIBRILLATION (HCC): ICD-10-CM

## 2021-06-21 PROCEDURE — 99214 OFFICE O/P EST MOD 30 MIN: CPT | Performed by: INTERNAL MEDICINE

## 2021-06-21 NOTE — TELEPHONE ENCOUNTER
PC to patient reminding him to go for INR  Patient is still taking warfarin  He will go in a few days

## 2021-06-21 NOTE — PROGRESS NOTES
Assessment/Plan: This is 51-year-old gentleman is here for follow-up of dementia  Also his wife needs paperwork to be filled out because [place] dementia is getting worse  He denies any chest pain or shortness of breath  No fever or chills or any nausea vomiting  [place] test for mental capacity was done and he scored 19/30  He is unable to manage his own affairs and letter was provided by which his wife will manage his financial affairs  The patient understands and is okay with that  1  Dementia without behavioral disturbance, unspecified dementia type ([facility] )  Comments:   progressive  His dementia mood and behavior may get worse over the next 12 months    2  Type 2 diabetes mellitus with diabetic neuropathy, with long-term current use of insulin ([facility] )    3  Hyperlipidemia, unspecified hyperlipidemia type    4  Paroxysmal atrial fibrillation (HCC)  Comments:   continue chronic anticoagulation  5  CKD (chronic kidney disease), stage IV ([facility] )           There are no diagnoses linked to this encounter  Subjective:      Patient ID: Saritha Macias is a 80 y o  male  This is 51-year-old gentleman is here for follow-up of dementia  Also his wife needs paperwork to be filled out because [place] dementia is getting worse  He denies any chest pain or shortness of breath  No fever or chills or any nausea vomiting  [place] test for mental capacity was done and he scored 19/30  He is unable to manage his own affairs and letter was provided by which his wife will manage his financial affairs  The patient understands and is okay with that        The following portions of the patient's history were reviewed and updated as appropriate: He  has a past medical history of Allergic rhinitis, Anxiety, Aspiration of liquid, Asthma, Atrial fibrillation ([facility] ), CAD (coronary artery disease), Cancer of kidney ([facility] ), COPD (chronic obstructive pulmonary disease) ([facility] ), CPAP (continuous positive airway pressure) dependence, Dementia ([facility] ), Dementia ([facility] ), Diabetes mellitus ([facility] ), Diabetes mellitus, type 2 ([facility] ), DJD (degenerative joint disease), Hypercholesterolemia, Hyperlipidemia, Hypertension, Incisional hernia, Kidney disease, Melanoma ([facility] ), Obesity, Sleep apnea, and TIA (transient ischemic attack)  He   Patient Active Problem List    Diagnosis Date Noted    Dementia [facility])     Recurrent falls 09/30/2020    Long term current use of antiarrhythmic medical therapy 08/17/2020    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 08/13/2020    Coagulation disorder ([facility] ) 08/13/2020    Head injury 03/09/2020    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, or unspecified chronic kidney disease 05/06/2019    Coronary artery disease involving native coronary artery of native heart without angina pectoris 03/12/2019    Type 2 diabetes mellitus with kidney complication, with long-term current use of insulin ([facility] ) 02/04/2019    Paroxysmal atrial fibrillation ([facility] ) 12/12/2018    Mixed hyperlipidemia 12/12/2018    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[facility] ) 04/16/2018    CKD (chronic kidney disease), stage IV ([facility] ) 03/30/2018    Essential hypertension 03/30/2018    Solitary kidney 03/30/2018    Benign prostate hyperplasia 10/27/2014    Elevated prostate specific antigen (PSA) 06/18/2013     He  has a past surgical history that includes Cardiac pacemaker placement; Nephrectomy (Left, 2005); pr esophagogastroduodenoscopy submucosal injection (N/A, 9/21/2016); Tonsillectomy; Cholecystectomy; pr esophagogastroduodenoscopy submucosal injection (N/A, 2/7/2018); Colonoscopy; Rotator cuff repair; pr esophagogastroduodenoscopy submucosal injection (N/A, 4/3/2019); and Hernia repair  His family history includes Brain cancer in his father; Cancer in his family; Colon cancer in his son; Diabetes in his family; Heart disease in his family;  Hypertension in his family; Lung cancer in his father; Stroke in his family  He  reports that he has never smoked  He has never used smokeless tobacco  He reports that he does not drink alcohol and does not use drugs  Current Outpatient Medications   Medication Sig Dispense Refill    amLODIPine (NORVASC) 2 5 mg tablet Take 1 tablet (2 5 mg total) by mouth daily Taking prn for SBP >130 30 tablet 6    atorvastatin (LIPITOR) 10 mg tablet TAKE ONE TABLET BY MOUTH DAILY 90 tablet 0    BD Insulin Syringe U/F 30G X 1/2" 0 5 ML MISC USE AS DIRECTED WITH INSULIN 200 each 0    cholecalciferol (VITAMIN D3) 1,000 units tablet Take 2,000 Units by mouth daily      cyanocobalamin (VITAMIN B-12) 500 mcg tablet Take 500 mcg by mouth daily      docusate sodium (COLACE) 100 mg capsule Take 100 mg by mouth as needed       donepezil (ARICEPT) 10 mg tablet Take 1 tablet (10 mg total) by mouth daily at bedtime 90 tablet 3    fenofibrate (TRICOR) 145 mg tablet TAKE ONE TABLET BY MOUTH EVERY DAY 90 tablet 0    ferrous sulfate 325 (65 Fe) mg tablet Take 325 mg by mouth as needed       fexofenadine (ALLEGRA) 180 MG tablet Take 180 mg by mouth daily      fluticasone (FLONASE) 50 mcg/act nasal spray 1 spray into each nostril daily      glucose blood (OneTouch Verio) test strip USE TO TEST 3 TIMES A  each 3    insulin aspart (NovoLOG) 100 units/mL injection Inject 8 Units under the skin 2 (two) times a day before meals       insulin glargine (Lantus) 100 units/mL subcutaneous injection Inject 30 units twice a day 30 mL 3    ipratropium (ATROVENT HFA) 17 mcg/act inhaler Inhale 2 puffs every 6 (six) hours   levalbuterol (XOPENEX) 0 63 mg/3 mL nebulizer solution Take 1 ampule by nebulization 2 (two) times a day as needed for wheezing        losartan (COZAAR) 25 mg tablet TAKE 1 TABLET BY MOUTH DAILY 90 tablet 4    Magnesium 250 MG TABS Take 500 mg by mouth daily       Memantine HCl ER 28 MG CP24 TAKE ONE CAPSULE BY MOUTH EVERY DAY 90 capsule 3    metoprolol tartrate (LOPRESSOR) 100 mg tablet Take 1 tablet (100 mg total) by mouth daily at bedtime 90 tablet 2    metoprolol tartrate (LOPRESSOR) 50 mg tablet Take 50 mg by mouth daily in the early morning      NovoFine 32G X 6 MM MISC USE TO INJECT 8 UNITS AT LUNCH AND 8 UNITS AT BEDTIME (TWICE DAILY) 200 each 0    NOVOLOG FLEXPEN 100 units/mL injection pen 8 Units 2 (two) times a day with meals       ONETOUCH DELICA LANCETS 52M MISC       Probiotic Product (PROBIOTIC COLON SUPPORT PO) Take 1 tablet by mouth daily      propafenone (RYTHMOL) 225 mg tablet Take 1 tablet (225 mg total) by mouth 3 (three) times a day 270 tablet 3    sertraline (ZOLOFT) 100 mg tablet Take 1 tablet (100 mg total) by mouth daily 90 tablet 3    warfarin (COUMADIN) 2 5 mg tablet Take 2 5 mg by mouth 3 (three) times a week Indications: Mon wed Fri  [place] [place] warfarin (COUMADIN) 5 mg tablet Take 1 tablet (5 mg total) by mouth daily 90 tablet 0    sodium chloride (OCEAN) 0 65 % nasal spray 1 spray into each nostril       No current facility-administered medications for this visit       Current Outpatient Medications on File Prior to Visit   Medication Sig    amLODIPine (NORVASC) 2 5 mg tablet Take 1 tablet (2 5 mg total) by mouth daily Taking prn for SBP >130    atorvastatin (LIPITOR) 10 mg tablet TAKE ONE TABLET BY MOUTH DAILY    BD Insulin Syringe U/F 30G X 1/2" 0 5 ML MISC USE AS DIRECTED WITH INSULIN    cholecalciferol (VITAMIN D3) 1,000 units tablet Take 2,000 Units by mouth daily    cyanocobalamin (VITAMIN B-12) 500 mcg tablet Take 500 mcg by mouth daily    docusate sodium (COLACE) 100 mg capsule Take 100 mg by mouth as needed     donepezil (ARICEPT) 10 mg tablet Take 1 tablet (10 mg total) by mouth daily at bedtime    fenofibrate (TRICOR) 145 mg tablet TAKE ONE TABLET BY MOUTH EVERY DAY    ferrous sulfate 325 (65 Fe) mg tablet Take 325 mg by mouth as needed     fexofenadine (ALLEGRA) 180 MG tablet Take 180 mg by mouth daily    fluticasone (FLONASE) 50 mcg/act nasal spray 1 spray into each nostril daily    glucose blood (OneTouch Verio) test strip USE TO TEST 3 TIMES A DAY    insulin aspart (NovoLOG) 100 units/mL injection Inject 8 Units under the skin 2 (two) times a day before meals     insulin glargine (Lantus) 100 units/mL subcutaneous injection Inject 30 units twice a day    ipratropium (ATROVENT HFA) 17 mcg/act inhaler Inhale 2 puffs every 6 (six) hours   levalbuterol (XOPENEX) 0 63 mg/3 mL nebulizer solution Take 1 ampule by nebulization 2 (two) times a day as needed for wheezing   losartan (COZAAR) 25 mg tablet TAKE 1 TABLET BY MOUTH DAILY    Magnesium 250 MG TABS Take 500 mg by mouth daily     Memantine HCl ER 28 MG CP24 TAKE ONE CAPSULE BY MOUTH EVERY DAY    metoprolol tartrate (LOPRESSOR) 100 mg tablet Take 1 tablet (100 mg total) by mouth daily at bedtime    metoprolol tartrate (LOPRESSOR) 50 mg tablet Take 50 mg by mouth daily in the early morning    NovoFine 32G X 6 MM MISC USE TO INJECT 8 UNITS AT LUNCH AND 8 UNITS AT BEDTIME (TWICE DAILY)    NOVOLOG FLEXPEN 100 units/mL injection pen 8 Units 2 (two) times a day with meals     ONETOUCH DELICA LANCETS 14B MISC     Probiotic Product (PROBIOTIC COLON SUPPORT PO) Take 1 tablet by mouth daily    propafenone (RYTHMOL) 225 mg tablet Take 1 tablet (225 mg total) by mouth 3 (three) times a day    sertraline (ZOLOFT) 100 mg tablet Take 1 tablet (100 mg total) by mouth daily    warfarin (COUMADIN) 2 5 mg tablet Take 2 5 mg by mouth 3 (three) times a week Indications: Mon wed Fri  [facility] warfarin (COUMADIN) 5 mg tablet Take 1 tablet (5 mg total) by mouth daily    sodium chloride (OCEAN) 0 65 % nasal spray 1 spray into each nostril     No current facility-administered medications on file prior to visit  He is allergic to Francis Schein tartrate], bextra [valdecoxib], dust mite extract, lyrica [pregabalin], mold extract [trichophyton], pollen extract, and tree extract       Review of Systems   Constitutional: Negative for appetite change, chills, fatigue and fever  HENT: Negative for sore throat and trouble swallowing  Eyes: Negative for redness  Respiratory: Negative for shortness of breath  Cardiovascular: Negative for chest pain and palpitations  Gastrointestinal: Negative for abdominal pain, constipation and diarrhea  Genitourinary: Negative for dysuria and hematuria  Musculoskeletal: Negative for back pain and neck pain  Skin: Negative for rash  Neurological: Negative for seizures, weakness and headaches  Hematological: Negative for adenopathy  Psychiatric/Behavioral: Negative for confusion  The patient is not nervous/anxious            Objective:      /76 (BP Location: Left arm, Patient Position: Sitting, Cuff Size: Standard)   Pulse 75   Temp 97 8 °F (36 6 °C) (Temporal)   Ht 5' 10" (1 778 m)   Wt 94 3 kg (208 lb)   SpO2 96%   BMI 29 84 kg/m²     Recent Results (from the past 1344 hour(s))   Protime-INR    Collection Time: 05/15/21 10:40 AM   Result Value Ref Range    Protime 20 7 (H) 11 6 - 14 5 seconds    INR 1 78 (H) 0 84 - 1 19   CBC    Collection Time: 05/15/21 10:40 AM   Result Value Ref Range    WBC 7 97 4 31 - 10 16 Thousand/uL    RBC 4 90 3 88 - 5 62 Million/uL    Hemoglobin 14 3 12 0 - 17 0 g/dL    Hematocrit 43 2 36 5 - 49 3 %    MCV 88 82 - 98 fL    MCH 29 2 26 8 - 34 3 pg    MCHC 33 1 31 4 - 37 4 g/dL    RDW 12 9 11 6 - 15 1 %    Platelets 456 030 - 300 Thousands/uL    MPV 10 4 8 9 - 12 7 fL   Renal function panel    Collection Time: 05/15/21 10:40 AM   Result Value Ref Range    Albumin 3 7 3 5 - 5 0 g/dL    Calcium 9 6 8 3 - 10 1 mg/dL    Phosphorus 3 1 2 3 - 4 1 mg/dL    BUN 28 (H) 5 - 25 mg/dL    Creatinine 2 22 (H) 0 60 - 1 30 mg/dL    Sodium 140 136 - 145 mmol/L    Potassium 4 5 3 5 - 5 3 mmol/L    Chloride 107 100 - 108 mmol/L    CO2 29 21 - 32 mmol/L    ANION GAP 4 4 - 13 mmol/L    eGFR 26 ml/min/1 73sq m    Glucose, Fasting 89 65 - 99 mg/dL   PTH, intact    Collection Time: 05/15/21 10:40 AM   Result Value Ref Range    PTH 22 8 18 4 - 80 1 pg/mL   Microalbumin / creatinine urine ratio    Collection Time: 06/06/21 10:39 AM   Result Value Ref Range    Creatinine, Ur 143 0 mg/dL    Microalbum  ,U,Random 332 0 (H) 0 0 - 20 0 mg/L    Microalb Creat Ratio 232 (H) 0 - 30 mg/g creatinine   Hemoglobin A1C    Collection Time: 06/06/21 10:39 AM   Result Value Ref Range    Hemoglobin A1C 8 3 (H) Normal 3 8-5 6%; PreDiabetic 5 7-6 4%; Diabetic >=6 5%; Glycemic control for adults with diabetes <7 0% %     mg/dl   Comprehensive metabolic panel    Collection Time: 06/06/21 10:39 AM   Result Value Ref Range    Sodium 139 136 - 145 mmol/L    Potassium 4 2 3 5 - 5 3 mmol/L    Chloride 106 100 - 108 mmol/L    CO2 30 21 - 32 mmol/L    ANION GAP 3 (L) 4 - 13 mmol/L    BUN 23 5 - 25 mg/dL    Creatinine 2 15 (H) 0 60 - 1 30 mg/dL    Glucose, Fasting 192 (H) 65 - 99 mg/dL    Calcium 9 6 8 3 - 10 1 mg/dL    AST 12 5 - 45 U/L    ALT 25 12 - 78 U/L    Alkaline Phosphatase 58 46 - 116 U/L    Total Protein 8 3 (H) 6 4 - 8 2 g/dL    Albumin 3 6 3 5 - 5 0 g/dL    Total Bilirubin 0 63 0 20 - 1 00 mg/dL    eGFR 27 ml/min/1 73sq m        Physical Exam  Constitutional:       General: He is not in acute distress  Appearance: Normal appearance  HENT:      Head: Normocephalic and atraumatic  Nose: Nose normal       Mouth/Throat:      Mouth: Mucous membranes are moist    Eyes:      Extraocular Movements: Extraocular movements intact  Pupils: Pupils are equal, round, and reactive to light  Cardiovascular:      Rate and Rhythm: Normal rate  Rhythm irregular  Pulses: Normal pulses  Heart sounds: Normal heart sounds  No murmur heard  No friction rub  Pulmonary:      Effort: Pulmonary effort is normal  No respiratory distress  Breath sounds: Normal breath sounds  No wheezing  Abdominal:      General: Abdomen is flat   Bowel sounds are normal  There is no distension  Palpations: Abdomen is soft  There is no mass  Tenderness: There is no abdominal tenderness  There is no guarding  Musculoskeletal:         General: Normal range of motion  Cervical back: Normal range of motion and neck supple  Neurological:      General: No focal deficit present  Mental Status: He is alert and oriented to person, place, and time  Mental status is at baseline  Cranial Nerves: No cranial nerve deficit        Gait: Gait abnormal    Psychiatric:         Mood and Affect: Mood normal          Behavior: Behavior normal

## 2021-06-22 DIAGNOSIS — Z79.4 TYPE 2 DIABETES MELLITUS WITH DIABETIC NEUROPATHY, WITH LONG-TERM CURRENT USE OF INSULIN (HCC): ICD-10-CM

## 2021-06-22 DIAGNOSIS — I48.91 ATRIAL FIBRILLATION, UNSPECIFIED TYPE (HCC): ICD-10-CM

## 2021-06-22 DIAGNOSIS — E11.40 TYPE 2 DIABETES MELLITUS WITH DIABETIC NEUROPATHY, WITH LONG-TERM CURRENT USE OF INSULIN (HCC): ICD-10-CM

## 2021-06-22 DIAGNOSIS — I48.0 PAROXYSMAL ATRIAL FIBRILLATION (HCC): ICD-10-CM

## 2021-06-22 RX ORDER — PROPAFENONE HYDROCHLORIDE 225 MG/1
225 TABLET, FILM COATED ORAL 3 TIMES DAILY
Qty: 270 TABLET | Refills: 3 | Status: SHIPPED | OUTPATIENT
Start: 2021-06-22 | End: 2022-07-05

## 2021-06-22 RX ORDER — INSULIN GLARGINE 100 [IU]/ML
INJECTION, SOLUTION SUBCUTANEOUS
Qty: 30 ML | Refills: 3 | Status: SHIPPED | OUTPATIENT
Start: 2021-06-22 | End: 2022-04-13 | Stop reason: HOSPADM

## 2021-06-22 RX ORDER — INSULIN ASPART 100 [IU]/ML
8 INJECTION, SOLUTION INTRAVENOUS; SUBCUTANEOUS 2 TIMES DAILY WITH MEALS
Qty: 15 ML | Refills: 3 | Status: SHIPPED | OUTPATIENT
Start: 2021-06-22 | End: 2022-04-13 | Stop reason: HOSPADM

## 2021-06-23 RX ORDER — METOPROLOL TARTRATE 100 MG/1
TABLET ORAL
Qty: 90 TABLET | Refills: 0 | Status: SHIPPED | OUTPATIENT
Start: 2021-06-23 | End: 2022-01-06

## 2021-06-24 ENCOUNTER — TELEPHONE (OUTPATIENT)
Dept: SLEEP CENTER | Facility: CLINIC | Age: 86
End: 2021-06-24

## 2021-06-24 NOTE — TELEPHONE ENCOUNTER
Patients wife asking for new RX patient uses N20 mask     RX will be sent to [facility]

## 2021-07-14 DIAGNOSIS — E78.5 HYPERLIPIDEMIA, UNSPECIFIED HYPERLIPIDEMIA TYPE: ICD-10-CM

## 2021-07-15 RX ORDER — ATORVASTATIN CALCIUM 10 MG/1
TABLET, FILM COATED ORAL
Qty: 90 TABLET | Refills: 0 | Status: SHIPPED | OUTPATIENT
Start: 2021-07-15 | End: 2021-07-18

## 2021-07-18 DIAGNOSIS — E78.5 HYPERLIPIDEMIA, UNSPECIFIED HYPERLIPIDEMIA TYPE: ICD-10-CM

## 2021-07-18 RX ORDER — ATORVASTATIN CALCIUM 10 MG/1
TABLET, FILM COATED ORAL
Qty: 90 TABLET | Refills: 0 | Status: SHIPPED | OUTPATIENT
Start: 2021-07-18 | End: 2022-01-10

## 2021-07-30 ENCOUNTER — TELEPHONE (OUTPATIENT)
Dept: CARDIOLOGY CLINIC | Facility: CLINIC | Age: 86
End: 2021-07-30

## 2021-07-30 ENCOUNTER — APPOINTMENT (OUTPATIENT)
Dept: LAB | Age: 86
End: 2021-07-30
Payer: COMMERCIAL

## 2021-07-30 DIAGNOSIS — I48.0 PAROXYSMAL ATRIAL FIBRILLATION (HCC): Primary | ICD-10-CM

## 2021-07-30 DIAGNOSIS — I48.0 PAROXYSMAL ATRIAL FIBRILLATION (HCC): ICD-10-CM

## 2021-07-30 LAB
INR PPP: 2.36 (ref 0.84–1.19)
PROTHROMBIN TIME: 25.7 SECONDS (ref 11.6–14.5)

## 2021-07-30 PROCEDURE — 85610 PROTHROMBIN TIME: CPT

## 2021-07-30 PROCEDURE — 36415 COLL VENOUS BLD VENIPUNCTURE: CPT

## 2021-07-30 NOTE — TELEPHONE ENCOUNTER
Tc to patient's wife, reminded patient is overdue for pt/inr testing  Will enter a new standing order for pt/inr testing  Patient will go for inr today

## 2021-08-02 ENCOUNTER — OFFICE VISIT (OUTPATIENT)
Dept: CARDIOLOGY CLINIC | Facility: CLINIC | Age: 86
End: 2021-08-02
Payer: COMMERCIAL

## 2021-08-02 ENCOUNTER — ANTICOAG VISIT (OUTPATIENT)
Dept: CARDIOLOGY CLINIC | Facility: CLINIC | Age: 86
End: 2021-08-02

## 2021-08-02 VITALS
SYSTOLIC BLOOD PRESSURE: 118 MMHG | BODY MASS INDEX: 15.6 KG/M2 | HEIGHT: 70 IN | DIASTOLIC BLOOD PRESSURE: 68 MMHG | OXYGEN SATURATION: 96 % | WEIGHT: 109 LBS | HEART RATE: 66 BPM

## 2021-08-02 DIAGNOSIS — I48.0 PAROXYSMAL ATRIAL FIBRILLATION (HCC): Primary | ICD-10-CM

## 2021-08-02 DIAGNOSIS — I10 ESSENTIAL HYPERTENSION: Primary | ICD-10-CM

## 2021-08-02 DIAGNOSIS — I25.10 CORONARY ARTERY DISEASE INVOLVING NATIVE CORONARY ARTERY OF NATIVE HEART WITHOUT ANGINA PECTORIS: ICD-10-CM

## 2021-08-02 DIAGNOSIS — E78.2 MIXED HYPERLIPIDEMIA: ICD-10-CM

## 2021-08-02 DIAGNOSIS — I48.0 PAROXYSMAL ATRIAL FIBRILLATION (HCC): ICD-10-CM

## 2021-08-02 PROCEDURE — 99214 OFFICE O/P EST MOD 30 MIN: CPT | Performed by: INTERNAL MEDICINE

## 2021-08-02 NOTE — LETTER
2021     Nai Holder  [address] [place] [address]    Patient: Geronimo Velarde   YOB: 1935   Date of Visit: 2021       Dear Dr Muñiz Driftwood:    Thank you for referring Lisa Munoz to me for evaluation  Below are my notes for this consultation  If you have questions, please do not hesitate to call me  I look forward to following your patient along with you  Sincerely,        Radha Sarabia MD        CC: No Recipients  Radha Sarabia MD  2021  9:16 AM  Sign when Signing Visit  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  The patient is asymptomatic  He is in regular rhythm  Essential hypertension    Hypertension, stable and adequately controlled  Diagnoses and all orders for this visit:    Essential hypertension    Paroxysmal atrial fibrillation ([address] )    Coronary artery disease involving native coronary artery of native heart without angina pectoris    Mixed hyperlipidemia          Subjective:   Feels well  Patient ID: Geronimo Velarde is a 80 y o  male  HPI    The following portions of the patient's history were reviewed and updated as appropriate: allergies, current medications, past family history, past medical history, past social history, past surgical history and problem list     Review of Systems   Respiratory: Negative for apnea, cough, chest tightness, shortness of breath and wheezing  Cardiovascular: Negative for chest pain, palpitations and leg swelling  Gastrointestinal: Negative for abdominal pain     Neurological: Negative for dizziness and light-headedness  Psychiatric/Behavioral: Negative  Objective:  Stable cardiac-wise  /68 (BP Location: Left arm, Patient Position: Sitting, Cuff Size: Large)   Pulse 66   Ht 5' 10" (1 778 m)   Wt 49 4 kg (109 lb)   SpO2 96%   BMI 15 64 kg/m²          Physical Exam  Vitals reviewed  Constitutional:       General: He is not in acute distress  Appearance: He is well-developed  He is not diaphoretic  HENT:      Head: Normocephalic  Eyes:      Pupils: Pupils are equal, round, and reactive to light  Neck:      Thyroid: No thyromegaly  Vascular: No JVD  Cardiovascular:      Rate and Rhythm: Normal rate and regular rhythm  Heart sounds: S1 normal and S2 normal  Murmur heard  [place] systolic murmur is present with a grade of 1/6  No friction rub  No gallop  Pulmonary:      Effort: Pulmonary effort is normal  No respiratory distress  Breath sounds: Normal breath sounds  No wheezing or rales  Chest:      Chest wall: No tenderness  Abdominal:      Palpations: Abdomen is soft  Musculoskeletal:         General: No tenderness or deformity  Normal range of motion  Cervical back: Normal range of motion  Skin:     General: Skin is warm and dry  Neurological:      Mental Status: He is alert and oriented to person, place, and time     Psychiatric:         Mood and Affect: Mood normal          Behavior: Behavior normal

## 2021-08-02 NOTE — PROGRESS NOTES
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  The patient is asymptomatic  He is in regular rhythm  Essential hypertension    Hypertension, stable and adequately controlled  Diagnoses and all orders for this visit:    Essential hypertension    Paroxysmal atrial fibrillation ([address] )    Coronary artery disease involving native coronary artery of native heart without angina pectoris    Mixed hyperlipidemia          Subjective:   Feels well  Patient ID: Chantal Abraham is a 80 y o  male  HPI    The following portions of the patient's history were reviewed and updated as appropriate: allergies, current medications, past family history, past medical history, past social history, past surgical history and problem list     Review of Systems   Respiratory: Negative for apnea, cough, chest tightness, shortness of breath and wheezing  Cardiovascular: Negative for chest pain, palpitations and leg swelling  Gastrointestinal: Negative for abdominal pain  Neurological: Negative for dizziness and light-headedness  Psychiatric/Behavioral: Negative  Objective:  Stable cardiac-wise  /68 (BP Location: Left arm, Patient Position: Sitting, Cuff Size: Large)   Pulse 66   Ht 5' 10" (1 778 m)   Wt 49 4 kg (109 lb)   SpO2 96%   BMI 15 64 kg/m²          Physical Exam  Vitals reviewed  Constitutional:       General: He is not in acute distress  Appearance: He is well-developed  He is not diaphoretic  HENT:      Head: Normocephalic  Eyes:      Pupils: Pupils are equal, round, and reactive to light     Neck: Thyroid: No thyromegaly  Vascular: No JVD  Cardiovascular:      Rate and Rhythm: Normal rate and regular rhythm  Heart sounds: S1 normal and S2 normal  Murmur heard  [place] systolic murmur is present with a grade of 1/6  No friction rub  No gallop  Pulmonary:      Effort: Pulmonary effort is normal  No respiratory distress  Breath sounds: Normal breath sounds  No wheezing or rales  Chest:      Chest wall: No tenderness  Abdominal:      Palpations: Abdomen is soft  Musculoskeletal:         General: No tenderness or deformity  Normal range of motion  Cervical back: Normal range of motion  Skin:     General: Skin is warm and dry  Neurological:      Mental Status: He is alert and oriented to person, place, and time     Psychiatric:         Mood and Affect: Mood normal          Behavior: Behavior normal

## 2021-08-02 NOTE — ASSESSMENT & PLAN NOTE
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

## 2021-08-05 ENCOUNTER — TELEPHONE (OUTPATIENT)
Dept: NEPHROLOGY | Facility: CLINIC | Age: 86
End: 2021-08-05

## 2021-08-05 NOTE — TELEPHONE ENCOUNTER
Spoke with patients wife, and reminded her of her upcoming appointment on 8/12 and that labs are needed for this appointment  She had stated that she will be getting them drawn on Saturday

## 2021-08-09 ENCOUNTER — HOSPITAL ENCOUNTER (OUTPATIENT)
Dept: RADIOLOGY | Age: 86
Discharge: HOME | End: 2021-08-09
Payer: COMMERCIAL

## 2021-08-09 ENCOUNTER — ANTICOAG VISIT (OUTPATIENT)
Dept: CARDIOLOGY CLINIC | Facility: CLINIC | Age: 86
End: 2021-08-09

## 2021-08-09 ENCOUNTER — APPOINTMENT (OUTPATIENT)
Dept: LAB | Age: 86
End: 2021-08-09
Payer: COMMERCIAL

## 2021-08-09 DIAGNOSIS — I48.0 PAROXYSMAL ATRIAL FIBRILLATION (HCC): Primary | ICD-10-CM

## 2021-08-09 DIAGNOSIS — I12.9 HYPERTENSIVE CHRONIC KIDNEY DISEASE WITH STAGE 1 THROUGH STAGE 4 CHRONIC KIDNEY DISEASE, OR UNSPECIFIED CHRONIC KIDNEY DISEASE: ICD-10-CM

## 2021-08-09 DIAGNOSIS — Z79.4 TYPE 2 DIABETES MELLITUS WITH STAGE 4 CHRONIC KIDNEY DISEASE, WITH LONG-TERM CURRENT USE OF INSULIN (HCC): ICD-10-CM

## 2021-08-09 DIAGNOSIS — N28.1 CYST OF KIDNEY, ACQUIRED: ICD-10-CM

## 2021-08-09 DIAGNOSIS — N18.4 TYPE 2 DIABETES MELLITUS WITH STAGE 4 CHRONIC KIDNEY DISEASE, WITH LONG-TERM CURRENT USE OF INSULIN (HCC): ICD-10-CM

## 2021-08-09 DIAGNOSIS — IMO0002 SOLITARY KIDNEY: ICD-10-CM

## 2021-08-09 DIAGNOSIS — E11.22 TYPE 2 DIABETES MELLITUS WITH STAGE 4 CHRONIC KIDNEY DISEASE, WITH LONG-TERM CURRENT USE OF INSULIN (HCC): ICD-10-CM

## 2021-08-09 DIAGNOSIS — I48.0 PAROXYSMAL ATRIAL FIBRILLATION (HCC): ICD-10-CM

## 2021-08-09 DIAGNOSIS — N18.4 CKD (CHRONIC KIDNEY DISEASE), STAGE IV (HCC): ICD-10-CM

## 2021-08-09 LAB
ALBUMIN SERPL BCP-MCNC: 3.3 G/DL (ref 3.5–5)
ANION GAP SERPL CALCULATED.3IONS-SCNC: 8 MMOL/L (ref 4–13)
BUN SERPL-MCNC: 29 MG/DL (ref 5–25)
CALCIUM ALBUM COR SERPL-MCNC: 9.8 MG/DL (ref 8.3–10.1)
CALCIUM SERPL-MCNC: 9.2 MG/DL (ref 8.3–10.1)
CHLORIDE SERPL-SCNC: 101 MMOL/L (ref 100–108)
CO2 SERPL-SCNC: 27 MMOL/L (ref 21–32)
CREAT SERPL-MCNC: 2.03 MG/DL (ref 0.6–1.3)
CREAT UR-MCNC: 78 MG/DL
ERYTHROCYTE [DISTWIDTH] IN BLOOD BY AUTOMATED COUNT: 13 % (ref 11.6–15.1)
GFR SERPL CREATININE-BSD FRML MDRD: 29 ML/MIN/1.73SQ M
GLUCOSE P FAST SERPL-MCNC: 219 MG/DL (ref 65–99)
HCT VFR BLD AUTO: 41.6 % (ref 36.5–49.3)
HGB BLD-MCNC: 13.7 G/DL (ref 12–17)
INR PPP: 2.06 (ref 0.84–1.19)
MCH RBC QN AUTO: 28.9 PG (ref 26.8–34.3)
MCHC RBC AUTO-ENTMCNC: 32.9 G/DL (ref 31.4–37.4)
MCV RBC AUTO: 88 FL (ref 82–98)
PHOSPHATE SERPL-MCNC: 3 MG/DL (ref 2.3–4.1)
PLATELET # BLD AUTO: 238 THOUSANDS/UL (ref 149–390)
PMV BLD AUTO: 10.4 FL (ref 8.9–12.7)
POTASSIUM SERPL-SCNC: 4.3 MMOL/L (ref 3.5–5.3)
PROT UR-MCNC: 54 MG/DL
PROT/CREAT UR: 0.69 MG/G{CREAT} (ref 0–0.1)
PROTHROMBIN TIME: 23.2 SECONDS (ref 11.6–14.5)
PTH-INTACT SERPL-MCNC: 31.7 PG/ML (ref 18.4–80.1)
RBC # BLD AUTO: 4.74 MILLION/UL (ref 3.88–5.62)
SODIUM SERPL-SCNC: 136 MMOL/L (ref 136–145)
URATE SERPL-MCNC: 4.8 MG/DL (ref 4.2–8)
WBC # BLD AUTO: 7.58 THOUSAND/UL (ref 4.31–10.16)

## 2021-08-09 PROCEDURE — 36415 COLL VENOUS BLD VENIPUNCTURE: CPT

## 2021-08-09 PROCEDURE — 83970 ASSAY OF PARATHORMONE: CPT

## 2021-08-09 PROCEDURE — 84550 ASSAY OF BLOOD/URIC ACID: CPT

## 2021-08-09 PROCEDURE — 84156 ASSAY OF PROTEIN URINE: CPT

## 2021-08-09 PROCEDURE — 85610 PROTHROMBIN TIME: CPT

## 2021-08-09 PROCEDURE — 76770 US EXAM ABDO BACK WALL COMP: CPT

## 2021-08-09 PROCEDURE — 80069 RENAL FUNCTION PANEL: CPT

## 2021-08-09 PROCEDURE — 85027 COMPLETE CBC AUTOMATED: CPT

## 2021-08-09 PROCEDURE — 82570 ASSAY OF URINE CREATININE: CPT

## 2021-08-12 ENCOUNTER — OFFICE VISIT (OUTPATIENT)
Dept: NEPHROLOGY | Facility: CLINIC | Age: 86
End: 2021-08-12
Payer: MEDICARE

## 2021-08-12 VITALS
DIASTOLIC BLOOD PRESSURE: 82 MMHG | RESPIRATION RATE: 16 BRPM | SYSTOLIC BLOOD PRESSURE: 158 MMHG | WEIGHT: 207 LBS | BODY MASS INDEX: 29.63 KG/M2 | HEART RATE: 76 BPM | HEIGHT: 70 IN

## 2021-08-12 DIAGNOSIS — E11.22 TYPE 2 DIABETES MELLITUS WITH STAGE 4 CHRONIC KIDNEY DISEASE, WITH LONG-TERM CURRENT USE OF INSULIN (HCC): Primary | ICD-10-CM

## 2021-08-12 DIAGNOSIS — IMO0002 SOLITARY KIDNEY: ICD-10-CM

## 2021-08-12 DIAGNOSIS — N18.4 TYPE 2 DIABETES MELLITUS WITH STAGE 4 CHRONIC KIDNEY DISEASE, WITH LONG-TERM CURRENT USE OF INSULIN (HCC): Primary | ICD-10-CM

## 2021-08-12 DIAGNOSIS — Z79.4 TYPE 2 DIABETES MELLITUS WITH STAGE 4 CHRONIC KIDNEY DISEASE, WITH LONG-TERM CURRENT USE OF INSULIN (HCC): Primary | ICD-10-CM

## 2021-08-12 DIAGNOSIS — I12.9 HYPERTENSIVE CHRONIC KIDNEY DISEASE WITH STAGE 1 THROUGH STAGE 4 CHRONIC KIDNEY DISEASE, OR UNSPECIFIED CHRONIC KIDNEY DISEASE: ICD-10-CM

## 2021-08-12 DIAGNOSIS — E78.2 MIXED HYPERLIPIDEMIA: ICD-10-CM

## 2021-08-12 PROCEDURE — 99214 OFFICE O/P EST MOD 30 MIN: CPT | Performed by: INTERNAL MEDICINE

## 2021-08-12 NOTE — PROGRESS NOTES
NEPHROLOGY OUTPATIENT PROGRESS NOTE   Renay Colon 80 y o  male MRN: [id number]  Reason for visit: Chronic kidney disease    ASSESSMENT and PLAN:  1  Chronic kidney disease,  stage IV,baseline creatinine low to mid 2s most recent creatinine 2 0 estimated GFR 29  2  Hypertension, blood pressure overall appears stable   3  Diabetes with associated nephropathy with recent hemoglobin A1c 8 3   4  History of renal cell carcinoma with nephrectomy, repeat renal ultrasound is pending  5  Renal cysts with history of renal cell carcinoma     overall renal function remains stable   No changes in current regimen  Blood pressure volume status appears stable   Awaiting repeat renal ultrasound   Follow-up in 6 months with repeat labs at that time      SUBJECTIVE / INTERVAL HISTORY:  He has been doing reasonably well  No reported hospitalizations or illnesses  No reports of chest pain shortness of breath  Denies abdominal pain  No reports of lower extremity swelling  On occasion he is incontinent  OBJECTIVE:  /82 (BP Location: Left arm, Patient Position: Sitting, Cuff Size: Large)   Pulse 76   Resp 16   Ht 5' 10" (1 778 m)   Wt 93 9 kg (207 lb)   BMI 29 70 kg/m²   Vitals:    08/12/21 1501   Weight: 93 9 kg (207 lb)       Physical Exam  Constitutional:       Appearance: He is obese  He is not ill-appearing  HENT:      Head: Normocephalic and atraumatic  Eyes:      General: No scleral icterus  Cardiovascular:      Rate and Rhythm: Normal rate and regular rhythm  Pulmonary:      Effort: Pulmonary effort is normal       Breath sounds: Normal breath sounds  Abdominal:      General: There is distension  Palpations: Abdomen is soft  Tenderness: There is no abdominal tenderness  Musculoskeletal:      Right lower leg: No edema  Left lower leg: No edema  Skin:     General: Skin is dry  Findings: No rash  Neurological:      Mental Status: He is alert  Mental status is at baseline  Medications:    Current Outpatient Medications:     amLODIPine (NORVASC) 2 5 mg tablet, Take 1 tablet (2 5 mg total) by mouth daily Taking prn for SBP >130, Disp: 30 tablet, Rfl: 6    atorvastatin (LIPITOR) 10 mg tablet, TAKE ONE TABLET BY MOUTH DAILY, Disp: 90 tablet, Rfl: 0    BD Insulin Syringe U/F 30G X 1/2" 0 5 ML MISC, USE AS DIRECTED WITH INSULIN, Disp: 200 each, Rfl: 0    cholecalciferol (VITAMIN D3) 1,000 units tablet, Take 2,000 Units by mouth daily, Disp: , Rfl:     cyanocobalamin (VITAMIN B-12) 500 mcg tablet, Take 500 mcg by mouth daily, Disp: , Rfl:     docusate sodium (COLACE) 100 mg capsule, Take 100 mg by mouth as needed , Disp: , Rfl:     donepezil (ARICEPT) 10 mg tablet, Take 1 tablet (10 mg total) by mouth daily at bedtime, Disp: 90 tablet, Rfl: 3    fenofibrate (TRICOR) 145 mg tablet, TAKE ONE TABLET BY MOUTH EVERY DAY, Disp: 90 tablet, Rfl: 0    ferrous sulfate 325 (65 Fe) mg tablet, Take 325 mg by mouth as needed , Disp: , Rfl:     fexofenadine (ALLEGRA) 180 MG tablet, Take 180 mg by mouth daily, Disp: , Rfl:     fluticasone (FLONASE) 50 mcg/act nasal spray, 1 spray into each nostril daily, Disp: , Rfl:     glucose blood (OneTouch Verio) test strip, USE TO TEST 3 TIMES A DAY, Disp: 300 each, Rfl: 3    insulin aspart (NovoLOG) 100 units/mL injection, Inject 8 Units under the skin 2 (two) times a day before meals , Disp: , Rfl:     insulin glargine (Lantus) 100 units/mL subcutaneous injection, Inject 30 units twice a day, Disp: 30 mL, Rfl: 3    ipratropium (ATROVENT HFA) 17 mcg/act inhaler, Inhale 2 puffs every 6 (six) hours  , Disp: , Rfl:     levalbuterol (XOPENEX) 0 63 mg/3 mL nebulizer solution, Take 1 ampule by nebulization 2 (two) times a day as needed for wheezing , Disp: , Rfl:     losartan (COZAAR) 25 mg tablet, TAKE 1 TABLET BY MOUTH DAILY, Disp: 90 tablet, Rfl: 4    Magnesium 250 MG TABS, Take 500 mg by mouth daily , Disp: , Rfl:     Memantine HCl ER 28 MG CP24, TAKE ONE CAPSULE BY MOUTH EVERY DAY, Disp: 90 capsule, Rfl: 3    metoprolol tartrate (LOPRESSOR) 100 mg tablet, TAKE 1 TABLET BY MOUTH AT BEDTIME, Disp: 90 tablet, Rfl: 0    metoprolol tartrate (LOPRESSOR) 50 mg tablet, Take 50 mg by mouth daily in the early morning, Disp: , Rfl:     NovoFine 32G X 6 MM MISC, USE TO INJECT 8 UNITS AT LUNCH AND 8 UNITS AT BEDTIME (TWICE DAILY), Disp: 200 each, Rfl: 0    NovoLOG FlexPen 100 units/mL injection pen, Inject 8 Units under the skin 2 (two) times a day with meals, Disp: 15 mL, Rfl: 3    ONETOUCH DELICA LANCETS 11J MISC, , Disp: , Rfl:     Probiotic Product (PROBIOTIC COLON SUPPORT PO), Take 1 tablet by mouth daily, Disp: , Rfl:     propafenone (RYTHMOL) 225 mg tablet, Take 1 tablet (225 mg total) by mouth 3 (three) times a day, Disp: 270 tablet, Rfl: 3    sertraline (ZOLOFT) 100 mg tablet, Take 1 tablet (100 mg total) by mouth daily, Disp: 90 tablet, Rfl: 3    warfarin (COUMADIN) 2 5 mg tablet, Take 2 5 mg by mouth 3 (three) times a week Indications: Mon wed Fri   , Disp: , Rfl:     warfarin (COUMADIN) 5 mg tablet, Take 1 tablet (5 mg total) by mouth daily, Disp: 90 tablet, Rfl: 0    sodium chloride (OCEAN) 0 65 % nasal spray, 1 spray into each nostril, Disp: , Rfl:     Laboratory Results:  Results for orders placed or performed in visit on 08/09/21   CBC   Result Value Ref Range    WBC 7 58 4 31 - 10 16 Thousand/uL    RBC 4 74 3 88 - 5 62 Million/uL    Hemoglobin 13 7 12 0 - 17 0 g/dL    Hematocrit 41 6 36 5 - 49 3 %    MCV 88 82 - 98 fL    MCH 28 9 26 8 - 34 3 pg    MCHC 32 9 31 4 - 37 4 g/dL    RDW 13 0 11 6 - 15 1 %    Platelets 437 724 - 459 Thousands/uL    MPV 10 4 8 9 - 12 7 fL   Renal function panel   Result Value Ref Range    Albumin 3 3 (L) 3 5 - 5 0 g/dL    Calcium 9 2 8 3 - 10 1 mg/dL    Corrected Calcium 9 8 8 3 - 10 1 mg/dL    Phosphorus 3 0 2 3 - 4 1 mg/dL    BUN 29 (H) 5 - 25 mg/dL    Creatinine 2 03 (H) 0 60 - 1 30 mg/dL    Sodium 136 136 - 145 mmol/L    Potassium 4 3 3 5 - 5 3 mmol/L    Chloride 101 100 - 108 mmol/L    CO2 27 21 - 32 mmol/L    ANION GAP 8 4 - 13 mmol/L    eGFR 29 ml/min/1 73sq m    Glucose, Fasting 219 (H) 65 - 99 mg/dL   PTH, intact   Result Value Ref Range    PTH 31 7 18 4 - 80 1 pg/mL   Uric acid   Result Value Ref Range    Uric Acid 4 8 4 2 - 8 0 mg/dL   Protein / creatinine ratio, urine   Result Value Ref Range    Creatinine, Ur 78 0 mg/dL    Protein Urine Random 54 mg/dL    Prot/Creat Ratio, Ur 0 69 (H) 0 00 - 0 10   Protime-INR   Result Value Ref Range    Protime 23 2 (H) 11 6 - 14 5 seconds    INR 2 06 (H) 0 84 - 1 19

## 2021-08-12 NOTE — LETTER
August 12, 2021     Emile Vora, [address]  [address] [address]    Patient: Destiney Nino   YOB: 1935   Date of Visit: 8/12/2021       Dear Dr Patricia Henning:    Thank you for referring Mo Cox to me for evaluation  Below are the relevant portions of my assessment and plan of care  If you have questions, please do not hesitate to call me  I look forward to following Kevin Camarillo along with you  Sincerely,        Monica Bailey DO        CC: No Recipients                         ASSESSMENT and PLAN:  1  Chronic kidney disease,  stage IV,baseline creatinine low to mid 2s most recent creatinine 2 0 estimated GFR 29  2  Hypertension, blood pressure overall appears stable   3  Diabetes with associated nephropathy with recent hemoglobin A1c 8 3   4  History of renal cell carcinoma with nephrectomy, repeat renal ultrasound is pending  5   Renal cysts with history of renal cell carcinoma     overall renal function remains stable   No changes in current regimen  Blood pressure volume status appears stable   Awaiting repeat renal ultrasound   Follow-up in 6

## 2021-08-18 ENCOUNTER — OFFICE VISIT (OUTPATIENT)
Dept: NEUROLOGY | Facility: CLINIC | Age: 86
End: 2021-08-18
Payer: COMMERCIAL

## 2021-08-18 VITALS
TEMPERATURE: 98.4 F | WEIGHT: 209.9 LBS | SYSTOLIC BLOOD PRESSURE: 142 MMHG | BODY MASS INDEX: 30.05 KG/M2 | DIASTOLIC BLOOD PRESSURE: 82 MMHG | HEART RATE: 81 BPM | HEIGHT: 70 IN

## 2021-08-18 DIAGNOSIS — G31.09 FRONTOTEMPORAL DEMENTIA (HCC): Primary | ICD-10-CM

## 2021-08-18 DIAGNOSIS — F02.80 FRONTOTEMPORAL DEMENTIA (HCC): Primary | ICD-10-CM

## 2021-08-18 PROCEDURE — 99214 OFFICE O/P EST MOD 30 MIN: CPT | Performed by: PHYSICIAN ASSISTANT

## 2021-08-18 NOTE — ASSESSMENT & PLAN NOTE
Patient with dementia  Per the wife he continues to have some progression however on exam he remains overall stable with a [place] score of 20/30 in the office today  He is still able to perform his ADLs independently at home  He does spend some of his time alone in the home while his wife is working however they are not having any issues with this at this time  His grandkids to also live with them and are in and out of the house periodically throughout the day  At this point given his continued stability we will not make any changes to his memory medication  He will remain on both Aricept and Namenda  Patient was encouraged to increase mind stimulating activities such as reading, crosswords, word searches, puzzles, Soduku, solitaire, coloring and other brain games  We also discussed the importance of staying physically active and eating a health diet such as the Mediterranean or MIND diet

## 2021-08-18 NOTE — PROGRESS NOTES
Patient ID: Adela Cote is a 80 y o  male  Assessment/Plan:    Frontotemporal dementia  Patient with dementia  Per the wife he continues to have some progression however on exam he remains overall stable with a [place] score of 20/30 in the office today  He is still able to perform his ADLs independently at home  He does spend some of his time alone in the home while his wife is working however they are not having any issues with this at this time  His grandkids to also live with them and are in and out of the house periodically throughout the day  At this point given his continued stability we will not make any changes to his memory medication  He will remain on both Aricept and Namenda  Patient was encouraged to increase mind stimulating activities such as reading, crosswords, word searches, puzzles, Soduku, solitaire, coloring and other brain games  We also discussed the importance of staying physically active and eating a health diet such as the Mediterranean or MIND diet  Subjective:    Iris Gallagher is an 80year old right handed man with CKD, DM, MELQUIADES on BiPAP, afib on coumadin, s/p PPM who presents in follow up for frontotemporal dementia  To review, symptoms of memory loss began gradually around 2013  He was started on Aricept in 2009  Neuropsychological testing revealed changes suggestive of possible Alzheimer's disease versus frontotemporal dementia with the latter being more likely  FDG-PET scan actually showed normal uptake, mild atrophy  At his last visit he was having some increased falls due to dizziness and imbalance  He was sent for a CT scan which was stable  He scored a 19/30 on MoCA which was stable  No changes were made  INTERVAL HISTORY:  On 6/21/21 he saw his PCP and paperwork was filled out at the time stating that he unable to manage his financial affairs     He still reads the paper and does the crosswords   He sleeps a lot at night during the day   His wife still works and he is home alone  His wife will pack his lunch and get all of his medications together before she leaves   His grandkids live with them and they are in and out of the house  He does not wander   He goes out with his wife all of the time   He has a good appetite still  He is still able to perform all of his ADLs on his own  He will still vacuum  He no longer drives   He was having a home aid however he did not want them to keep coming   He wears a CPAP at night   He is still very personable         Current medications for memory and mood:   - Aricept 10mg daily (bad dreams when given at night)  - Namenda XR 28mg  - Zoloft 100mg daily      POA filed, wife with daughter in [place] as alternate      I personally reviewed and updated the ROS  Objective:    Blood pressure 142/82, pulse 81, temperature 98 4 °F (36 9 °C), temperature source Oral, height 5' 10" (1 778 m), weight 95 2 kg (209 lb 14 4 oz)  Physical Exam  Constitutional:       General: He is awake  HENT:      Right Ear: Hearing normal       Left Ear: Hearing normal    Eyes:      General: Lids are normal       Extraocular Movements: Extraocular movements intact  Pupils: Pupils are equal, round, and reactive to light  Neurological:      Mental Status: He is alert  Deep Tendon Reflexes: Strength normal          Neurological Exam  Mental Status  Awake and alert  Oriented only to person  Clock drawing is normal  Able to name objects  Able to perform serial calculations  Very pleasant and talkative   MoCA 20/30 - 8/18/21    MoCA 19/30 - 9/30/20   MoCA 19/30 - 4/17/19   MMSE 15/26 - 4/22/20     Cranial Nerves  CN III, IV, VI: Extraocular movements intact bilaterally  Normal lids and orbits bilaterally  Pupils equal round and reactive to light bilaterally  CN V:  Right: Facial sensation is normal   Left: Facial sensation is normal on the left  CN VII:  Right: There is no facial weakness    Left: There is no facial weakness  CN VIII:  Right: Hearing is normal   Left: Hearing is normal   CN XI: Shoulder shrug strength is normal     Motor   Strength is 5/5 throughout all four extremities  Sensory  Light touch is normal in upper and lower extremities  Coordination  Right: Finger-to-nose normal   Left: Finger-to-nose normal     Gait  Able to arise from chair without issues  Overall good stride  Slowed gait with some imbalance    ROS:    Review of Systems   Constitutional: Negative  Negative for appetite change and fever  HENT: Negative  Negative for hearing loss, tinnitus, trouble swallowing and voice change  Eyes: Negative  Negative for photophobia and pain  Respiratory: Negative  Negative for shortness of breath  Cardiovascular: Negative  Negative for palpitations  Gastrointestinal: Negative  Negative for nausea and vomiting  Endocrine: Negative  Negative for cold intolerance  Genitourinary: Negative  Negative for dysuria, frequency and urgency  Musculoskeletal: Positive for gait problem  Negative for myalgias and neck pain  Skin: Negative  Negative for rash  Neurological: Negative for dizziness, tremors, seizures, syncope, facial asymmetry, speech difficulty, weakness, light-headedness, numbness and headaches  Hematological: Negative  Does not bruise/bleed easily  Psychiatric/Behavioral: Positive for confusion  Negative for hallucinations and sleep disturbance

## 2021-08-23 NOTE — PROGRESS NOTES
Tc from wife, reports she noticed bruising where she has  given insulin injections,advised to have pt/inr drawn today

## 2021-08-30 ENCOUNTER — APPOINTMENT (OUTPATIENT)
Dept: LAB | Age: 86
End: 2021-08-30
Payer: COMMERCIAL

## 2021-08-30 DIAGNOSIS — I48.0 PAROXYSMAL ATRIAL FIBRILLATION (HCC): ICD-10-CM

## 2021-08-30 LAB
INR PPP: 1.33 (ref 0.84–1.19)
PROTHROMBIN TIME: 16.5 SECONDS (ref 11.6–14.5)

## 2021-08-30 PROCEDURE — 85610 PROTHROMBIN TIME: CPT

## 2021-08-30 PROCEDURE — 36415 COLL VENOUS BLD VENIPUNCTURE: CPT

## 2021-08-31 ENCOUNTER — ANTICOAG VISIT (OUTPATIENT)
Dept: CARDIOLOGY CLINIC | Facility: CLINIC | Age: 86
End: 2021-08-31

## 2021-08-31 DIAGNOSIS — I48.0 PAROXYSMAL ATRIAL FIBRILLATION (HCC): Primary | ICD-10-CM

## 2021-08-31 NOTE — PROGRESS NOTES
----- Message from Elder Reese MD sent at 10/24/2017 12:41 PM EDT -----  The polyp(s) biopsies showed adenomatous change. This is not cancerous but is considered potentially precancerous. Follow-up colonoscopy in 3 years is advised.        Tc to wife, glenna, cell phone, left message on cell phone, take 5 mg today in place of 2 5 mg , then resume usual dose, inr due 1 week  Wife to call to report if any changes in medication, missed doses or if warfarin is being held for any reason

## 2021-09-03 ENCOUNTER — TELEPHONE (OUTPATIENT)
Dept: CARDIOLOGY CLINIC | Facility: CLINIC | Age: 86
End: 2021-09-03

## 2021-09-04 ENCOUNTER — APPOINTMENT (OUTPATIENT)
Dept: LAB | Age: 86
End: 2021-09-04
Payer: COMMERCIAL

## 2021-09-04 DIAGNOSIS — Z00.8 HEALTH EXAMINATION IN POPULATION SURVEY: ICD-10-CM

## 2021-09-04 LAB
CHOLEST SERPL-MCNC: 149 MG/DL (ref 50–200)
EST. AVERAGE GLUCOSE BLD GHB EST-MCNC: 192 MG/DL
HBA1C MFR BLD: 8.3 %
HDLC SERPL-MCNC: 26 MG/DL
LDLC SERPL CALC-MCNC: 57 MG/DL (ref 0–100)
NONHDLC SERPL-MCNC: 123 MG/DL
TRIGL SERPL-MCNC: 328 MG/DL

## 2021-09-04 PROCEDURE — 80061 LIPID PANEL: CPT

## 2021-09-04 PROCEDURE — 36415 COLL VENOUS BLD VENIPUNCTURE: CPT

## 2021-09-04 PROCEDURE — 83036 HEMOGLOBIN GLYCOSYLATED A1C: CPT

## 2021-09-07 NOTE — TELEPHONE ENCOUNTER
Wife lgenna called office inquiring if a pt/inr script is need to take to lab, explained a prescription is currently actve for standing order for use at [facility] lab for pt/inr , expires 1/30/22

## 2021-09-09 ENCOUNTER — IN-CLINIC DEVICE VISIT (OUTPATIENT)
Dept: CARDIOLOGY CLINIC | Facility: CLINIC | Age: 86
End: 2021-09-09
Payer: COMMERCIAL

## 2021-09-09 ENCOUNTER — APPOINTMENT (OUTPATIENT)
Dept: LAB | Age: 86
End: 2021-09-09
Payer: COMMERCIAL

## 2021-09-09 DIAGNOSIS — I48.0 PAROXYSMAL ATRIAL FIBRILLATION (HCC): ICD-10-CM

## 2021-09-09 DIAGNOSIS — Z95.0 CARDIAC PACEMAKER IN SITU: Primary | ICD-10-CM

## 2021-09-09 LAB
INR PPP: 1.44 (ref 0.84–1.19)
PROTHROMBIN TIME: 17 SECONDS (ref 11.6–14.5)

## 2021-09-09 PROCEDURE — 36415 COLL VENOUS BLD VENIPUNCTURE: CPT

## 2021-09-09 PROCEDURE — 93280 PM DEVICE PROGR EVAL DUAL: CPT | Performed by: INTERNAL MEDICINE

## 2021-09-09 PROCEDURE — 85610 PROTHROMBIN TIME: CPT

## 2021-09-09 NOTE — PROGRESS NOTES
Results for orders placed or performed in visit on 09/09/21   Cardiac EP device report    Narrative    MDT-DUAL CHAMBER PPM (AAIR-DDDR MODE)/ NOT MRI CONDITIONALKARA PT   DEVICE INTERROGATED IN THE [place] OFFICE: BATTERY VOLTAGE ADEQUATE-12 YRS  AP 70%  0%  ALL AVAILABLE LEAD PARAMETERS WITHIN NORMAL LIMITS  NO SIGNIFICANT HIGH RATE EPISODES  NO PROGRAMMING CHANGES MADE TO DEVICE PARAMETERS  NORMAL DEVICE FUNCTION   NC         Current Outpatient Medications:     amLODIPine (NORVASC) 2 5 mg tablet, Take 1 tablet (2 5 mg total) by mouth daily Taking prn for SBP >130, Disp: 30 tablet, Rfl: 6    atorvastatin (LIPITOR) 10 mg tablet, TAKE ONE TABLET BY MOUTH DAILY, Disp: 90 tablet, Rfl: 0    BD Insulin Syringe U/F 30G X 1/2" 0 5 ML MISC, USE AS DIRECTED WITH INSULIN, Disp: 200 each, Rfl: 0    cholecalciferol (VITAMIN D3) 1,000 units tablet, Take 2,000 Units by mouth daily, Disp: , Rfl:     cyanocobalamin (VITAMIN B-12) 500 mcg tablet, Take 500 mcg by mouth daily, Disp: , Rfl:     docusate sodium (COLACE) 100 mg capsule, Take 100 mg by mouth as needed , Disp: , Rfl:     donepezil (ARICEPT) 10 mg tablet, Take 1 tablet (10 mg total) by mouth daily at bedtime, Disp: 90 tablet, Rfl: 3    fenofibrate (TRICOR) 145 mg tablet, TAKE ONE TABLET BY MOUTH EVERY DAY, Disp: 90 tablet, Rfl: 0    ferrous sulfate 325 (65 Fe) mg tablet, Take 325 mg by mouth as needed , Disp: , Rfl:     fexofenadine (ALLEGRA) 180 MG tablet, Take 180 mg by mouth daily, Disp: , Rfl:     fluticasone (FLONASE) 50 mcg/act nasal spray, 1 spray into each nostril daily, Disp: , Rfl:     glucose blood (OneTouch Verio) test strip, USE TO TEST 3 TIMES A DAY, Disp: 300 each, Rfl: 3    insulin aspart (NovoLOG) 100 units/mL injection, Inject 8 Units under the skin 2 (two) times a day before meals , Disp: , Rfl:     insulin glargine (Lantus) 100 units/mL subcutaneous injection, Inject 30 units twice a day, Disp: 30 mL, Rfl: 3    ipratropium (ATROVENT HFA) 17 mcg/act inhaler, Inhale 2 puffs every 6 (six) hours  , Disp: , Rfl:     levalbuterol (XOPENEX) 0 63 mg/3 mL nebulizer solution, Take 1 ampule by nebulization 2 (two) times a day as needed for wheezing , Disp: , Rfl:     losartan (COZAAR) 25 mg tablet, TAKE 1 TABLET BY MOUTH DAILY, Disp: 90 tablet, Rfl: 4    Magnesium 250 MG TABS, Take 500 mg by mouth daily , Disp: , Rfl:     Memantine HCl ER 28 MG CP24, TAKE ONE CAPSULE BY MOUTH EVERY DAY, Disp: 90 capsule, Rfl: 3    metoprolol tartrate (LOPRESSOR) 100 mg tablet, TAKE 1 TABLET BY MOUTH AT BEDTIME, Disp: 90 tablet, Rfl: 0    metoprolol tartrate (LOPRESSOR) 50 mg tablet, Take 50 mg by mouth daily in the early morning, Disp: , Rfl:     NovoFine 32G X 6 MM MISC, USE TO INJECT 8 UNITS AT LUNCH AND 8 UNITS AT BEDTIME (TWICE DAILY), Disp: 200 each, Rfl: 0    NovoLOG FlexPen 100 units/mL injection pen, Inject 8 Units under the skin 2 (two) times a day with meals, Disp: 15 mL, Rfl: 3    ONETOUCH DELICA LANCETS 80Y MISC, , Disp: , Rfl:     Probiotic Product (PROBIOTIC COLON SUPPORT PO), Take 1 tablet by mouth daily, Disp: , Rfl:     propafenone (RYTHMOL) 225 mg tablet, Take 1 tablet (225 mg total) by mouth 3 (three) times a day, Disp: 270 tablet, Rfl: 3    sertraline (ZOLOFT) 100 mg tablet, Take 1 tablet (100 mg total) by mouth daily, Disp: 90 tablet, Rfl: 3    sodium chloride (OCEAN) 0 65 % nasal spray, 1 spray into each nostril, Disp: , Rfl:     warfarin (COUMADIN) 2 5 mg tablet, Take 2 5 mg by mouth 3 (three) times a week Indications: Mon wed Fri   , Disp: , Rfl:     warfarin (COUMADIN) 5 mg tablet, Take 1 tablet (5 mg total) by mouth daily, Disp: 90 tablet, Rfl: 0

## 2021-09-10 ENCOUNTER — ANTICOAG VISIT (OUTPATIENT)
Dept: CARDIOLOGY CLINIC | Facility: CLINIC | Age: 86
End: 2021-09-10

## 2021-09-10 DIAGNOSIS — I48.0 PAROXYSMAL ATRIAL FIBRILLATION (HCC): Primary | ICD-10-CM

## 2021-09-10 NOTE — PROGRESS NOTES
Tc to wife, she reports patient cut himself last week, and had some bleeding, so he decreased warfarin dose to half tablet daily, 2 5 mg   Advised wife to call office regarding any bleeding, and do not adjust dosing on her own  Call office for any instructions before adjusting warfarin dosing  Will resume dose provided last , 2 5 tues/th/sat, 5 mg other days of the week, inr due 1 week

## 2021-09-14 NOTE — PROGRESS NOTES
Wife called stating wasn't sure what  was to do with coumadin told her A left or spoke to her on Friday    she said I know have written down what ask when to go in 2 weeks? Told no to go on 9/16 as instructed

## 2021-09-16 ENCOUNTER — OFFICE VISIT (OUTPATIENT)
Dept: INTERNAL MEDICINE CLINIC | Facility: CLINIC | Age: 86
End: 2021-09-16
Payer: COMMERCIAL

## 2021-09-16 VITALS
TEMPERATURE: 97.4 F | SYSTOLIC BLOOD PRESSURE: 130 MMHG | HEIGHT: 69 IN | WEIGHT: 207.4 LBS | BODY MASS INDEX: 30.72 KG/M2 | HEART RATE: 84 BPM | DIASTOLIC BLOOD PRESSURE: 82 MMHG | OXYGEN SATURATION: 95 %

## 2021-09-16 DIAGNOSIS — Z79.4 TYPE 2 DIABETES MELLITUS WITH DIABETIC NEUROPATHY, WITH LONG-TERM CURRENT USE OF INSULIN (HCC): Primary | ICD-10-CM

## 2021-09-16 DIAGNOSIS — E11.40 TYPE 2 DIABETES MELLITUS WITH DIABETIC NEUROPATHY, WITH LONG-TERM CURRENT USE OF INSULIN (HCC): Primary | ICD-10-CM

## 2021-09-16 DIAGNOSIS — R26.9 ABNORMAL GAIT: ICD-10-CM

## 2021-09-16 DIAGNOSIS — I48.0 PAROXYSMAL ATRIAL FIBRILLATION (HCC): ICD-10-CM

## 2021-09-16 DIAGNOSIS — Z23 NEED FOR INFLUENZA VACCINATION: ICD-10-CM

## 2021-09-16 DIAGNOSIS — I25.10 CORONARY ARTERY DISEASE INVOLVING NATIVE CORONARY ARTERY OF NATIVE HEART WITHOUT ANGINA PECTORIS: ICD-10-CM

## 2021-09-16 DIAGNOSIS — F02.80 FRONTOTEMPORAL DEMENTIA (HCC): ICD-10-CM

## 2021-09-16 DIAGNOSIS — G31.09 FRONTOTEMPORAL DEMENTIA (HCC): ICD-10-CM

## 2021-09-16 PROCEDURE — 90662 IIV NO PRSV INCREASED AG IM: CPT

## 2021-09-16 PROCEDURE — 99214 OFFICE O/P EST MOD 30 MIN: CPT | Performed by: INTERNAL MEDICINE

## 2021-09-16 PROCEDURE — 90471 IMMUNIZATION ADMIN: CPT

## 2021-10-14 ENCOUNTER — TELEPHONE (OUTPATIENT)
Dept: INTERNAL MEDICINE CLINIC | Facility: CLINIC | Age: 86
End: 2021-10-14

## 2021-11-02 ENCOUNTER — TELEPHONE (OUTPATIENT)
Dept: CARDIOLOGY CLINIC | Facility: CLINIC | Age: 86
End: 2021-11-02

## 2021-11-03 ENCOUNTER — APPOINTMENT (OUTPATIENT)
Dept: LAB | Age: 86
End: 2021-11-03
Payer: COMMERCIAL

## 2021-11-03 ENCOUNTER — TELEPHONE (OUTPATIENT)
Dept: CARDIOLOGY CLINIC | Facility: CLINIC | Age: 86
End: 2021-11-03

## 2021-11-03 DIAGNOSIS — I48.0 PAROXYSMAL ATRIAL FIBRILLATION (HCC): ICD-10-CM

## 2021-11-03 LAB
INR PPP: 3.05 (ref 0.84–1.19)
PROTHROMBIN TIME: 30 SECONDS (ref 11.6–14.5)

## 2021-11-03 PROCEDURE — 36415 COLL VENOUS BLD VENIPUNCTURE: CPT

## 2021-11-03 PROCEDURE — 85610 PROTHROMBIN TIME: CPT

## 2021-11-04 ENCOUNTER — ANTICOAG VISIT (OUTPATIENT)
Dept: CARDIOLOGY CLINIC | Facility: CLINIC | Age: 86
End: 2021-11-04

## 2021-11-04 DIAGNOSIS — I48.0 PAROXYSMAL ATRIAL FIBRILLATION (HCC): Primary | ICD-10-CM

## 2021-11-10 ENCOUNTER — TELEPHONE (OUTPATIENT)
Dept: CARDIOLOGY CLINIC | Facility: CLINIC | Age: 86
End: 2021-11-10

## 2021-11-10 ENCOUNTER — APPOINTMENT (OUTPATIENT)
Dept: LAB | Age: 86
End: 2021-11-10
Payer: MEDICARE

## 2021-11-10 DIAGNOSIS — I48.0 PAROXYSMAL ATRIAL FIBRILLATION (HCC): ICD-10-CM

## 2021-11-10 DIAGNOSIS — F33.0 MILD EPISODE OF RECURRENT MAJOR DEPRESSIVE DISORDER (HCC): ICD-10-CM

## 2021-11-10 LAB
INR PPP: 2.68 (ref 0.84–1.19)
PROTHROMBIN TIME: 27.2 SECONDS (ref 11.6–14.5)

## 2021-11-10 PROCEDURE — 36415 COLL VENOUS BLD VENIPUNCTURE: CPT

## 2021-11-10 PROCEDURE — 85610 PROTHROMBIN TIME: CPT

## 2021-11-11 ENCOUNTER — ANTICOAG VISIT (OUTPATIENT)
Dept: CARDIOLOGY CLINIC | Facility: CLINIC | Age: 86
End: 2021-11-11

## 2021-11-11 DIAGNOSIS — I48.0 PAROXYSMAL ATRIAL FIBRILLATION (HCC): Primary | ICD-10-CM

## 2021-11-11 RX ORDER — SERTRALINE HYDROCHLORIDE 100 MG/1
TABLET, FILM COATED ORAL
Qty: 90 TABLET | Refills: 0 | Status: SHIPPED | OUTPATIENT
Start: 2021-11-11 | End: 2022-02-24 | Stop reason: SDUPTHER

## 2021-11-13 ENCOUNTER — IMMUNIZATIONS (OUTPATIENT)
Dept: FAMILY MEDICINE CLINIC | Facility: HOSPITAL | Age: 86
End: 2021-11-13

## 2021-11-13 DIAGNOSIS — Z23 ENCOUNTER FOR IMMUNIZATION: Primary | ICD-10-CM

## 2021-11-13 PROCEDURE — 0001A COVID-19 PFIZER VACC 0.3 ML: CPT

## 2021-11-13 PROCEDURE — 91300 COVID-19 PFIZER VACC 0.3 ML: CPT

## 2021-11-15 ENCOUNTER — OFFICE VISIT (OUTPATIENT)
Dept: INTERNAL MEDICINE CLINIC | Facility: CLINIC | Age: 86
End: 2021-11-15
Payer: COMMERCIAL

## 2021-11-15 VITALS
DIASTOLIC BLOOD PRESSURE: 80 MMHG | TEMPERATURE: 98.4 F | OXYGEN SATURATION: 98 % | SYSTOLIC BLOOD PRESSURE: 130 MMHG | BODY MASS INDEX: 29.03 KG/M2 | HEIGHT: 69 IN | HEART RATE: 74 BPM | WEIGHT: 196 LBS

## 2021-11-15 DIAGNOSIS — Z79.4 TYPE 2 DIABETES MELLITUS WITH DIABETIC NEUROPATHY, WITH LONG-TERM CURRENT USE OF INSULIN (HCC): ICD-10-CM

## 2021-11-15 DIAGNOSIS — R26.9 ABNORMAL GAIT: ICD-10-CM

## 2021-11-15 DIAGNOSIS — J04.10 TRACHEITIS: Primary | ICD-10-CM

## 2021-11-15 DIAGNOSIS — F02.80 FRONTOTEMPORAL DEMENTIA (HCC): ICD-10-CM

## 2021-11-15 DIAGNOSIS — G31.09 FRONTOTEMPORAL DEMENTIA (HCC): ICD-10-CM

## 2021-11-15 DIAGNOSIS — N18.4 CKD (CHRONIC KIDNEY DISEASE), STAGE IV (HCC): ICD-10-CM

## 2021-11-15 DIAGNOSIS — E11.40 TYPE 2 DIABETES MELLITUS WITH DIABETIC NEUROPATHY, WITH LONG-TERM CURRENT USE OF INSULIN (HCC): ICD-10-CM

## 2021-11-15 DIAGNOSIS — I48.0 PAROXYSMAL ATRIAL FIBRILLATION (HCC): ICD-10-CM

## 2021-11-15 DIAGNOSIS — I25.10 CORONARY ARTERY DISEASE INVOLVING NATIVE CORONARY ARTERY OF NATIVE HEART WITHOUT ANGINA PECTORIS: ICD-10-CM

## 2021-11-15 PROCEDURE — 99213 OFFICE O/P EST LOW 20 MIN: CPT | Performed by: INTERNAL MEDICINE

## 2021-11-15 RX ORDER — AZITHROMYCIN 250 MG/1
TABLET, FILM COATED ORAL
Qty: 6 TABLET | Refills: 0 | Status: SHIPPED | OUTPATIENT
Start: 2021-11-15 | End: 2021-11-19

## 2021-11-15 RX ORDER — AMLODIPINE BESYLATE 5 MG/1
5 TABLET ORAL DAILY
COMMUNITY
Start: 2021-09-19 | End: 2021-12-08

## 2021-11-19 ENCOUNTER — OFFICE VISIT (OUTPATIENT)
Dept: INTERNAL MEDICINE CLINIC | Facility: CLINIC | Age: 86
End: 2021-11-19
Payer: COMMERCIAL

## 2021-11-19 VITALS
OXYGEN SATURATION: 93 % | HEART RATE: 72 BPM | TEMPERATURE: 98.2 F | DIASTOLIC BLOOD PRESSURE: 82 MMHG | BODY MASS INDEX: 29.06 KG/M2 | WEIGHT: 196.2 LBS | SYSTOLIC BLOOD PRESSURE: 132 MMHG | HEIGHT: 69 IN

## 2021-11-19 DIAGNOSIS — J04.10 TRACHEITIS: Primary | ICD-10-CM

## 2021-11-19 DIAGNOSIS — R05.9 COUGH: ICD-10-CM

## 2021-11-19 PROCEDURE — 99213 OFFICE O/P EST LOW 20 MIN: CPT | Performed by: INTERNAL MEDICINE

## 2021-11-19 RX ORDER — PREDNISONE 50 MG/1
50 TABLET ORAL DAILY
Qty: 5 TABLET | Refills: 0 | Status: SHIPPED | OUTPATIENT
Start: 2021-11-19 | End: 2022-04-13 | Stop reason: HOSPADM

## 2021-11-19 RX ORDER — LEVOFLOXACIN 250 MG/1
250 TABLET ORAL EVERY OTHER DAY
Qty: 4 TABLET | Refills: 0 | Status: SHIPPED | OUTPATIENT
Start: 2021-11-19 | End: 2021-11-27

## 2021-11-20 DIAGNOSIS — E11.69 TYPE 2 DIABETES MELLITUS WITH OTHER SPECIFIED COMPLICATION, WITH LONG-TERM CURRENT USE OF INSULIN (HCC): ICD-10-CM

## 2021-11-20 DIAGNOSIS — Z79.4 TYPE 2 DIABETES MELLITUS WITH OTHER SPECIFIED COMPLICATION, WITH LONG-TERM CURRENT USE OF INSULIN (HCC): ICD-10-CM

## 2021-11-21 RX ORDER — PEN NEEDLE, DIABETIC 32 GX 1/4"
NEEDLE, DISPOSABLE MISCELLANEOUS
Qty: 200 EACH | Refills: 0 | Status: SHIPPED | OUTPATIENT
Start: 2021-11-21 | End: 2022-03-08

## 2021-11-23 ENCOUNTER — NURSE TRIAGE (OUTPATIENT)
Dept: OTHER | Facility: OTHER | Age: 86
End: 2021-11-23

## 2021-11-23 DIAGNOSIS — Z20.822 ENCOUNTER FOR SCREENING LABORATORY TESTING FOR COVID-19 VIRUS: Primary | ICD-10-CM

## 2021-11-25 ENCOUNTER — NURSE TRIAGE (OUTPATIENT)
Dept: OTHER | Facility: OTHER | Age: 86
End: 2021-11-25

## 2021-11-26 ENCOUNTER — APPOINTMENT (OUTPATIENT)
Dept: LAB | Age: 86
End: 2021-11-26
Payer: MEDICARE

## 2021-11-26 DIAGNOSIS — I48.0 PAROXYSMAL ATRIAL FIBRILLATION (HCC): ICD-10-CM

## 2021-11-26 LAB
INR PPP: 2.73 (ref 0.84–1.19)
PROTHROMBIN TIME: 27.5 SECONDS (ref 11.6–14.5)

## 2021-11-26 PROCEDURE — 36415 COLL VENOUS BLD VENIPUNCTURE: CPT

## 2021-11-26 PROCEDURE — 85610 PROTHROMBIN TIME: CPT

## 2021-11-29 ENCOUNTER — ANTICOAG VISIT (OUTPATIENT)
Dept: CARDIOLOGY CLINIC | Facility: CLINIC | Age: 86
End: 2021-11-29

## 2021-11-29 DIAGNOSIS — I48.0 PAROXYSMAL ATRIAL FIBRILLATION (HCC): Primary | ICD-10-CM

## 2021-12-01 ENCOUNTER — APPOINTMENT (OUTPATIENT)
Dept: LAB | Age: 86
End: 2021-12-01
Payer: MEDICARE

## 2021-12-01 DIAGNOSIS — I48.0 PAROXYSMAL ATRIAL FIBRILLATION (HCC): ICD-10-CM

## 2021-12-01 LAB
INR PPP: 3.84 (ref 0.84–1.19)
PROTHROMBIN TIME: 35.8 SECONDS (ref 11.6–14.5)

## 2021-12-01 PROCEDURE — 36415 COLL VENOUS BLD VENIPUNCTURE: CPT

## 2021-12-01 PROCEDURE — 85610 PROTHROMBIN TIME: CPT

## 2021-12-02 ENCOUNTER — ANTICOAG VISIT (OUTPATIENT)
Dept: CARDIOLOGY CLINIC | Facility: CLINIC | Age: 86
End: 2021-12-02

## 2021-12-02 DIAGNOSIS — I48.0 PAROXYSMAL ATRIAL FIBRILLATION (HCC): Primary | ICD-10-CM

## 2021-12-02 RX ORDER — WARFARIN SODIUM 5 MG/1
TABLET ORAL
Qty: 90 TABLET | Refills: 0 | Status: SHIPPED | OUTPATIENT
Start: 2021-12-02 | End: 2022-06-29 | Stop reason: DRUGHIGH

## 2021-12-07 DIAGNOSIS — I10 ESSENTIAL HYPERTENSION: Primary | ICD-10-CM

## 2021-12-08 RX ORDER — AMLODIPINE BESYLATE 5 MG/1
TABLET ORAL
Qty: 90 TABLET | Refills: 0 | Status: SHIPPED | OUTPATIENT
Start: 2021-12-08 | End: 2022-03-22

## 2021-12-09 ENCOUNTER — APPOINTMENT (OUTPATIENT)
Dept: LAB | Age: 86
End: 2021-12-09
Payer: MEDICARE

## 2021-12-09 DIAGNOSIS — I48.0 PAROXYSMAL ATRIAL FIBRILLATION (HCC): ICD-10-CM

## 2021-12-09 LAB
INR PPP: 2.13 (ref 0.84–1.19)
PROTHROMBIN TIME: 22.8 SECONDS (ref 11.6–14.5)

## 2021-12-09 PROCEDURE — 36415 COLL VENOUS BLD VENIPUNCTURE: CPT

## 2021-12-09 PROCEDURE — 85610 PROTHROMBIN TIME: CPT

## 2021-12-10 ENCOUNTER — ANTICOAG VISIT (OUTPATIENT)
Dept: CARDIOLOGY CLINIC | Facility: CLINIC | Age: 86
End: 2021-12-10

## 2021-12-10 DIAGNOSIS — I48.0 PAROXYSMAL ATRIAL FIBRILLATION (HCC): Primary | ICD-10-CM

## 2021-12-13 ENCOUNTER — OFFICE VISIT (OUTPATIENT)
Dept: INTERNAL MEDICINE CLINIC | Facility: CLINIC | Age: 86
End: 2021-12-13
Payer: COMMERCIAL

## 2021-12-13 VITALS
OXYGEN SATURATION: 97 % | DIASTOLIC BLOOD PRESSURE: 68 MMHG | HEART RATE: 80 BPM | SYSTOLIC BLOOD PRESSURE: 108 MMHG | WEIGHT: 197.6 LBS | TEMPERATURE: 97.3 F | BODY MASS INDEX: 29.27 KG/M2 | HEIGHT: 69 IN

## 2021-12-13 DIAGNOSIS — N18.4 CKD (CHRONIC KIDNEY DISEASE), STAGE IV (HCC): ICD-10-CM

## 2021-12-13 DIAGNOSIS — F02.80 FRONTOTEMPORAL DEMENTIA (HCC): ICD-10-CM

## 2021-12-13 DIAGNOSIS — Z00.00 MEDICARE ANNUAL WELLNESS VISIT, SUBSEQUENT: ICD-10-CM

## 2021-12-13 DIAGNOSIS — Z79.4 TYPE 2 DIABETES MELLITUS WITH OTHER SPECIFIED COMPLICATION, WITH LONG-TERM CURRENT USE OF INSULIN (HCC): Primary | ICD-10-CM

## 2021-12-13 DIAGNOSIS — E11.69 TYPE 2 DIABETES MELLITUS WITH OTHER SPECIFIED COMPLICATION, WITH LONG-TERM CURRENT USE OF INSULIN (HCC): Primary | ICD-10-CM

## 2021-12-13 DIAGNOSIS — I48.0 PAROXYSMAL ATRIAL FIBRILLATION (HCC): ICD-10-CM

## 2021-12-13 DIAGNOSIS — I25.10 CORONARY ARTERY DISEASE INVOLVING NATIVE CORONARY ARTERY OF NATIVE HEART WITHOUT ANGINA PECTORIS: ICD-10-CM

## 2021-12-13 DIAGNOSIS — E55.9 VITAMIN D DEFICIENCY, UNSPECIFIED: ICD-10-CM

## 2021-12-13 DIAGNOSIS — G31.09 FRONTOTEMPORAL DEMENTIA (HCC): ICD-10-CM

## 2021-12-13 PROCEDURE — G0438 PPPS, INITIAL VISIT: HCPCS | Performed by: INTERNAL MEDICINE

## 2021-12-13 PROCEDURE — 1123F ACP DISCUSS/DSCN MKR DOCD: CPT | Performed by: INTERNAL MEDICINE

## 2021-12-13 PROCEDURE — 99214 OFFICE O/P EST MOD 30 MIN: CPT | Performed by: INTERNAL MEDICINE

## 2021-12-13 NOTE — PROGRESS NOTES
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[address] )       Genitourinary    CKD (chronic kidney disease), stage IV ([address] )      Other Visit Diagnoses     Type 2 diabetes mellitus with other specified complication, with long-term current use of insulin ([facility] )    -  Primary    Medicare annual wellness visit, subsequent               Preventive health issues were discussed with patient, and age appropriate screening tests were ordered as noted in patient's After Visit Summary  Personalized health advice and appropriate referrals for health education or preventive services given if needed, as noted in patient's After Visit Summary       History of Present Illness:     Patient presents for Medicare Annual Wellness visit    Patient Care Team:  Edd Alicea MD as PCP - General (Internal Medicine)  MD Vanessa Price DO Denna Ohms, DO Senaida Burgess, MD Denna Ohms, DO (Nephrology)     Problem List:     Patient Active Problem List   Diagnosis    Benign prostate hyperplasia    Elevated prostate specific antigen (PSA)    CKD (chronic kidney disease), stage IV (HCC)    Essential hypertension    Solitary kidney    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[address] )    Paroxysmal atrial fibrillation ([address] )    Mixed hyperlipidemia    Type 2 diabetes mellitus with kidney complication, with long-term current use of insulin ([address] )    Coronary artery disease involving native coronary artery of native heart without angina pectoris    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, or unspecified chronic kidney disease    Head injury    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    Coagulation disorder ([address] )    Long term current use of antiarrhythmic medical therapy    Recurrent falls    Dementia (HCC)    Tracheitis    Cough      Past Medical and Surgical History:     Past Medical History:   Diagnosis Date    Allergic rhinitis     Anxiety     Aspiration of liquid     and food per wife if he is eating too fast or talking when eating    Asthma     as a child    Atrial fibrillation ([place] [address] )     CAD (coronary artery disease)     Cancer of kidney ([place] [address] )     COPD (chronic obstructive pulmonary disease) (HCC)     CPAP (continuous positive airway pressure) dependence     Dementia ([place] [address] )     Frontal lobe    Dementia ([place] [address] )     Diabetes mellitus ([place] [address] )     Diabetes mellitus, type 2 ([place] [address] )     DJD (degenerative joint disease)     Hypercholesterolemia     Hyperlipidemia     Hypertension     Incisional hernia     Kidney disease     Melanoma ([place] [address] )     left leg    Obesity     Sleep apnea     wears CPAP    TIA (transient ischemic attack)      Past Surgical History:   Procedure Laterality Date    CARDIAC PACEMAKER PLACEMENT      CHOLECYSTECTOMY      COLONOSCOPY      HERNIA REPAIR      Incisional hernia    NEPHRECTOMY Left 2005    [place] ESOPHAGOGASTRODUODENOSCOPY SUBMUCOSAL INJECTION N/A 9/21/2016    Procedure: ESOPHAGOGASTRODUODENOSCOPY (EGD); Surgeon: Alexa Jones MD;  Location: [facility]; Service: Gastroenterology    [place] ESOPHAGOGASTRODUODENOSCOPY SUBMUCOSAL INJECTION N/A 2/7/2018    Procedure: ESOPHAGOGASTRODUODENOSCOPY (EGD); Surgeon: Alexa Jones MD;  [facility]: [facility]; Service: Gastroenterology    [place] ESOPHAGOGASTRODUODENOSCOPY SUBMUCOSAL INJECTION N/A 4/3/2019    Procedure: ESOPHAGOGASTRODUODENOSCOPY (EGD) WITH BOTOX;  Surgeon: Alexa Jones MD;  Location: [facility];   Service: Gastroenterology    ROTATOR CUFF REPAIR      TONSILLECTOMY        Family History:     Family History   Problem Relation Age of Onset    Brain cancer Father     Lung cancer Father     Diabetes Family     Heart disease Family     Hypertension Family  Cancer Family         renal cell carcinoma    Stroke Family     Colon cancer Son       Social History:     Social History     Socioeconomic History    Marital status: /Civil Union     Spouse name: None    Number of children: None    Years of education: None    Highest education level: None   Occupational History    None   Tobacco Use    Smoking status: Never Smoker    Smokeless tobacco: Never Used   Vaping Use    Vaping Use: Never used   Substance and Sexual Activity    Alcohol use: No    Drug use: No    Sexual activity: None   Other Topics Concern    None   Social History Narrative    Travel outside [place]: No      Social Determinants of Health     Financial Resource Strain: Not on file   Food Insecurity: Not on file   Transportation Needs: Not on file   Physical Activity: Not on file   Stress: Not on file   Social Connections: Not on file   Intimate Partner Violence: Not on file   Housing Stability: Not on file      Medications and Allergies:     Current Outpatient Medications   Medication Sig Dispense Refill    amLODIPine (NORVASC) 5 mg tablet TAKE 1 TABLET BY MOUTH DAILY   90 tablet 0    atorvastatin (LIPITOR) 10 mg tablet TAKE ONE TABLET BY MOUTH DAILY 90 tablet 0    BD Insulin Syringe U/F 30G X 1/2" 0 5 ML MISC USE AS DIRECTED WITH INSULIN 200 each 0    cholecalciferol (VITAMIN D3) 1,000 units tablet Take 2,000 Units by mouth daily      cyanocobalamin (VITAMIN B-12) 500 mcg tablet Take 500 mcg by mouth daily      docusate sodium (COLACE) 100 mg capsule Take 100 mg by mouth as needed       donepezil (ARICEPT) 10 mg tablet Take 1 tablet (10 mg total) by mouth daily at bedtime 90 tablet 3    fenofibrate (TRICOR) 145 mg tablet TAKE ONE TABLET BY MOUTH EVERY DAY 90 tablet 0    ferrous sulfate 325 (65 Fe) mg tablet Take 325 mg by mouth as needed       fexofenadine (ALLEGRA) 180 MG tablet Take 180 mg by mouth daily      fluticasone (FLONASE) 50 mcg/act nasal spray 1 spray into each nostril daily      glucose blood (OneTouch Verio) test strip USE TO TEST 3 TIMES A  each 3    insulin glargine (Lantus) 100 units/mL subcutaneous injection Inject 30 units twice a day 30 mL 3    ipratropium (ATROVENT HFA) 17 mcg/act inhaler Inhale 2 puffs every 6 (six) hours   levalbuterol (XOPENEX) 0 63 mg/3 mL nebulizer solution Take 1 ampule by nebulization 2 (two) times a day as needed for wheezing   losartan (COZAAR) 25 mg tablet TAKE 1 TABLET BY MOUTH DAILY 90 tablet 4    Magnesium 250 MG TABS Take 500 mg by mouth daily       Memantine HCl ER 28 MG CP24 TAKE ONE CAPSULE BY MOUTH EVERY DAY 90 capsule 3    metoprolol tartrate (LOPRESSOR) 100 mg tablet TAKE 1 TABLET BY MOUTH AT BEDTIME 90 tablet 0    metoprolol tartrate (LOPRESSOR) 50 mg tablet Take 50 mg by mouth daily in the early morning      Novofine Pen Needle 32G X 6 MM MISC USE TO INJECT 8 UNITS AT LUNCH AND 8 UNITS AT BEDTIME (TWICE DAILY) 200 each 0    NovoLOG FlexPen 100 units/mL injection pen Inject 8 Units under the skin 2 (two) times a day with meals 15 mL 3    ONETOUCH DELICA LANCETS 18U MISC       predniSONE 50 mg tablet Take 1 tablet (50 mg total) by mouth daily 5 tablet 0    Probiotic Product (PROBIOTIC COLON SUPPORT PO) Take 1 tablet by mouth daily      propafenone (RYTHMOL) 225 mg tablet Take 1 tablet (225 mg total) by mouth 3 (three) times a day 270 tablet 3    sertraline (ZOLOFT) 100 mg tablet TAKE ONE TABLET EVERY DAY 90 tablet 0    warfarin (COUMADIN) 2 5 mg tablet Take 2 5 mg by mouth 3 (three) times a week Indications: Mon wed Fri  George Latin warfarin (COUMADIN) 5 mg tablet TAKE 1 TABLET BY MOUTH DAILY 90 tablet 0    sodium chloride (OCEAN) 0 65 % nasal spray 1 spray into each nostril       No current facility-administered medications for this visit       Allergies   Allergen Reactions    Ambien [Zolpidem Tartrate] Hallucinations    Bextra [Valdecoxib] Hives    Dust Mite Extract Sneezing    Lyrica [Pregabalin] Confusion    Mold Extract [Trichophyton] Sneezing    Pollen Extract Sneezing    Tree Extract Other (See Comments) and Sneezing     congestion      Immunizations:     Immunization History   Administered Date(s) Administered    COVID-19 PFIZER VACCINE 0 3 ML IM 01/22/2021, 02/11/2021, 11/13/2021    INFLUENZA 10/30/2014, 10/05/2015, 10/20/2016, 10/13/2017, 10/10/2019, 09/09/2020    Influenza, high dose seasonal 0 7 mL 09/16/2021    Pneumococcal Conjugate 13-Valent 10/12/2015    Zoster 03/01/2016    Zoster Vaccine Recombinant 02/26/2021    influenza, trivalent, adjuvanted 09/09/2020      Health Maintenance: There are no preventive care reminders to display for this patient  Topic Date Due    DTaP,Tdap,and Td Vaccines (1 - Tdap) Never done    Pneumococcal Vaccine: 65+ Years (1 of 2 - PPSV23) 12/07/2015      Medicare Health Risk Assessment:     /68 (BP Location: Left arm, Patient Position: Sitting, Cuff Size: Large)   Pulse 80   Temp (!) 97 3 °F (36 3 °C) (Temporal)   Ht 5' 9" (1 753 m)   Wt 89 6 kg (197 lb 9 6 oz)   SpO2 97%   BMI 29 18 kg/m²      Vanessa Hastings is here for his Subsequent Wellness visit  Health Risk Assessment:   Patient rates overall health as excellent  Patient feels that their physical health rating is same  Patient is very satisfied with their life  Eyesight was rated as slightly worse  Hearing was rated as slightly worse  Patient feels that their emotional and mental health rating is same  Patients states they are never, rarely angry  Patient states they are sometimes unusually tired/fatigued  Pain experienced in the last 7 days has been none  Patient states that he has experienced no weight loss or gain in last 6 months  Depression Screening:   PHQ-2 Score: 1  PHQ-9 Score: 2      Fall Risk Screening:    In the past year, patient has experienced: no history of falling in past year      Home Safety:  Patient does not have trouble with stairs inside or outside of their home  Patient has working smoke alarms and has working carbon monoxide detector  Home safety hazards include: none  Nutrition:   Current diet is Regular  Medications:   Patient is currently taking over-the-counter supplements  OTC medications include: see medication list  Patient is able to manage medications  Activities of Daily Living (ADLs)/Instrumental Activities of Daily Living (IADLs):   Walk and transfer into and out of bed and chair?: Yes  Dress and groom yourself?: Yes    Bathe or shower yourself?: Yes    Feed yourself? Yes  Do your laundry/housekeeping?: Yes  Manage your money, pay your bills and track your expenses?: Yes  Make your own meals?: Yes    Do your own shopping?: Yes    Previous Hospitalizations:   Any hospitalizations or ED visits within the last 12 months?: No      Advance Care Planning:   Living will: Yes    Advanced directive: Yes      PREVENTIVE SCREENINGS      Cardiovascular Screening:    General: Screening Not Indicated and History Lipid Disorder      Diabetes Screening:     General: Screening Not Indicated and History Diabetes      Colorectal Cancer Screening:     General: Screening Not Indicated      Prostate Cancer Screening:    General: Screening Not Indicated      Lung Cancer Screening:     General: Screening Not Indicated    Screening, Brief Intervention, and Referral to Treatment (SBIRT)    Screening  Typical number of drinks in a day: 0  Typical number of drinks in a week: 0  Interpretation: Low risk drinking behavior  Other Counseling Topics:   Car/seat belt/driving safety, skin self-exam, sunscreen and calcium and vitamin D intake and regular weightbearing exercise         Maninder Lang MD

## 2021-12-13 NOTE — PATIENT INSTRUCTIONS

## 2021-12-13 NOTE — PROGRESS NOTES
Assessment/Plan:      Depression Screening and Follow-up Plan: Patient was screened for depression during today's encounter  They screened negative with a PHQ-2 score of 1           1  Type 2 diabetes mellitus with other specified complication, with long-term current use of insulin ([facility])  -     Hemoglobin A1C; Future    2  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[facility] [address] )  Comments:  Dementia has been progressive  3  Paroxysmal atrial fibrillation (HCC)  -     CBC and differential; Future  -     Comprehensive metabolic panel; Future  -     Urinalysis with microscopic    4  Coronary artery disease involving native coronary artery of native heart without angina pectoris    5  CKD (chronic kidney disease), stage IV ([facility])  Comments:  Avoid nephrotoxic agents    6  Medicare annual wellness visit, subsequent    7  Vitamin D deficiency, unspecified  -     Vitamin D 25 hydroxy; Future           1  Type 2 diabetes mellitus with other specified complication, with long-term current use of insulin ([facility] [address] )      2  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[facility] [address] )      3  Paroxysmal atrial fibrillation ([facility] [address] )      4  Coronary artery disease involving native coronary artery of native heart without angina pectoris      5  CKD (chronic kidney disease), stage IV ([facility] [address] )      6  Medicare annual wellness visit, subsequent         No problem-specific Assessment & Plan notes found for this encounter  Subjective:      Patient ID: Grayson Saldana is a 80 y o  male      HPI    The following portions of the patient's history were reviewed and updated as appropriate: He  has a past medical history of Allergic rhinitis, Anxiety, Aspiration of liquid, Asthma, Atrial fibrillation ([address] ), CAD (coronary artery disease), Cancer of kidney ([facility] [address] ), COPD (chronic obstructive pulmonary disease) ([facility] [address] ), CPAP (continuous positive airway pressure) dependence, Dementia ([facility] [address] ), Dementia ([address] ), Diabetes mellitus ([address] ), Diabetes mellitus, type 2 ([address] ), DJD (degenerative joint disease), Hypercholesterolemia, Hyperlipidemia, Hypertension, Incisional hernia, Kidney disease, Melanoma ([facility] ), Obesity, Sleep apnea, and TIA (transient ischemic attack)  He   Patient Active Problem List    Diagnosis Date Noted    Cough 11/19/2021    Tracheitis 11/15/2021    Dementia ([facility] )     Recurrent falls 09/30/2020    Long term current use of antiarrhythmic medical therapy 08/17/2020    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 08/13/2020    Coagulation disorder ([place] [address] ) 08/13/2020    Head injury 03/09/2020    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, or unspecified chronic kidney disease 05/06/2019    Coronary artery disease involving native coronary artery of native heart without angina pectoris 03/12/2019    Type 2 diabetes mellitus with kidney complication, with long-term current use of insulin ([place] [address] ) 02/04/2019    Paroxysmal atrial fibrillation ([place] [address] ) 12/12/2018    Mixed hyperlipidemia 12/12/2018    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[place] [address] ) 04/16/2018    CKD (chronic kidney disease), stage IV ([place] [address] ) 03/30/2018    Essential hypertension 03/30/2018    Solitary kidney 03/30/2018    Benign prostate hyperplasia 10/27/2014    Elevated prostate specific antigen (PSA) 06/18/2013     He  has a past surgical history that includes Cardiac pacemaker placement; Nephrectomy (Left, 2005); pr esophagogastroduodenoscopy submucosal injection (N/A, 9/21/2016); Tonsillectomy; Cholecystectomy; pr esophagogastroduodenoscopy submucosal injection (N/A, 2/7/2018); Colonoscopy; Rotator cuff repair; pr esophagogastroduodenoscopy submucosal injection (N/A, 4/3/2019); and Hernia repair  His family history includes Brain cancer in his father; Cancer in his family; Colon cancer in his son; Diabetes in his family; Heart disease in his family; Hypertension in his family; Lung cancer in his father; Stroke in his family  He  reports that he has never smoked   He has never used smokeless tobacco  He reports that he does not drink alcohol and does not use drugs  Current Outpatient Medications   Medication Sig Dispense Refill    amLODIPine (NORVASC) 5 mg tablet TAKE 1 TABLET BY MOUTH DAILY  90 tablet 0    cholecalciferol (VITAMIN D3) 1,000 units tablet Take 2,000 Units by mouth daily      cyanocobalamin (VITAMIN B-12) 500 mcg tablet Take 500 mcg by mouth daily      docusate sodium (COLACE) 100 mg capsule Take 100 mg by mouth as needed       donepezil (ARICEPT) 10 mg tablet Take 1 tablet (10 mg total) by mouth daily at bedtime 90 tablet 3    fenofibrate (TRICOR) 145 mg tablet TAKE ONE TABLET BY MOUTH EVERY DAY 90 tablet 0    ferrous sulfate 325 (65 Fe) mg tablet Take 325 mg by mouth as needed       fexofenadine (ALLEGRA) 180 MG tablet Take 180 mg by mouth daily      fluticasone (FLONASE) 50 mcg/act nasal spray 1 spray into each nostril daily      glucose blood (OneTouch Verio) test strip USE TO TEST 3 TIMES A  each 3    insulin glargine (Lantus) 100 units/mL subcutaneous injection Inject 30 units twice a day 30 mL 3    ipratropium (ATROVENT HFA) 17 mcg/act inhaler Inhale 2 puffs every 6 (six) hours   levalbuterol (XOPENEX) 0 63 mg/3 mL nebulizer solution Take 1 ampule by nebulization 2 (two) times a day as needed for wheezing        losartan (COZAAR) 25 mg tablet TAKE 1 TABLET BY MOUTH DAILY 90 tablet 4    Magnesium 250 MG TABS Take 500 mg by mouth daily       Memantine HCl ER 28 MG CP24 TAKE ONE CAPSULE BY MOUTH EVERY DAY 90 capsule 3    metoprolol tartrate (LOPRESSOR) 50 mg tablet Take 50 mg by mouth daily in the early morning      Novofine Pen Needle 32G X 6 MM MISC USE TO INJECT 8 UNITS AT LUNCH AND 8 UNITS AT BEDTIME (TWICE DAILY) 200 each 0    NovoLOG FlexPen 100 units/mL injection pen Inject 8 Units under the skin 2 (two) times a day with meals 15 mL 3    ONETOUCH DELICA LANCETS 19W MISC       predniSONE 50 mg tablet Take 1 tablet (50 mg total) by mouth daily 5 tablet 0    Probiotic Product (PROBIOTIC COLON SUPPORT PO) Take 1 tablet by mouth daily      propafenone (RYTHMOL) 225 mg tablet Take 1 tablet (225 mg total) by mouth 3 (three) times a day 270 tablet 3    sertraline (ZOLOFT) 100 mg tablet TAKE ONE TABLET EVERY DAY 90 tablet 0    warfarin (COUMADIN) 2 5 mg tablet Take 2 5 mg by mouth 3 (three) times a week Indications: Mon wed Fri  Maurisio Suraj warfarin (COUMADIN) 5 mg tablet TAKE 1 TABLET BY MOUTH DAILY 90 tablet 0    atorvastatin (LIPITOR) 10 mg tablet TAKE ONE TABLET BY MOUTH DAILY 90 tablet 0    BD Insulin Syringe U/F 30G X 1/2" 0 5 ML MISC USE AS DIRECTED WITH INSULIN 100 each 0    metoprolol tartrate (LOPRESSOR) 100 mg tablet TAKE 1 TABLET BY MOUTH AT BEDTIME 90 tablet 0    sodium chloride (OCEAN) 0 65 % nasal spray 1 spray into each nostril       No current facility-administered medications for this visit  Current Outpatient Medications on File Prior to Visit   Medication Sig    amLODIPine (NORVASC) 5 mg tablet TAKE 1 TABLET BY MOUTH DAILY   cholecalciferol (VITAMIN D3) 1,000 units tablet Take 2,000 Units by mouth daily    cyanocobalamin (VITAMIN B-12) 500 mcg tablet Take 500 mcg by mouth daily    docusate sodium (COLACE) 100 mg capsule Take 100 mg by mouth as needed     donepezil (ARICEPT) 10 mg tablet Take 1 tablet (10 mg total) by mouth daily at bedtime    fenofibrate (TRICOR) 145 mg tablet TAKE ONE TABLET BY MOUTH EVERY DAY    ferrous sulfate 325 (65 Fe) mg tablet Take 325 mg by mouth as needed     fexofenadine (ALLEGRA) 180 MG tablet Take 180 mg by mouth daily    fluticasone (FLONASE) 50 mcg/act nasal spray 1 spray into each nostril daily    glucose blood (OneTouch Verio) test strip USE TO TEST 3 TIMES A DAY    insulin glargine (Lantus) 100 units/mL subcutaneous injection Inject 30 units twice a day    ipratropium (ATROVENT HFA) 17 mcg/act inhaler Inhale 2 puffs every 6 (six) hours      levalbuterol (XOPENEX) 0 63 mg/3 mL nebulizer solution Take 1 ampule by nebulization 2 (two) times a day as needed for wheezing   losartan (COZAAR) 25 mg tablet TAKE 1 TABLET BY MOUTH DAILY    Magnesium 250 MG TABS Take 500 mg by mouth daily     Memantine HCl ER 28 MG CP24 TAKE ONE CAPSULE BY MOUTH EVERY DAY    metoprolol tartrate (LOPRESSOR) 50 mg tablet Take 50 mg by mouth daily in the early morning    Novofine Pen Needle 32G X 6 MM MISC USE TO INJECT 8 UNITS AT LUNCH AND 8 UNITS AT BEDTIME (TWICE DAILY)    NovoLOG FlexPen 100 units/mL injection pen Inject 8 Units under the skin 2 (two) times a day with meals    ONETOUCH DELICA LANCETS 23L MISC     predniSONE 50 mg tablet Take 1 tablet (50 mg total) by mouth daily    Probiotic Product (PROBIOTIC COLON SUPPORT PO) Take 1 tablet by mouth daily    propafenone (RYTHMOL) 225 mg tablet Take 1 tablet (225 mg total) by mouth 3 (three) times a day    sertraline (ZOLOFT) 100 mg tablet TAKE ONE TABLET EVERY DAY    warfarin (COUMADIN) 2 5 mg tablet Take 2 5 mg by mouth 3 (three) times a week Indications: Mon wed Fri  Ambika Begin warfarin (COUMADIN) 5 mg tablet TAKE 1 TABLET BY MOUTH DAILY    [DISCONTINUED] atorvastatin (LIPITOR) 10 mg tablet TAKE ONE TABLET BY MOUTH DAILY    sodium chloride (OCEAN) 0 65 % nasal spray 1 spray into each nostril     No current facility-administered medications on file prior to visit  He is allergic to Francis Schein tartrate], bextra [valdecoxib], dust mite extract, lyrica [pregabalin], mold extract [trichophyton], pollen extract, and tree extract       Review of Systems   Constitutional: Negative for appetite change, chills, fatigue and fever  HENT: Negative for sore throat and trouble swallowing  Eyes: Negative for redness  Respiratory: Negative for shortness of breath  Cardiovascular: Negative for chest pain and palpitations  Gastrointestinal: Negative for abdominal pain, constipation and diarrhea  Genitourinary: Negative for dysuria and hematuria     Musculoskeletal: Negative for back pain and neck pain  Skin: Negative for rash  Neurological: Negative for seizures, weakness and headaches  Hematological: Negative for adenopathy  Psychiatric/Behavioral: Negative for confusion  The patient is not nervous/anxious            Objective:      /68 (BP Location: Left arm, Patient Position: Sitting, Cuff Size: Large)   Pulse 80   Temp (!) 97 3 °F (36 3 °C) (Temporal)   Ht 5' 9" (1 753 m)   Wt 89 6 kg (197 lb 9 6 oz)   SpO2 97%   BMI 29 18 kg/m²     Recent Results (from the past 1344 hour(s))   Protime-INR    Collection Time: 11/26/21  5:01 PM   Result Value Ref Range    Protime 27 5 (H) 11 6 - 14 5 seconds    INR 2 73 (H) 0 84 - 1 19   Protime-INR    Collection Time: 12/01/21  5:59 PM   Result Value Ref Range    Protime 35 8 (H) 11 6 - 14 5 seconds    INR 3 84 (H) 0 84 - 1 19   Protime-INR    Collection Time: 12/09/21  3:48 PM   Result Value Ref Range    Protime 22 8 (H) 11 6 - 14 5 seconds    INR 2 13 (H) 0 84 - 1 19   Urinalysis with microscopic    Collection Time: 12/16/21  2:44 PM   Result Value Ref Range    Clarity, UA Clear     Color, UA Yellow     Specific [place], UA 1 021 1 003 - 1 030    pH, UA 6 0 4 5, 5 0, 5 5, 6 0, 6 5, 7 0, 7 5, 8 0    Glucose, UA Negative Negative mg/dl    Ketones, UA Negative Negative mg/dl    Blood, UA Negative Negative    Protein, UA 30 (1+) (A) Negative mg/dl    Nitrite, UA Negative Negative    Bilirubin, UA Negative Negative    Urobilinogen, UA 0 2 0 2, 1 0 E U /dl E U /dl    Leukocytes, UA Negative Negative    WBC, UA None Seen None Seen, 2-4, 5-60 /hpf    RBC, UA None Seen None Seen, 2-4 /hpf    Hyaline Casts, UA None Seen None Seen /lpf    Bacteria, UA None Seen None Seen, Occasional /hpf    Epithelial Cells None Seen None Seen, Occasional /hpf   Protime-INR    Collection Time: 12/16/21  2:44 PM   Result Value Ref Range    Protime 19 2 (H) 11 6 - 14 5 seconds    INR 1 70 (H) 0 84 - 1 19   Microalbumin / creatinine urine ratio    Collection Time: 12/16/21  2:44 PM   Result Value Ref Range    Creatinine, Ur 104 0 mg/dL    Microalbum  ,U,Random 113 0 (H) 0 0 - 20 0 mg/L    Microalb Creat Ratio 109 (H) 0 - 30 mg/g creatinine   Protime-INR    Collection Time: 12/20/21  3:16 PM   Result Value Ref Range    Protime 19 5 (H) 11 6 - 14 5 seconds    INR 1 74 (H) 0 84 - 1 19   CBC and differential    Collection Time: 12/20/21  3:16 PM   Result Value Ref Range    WBC 6 21 4 31 - 10 16 Thousand/uL    RBC 4 63 3 88 - 5 62 Million/uL    Hemoglobin 13 3 12 0 - 17 0 g/dL    Hematocrit 41 6 36 5 - 49 3 %    MCV 90 82 - 98 fL    MCH 28 7 26 8 - 34 3 pg    MCHC 32 0 31 4 - 37 4 g/dL    RDW 14 2 11 6 - 15 1 %    MPV 10 6 8 9 - 12 7 fL    Platelets 047 654 - 777 Thousands/uL    nRBC 0 /100 WBCs    Neutrophils Relative 56 43 - 75 %    Immat GRANS % 0 0 - 2 %    Lymphocytes Relative 28 14 - 44 %    Monocytes Relative 11 4 - 12 %    Eosinophils Relative 4 0 - 6 %    Basophils Relative 1 0 - 1 %    Neutrophils Absolute 3 47 1 85 - 7 62 Thousands/µL    Immature Grans Absolute 0 02 0 00 - 0 20 Thousand/uL    Lymphocytes Absolute 1 76 0 60 - 4 47 Thousands/µL    Monocytes Absolute 0 65 0 17 - 1 22 Thousand/µL    Eosinophils Absolute 0 24 0 00 - 0 61 Thousand/µL    Basophils Absolute 0 07 0 00 - 0 10 Thousands/µL   Comprehensive metabolic panel    Collection Time: 12/20/21  3:16 PM   Result Value Ref Range    Sodium 139 136 - 145 mmol/L    Potassium 4 8 3 5 - 5 3 mmol/L    Chloride 106 100 - 108 mmol/L    CO2 30 21 - 32 mmol/L    ANION GAP 3 (L) 4 - 13 mmol/L    BUN 25 5 - 25 mg/dL    Creatinine 2 08 (H) 0 60 - 1 30 mg/dL    Glucose, Fasting 161 (H) 65 - 99 mg/dL    Calcium 9 6 8 3 - 10 1 mg/dL    AST 20 5 - 45 U/L    ALT 27 12 - 78 U/L    Alkaline Phosphatase 51 46 - 116 U/L    Total Protein 8 0 6 4 - 8 2 g/dL    Albumin 3 7 3 5 - 5 0 g/dL    Total Bilirubin 0 40 0 20 - 1 00 mg/dL    eGFR 27 ml/min/1 73sq m   Hemoglobin A1C    Collection Time: 12/20/21  3:16 PM   Result Value Ref Range    Hemoglobin A1C 7 2 (H) Normal 3 8-5 6%; PreDiabetic 5 7-6 4%; Diabetic >=6 5%; Glycemic control for adults with diabetes <7 0% %     mg/dl   Vitamin D 25 hydroxy    Collection Time: 12/20/21  3:16 PM   Result Value Ref Range    Vit D, 25-Hydroxy 30 6 30 0 - 100 0 ng/mL   Protime-INR    Collection Time: 12/30/21  1:05 PM   Result Value Ref Range    Protime 16 6 (H) 11 6 - 14 5 seconds    INR 1 41 (H) 0 84 - 1 19   Protime-INR    Collection Time: 01/05/22  1:40 PM   Result Value Ref Range    Protime 16 4 (H) 11 6 - 14 5 seconds    INR 1 38 (H) 0 84 - 1 19   COVID Only - Collected at [facility]    Collection Time: 01/05/22  2:29 PM    Specimen: Nose; Nares   Result Value Ref Range    SARS-CoV-2 Negative Negative        Physical Exam  Constitutional:       General: He is not in acute distress  Appearance: Normal appearance  HENT:      Head: Normocephalic and atraumatic  Nose: Nose normal       Mouth/Throat:      Mouth: Mucous membranes are moist    Eyes:      Extraocular Movements: Extraocular movements intact  Pupils: Pupils are equal, round, and reactive to light  Cardiovascular:      Rate and Rhythm: Normal rate  Rhythm irregular  Pulses: Normal pulses  Heart sounds: Normal heart sounds  No murmur heard  No friction rub  Pulmonary:      Effort: Pulmonary effort is normal  No respiratory distress  Breath sounds: Normal breath sounds  No wheezing  Abdominal:      General: Abdomen is flat  Bowel sounds are normal  There is no distension  Palpations: Abdomen is soft  There is no mass  Tenderness: There is no abdominal tenderness  There is no guarding  Musculoskeletal:         General: Normal range of motion  Cervical back: Normal range of motion  Neurological:      General: No focal deficit present  Mental Status: He is alert and oriented to person, place, and time  Mental status is at baseline        Cranial Nerves: No cranial nerve deficit        Gait: Gait abnormal    Psychiatric:         Mood and Affect: Mood normal          Behavior: Behavior normal

## 2021-12-16 ENCOUNTER — REMOTE DEVICE CLINIC VISIT (OUTPATIENT)
Dept: CARDIOLOGY CLINIC | Facility: CLINIC | Age: 86
End: 2021-12-16
Payer: COMMERCIAL

## 2021-12-16 ENCOUNTER — LAB (OUTPATIENT)
Dept: LAB | Age: 86
End: 2021-12-16
Payer: COMMERCIAL

## 2021-12-16 DIAGNOSIS — E78.2 MIXED HYPERLIPIDEMIA: ICD-10-CM

## 2021-12-16 DIAGNOSIS — Z79.4 TYPE 2 DIABETES MELLITUS WITH STAGE 4 CHRONIC KIDNEY DISEASE, WITH LONG-TERM CURRENT USE OF INSULIN (HCC): ICD-10-CM

## 2021-12-16 DIAGNOSIS — E11.22 TYPE 2 DIABETES MELLITUS WITH STAGE 4 CHRONIC KIDNEY DISEASE, WITH LONG-TERM CURRENT USE OF INSULIN (HCC): ICD-10-CM

## 2021-12-16 DIAGNOSIS — I48.0 PAROXYSMAL ATRIAL FIBRILLATION (HCC): ICD-10-CM

## 2021-12-16 DIAGNOSIS — Z95.0 PRESENCE OF PERMANENT CARDIAC PACEMAKER: Primary | ICD-10-CM

## 2021-12-16 DIAGNOSIS — N18.4 TYPE 2 DIABETES MELLITUS WITH STAGE 4 CHRONIC KIDNEY DISEASE, WITH LONG-TERM CURRENT USE OF INSULIN (HCC): ICD-10-CM

## 2021-12-16 DIAGNOSIS — IMO0002 SOLITARY KIDNEY: ICD-10-CM

## 2021-12-16 DIAGNOSIS — I12.9 HYPERTENSIVE CHRONIC KIDNEY DISEASE WITH STAGE 1 THROUGH STAGE 4 CHRONIC KIDNEY DISEASE, OR UNSPECIFIED CHRONIC KIDNEY DISEASE: ICD-10-CM

## 2021-12-16 LAB
BACTERIA UR QL AUTO: ABNORMAL /HPF
BILIRUB UR QL STRIP: NEGATIVE
CLARITY UR: CLEAR
COLOR UR: YELLOW
CREAT UR-MCNC: 104 MG/DL
GLUCOSE UR STRIP-MCNC: NEGATIVE MG/DL
HGB UR QL STRIP.AUTO: NEGATIVE
HYALINE CASTS #/AREA URNS LPF: ABNORMAL /LPF
INR PPP: 1.7 (ref 0.84–1.19)
KETONES UR STRIP-MCNC: NEGATIVE MG/DL
LEUKOCYTE ESTERASE UR QL STRIP: NEGATIVE
MICROALBUMIN UR-MCNC: 113 MG/L (ref 0–20)
MICROALBUMIN/CREAT 24H UR: 109 MG/G CREATININE (ref 0–30)
NITRITE UR QL STRIP: NEGATIVE
NON-SQ EPI CELLS URNS QL MICRO: ABNORMAL /HPF
PH UR STRIP.AUTO: 6 [PH]
PROT UR STRIP-MCNC: ABNORMAL MG/DL
PROTHROMBIN TIME: 19.2 SECONDS (ref 11.6–14.5)
RBC #/AREA URNS AUTO: ABNORMAL /HPF
SP GR UR STRIP.AUTO: 1.02 (ref 1–1.03)
UROBILINOGEN UR QL STRIP.AUTO: 0.2 E.U./DL
WBC #/AREA URNS AUTO: ABNORMAL /HPF

## 2021-12-16 PROCEDURE — 93296 REM INTERROG EVL PM/IDS: CPT | Performed by: INTERNAL MEDICINE

## 2021-12-16 PROCEDURE — 36415 COLL VENOUS BLD VENIPUNCTURE: CPT

## 2021-12-16 PROCEDURE — 82570 ASSAY OF URINE CREATININE: CPT

## 2021-12-16 PROCEDURE — 81001 URINALYSIS AUTO W/SCOPE: CPT | Performed by: INTERNAL MEDICINE

## 2021-12-16 PROCEDURE — 93294 REM INTERROG EVL PM/LDLS PM: CPT | Performed by: INTERNAL MEDICINE

## 2021-12-16 PROCEDURE — 85610 PROTHROMBIN TIME: CPT

## 2021-12-16 PROCEDURE — 82043 UR ALBUMIN QUANTITATIVE: CPT

## 2021-12-17 ENCOUNTER — ANTICOAG VISIT (OUTPATIENT)
Dept: CARDIOLOGY CLINIC | Facility: CLINIC | Age: 86
End: 2021-12-17

## 2021-12-17 DIAGNOSIS — Z79.4 TYPE 2 DIABETES MELLITUS WITH DIABETIC NEUROPATHY, WITH LONG-TERM CURRENT USE OF INSULIN (HCC): ICD-10-CM

## 2021-12-17 DIAGNOSIS — E11.40 TYPE 2 DIABETES MELLITUS WITH DIABETIC NEUROPATHY, WITH LONG-TERM CURRENT USE OF INSULIN (HCC): ICD-10-CM

## 2021-12-17 DIAGNOSIS — I48.0 PAROXYSMAL ATRIAL FIBRILLATION (HCC): Primary | ICD-10-CM

## 2021-12-18 RX ORDER — PEN NEEDLE, DIABETIC 29 G X1/2"
NEEDLE, DISPOSABLE MISCELLANEOUS
Qty: 100 EACH | Refills: 0 | Status: SHIPPED | OUTPATIENT
Start: 2021-12-18 | End: 2022-02-07

## 2021-12-20 ENCOUNTER — APPOINTMENT (OUTPATIENT)
Dept: LAB | Age: 86
End: 2021-12-20
Payer: COMMERCIAL

## 2021-12-20 DIAGNOSIS — E55.9 VITAMIN D DEFICIENCY, UNSPECIFIED: ICD-10-CM

## 2021-12-20 DIAGNOSIS — Z79.4 TYPE 2 DIABETES MELLITUS WITH OTHER SPECIFIED COMPLICATION, WITH LONG-TERM CURRENT USE OF INSULIN (HCC): ICD-10-CM

## 2021-12-20 DIAGNOSIS — E11.69 TYPE 2 DIABETES MELLITUS WITH OTHER SPECIFIED COMPLICATION, WITH LONG-TERM CURRENT USE OF INSULIN (HCC): ICD-10-CM

## 2021-12-20 DIAGNOSIS — I48.0 PAROXYSMAL ATRIAL FIBRILLATION (HCC): ICD-10-CM

## 2021-12-20 LAB
ALBUMIN SERPL BCP-MCNC: 3.7 G/DL (ref 3.5–5)
ALP SERPL-CCNC: 51 U/L (ref 46–116)
ALT SERPL W P-5'-P-CCNC: 27 U/L (ref 12–78)
ANION GAP SERPL CALCULATED.3IONS-SCNC: 3 MMOL/L (ref 4–13)
AST SERPL W P-5'-P-CCNC: 20 U/L (ref 5–45)
BASOPHILS # BLD AUTO: 0.07 THOUSANDS/ΜL (ref 0–0.1)
BASOPHILS NFR BLD AUTO: 1 % (ref 0–1)
BILIRUB SERPL-MCNC: 0.4 MG/DL (ref 0.2–1)
BUN SERPL-MCNC: 25 MG/DL (ref 5–25)
CALCIUM SERPL-MCNC: 9.6 MG/DL (ref 8.3–10.1)
CHLORIDE SERPL-SCNC: 106 MMOL/L (ref 100–108)
CO2 SERPL-SCNC: 30 MMOL/L (ref 21–32)
CREAT SERPL-MCNC: 2.08 MG/DL (ref 0.6–1.3)
EOSINOPHIL # BLD AUTO: 0.24 THOUSAND/ΜL (ref 0–0.61)
EOSINOPHIL NFR BLD AUTO: 4 % (ref 0–6)
ERYTHROCYTE [DISTWIDTH] IN BLOOD BY AUTOMATED COUNT: 14.2 % (ref 11.6–15.1)
GFR SERPL CREATININE-BSD FRML MDRD: 27 ML/MIN/1.73SQ M
GLUCOSE P FAST SERPL-MCNC: 161 MG/DL (ref 65–99)
HCT VFR BLD AUTO: 41.6 % (ref 36.5–49.3)
HGB BLD-MCNC: 13.3 G/DL (ref 12–17)
IMM GRANULOCYTES # BLD AUTO: 0.02 THOUSAND/UL (ref 0–0.2)
IMM GRANULOCYTES NFR BLD AUTO: 0 % (ref 0–2)
INR PPP: 1.74 (ref 0.84–1.19)
LYMPHOCYTES # BLD AUTO: 1.76 THOUSANDS/ΜL (ref 0.6–4.47)
LYMPHOCYTES NFR BLD AUTO: 28 % (ref 14–44)
MCH RBC QN AUTO: 28.7 PG (ref 26.8–34.3)
MCHC RBC AUTO-ENTMCNC: 32 G/DL (ref 31.4–37.4)
MCV RBC AUTO: 90 FL (ref 82–98)
MONOCYTES # BLD AUTO: 0.65 THOUSAND/ΜL (ref 0.17–1.22)
MONOCYTES NFR BLD AUTO: 11 % (ref 4–12)
NEUTROPHILS # BLD AUTO: 3.47 THOUSANDS/ΜL (ref 1.85–7.62)
NEUTS SEG NFR BLD AUTO: 56 % (ref 43–75)
NRBC BLD AUTO-RTO: 0 /100 WBCS
PLATELET # BLD AUTO: 304 THOUSANDS/UL (ref 149–390)
PMV BLD AUTO: 10.6 FL (ref 8.9–12.7)
POTASSIUM SERPL-SCNC: 4.8 MMOL/L (ref 3.5–5.3)
PROT SERPL-MCNC: 8 G/DL (ref 6.4–8.2)
PROTHROMBIN TIME: 19.5 SECONDS (ref 11.6–14.5)
RBC # BLD AUTO: 4.63 MILLION/UL (ref 3.88–5.62)
SODIUM SERPL-SCNC: 139 MMOL/L (ref 136–145)
WBC # BLD AUTO: 6.21 THOUSAND/UL (ref 4.31–10.16)

## 2021-12-20 PROCEDURE — 85610 PROTHROMBIN TIME: CPT

## 2021-12-20 PROCEDURE — 83036 HEMOGLOBIN GLYCOSYLATED A1C: CPT

## 2021-12-20 PROCEDURE — 85025 COMPLETE CBC W/AUTO DIFF WBC: CPT

## 2021-12-20 PROCEDURE — 36415 COLL VENOUS BLD VENIPUNCTURE: CPT

## 2021-12-20 PROCEDURE — 82306 VITAMIN D 25 HYDROXY: CPT

## 2021-12-20 PROCEDURE — 80053 COMPREHEN METABOLIC PANEL: CPT

## 2021-12-21 ENCOUNTER — ANTICOAG VISIT (OUTPATIENT)
Dept: CARDIOLOGY CLINIC | Facility: CLINIC | Age: 86
End: 2021-12-21

## 2021-12-21 DIAGNOSIS — I48.0 PAROXYSMAL ATRIAL FIBRILLATION (HCC): Primary | ICD-10-CM

## 2021-12-21 LAB
25(OH)D3 SERPL-MCNC: 30.6 NG/ML (ref 30–100)
EST. AVERAGE GLUCOSE BLD GHB EST-MCNC: 160 MG/DL
HBA1C MFR BLD: 7.2 %

## 2021-12-30 ENCOUNTER — APPOINTMENT (OUTPATIENT)
Dept: LAB | Age: 86
End: 2021-12-30
Payer: MEDICARE

## 2021-12-30 DIAGNOSIS — I48.0 PAROXYSMAL ATRIAL FIBRILLATION (HCC): ICD-10-CM

## 2021-12-30 LAB
INR PPP: 1.41 (ref 0.84–1.19)
PROTHROMBIN TIME: 16.6 SECONDS (ref 11.6–14.5)

## 2021-12-30 PROCEDURE — 85610 PROTHROMBIN TIME: CPT

## 2021-12-30 PROCEDURE — 36415 COLL VENOUS BLD VENIPUNCTURE: CPT

## 2022-01-03 ENCOUNTER — ANTICOAG VISIT (OUTPATIENT)
Dept: CARDIOLOGY CLINIC | Facility: CLINIC | Age: 87
End: 2022-01-03

## 2022-01-03 DIAGNOSIS — I48.0 PAROXYSMAL ATRIAL FIBRILLATION (HCC): Primary | ICD-10-CM

## 2022-01-05 ENCOUNTER — APPOINTMENT (OUTPATIENT)
Dept: LAB | Age: 87
End: 2022-01-05
Payer: MEDICARE

## 2022-01-05 DIAGNOSIS — I48.91 ATRIAL FIBRILLATION, UNSPECIFIED TYPE (HCC): ICD-10-CM

## 2022-01-05 DIAGNOSIS — I48.0 PAROXYSMAL ATRIAL FIBRILLATION (HCC): ICD-10-CM

## 2022-01-05 DIAGNOSIS — Z11.59 SCREENING FOR VIRAL DISEASE: Primary | ICD-10-CM

## 2022-01-05 LAB
INR PPP: 1.38 (ref 0.84–1.19)
PROTHROMBIN TIME: 16.4 SECONDS (ref 11.6–14.5)

## 2022-01-05 PROCEDURE — 36415 COLL VENOUS BLD VENIPUNCTURE: CPT

## 2022-01-05 PROCEDURE — U0003 INFECTIOUS AGENT DETECTION BY NUCLEIC ACID (DNA OR RNA); SEVERE ACUTE RESPIRATORY SYNDROME CORONAVIRUS 2 (SARS-COV-2) (CORONAVIRUS DISEASE [COVID-19]), AMPLIFIED PROBE TECHNIQUE, MAKING USE OF HIGH THROUGHPUT TECHNOLOGIES AS DESCRIBED BY CMS-2020-01-R: HCPCS | Performed by: INTERNAL MEDICINE

## 2022-01-05 PROCEDURE — U0005 INFEC AGEN DETEC AMPLI PROBE: HCPCS | Performed by: INTERNAL MEDICINE

## 2022-01-05 PROCEDURE — 85610 PROTHROMBIN TIME: CPT

## 2022-01-06 ENCOUNTER — ANTICOAG VISIT (OUTPATIENT)
Dept: CARDIOLOGY CLINIC | Facility: CLINIC | Age: 87
End: 2022-01-06

## 2022-01-06 DIAGNOSIS — I48.0 PAROXYSMAL ATRIAL FIBRILLATION (HCC): Primary | ICD-10-CM

## 2022-01-06 DIAGNOSIS — I48.91 ATRIAL FIBRILLATION, UNSPECIFIED TYPE (HCC): ICD-10-CM

## 2022-01-06 RX ORDER — METOPROLOL TARTRATE 100 MG/1
TABLET ORAL
Qty: 90 TABLET | Refills: 0 | Status: SHIPPED | OUTPATIENT
Start: 2022-01-06 | End: 2022-01-07

## 2022-01-07 ENCOUNTER — TELEPHONE (OUTPATIENT)
Dept: OTHER | Facility: OTHER | Age: 87
End: 2022-01-07

## 2022-01-07 DIAGNOSIS — I48.91 ATRIAL FIBRILLATION, UNSPECIFIED TYPE (HCC): ICD-10-CM

## 2022-01-07 RX ORDER — METOPROLOL TARTRATE 100 MG/1
TABLET ORAL
Qty: 90 TABLET | Refills: 0 | Status: SHIPPED | OUTPATIENT
Start: 2022-01-07 | End: 2022-01-08

## 2022-01-07 NOTE — PROGRESS NOTES
Left message yesterday and again today to see why had another lab test done yesterday and to make sure she understood directions from when she was called Monday

## 2022-01-08 DIAGNOSIS — I48.91 ATRIAL FIBRILLATION, UNSPECIFIED TYPE (HCC): ICD-10-CM

## 2022-01-08 RX ORDER — METOPROLOL TARTRATE 100 MG/1
TABLET ORAL
Qty: 90 TABLET | Refills: 0 | Status: SHIPPED | OUTPATIENT
Start: 2022-01-08 | End: 2022-01-09

## 2022-01-08 NOTE — TELEPHONE ENCOUNTER
The patient was called for notification of a test result for COVID-19  The patient did not answer the phone and a voicemail was left requesting a call back to [phone], Option 7

## 2022-01-09 DIAGNOSIS — I48.91 ATRIAL FIBRILLATION, UNSPECIFIED TYPE (HCC): ICD-10-CM

## 2022-01-09 DIAGNOSIS — E78.5 HYPERLIPIDEMIA, UNSPECIFIED HYPERLIPIDEMIA TYPE: ICD-10-CM

## 2022-01-09 RX ORDER — METOPROLOL TARTRATE 100 MG/1
TABLET ORAL
Qty: 90 TABLET | Refills: 0 | Status: SHIPPED | OUTPATIENT
Start: 2022-01-09 | End: 2022-01-10

## 2022-01-10 ENCOUNTER — TELEPHONE (OUTPATIENT)
Dept: INTERNAL MEDICINE CLINIC | Facility: CLINIC | Age: 87
End: 2022-01-10

## 2022-01-10 DIAGNOSIS — I48.91 ATRIAL FIBRILLATION, UNSPECIFIED TYPE (HCC): ICD-10-CM

## 2022-01-10 RX ORDER — ATORVASTATIN CALCIUM 10 MG/1
TABLET, FILM COATED ORAL
Qty: 90 TABLET | Refills: 0 | Status: SHIPPED | OUTPATIENT
Start: 2022-01-10 | End: 2022-07-21 | Stop reason: SDUPTHER

## 2022-01-10 RX ORDER — METOPROLOL TARTRATE 100 MG/1
TABLET ORAL
Qty: 90 TABLET | Refills: 0 | Status: SHIPPED | OUTPATIENT
Start: 2022-01-10

## 2022-01-10 RX ORDER — METOPROLOL TARTRATE 100 MG/1
TABLET ORAL
Qty: 90 TABLET | Refills: 0 | Status: SHIPPED | OUTPATIENT
Start: 2022-01-10 | End: 2022-01-10

## 2022-01-12 ENCOUNTER — APPOINTMENT (OUTPATIENT)
Dept: LAB | Age: 87
End: 2022-01-12
Payer: MEDICARE

## 2022-01-12 DIAGNOSIS — I48.0 PAROXYSMAL ATRIAL FIBRILLATION (HCC): ICD-10-CM

## 2022-01-12 LAB
INR PPP: 1.33 (ref 0.84–1.19)
PROTHROMBIN TIME: 16 SECONDS (ref 11.6–14.5)

## 2022-01-12 PROCEDURE — 85610 PROTHROMBIN TIME: CPT

## 2022-01-12 PROCEDURE — 36415 COLL VENOUS BLD VENIPUNCTURE: CPT

## 2022-01-13 ENCOUNTER — ANTICOAG VISIT (OUTPATIENT)
Dept: CARDIOLOGY CLINIC | Facility: CLINIC | Age: 87
End: 2022-01-13

## 2022-01-13 DIAGNOSIS — I48.0 PAROXYSMAL ATRIAL FIBRILLATION (HCC): Primary | ICD-10-CM

## 2022-01-13 NOTE — PROGRESS NOTES
Spoke with patients wife, advised INR low  Lacretia Harbour states patient has not missed any doses, but she has only giving him half of the dose, asking what to give him, but states she is in  procession  I advised to call office later to discuss further

## 2022-01-13 NOTE — LETTER
January 17, 2022     [address] [id number]    Patient: Morgan Torres   YOB: 1935   Date of Visit: 1/17/2022       Dear Lin Hinojosa: We have been trying to reach you to discuss the results of your PT INR  Please call the office at your earliest convenience at [phone]         Sincerely,        [address] [facility] nursing staff

## 2022-01-17 NOTE — PROGRESS NOTES
Attempted to contact Jerri, cell #, no answer and mail box is full  Home # is busy       Unable to reach you letter sent

## 2022-01-17 NOTE — PROGRESS NOTES
Finally able to reach wife   told her VM is full home phone rings no answere or busy   told her importance of calling us the next day if does not hear from us  She has only been giving 2 5 mg daily  Will take 5 m and F as instructed before and 2 5 others recheck in 1 week (Mon)1/24/21   letter retracted from mail

## 2022-01-25 ENCOUNTER — APPOINTMENT (OUTPATIENT)
Dept: LAB | Age: 87
End: 2022-01-25
Payer: MEDICARE

## 2022-01-25 DIAGNOSIS — I48.0 PAROXYSMAL ATRIAL FIBRILLATION (HCC): ICD-10-CM

## 2022-01-25 LAB
INR PPP: 1.42 (ref 0.84–1.19)
PROTHROMBIN TIME: 16.7 SECONDS (ref 11.6–14.5)

## 2022-01-25 PROCEDURE — 85610 PROTHROMBIN TIME: CPT

## 2022-01-25 PROCEDURE — 36415 COLL VENOUS BLD VENIPUNCTURE: CPT

## 2022-01-26 ENCOUNTER — ANTICOAG VISIT (OUTPATIENT)
Dept: CARDIOLOGY CLINIC | Facility: CLINIC | Age: 87
End: 2022-01-26

## 2022-01-26 DIAGNOSIS — I48.0 PAROXYSMAL ATRIAL FIBRILLATION (HCC): Primary | ICD-10-CM

## 2022-01-26 NOTE — PROGRESS NOTES
I spoke with Samson Harris, advised INR still low, asked if patient taking full tablet (5 mg) on Mon and Fri and 1/2 tab (2 5 mg) other days  Samson Harris states she has just been giving patient 1/2 tablet daily  Will have patient take as directed previously     Will recheck on 1 week 2/2/22

## 2022-02-01 ENCOUNTER — APPOINTMENT (OUTPATIENT)
Dept: LAB | Age: 87
End: 2022-02-01
Payer: COMMERCIAL

## 2022-02-01 DIAGNOSIS — N18.4 TYPE 2 DIABETES MELLITUS WITH STAGE 4 CHRONIC KIDNEY DISEASE, WITH LONG-TERM CURRENT USE OF INSULIN (HCC): ICD-10-CM

## 2022-02-01 DIAGNOSIS — I48.91 ATRIAL FIBRILLATION, UNSPECIFIED TYPE (HCC): ICD-10-CM

## 2022-02-01 DIAGNOSIS — IMO0002 SOLITARY KIDNEY: ICD-10-CM

## 2022-02-01 DIAGNOSIS — I12.9 HYPERTENSIVE CHRONIC KIDNEY DISEASE WITH STAGE 1 THROUGH STAGE 4 CHRONIC KIDNEY DISEASE, OR UNSPECIFIED CHRONIC KIDNEY DISEASE: ICD-10-CM

## 2022-02-01 DIAGNOSIS — E11.22 TYPE 2 DIABETES MELLITUS WITH STAGE 4 CHRONIC KIDNEY DISEASE, WITH LONG-TERM CURRENT USE OF INSULIN (HCC): ICD-10-CM

## 2022-02-01 DIAGNOSIS — E78.2 MIXED HYPERLIPIDEMIA: ICD-10-CM

## 2022-02-01 DIAGNOSIS — Z79.4 TYPE 2 DIABETES MELLITUS WITH STAGE 4 CHRONIC KIDNEY DISEASE, WITH LONG-TERM CURRENT USE OF INSULIN (HCC): ICD-10-CM

## 2022-02-01 LAB
ALBUMIN SERPL BCP-MCNC: 3.7 G/DL (ref 3.5–5)
ANION GAP SERPL CALCULATED.3IONS-SCNC: 7 MMOL/L (ref 4–13)
BUN SERPL-MCNC: 25 MG/DL (ref 5–25)
CALCIUM SERPL-MCNC: 9.4 MG/DL (ref 8.3–10.1)
CHLORIDE SERPL-SCNC: 106 MMOL/L (ref 100–108)
CO2 SERPL-SCNC: 26 MMOL/L (ref 21–32)
CREAT SERPL-MCNC: 2.12 MG/DL (ref 0.6–1.3)
ERYTHROCYTE [DISTWIDTH] IN BLOOD BY AUTOMATED COUNT: 13.9 % (ref 11.6–15.1)
GFR SERPL CREATININE-BSD FRML MDRD: 27 ML/MIN/1.73SQ M
GLUCOSE P FAST SERPL-MCNC: 155 MG/DL (ref 65–99)
HCT VFR BLD AUTO: 40.4 % (ref 36.5–49.3)
HGB BLD-MCNC: 13.2 G/DL (ref 12–17)
INR PPP: 1.57 (ref 0.84–1.19)
MCH RBC QN AUTO: 28.4 PG (ref 26.8–34.3)
MCHC RBC AUTO-ENTMCNC: 32.7 G/DL (ref 31.4–37.4)
MCV RBC AUTO: 87 FL (ref 82–98)
PHOSPHATE SERPL-MCNC: 2.9 MG/DL (ref 2.3–4.1)
PLATELET # BLD AUTO: 250 THOUSANDS/UL (ref 149–390)
PMV BLD AUTO: 10.1 FL (ref 8.9–12.7)
POTASSIUM SERPL-SCNC: 4.3 MMOL/L (ref 3.5–5.3)
PROTHROMBIN TIME: 18.1 SECONDS (ref 11.6–14.5)
PTH-INTACT SERPL-MCNC: 38.6 PG/ML (ref 18.4–80.1)
RBC # BLD AUTO: 4.65 MILLION/UL (ref 3.88–5.62)
SODIUM SERPL-SCNC: 139 MMOL/L (ref 136–145)
URATE SERPL-MCNC: 4.7 MG/DL (ref 4.2–8)
WBC # BLD AUTO: 6.74 THOUSAND/UL (ref 4.31–10.16)

## 2022-02-01 PROCEDURE — 80069 RENAL FUNCTION PANEL: CPT

## 2022-02-01 PROCEDURE — 84550 ASSAY OF BLOOD/URIC ACID: CPT

## 2022-02-01 PROCEDURE — 36415 COLL VENOUS BLD VENIPUNCTURE: CPT

## 2022-02-01 PROCEDURE — 85610 PROTHROMBIN TIME: CPT

## 2022-02-01 PROCEDURE — 85027 COMPLETE CBC AUTOMATED: CPT

## 2022-02-01 PROCEDURE — 83970 ASSAY OF PARATHORMONE: CPT

## 2022-02-02 ENCOUNTER — TELEPHONE (OUTPATIENT)
Dept: NEPHROLOGY | Facility: CLINIC | Age: 87
End: 2022-02-02

## 2022-02-02 ENCOUNTER — ANTICOAG VISIT (OUTPATIENT)
Dept: CARDIOLOGY CLINIC | Facility: CLINIC | Age: 87
End: 2022-02-02

## 2022-02-02 DIAGNOSIS — I48.0 PAROXYSMAL ATRIAL FIBRILLATION (HCC): Primary | ICD-10-CM

## 2022-02-02 NOTE — TELEPHONE ENCOUNTER
Overall renal function remains stable, discussed with wife  No changes in current regimen  Follow-up appointment scheduled for next month

## 2022-02-02 NOTE — PROGRESS NOTES
Spoke with Ramez Cuadra, advised INR remains low, will have patient take 2 5 mg Mon, Wed and Fri and 5 mg other days   Will recheck in 2 weeks 2/15/22

## 2022-02-06 DIAGNOSIS — Z79.4 TYPE 2 DIABETES MELLITUS WITH DIABETIC NEUROPATHY, WITH LONG-TERM CURRENT USE OF INSULIN (HCC): ICD-10-CM

## 2022-02-06 DIAGNOSIS — E11.40 TYPE 2 DIABETES MELLITUS WITH DIABETIC NEUROPATHY, WITH LONG-TERM CURRENT USE OF INSULIN (HCC): ICD-10-CM

## 2022-02-07 RX ORDER — PEN NEEDLE, DIABETIC 29 G X1/2"
NEEDLE, DISPOSABLE MISCELLANEOUS
Qty: 100 EACH | Refills: 0 | Status: SHIPPED | OUTPATIENT
Start: 2022-02-07 | End: 2022-05-16 | Stop reason: SDUPTHER

## 2022-02-08 ENCOUNTER — OFFICE VISIT (OUTPATIENT)
Dept: CARDIOLOGY CLINIC | Facility: CLINIC | Age: 87
End: 2022-02-08
Payer: COMMERCIAL

## 2022-02-08 VITALS
HEART RATE: 73 BPM | DIASTOLIC BLOOD PRESSURE: 60 MMHG | OXYGEN SATURATION: 97 % | BODY MASS INDEX: 29.62 KG/M2 | WEIGHT: 200 LBS | SYSTOLIC BLOOD PRESSURE: 122 MMHG | HEIGHT: 69 IN

## 2022-02-08 DIAGNOSIS — N18.4 CKD (CHRONIC KIDNEY DISEASE), STAGE IV (HCC): ICD-10-CM

## 2022-02-08 DIAGNOSIS — E78.2 MIXED HYPERLIPIDEMIA: ICD-10-CM

## 2022-02-08 DIAGNOSIS — I10 ESSENTIAL HYPERTENSION: ICD-10-CM

## 2022-02-08 DIAGNOSIS — I48.0 PAROXYSMAL ATRIAL FIBRILLATION (HCC): ICD-10-CM

## 2022-02-08 DIAGNOSIS — I25.10 CORONARY ARTERY DISEASE INVOLVING NATIVE CORONARY ARTERY OF NATIVE HEART WITHOUT ANGINA PECTORIS: Primary | ICD-10-CM

## 2022-02-08 PROCEDURE — 99214 OFFICE O/P EST MOD 30 MIN: CPT | Performed by: INTERNAL MEDICINE

## 2022-02-08 NOTE — PROGRESS NOTES
Assessment/Plan:    Essential hypertension   Hypertension, stable and adequately controlled  We will continue present regimen  Paroxysmal atrial fibrillation (HCC)    Paroxysmal atrial fibrillation, stable  Patient has a pacemaker  He is asymptomatic  We will continue present regimen  Coronary artery disease involving native coronary artery of native heart without angina pectoris    Coronary artery disease, stable  No symptoms of angina or signs of heart failure  CKD (chronic kidney disease), stage IV [facility])  Lab Results   Component Value Date    EGFR 27 02/01/2022    EGFR 27 12/20/2021    EGFR 29 08/09/2021    CREATININE 2 12 (H) 02/01/2022    CREATININE 2 08 (H) 12/20/2021    CREATININE 2 03 (H) 08/09/2021     History of renal insufficiency with a prior history nephrectomy  Mixed hyperlipidemia    Hyperlipidemia, stable  The patient is on atorvastatin 10 mg daily and fenofibrate 145 mg daily  Diagnoses and all orders for this visit:    Coronary artery disease involving native coronary artery of native heart without angina pectoris    Essential hypertension    Paroxysmal atrial fibrillation (HCC)    Mixed hyperlipidemia    CKD (chronic kidney disease), stage IV ([facility] [address] )          Subjective:   Feels well dimitris     Patient ID: Hussain Gudino is a 80 y o  male  The patient presented to this office for the purpose of cardiac follow-up  He known to have a history of coronary artery disease with hypertension and hyperlipidemia as well history atrial fibrillation with amanda-tachy syndrome  Patient has been feeling well from the cardiac standpoint denying any symptoms of chest pain, shortness of breath palpitation, dizziness or lightheadedness  He has no leg edema        The following portions of the patient's history were reviewed and updated as appropriate: allergies, current medications, past family history, past medical history, past social history, past surgical history and problem list     Review of Systems   Respiratory: Negative for apnea, cough, chest tightness, shortness of breath and wheezing  Cardiovascular: Negative for chest pain, palpitations and leg swelling  Gastrointestinal: Negative for abdominal pain  Neurological: Negative for dizziness and light-headedness  Psychiatric/Behavioral: Negative  Objective:  Stable cardiac-wise  /60 (BP Location: Left arm, Patient Position: Sitting, Cuff Size: Large)   Pulse 73   Ht 5' 9" (1 753 m)   Wt 90 7 kg (200 lb)   SpO2 97%   BMI 29 53 kg/m²          Physical Exam  Vitals reviewed  Constitutional:       General: He is not in acute distress  Appearance: He is well-developed  He is not diaphoretic  HENT:      Head: Normocephalic  Eyes:      Pupils: Pupils are equal, round, and reactive to light  Neck:      Thyroid: No thyromegaly  Vascular: No JVD  Cardiovascular:      Rate and Rhythm: Normal rate and regular rhythm  Heart sounds: S1 normal and S2 normal  Murmur heard  Crescendo systolic murmur is present with a grade of 1/6  No friction rub  No gallop  Pulmonary:      Effort: Pulmonary effort is normal  No respiratory distress  Breath sounds: Normal breath sounds  No wheezing or rales  Chest:      Chest wall: No tenderness  Abdominal:      Palpations: Abdomen is soft  Musculoskeletal:         General: No tenderness or deformity  Normal range of motion  Cervical back: Normal range of motion  Right lower leg: No edema  Left lower leg: No edema  Skin:     General: Skin is warm and dry  Neurological:      Mental Status: He is alert and oriented to person, place, and time     Psychiatric:         Mood and Affect: Mood normal          Behavior: Behavior normal

## 2022-02-08 NOTE — ASSESSMENT & PLAN NOTE
Hyperlipidemia, stable  The patient is on atorvastatin 10 mg daily and fenofibrate 145 mg daily  walked in with c/o chest pain yesterday and  left hand numbness started this afternoon

## 2022-02-08 NOTE — LETTER
February 8, 2022     Valeluis Vera, [address]  [address] [place] [address]    Patient: Bi Jara   YOB: 1935   Date of Visit: 2/8/2022       Dear Dr Nikkie Isidro:    Thank you for referring Leann Garay to me for evaluation  Below are my notes for this consultation  If you have questions, please do not hesitate to call me  I look forward to following your patient along with you  Sincerely,        Romero Aviles MD        CC: No Recipients  Romero Aviles MD  2/8/2022  2:58 PM  Sign when Signing Visit  Assessment/Plan:    Essential hypertension   Hypertension, stable and adequately controlled  We will continue present regimen  Paroxysmal atrial fibrillation (HCC)    Paroxysmal atrial fibrillation, stable  Patient has a pacemaker  He is asymptomatic  We will continue present regimen  Coronary artery disease involving native coronary artery of native heart without angina pectoris    Coronary artery disease, stable  No symptoms of angina or signs of heart failure  CKD (chronic kidney disease), stage IV [facility])  Lab Results   Component Value Date    EGFR 27 02/01/2022    EGFR 27 12/20/2021    EGFR 29 08/09/2021    CREATININE 2 12 (H) 02/01/2022    CREATININE 2 08 (H) 12/20/2021    CREATININE 2 03 (H) 08/09/2021     History of renal insufficiency with a prior history nephrectomy  Mixed hyperlipidemia    Hyperlipidemia, stable  The patient is on atorvastatin 10 mg daily and fenofibrate 145 mg daily  Diagnoses and all orders for this visit:    Coronary artery disease involving native coronary artery of native heart without angina pectoris    Essential hypertension    Paroxysmal atrial fibrillation (HCC)    Mixed hyperlipidemia    CKD (chronic kidney disease), stage IV ([facility] [address] )          Subjective:   Feels well dimitris     Patient ID: Bi Jara is a 80 y o  male  The patient presented to this office for the purpose of cardiac follow-up    He known to have a history of coronary artery disease with hypertension and hyperlipidemia as well history atrial fibrillation with amanda-tachy syndrome  Patient has been feeling well from the cardiac standpoint denying any symptoms of chest pain, shortness of breath palpitation, dizziness or lightheadedness  He has no leg edema  The following portions of the patient's history were reviewed and updated as appropriate: allergies, current medications, past family history, past medical history, past social history, past surgical history and problem list     Review of Systems   Respiratory: Negative for apnea, cough, chest tightness, shortness of breath and wheezing  Cardiovascular: Negative for chest pain, palpitations and leg swelling  Gastrointestinal: Negative for abdominal pain  Neurological: Negative for dizziness and light-headedness  Psychiatric/Behavioral: Negative  Objective:  Stable cardiac-wise  /60 (BP Location: Left arm, Patient Position: Sitting, Cuff Size: Large)   Pulse 73   Ht 5' 9" (1 753 m)   Wt 90 7 kg (200 lb)   SpO2 97%   BMI 29 53 kg/m²          Physical Exam  Vitals reviewed  Constitutional:       General: He is not in acute distress  Appearance: He is well-developed  He is not diaphoretic  HENT:      Head: Normocephalic  Eyes:      Pupils: Pupils are equal, round, and reactive to light  Neck:      Thyroid: No thyromegaly  Vascular: No JVD  Cardiovascular:      Rate and Rhythm: Normal rate and regular rhythm  Heart sounds: S1 normal and S2 normal  Murmur heard  Crescendo systolic murmur is present with a grade of 1/6  No friction rub  No gallop  Pulmonary:      Effort: Pulmonary effort is normal  No respiratory distress  Breath sounds: Normal breath sounds  No wheezing or rales  Chest:      Chest wall: No tenderness  Abdominal:      Palpations: Abdomen is soft  Musculoskeletal:         General: No tenderness or deformity  Normal range of motion  Cervical back: Normal range of motion  Right lower leg: No edema  Left lower leg: No edema  Skin:     General: Skin is warm and dry  Neurological:      Mental Status: He is alert and oriented to person, place, and time     Psychiatric:         Mood and Affect: Mood normal          Behavior: Behavior normal

## 2022-02-08 NOTE — LETTER
February 8, 2022     Ernie Cristobal, [address]  [address] [place] [address]    Patient: Deven Cutler   YOB: 1935   Date of Visit: 2/8/2022       Dear Dr Alfonso Lowe:    Thank you for referring Won Deng to me for evaluation  Below are my notes for this consultation  If you have questions, please do not hesitate to call me  I look forward to following your patient along with you  Sincerely,        Sixto Paulson MD        CC: No Recipients  Sixto Paulson MD  2/8/2022  2:58 PM  Sign when Signing Visit  Assessment/Plan:    Essential hypertension   Hypertension, stable and adequately controlled  We will continue present regimen  Paroxysmal atrial fibrillation (HCC)    Paroxysmal atrial fibrillation, stable  Patient has a pacemaker  He is asymptomatic  We will continue present regimen  Coronary artery disease involving native coronary artery of native heart without angina pectoris    Coronary artery disease, stable  No symptoms of angina or signs of heart failure  CKD (chronic kidney disease), stage IV [facility])  Lab Results   Component Value Date    EGFR 27 02/01/2022    EGFR 27 12/20/2021    EGFR 29 08/09/2021    CREATININE 2 12 (H) 02/01/2022    CREATININE 2 08 (H) 12/20/2021    CREATININE 2 03 (H) 08/09/2021     History of renal insufficiency with a prior history nephrectomy  Mixed hyperlipidemia    Hyperlipidemia, stable  The patient is on atorvastatin 10 mg daily and fenofibrate 145 mg daily  Diagnoses and all orders for this visit:    Coronary artery disease involving native coronary artery of native heart without angina pectoris    Essential hypertension    Paroxysmal atrial fibrillation (HCC)    Mixed hyperlipidemia    CK[facility]          Subjective:   Feels well dimitris     Patient ID: Deven Cutler is a 80 y o  male  The patient presented to this office for the purpose of cardiac follow-up    He known to have a history of coronary artery disease with hypertension and hyperlipidemia as well history atrial fibrillation with amanda-tachy syndrome  Patient has been feeling well from the cardiac standpoint denying any symptoms of chest pain, shortness of breath palpitation, dizziness or lightheadedness  He has no leg edema  The following portions of the patient's history were reviewed and updated as appropriate: allergies, current medications, past family history, past medical history, past social history, past surgical history and problem list     Review of Systems   Respiratory: Negative for apnea, cough, chest tightness, shortness of breath and wheezing  Cardiovascular: Negative for chest pain, palpitations and leg swelling  Gastrointestinal: Negative for abdominal pain  Neurological: Negative for dizziness and light-headedness  Psychiatric/Behavioral: Negative  Objective:  Stable cardiac-wise  /60 (BP Location: Left arm, Patient Position: Sitting, Cuff Size: Large)   Pulse 73   Ht 5' 9" (1 753 m)   Wt 90 7 kg (200 lb)   SpO2 97%   BMI 29 53 kg/m²          Physical Exam  Vitals reviewed  Constitutional:       General: He is not in acute distress  Appearance: He is well-developed  He is not diaphoretic  HENT:      Head: Normocephalic  Eyes:      Pupils: Pupils are equal, round, and reactive to light  Neck:      Thyroid: No thyromegaly  Vascular: No JVD  Cardiovascular:      Rate and Rhythm: Normal rate and regular rhythm  Heart sounds: S1 normal and S2 normal  Murmur heard  Crescendo systolic murmur is present with a grade of 1/6  No friction rub  No gallop  Pulmonary:      Effort: Pulmonary effort is normal  No respiratory distress  Breath sounds: Normal breath sounds  No wheezing or rales  Chest:      Chest wall: No tenderness  Abdominal:      Palpations: Abdomen is soft  Musculoskeletal:         General: No tenderness or deformity  Normal range of motion  Cervical back: Normal range of motion  Right lower leg: No edema  Left lower leg: No edema  Skin:     General: Skin is warm and dry  Neurological:      Mental Status: He is alert and oriented to person, place, and time     Psychiatric:         Mood and Affect: Mood normal          Behavior: Behavior normal

## 2022-02-08 NOTE — ASSESSMENT & PLAN NOTE
Lab Results   Component Value Date    EGFR 27 02/01/2022    EGFR 27 12/20/2021    EGFR 29 08/09/2021    CREATININE 2 12 (H) 02/01/2022    CREATININE 2 08 (H) 12/20/2021    CREATININE 2 03 (H) 08/09/2021     History of renal insufficiency with a prior history nephrectomy

## 2022-02-08 NOTE — ASSESSMENT & PLAN NOTE
Paroxysmal atrial fibrillation, stable  Patient has a pacemaker  He is asymptomatic  We will continue present regimen

## 2022-02-09 DIAGNOSIS — I10 ESSENTIAL HYPERTENSION: ICD-10-CM

## 2022-02-10 RX ORDER — METOPROLOL TARTRATE 50 MG/1
50 TABLET, FILM COATED ORAL
Qty: 180 TABLET | Refills: 2 | Status: SHIPPED | OUTPATIENT
Start: 2022-02-10 | End: 2022-06-29 | Stop reason: DRUGHIGH

## 2022-02-10 RX ORDER — LOSARTAN POTASSIUM 25 MG/1
25 TABLET ORAL DAILY
Qty: 90 TABLET | Refills: 4 | Status: SHIPPED | OUTPATIENT
Start: 2022-02-10 | End: 2022-05-27 | Stop reason: SDUPTHER

## 2022-02-11 DIAGNOSIS — E11.40 TYPE 2 DIABETES MELLITUS WITH DIABETIC NEUROPATHY, WITH LONG-TERM CURRENT USE OF INSULIN (HCC): ICD-10-CM

## 2022-02-11 DIAGNOSIS — Z79.4 TYPE 2 DIABETES MELLITUS WITH DIABETIC NEUROPATHY, WITH LONG-TERM CURRENT USE OF INSULIN (HCC): ICD-10-CM

## 2022-02-11 RX ORDER — BLOOD SUGAR DIAGNOSTIC
STRIP MISCELLANEOUS
Qty: 300 STRIP | Refills: 0 | Status: SHIPPED | OUTPATIENT
Start: 2022-02-11 | End: 2022-07-14 | Stop reason: SDUPTHER

## 2022-02-14 ENCOUNTER — APPOINTMENT (OUTPATIENT)
Dept: LAB | Age: 87
End: 2022-02-14
Payer: COMMERCIAL

## 2022-02-14 DIAGNOSIS — Z79.4 ENCOUNTER FOR LONG-TERM (CURRENT) USE OF INSULIN (HCC): ICD-10-CM

## 2022-02-14 DIAGNOSIS — N18.4 CHRONIC KIDNEY DISEASE, STAGE IV (SEVERE) (HCC): ICD-10-CM

## 2022-02-14 DIAGNOSIS — E78.5 HYPERLIPIDEMIA, UNSPECIFIED HYPERLIPIDEMIA TYPE: ICD-10-CM

## 2022-02-14 DIAGNOSIS — N18.6 TYPE 2 DIABETES MELLITUS WITH ESRD (END-STAGE RENAL DISEASE) (HCC): ICD-10-CM

## 2022-02-14 DIAGNOSIS — E11.22 TYPE 2 DIABETES MELLITUS WITH ESRD (END-STAGE RENAL DISEASE) (HCC): ICD-10-CM

## 2022-02-14 LAB
ALBUMIN SERPL BCP-MCNC: 3.7 G/DL (ref 3.5–5)
ANION GAP SERPL CALCULATED.3IONS-SCNC: 3 MMOL/L (ref 4–13)
BUN SERPL-MCNC: 24 MG/DL (ref 5–25)
CALCIUM SERPL-MCNC: 9.2 MG/DL (ref 8.3–10.1)
CHLORIDE SERPL-SCNC: 107 MMOL/L (ref 100–108)
CO2 SERPL-SCNC: 28 MMOL/L (ref 21–32)
CREAT SERPL-MCNC: 1.98 MG/DL (ref 0.6–1.3)
ERYTHROCYTE [DISTWIDTH] IN BLOOD BY AUTOMATED COUNT: 13.5 % (ref 11.6–15.1)
GFR SERPL CREATININE-BSD FRML MDRD: 29 ML/MIN/1.73SQ M
GLUCOSE P FAST SERPL-MCNC: 156 MG/DL (ref 65–99)
HCT VFR BLD AUTO: 43.2 % (ref 36.5–49.3)
HGB BLD-MCNC: 14.1 G/DL (ref 12–17)
MCH RBC QN AUTO: 29 PG (ref 26.8–34.3)
MCHC RBC AUTO-ENTMCNC: 32.6 G/DL (ref 31.4–37.4)
MCV RBC AUTO: 89 FL (ref 82–98)
PHOSPHATE SERPL-MCNC: 3.4 MG/DL (ref 2.3–4.1)
PLATELET # BLD AUTO: 263 THOUSANDS/UL (ref 149–390)
PMV BLD AUTO: 10.2 FL (ref 8.9–12.7)
POTASSIUM SERPL-SCNC: 4.9 MMOL/L (ref 3.5–5.3)
PTH-INTACT SERPL-MCNC: 31.3 PG/ML (ref 18.4–80.1)
RBC # BLD AUTO: 4.86 MILLION/UL (ref 3.88–5.62)
SODIUM SERPL-SCNC: 138 MMOL/L (ref 136–145)
URATE SERPL-MCNC: 4.5 MG/DL (ref 4.2–8)
WBC # BLD AUTO: 6.22 THOUSAND/UL (ref 4.31–10.16)

## 2022-02-14 PROCEDURE — 84550 ASSAY OF BLOOD/URIC ACID: CPT

## 2022-02-14 PROCEDURE — 36415 COLL VENOUS BLD VENIPUNCTURE: CPT

## 2022-02-14 PROCEDURE — 83970 ASSAY OF PARATHORMONE: CPT

## 2022-02-14 PROCEDURE — 85027 COMPLETE CBC AUTOMATED: CPT

## 2022-02-14 PROCEDURE — 80069 RENAL FUNCTION PANEL: CPT

## 2022-02-24 DIAGNOSIS — F33.0 MILD EPISODE OF RECURRENT MAJOR DEPRESSIVE DISORDER (HCC): ICD-10-CM

## 2022-02-24 NOTE — TELEPHONE ENCOUNTER
received fax from [employer] stating that pt is requesting sertraline 100mg   Refill entered, please sign off

## 2022-02-25 RX ORDER — SERTRALINE HYDROCHLORIDE 100 MG/1
100 TABLET, FILM COATED ORAL DAILY
Qty: 90 TABLET | Refills: 1 | Status: SHIPPED | OUTPATIENT
Start: 2022-02-25 | End: 2022-05-25 | Stop reason: SDUPTHER

## 2022-03-02 ENCOUNTER — TELEPHONE (OUTPATIENT)
Dept: CARDIOLOGY CLINIC | Facility: CLINIC | Age: 87
End: 2022-03-02

## 2022-03-04 ENCOUNTER — APPOINTMENT (OUTPATIENT)
Dept: LAB | Age: 87
End: 2022-03-04
Payer: COMMERCIAL

## 2022-03-04 DIAGNOSIS — Z20.822 ENCOUNTER FOR SCREENING LABORATORY TESTING FOR COVID-19 VIRUS: ICD-10-CM

## 2022-03-07 ENCOUNTER — TELEPHONE (OUTPATIENT)
Dept: NEPHROLOGY | Facility: CLINIC | Age: 87
End: 2022-03-07

## 2022-03-07 ENCOUNTER — ANTICOAG VISIT (OUTPATIENT)
Dept: CARDIOLOGY CLINIC | Facility: CLINIC | Age: 87
End: 2022-03-07

## 2022-03-07 DIAGNOSIS — I48.0 PAROXYSMAL ATRIAL FIBRILLATION (HCC): Primary | ICD-10-CM

## 2022-03-07 NOTE — TELEPHONE ENCOUNTER
Appointment Confirmation   Person confirmed appointment with  If not patient, name of the person  Patients wife- Irme    Date and time of appointment 03/09   Patient acknowledged and will be at appointment?  Yes    Did you advise the patient that they will need a urine sample if they are a new patient? no   Did you advise the patient to bring their current medications for verification? (including any OTC) No    Additional Information

## 2022-03-07 NOTE — PROGRESS NOTES
I spoke with Isaac Watson, states patient has been taking 5 mg Mon and Fri, 2 5 mg other days  I advised that is not what patient was to be taking, he was to be taking 2 5 mg Mon Wed Fri and 5 mg other days  She states she did find that paper with those instructions  Advised to throw away previous instructions  Advised to have patient take 5 mg today, then 2 5 mg Mon Wed Fri and 5 mg other days  Jerri verified dosing instructions     Will recheck 2 weeks 3/21/22

## 2022-03-08 DIAGNOSIS — Z79.4 TYPE 2 DIABETES MELLITUS WITH OTHER SPECIFIED COMPLICATION, WITH LONG-TERM CURRENT USE OF INSULIN (HCC): ICD-10-CM

## 2022-03-08 DIAGNOSIS — E11.69 TYPE 2 DIABETES MELLITUS WITH OTHER SPECIFIED COMPLICATION, WITH LONG-TERM CURRENT USE OF INSULIN (HCC): ICD-10-CM

## 2022-03-08 RX ORDER — PEN NEEDLE, DIABETIC 32 GX 1/4"
NEEDLE, DISPOSABLE MISCELLANEOUS
Qty: 200 EACH | Refills: 0 | Status: SHIPPED | OUTPATIENT
Start: 2022-03-08 | End: 2022-03-10 | Stop reason: SDUPTHER

## 2022-03-09 ENCOUNTER — TELEPHONE (OUTPATIENT)
Dept: NEPHROLOGY | Facility: CLINIC | Age: 87
End: 2022-03-09

## 2022-03-09 NOTE — TELEPHONE ENCOUNTER
Patients wife - Brigid Cerda called needing to cancel and reschedule patients appointment for 03/09  I explained to Brigid Cerda that we will have to put patient on recall list for May and give them a call back to schedule due to the schedule being full

## 2022-03-10 ENCOUNTER — TELEPHONE (OUTPATIENT)
Dept: SLEEP CENTER | Facility: CLINIC | Age: 87
End: 2022-03-10

## 2022-03-10 DIAGNOSIS — Z79.4 TYPE 2 DIABETES MELLITUS WITH OTHER SPECIFIED COMPLICATION, WITH LONG-TERM CURRENT USE OF INSULIN (HCC): ICD-10-CM

## 2022-03-10 DIAGNOSIS — E11.69 TYPE 2 DIABETES MELLITUS WITH OTHER SPECIFIED COMPLICATION, WITH LONG-TERM CURRENT USE OF INSULIN (HCC): ICD-10-CM

## 2022-03-10 NOTE — TELEPHONE ENCOUNTER
04/15/22 nov   12/13/21 lov    Called Patient, no answer   Need to know what pharmacy for refills, [facility] in what location

## 2022-03-14 ENCOUNTER — APPOINTMENT (OUTPATIENT)
Dept: LAB | Age: 87
End: 2022-03-14
Payer: COMMERCIAL

## 2022-03-14 RX ORDER — PEN NEEDLE, DIABETIC 32 GX 1/4"
NEEDLE, DISPOSABLE MISCELLANEOUS 4 TIMES DAILY
Qty: 200 EACH | Refills: 5 | Status: SHIPPED | OUTPATIENT
Start: 2022-03-14

## 2022-03-15 ENCOUNTER — ANTICOAG VISIT (OUTPATIENT)
Dept: CARDIOLOGY CLINIC | Facility: CLINIC | Age: 87
End: 2022-03-15

## 2022-03-15 DIAGNOSIS — I48.0 PAROXYSMAL ATRIAL FIBRILLATION (HCC): Primary | ICD-10-CM

## 2022-03-15 NOTE — PROGRESS NOTES
Left message for Morro Eric, wife, advised INR good, will continue same dose 2 5 mg Mon Wed Fri, 5 mg other days  Will recheck in 2 weeks 3/28/22  Advised to call with questions or concerns

## 2022-03-17 ENCOUNTER — REMOTE DEVICE CLINIC VISIT (OUTPATIENT)
Dept: CARDIOLOGY CLINIC | Facility: CLINIC | Age: 87
End: 2022-03-17
Payer: COMMERCIAL

## 2022-03-17 ENCOUNTER — NURSE TRIAGE (OUTPATIENT)
Dept: OTHER | Facility: OTHER | Age: 87
End: 2022-03-17

## 2022-03-17 ENCOUNTER — APPOINTMENT (OUTPATIENT)
Dept: LAB | Age: 87
End: 2022-03-17
Payer: COMMERCIAL

## 2022-03-17 DIAGNOSIS — F03.90 DEMENTIA WITHOUT BEHAVIORAL DISTURBANCE, UNSPECIFIED DEMENTIA TYPE (HCC): ICD-10-CM

## 2022-03-17 DIAGNOSIS — I48.0 PAROXYSMAL ATRIAL FIBRILLATION (HCC): ICD-10-CM

## 2022-03-17 DIAGNOSIS — Z95.0 PRESENCE OF CARDIAC PACEMAKER: Primary | ICD-10-CM

## 2022-03-17 LAB
INR PPP: 4.03 (ref 0.84–1.19)
PROTHROMBIN TIME: 37.1 SECONDS (ref 11.6–14.5)

## 2022-03-17 PROCEDURE — 85610 PROTHROMBIN TIME: CPT

## 2022-03-17 PROCEDURE — 93296 REM INTERROG EVL PM/IDS: CPT | Performed by: INTERNAL MEDICINE

## 2022-03-17 PROCEDURE — 93294 REM INTERROG EVL PM/LDLS PM: CPT | Performed by: INTERNAL MEDICINE

## 2022-03-17 PROCEDURE — 36415 COLL VENOUS BLD VENIPUNCTURE: CPT

## 2022-03-17 NOTE — TELEPHONE ENCOUNTER
Regarding: Lost pace maker machine  ----- Message from Candace Mcdonald sent at 3/17/2022  6:07 PM EDT -----  "My  is scheduled for a pace maker check this evening, but we can't find the machine "

## 2022-03-17 NOTE — TELEPHONE ENCOUNTER
Reason for Disposition   [1] Caller requesting NON-URGENT health information AND [2] PCP's office is the best resource    Answer Assessment - Initial Assessment Questions  1   REASON FOR CALL or QUESTION: "What is your reason for calling today?" or "How can I best help you?" or "What question do you have that I can help answer?"      "My  was supposed to have a remote pacemaker check, we cannot find the device, can this wait till the morning and can this be done in the office/"    Protocols used: INFORMATION ONLY CALL - NO TRIAGE-ADULT-

## 2022-03-17 NOTE — PROGRESS NOTES
Results for orders placed or performed in visit on 03/17/22   Cardiac EP device report    Narrative    MDT-DUAL CHAMBER PPM (AAIR-DDDR MODE)/ NOT MRI CONDITIONALKARA PT   CARELINK TRANSMISSION: BATTERY VOLTAGE ADEQUATE (11 YRS)  AP: 73 6%  : <0 1%  ALL AVAILABLE LEAD PARAMETERS WITHIN NORMAL LIMITS  NO SIGNIFICANT HIGH RATE EPISODES  PACEMAKER FUNCTIONING APPROPRIATELY    ch

## 2022-03-18 ENCOUNTER — ANTICOAG VISIT (OUTPATIENT)
Dept: CARDIOLOGY CLINIC | Facility: CLINIC | Age: 87
End: 2022-03-18

## 2022-03-18 DIAGNOSIS — F03.90 DEMENTIA WITHOUT BEHAVIORAL DISTURBANCE, UNSPECIFIED DEMENTIA TYPE (HCC): ICD-10-CM

## 2022-03-18 DIAGNOSIS — I48.0 PAROXYSMAL ATRIAL FIBRILLATION (HCC): Primary | ICD-10-CM

## 2022-03-18 RX ORDER — MEMANTINE HYDROCHLORIDE 28 MG/1
CAPSULE, EXTENDED RELEASE ORAL
Qty: 90 CAPSULE | Refills: 1 | Status: SHIPPED | OUTPATIENT
Start: 2022-03-18 | End: 2022-03-18 | Stop reason: SDUPTHER

## 2022-03-18 NOTE — PROGRESS NOTES
Left message for Jerri to call office to discuss reason for INR and discuss dosing as INR was high yesterday

## 2022-03-18 NOTE — PROGRESS NOTES
Attempted calling wife did not call back earlier LM to hold x3  Instructions for 3/21, 3/22 and repeat INR 3/23

## 2022-03-19 ENCOUNTER — TELEPHONE (OUTPATIENT)
Dept: OTHER | Facility: OTHER | Age: 87
End: 2022-03-19

## 2022-03-19 DIAGNOSIS — E11.22 TYPE 2 DIABETES MELLITUS WITH STAGE 4 CHRONIC KIDNEY DISEASE, WITH LONG-TERM CURRENT USE OF INSULIN (HCC): Primary | ICD-10-CM

## 2022-03-19 DIAGNOSIS — N18.4 TYPE 2 DIABETES MELLITUS WITH STAGE 4 CHRONIC KIDNEY DISEASE, WITH LONG-TERM CURRENT USE OF INSULIN (HCC): Primary | ICD-10-CM

## 2022-03-19 DIAGNOSIS — Z79.4 TYPE 2 DIABETES MELLITUS WITH STAGE 4 CHRONIC KIDNEY DISEASE, WITH LONG-TERM CURRENT USE OF INSULIN (HCC): Primary | ICD-10-CM

## 2022-03-21 DIAGNOSIS — I10 ESSENTIAL HYPERTENSION: ICD-10-CM

## 2022-03-21 RX ORDER — INSULIN GLARGINE 100 [IU]/ML
30 INJECTION, SOLUTION SUBCUTANEOUS 2 TIMES DAILY
Qty: 15 ML | Refills: 1 | Status: CANCELLED | OUTPATIENT
Start: 2022-03-21

## 2022-03-21 RX ORDER — MEMANTINE HYDROCHLORIDE 28 MG/1
CAPSULE, EXTENDED RELEASE ORAL
Qty: 90 CAPSULE | Refills: 1 | Status: SHIPPED | OUTPATIENT
Start: 2022-03-21 | End: 2022-05-04 | Stop reason: SDUPTHER

## 2022-03-21 NOTE — TELEPHONE ENCOUNTER
He uses [facility] on [address]   She is okay with you sending in the basaglar and it is the vials that he uses , he was taking 28 units am and pm of the lantus

## 2022-03-22 RX ORDER — AMLODIPINE BESYLATE 5 MG/1
TABLET ORAL
Qty: 90 TABLET | Refills: 0 | Status: SHIPPED | OUTPATIENT
Start: 2022-03-22 | End: 2022-06-10 | Stop reason: SDUPTHER

## 2022-03-25 ENCOUNTER — APPOINTMENT (OUTPATIENT)
Dept: LAB | Age: 87
End: 2022-03-25
Payer: COMMERCIAL

## 2022-03-25 DIAGNOSIS — I48.0 PAROXYSMAL ATRIAL FIBRILLATION (HCC): ICD-10-CM

## 2022-03-25 LAB
INR PPP: 3.45 (ref 0.84–1.19)
PROTHROMBIN TIME: 33 SECONDS (ref 11.6–14.5)

## 2022-03-25 PROCEDURE — 36415 COLL VENOUS BLD VENIPUNCTURE: CPT

## 2022-03-25 PROCEDURE — 85610 PROTHROMBIN TIME: CPT

## 2022-03-28 ENCOUNTER — ANTICOAG VISIT (OUTPATIENT)
Dept: CARDIOLOGY CLINIC | Facility: CLINIC | Age: 87
End: 2022-03-28

## 2022-03-28 DIAGNOSIS — I48.0 PAROXYSMAL ATRIAL FIBRILLATION (HCC): Primary | ICD-10-CM

## 2022-03-28 NOTE — PROGRESS NOTES
Was able to reach [id number] on home #, advised INR remains high, I did verify dose patient taking, advised to hold tonight and tomorrow, take 2 5 mg Wed Fri Sat, 5 mg Thurs Sun, will recheck 4/4/22  I did discuss importance of calling us back when message left from our office  Advised that most times we cannot leave a message on cell #,  mailbox was full  [id number] states that maybe it is best to call home #

## 2022-03-30 ENCOUNTER — APPOINTMENT (OUTPATIENT)
Dept: LAB | Age: 87
End: 2022-03-30
Payer: COMMERCIAL

## 2022-03-31 ENCOUNTER — ANTICOAG VISIT (OUTPATIENT)
Dept: CARDIOLOGY CLINIC | Facility: CLINIC | Age: 87
End: 2022-03-31

## 2022-03-31 DIAGNOSIS — I48.0 PAROXYSMAL ATRIAL FIBRILLATION (HCC): Primary | ICD-10-CM

## 2022-03-31 NOTE — PROGRESS NOTES
Spoke with Lizzy First, wife, advised INR still high, states patient fell and was bleeding   Advised no Coumadin tonight or tomorrow, then 2 5 mg Sat Sun and recheck in Mon 4/4

## 2022-04-01 ENCOUNTER — APPOINTMENT (OUTPATIENT)
Dept: LAB | Age: 87
End: 2022-04-01
Payer: COMMERCIAL

## 2022-04-04 ENCOUNTER — ANTICOAG VISIT (OUTPATIENT)
Dept: CARDIOLOGY CLINIC | Facility: CLINIC | Age: 87
End: 2022-04-04

## 2022-04-04 DIAGNOSIS — I48.0 PAROXYSMAL ATRIAL FIBRILLATION (HCC): Primary | ICD-10-CM

## 2022-04-06 ENCOUNTER — TELEPHONE (OUTPATIENT)
Dept: CARDIOLOGY CLINIC | Facility: CLINIC | Age: 87
End: 2022-04-06

## 2022-04-06 ENCOUNTER — APPOINTMENT (OUTPATIENT)
Dept: LAB | Age: 87
DRG: 184 | End: 2022-04-06
Payer: COMMERCIAL

## 2022-04-06 DIAGNOSIS — I48.91 ATRIAL FIBRILLATION, UNSPECIFIED TYPE (HCC): ICD-10-CM

## 2022-04-06 LAB
INR PPP: 2.29 (ref 0.84–1.19)
PROTHROMBIN TIME: 24.1 SECONDS (ref 11.6–14.5)

## 2022-04-06 PROCEDURE — 85610 PROTHROMBIN TIME: CPT

## 2022-04-06 PROCEDURE — 36415 COLL VENOUS BLD VENIPUNCTURE: CPT

## 2022-04-06 NOTE — PROGRESS NOTES
Spoke with Yarelis Ronnie, advised I have been trying to reach her, left message on home #, unable to leave message on cell # mailbox full  She states they are having issues with phone, and not getting messages  I advised that I wanted patient to get INR 4/4/22, but it was done 4/1/22  States she does not remember our conversation 3/31/22, and that she was giving patient 5 mg on 3/29, 5 mg 3/31, 2 5 mg 4/1, 2 5 mg 4/2, 5 mg 4/3, 2 5 mg 4/4 and 5 mg 4/5  I advised she needs to take patient today for INR, if she does not hear from us today, patient not to take coumadin tonight and to call our office first thing tomorrow AM    I advised the importance of following directions on dosing  I recommend that she get a calendar to write down instructions  She states she does have one  Note sent to Dr Birgit Choi with issues we are having

## 2022-04-06 NOTE — TELEPHONE ENCOUNTER
Joshua Negro,    Just wanted to make you aware  We have been having a hard time with Justice Mazariegos (wife) handling Ayaan's Coumadin  It is rare that patient is therapeutic with his INR  We are unable to reach Justice Mazariegos for days, when we speak with her and give her instructions, she states she does write them down, but next time we speak with her, she doesn't have the instructions and doesn't remember speaking with us  She doesn't test when instructed, either it is to soon or to far out  I told her numerous times how important it is to have patient take medication as instructed and test as instructed       Thanks  Rolando Virgen

## 2022-04-07 ENCOUNTER — ANTICOAG VISIT (OUTPATIENT)
Dept: CARDIOLOGY CLINIC | Facility: CLINIC | Age: 87
End: 2022-04-07

## 2022-04-07 DIAGNOSIS — I48.0 PAROXYSMAL ATRIAL FIBRILLATION (HCC): Primary | ICD-10-CM

## 2022-04-07 NOTE — PROGRESS NOTES
Spoke with Reese Post, advised INR good, will have patient take 2 5 mg Mon Wed Fri, 5 mg all other days  Will recheck in 1 week 4/13/22  Reese Post did write instructions on calendar, verbalized understanding

## 2022-04-08 ENCOUNTER — RA CDI HCC (OUTPATIENT)
Dept: OTHER | Facility: HOSPITAL | Age: 87
End: 2022-04-08

## 2022-04-08 NOTE — PROGRESS NOTES
[facility]  coding opportunities          Chart Reviewed number of suggestions sent to Provider: 1   E11 [id number]  Patients Insurance     Medicare Insurance: [employer]

## 2022-04-09 ENCOUNTER — HOSPITAL ENCOUNTER (INPATIENT)
Facility: HOSPITAL | Age: 87
LOS: 4 days | Discharge: HOME HEALTH | DRG: 184 | End: 2022-04-13
Attending: SURGERY | Admitting: SURGERY
Payer: COMMERCIAL

## 2022-04-09 ENCOUNTER — APPOINTMENT (EMERGENCY)
Dept: RADIOLOGY | Facility: HOSPITAL | Age: 87
DRG: 184 | End: 2022-04-09
Payer: COMMERCIAL

## 2022-04-09 ENCOUNTER — OFFICE VISIT (OUTPATIENT)
Dept: URGENT CARE | Age: 87
DRG: 184 | End: 2022-04-09
Payer: COMMERCIAL

## 2022-04-09 DIAGNOSIS — G31.09 FRONTOTEMPORAL DEMENTIA (HCC): Primary | ICD-10-CM

## 2022-04-09 DIAGNOSIS — F02.80 FRONTOTEMPORAL DEMENTIA (HCC): Primary | ICD-10-CM
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
## 2022-04-09 DIAGNOSIS — N18.4 TYPE 2 DIABETES MELLITUS WITH STAGE 4 CHRONIC KIDNEY DISEASE, WITH LONG-TERM CURRENT USE OF INSULIN (HCC): ICD-10-CM

## 2022-04-09 DIAGNOSIS — R29.6 RECURRENT FALLS: ICD-10-CM

## 2022-04-09 DIAGNOSIS — E11.22 TYPE 2 DIABETES MELLITUS WITH STAGE 4 CHRONIC KIDNEY DISEASE, WITH LONG-TERM CURRENT USE OF INSULIN (HCC): ICD-10-CM

## 2022-04-09 DIAGNOSIS — Z79.4 TYPE 2 DIABETES MELLITUS WITH STAGE 4 CHRONIC KIDNEY DISEASE, WITH LONG-TERM CURRENT USE OF INSULIN (HCC): ICD-10-CM

## 2022-04-09 PROBLEM — R93.0 ABNORMAL HEAD CT: Status: ACTIVE | Noted: 2022-04-09

## 2022-04-09 PROBLEM — S22.49XA MULTIPLE RIB FRACTURES: Status: ACTIVE | Noted: 2022-04-09

## 2022-04-09 LAB
ABO GROUP BLD: NORMAL
ALBUMIN SERPL BCP-MCNC: 3.8 G/DL (ref 3.5–5)
ALP SERPL-CCNC: 74 U/L (ref 46–116)
ALT SERPL W P-5'-P-CCNC: 27 U/L (ref 12–78)
ANION GAP SERPL CALCULATED.3IONS-SCNC: 5 MMOL/L (ref 4–13)
AST SERPL W P-5'-P-CCNC: 26 U/L (ref 5–45)
BASE EXCESS BLDA CALC-SCNC: -1 MMOL/L (ref -2–3)
BASOPHILS # BLD AUTO: 0.04 THOUSANDS/ΜL (ref 0–0.1)
BASOPHILS NFR BLD AUTO: 0 % (ref 0–1)
BILIRUB SERPL-MCNC: 0.62 MG/DL (ref 0.2–1)
BLD GP AB SCN SERPL QL: NEGATIVE
BUN SERPL-MCNC: 43 MG/DL (ref 5–25)
CA-I BLD-SCNC: 1.27 MMOL/L (ref 1.12–1.32)
CALCIUM SERPL-MCNC: 9.6 MG/DL (ref 8.3–10.1)
CARDIAC TROPONIN I PNL SERPL HS: 12 NG/L
CHLORIDE SERPL-SCNC: 107 MMOL/L (ref 100–108)
CO2 SERPL-SCNC: 24 MMOL/L (ref 21–32)
CREAT SERPL-MCNC: 2.48 MG/DL (ref 0.6–1.3)
EOSINOPHIL # BLD AUTO: 0.13 THOUSAND/ΜL (ref 0–0.61)
EOSINOPHIL NFR BLD AUTO: 1 % (ref 0–6)
ERYTHROCYTE [DISTWIDTH] IN BLOOD BY AUTOMATED COUNT: 13 % (ref 11.6–15.1)
GFR SERPL CREATININE-BSD FRML MDRD: 22 ML/MIN/1.73SQ M
GLUCOSE SERPL-MCNC: 260 MG/DL (ref 65–140)
GLUCOSE SERPL-MCNC: 294 MG/DL (ref 65–140)
GLUCOSE SERPL-MCNC: 306 MG/DL (ref 65–140)
HCO3 BLDA-SCNC: 23.9 MMOL/L (ref 24–30)
HCT VFR BLD AUTO: 39.6 % (ref 36.5–49.3)
HCT VFR BLD CALC: 38 % (ref 36.5–49.3)
HGB BLD-MCNC: 13.4 G/DL (ref 12–17)
HGB BLDA-MCNC: 12.9 G/DL (ref 12–17)
HOLD SPECIMEN: NORMAL
IMM GRANULOCYTES # BLD AUTO: 0.05 THOUSAND/UL (ref 0–0.2)
IMM GRANULOCYTES NFR BLD AUTO: 0 % (ref 0–2)
INR PPP: 1.96 (ref 0.84–1.19)
LYMPHOCYTES # BLD AUTO: 2 THOUSANDS/ΜL (ref 0.6–4.47)
LYMPHOCYTES NFR BLD AUTO: 18 % (ref 14–44)
MCH RBC QN AUTO: 29.3 PG (ref 26.8–34.3)
MCHC RBC AUTO-ENTMCNC: 33.8 G/DL (ref 31.4–37.4)
MCV RBC AUTO: 87 FL (ref 82–98)
MONOCYTES # BLD AUTO: 0.99 THOUSAND/ΜL (ref 0.17–1.22)
MONOCYTES NFR BLD AUTO: 9 % (ref 4–12)
NEUTROPHILS # BLD AUTO: 8.06 THOUSANDS/ΜL (ref 1.85–7.62)
NEUTS SEG NFR BLD AUTO: 72 % (ref 43–75)
NRBC BLD AUTO-RTO: 0 /100 WBCS
PCO2 BLD: 25 MMOL/L (ref 21–32)
PCO2 BLD: 39.6 MM HG (ref 42–50)
PH BLD: 7.39 [PH] (ref 7.3–7.4)
PLATELET # BLD AUTO: 242 THOUSANDS/UL (ref 149–390)
PMV BLD AUTO: 10 FL (ref 8.9–12.7)
PO2 BLD: 33 MM HG (ref 35–45)
POTASSIUM BLD-SCNC: 4.3 MMOL/L (ref 3.5–5.3)
POTASSIUM SERPL-SCNC: 4.2 MMOL/L (ref 3.5–5.3)
PROT SERPL-MCNC: 8 G/DL (ref 6.4–8.2)
PROTHROMBIN TIME: 21.4 SECONDS (ref 11.6–14.5)
RBC # BLD AUTO: 4.58 MILLION/UL (ref 3.88–5.62)
RH BLD: POSITIVE
SAO2 % BLD FROM PO2: 63 % (ref 60–85)
SODIUM BLD-SCNC: 140 MMOL/L (ref 136–145)
SODIUM SERPL-SCNC: 136 MMOL/L (ref 136–145)
SPECIMEN EXPIRATION DATE: NORMAL
SPECIMEN SOURCE: ABNORMAL
WBC # BLD AUTO: 11.27 THOUSAND/UL (ref 4.31–10.16)

## 2022-04-09 PROCEDURE — 93308 TTE F-UP OR LMTD: CPT | Performed by: SURGERY

## 2022-04-09 PROCEDURE — 84484 ASSAY OF TROPONIN QUANT: CPT | Performed by: SURGERY

## 2022-04-09 PROCEDURE — 71260 CT THORAX DX C+: CPT

## 2022-04-09 PROCEDURE — 36415 COLL VENOUS BLD VENIPUNCTURE: CPT | Performed by: EMERGENCY MEDICINE

## 2022-04-09 PROCEDURE — 82803 BLOOD GASES ANY COMBINATION: CPT

## 2022-04-09 PROCEDURE — 93005 ELECTROCARDIOGRAM TRACING: CPT

## 2022-04-09 PROCEDURE — 84132 ASSAY OF SERUM POTASSIUM: CPT

## 2022-04-09 PROCEDURE — 85014 HEMATOCRIT: CPT

## 2022-04-09 PROCEDURE — 74177 CT ABD & PELVIS W/CONTRAST: CPT

## 2022-04-09 PROCEDURE — 70450 CT HEAD/BRAIN W/O DYE: CPT

## 2022-04-09 PROCEDURE — 85610 PROTHROMBIN TIME: CPT | Performed by: EMERGENCY MEDICINE

## 2022-04-09 PROCEDURE — 86900 BLOOD TYPING SEROLOGIC ABO: CPT | Performed by: EMERGENCY MEDICINE

## 2022-04-09 PROCEDURE — 99285 EMERGENCY DEPT VISIT HI MDM: CPT

## 2022-04-09 PROCEDURE — 71045 X-RAY EXAM CHEST 1 VIEW: CPT

## 2022-04-09 PROCEDURE — 99223 1ST HOSP IP/OBS HIGH 75: CPT | Performed by: SURGERY

## 2022-04-09 PROCEDURE — 76604 US EXAM CHEST: CPT | Performed by: SURGERY

## 2022-04-09 PROCEDURE — S9083 URGENT CARE CENTER GLOBAL: HCPCS

## 2022-04-09 PROCEDURE — 84295 ASSAY OF SERUM SODIUM: CPT

## 2022-04-09 PROCEDURE — 86850 RBC ANTIBODY SCREEN: CPT | Performed by: EMERGENCY MEDICINE

## 2022-04-09 PROCEDURE — 80053 COMPREHEN METABOLIC PANEL: CPT | Performed by: SURGERY

## 2022-04-09 PROCEDURE — 85025 COMPLETE CBC W/AUTO DIFF WBC: CPT | Performed by: SURGERY

## 2022-04-09 PROCEDURE — 76705 ECHO EXAM OF ABDOMEN: CPT | Performed by: SURGERY

## 2022-04-09 PROCEDURE — 82948 REAGENT STRIP/BLOOD GLUCOSE: CPT

## 2022-04-09 PROCEDURE — 99213 OFFICE O/P EST LOW 20 MIN: CPT

## 2022-04-09 PROCEDURE — 82330 ASSAY OF CALCIUM: CPT

## 2022-04-09 PROCEDURE — 86901 BLOOD TYPING SEROLOGIC RH(D): CPT | Performed by: EMERGENCY MEDICINE

## 2022-04-09 PROCEDURE — 82947 ASSAY GLUCOSE BLOOD QUANT: CPT

## 2022-04-09 PROCEDURE — NC001 PR NO CHARGE: Performed by: EMERGENCY MEDICINE

## 2022-04-09 PROCEDURE — 90715 TDAP VACCINE 7 YRS/> IM: CPT | Performed by: EMERGENCY MEDICINE

## 2022-04-09 PROCEDURE — 96374 THER/PROPH/DIAG INJ IV PUSH: CPT

## 2022-04-09 RX ORDER — DONEPEZIL HYDROCHLORIDE 10 MG/1
10 TABLET, FILM COATED ORAL
Status: DISCONTINUED | OUTPATIENT
Start: 2022-04-09 | End: 2022-04-13 | Stop reason: HOSPADM

## 2022-04-09 RX ORDER — ACETAMINOPHEN 325 MG/1
975 TABLET ORAL EVERY 8 HOURS SCHEDULED
Status: DISCONTINUED | OUTPATIENT
Start: 2022-04-09 | End: 2022-04-13 | Stop reason: HOSPADM

## 2022-04-09 RX ORDER — FENOFIBRATE 145 MG/1
145 TABLET, COATED ORAL DAILY
Status: DISCONTINUED | OUTPATIENT
Start: 2022-04-10 | End: 2022-04-09

## 2022-04-09 RX ORDER — HYDROMORPHONE HCL IN WATER/PF 6 MG/30 ML
0.2 PATIENT CONTROLLED ANALGESIA SYRINGE INTRAVENOUS EVERY 4 HOURS PRN
Status: DISCONTINUED | OUTPATIENT
Start: 2022-04-09 | End: 2022-04-13 | Stop reason: HOSPADM

## 2022-04-09 RX ORDER — PROPAFENONE HYDROCHLORIDE 150 MG/1
225 TABLET, FILM COATED ORAL 3 TIMES DAILY
Status: DISCONTINUED | OUTPATIENT
Start: 2022-04-09 | End: 2022-04-13 | Stop reason: HOSPADM

## 2022-04-09 RX ORDER — SERTRALINE HYDROCHLORIDE 100 MG/1
100 TABLET, FILM COATED ORAL DAILY
Status: DISCONTINUED | OUTPATIENT
Start: 2022-04-10 | End: 2022-04-13 | Stop reason: HOSPADM

## 2022-04-09 RX ORDER — OXYCODONE HYDROCHLORIDE 5 MG/1
5 TABLET ORAL EVERY 4 HOURS PRN
Status: DISCONTINUED | OUTPATIENT
Start: 2022-04-09 | End: 2022-04-13 | Stop reason: HOSPADM

## 2022-04-09 RX ORDER — MEMANTINE HYDROCHLORIDE 10 MG/1
10 TABLET ORAL DAILY
Status: DISCONTINUED | OUTPATIENT
Start: 2022-04-10 | End: 2022-04-13 | Stop reason: HOSPADM

## 2022-04-09 RX ORDER — SENNOSIDES 8.6 MG
2 TABLET ORAL DAILY
Status: DISCONTINUED | OUTPATIENT
Start: 2022-04-10 | End: 2022-04-13 | Stop reason: HOSPADM

## 2022-04-09 RX ORDER — LIDOCAINE 50 MG/G
1 PATCH TOPICAL DAILY
Status: DISCONTINUED | OUTPATIENT
Start: 2022-04-10 | End: 2022-04-13 | Stop reason: HOSPADM

## 2022-04-09 RX ORDER — WARFARIN SODIUM 2.5 MG/1
2.5 TABLET ORAL EVERY OTHER DAY
Status: DISCONTINUED | OUTPATIENT
Start: 2022-04-11 | End: 2022-04-09

## 2022-04-09 RX ORDER — AMLODIPINE BESYLATE 5 MG/1
5 TABLET ORAL DAILY
Status: DISCONTINUED | OUTPATIENT
Start: 2022-04-10 | End: 2022-04-13 | Stop reason: HOSPADM

## 2022-04-09 RX ORDER — METOPROLOL TARTRATE 50 MG/1
50 TABLET, FILM COATED ORAL
Status: DISCONTINUED | OUTPATIENT
Start: 2022-04-10 | End: 2022-04-13 | Stop reason: HOSPADM

## 2022-04-09 RX ORDER — FLUTICASONE PROPIONATE 50 MCG
1 SPRAY, SUSPENSION (ML) NASAL DAILY
Status: DISCONTINUED | OUTPATIENT
Start: 2022-04-10 | End: 2022-04-13 | Stop reason: HOSPADM

## 2022-04-09 RX ORDER — METOPROLOL TARTRATE 50 MG/1
100 TABLET, FILM COATED ORAL
Status: DISCONTINUED | OUTPATIENT
Start: 2022-04-09 | End: 2022-04-13 | Stop reason: HOSPADM

## 2022-04-09 RX ORDER — DOCUSATE SODIUM 100 MG/1
100 CAPSULE, LIQUID FILLED ORAL DAILY PRN
Status: DISCONTINUED | OUTPATIENT
Start: 2022-04-09 | End: 2022-04-13 | Stop reason: HOSPADM

## 2022-04-09 RX ORDER — UREA 10 %
250 LOTION (ML) TOPICAL EVERY MORNING
Status: DISCONTINUED | OUTPATIENT
Start: 2022-04-10 | End: 2022-04-13 | Stop reason: HOSPADM

## 2022-04-09 RX ORDER — SACCHAROMYCES BOULARDII 250 MG
250 CAPSULE ORAL EVERY MORNING
Status: DISCONTINUED | OUTPATIENT
Start: 2022-04-10 | End: 2022-04-13 | Stop reason: HOSPADM

## 2022-04-09 RX ORDER — WARFARIN SODIUM 5 MG/1
5 TABLET ORAL DAILY
Status: DISCONTINUED | OUTPATIENT
Start: 2022-04-10 | End: 2022-04-09

## 2022-04-09 RX ORDER — ONDANSETRON 2 MG/ML
4 INJECTION INTRAMUSCULAR; INTRAVENOUS EVERY 6 HOURS PRN
Status: DISCONTINUED | OUTPATIENT
Start: 2022-04-09 | End: 2022-04-13 | Stop reason: HOSPADM

## 2022-04-09 RX ORDER — OXYCODONE HYDROCHLORIDE 5 MG/1
2.5 TABLET ORAL EVERY 4 HOURS PRN
Status: DISCONTINUED | OUTPATIENT
Start: 2022-04-09 | End: 2022-04-13 | Stop reason: HOSPADM

## 2022-04-09 RX ORDER — SODIUM CHLORIDE, SODIUM GLUCONATE, SODIUM ACETATE, POTASSIUM CHLORIDE, MAGNESIUM CHLORIDE, SODIUM PHOSPHATE, DIBASIC, AND POTASSIUM PHOSPHATE .53; .5; .37; .037; .03; .012; .00082 G/100ML; G/100ML; G/100ML; G/100ML; G/100ML; G/100ML; G/100ML
1000 INJECTION, SOLUTION INTRAVENOUS ONCE
Status: COMPLETED | OUTPATIENT
Start: 2022-04-09 | End: 2022-04-10

## 2022-04-09 RX ORDER — FENTANYL CITRATE 50 UG/ML
INJECTION, SOLUTION INTRAMUSCULAR; INTRAVENOUS CODE/TRAUMA/SEDATION MEDICATION
Status: COMPLETED | OUTPATIENT
Start: 2022-04-09 | End: 2022-04-09

## 2022-04-09 RX ORDER — LORATADINE 10 MG/1
10 TABLET ORAL DAILY
Status: DISCONTINUED | OUTPATIENT
Start: 2022-04-10 | End: 2022-04-13 | Stop reason: HOSPADM

## 2022-04-09 RX ORDER — WARFARIN SODIUM 2.5 MG/1
2.5 TABLET ORAL EVERY OTHER DAY
Status: DISCONTINUED | OUTPATIENT
Start: 2022-04-12 | End: 2022-04-12

## 2022-04-09 RX ORDER — FENOFIBRATE 48 MG/1
48 TABLET, COATED ORAL DAILY
Status: DISCONTINUED | OUTPATIENT
Start: 2022-04-10 | End: 2022-04-13 | Stop reason: HOSPADM

## 2022-04-09 RX ORDER — LOSARTAN POTASSIUM 25 MG/1
25 TABLET ORAL DAILY
Status: DISCONTINUED | OUTPATIENT
Start: 2022-04-10 | End: 2022-04-13 | Stop reason: HOSPADM

## 2022-04-09 RX ORDER — FERROUS SULFATE 325(65) MG
325 TABLET ORAL
Status: DISCONTINUED | OUTPATIENT
Start: 2022-04-10 | End: 2022-04-13 | Stop reason: HOSPADM

## 2022-04-09 RX ORDER — PREDNISONE 50 MG/1
50 TABLET ORAL DAILY
Status: DISCONTINUED | OUTPATIENT
Start: 2022-04-10 | End: 2022-04-12

## 2022-04-09 RX ORDER — ATORVASTATIN CALCIUM 10 MG/1
10 TABLET, FILM COATED ORAL DAILY
Status: DISCONTINUED | OUTPATIENT
Start: 2022-04-10 | End: 2022-04-13 | Stop reason: HOSPADM

## 2022-04-09 RX ORDER — MELATONIN
2000 DAILY
Status: DISCONTINUED | OUTPATIENT
Start: 2022-04-10 | End: 2022-04-13 | Stop reason: HOSPADM

## 2022-04-09 RX ADMIN — IPRATROPIUM BROMIDE 2 PUFF: 17 AEROSOL, METERED RESPIRATORY (INHALATION) at 23:42

## 2022-04-09 RX ADMIN — DONEPEZIL HYDROCHLORIDE 10 MG: 10 TABLET ORAL at 23:26

## 2022-04-09 RX ADMIN — SODIUM CHLORIDE, SODIUM GLUCONATE, SODIUM ACETATE, POTASSIUM CHLORIDE, MAGNESIUM CHLORIDE, SODIUM PHOSPHATE, DIBASIC, AND POTASSIUM PHOSPHATE 1000 ML: .53; .5; .37; .037; .03; .012; .00082 INJECTION, SOLUTION INTRAVENOUS at 22:34

## 2022-04-09 RX ADMIN — METOPROLOL TARTRATE 100 MG: 50 TABLET, FILM COATED ORAL at 23:26

## 2022-04-09 RX ADMIN — INSULIN LISPRO 3 UNITS: 100 INJECTION, SOLUTION INTRAVENOUS; SUBCUTANEOUS at 23:35

## 2022-04-09 RX ADMIN — IOHEXOL 100 ML: 350 INJECTION, SOLUTION INTRAVENOUS at 21:29

## 2022-04-09 RX ADMIN — TETANUS TOXOID, REDUCED DIPHTHERIA TOXOID AND ACELLULAR PERTUSSIS VACCINE, ADSORBED 0.5 ML: 5; 2.5; 8; 8; 2.5 SUSPENSION INTRAMUSCULAR at 22:34

## 2022-04-09 RX ADMIN — FENTANYL CITRATE 25 MCG: 50 INJECTION INTRAMUSCULAR; INTRAVENOUS at 21:09

## 2022-04-09 RX ADMIN — PROPAFENONE HYDROCHLORIDE 225 MG: 150 TABLET, FILM COATED ORAL at 23:40

## 2022-04-09 RX ADMIN — ACETAMINOPHEN 975 MG: 325 TABLET ORAL at 22:34

## 2022-04-10 ENCOUNTER — APPOINTMENT (INPATIENT)
Dept: RADIOLOGY | Facility: HOSPITAL | Age: 87
DRG: 184 | End: 2022-04-10
Payer: COMMERCIAL

## 2022-04-10 LAB
2HR DELTA HS TROPONIN: 0 NG/L
4HR DELTA HS TROPONIN: 1 NG/L
ANION GAP SERPL CALCULATED.3IONS-SCNC: 5 MMOL/L (ref 4–13)
BACTERIA UR QL AUTO: ABNORMAL /HPF
BASOPHILS # BLD AUTO: 0.03 THOUSANDS/ΜL (ref 0–0.1)
BASOPHILS NFR BLD AUTO: 0 % (ref 0–1)
BILIRUB UR QL STRIP: NEGATIVE
BUN SERPL-MCNC: 38 MG/DL (ref 5–25)
CALCIUM SERPL-MCNC: 8.9 MG/DL (ref 8.3–10.1)
CARDIAC TROPONIN I PNL SERPL HS: 12 NG/L
CARDIAC TROPONIN I PNL SERPL HS: 13 NG/L
CHLORIDE SERPL-SCNC: 106 MMOL/L (ref 100–108)
CLARITY UR: CLEAR
CO2 SERPL-SCNC: 26 MMOL/L (ref 21–32)
COLOR UR: ABNORMAL
CREAT SERPL-MCNC: 2.21 MG/DL (ref 0.6–1.3)
EOSINOPHIL # BLD AUTO: 0.13 THOUSAND/ΜL (ref 0–0.61)
EOSINOPHIL NFR BLD AUTO: 2 % (ref 0–6)
ERYTHROCYTE [DISTWIDTH] IN BLOOD BY AUTOMATED COUNT: 13 % (ref 11.6–15.1)
GFR SERPL CREATININE-BSD FRML MDRD: 26 ML/MIN/1.73SQ M
GLUCOSE SERPL-MCNC: 213 MG/DL (ref 65–140)
GLUCOSE SERPL-MCNC: 232 MG/DL (ref 65–140)
GLUCOSE SERPL-MCNC: 269 MG/DL (ref 65–140)
GLUCOSE SERPL-MCNC: 428 MG/DL (ref 65–140)
GLUCOSE UR STRIP-MCNC: ABNORMAL MG/DL
HCT VFR BLD AUTO: 36.6 % (ref 36.5–49.3)
HGB BLD-MCNC: 12.3 G/DL (ref 12–17)
HGB UR QL STRIP.AUTO: ABNORMAL
IMM GRANULOCYTES # BLD AUTO: 0.02 THOUSAND/UL (ref 0–0.2)
IMM GRANULOCYTES NFR BLD AUTO: 0 % (ref 0–2)
KETONES UR STRIP-MCNC: NEGATIVE MG/DL
LEUKOCYTE ESTERASE UR QL STRIP: NEGATIVE
LYMPHOCYTES # BLD AUTO: 1.84 THOUSANDS/ΜL (ref 0.6–4.47)
LYMPHOCYTES NFR BLD AUTO: 21 % (ref 14–44)
MCH RBC QN AUTO: 28.8 PG (ref 26.8–34.3)
MCHC RBC AUTO-ENTMCNC: 33.6 G/DL (ref 31.4–37.4)
MCV RBC AUTO: 86 FL (ref 82–98)
MONOCYTES # BLD AUTO: 0.79 THOUSAND/ΜL (ref 0.17–1.22)
MONOCYTES NFR BLD AUTO: 9 % (ref 4–12)
NEUTROPHILS # BLD AUTO: 6.03 THOUSANDS/ΜL (ref 1.85–7.62)
NEUTS SEG NFR BLD AUTO: 68 % (ref 43–75)
NITRITE UR QL STRIP: NEGATIVE
NON-SQ EPI CELLS URNS QL MICRO: ABNORMAL /HPF
NRBC BLD AUTO-RTO: 0 /100 WBCS
PH UR STRIP.AUTO: 6 [PH]
PLATELET # BLD AUTO: 209 THOUSANDS/UL (ref 149–390)
PMV BLD AUTO: 10 FL (ref 8.9–12.7)
POTASSIUM SERPL-SCNC: 3.8 MMOL/L (ref 3.5–5.3)
PROT UR STRIP-MCNC: ABNORMAL MG/DL
RBC # BLD AUTO: 4.27 MILLION/UL (ref 3.88–5.62)
RBC #/AREA URNS AUTO: ABNORMAL /HPF
SODIUM SERPL-SCNC: 137 MMOL/L (ref 136–145)
SP GR UR STRIP.AUTO: 1.04 (ref 1–1.03)
UROBILINOGEN UR STRIP-ACNC: <2 MG/DL
WBC # BLD AUTO: 8.84 THOUSAND/UL (ref 4.31–10.16)
WBC #/AREA URNS AUTO: ABNORMAL /HPF

## 2022-04-10 PROCEDURE — 81001 URINALYSIS AUTO W/SCOPE: CPT | Performed by: SURGERY

## 2022-04-10 PROCEDURE — 84484 ASSAY OF TROPONIN QUANT: CPT | Performed by: SURGERY

## 2022-04-10 PROCEDURE — 99232 SBSQ HOSP IP/OBS MODERATE 35: CPT | Performed by: SURGERY

## 2022-04-10 PROCEDURE — 82948 REAGENT STRIP/BLOOD GLUCOSE: CPT

## 2022-04-10 PROCEDURE — 71046 X-RAY EXAM CHEST 2 VIEWS: CPT

## 2022-04-10 PROCEDURE — 97163 PT EVAL HIGH COMPLEX 45 MIN: CPT

## 2022-04-10 PROCEDURE — 97167 OT EVAL HIGH COMPLEX 60 MIN: CPT

## 2022-04-10 PROCEDURE — 80048 BASIC METABOLIC PNL TOTAL CA: CPT | Performed by: SURGERY

## 2022-04-10 PROCEDURE — 85025 COMPLETE CBC W/AUTO DIFF WBC: CPT | Performed by: SURGERY

## 2022-04-10 RX ADMIN — IPRATROPIUM BROMIDE 2 PUFF: 17 AEROSOL, METERED RESPIRATORY (INHALATION) at 12:53

## 2022-04-10 RX ADMIN — SENNOSIDES 17.2 MG: 8.6 TABLET, FILM COATED ORAL at 09:42

## 2022-04-10 RX ADMIN — IPRATROPIUM BROMIDE 2 PUFF: 17 AEROSOL, METERED RESPIRATORY (INHALATION) at 05:38

## 2022-04-10 RX ADMIN — LOSARTAN POTASSIUM 25 MG: 25 TABLET, FILM COATED ORAL at 09:42

## 2022-04-10 RX ADMIN — PROPAFENONE HYDROCHLORIDE 225 MG: 150 TABLET, FILM COATED ORAL at 21:37

## 2022-04-10 RX ADMIN — AMLODIPINE BESYLATE 5 MG: 5 TABLET ORAL at 09:43

## 2022-04-10 RX ADMIN — FENOFIBRATE 48 MG: 48 TABLET ORAL at 09:43

## 2022-04-10 RX ADMIN — FERROUS SULFATE TAB 325 MG (65 MG ELEMENTAL FE) 325 MG: 325 (65 FE) TAB at 09:43

## 2022-04-10 RX ADMIN — SERTRALINE HYDROCHLORIDE 100 MG: 100 TABLET, FILM COATED ORAL at 09:42

## 2022-04-10 RX ADMIN — IPRATROPIUM BROMIDE 2 PUFF: 17 AEROSOL, METERED RESPIRATORY (INHALATION) at 23:31

## 2022-04-10 RX ADMIN — ACETAMINOPHEN 975 MG: 325 TABLET ORAL at 12:59

## 2022-04-10 RX ADMIN — LORATADINE 10 MG: 10 TABLET ORAL at 09:42

## 2022-04-10 RX ADMIN — PROPAFENONE HYDROCHLORIDE 225 MG: 150 TABLET, FILM COATED ORAL at 17:52

## 2022-04-10 RX ADMIN — Medication 2000 UNITS: at 09:42

## 2022-04-10 RX ADMIN — ATORVASTATIN CALCIUM 10 MG: 10 TABLET, FILM COATED ORAL at 09:42

## 2022-04-10 RX ADMIN — LIDOCAINE 5% 1 PATCH: 700 PATCH TOPICAL at 09:43

## 2022-04-10 RX ADMIN — METOPROLOL TARTRATE 100 MG: 50 TABLET, FILM COATED ORAL at 21:34

## 2022-04-10 RX ADMIN — Medication 250 MG: at 09:42

## 2022-04-10 RX ADMIN — MEMANTINE 10 MG: 10 TABLET ORAL at 09:42

## 2022-04-10 RX ADMIN — ACETAMINOPHEN 975 MG: 325 TABLET ORAL at 05:38

## 2022-04-10 RX ADMIN — INSULIN LISPRO 6 UNITS: 100 INJECTION, SOLUTION INTRAVENOUS; SUBCUTANEOUS at 21:35

## 2022-04-10 RX ADMIN — METOPROLOL TARTRATE 50 MG: 50 TABLET, FILM COATED ORAL at 09:46

## 2022-04-10 RX ADMIN — DONEPEZIL HYDROCHLORIDE 10 MG: 10 TABLET ORAL at 21:34

## 2022-04-10 RX ADMIN — CYANOCOBALAMIN TAB 500 MCG 500 MCG: 500 TAB at 09:42

## 2022-04-10 RX ADMIN — INSULIN LISPRO 3 UNITS: 100 INJECTION, SOLUTION INTRAVENOUS; SUBCUTANEOUS at 12:51

## 2022-04-10 RX ADMIN — Medication 250 MG: at 09:43

## 2022-04-10 RX ADMIN — PREDNISONE 50 MG: 50 TABLET ORAL at 12:53

## 2022-04-10 RX ADMIN — IPRATROPIUM BROMIDE 2 PUFF: 17 AEROSOL, METERED RESPIRATORY (INHALATION) at 17:53

## 2022-04-10 RX ADMIN — INSULIN LISPRO 3 UNITS: 100 INJECTION, SOLUTION INTRAVENOUS; SUBCUTANEOUS at 17:53

## 2022-04-10 RX ADMIN — ACETAMINOPHEN 975 MG: 325 TABLET ORAL at 21:34

## 2022-04-10 NOTE — PLAN OF CARE
Problem: MOBILITY - ADULT  Goal: Maintain or return to baseline ADL function  Description: INTERVENTIONS:  -  Assess patient's ability to carry out ADLs; assess patient's baseline for ADL function and identify physical deficits which impact ability to perform ADLs (bathing, care of mouth/teeth, toileting, grooming, dressing, etc )  - Assess/evaluate cause of self-care deficits   - Assess range of motion  - Assess patient's mobility; develop plan if impaired  - Assess patient's need for assistive devices and provide as appropriate  - Encourage maximum independence but intervene and supervise when necessary  - Involve family in performance of ADLs  - Assess for home care needs following discharge   - Consider OT consult to assist with ADL evaluation and planning for discharge  - Provide patient education as appropriate  Outcome: Progressing  Goal: Maintains/Returns to pre admission functional level  Description: INTERVENTIONS:  - Perform BMAT or MOVE assessment daily    - Set and communicate daily mobility goal to care team and patient/family/caregiver  - Collaborate with rehabilitation services on mobility goals if consulted  - Perform Range of Motion  times a day  - Reposition patient every  hours    - Dangle patient  times a day  - Stand patient  times a day  - Ambulate patient  times a day  - Out of bed to chair  times a day   - Out of bed for meals  times a day  - Out of bed for toileting  - Record patient progress and toleration of activity level   Outcome: Progressing     Problem: Potential for Falls  Goal: Patient will remain free of falls  Description: INTERVENTIONS:  - Educate patient/family on patient safety including physical limitations  - Instruct patient to call for assistance with activity   - Consult OT/PT to assist with strengthening/mobility   - Keep Call bell within reach  - Keep bed low and locked with side rails adjusted as appropriate  - Keep care items and personal belongings within reach  - Initiate and maintain comfort rounds  - Make Fall Risk Sign visible to staff  - Offer Toileting every  Hours, in advance of need  - Initiate/Maintain alarm  - Obtain necessary fall risk management equipment:   - Apply yellow socks and bracelet for high fall risk patients  - Consider moving patient to room near nurses station  Outcome: Progressing     Problem: Prexisting or High Potential for Compromised Skin Integrity  Goal: Skin integrity is maintained or improved  Description: INTERVENTIONS:  - Identify patients at risk for skin breakdown  - Assess and monitor skin integrity  - Assess and monitor nutrition and hydration status  - Monitor labs   - Assess for incontinence   - Turn and reposition patient  - Assist with mobility/ambulation  - Relieve pressure over bony prominences  - Avoid friction and shearing  - Provide appropriate hygiene as needed including keeping skin clean and dry  - Evaluate need for skin moisturizer/barrier cream  - Collaborate with interdisciplinary team   - Patient/family teaching  - Consider wound care consult   Outcome: Progressing     Problem: PAIN - ADULT  Goal: Verbalizes/displays adequate comfort level or baseline comfort level  Description: Interventions:  - Encourage patient to monitor pain and request assistance  - Assess pain using appropriate pain scale  - Administer analgesics based on type and severity of pain and evaluate response  - Implement non-pharmacological measures as appropriate and evaluate response  - Consider cultural and social influences on pain and pain management  - Notify physician/advanced practitioner if interventions unsuccessful or patient reports new pain  Outcome: Progressing     Problem: INFECTION - ADULT  Goal: Absence or prevention of progression during hospitalization  Description: INTERVENTIONS:  - Assess and monitor for signs and symptoms of infection  - Monitor lab/diagnostic results  - Monitor all insertion sites, i e  indwelling lines, tubes, and drains  - Monitor endotracheal if appropriate and nasal secretions for changes in amount and color  - [place] appropriate cooling/warming therapies per order  - Administer medications as ordered  - Instruct and encourage patient and family to use good hand hygiene technique  - Identify and instruct in appropriate isolation precautions for identified infection/condition  Outcome: Progressing  Goal: Absence of fever/infection during neutropenic period  Description: INTERVENTIONS:  - Monitor WBC    Outcome: Progressing     Problem: SAFETY ADULT  Goal: Maintain or return to baseline ADL function  Description: INTERVENTIONS:  -  Assess patient's ability to carry out ADLs; assess patient's baseline for ADL function and identify physical deficits which impact ability to perform ADLs (bathing, care of mouth/teeth, toileting, grooming, dressing, etc )  - Assess/evaluate cause of self-care deficits   - Assess range of motion  - Assess patient's mobility; develop plan if impaired  - Assess patient's need for assistive devices and provide as appropriate  - Encourage maximum independence but intervene and supervise when necessary  - Involve family in performance of ADLs  - Assess for home care needs following discharge   - Consider OT consult to assist with ADL evaluation and planning for discharge  - Provide patient education as appropriate  Outcome: Progressing  Goal: Maintains/Returns to pre admission functional level  Description: INTERVENTIONS:  - Perform BMAT or MOVE assessment daily    - Set and communicate daily mobility goal to care team and patient/family/caregiver  - Collaborate with rehabilitation services on mobility goals if consulted  - Perform Range of Motion  times a day  - Reposition patient every  hours    - Dangle patient  times a day  - Stand patient  times a day  - Ambulate patient  times a day  - Out of bed to chair  times a day   - Out of bed for meals  times a day  - Out of bed for toileting  - Record patient progress and toleration of activity level   Outcome: Progressing  Goal: Patient will remain free of falls  Description: INTERVENTIONS:  - Educate patient/family on patient safety including physical limitations  - Instruct patient to call for assistance with activity   - Consult OT/PT to assist with strengthening/mobility   - Keep Call bell within reach  - Keep bed low and locked with side rails adjusted as appropriate  - Keep care items and personal belongings within reach  - Initiate and maintain comfort rounds  - Make Fall Risk Sign visible to staff  - Offer Toileting every  Hours, in advance of need  - Initiate/Maintain alarm  - Obtain necessary fall risk management equipment:  - Apply yellow socks and bracelet for high fall risk patients  - Consider moving patient to room near nurses station  Outcome: Progressing     Problem: DISCHARGE PLANNING  Goal: Discharge to home or other facility with appropriate resources  Description: INTERVENTIONS:  - Identify barriers to discharge w/patient and caregiver  - Arrange for needed discharge resources and transportation as appropriate  - Identify discharge learning needs (meds, wound care, etc )  - Arrange for interpretive services to assist at discharge as needed  - Refer to Case Management Department for coordinating discharge planning if the patient needs post-hospital services based on physician/advanced practitioner order or complex needs related to functional status, cognitive ability, or social support system  Outcome: Progressing     Problem: Knowledge Deficit  Goal: Patient/family/caregiver demonstrates understanding of disease process, treatment plan, medications, and discharge instructions  Description: Complete learning assessment and assess knowledge base    Interventions:  - Provide teaching at level of understanding  - Provide teaching via preferred learning methods  Outcome: Progressing
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
## 2022-04-10 NOTE — ASSESSMENT & PLAN NOTE
Lab Results   Component Value Date    HGBA1C 7 2 (H) 12/20/2021       No results for input(s): POCGLU in the last 72 hours      Blood Sugar Average: Last 72 hrs:     -sliding-scale insulin  -frequent glucose checks  -diabetic diet

## 2022-04-10 NOTE — PLAN OF CARE
Problem: MOBILITY - ADULT  Goal: Maintain or return to baseline ADL function  Description: INTERVENTIONS:  -  Assess patient's ability to carry out ADLs; assess patient's baseline for ADL function and identify physical deficits which impact ability to perform ADLs (bathing, care of mouth/teeth, toileting, grooming, dressing, etc )  - Assess/evaluate cause of self-care deficits   - Assess range of motion  - Assess patient's mobility; develop plan if impaired  - Assess patient's need for assistive devices and provide as appropriate  - Encourage maximum independence but intervene and supervise when necessary  - Involve family in performance of ADLs  - Assess for home care needs following discharge   - Consider OT consult to assist with ADL evaluation and planning for discharge  - Provide patient education as appropriate  Outcome: Progressing  Goal: Maintains/Returns to pre admission functional level  Description: INTERVENTIONS:  - Perform BMAT or MOVE assessment daily    - Set and communicate daily mobility goal to care team and patient/family/caregiver  - Collaborate with rehabilitation services on mobility goals if consulted  - Perform Range of Motion 3 times a day  - Reposition patient every 2 hours    - Dangle patient 3 times a day  - Stand patient 3 times a day  - Ambulate patient 3 times a day  - Out of bed to chair 3 times a day   - Out of bed for meals 3 times a day  - Out of bed for toileting  - Record patient progress and toleration of activity level   Outcome: Progressing     Problem: Potential for Falls  Goal: Patient will remain free of falls  Description: INTERVENTIONS:  - Educate patient/family on patient safety including physical limitations  - Instruct patient to call for assistance with activity   - Consult OT/PT to assist with strengthening/mobility   - Keep Call bell within reach  - Keep bed low and locked with side rails adjusted as appropriate  - Keep care items and personal belongings within reach  - Initiate and maintain comfort rounds  - Make Fall Risk Sign visible to staff  - Offer Toileting every 2 Hours, in advance of need  - Initiate/Maintain alarm  - Obtain necessary fall risk management equipment:   - Apply yellow socks and bracelet for high fall risk patients  - Consider moving patient to room near nurses station  Outcome: Progressing     Problem: Prexisting or High Potential for Compromised Skin Integrity  Goal: Skin integrity is maintained or improved  Description: INTERVENTIONS:  - Identify patients at risk for skin breakdown  - Assess and monitor skin integrity  - Assess and monitor nutrition and hydration status  - Monitor labs   - Assess for incontinence   - Turn and reposition patient  - Assist with mobility/ambulation  - Relieve pressure over bony prominences  - Avoid friction and shearing  - Provide appropriate hygiene as needed including keeping skin clean and dry  - Evaluate need for skin moisturizer/barrier cream  - Collaborate with interdisciplinary team   - Patient/family teaching  - Consider wound care consult   Outcome: Progressing     Problem: PAIN - ADULT  Goal: Verbalizes/displays adequate comfort level or baseline comfort level  Description: Interventions:  - Encourage patient to monitor pain and request assistance  - Assess pain using appropriate pain scale  - Administer analgesics based on type and severity of pain and evaluate response  - Implement non-pharmacological measures as appropriate and evaluate response  - Consider cultural and social influences on pain and pain management  - Notify physician/advanced practitioner if interventions unsuccessful or patient reports new pain  Outcome: Progressing     Problem: INFECTION - ADULT  Goal: Absence or prevention of progression during hospitalization  Description: INTERVENTIONS:  - Assess and monitor for signs and symptoms of infection  - Monitor lab/diagnostic results  - Monitor all insertion sites, i e  indwelling lines, tubes, and drains  - Monitor endotracheal if appropriate and nasal secretions for changes in amount and color  - Trosper appropriate cooling/warming therapies per order  - Administer medications as ordered  - Instruct and encourage patient and family to use good hand hygiene technique  - Identify and instruct in appropriate isolation precautions for identified infection/condition  Outcome: Progressing  Goal: Absence of fever/infection during neutropenic period  Description: INTERVENTIONS:  - Monitor WBC    Outcome: Progressing     Problem: SAFETY ADULT  Goal: Maintain or return to baseline ADL function  Description: INTERVENTIONS:  -  Assess patient's ability to carry out ADLs; assess patient's baseline for ADL function and identify physical deficits which impact ability to perform ADLs (bathing, care of mouth/teeth, toileting, grooming, dressing, etc )  - Assess/evaluate cause of self-care deficits   - Assess range of motion  - Assess patient's mobility; develop plan if impaired  - Assess patient's need for assistive devices and provide as appropriate  - Encourage maximum independence but intervene and supervise when necessary  - Involve family in performance of ADLs  - Assess for home care needs following discharge   - Consider OT consult to assist with ADL evaluation and planning for discharge  - Provide patient education as appropriate  Outcome: Progressing  Goal: Maintains/Returns to pre admission functional level  Description: INTERVENTIONS:  - Perform BMAT or MOVE assessment daily    - Set and communicate daily mobility goal to care team and patient/family/caregiver  - Collaborate with rehabilitation services on mobility goals if consulted  - Perform Range of Motion 3 times a day  - Reposition patient every 2 hours    - Dangle patient 3 times a day  - Stand patient 3 times a day  - Ambulate patient 3 times a day  - Out of bed to chair 3 times a day   - Out of bed for meals 3 times a day  - Out of bed for toileting  - Record patient progress and toleration of activity level   Outcome: Progressing  Goal: Patient will remain free of falls  Description: INTERVENTIONS:  - Educate patient/family on patient safety including physical limitations  - Instruct patient to call for assistance with activity   - Consult OT/PT to assist with strengthening/mobility   - Keep Call bell within reach  - Keep bed low and locked with side rails adjusted as appropriate  - Keep care items and personal belongings within reach  - Initiate and maintain comfort rounds  - Make Fall Risk Sign visible to staff  - Offer Toileting every 2 Hours, in advance of need  - Initiate/Maintain alarm  - Obtain necessary fall risk management equipment:   - Apply yellow socks and bracelet for high fall risk patients  - Consider moving patient to room near nurses station  Outcome: Progressing     Problem: DISCHARGE PLANNING  Goal: Discharge to home or other facility with appropriate resources  Description: INTERVENTIONS:  - Identify barriers to discharge w/patient and caregiver  - Arrange for needed discharge resources and transportation as appropriate  - Identify discharge learning needs (meds, wound care, etc )  - Arrange for interpretive services to assist at discharge as needed  - Refer to Case Management Department for coordinating discharge planning if the patient needs post-hospital services based on physician/advanced practitioner order or complex needs related to functional status, cognitive ability, or social support system  Outcome: Progressing     Problem: Knowledge Deficit  Goal: Patient/family/caregiver demonstrates understanding of disease process, treatment plan, medications, and discharge instructions  Description: Complete learning assessment and assess knowledge base    Interventions:  - Provide teaching at level of understanding  - Provide teaching via preferred learning methods  Outcome: Progressing

## 2022-04-10 NOTE — CASE MANAGEMENT
Case Management Assessment & Discharge Planning Note    Patient name Olvin Rodriguezic  Location [address] [place] [address] [id number]  : 1935 Date 4/10/2022       Current Admission Date: 2022  Current Admission Diagnosis:Multiple rib fractures   Patient Active Problem List    Diagnosis Date Noted    Multiple rib fractures 2022    Abnormal head CT 2022    Cough 2021    Tracheitis 11/15/2021    Dementia ([facility] )     Recurrent falls 2020    Long term current use of antiarrhythmic medical therapy 2020    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 2020    Coagulation disorder ([facility] ) 2020    Head injury 2020    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, or unspecified chronic kidney disease 2019    Coronary artery disease involving native coronary artery of native heart without angina pectoris 2019    Type 2 diabetes mellitus with kidney complication, with long-term current use of insulin ([facility] ) 2019    [facility]    Mixed hyperlipidemia 2018    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[facility] ) 2018    CKD (chronic kidney disease), stage IV ([facility] ) 2018    Essential hypertension 2018    Solitary kidney 2018    Benign prostate hyperplasia 10/27/2014    Elevated prostate specific antigen (PSA) 2013      LOS (days): 1  Geometric Mean LOS (GMLOS) (days):   Days to GMLOS:     OBJECTIVE:    Risk of Unplanned Readmission Score: 21         Current admission status: Inpatient       Preferred Pharmacy:   [address]  [address]  [address] [facility] [address]  Phone: [phone] Fax: [address] [place] [address], [address] [facility]  Phone: [phone] Fax: [phone]    Primary Care Provider: Janae Auguste MD    Primary Insurance: [facility] REP  Secondary Insurance:     ASSESSMENT:  Juliette Harrison Proxies    There are no active Health Care Proxies on file                   Readmission Root Cause  30 Day Readmission: No    Patient Information  Admitted from[de-identified] Home  Mental Status: Alert,Confused  During Assessment patient was accompanied by: Spouse  Assessment information provided by[de-identified] Spouse  Primary Caregiver: Self  Support Systems: Self,Spouse/significant other  [place] of Residence: [address] [place] [address] do you live in?: [place]  In the last 12 months, was there a time when you were not able to pay the mortgage or rent on time?: No  In the last 12 months, how many places have you lived?: 1  In the last 12 months, was there a time when you did not have a steady place to sleep or slept in a shelter (including now)?: No  Homeless/housing insecurity resource given?: N/A  Living Arrangements: Lives w/ Spouse/significant other  Is patient a ?: No    Activities of Daily Living Prior to Admission  Functional Status: Independent  Completes ADLs independently?: Yes  Ambulates independently?: Yes         Patient Information Continued  Income Source: Pension/FPC  Does patient have prescription coverage?: Yes  Within the past 12 months, you worried that your food would run out before you got the money to buy more : Never true  Within the past 12 months, the food you bought just didnt last and you didnt have money to get more : Never true  Food insecurity resource given?: N/A  Does patient receive dialysis treatments?: No  Does patient have a history of substance abuse?: No  Does patient have a history of Mental Health Diagnosis?: No         Means of Transportation  [facility] Family transport  In the past 12 months, has lack of transportation kept you from medical appointments or from getting medications?: No  In the past 12 months, has lack of transportation kept you from meetings, work, or from getting things needed for daily living?: No  Was application for public transport provided?: N/A        DISCHARGE DETAILS:    Discharge planning discussed with[de-identified] pt's wife  Freedom of Choice: Yes     CM contacted family/caregiver?: Yes  Were Treatment Team discharge recommendations reviewed with patient/caregiver?: Yes     Were patient/caregiver advised of the risks associated with not following Treatment Team discharge recommendations?: Yes    Contacts  Patient Contacts: Antnoy Delgado (Spouse)  [phone] (H) [phone]  Relationship to Patient[de-identified] Family  Contact Method: Phone  Phone Number: [phone] Becky Wetzel  Reason/Outcome: Continuity of Care,Emergency Contact      Treatment Team Recommendation: Short Term Rehab  Discharge Destination Plan[de-identified] Short Term Rehab         Pt was seen by OT/PT and recommended for IP rehab  CM discussed this with pt's wife  She is interested in rehab, "but only here", and requested CM place a referral to [facility]  CM offered SH TCF and SNF, if [facility] denies or if insurance denies acute  Pt's wife doesn't wish to pursue those avenues and prefers a home d/c with VNA, if unable to get into [facility]  CM will follow     CM reviewed d/c planning process including the following: identifying help at home, patient preference for d/c planning needs, Discharge Lounge, [facility] Meds to Bed program, availability of treatment team to discuss questions or concerns patient and/or family may have regarding understanding medications and recognizing signs and symptoms once discharged  CM also encouraged patient to follow up with all recommended appointments after discharge  Patient advised of importance for patient and family to participate in managing patients medical well being

## 2022-04-10 NOTE — PLAN OF CARE
Problem: OCCUPATIONAL THERAPY ADULT  Goal: Performs self-care activities at highest level of function for planned discharge setting  See evaluation for individualized goals  Description: Treatment Interventions: ADL retraining,Functional transfer training,Endurance training,Cognitive reorientation,Patient/family training,Equipment evaluation/education,Activityengagement          See flowsheet documentation for full assessment, interventions and recommendations  Note: Limitation: Decreased ADL status,Decreased Safe judgement during ADL,Decreased cognition,Decreased endurance,Decreased self-care trans,Decreased high-level ADLs  Prognosis: Good,Fair  Assessment: 79 YO Male SEEN FOR INITIAL OCCUPATIONAL THERAPY EVALUATION FOLLOWING ADMISSION TO [facility] S/P FALL RESULTING IN R SIDED RIB FX 5-10  PROBLEMS LIST INCLUDES  has a past medical history of Allergic rhinitis, Anxiety, Aspiration of liquid, Asthma, Atrial fibrillation ([facility] ), CAD (coronary artery disease), Cancer of kidney ([facility] ), COPD (chronic obstructive pulmonary disease) ([facility] ), CPAP (continuous positive airway pressure) dependence, Dementia ([facility] ), Diabetes mellitus, type 2 ([facility] ), DJD (degenerative joint disease), Hypercholesterolemia, Hyperlipidemia, Hypertension, Incisional hernia, Kidney disease, Melanoma ([facility] ), Obesity, Sleep apnea, and TIA (transient ischemic attack)  PT IS A QUESTIONABLE HISTORIAN HOWEVER REPORTS BEING FROM HOME WITH SPOUSE AND OVERALL INDEPENDENT WITH ADLS/LT IADLS/DRIVING (?) PTA  PT CURRENTLY REQUIRES OVERALL MIN-MOD A WITH ADLS, MOD A WITH BED MOBILITY AND MIN A WITH TRANSFERS /FUNCTIONAL MOBILITY WITH USE OF RW  PT IS LIMITED 2' PAIN, FATIGUE, IMPAIRED BALANCE, FALL RISK , LIMITED SAFETY AWARENESS/INSIGHT/JUDGEMENT, DISORIENTATION, LIMITED FAMILY/FRIEND SUPPORT , INACCESSIBLE HOME ENVIRONMENT and OVERALL LIMITED ACTIVITY TOLERANCE   PT EDUCATED ON DEEP BREATHING TECHNIQUES T/O ACTIVITY, SLOWING OF PACE and CONTINUE PARTICIPATION IN SELF-CARE/MOBILITY WITH STAFF WHILE IN THE HOSPITAL   The patient's raw score on the AM-PAC Daily Activity inpatient short form is 17, standardized score is 37 26, less than 39 4  Patients at this level are likely to benefit from discharge to post-acute rehabilitation services  Please refer to the recommendation of the Occupational Therapist for safe discharge planning  FROM AN OCCUPATIONAL THERAPY PERSPECTIVE, PT WOULD BENEFIT FROM ADDITIONAL OT SERVICES IN AN INPT REHAB SETTING UPON D/C  WILL CONT TO FOLLOW TO ADDRESS THE BELOW DESCRIBED GOALS  Recommendation: Geriatric Consult  OT Discharge Recommendation: Post acute rehabilitation services  OT - OK to Discharge:  Yes

## 2022-04-10 NOTE — OCCUPATIONAL THERAPY NOTE
Occupational Therapy Evaluation     Patient Name: Fernando Ching  Today's Date: 4/10/2022  Problem List  Principal Problem:    Multiple rib fractures  Active Problems:    CKD (chronic kidney disease), stage IV (HCC)    Essential hypertension    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HCC)    Paroxysmal atrial fibrillation (HCC)    Type 2 diabetes mellitus with kidney complication, with long-term current use of insulin (HCC)    Abnormal head CT    Past Medical History  Past Medical History:   Diagnosis Date    Allergic rhinitis     Anxiety     Aspiration of liquid     and food per wife if he is eating too fast or talking when eating    Asthma     as a child    Atrial fibrillation ([address] )     CAD (coronary artery disease)     [facility]     COPD (chronic obstructive pulmonary disease) [facility]     CPAP (continuous positive airway pressure) dependence     Dementia ([address] )     Frontal lobe    Dementia ([facility]     Diabetes mellitus ([address] )     Diabetes mellitus, type 2 ([address] )     DJD (degenerative joint disease)     Hypercholesterolemia     Hyperlipidemia     Hypertension     Incisional hernia     Kidney disease     Melanoma ([facility] )     left leg    Obesity     Sleep apnea     wears CPAP    TIA (transient ischemic attack)      Past Surgical History  Past Surgical History:   Procedure Laterality Date    CARDIAC PACEMAKER PLACEMENT      CHOLECYSTECTOMY      COLONOSCOPY      HERNIA REPAIR      Incisional hernia    NEPHRECTOMY Left 2005    IA ESOPHAGOGASTRODUODENOSCOPY SUBMUCOSAL INJECTION N/A 9/21/2016    Procedure: ESOPHAGOGASTRODUODENOSCOPY (EGD); Surgeon: Torin Devine MD;  [facility]: [facility]; Service: Gastroenterology    IA ESOPHAGOGASTRODUODENOSCOPY SUBMUCOSAL INJECTION N/A 2/7/2018    Procedure: ESOPHAGOGASTRODUODENOSCOPY (EGD); Surgeon: Torin Devine MD;  [facility];   Service: Gastroenterology    IA ESOPHAGOGASTRODUODENOSCOPY SUBMUCOSAL INJECTION N/A 4/3/2019    Procedure: ESOPHAGOGASTRODUODENOSCOPY (EGD) WITH BOTOX;  Surgeon: Nima Hernandez MD;  [facility]; Service: Gastroenterology    ROTATOR CUFF REPAIR      TONSILLECTOMY           04/10/22 0919   OT Last Visit   OT Visit Date 04/10/21   Note Type   Note type Evaluation   Restrictions/Precautions   Weight Bearing Precautions Per Order No   Other Precautions Cognitive; Chair Alarm; Bed Alarm;Multiple lines; Fall Risk;Pain   Pain Assessment   Pain Assessment Tool 0-10   Pain Score 5   Pain Location/Orientation Orientation: Right;Location: Rib Cage   Patient's Stated Pain Goal No pain   Hospital Pain Intervention(s) Repositioned; Ambulation/increased activity; Emotional support   Home Living   Type of [address] Two level;1/2 bath on main level;Stairs to enter with rails  (2 MING )   Bathroom Shower/Tub Tub/shower unit   Bathroom Toilet Standard   Bathroom Equipment Shower chair   Bathroom Accessibility Accessible   Additional Comments PT IS A QUESTIONABLE HISTORIAN  REPORTS NO USE OF DME AT BASELINE    Prior Function   Level of [place] Independent with ADLs and functional mobility   Lives With Spouse   Receives Help From Family   ADL Assistance Independent   IADLs Needs assistance   Falls in the last 6 months 1 to 4   Vocational Retired   Lifestyle   Autonomy PT [address] ADLS/LT IADLS AND DRIVING (?)   Reciprocal Relationships LIVES WITH SPOUSE   PT REPORTS HE IS ALONE AT TIMES (?)   Service to Others RETIRED   Intrinsic Gratification ENJOYS VISITING WITH HIS GRANDCHILDREN    Subjective   Subjective "I'LL HAVE TO TAKE YOU GIRLS HOME WITH ME"   ADL   Eating Assistance 5  Supervision/Setup   Grooming Assistance 5  Supervision/Setup   UB Bathing Assistance 4  Minimal Assistance   LB Bathing Assistance 3  Moderate Assistance   UB Dressing Assistance 4  Minimal Assistance   LB Dressing Assistance 3  Moderate [address]  3  Moderate Assistance Functional Assistance 3  Moderate Assistance   Bed Mobility   Supine to Sit 3  Moderate assistance   Additional items Assist x 1; Increased time required;Verbal cues;LE management   Sit to Supine Unable to assess  (PT LEFT OOB WITH ALARM ON AND ALL NEEDS IN REACH)   Transfers   Sit to Stand 4  Minimal assistance   Additional items Assist x 1; Increased time required;Verbal cues   Stand to Sit 4  Minimal assistance   Additional items Assist x 1; Increased time required;Verbal cues   Functional Mobility   Functional Mobility 4  Minimal assistance   Additional items Rolling walker   Balance   Static Sitting Good   Static Standing Fair -   Ambulatory Poor +   Activity Tolerance   Activity Tolerance Patient limited by pain; Other (Comment)  (COG)   Medical Staff Made Aware PT SEEN FOR CO-SESSION WITH SKILLED PHYSICAL THERAPIST 2' CLINICALLY UNSTABLE PRESENTATION, POLY-TRAUMATIC INJURIES, NEW PRECAUTIONS/LIMITATIONS, LIMITED ACTIVITY TOLERANCE AND PRESENT IMPAIRMENTS WHICH ARE A REGRESSION FROM THE PT'S BASELINE AND IMPACTING OVERALL OCCUPATIONAL PERFORMANCE  Nurse Made Aware APPROPRIATE TO SEE PER RN    RUE Assessment   RUE Assessment WFL   LUE Assessment   LUE Assessment WFL   Hand Function   Gross Motor Coordination Functional   Fine Motor Coordination Functional   Cognition   Overall Cognitive Status Impaired   Arousal/Participation Alert; Cooperative   Attention Attends with cues to redirect   Orientation Level Oriented to person;Oriented to place;Oriented to situation;Disoriented to time   Memory Decreased recall of precautions;Decreased recall of recent events;Decreased short term memory   Following Commands Follows one step commands with increased time or repetition   Comments PT IS PLEASANT AND COOPERATIVE  BASELINE DEMENTIA  CUES REQUIRED TO ATTEND TO TASK  LIMITED SAFETY/INSIGHT/JUDGEMENT  RECOMMEND ALARM ON FOR SAFETY  RECOMMEND LIGHTS ON/BLINDS OPEN DURING DAY-TIME HOURS TO PROMOTE PROPER SLEEP WAKE CYCLE  Assessment   Limitation Decreased ADL status; Decreased Safe judgement during ADL;Decreased cognition;Decreased endurance;Decreased self-care trans;Decreased high-level ADLs   Prognosis Good;Fair   Assessment 81 YO Male SEEN FOR INITIAL OCCUPATIONAL THERAPY EVALUATION FOLLOWING ADMISSION TO [facility] S/P FALL RESULTING IN R SIDED RIB FX 5-10  PROBLEMS LIST INCLUDES  has a past medical history of Allergic rhinitis, Anxiety, Aspiration of liquid, Asthma, Atrial fibrillation ([facility] ), CAD (coronary artery disease), Cancer of kidney ([facility] ), COPD (chronic obstructive pulmonary disease) ([facility] ), CPAP (continuous positive airway pressure) dependence, Dementia ([facility] ), Diabetes mellitus, type 2 ([facility] ), DJD (degenerative joint disease), Hypercholesterolemia, Hyperlipidemia, Hypertension, Incisional hernia, Kidney disease, Melanoma ([facility] ), Obesity, Sleep apnea, and TIA (transient ischemic attack)  PT IS A QUESTIONABLE HISTORIAN HOWEVER REPORTS BEING FROM HOME WITH SPOUSE AND OVERALL INDEPENDENT WITH ADLS/LT IADLS/DRIVING (?) PTA  PT CURRENTLY REQUIRES OVERALL MIN-MOD A WITH ADLS, MOD A WITH BED MOBILITY AND MIN A WITH TRANSFERS /FUNCTIONAL MOBILITY WITH USE OF RW  PT IS LIMITED 2' PAIN, FATIGUE, IMPAIRED BALANCE, FALL RISK , LIMITED SAFETY AWARENESS/INSIGHT/JUDGEMENT, DISORIENTATION, LIMITED FAMILY/FRIEND SUPPORT , INACCESSIBLE HOME ENVIRONMENT and OVERALL LIMITED ACTIVITY TOLERANCE  PT EDUCATED ON DEEP BREATHING TECHNIQUES T/O ACTIVITY, SLOWING OF PACE and CONTINUE PARTICIPATION IN SELF-CARE/MOBILITY WITH STAFF WHILE IN THE HOSPITAL   The patient's raw score on the AM-PAC Daily Activity inpatient short form is 17, standardized score is 37 26, less than 39 4  Patients at this level are likely to benefit from discharge to post-acute rehabilitation services  Please refer to the recommendation of the Occupational Therapist for safe discharge planning   FROM AN OCCUPATIONAL THERAPY PERSPECTIVE, PT WOULD BENEFIT FROM ADDITIONAL OT SERVICES IN AN INPT REHAB SETTING UPON D/C  WILL CONT TO FOLLOW TO ADDRESS THE BELOW DESCRIBED GOALS  Goals   Patient Goals TO GO FOR A WALK    LTG Time Frame 10-14   Long Term Goal #1 SEE BELOW    Plan   Treatment Interventions ADL retraining;Functional transfer training; Endurance training;Cognitive reorientation;Patient/family training;Equipment evaluation/education; Activityengagement   Goal Expiration Date 04/24/22   OT Frequency 3-5x/wk   Recommendation   Recommendation Geriatric Consult   OT Discharge Recommendation Post acute rehabilitation services   OT - OK to Discharge Yes   AM-PAC Daily Activity Inpatient   Lower Body Dressing 2   Bathing 2   Toileting 2   Upper Body Dressing 3   Grooming 4   Eating 4   Daily Activity Raw Score 17   Daily Activity Standardized Score (Calc for Raw Score >=11) 37 26   AM-PAC Applied Cognition Inpatient   Following a Speech/Presentation 2   Understanding Ordinary Conversation 4   Taking Medications 2   Remembering Where Things Are Placed or Put Away 3   Remembering List of 4-5 Errands 3   Taking Care of Complicated Tasks 2   Applied Cognition Raw Score 16   Applied Cognition Standardized Score 35 03   Modified [place] Scale   Modified [place] Scale 4       OCCUPATIONAL THERAPY GOALS TO BE MET WITHIN 10-14 DAYS:    -Pt will complete additional cognitive assessment (ACLS) with 100% attention to task in order to assist with safe d/c plan  -Pt will increase bed mobility to S LEVEL to participate in functional activities with G tolerance and balance  -Pt will improve functional mobility and transfers to S LEVEL on/off all surfaces including toileting   -Pt will increase independence in all ADLS to S LEVEL with G balance sitting upright in chair   -Pt will improve activity tolerance to G for 30 min txment sessions   -Pt will improve independence in lt homemaking activities to S LEVEL        Documentation completed by ODALYS Tatum, OTR/L  [facility] OF THE [facility] Certified ID# [id number]

## 2022-04-10 NOTE — PLAN OF CARE
Problem: MOBILITY - ADULT  Goal: Maintain or return to baseline ADL function  Description: INTERVENTIONS:  -  Assess patient's ability to carry out ADLs; assess patient's baseline for ADL function and identify physical deficits which impact ability to perform ADLs (bathing, care of mouth/teeth, toileting, grooming, dressing, etc )  - Assess/evaluate cause of self-care deficits   - Assess range of motion  - Assess patient's mobility; develop plan if impaired  - Assess patient's need for assistive devices and provide as appropriate  - Encourage maximum independence but intervene and supervise when necessary  - Involve family in performance of ADLs  - Assess for home care needs following discharge   - Consider OT consult to assist with ADL evaluation and planning for discharge  - Provide patient education as appropriate  4/10/2022 0805 by Vinnie Acosta RN  Outcome: Progressing  4/10/2022 0804 by Vinnie Acosta RN  Outcome: Progressing  Goal: Maintains/Returns to pre admission functional level  Description: INTERVENTIONS:  - Perform BMAT or MOVE assessment daily    - Set and communicate daily mobility goal to care team and patient/family/caregiver  - Collaborate with rehabilitation services on mobility goals if consulted  - Perform Range of Motion 3 times a day  - Reposition patient every 2 hours    - Dangle patient 3 times a day  - Stand patient 3 times a day  - Ambulate patient 3 times a day  - Out of bed to chair 3 times a day   - Out of bed for meals 3 times a day  - Out of bed for toileting  - Record patient progress and toleration of activity level   4/10/2022 0805 by Vinnie Acosta RN  Outcome: Progressing  4/10/2022 0804 by Vinnie Acosta RN  Outcome: Progressing     Problem: Potential for Falls  Goal: Patient will remain free of falls  Description: INTERVENTIONS:  -  Assess patient's ability to carry out ADLs; assess patient's baseline for ADL function and identify physical deficits which impact ability to perform ADLs (bathing, care of mouth/teeth, toileting, grooming, dressing, etc )  - Assess/evaluate cause of self-care deficits   - Assess range of motion  - Assess patient's mobility; develop plan if impaired  - Assess patient's need for assistive devices and provide as appropriate  - Encourage maximum independence but intervene and supervise when necessary  - Involve family in performance of ADLs  - Assess for home care needs following discharge   - Consider OT consult to assist with ADL evaluation and planning for discharge  - Provide patient education as appropriate  4/10/2022 0805 by Maegan Prabhakar RN  Outcome: Progressing  4/10/2022 0804 by Maegan Prabhakar RN  Outcome: Progressing     Problem: Prexisting or High Potential for Compromised Skin Integrity  Goal: Skin integrity is maintained or improved  Description: INTERVENTIONS:  - Identify patients at risk for skin breakdown  - Assess and monitor skin integrity  - Assess and monitor nutrition and hydration status  - Monitor labs   - Assess for incontinence   - Turn and reposition patient  - Assist with mobility/ambulation  - Relieve pressure over bony prominences  - Avoid friction and shearing  - Provide appropriate hygiene as needed including keeping skin clean and dry  - Evaluate need for skin moisturizer/barrier cream  - Collaborate with interdisciplinary team   - Patient/family teaching  - Consider wound care consult   4/10/2022 0805 by Maegan Prabhakar RN  Outcome: Progressing  4/10/2022 0804 by Maegan Prabhakar RN  Outcome: Progressing     Problem: PAIN - ADULT  Goal: Verbalizes/displays adequate comfort level or baseline comfort level  Description: Interventions:  - Encourage patient to monitor pain and request assistance  - Assess pain using appropriate pain scale  - Administer analgesics based on type and severity of pain and evaluate response  - Implement non-pharmacological measures as appropriate and evaluate response  - Consider cultural and social influences on pain and pain management  - Notify physician/advanced practitioner if interventions unsuccessful or patient reports new pain  4/10/2022 0805 by Hamlet Gardiner RN  Outcome: Progressing  4/10/2022 0804 by Hamlet Gardiner RN  Outcome: Progressing     Problem: INFECTION - ADULT  Goal: Absence or prevention of progression during hospitalization  Description: INTERVENTIONS:  - Assess and monitor for signs and symptoms of infection  - Monitor lab/diagnostic results  - Monitor all insertion sites, i e  indwelling lines, tubes, and drains  - Monitor endotracheal if appropriate and nasal secretions for changes in amount and color  - [place] appropriate cooling/warming therapies per order  - Administer medications as ordered  - Instruct and encourage patient and family to use good hand hygiene technique  - Identify and instruct in appropriate isolation precautions for identified infection/condition  4/10/2022 0805 by Hamlet Gardiner RN  Outcome: Progressing  4/10/2022 0804 by Hamlet Gardiner RN  Outcome: Progressing  Goal: Absence of fever/infection during neutropenic period  Description: INTERVENTIONS:  - Monitor WBC    4/10/2022 0805 by Hamlet Gardiner RN  Outcome: Progressing  4/10/2022 0804 by Hamlet Gardiner RN  Outcome: Progressing     Problem: SAFETY ADULT  Goal: Maintain or return to baseline ADL function  Description: INTERVENTIONS:  -  Assess patient's ability to carry out ADLs; assess patient's baseline for ADL function and identify physical deficits which impact ability to perform ADLs (bathing, care of mouth/teeth, toileting, grooming, dressing, etc )  - Assess/evaluate cause of self-care deficits   - Assess range of motion  - Assess patient's mobility; develop plan if impaired  - Assess patient's need for assistive devices and provide as appropriate  - Encourage maximum independence but intervene and supervise when necessary  - Involve family in performance of ADLs  - Assess for home care needs following discharge   - Consider OT consult to assist with ADL evaluation and planning for discharge  - Provide patient education as appropriate  4/10/2022 0805 by Luis Antonio Velez RN  Outcome: Progressing  4/10/2022 0804 by Luis Antonio Velez RN  Outcome: Progressing  Goal: Maintains/Returns to pre admission functional level  Description: INTERVENTIONS:  - Perform BMAT or MOVE assessment daily    - Set and communicate daily mobility goal to care team and patient/family/caregiver  - Collaborate with rehabilitation services on mobility goals if consulted  - Perform Range of Motion 3 times a day  - Reposition patient every 2 hours    - Dangle patient 3 times a day  - Stand patient 3 times a day  - Ambulate patient 3 times a day  - Out of bed to chair 3 times a day   - Out of bed for meals 3 times a day  - Out of bed for toileting  - Record patient progress and toleration of activity level   4/10/2022 0805 by Luis Antonio Velez RN  Outcome: Progressing  4/10/2022 0804 by Luis Antonio Velez RN  Outcome: Progressing  Goal: Patient will remain free of falls  Description: INTERVENTIONS:  -  Assess patient's ability to carry out ADLs; assess patient's baseline for ADL function and identify physical deficits which impact ability to perform ADLs (bathing, care of mouth/teeth, toileting, grooming, dressing, etc )  - Assess/evaluate cause of self-care deficits   - Assess range of motion  - Assess patient's mobility; develop plan if impaired  - Assess patient's need for assistive devices and provide as appropriate  - Encourage maximum independence but intervene and supervise when necessary  - Involve family in performance of ADLs  - Assess for home care needs following discharge   - Consider OT consult to assist with ADL evaluation and planning for discharge  - Provide patient education as appropriate  4/10/2022 0805 by Luis Antonio Velez RN  Outcome: Progressing  4/10/2022 0804 by Luis Antonio Velez RN  Outcome: Progressing Problem: DISCHARGE PLANNING  Goal: Discharge to home or other facility with appropriate resources  Description: INTERVENTIONS:  - Identify barriers to discharge w/patient and caregiver  - Arrange for needed discharge resources and transportation as appropriate  - Identify discharge learning needs (meds, wound care, etc )  - Arrange for interpretive services to assist at discharge as needed  - Refer to Case Management Department for coordinating discharge planning if the patient needs post-hospital services based on physician/advanced practitioner order or complex needs related to functional status, cognitive ability, or social support system  4/10/2022 0805 by Bryce Calle RN  Outcome: Progressing  4/10/2022 0804 by Bryce Calle RN  Outcome: Progressing     Problem: Knowledge Deficit  Goal: Patient/family/caregiver demonstrates understanding of disease process, treatment plan, medications, and discharge instructions  Description: Complete learning assessment and assess knowledge base    Interventions:  - Provide teaching at level of understanding  - Provide teaching via preferred learning methods  4/10/2022 0805 by Bryce Calle RN  Outcome: Progressing  4/10/2022 0804 by Bryce Calle RN  Outcome: Progressing

## 2022-04-10 NOTE — PLAN OF CARE
Problem: PHYSICAL THERAPY ADULT  Goal: Performs mobility at highest level of function for planned discharge setting  See evaluation for individualized goals  Description: Treatment/Interventions: Functional transfer training,LE strengthening/ROM,Elevations,Therapeutic exercise,Endurance training,Cognitive reorientation,Patient/family training,Equipment eval/education,Bed mobility,Gait training          See flowsheet documentation for full assessment, interventions and recommendations  Note: Prognosis: Good  Problem List: Decreased strength,Decreased endurance,Impaired balance,Decreased mobility,Decreased cognition,Decreased safety awareness,Orthopedic restrictions,Pain  Assessment: Pt is a 80 y o  male seen for PT evaluation s/p admit to [facility] 4/9/[facility]22  Pt was admitted with a primary dx of: Fall resulting in R 5-10 rib fractures  PT now consulted for assessment of mobility and d/c needs  Pt with Up in chair orders  Pts current comorbidities effecting treatment include: Afib, Dementia, HTN, CKD  Pts current clinical presentation is Unstable/ Unpredictable (high complexity) due to Ongoing medical management for primary dx, Increased reliance on more restrictive AD compared to baseline, Decreased activity tolerance compared to baseline, Fall risk, Increased assistance needed from caregiver at current time, Cog status, Trending lab values  Prior to admission, pt was independent with household mobility, no AD, required assistance from spouse due to dementia  Upon evaluation, pt currently is requiring modA for bed mobility; [place] for transfers and [place] for ambulation 60 ft w/ RW   Pt presents at PT eval functioning below baseline and currently w/ overall mobility deficits 2* to: BLE weakness, impaired balance, decreased endurance, gait deviations, pain, decreased activity tolerance compared to baseline, decreased functional mobility tolerance compared to baseline, decreased safety awareness, fall risk, decreased cognition  Pt currently at a fall risk 2* to impairments listed above  Pt will continue to benefit from skilled acute PT interventions to address stated impairments; to maximize functional mobility; for ongoing pt/ family training; and DME needs  At conclusion of PT session pt returned back in chair and chair alarm engaged with phone and call bell within reach  Pt denies any further questions at this time  Recommend IP rehab upon hospital D/C  Barriers to Discharge: Inaccessible home environment,Decreased caregiver support        PT Discharge Recommendation: Post acute rehabilitation services          See flowsheet documentation for full assessment
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
## 2022-04-10 NOTE — PATIENT INSTRUCTIONS
Fall Prevention   WHAT YOU NEED TO KNOW:   Fall prevention includes ways to make your home and other areas safer  It also includes ways you can move more carefully to prevent a fall  Health conditions that cause changes in your blood pressure, vision, or muscle strength and coordination may increase your risk for falls  Medicines may also increase your risk for falls if they make you dizzy, weak, or sleepy  DISCHARGE INSTRUCTIONS:   Call 911 or have someone else call if:   · You have fallen and are unconscious  · You have fallen and cannot move part of your body  Contact your healthcare provider if:   · You have fallen and have pain or a headache  · You have questions or concerns about your condition or care  Fall prevention tips:   · Stand or sit up slowly  This may help you keep your balance and prevent falls  · Use assistive devices as directed  Your healthcare provider may suggest that you use a cane or walker to help you keep your balance  You may need to have grab bars put in your bathroom near the toilet or in the shower  · Wear shoes that fit well and have soles that   Wear shoes both inside and outside  Use slippers with good   Do not wear shoes with high heels  · Wear a personal alarm  This is a device that allows you to call 911 if you fall and need help  Ask your healthcare provider for more information  · Stay active  Exercise can help strengthen your muscles and improve your balance  Your healthcare provider may recommend water aerobics or walking  He or she may also recommend physical therapy to improve your coordination  Never start an exercise program without talking to your healthcare provider first          · Manage your medical conditions  Keep all appointments with your healthcare providers  Visit your eye doctor as directed  Home safety tips:       · Add items to prevent falls in the bathroom    Put nonslip strips on your bath or shower floor to prevent you from slipping  Use a bath mat if you do not have carpet in the bathroom  This will prevent you from falling when you step out of the bath or shower  Use a shower seat so you do not need to stand while you shower  Sit on the toilet or a chair in your bathroom to dry yourself and put on clothing  This will prevent you from losing your balance from drying or dressing yourself while you are standing  · Keep paths clear  Remove books, shoes, and other objects from walkways and stairs  Place cords for telephones and lamps out of the way so that you do not need to walk over them  Tape them down if you cannot move them  Remove small rugs  If you cannot remove a rug, secure it with double-sided tape  This will prevent you from tripping  · Install bright lights in your home  Use night lights to help light paths to the bathroom or kitchen  Always turn on the light before you start walking  · Keep items you use often on shelves within reach  Do not use a step stool to help you reach an item  · Paint or place reflective tape on the edges of your stairs  This will help you see the stairs better  Follow up with your doctor as directed:  Write down your questions so you remember to ask them during your visits  © Copyright [employer] 2022 Information is for End User's use only and may not be sold, redistributed or otherwise used for commercial purposes  All illustrations and images included in CareNotes® are the copyrighted property of A D A M , Inc  or Hayden Marie  The above information is an  only  It is not intended as medical advice for individual conditions or treatments  Talk to your doctor, nurse or pharmacist before following any medical regimen to see if it is safe and effective for you

## 2022-04-10 NOTE — ASSESSMENT & PLAN NOTE
Right 5th through 10th rib fractures  -rib fracture protocol  -multimodal pain regimen  -encourage incentive spirometry  -repeat chest x-ray in a m
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
## 2022-04-10 NOTE — CONSULTS
Rib Fracture Consultation - Acute Pain Service   Nata Fernandez 80 y o  male MRN: [id number]  Unit/Bed#: [facility] 621-01 Encounter: [id number]               Assessment/Plan     Assessment:   Patient Active Problem List   Diagnosis    Benign prostate hyperplasia    Elevated prostate specific antigen (PSA)    CKD (chronic kidney disease), stage IV (HCC)    Essential hypertension    Solitary kidney    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[facility] )    [facility]ox[facility] fibrillation [facility]    Mixed hyperlipidemia    Type 2 diabetes mellitus with kidney complication, with long-term current use of insulin ([facility] )    Coronary artery disease involving native coronary artery of native heart without angina pectoris    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, or unspecified chronic kidney disease    Head injury    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    Coagulation disorder ([facility]    Long term current use of antiarrhythmic medical therapy    Recurrent falls    Dementia ([facility] )    Tracheitis    Cough    Multiple rib fractures    Abnormal head CT      Nata Fernandez is a 80y o  year old male with pmh of A-Fib on coumadin and dementia who presents s/p fall with rib fx right side 5-10  Acute pain service consulted for rib fracture and pain management  Patient seen this AM while enjoying breakfast  He is on room air and O2 sats are 95  His IS score is 1750 but he states he has done even better  He reports the pain is tolerable, "I've endured worse than this"  He has had only tylenol for the pain and states that has helped  He is on warfarin for A-Fib and his last dose was reported to be Friday 4/8   His INR yesterday was 1 96    Plan:   - Recommend geriatric oral opioid regimen with oxycodone 2 5 mg/5 mg PO q4hrs PRN for moderate/severe pain  - Will follow-up tomorrow to see if clinical status remains good  - Encourage OOB as tolerated, aggressive pulmonary hygiene    - Multimodal analgesia with:  - Tylenol 975 mg PO q8hrs standing  - Lidocaine patches to affected areas 12 hours on, 12 hours off     Recommended interventions: No intervention at this time  Ideally the patient would be off Warfarin for 5 days with a normal INR prior to epidural placement  His respiratory status and pain control are currently very good  APS will continue to follow  Please contact Acute Pain Service - [facility] via [employer] from [phone] with additional questions or concerns  See TigerText or Amion for additional contacts and after hours information  History of Present Illness    Admit Date:  4/9/2022  Hospital Day:  1 day  Primary Service:  Trauma  Attending Provider:  Dianelys Frias MD  Reason for Consult / Principal Problem: Rib fractures right side 5-10  HPI: Yogesh Bagley is a 80 y o  male who presents with with pmh of A-Fib on coumadin and dementia who presents s/p fall with rib fx right side 5-10  Acute pain service consulted for rib fracture and pain management  Rib Fracture Evaluation:  Injuries: right side rib fx 5-10  Chest tube: n/a  Respiratory Co-morbidities: COPD/Emphysema, MELQUIADES and General debility  SpO2:   SPO2 RA Rest      ED to Hosp-Admission (Current) from [facility]/9/[facility]   SpO2 90 %   SpO2 Activity --   O2 Device None (Room air)   O2 Flow Rate --        Incentive Spirometer: >1750 mL  Platelet Count:   Results from last 7 days   Lab Units 04/10/22  0214   PLATELETS Thousands/uL 209     Coags:   Results from last 7 days   Lab Units 04/09/22  2107   INR  1 96*   PROTIME seconds 21 4*     Home anticoagulants: warfarin    Current pain location(s): right chest  Pain Scale: mild, 2/10  Quality: ache  Current Analgesic regimen:  Tylenol only    Review of Systems   Constitutional: Negative  HENT: Negative  Eyes: Negative  Respiratory: Positive for chest tightness  Negative for shortness of breath  Cardiovascular: Positive for chest pain  Gastrointestinal: Negative  Endocrine: Negative  Genitourinary: Negative  Musculoskeletal: Negative  Skin: Negative  Allergic/Immunologic: Negative  Neurological: Negative  Hematological: Negative  Psychiatric/Behavioral: Negative  Historical Information   Past Medical History:   Diagnosis Date    Allergic rhinitis     Anxiety     Aspiration of liquid     and food per wife if he is eating too fast or talking when eating    Asthma     as a child    [facility]     CAD (coronary artery disease)     Cancer of kidney ([place] [address] )     [facility]) [facility]     CPAP (continuous positive airway pressure) dependence     Dementia ([place] [address] )     Frontal lobe    Dementia ([place] [address] )     Diabetes mellitus ([place] [address] )     Diabetes mellitus, type 2 ([place] [address] )     DJD (degenerative joint disease)     Hypercholesterolemia     Hyperlipidemia     Hypertension     Incisional hernia     Kidney disease     Melanoma ([place] [address] )     left leg    Obesity     Sleep apnea     wears CPAP    TIA (transient ischemic attack)      Past Surgical History:   Procedure Laterality Date    CARDIAC PACEMAKER PLACEMENT      CHOLECYSTECTOMY      COLONOSCOPY      HERNIA REPAIR      Incisional hernia    NEPHRECTOMY Left 2005    MT ESOPHAGOGASTRODUODENOSCOPY SUBMUCOSAL INJECTION N/A 9/21/2016    Procedure: ESOPHAGOGASTRODUODENOSCOPY (EGD); Surgeon: Socrates Mason MD;  [facility]: [facility]; Service: Gastroenterology    MT ESOPHAGOGASTRODUODENOSCOPY SUBMUCOSAL INJECTION N/A 2/7/2018    Procedure: ESOPHAGOGASTRODUODENOSCOPY (EGD); Surgeon: Socrates Mason MD;  [facility]: [facility]; Service: Gastroenterology    MT ESOPHAGOGASTRODUODENOSCOPY SUBMUCOSAL INJECTION N/A 4/3/2019    Procedure: ESOPHAGOGASTRODUODENOSCOPY (EGD) WITH BOTOX;  Surgeon: Socrates Mason MD;  Location: [facility];   Service: Gastroenterology    ROTATOR CUFF REPAIR      TONSILLECTOMY       Social History   Social History Substance and Sexual Activity   Alcohol Use No     Social History     Substance and Sexual Activity   Drug Use No     Social History     Tobacco Use   Smoking Status Never Smoker   Smokeless Tobacco Never Used     Family History: non-contributory    Meds/Allergies   all current active meds have been reviewed    Allergies   Allergen Reactions    Ambien [Zolpidem Tartrate] Hallucinations    Bextra [Valdecoxib] Hives    Dust Mite Extract Sneezing    Lyrica [Pregabalin] Confusion    Mold Extract [Trichophyton] Sneezing    Pollen Extract Sneezing    Tree Extract Other (See Comments) and Sneezing     congestion       Objective   Temp:  [97 7 °F (36 5 °C)-99 4 °F (37 4 °C)] 97 9 °F (36 6 °C)  HR:  [55-81] 55  Resp:  [0-24] 17  BP: (125-186)/(60-85) 143/74    Intake/Output Summary (Last 24 hours) at 4/10/2022 0838  Last data filed at 4/10/2022 0401  Gross per 24 hour   Intake 1000 ml   Output 400 ml   Net 600 ml       Physical Exam  Constitutional:       General: He is not in acute distress  Appearance: Normal appearance  Eyes:      Extraocular Movements: Extraocular movements intact  Conjunctiva/sclera: Conjunctivae normal    Cardiovascular:      Rate and Rhythm: Normal rate  Pulmonary:      Effort: Pulmonary effort is normal  No respiratory distress  Abdominal:      General: There is no distension  Palpations: Abdomen is soft  Musculoskeletal:         General: Normal range of motion  Skin:     General: Skin is warm and dry  Neurological:      General: No focal deficit present  Mental Status: He is oriented to person, place, and time  Psychiatric:         Mood and Affect: Mood normal          Behavior: Behavior normal          Lab Results: I have personally reviewed pertinent labs  Imaging Studies: I have personally reviewed pertinent reports  EKG, Pathology, and Other Studies: I have personally reviewed pertinent reports          Please note that the APS provides consultative services regarding pain management only  With the exception of ketamine, peripheral nerve catheters, and epidural infusions (and except when indicated), final decisions regarding starting or changing doses of analgesic medications are at the discretion of the consulting service  Off hours consultation and/or medication management is generally not available      Jorge A Carrasquillo MD  Acute Pain Service

## 2022-04-10 NOTE — ASSESSMENT & PLAN NOTE
6 mm rounded hyperdense lesion within the posterior right temporal occipital region likely reflects a cavernoma rather than intraparenchymal hemorrhage     -consider repeat CT head versus MRI to further characterize

## 2022-04-10 NOTE — ED PROCEDURE NOTE
Procedure  POC FAST US    Date/Time: 4/9/2022 9:53 PM  Performed by: Malika Branch MD  Authorized by: Malika Branch MD     Patient location:  Trauma  Procedure details:     Exam Type:  Diagnostic    Indications: blunt abdominal trauma and blunt chest trauma      Assess for:  Hemothorax, intra-abdominal fluid, pericardial effusion and pneumothorax    Technique: extended FAST      Views obtained:  Heart - Pericardial sac, Left thorax, RUQ - Arriaga's Pouch, Right thorax, LUQ - Splenorenal space and Suprapubic - Pouch of Fredy    Image quality: diagnostic      Image availability:  Images available in PACS  FAST Findings:     RUQ (Hepatorenal) free fluid: absent      LUQ (Splenorenal) free fluid: absent      Suprapubic free fluid: absent      Cardiac wall motion: identified      Pericardial effusion: absent    extended FAST (Pulmonary) findings:     Left lung sliding: Present      Right lung sliding: Present      Left pleural effusion: Absent      Right pleural effusion: Absent    Interpretation:     Impressions: negative                       Malika Branch MD  04/09/22 2153

## 2022-04-10 NOTE — ASSESSMENT & PLAN NOTE
Lab Results   Component Value Date    EGFR 22 04/09/2022    EGFR 29 02/14/2022    EGFR 27 02/01/2022    CREATININE 2 48 (H) 04/09/2022    CREATININE 1 98 (H) 02/14/2022    CREATININE 2 12 (H) 02/01/2022   Initial creatinine slightly elevated from baseline  -received a L bolus in the emergency department  -continue to monitor

## 2022-04-11 LAB
ATRIAL RATE: 60 BPM
GLUCOSE SERPL-MCNC: 242 MG/DL (ref 65–140)
GLUCOSE SERPL-MCNC: 278 MG/DL (ref 65–140)
GLUCOSE SERPL-MCNC: 368 MG/DL (ref 65–140)
GLUCOSE SERPL-MCNC: 370 MG/DL (ref 65–140)
GLUCOSE SERPL-MCNC: 378 MG/DL (ref 65–140)
GLUCOSE SERPL-MCNC: 445 MG/DL (ref 65–140)
GLUCOSE SERPL-MCNC: 459 MG/DL (ref 65–140)
GLUCOSE SERPL-MCNC: >500 MG/DL (ref 65–140)
P AXIS: 49 DEGREES
PR INTERVAL: 204 MS
QRS AXIS: 34 DEGREES
QRSD INTERVAL: 90 MS
QT INTERVAL: 416 MS
QTC INTERVAL: 416 MS
T WAVE AXIS: 39 DEGREES
VENTRICULAR RATE: 60 BPM

## 2022-04-11 PROCEDURE — 97110 THERAPEUTIC EXERCISES: CPT

## 2022-04-11 PROCEDURE — 82948 REAGENT STRIP/BLOOD GLUCOSE: CPT

## 2022-04-11 PROCEDURE — 97116 GAIT TRAINING THERAPY: CPT

## 2022-04-11 PROCEDURE — 99223 1ST HOSP IP/OBS HIGH 75: CPT | Performed by: INTERNAL MEDICINE

## 2022-04-11 PROCEDURE — 99232 SBSQ HOSP IP/OBS MODERATE 35: CPT | Performed by: STUDENT IN AN ORGANIZED HEALTH CARE EDUCATION/TRAINING PROGRAM

## 2022-04-11 PROCEDURE — 93010 ELECTROCARDIOGRAM REPORT: CPT | Performed by: INTERNAL MEDICINE

## 2022-04-11 RX ORDER — INSULIN GLARGINE 100 [IU]/ML
30 INJECTION, SOLUTION SUBCUTANEOUS EVERY 12 HOURS SCHEDULED
Status: DISCONTINUED | OUTPATIENT
Start: 2022-04-11 | End: 2022-04-11

## 2022-04-11 RX ADMIN — ACETAMINOPHEN 975 MG: 325 TABLET ORAL at 05:31

## 2022-04-11 RX ADMIN — IPRATROPIUM BROMIDE 2 PUFF: 17 AEROSOL, METERED RESPIRATORY (INHALATION) at 05:31

## 2022-04-11 RX ADMIN — IPRATROPIUM BROMIDE 2 PUFF: 17 AEROSOL, METERED RESPIRATORY (INHALATION) at 12:34

## 2022-04-11 RX ADMIN — SENNOSIDES 17.2 MG: 8.6 TABLET, FILM COATED ORAL at 08:37

## 2022-04-11 RX ADMIN — INSULIN LISPRO 3 UNITS: 100 INJECTION, SOLUTION INTRAVENOUS; SUBCUTANEOUS at 12:30

## 2022-04-11 RX ADMIN — METOPROLOL TARTRATE 100 MG: 50 TABLET, FILM COATED ORAL at 21:59

## 2022-04-11 RX ADMIN — ACETAMINOPHEN 975 MG: 325 TABLET ORAL at 12:30

## 2022-04-11 RX ADMIN — IPRATROPIUM BROMIDE 2 PUFF: 17 AEROSOL, METERED RESPIRATORY (INHALATION) at 23:40

## 2022-04-11 RX ADMIN — PROPAFENONE HYDROCHLORIDE 225 MG: 150 TABLET, FILM COATED ORAL at 22:00

## 2022-04-11 RX ADMIN — PROPAFENONE HYDROCHLORIDE 225 MG: 150 TABLET, FILM COATED ORAL at 16:21

## 2022-04-11 RX ADMIN — Medication 2000 UNITS: at 08:37

## 2022-04-11 RX ADMIN — IPRATROPIUM BROMIDE 2 PUFF: 17 AEROSOL, METERED RESPIRATORY (INHALATION) at 17:37

## 2022-04-11 RX ADMIN — PREDNISONE 50 MG: 50 TABLET ORAL at 08:39

## 2022-04-11 RX ADMIN — INSULIN LISPRO 6 UNITS: 100 INJECTION, SOLUTION INTRAVENOUS; SUBCUTANEOUS at 17:37

## 2022-04-11 RX ADMIN — SODIUM CHLORIDE 8 UNITS/HR: 9 INJECTION, SOLUTION INTRAVENOUS at 23:30

## 2022-04-11 RX ADMIN — METOPROLOL TARTRATE 50 MG: 50 TABLET, FILM COATED ORAL at 05:31

## 2022-04-11 RX ADMIN — AMLODIPINE BESYLATE 5 MG: 5 TABLET ORAL at 08:37

## 2022-04-11 RX ADMIN — LORATADINE 10 MG: 10 TABLET ORAL at 08:37

## 2022-04-11 RX ADMIN — CYANOCOBALAMIN TAB 500 MCG 500 MCG: 500 TAB at 08:37

## 2022-04-11 RX ADMIN — INSULIN LISPRO 4 UNITS: 100 INJECTION, SOLUTION INTRAVENOUS; SUBCUTANEOUS at 08:35

## 2022-04-11 RX ADMIN — ATORVASTATIN CALCIUM 10 MG: 10 TABLET, FILM COATED ORAL at 08:37

## 2022-04-11 RX ADMIN — DONEPEZIL HYDROCHLORIDE 10 MG: 10 TABLET ORAL at 22:00

## 2022-04-11 RX ADMIN — ACETAMINOPHEN 975 MG: 325 TABLET ORAL at 21:59

## 2022-04-11 RX ADMIN — FENOFIBRATE 48 MG: 48 TABLET ORAL at 08:39

## 2022-04-11 RX ADMIN — LIDOCAINE 5% 1 PATCH: 700 PATCH TOPICAL at 08:39

## 2022-04-11 RX ADMIN — Medication 250 MG: at 08:37

## 2022-04-11 RX ADMIN — OXYCODONE HYDROCHLORIDE 5 MG: 5 TABLET ORAL at 19:11

## 2022-04-11 RX ADMIN — MEMANTINE 10 MG: 10 TABLET ORAL at 08:37

## 2022-04-11 RX ADMIN — PROPAFENONE HYDROCHLORIDE 225 MG: 150 TABLET, FILM COATED ORAL at 08:37

## 2022-04-11 RX ADMIN — SERTRALINE HYDROCHLORIDE 100 MG: 100 TABLET, FILM COATED ORAL at 08:36

## 2022-04-11 RX ADMIN — LOSARTAN POTASSIUM 25 MG: 25 TABLET, FILM COATED ORAL at 08:36

## 2022-04-11 RX ADMIN — Medication 250 MG: at 08:39

## 2022-04-11 RX ADMIN — FERROUS SULFATE TAB 325 MG (65 MG ELEMENTAL FE) 325 MG: 325 (65 FE) TAB at 08:37

## 2022-04-11 NOTE — CASE MANAGEMENT
Case Management Discharge Planning Note    Patient name Barbara Herman  Location [facility] 621/[facility] 879-60 MRN [id number]  : 1935 Date 2022       Current Admission Date: 2022  Current Admission Diagnosis:Multiple rib fractures   Patient Active Problem List    Diagnosis Date Noted    Multiple rib fractures 2022    Abnormal head CT 2022    Cough 2021    Tracheitis 11/15/2021    Dementia ([facility]     Recurrent falls 2020    Long term current use of antiarrhythmic medical therapy 2020    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 2020    Coagulation disorder ([address] ) 2020    Head injury 2020    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, or unspecified chronic kidney disease 2019    Coronary artery disease involving native coronary artery of native heart without angina pectoris 2019    Type 2 diabetes mellitus with kidney complication, with long-term current use of insulin ([facility] ) 2019    Paroxysmal atrial fibrillation ([facility] ) 2018    Mixed hyperlipidemia 2018    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[facility] ) 2018    CKD (chronic kidney disease), stage IV ([facility] ) 2018    Essential hypertension 2018    Solitary kidney 2018    Benign prostate hyperplasia 10/27/2014    Elevated prostate specific antigen (PSA) 2013      LOS (days): 2  Geometric Mean LOS (GMLOS) (days):   Days to GMLOS:     OBJECTIVE:  Risk of Unplanned Readmission Score: 22         Current admission status: Inpatient   Preferred Pharmacy:   [address]  [address] [facility] [address]  Phone: [phone] Fax: [phone] [facility] [address] [facility] [address] [facility]  Phone: [phone] Fax: [phone]    Primary Care Provider: Winston Solorio MD    Primary Insurance: [employer] [facility] REP  Secondary Insurance:     DISCHARGE DETAILS:    [facility] won't accept  As per [facility], "Patient's case has been reviewed with [facility] medical director, patient is not an acute rehab candidate at his time  Recommend discharge to slower paced sub-acute/SNF rehab"    CM left voicemail for pt's wife to discuss d/c plan, as previous she wanted the pt to d/c home with [facility] if denied [facility]

## 2022-04-11 NOTE — PLAN OF CARE
Problem: PHYSICAL THERAPY ADULT  Goal: Performs mobility at highest level of function for planned discharge setting  See evaluation for individualized goals  Description: Treatment/Interventions: Functional transfer training,LE strengthening/ROM,Elevations,Therapeutic exercise,Endurance training,Cognitive reorientation,Patient/family training,Equipment eval/education,Bed mobility,Gait training          See flowsheet documentation for full assessment, interventions and recommendations  Note: Prognosis: Good  Problem List: Decreased strength,Decreased range of motion,Decreased endurance,Impaired balance,Decreased mobility,Decreased coordination,Decreased safety awareness,Decreased cognition,Pain  Assessment: Pt seen at [facility] on 4/11/22 for PT ts session  Pt was in bed upon arrival for PT and was pleasant and agreeable to session  Pt reported increased discomfort and pain with certain movements  Pt need MinAx1 with HHA in order to go from supine to sit, but was then able to perform transfers with  contact guard which is an improvement since the first session  Pt required increased VC for AD management to increase safety with amb  Pt tolerated increased distance with amb but reported occasional R sided rib pain  Pt then performed stairs with Min Ax1 and VC for proper gait sequencing and safety  Pt completed seated therex to increase strength in which he tolerated well  Pt tolerated exercise well noted by proper technique throughout but required increased VC in order to keep track of rep count  Pt was left OOB with all needs within reach and no further questions  PT will continue to benefit from skilled PT during hospital stay  PT recommends d/c to rehab  Barriers to Discharge: Inaccessible home environment,Decreased caregiver support        PT Discharge Recommendation: Post acute rehabilitation services          See flowsheet documentation for full assessment

## 2022-04-11 NOTE — PROGRESS NOTES
04/11/22 1500   Clinical Encounter Type   Visited With Patient   Routine Visit Introduction   Sacramental Encounters   Communion Given Indicator Yes  (Per Patient - HC given by Pt's  yesterday)   Sacrament of Sick-Anointing Anointed  (Per Patient - anointed by Patient's  yesterday)   Sacrament Other Other (Comment)  (Tatum by Fr Niño Player)

## 2022-04-11 NOTE — PHYSICAL THERAPY NOTE
PHYSICAL THERAPY NOTE          Patient Name: Ginny Ballesteros  BQHFA'E Date: 4/11/2022 04/11/22 1059   PT Last Visit   PT Visit Date 04/11/22   Note Type   Note Type Treatment   Pain Assessment   Pain Assessment Tool FLACC   Pain Score No Pain   Pain Rating: FLACC (Rest) - Face 0   Pain Rating: FLACC (Rest) - Legs 0   Pain Rating: FLACC (Rest) - Activity 0   Pain Rating: FLACC (Rest) - Cry 0   Pain Rating: FLACC (Rest) - Consolability 0   Score: FLACC (Rest) 0   Pain Rating: FLACC (Activity) - Face 1   Pain Rating: FLACC (Activity) - Legs 0   Pain Rating: FLACC (Activity) - Activity 0   Pain Rating: FLACC (Activity) - Cry 1   Pain Rating: FLACC (Activity) - Consolability 0   Score: FLACC (Activity) 2   Restrictions/Precautions   Weight Bearing Precautions Per Order No   Other Precautions Cognitive; Chair Alarm; Bed Alarm; Fall Risk;Pain   General   Chart Reviewed Yes   Response to Previous Treatment Patient with no complaints from previous session  Family/Caregiver Present No   Cognition   Overall Cognitive Status Impaired   Arousal/Participation Alert; Cooperative   Attention Attends with cues to redirect   Orientation Level Oriented to person;Oriented to place; Disoriented to time;Oriented to situation   Memory Decreased long term memory;Decreased short term memory;Decreased recall of recent events   Following Commands Follows one step commands with increased time or repetition   Subjective   Subjective pt in bed upon arrival, pleasant and agreeable   Bed Mobility   Supine to Sit 4  Minimal assistance   Additional items Assist x 1;Verbal cues; Increased time required  (HHA)   Transfers   Sit to Stand 4  Minimal assistance   Additional items Assist x 1;Other  (Contact Guard)   Stand to Sit 4  Minimal assistance   Additional items Assist x 1;Other; Increased time required  (contact guard)   Ambulation/Elevation   Gait pattern Forward Flexion; Short stride; Step to;Excessively slow   Gait Assistance 4  Minimal assist   Additional items Assist x 1;Verbal cues   Assistive Device Rolling walker   Distance 50'x2   Stair Management Assistance 4  Minimal assist   Additional items Assist x 1;Verbal cues   Stair Management Technique Two rails; Step to pattern   Number of Stairs 4   Ambulation/Elevation Additional Comments VC for AD management   Balance   Static Sitting Good   Static Standing Fair -   Ambulatory Poor +   Endurance Deficit   Endurance Deficit Yes   Endurance Deficit Description limited due to pain   Activity Tolerance   Activity Tolerance Patient limited by fatigue;Patient limited by pain   Medical Staff [facility] DPT   Nurse Made Aware RN cleared tx   Exercises   Quad Sets Sitting;15 reps;AROM; Bilateral   Glute Sets Sitting;15 reps;AROM; Bilateral   Hip Abduction Sitting;15 reps;AROM; Bilateral   Hip Adduction Sitting;15 reps;AROM; Bilateral   Knee AROM Long Arc Quad Sitting;15 reps;AROM; Bilateral   Ankle Pumps Sitting;15 reps;AROM; Bilateral   Marching Sitting;15 reps;AROM; Bilateral   Assessment   Prognosis Good   Problem List Decreased strength;Decreased range of motion;Decreased endurance; Impaired balance;Decreased mobility; Decreased coordination;Decreased safety awareness;Decreased cognition;Pain   Assessment Pt seen at [facility] on 4/11/22 for PT ts session  Pt was in bed upon arrival for PT and was pleasant and agreeable to session  Pt reported increased discomfort and pain with certain movements  Pt need MinAx1 with HHA in order to go from supine to sit, but was then able to perform transfers with  contact guard which is an improvement since the first session  Pt required increased VC for AD management to increase safety with amb  Pt tolerated increased distance with amb but reported occasional R sided rib pain  Pt then performed stairs with Min Ax1 and VC for proper gait sequencing and safety   Pt completed seated therex to increase strength in which he tolerated well  Pt tolerated exercise well noted by proper technique throughout but required increased VC in order to keep track of rep count  Pt was left OOB with all needs within reach and no further questions  PT will continue to benefit from skilled PT during hospital stay  PT recommends d/c to rehab  Barriers to Discharge Inaccessible home environment;Decreased caregiver support   Goals   Patient Goals to go home   STG Expiration Date 04/24/22   PT Treatment Day 1   Plan   Treatment/Interventions Functional transfer training;LE strengthening/ROM; Elevations; Therapeutic exercise; Endurance training;Patient/family training;Equipment eval/education; Bed mobility;Gait training;Spoke to nursing   Progress Improving as expected   PT Frequency 3-5x/wk   Recommendation   PT Discharge Recommendation Post acute rehabilitation services   Equipment Recommended [facility] Recommended Wheeled walker   Change/add to [employer]lic[employer]?  No   AM-PAC Basic Mobility Inpatient   Turning in Bed Without Bedrails 3   Lying on Back to Sitting on Edge of Flat Bed 2   Moving Bed to Chair 3   Standing Up From Chair 3   Walk in Room 3   Climb 3-5 Stairs 3   Basic Mobility Inpatient Raw Score 17   Basic Mobility Standardized Score 39 67   Highest Level Of Mobility   -[facility] Goal 5: Stand one or more mins   Education   Education Provided Mobility training;Assistive device   Patient Demonstrates acceptance/verbal understanding   Sejal Hernadez, spt

## 2022-04-11 NOTE — PLAN OF CARE
Problem: MOBILITY - ADULT  Goal: Maintain or return to baseline ADL function  Description: INTERVENTIONS:  -  Assess patient's ability to carry out ADLs; assess patient's baseline for ADL function and identify physical deficits which impact ability to perform ADLs (bathing, care of mouth/teeth, toileting, grooming, dressing, etc )  - Assess/evaluate cause of self-care deficits   - Assess range of motion  - Assess patient's mobility; develop plan if impaired  - Assess patient's need for assistive devices and provide as appropriate  - Encourage maximum independence but intervene and supervise when necessary  - Involve family in performance of ADLs  - Assess for home care needs following discharge   - Consider OT consult to assist with ADL evaluation and planning for discharge  - Provide patient education as appropriate  Outcome: Progressing  Goal: Maintains/Returns to pre admission functional level  Description: INTERVENTIONS:  - Perform BMAT or MOVE assessment daily    - Set and communicate daily mobility goal to care team and patient/family/caregiver  - Collaborate with rehabilitation services on mobility goals if consulted  - Perform Range of Motion  times a day  - Reposition patient every hours    - Dangle patient  times a day  - Stand patient times a day  - Ambulate patient  times a day  - Out of bed to chair times a day   - Out of bed for meals  times a day  - Out of bed for toileting  - Record patient progress and toleration of activity level   Outcome: Progressing     Problem: Potential for Falls  Goal: Patient will remain free of falls  Description: INTERVENTIONS:  - Educate patient/family on patient safety including physical limitations  - Instruct patient to call for assistance with activity   - Consult OT/PT to assist with strengthening/mobility   - Keep Call bell within reach  - Keep bed low and locked with side rails adjusted as appropriate  - Keep care items and personal belongings within reach  - Initiate and maintain comfort rounds  - Make Fall Risk Sign visible to staff  - Offer Toileting every Hours, in advance of need  - Initiate/Maintain alarm  - Obtain necessary fall risk management equipment:  - Apply yellow socks and bracelet for high fall risk patients  - Consider moving patient to room near nurses station  Outcome: Progressing     Problem: Prexisting or High Potential for Compromised Skin Integrity  Goal: Skin integrity is maintained or improved  Description: INTERVENTIONS:  - Identify patients at risk for skin breakdown  - Assess and monitor skin integrity  - Assess and monitor nutrition and hydration status  - Monitor labs   - Assess for incontinence   - Turn and reposition patient  - Assist with mobility/ambulation  - Relieve pressure over bony prominences  - Avoid friction and shearing  - Provide appropriate hygiene as needed including keeping skin clean and dry  - Evaluate need for skin moisturizer/barrier cream  - Collaborate with interdisciplinary team   - Patient/family teaching  - Consider wound care consult   Outcome: Progressing     Problem: PAIN - ADULT  Goal: Verbalizes/displays adequate comfort level or baseline comfort level  Description: Interventions:  - Encourage patient to monitor pain and request assistance  - Assess pain using appropriate pain scale  - Administer analgesics based on type and severity of pain and evaluate response  - Implement non-pharmacological measures as appropriate and evaluate response  - Consider cultural and social influences on pain and pain management  - Notify physician/advanced practitioner if interventions unsuccessful or patient reports new pain  Outcome: Progressing     Problem: INFECTION - ADULT  Goal: Absence or prevention of progression during hospitalization  Description: INTERVENTIONS:  - Assess and monitor for signs and symptoms of infection  - Monitor lab/diagnostic results  - Monitor all insertion sites, i e  indwelling lines, tubes, and drains  - Monitor endotracheal if appropriate and nasal secretions for changes in amount and color  - [place] appropriate cooling/warming therapies per order  - Administer medications as ordered  - Instruct and encourage patient and family to use good hand hygiene technique  - Identify and instruct in appropriate isolation precautions for identified infection/condition  Outcome: Progressing  Goal: Absence of fever/infection during neutropenic period  Description: INTERVENTIONS:  - Monitor WBC    Outcome: Progressing     Problem: SAFETY ADULT  Goal: Maintain or return to baseline ADL function  Description: INTERVENTIONS:  -  Assess patient's ability to carry out ADLs; assess patient's baseline for ADL function and identify physical deficits which impact ability to perform ADLs (bathing, care of mouth/teeth, toileting, grooming, dressing, etc )  - Assess/evaluate cause of self-care deficits   - Assess range of motion  - Assess patient's mobility; develop plan if impaired  - Assess patient's need for assistive devices and provide as appropriate  - Encourage maximum independence but intervene and supervise when necessary  - Involve family in performance of ADLs  - Assess for home care needs following discharge   - Consider OT consult to assist with ADL evaluation and planning for discharge  - Provide patient education as appropriate  Outcome: Progressing  Goal: Maintains/Returns to pre admission functional level  Description: INTERVENTIONS:  - Perform BMAT or MOVE assessment daily    - Set and communicate daily mobility goal to care team and patient/family/caregiver  - Collaborate with rehabilitation services on mobility goals if consulted  - Perform Range of Motion  times a day  - Reposition patient every  hours    - Dangle patient times a day  - Stand patient  times a day  - Ambulate patient  times a day  - Out of bed to chair times a day   - Out of bed for meals  times a day  - Out of bed for toileting  - Record patient progress and toleration of activity level   Outcome: Progressing  Goal: Patient will remain free of falls  Description: INTERVENTIONS:  - Educate patient/family on patient safety including physical limitations  - Instruct patient to call for assistance with activity   - Consult OT/PT to assist with strengthening/mobility   - Keep Call bell within reach  - Keep bed low and locked with side rails adjusted as appropriate  - Keep care items and personal belongings within reach  - Initiate and maintain comfort rounds  - Make Fall Risk Sign visible to staff  - Offer Toileting every  Hours, in advance of need  - Initiate/Maintainalarm  - Obtain necessary fall risk management equipment  - Apply yellow socks and bracelet for high fall risk patients  - Consider moving patient to room near nurses station  Outcome: Progressing     Problem: DISCHARGE PLANNING  Goal: Discharge to home or other facility with appropriate resources  Description: INTERVENTIONS:  - Identify barriers to discharge w/patient and caregiver  - Arrange for needed discharge resources and transportation as appropriate  - Identify discharge learning needs (meds, wound care, etc )  - Arrange for interpretive services to assist at discharge as needed  - Refer to Case Management Department for coordinating discharge planning if the patient needs post-hospital services based on physician/advanced practitioner order or complex needs related to functional status, cognitive ability, or social support system  Outcome: Progressing     Problem: Knowledge Deficit  Goal: Patient/family/caregiver demonstrates understanding of disease process, treatment plan, medications, and discharge instructions  Description: Complete learning assessment and assess knowledge base    Interventions:  - Provide teaching at level of understanding  - Provide teaching via preferred learning methods  Outcome: Progressing

## 2022-04-11 NOTE — PLAN OF CARE
Problem: MOBILITY - ADULT  Goal: Maintain or return to baseline ADL function  Description: INTERVENTIONS:  -  Assess patient's ability to carry out ADLs; assess patient's baseline for ADL function and identify physical deficits which impact ability to perform ADLs (bathing, care of mouth/teeth, toileting, grooming, dressing, etc )  - Assess/evaluate cause of self-care deficits   - Assess range of motion  - Assess patient's mobility; develop plan if impaired  - Assess patient's need for assistive devices and provide as appropriate  - Encourage maximum independence but intervene and supervise when necessary  - Involve family in performance of ADLs  - Assess for home care needs following discharge   - Consider OT consult to assist with ADL evaluation and planning for discharge  - Provide patient education as appropriate  Outcome: Progressing  Goal: Maintains/Returns to pre admission functional level  Description: INTERVENTIONS:  - Perform BMAT or MOVE assessment daily    - Set and communicate daily mobility goal to care team and patient/family/caregiver  - Collaborate with rehabilitation services on mobility goals if consulted  - Perform Range of Motion 3 times a day  - Reposition patient every 2 hours    - Dangle patient 3 times a day  - Stand patient 3 times a day  - Ambulate patient 3 times a day  - Out of bed to chair 3 times a day   - Out of bed for meals 3 times a day  - Out of bed for toileting  - Record patient progress and toleration of activity level   Outcome: Progressing     Problem: Potential for Falls  Goal: Patient will remain free of falls  Description: INTERVENTIONS:  - Educate patient/family on patient safety including physical limitations  - Instruct patient to call for assistance with activity   - Consult OT/PT to assist with strengthening/mobility   - Keep Call bell within reach  - Keep bed low and locked with side rails adjusted as appropriate  - Keep care items and personal belongings within reach  - Initiate and maintain comfort rounds  - Make Fall Risk Sign visible to staff  - Offer Toileting every 2 Hours, in advance of need  - Initiate/Maintain alarm  - Obtain necessary fall risk management equipment:   - Apply yellow socks and bracelet for high fall risk patients  - Consider moving patient to room near nurses station  Outcome: Progressing     Problem: Prexisting or High Potential for Compromised Skin Integrity  Goal: Skin integrity is maintained or improved  Description: INTERVENTIONS:  - Identify patients at risk for skin breakdown  - Assess and monitor skin integrity  - Assess and monitor nutrition and hydration status  - Monitor labs   - Assess for incontinence   - Turn and reposition patient  - Assist with mobility/ambulation  - Relieve pressure over bony prominences  - Avoid friction and shearing  - Provide appropriate hygiene as needed including keeping skin clean and dry  - Evaluate need for skin moisturizer/barrier cream  - Collaborate with interdisciplinary team   - Patient/family teaching  - Consider wound care consult   Outcome: Progressing     Problem: PAIN - ADULT  Goal: Verbalizes/displays adequate comfort level or baseline comfort level  Description: Interventions:  - Encourage patient to monitor pain and request assistance  - Assess pain using appropriate pain scale  - Administer analgesics based on type and severity of pain and evaluate response  - Implement non-pharmacological measures as appropriate and evaluate response  - Consider cultural and social influences on pain and pain management  - Notify physician/advanced practitioner if interventions unsuccessful or patient reports new pain  Outcome: Progressing     Problem: INFECTION - ADULT  Goal: Absence or prevention of progression during hospitalization  Description: INTERVENTIONS:  - Assess and monitor for signs and symptoms of infection  - Monitor lab/diagnostic results  - Monitor all insertion sites, i e  indwelling lines, tubes, and drains  - Monitor endotracheal if appropriate and nasal secretions for changes in amount and color  - [place] appropriate cooling/warming therapies per order  - Administer medications as ordered  - Instruct and encourage patient and family to use good hand hygiene technique  - Identify and instruct in appropriate isolation precautions for identified infection/condition  Outcome: Progressing  Goal: Absence of fever/infection during neutropenic period  Description: INTERVENTIONS:  - Monitor WBC    Outcome: Progressing     Problem: SAFETY ADULT  Goal: Maintain or return to baseline ADL function  Description: INTERVENTIONS:  -  Assess patient's ability to carry out ADLs; assess patient's baseline for ADL function and identify physical deficits which impact ability to perform ADLs (bathing, care of mouth/teeth, toileting, grooming, dressing, etc )  - Assess/evaluate cause of self-care deficits   - Assess range of motion  - Assess patient's mobility; develop plan if impaired  - Assess patient's need for assistive devices and provide as appropriate  - Encourage maximum independence but intervene and supervise when necessary  - Involve family in performance of ADLs  - Assess for home care needs following discharge   - Consider OT consult to assist with ADL evaluation and planning for discharge  - Provide patient education as appropriate  Outcome: Progressing  Goal: Maintains/Returns to pre admission functional level  Description: INTERVENTIONS:  - Perform BMAT or MOVE assessment daily    - Set and communicate daily mobility goal to care team and patient/family/caregiver  - Collaborate with rehabilitation services on mobility goals if consulted  - Perform Range of Motion 3 times a day  - Reposition patient every 2 hours    - Dangle patient 3 times a day  - Stand patient 3 times a day  - Ambulate patient 3 times a day  - Out of bed to chair 3 times a day   - Out of bed for meals 3 times a day  - Out of bed for toileting  - Record patient progress and toleration of activity level   Outcome: Progressing  Goal: Patient will remain free of falls  Description: INTERVENTIONS:  - Educate patient/family on patient safety including physical limitations  - Instruct patient to call for assistance with activity   - Consult OT/PT to assist with strengthening/mobility   - Keep Call bell within reach  - Keep bed low and locked with side rails adjusted as appropriate  - Keep care items and personal belongings within reach  - Initiate and maintain comfort rounds  - Make Fall Risk Sign visible to staff  - Offer Toileting every 2 Hours, in advance of need  - Initiate/Maintain alarm  - Obtain necessary fall risk management equipment:   - Apply yellow socks and bracelet for high fall risk patients  - Consider moving patient to room near nurses station  Outcome: Progressing     Problem: DISCHARGE PLANNING  Goal: Discharge to home or other facility with appropriate resources  Description: INTERVENTIONS:  - Identify barriers to discharge w/patient and caregiver  - Arrange for needed discharge resources and transportation as appropriate  - Identify discharge learning needs (meds, wound care, etc )  - Arrange for interpretive services to assist at discharge as needed  - Refer to Case Management Department for coordinating discharge planning if the patient needs post-hospital services based on physician/advanced practitioner order or complex needs related to functional status, cognitive ability, or social support system  Outcome: Progressing     Problem: Knowledge Deficit  Goal: Patient/family/caregiver demonstrates understanding of disease process, treatment plan, medications, and discharge instructions  Description: Complete learning assessment and assess knowledge base    Interventions:  - Provide teaching at level of understanding  - Provide teaching via preferred learning methods  Outcome: Progressing

## 2022-04-11 NOTE — RESTORATIVE TECHNICIAN NOTE
Restorative Technician Note      Patient Name: Compa Elaine     Restorative Tech Visit Date: 04/11/22  Note Type: Mobility  Patient Position Upon Consult: Standing  Activity Performed: Ambulated  Assistive Device: Roller walker  Patient Position at End of Consult: Supine;  All needs within reach; Bed/Chair alarm activated

## 2022-04-12 ENCOUNTER — TELEPHONE (OUTPATIENT)
Dept: INTERNAL MEDICINE CLINIC | Facility: CLINIC | Age: 87
End: 2022-04-12

## 2022-04-12 PROBLEM — C64.9 RENAL CELL CARCINOMA (HCC): Status: ACTIVE | Noted: 2022-04-12

## 2022-04-12 LAB
GLUCOSE SERPL-MCNC: 143 MG/DL (ref 65–140)
GLUCOSE SERPL-MCNC: 149 MG/DL (ref 65–140)
GLUCOSE SERPL-MCNC: 157 MG/DL (ref 65–140)
GLUCOSE SERPL-MCNC: 198 MG/DL (ref 65–140)
GLUCOSE SERPL-MCNC: 203 MG/DL (ref 65–140)
GLUCOSE SERPL-MCNC: 216 MG/DL (ref 65–140)
GLUCOSE SERPL-MCNC: 346 MG/DL (ref 65–140)
GLUCOSE SERPL-MCNC: 357 MG/DL (ref 65–140)
INR PPP: 1.44 (ref 0.84–1.19)
PROTHROMBIN TIME: 16.9 SECONDS (ref 11.6–14.5)

## 2022-04-12 PROCEDURE — 82948 REAGENT STRIP/BLOOD GLUCOSE: CPT

## 2022-04-12 PROCEDURE — 99232 SBSQ HOSP IP/OBS MODERATE 35: CPT | Performed by: STUDENT IN AN ORGANIZED HEALTH CARE EDUCATION/TRAINING PROGRAM

## 2022-04-12 PROCEDURE — 85610 PROTHROMBIN TIME: CPT | Performed by: PHYSICIAN ASSISTANT

## 2022-04-12 PROCEDURE — 99222 1ST HOSP IP/OBS MODERATE 55: CPT | Performed by: INTERNAL MEDICINE

## 2022-04-12 RX ORDER — INSULIN GLARGINE 100 [IU]/ML
30 INJECTION, SOLUTION SUBCUTANEOUS EVERY 12 HOURS SCHEDULED
Status: DISCONTINUED | OUTPATIENT
Start: 2022-04-12 | End: 2022-04-13

## 2022-04-12 RX ORDER — WARFARIN SODIUM 5 MG/1
5 TABLET ORAL
Status: DISCONTINUED | OUTPATIENT
Start: 2022-04-15 | End: 2022-04-13

## 2022-04-12 RX ORDER — WARFARIN SODIUM 2.5 MG/1
2.5 TABLET ORAL EVERY OTHER DAY
Status: DISCONTINUED | OUTPATIENT
Start: 2022-04-14 | End: 2022-04-12

## 2022-04-12 RX ORDER — WARFARIN SODIUM 2.5 MG/1
2.5 TABLET ORAL
Status: DISCONTINUED | OUTPATIENT
Start: 2022-04-12 | End: 2022-04-13

## 2022-04-12 RX ADMIN — Medication 250 MG: at 08:27

## 2022-04-12 RX ADMIN — CYANOCOBALAMIN TAB 500 MCG 500 MCG: 500 TAB at 08:27

## 2022-04-12 RX ADMIN — INSULIN LISPRO 6 UNITS: 100 INJECTION, SOLUTION INTRAVENOUS; SUBCUTANEOUS at 22:17

## 2022-04-12 RX ADMIN — PROPAFENONE HYDROCHLORIDE 225 MG: 150 TABLET, FILM COATED ORAL at 08:30

## 2022-04-12 RX ADMIN — LOSARTAN POTASSIUM 25 MG: 25 TABLET, FILM COATED ORAL at 08:27

## 2022-04-12 RX ADMIN — METOPROLOL TARTRATE 50 MG: 50 TABLET, FILM COATED ORAL at 06:07

## 2022-04-12 RX ADMIN — ACETAMINOPHEN 975 MG: 325 TABLET ORAL at 13:45

## 2022-04-12 RX ADMIN — MEMANTINE 10 MG: 10 TABLET ORAL at 08:27

## 2022-04-12 RX ADMIN — ACETAMINOPHEN 975 MG: 325 TABLET ORAL at 06:08

## 2022-04-12 RX ADMIN — WARFARIN SODIUM 2.5 MG: 2.5 TABLET ORAL at 17:52

## 2022-04-12 RX ADMIN — ATORVASTATIN CALCIUM 10 MG: 10 TABLET, FILM COATED ORAL at 08:28

## 2022-04-12 RX ADMIN — INSULIN GLARGINE 30 UNITS: 100 INJECTION, SOLUTION SUBCUTANEOUS at 22:07

## 2022-04-12 RX ADMIN — ACETAMINOPHEN 975 MG: 325 TABLET ORAL at 22:08

## 2022-04-12 RX ADMIN — INSULIN LISPRO 6 UNITS: 100 INJECTION, SOLUTION INTRAVENOUS; SUBCUTANEOUS at 17:52

## 2022-04-12 RX ADMIN — FERROUS SULFATE TAB 325 MG (65 MG ELEMENTAL FE) 325 MG: 325 (65 FE) TAB at 08:27

## 2022-04-12 RX ADMIN — INSULIN LISPRO 4 UNITS: 100 INJECTION, SOLUTION INTRAVENOUS; SUBCUTANEOUS at 22:08

## 2022-04-12 RX ADMIN — FLUTICASONE PROPIONATE 1 SPRAY: 50 SPRAY, METERED NASAL at 08:30

## 2022-04-12 RX ADMIN — INSULIN LISPRO 8 UNITS: 100 INJECTION, SOLUTION INTRAVENOUS; SUBCUTANEOUS at 17:53

## 2022-04-12 RX ADMIN — OXYCODONE HYDROCHLORIDE 5 MG: 5 TABLET ORAL at 20:28

## 2022-04-12 RX ADMIN — FENOFIBRATE 48 MG: 48 TABLET ORAL at 08:31

## 2022-04-12 RX ADMIN — SERTRALINE HYDROCHLORIDE 100 MG: 100 TABLET, FILM COATED ORAL at 08:28

## 2022-04-12 RX ADMIN — IPRATROPIUM BROMIDE 2 PUFF: 17 AEROSOL, METERED RESPIRATORY (INHALATION) at 17:53

## 2022-04-12 RX ADMIN — DONEPEZIL HYDROCHLORIDE 10 MG: 10 TABLET ORAL at 22:08

## 2022-04-12 RX ADMIN — AMLODIPINE BESYLATE 5 MG: 5 TABLET ORAL at 08:28

## 2022-04-12 RX ADMIN — LIDOCAINE 5% 1 PATCH: 700 PATCH TOPICAL at 08:33

## 2022-04-12 RX ADMIN — PROPAFENONE HYDROCHLORIDE 225 MG: 150 TABLET, FILM COATED ORAL at 22:07

## 2022-04-12 RX ADMIN — PREDNISONE 50 MG: 50 TABLET ORAL at 08:31

## 2022-04-12 RX ADMIN — Medication 250 MG: at 08:31

## 2022-04-12 RX ADMIN — Medication 2000 UNITS: at 08:28

## 2022-04-12 RX ADMIN — IPRATROPIUM BROMIDE 2 PUFF: 17 AEROSOL, METERED RESPIRATORY (INHALATION) at 11:54

## 2022-04-12 RX ADMIN — PROPAFENONE HYDROCHLORIDE 225 MG: 150 TABLET, FILM COATED ORAL at 17:52

## 2022-04-12 RX ADMIN — INSULIN GLARGINE 30 UNITS: 100 INJECTION, SOLUTION SUBCUTANEOUS at 12:43

## 2022-04-12 RX ADMIN — METOPROLOL TARTRATE 100 MG: 50 TABLET, FILM COATED ORAL at 22:08

## 2022-04-12 RX ADMIN — IPRATROPIUM BROMIDE 2 PUFF: 17 AEROSOL, METERED RESPIRATORY (INHALATION) at 06:13

## 2022-04-12 RX ADMIN — LORATADINE 10 MG: 10 TABLET ORAL at 08:27

## 2022-04-12 RX ADMIN — SENNOSIDES 17.2 MG: 8.6 TABLET, FILM COATED ORAL at 08:27
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
## 2022-04-12 NOTE — PROGRESS NOTES
[facility]  Progress Note - Selvin Dunaway 1935, 80 y o  male MRN: [id number]  Unit/Bed#: [facility] 621-01 Encounter: [id number]  Primary Care Provider: Natasha Pierce MD   Date and time admitted to hospital: 4/9/2022  8:59 PM    Renal cell carcinoma [facility])  Assessment & Plan  S/p left nephrectomy 2005  Followed by nephrology outpatient    Abnormal head CT  Assessment & Plan  6 mm rounded hyperdense lesion within the posterior right temporal occipital region likely reflects a cavernoma rather than intraparenchymal hemorrhage     - no acute reimaging necessary    Type 2 diabetes mellitus with kidney complication, with long-term current use of insulin [facility])  Assessment & Plan  Lab Results   Component Value Date    HGBA1C 7 2 (H) 12/20/2021       Recent Labs     04/12/22  0601 04/12/22  0811 04/12/22  0955 04/12/22  1152   POCGLU 143* 149* 198* 203*       Blood Sugar Average: Last 72 hrs:  (P) 339 [id number]   -on insulin gtt overnight, changed to home regimen, continue to monitor  -endocrine consult  -frequent glucose checks  -diabetic diet    Paroxysmal atrial fibrillation (HCC)  Assessment & Plan  -s/p pacer  -Continue home metoprolol and propafenone  -continue warfarin with goal INR 2-3, current INR 1 44 (titrate warfarin)    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[facility] [address] )  Assessment & Plan  -Delirium precautions  -geriatrics consult, appreciate recommendations  -continue home medications    Essential hypertension  Assessment & Plan  Continue home amlodipine, losartan, and metoprolol    CKD (chronic kidney disease), stage IV [facility])  Assessment & Plan  Lab Results   Component Value Date    EGFR 26 04/10/2022    EGFR 22 04/09/2022    EGFR 29 02/14/2022    CREATININE 2 21 (H) 04/10/2022    CREATININE 2 48 (H) 04/09/2022    CREATININE 1 98 (H) 02/14/2022   Initial creatinine slightly elevated from baseline  -received a L bolus in the emergency department  -continue to monitor   Last cre 2 21 4/10, stable    * Multiple rib fractures  Assessment & Plan  Right 5th through 10th rib fractures  -rib fracture protocol  -multimodal pain regimen  -encourage incentive spirometry            Disposition: pending stability of blood glucose monitoring, endo consult pending, ultimately will d/c home with VNA services per PT recs     SUBJECTIVE:  Chief Complaint: "I feel wonderful today"    Subjective:   Patient seen and examined bedside  No acute issues  Tolerating diet, no nausea or emesis  Pain well controlled  Additionally seen again bedside with wife this afternoon  Discussed elevated blood glucose levels and need to monitor with appropriate coverage/regimen  OBJECTIVE:   Vitals:   Temp:  [97 6 °F (36 4 °C)-98 6 °F (37 °C)] 98 6 °F (37 °C)  HR:  [60-64] 60  Resp:  [16-18] 17  BP: (133-147)/(68-92) 144/92    Intake/Output:  I/O       04/10 0701  04/11 0700 04/11 0701  04/12 0700 04/12 0701 04/13 0700    P  O  225 700     I V  (mL/kg)   40 9 (0 4)    Total Intake(mL/kg) 225 (2 4) 700 (7 6) 40 9 (0 4)    Urine (mL/kg/hr) 700 (0 3) 1200 (0 5) 550 (0 6)    Total Output 700 1200 550    Net -475 -500 -509 1           Unmeasured Urine Occurrence 3 x 2 x          Nutrition: Diet Levi/CHO Controlled; Consistent Carbohydrate Diet Level 2 (5 carb servings/75 grams CHO/meal)  GI Proph/Bowel Reg: senna  VTE Prophylaxis:Warfarin (Coumadin)     Physical Exam:   General - no acute distress, responsive and cooperative  CV - warm, regular rate  Pulm - normal work of breathing, no respiratory distress, mildly tender right chest wall  Abd - soft, nondistended  Neuro - m/s grossly intact, cn grossly intact  Ext - moving all extremities, right hip ecchymosis      Invasive Devices  Report    Peripheral Intravenous Line            Peripheral IV 04/09/22 Right Antecubital 2 days                 PIC Score  PIC Pain Score: 3 (4/12/2022 11:22 AM)  PIC Incentive Spirometry Score: 2 (4/12/2022 11:22 AM)  PIC Cough Description: 2 (4/12/2022 11:22 AM)  PIC Total Score: 7 (4/12/2022 11:22 AM)       If the Total PIC Score </=5, did you consult APS and evaluate patient for further intervention?: n/a      Pain:    Incentive Spirometry  Cough  3 = Controlled  4 = Above goal volume 3 = Strong  2 = Moderate  3 = Goal to alert volume 2 = Weak  1 = Severe  2 = Below alert volume 1 = Absent     1 = Unable to perform IS         Lab Results:   BMP/CMP: No results found for: SODIUM, K, CL, CO2, ANIONGAP, BUN, CREATININE, GLUCOSE, CALCIUM, AST, ALT, ALKPHOS, PROT, BILITOT, EGFR, CBC: No results found for: WBC, HGB, HCT, MCV, PLT, ADJUSTEDWBC, MCH, MCHC, RDW, MPV, NRBC, Coagulation:   Lab Results   Component Value Date    INR 1 44 (H) 04/12/2022   , Lactate: No results found for: LACTATE, Amylase: No results found for: AMYLASE, Lipase: No results found for: LIPASE, AST: No results found for: AST, ALT: No results found for: ALT, Urinalysis: No results found for: COLORU, CLARITYU, SPECGRAV, PHUR, LEUKOCYTESUR, NITRITE, PROTEINUA, GLUCOSEU, KETONESU, BILIRUBINUR, BLOODU, CK: No results found for: CKTOTAL, Troponin: No results found for: TROPONINI, EtOH: No results found for: ETOH, UDS: No components found for: RAPIDDRUGSCREEN, Pregnancy: No results found for: PREGTESTUR, ABG: No results found for: PHART, DDO7HKM, PO2ART, KXC6LFI, X4SKDGOK, BEART, SOURCE and ISTAT: No components found for: VBG  Imaging/EKG Studies: I have personally reviewed pertinent reports       Other Studies: n/a

## 2022-04-12 NOTE — TELEPHONE ENCOUNTER
pts wife called saying  is in hospital for a fall and has had other complications  She was wondering if you were going to stop by hospital to check him out hes at [facility] room [address] [facility]

## 2022-04-12 NOTE — ASSESSMENT & PLAN NOTE
Lab Results   Component Value Date    HGBA1C 7 2 (H) 12/20/2021       Recent Labs     04/12/22  0601 04/12/22  0811 04/12/22  0955 04/12/22  1152   POCGLU 143* 149* 198* 203*       Blood Sugar Average: Last 72 hrs:  (P) 357 [id number]   -on insulin gtt overnight, changed to home regimen, continue to monitor  -endocrine consult  -frequent glucose checks  -diabetic diet

## 2022-04-12 NOTE — ASSESSMENT & PLAN NOTE
Lab Results   Component Value Date    EGFR 26 04/10/2022    EGFR 22 04/09/2022    EGFR 29 02/14/2022    CREATININE 2 21 (H) 04/10/2022    CREATININE 2 48 (H) 04/09/2022    CREATININE 1 98 (H) 02/14/2022   Initial creatinine slightly elevated from baseline  -received a L bolus in the emergency department  -continue to monitor   Last cre 2 21 4/10, stable

## 2022-04-12 NOTE — ASSESSMENT & PLAN NOTE
-s/p pacer  -Continue home metoprolol and propafenone  -continue warfarin with goal INR 2-3, current INR 1 44 (titrate warfarin)

## 2022-04-12 NOTE — ASSESSMENT & PLAN NOTE
6 mm rounded hyperdense lesion within the posterior right temporal occipital region likely reflects a cavernoma rather than intraparenchymal hemorrhage     - no acute reimaging necessary

## 2022-04-12 NOTE — CASE MANAGEMENT
Case Management Discharge Planning Note    Patient name Concetta Garibay  Location [facility] 276-83 MRN [id number]  : 1935 Date 2022       Current Admission Date: 2022  Current Admission Diagnosis:Multiple rib fractures   Patient Active Problem List    Diagnosis Date Noted    Multiple rib fractures 2022    Abnormal head CT 2022    Cough 2021    Tracheitis 11/15/2021    Dementia ([facility] [address] [facility]     Recurrent falls 2020    Long term current use of antiarrhythmic medical therapy 2020    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 2020    Coagulation disorder ([facility] [facility] ) 2020    Head injury 2020    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, or unspecified chronic kidney disease 2019    Coronary artery disease involving native coronary artery of native heart without angina pectoris 2019    Type 2 diabetes mellitus with kidney complication, with long-term current use of insulin ([facility] ) 2019    Paroxysmal atrial fibrillation ([facility] ) 2018    Mixed hyperlipidemia 2018    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[facility] ) 2018    CKD (chronic kidney disease), stage IV ([facility] [address] [facility] ) 2018    Essential hypertension 2018    Solitary kidney 2018    Benign prostate hyperplasia 10/27/2014    Elevated prostate specific antigen (PSA) 2013      LOS (days): 3  Geometric Mean LOS (GMLOS) (days):   Days to GMLOS:     OBJECTIVE:  Risk of Unplanned Readmission Score: 22         Current admission status: Inpatient   Preferred Pharmacy:   [address]  [address]  [facility] [address]  Phone: [phone] Fax: [address] [facility] [address] [facility]  Phone: [phone] Fax: [phone]    Primary Care Provider: Kirby Perez MD    Primary Insurance: Blanca Manzo [facility] HOSPITAL REP  Secondary Insurance:     DISCHARGE DETAILS:                                Requested [address]         Is the patient interested in [place] at discharge?: Yes  [address] requested[de-identified] Physical [place] Name[de-identified] [facility] Provider[de-identified] PCP  Home Health Services Needed[de-identified] Strengthening/Theraputic Exercises to Improve Function,Gait/ADL Training,Evaluate Functional Status and Safety  Homebound Criteria Met[de-identified] Requires the Assistance of Another Person for Safe Ambulation or to Leave the Home,Uses an Assist Device (i e  cane, walker, etc)  Supporting Clincal Findings[de-identified] Limited Endurance,Fatigues Easliy in Short Distances    DME Referral Provided  Referral made for DME?: No      Treatment Team Recommendation: Short Term Rehab  Discharge Destination Plan[de-identified] Home with [place] at Discharge : Family       CM spoke to pt's wife  She is aware of [facility]'s denial and prefers to bring the pt home with VNA  Family has no preference  Referrals were placed  Pt's wife will come to the hospital today and transport home if medically stable
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
## 2022-04-12 NOTE — PROGRESS NOTES
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 rather than intraparenchymal hemorrhage     - no acute reimaging necessary    Type 2 diabetes mellitus with kidney complication, with long-term current use of insulin [facility])  Assessment & Plan  Lab Results   Component Value Date    HGBA1C 7 2 (H) 12/20/2021       Recent Labs     04/11/22  0759 04/11/22  1133 04/11/22  1725 04/11/22  1728   POCGLU 278* 242* >500* 459*       Blood Sugar Average: Last 72 hrs:  (P) 851 [id number]   -sliding-scale insulin, will change to home regiment due to elevated BG levels  -frequent glucose checks  -diabetic diet    Paroxysmal atrial fibrillation (HCC)  Assessment & Plan  -Continue home metoprolol and propafenone  -continue warfarin with goal INR 2-3    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[address] )  Assessment & Plan  -Delirium precautions  -geriatrics consult, appreciate recommendations  -continue home medications    Essential hypertension  Assessment & Plan  Continue home amlodipine, losartan, and metoprolol    CKD (chronic kidney disease), stage IV [facility])  Assessment & Plan  Lab Results   Component Value Date    EGFR 26 04/10/2022    EGFR 22 04/09/2022    EGFR 29 02/14/2022    CREATININE 2 21 (H) 04/10/2022    CREATININE 2 48 (H) 04/09/2022    CREATININE 1 98 (H) 02/14/2022   Initial creatinine slightly elevated from baseline  -received a L bolus in the emergency department  -continue to monitor    * Multiple rib fractures  Assessment & Plan  Right 5th through 10th rib fractures  -rib fracture protocol  -multimodal pain regimen  -encourage incentive spirometry            Disposition: pending dispo to sub-acute/SNF rehab    SUBJECTIVE:  Chief Complaint: "I feel fine today"    Subjective: In good spirits today  Endorses right sided rib pain  Overall pain is controlled  OOB and using IS >2000  No additional complaints  OBJECTIVE:   Vitals:   Temp:  [97 6 °F (36 4 °C)-98 7 °F (37 1 °C)] 97 6 °F (36 4 °C)  HR:  [60-64] 64  Resp:  [16-20] 18  BP: (126-145)/(65-88) 138/68    Intake/Output:  I/O       04/10 0701  04/11 0700 04/11 0701  04/12 0700    P  O  225 700    I V  (mL/kg)      Total Intake(mL/kg) 225 (2 4) 700 (7 6)    Urine (mL/kg/hr) 700 (0 3) 550 (0 4)    Total Output 700 550    Net -475 +150          Unmeasured Urine Occurrence 3 x 2 x         Nutrition: Diet Levi/CHO Controlled; Consistent Carbohydrate Diet Level 2 (5 carb servings/75 grams CHO/meal)  GI Proph/Bowel Reg: colace PRN, senna QD  VTE Prophylaxis:Warfarin (Coumadin)     Physical Exam:   General - no acute distress, responsive and cooperative  CV - warm, regular rate  Pulm - normal work of breathing, no respiratory distress, tender to palpation right chest wall  Abd - soft, nondistended  Neuro - m/s grossly intact, cn grossly intact  Ext - moving all extremities, right hip ecchymosis      Invasive Devices  Report    Peripheral Intravenous Line            Peripheral IV 04/09/22 Right Antecubital 1 day                 [facility] Score  PIC Pain Score: 1 (4/11/2022  7:11 PM)  PIC Incentive Spirometry Score: 3 (4/11/2022 12:00 PM)  PIC Cough Description: 2 (4/11/2022 12:00 PM)  PIC Total Score: 7 (4/11/2022 12:00 PM)       If the Total PIC Score </=5, did you consult APS and evaluate patient for further intervention?: n/a      Pain:    Incentive Spirometry  Cough  3 = Controlled  4 = Above goal volume 3 = Strong  2 = Moderate  3 = Goal to alert volume 2 = Weak  1 = Severe  2 = Below alert volume 1 = Absent     1 = Unable to perform IS         Lab Results: BMP/CMP: No results found for: SODIUM, K, CL, CO2, ANIONGAP, BUN, CREATININE, GLUCOSE, CALCIUM, AST, ALT, ALKPHOS, PROT, BILITOT, EGFR, CBC: No results found for: WBC, HGB, HCT, MCV, PLT, ADJUSTEDWBC, MCH, MCHC, RDW, MPV, NRBC, Coagulation: No results found for: PT, INR, APTT, Lactate: No results found for: LACTATE, Amylase: No results found for: AMYLASE, Lipase: No results found for: LIPASE, AST: No results found for: AST, ALT: No results found for: ALT, Urinalysis: No results found for: [place], SPECGRAV, PHUR, LEUKOCYTESUR, NITRITE, PROTEINUA, GLUCOSEU, KETONESU, BILIRUBINUR, BLOODU, CK: No results found for: CKTOTAL, Troponin: No results found for: TROPONINI, EtOH: No results found for: ETOH, UDS: No components found for: RAPIDDRUGSCREEN, Pregnancy: No results found for: PREGTESTUR, ABG: No results found for: PHART, KRM3WTU, PO2ART, WHS7KRH, U1UQXUJD, BEART, SOURCE and ISTAT: No components found for: VBG  Imaging/EKG Studies: I have personally reviewed pertinent reports       Other Studies: n/a

## 2022-04-12 NOTE — ASSESSMENT & PLAN NOTE
Lab Results   Component Value Date    EGFR 26 04/10/2022    EGFR 22 04/09/2022    EGFR 29 02/14/2022    CREATININE 2 21 (H) 04/10/2022    CREATININE 2 48 (H) 04/09/2022    CREATININE 1 98 (H) 02/14/2022   Initial creatinine slightly elevated from baseline  -received a L bolus in the emergency department  -continue to monitor

## 2022-04-12 NOTE — ASSESSMENT & PLAN NOTE
Lab Results   Component Value Date    HGBA1C 7 2 (H) 12/20/2021       Recent Labs     04/11/22  0759 04/11/22  1133 04/11/22  1725 04/11/22  1728   POCGLU 278* 242* >500* 459*       Blood Sugar Average: Last 72 hrs:  (P) 322 [id number]   -sliding-scale insulin, will change to home regiment due to elevated BG levels  -frequent glucose checks  -diabetic diet

## 2022-04-12 NOTE — PLAN OF CARE
Problem: MOBILITY - ADULT  Goal: Maintain or return to baseline ADL function  Description: INTERVENTIONS:  -  Assess patient's ability to carry out ADLs; assess patient's baseline for ADL function and identify physical deficits which impact ability to perform ADLs (bathing, care of mouth/teeth, toileting, grooming, dressing, etc )  - Assess/evaluate cause of self-care deficits   - Assess range of motion  - Assess patient's mobility; develop plan if impaired  - Assess patient's need for assistive devices and provide as appropriate  - Encourage maximum independence but intervene and supervise when necessary  - Involve family in performance of ADLs  - Assess for home care needs following discharge   - Consider OT consult to assist with ADL evaluation and planning for discharge  - Provide patient education as appropriate  Outcome: Progressing  Goal: Maintains/Returns to pre admission functional level  Description: INTERVENTIONS:  - Perform BMAT or MOVE assessment daily    - Set and communicate daily mobility goal to care team and patient/family/caregiver  - Collaborate with rehabilitation services on mobility goals if consulted  - Perform Range of Motion *3** times a day  - Reposition patient every *2** hours    - Dangle patient *3** times a day  - Stand patient *3** times a day  - Ambulate patient *3** times a day  - Out of bed to chair *3** times a day   - Out of bed for meals *3** times a day  - Out of bed for toileting  - Record patient progress and toleration of activity level   Outcome: Progressing     Problem: Potential for Falls  Goal: Patient will remain free of falls  Description: INTERVENTIONS:  - Educate patient/family on patient safety including physical limitations  - Instruct patient to call for assistance with activity   - Consult OT/PT to assist with strengthening/mobility   - Keep Call bell within reach  - Keep bed low and locked with side rails adjusted as appropriate  - Keep care items and personal belongings within reach  - Initiate and maintain comfort rounds  - Make Fall Risk Sign visible to staff  - Offer Toileting every *2** Hours, in advance of need  - Initiate/Maintain **bed/chair *alarm  - Obtain necessary fall risk management equipment:   - Apply yellow socks and bracelet for high fall risk patients  - Consider moving patient to room near nurses station  Outcome: Progressing     Problem: Prexisting or High Potential for Compromised Skin Integrity  Goal: Skin integrity is maintained or improved  Description: INTERVENTIONS:  - Identify patients at risk for skin breakdown  - Assess and monitor skin integrity  - Assess and monitor nutrition and hydration status  - Monitor labs   - Assess for incontinence   - Turn and reposition patient  - Assist with mobility/ambulation  - Relieve pressure over bony prominences  - Avoid friction and shearing  - Provide appropriate hygiene as needed including keeping skin clean and dry  - Evaluate need for skin moisturizer/barrier cream  - Collaborate with interdisciplinary team   - Patient/family teaching  - Consider wound care consult   Outcome: Progressing     Problem: PAIN - ADULT  Goal: Verbalizes/displays adequate comfort level or baseline comfort level  Description: Interventions:  - Encourage patient to monitor pain and request assistance  - Assess pain using appropriate pain scale  - Administer analgesics based on type and severity of pain and evaluate response  - Implement non-pharmacological measures as appropriate and evaluate response  - Consider cultural and social influences on pain and pain management  - Notify physician/advanced practitioner if interventions unsuccessful or patient reports new pain  Outcome: Progressing     Problem: INFECTION - ADULT  Goal: Absence or prevention of progression during hospitalization  Description: INTERVENTIONS:  - Assess and monitor for signs and symptoms of infection  - Monitor lab/diagnostic results  - Monitor all insertion sites, i e  indwelling lines, tubes, and drains  - Monitor endotracheal if appropriate and nasal secretions for changes in amount and color  - [place] appropriate cooling/warming therapies per order  - Administer medications as ordered  - Instruct and encourage patient and family to use good hand hygiene technique  - Identify and instruct in appropriate isolation precautions for identified infection/condition  Outcome: Progressing  Goal: Absence of fever/infection during neutropenic period  Description: INTERVENTIONS:  - Monitor WBC    Outcome: Progressing     Problem: SAFETY ADULT  Goal: Maintain or return to baseline ADL function  Description: INTERVENTIONS:  -  Assess patient's ability to carry out ADLs; assess patient's baseline for ADL function and identify physical deficits which impact ability to perform ADLs (bathing, care of mouth/teeth, toileting, grooming, dressing, etc )  - Assess/evaluate cause of self-care deficits   - Assess range of motion  - Assess patient's mobility; develop plan if impaired  - Assess patient's need for assistive devices and provide as appropriate  - Encourage maximum independence but intervene and supervise when necessary  - Involve family in performance of ADLs  - Assess for home care needs following discharge   - Consider OT consult to assist with ADL evaluation and planning for discharge  - Provide patient education as appropriate  Outcome: Progressing  Goal: Maintains/Returns to pre admission functional level  Description: INTERVENTIONS:  - Perform BMAT or MOVE assessment daily    - Set and communicate daily mobility goal to care team and patient/family/caregiver  - Collaborate with rehabilitation services on mobility goals if consulted  - Perform Range of Motion *3** times a day  - Reposition patient every *2** hours    - Dangle patient *3** times a day  - Stand patient *3** times a day  - Ambulate patient *3** times a day  - Out of bed to chair *3** times a day   - Out of bed for meals *3** times a day  - Out of bed for toileting  - Record patient progress and toleration of activity level   Outcome: Progressing  Goal: Patient will remain free of falls  Description: INTERVENTIONS:  - Educate patient/family on patient safety including physical limitations  - Instruct patient to call for assistance with activity   - Consult OT/PT to assist with strengthening/mobility   - Keep Call bell within reach  - Keep bed low and locked with side rails adjusted as appropriate  - Keep care items and personal belongings within reach  - Initiate and maintain comfort rounds  - Make Fall Risk Sign visible to staff  - Offer Toileting every **2* Hours, in advance of need  - Initiate/Maintain **bed/chair *alarm  - Obtain necessary fall risk management equipment:   - Apply yellow socks and bracelet for high fall risk patients  - Consider moving patient to room near nurses station  Outcome: Progressing     Problem: DISCHARGE PLANNING  Goal: Discharge to home or other facility with appropriate resources  Description: INTERVENTIONS:  - Identify barriers to discharge w/patient and caregiver  - Arrange for needed discharge resources and transportation as appropriate  - Identify discharge learning needs (meds, wound care, etc )  - Arrange for interpretive services to assist at discharge as needed  - Refer to Case Management Department for coordinating discharge planning if the patient needs post-hospital services based on physician/advanced practitioner order or complex needs related to functional status, cognitive ability, or social support system  Outcome: Progressing     Problem: Knowledge Deficit  Goal: Patient/family/caregiver demonstrates understanding of disease process, treatment plan, medications, and discharge instructions  Description: Complete learning assessment and assess knowledge base    Interventions:  - Provide teaching at level of understanding  - Provide teaching via preferred learning methods  Outcome: Progressing     Problem: Nutrition/Hydration-ADULT  Goal: Nutrient/Hydration intake appropriate for improving, restoring or maintaining nutritional needs  Description: Monitor and assess patient's nutrition/hydration status for malnutrition  Collaborate with interdisciplinary team and initiate plan and interventions as ordered  Monitor patient's weight and dietary intake as ordered or per policy  Utilize nutrition screening tool and intervene as necessary  Determine patient's food preferences and provide high-protein, high-caloric foods as appropriate       INTERVENTIONS:  - Monitor oral intake, urinary output, labs, and treatment plans  - Assess nutrition and hydration status and recommend course of action  - Evaluate amount of meals eaten  - Assist patient with eating if necessary   - Allow adequate time for meals  - Recommend/ encourage appropriate diets, oral nutritional supplements, and vitamin/mineral supplements  - Order, calculate, and assess calorie counts as needed  - Recommend, monitor, and adjust tube feedings and TPN/PPN based on assessed needs  - Assess need for intravenous fluids  - Provide specific nutrition/hydration education as appropriate  - Include patient/family/caregiver in decisions related to nutrition  Outcome: Progressing

## 2022-04-12 NOTE — ASSESSMENT & PLAN NOTE
Right 5th through 10th rib fractures  -rib fracture protocol  -multimodal pain regimen  -encourage incentive spirometry

## 2022-04-13 ENCOUNTER — TELEPHONE (OUTPATIENT)
Dept: INTERNAL MEDICINE CLINIC | Facility: CLINIC | Age: 87
End: 2022-04-13

## 2022-04-13 VITALS
BODY MASS INDEX: 30.08 KG/M2 | DIASTOLIC BLOOD PRESSURE: 87 MMHG | WEIGHT: 203.71 LBS | HEART RATE: 63 BPM | RESPIRATION RATE: 20 BRPM | SYSTOLIC BLOOD PRESSURE: 160 MMHG | TEMPERATURE: 97.5 F | OXYGEN SATURATION: 88 %

## 2022-04-13 LAB
ANION GAP SERPL CALCULATED.3IONS-SCNC: 7 MMOL/L (ref 4–13)
BUN SERPL-MCNC: 43 MG/DL (ref 5–25)
CALCIUM SERPL-MCNC: 9.5 MG/DL (ref 8.3–10.1)
CHLORIDE SERPL-SCNC: 107 MMOL/L (ref 100–108)
CO2 SERPL-SCNC: 23 MMOL/L (ref 21–32)
CREAT SERPL-MCNC: 2.19 MG/DL (ref 0.6–1.3)
GFR SERPL CREATININE-BSD FRML MDRD: 26 ML/MIN/1.73SQ M
GLUCOSE SERPL-MCNC: 107 MG/DL (ref 65–140)
GLUCOSE SERPL-MCNC: 113 MG/DL (ref 65–140)
GLUCOSE SERPL-MCNC: 147 MG/DL (ref 65–140)
GLUCOSE SERPL-MCNC: 204 MG/DL (ref 65–140)
INR PPP: 1.31 (ref 0.84–1.19)
MAGNESIUM SERPL-MCNC: 2.4 MG/DL (ref 1.6–2.6)
POTASSIUM SERPL-SCNC: 3.6 MMOL/L (ref 3.5–5.3)
PROTHROMBIN TIME: 15.7 SECONDS (ref 11.6–14.5)
SODIUM SERPL-SCNC: 137 MMOL/L (ref 136–145)

## 2022-04-13 PROCEDURE — 85610 PROTHROMBIN TIME: CPT | Performed by: PHYSICIAN ASSISTANT

## 2022-04-13 PROCEDURE — NC001 PR NO CHARGE: Performed by: STUDENT IN AN ORGANIZED HEALTH CARE EDUCATION/TRAINING PROGRAM

## 2022-04-13 PROCEDURE — 83735 ASSAY OF MAGNESIUM: CPT | Performed by: STUDENT IN AN ORGANIZED HEALTH CARE EDUCATION/TRAINING PROGRAM

## 2022-04-13 PROCEDURE — 80048 BASIC METABOLIC PNL TOTAL CA: CPT | Performed by: STUDENT IN AN ORGANIZED HEALTH CARE EDUCATION/TRAINING PROGRAM

## 2022-04-13 PROCEDURE — 99238 HOSP IP/OBS DSCHRG MGMT 30/<: CPT | Performed by: STUDENT IN AN ORGANIZED HEALTH CARE EDUCATION/TRAINING PROGRAM

## 2022-04-13 PROCEDURE — 82948 REAGENT STRIP/BLOOD GLUCOSE: CPT

## 2022-04-13 RX ORDER — WARFARIN SODIUM 5 MG/1
5 TABLET ORAL
Status: DISCONTINUED | OUTPATIENT
Start: 2022-04-13 | End: 2022-04-13 | Stop reason: HOSPADM

## 2022-04-13 RX ORDER — OXYCODONE HYDROCHLORIDE 5 MG/1
2.5 TABLET ORAL EVERY 4 HOURS PRN
Qty: 30 TABLET | Refills: 0 | Status: SHIPPED | OUTPATIENT
Start: 2022-04-13 | End: 2022-04-23

## 2022-04-13 RX ORDER — INSULIN GLARGINE 100 [IU]/ML
28 INJECTION, SOLUTION SUBCUTANEOUS EVERY 12 HOURS SCHEDULED
Status: DISCONTINUED | OUTPATIENT
Start: 2022-04-13 | End: 2022-04-13 | Stop reason: HOSPADM

## 2022-04-13 RX ORDER — INSULIN GLARGINE 100 [IU]/ML
28 INJECTION, SOLUTION SUBCUTANEOUS 2 TIMES DAILY
Qty: 10 ML | Refills: 0 | Status: SHIPPED | OUTPATIENT
Start: 2022-04-13 | End: 2022-07-14 | Stop reason: SDUPTHER

## 2022-04-13 RX ORDER — POTASSIUM CHLORIDE 20 MEQ/1
40 TABLET, EXTENDED RELEASE ORAL ONCE
Status: COMPLETED | OUTPATIENT
Start: 2022-04-13 | End: 2022-04-13

## 2022-04-13 RX ORDER — ACETAMINOPHEN 325 MG/1
975 TABLET ORAL EVERY 8 HOURS SCHEDULED
Qty: 30 TABLET | Refills: 0 | Status: SHIPPED | OUTPATIENT
Start: 2022-04-13 | End: 2022-06-29 | Stop reason: ALTCHOICE

## 2022-04-13 RX ORDER — WARFARIN SODIUM 5 MG/1
TABLET ORAL
Qty: 15 TABLET | Refills: 0 | Status: CANCELLED | OUTPATIENT
Start: 2022-04-13

## 2022-04-13 RX ORDER — HEPARIN SODIUM 5000 [USP'U]/ML
5000 INJECTION, SOLUTION INTRAVENOUS; SUBCUTANEOUS EVERY 8 HOURS SCHEDULED
Status: DISCONTINUED | OUTPATIENT
Start: 2022-04-13 | End: 2022-04-13 | Stop reason: HOSPADM

## 2022-04-13 RX ORDER — WARFARIN SODIUM 2.5 MG/1
2.5 TABLET ORAL
Status: DISCONTINUED | OUTPATIENT
Start: 2022-04-14 | End: 2022-04-13 | Stop reason: HOSPADM

## 2022-04-13 RX ORDER — WARFARIN SODIUM 2.5 MG/1
TABLET ORAL
Qty: 30 TABLET | Refills: 0 | Status: CANCELLED | OUTPATIENT
Start: 2022-04-14

## 2022-04-13 RX ADMIN — FLUTICASONE PROPIONATE 1 SPRAY: 50 SPRAY, METERED NASAL at 08:44

## 2022-04-13 RX ADMIN — AMLODIPINE BESYLATE 5 MG: 5 TABLET ORAL at 08:42

## 2022-04-13 RX ADMIN — Medication 2000 UNITS: at 08:42

## 2022-04-13 RX ADMIN — HEPARIN SODIUM 5000 UNITS: 5000 INJECTION INTRAVENOUS; SUBCUTANEOUS at 14:21

## 2022-04-13 RX ADMIN — FERROUS SULFATE TAB 325 MG (65 MG ELEMENTAL FE) 325 MG: 325 (65 FE) TAB at 08:42

## 2022-04-13 RX ADMIN — INSULIN LISPRO 8 UNITS: 100 INJECTION, SOLUTION INTRAVENOUS; SUBCUTANEOUS at 08:46

## 2022-04-13 RX ADMIN — Medication 250 MG: at 08:44

## 2022-04-13 RX ADMIN — PROPAFENONE HYDROCHLORIDE 225 MG: 150 TABLET, FILM COATED ORAL at 08:43

## 2022-04-13 RX ADMIN — ATORVASTATIN CALCIUM 10 MG: 10 TABLET, FILM COATED ORAL at 08:41

## 2022-04-13 RX ADMIN — MEMANTINE 10 MG: 10 TABLET ORAL at 08:46

## 2022-04-13 RX ADMIN — LORATADINE 10 MG: 10 TABLET ORAL at 08:41

## 2022-04-13 RX ADMIN — OXYCODONE HYDROCHLORIDE 5 MG: 5 TABLET ORAL at 17:02

## 2022-04-13 RX ADMIN — SERTRALINE HYDROCHLORIDE 100 MG: 100 TABLET, FILM COATED ORAL at 08:41

## 2022-04-13 RX ADMIN — FENOFIBRATE 48 MG: 48 TABLET ORAL at 08:44

## 2022-04-13 RX ADMIN — INSULIN GLARGINE 30 UNITS: 100 INJECTION, SOLUTION SUBCUTANEOUS at 08:46

## 2022-04-13 RX ADMIN — Medication 250 MG: at 08:42

## 2022-04-13 RX ADMIN — SENNOSIDES 17.2 MG: 8.6 TABLET, FILM COATED ORAL at 08:41

## 2022-04-13 RX ADMIN — ACETAMINOPHEN 325 MG: 325 TABLET ORAL at 14:21

## 2022-04-13 RX ADMIN — ACETAMINOPHEN 975 MG: 325 TABLET ORAL at 05:41

## 2022-04-13 RX ADMIN — POTASSIUM CHLORIDE 40 MEQ: 1500 TABLET, EXTENDED RELEASE ORAL at 08:41

## 2022-04-13 RX ADMIN — INSULIN LISPRO 2 UNITS: 100 INJECTION, SOLUTION INTRAVENOUS; SUBCUTANEOUS at 18:15

## 2022-04-13 RX ADMIN — PROPAFENONE HYDROCHLORIDE 225 MG: 150 TABLET, FILM COATED ORAL at 17:01

## 2022-04-13 RX ADMIN — WARFARIN SODIUM 5 MG: 5 TABLET ORAL at 17:02

## 2022-04-13 RX ADMIN — LOSARTAN POTASSIUM 25 MG: 25 TABLET, FILM COATED ORAL at 08:41

## 2022-04-13 RX ADMIN — INSULIN LISPRO 8 UNITS: 100 INJECTION, SOLUTION INTRAVENOUS; SUBCUTANEOUS at 18:15

## 2022-04-13 RX ADMIN — METOPROLOL TARTRATE 50 MG: 50 TABLET, FILM COATED ORAL at 05:41

## 2022-04-13 RX ADMIN — CYANOCOBALAMIN TAB 500 MCG 500 MCG: 500 TAB at 08:42

## 2022-04-13 NOTE — ASSESSMENT & PLAN NOTE
-s/p pacer  -Continue home metoprolol and propafenone  -continue warfarin with goal INR 2-3, current INR 1 31   - given SQH while subtherapeutic, additional 5mg warfarin given today   - patient to follow up outpatient for titration and trending of INR

## 2022-04-13 NOTE — DISCHARGE INSTRUCTIONS
Rib Fracture   WHAT YOU NEED TO KNOW:   A rib fracture is a crack or break in a rib bone  Rib fractures usually heal within 6 weeks  You should be able to return to your usual activities before that time  DISCHARGE INSTRUCTIONS:   Call your local emergency number (911 in the [address]) if:   · You have trouble breathing  · You have new or increased pain  Seek care immediately if:   · Your pain does not get better, even after treatment  · You have a fever or a cough  Call your doctor if:   · You have questions or concerns about your condition or care  Medicines: You may need any of the following:  · NSAIDs , such as ibuprofen, help decrease swelling, pain, and fever  This medicine is available with or without a doctor's order  NSAIDs can cause stomach bleeding or kidney problems in certain people  If you take blood thinner medicine, always ask your healthcare provider if NSAIDs are safe for you  Always read the medicine label and follow directions  · Prescription pain medicine  may be given  Ask your healthcare provider how to take this medicine safely  Some prescription pain medicines contain acetaminophen  Do not take other medicines that contain acetaminophen without talking to your healthcare provider  Too much acetaminophen may cause liver damage  Prescription pain medicine may cause constipation  Ask your healthcare provider how to prevent or treat constipation  · Take your medicine as directed  Contact your healthcare provider if you think your medicine is not helping or if you have side effects  Tell him or her if you are allergic to any medicine  Keep a list of the medicines, vitamins, and herbs you take  Include the amounts, and when and why you take them  Bring the list or the pill bottles to follow-up visits  Carry your medicine list with you in case of an emergency  Self-care:   · Take deep breaths and cough 10 times each hour    This will decrease your risk for a lung infection  Hug a pillow on your injured side to decrease pain while you take deep breaths  Take a deep breath and hold it for as long as you can  Let the air out and then cough  Deep breaths help open your airway  You may be given an incentive spirometer to help you take deep breaths  Put the plastic piece in your mouth and take a slow, deep breath, then let the air out and cough  Repeat these steps 10 times every hour  · Rest and limit activity as directed  Do not pull, push, or lift objects  Start to do more as your pain decreases  Ask your healthcare provider how much activity you can do  · Apply ice  on your chest near your fractured rib for 15 to 20 minutes every hour or as directed  Use an ice pack, or put crushed ice in a plastic bag  Cover it with a towel  Ice helps prevent tissue damage and decreases swelling and pain  Follow up with your doctor as directed:  Write down your questions so you remember to ask them during your visits  © Copyright [employer] 2022 Information is for End User's use only and may not be sold, redistributed or otherwise used for commercial purposes  All illustrations and images included in CareNotes® are the copyrighted property of A D A M , Inc  or [facility] Ariella Pena   The above information is an  only  It is not intended as medical advice for individual conditions or treatments  Talk to your doctor, nurse or pharmacist before following any medical regimen to see if it is safe and effective for you

## 2022-04-13 NOTE — QUICK NOTE
Vale Beaulieu is a 80y o  year old male with type 2 diabetes for at least 12 years as per wife  He is on insulin at home, according to wife she gives him 28 units of Levemir in the morning and in the afternoon and 8 units of NovoLog with meals  Patient also has CKD stage 4, frontotemporal dementia and paroxysmal AFib on chronic anticoagulation with Coumadin    Plan:  -Recommend to decrease Lantus dose from 30 to 28 units BID  - Patient currently if off of prednisone , glucose levels improving  - recommend to DC patient with home regimen,  Lantus 28 units BID and lispro 8 units bid with meals

## 2022-04-13 NOTE — ASSESSMENT & PLAN NOTE
Lab Results   Component Value Date    EGFR 26 04/13/2022    EGFR 26 04/10/2022    EGFR 22 04/09/2022    CREATININE 2 19 (H) 04/13/2022    CREATININE 2 21 (H) 04/10/2022    CREATININE 2 48 (H) 04/09/2022   Initial creatinine slightly elevated from baseline  -received a L bolus in the emergency department  -continue to monitor   Last cre 2 19 from 2 21, stable

## 2022-04-13 NOTE — RESTORATIVE TECHNICIAN NOTE
Restorative Technician Note      Patient Name: Katherin Huynh     Restorative Tech Visit Date: 04/13/22  Patient Position Upon Consult: Supine  Range of Motion: Other (Comment) (pt declined ambulation)  Patient Position at End of Consult: Supine;  All needs within reach; Bed/Chair alarm activated

## 2022-04-13 NOTE — TELEPHONE ENCOUNTER
Chance Read called to let you know Lani Clas is in the hospital - [facility]  He had a fall and is under trauma care

## 2022-04-13 NOTE — PROGRESS NOTES
[address] 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 rather than intraparenchymal hemorrhage     - no acute reimaging necessary    Type 2 diabetes mellitus with kidney complication, with long-term current use of insulin [facility])  Assessment & Plan  Lab Results   Component Value Date    HGBA1C 7 2 (H) 12/20/2021       Recent Labs     04/12/22 2020 04/13/22  0840 04/13/22  1147 04/13/22  1615   POCGLU 346* 107 147* 204*       Blood Sugar Average: Last 72 hrs:  (P) 271 [id number]   -s/p insulin gtt briefly, changed to home regimen  -endocrine consulted and monitoring  -frequent glucose checks  -diabetic diet    Paroxysmal atrial fibrillation (HCC)  Assessment & Plan  -s/p pacer  -Continue home metoprolol and propafenone  -continue warfarin with goal INR 2-3, current INR 1 31   - given SQH while subtherapeutic, additional 5mg warfarin given today   - patient to follow up outpatient for titration and trending of INR    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[address] )  Assessment & Plan  -Delirium precautions  -geriatrics consult, appreciate recommendations  -continue home medications    Essential hypertension  Assessment & Plan  Continue home amlodipine, losartan, and metoprolol    CKD (chronic kidney disease), stage IV [facility])  Assessment & Plan  Lab Results   Component Value Date    EGFR 26 04/13/2022    EGFR 26 04/10/2022    EGFR 22 04/09/2022    CREATININE 2 19 (H) 04/13/2022    CREATININE 2 21 (H) 04/10/2022    CREATININE 2 48 (H) 04/09/2022   Initial creatinine slightly elevated from baseline  -received a L bolus in the emergency department  -continue to monitor   Last cre 2 19 from 2 21, stable    * Multiple rib fractures  Assessment & Plan  Right 5th through 10th rib fractures  -rib fracture protocol  -multimodal pain regimen  -encourage incentive spirometry            Disposition: discharge home today with outpatient follow up as described    SUBJECTIVE:    Subjective: Patient seen and examined bedside  No acute complaints  Tolerating diet  No nausea or emesis  Pain well controlled  OBJECTIVE:   Vitals:   Temp:  [97 3 °F (36 3 °C)-98 3 °F (36 8 °C)] 97 5 °F (36 4 °C)  HR:  [60-66] 63  Resp:  [16-20] 20  BP: (125-160)/(70-95) 160/87    Intake/Output:  I/O       04/11 0701 04/12 0700 04/12 0701 04/13 0700 04/13 0701 04/14 0700    P  O  700      I V  (mL/kg)  40 9 (0 4)     Total Intake(mL/kg) 700 (7 6) 40 9 (0 4)     Urine (mL/kg/hr) 1200 (0 5) 1050 (0 5)     Total Output 1200 1050     Net -500 -1009 1            Unmeasured Urine Occurrence 2 x 1 x          Nutrition: Diet Levi/CHO Controlled; Consistent Carbohydrate Diet Level 2 (5 carb servings/75 grams CHO/meal)  Discharge Diet  Discharge Diet  GI Proph/Bowel Reg: senna  VTE Prophylaxis:Warfarin (Coumadin)     Physical Exam:   General - no acute distress, responsive and cooperative  CV - warm, regular rate  Pulm - normal work of breathing, no respiratory distress, mildly tender to chest wall  Abd - soft, nondistended  Neuro - m/s grossly intact, cn grossly intact  Ext - moving all extremities      Invasive Devices  Report    None                  PIC Score  PIC Pain Score: 1 (4/13/2022  5:02 PM)  PIC Incentive Spirometry Score: 3 (4/13/2022  4:37 PM)  PIC Cough Description: 2 (4/13/2022  4:37 PM)  [facility] Total Score: 8 (4/13/2022  4:37 PM)       If the Total PIC Score </=5, did you consult APS and evaluate patient for further intervention?: n/a      Pain:    Incentive Spirometry  Cough  3 = Controlled  4 = Above goal volume 3 = Strong  2 = Moderate  3 = Goal to alert volume 2 = Weak  1 = Severe  2 = Below alert volume 1 = Absent     1 = Unable to perform IS         Lab Results:   BMP/CMP:   Lab Results   Component Value Date    SODIUM 137 04/13/2022    K 3 6 04/13/2022     04/13/2022    CO2 23 04/13/2022    BUN 43 (H) 04/13/2022    CREATININE 2 19 (H) 04/13/2022    CALCIUM 9 5 04/13/2022    EGFR 26 04/13/2022   , CBC: No results found for: WBC, HGB, HCT, MCV, PLT, ADJUSTEDWBC, MCH, MCHC, RDW, MPV, NRBC, Coagulation:   Lab Results   Component Value Date    INR 1 31 (H) 04/13/2022   , Lactate: No results found for: LACTATE, Amylase: No results found for: AMYLASE, Lipase: No results found for: LIPASE, AST: No results found for: AST, ALT: No results found for: ALT, Urinalysis: No results found for: [place] Pollen, SPECGRAV, PHUR, LEUKOCYTESUR, NITRITE, PROTEINUA, GLUCOSEU, KETONESU, BILIRUBINUR, BLOODU, CK: No results found for: CKTOTAL, Troponin: No results found for: TROPONINI, EtOH: No results found for: ETOH, UDS: No components found for: RAPIDDRUGSCREEN, Pregnancy: No results found for: PREGTESTUR, ABG: No results found for: PHART, RPH0FOG, PO2ART, NJU8ZIS, A6EEXGAL, BEART, SOURCE and ISTAT: No components found for: VBG  Imaging/EKG Studies: I have personally reviewed pertinent reports       Other Studies: n/a

## 2022-04-13 NOTE — ASSESSMENT & PLAN NOTE
Lab Results   Component Value Date    HGBA1C 7 2 (H) 12/20/2021       Recent Labs     04/12/22 2020 04/13/22  0840 04/13/22  1147 04/13/22  1615   POCGLU 346* 107 147* 204*       Blood Sugar Average: Last 72 hrs:  (P) 271 [id number]   -s/p insulin gtt briefly, changed to home regimen  -endocrine consulted and monitoring  -frequent glucose checks  -diabetic diet

## 2022-04-13 NOTE — PLAN OF CARE
Problem: MOBILITY - ADULT  Goal: Maintain or return to baseline ADL function  Description: INTERVENTIONS:  -  Assess patient's ability to carry out ADLs; assess patient's baseline for ADL function and identify physical deficits which impact ability to perform ADLs (bathing, care of mouth/teeth, toileting, grooming, dressing, etc )  - Assess/evaluate cause of self-care deficits   - Assess range of motion  - Assess patient's mobility; develop plan if impaired  - Assess patient's need for assistive devices and provide as appropriate  - Encourage maximum independence but intervene and supervise when necessary  - Involve family in performance of ADLs  - Assess for home care needs following discharge   - Consider OT consult to assist with ADL evaluation and planning for discharge  - Provide patient education as appropriate  Outcome: Progressing  Goal: Maintains/Returns to pre admission functional level  Description: INTERVENTIONS:  - Perform BMAT or MOVE assessment daily    - Set and communicate daily mobility goal to care team and patient/family/caregiver  - Collaborate with rehabilitation services on mobility goals if consulted  - Perform Range of Motion  times a day  - Reposition patient every  hours    - Dangle patient  times a day  - Stand patient  times a day  - Ambulate patient  times a day  - Out of bed to chair  times a day   - Out of bed for meals  times a day  - Out of bed for toileting  - Record patient progress and toleration of activity level   Outcome: Progressing     Problem: Potential for Falls  Goal: Patient will remain free of falls  Description: INTERVENTIONS:  - Educate patient/family on patient safety including physical limitations  - Instruct patient to call for assistance with activity   - Consult OT/PT to assist with strengthening/mobility   - Keep Call bell within reach  - Keep bed low and locked with side rails adjusted as appropriate  - Keep care items and personal belongings within reach  - Initiate and maintain comfort rounds  - Make Fall Risk Sign visible to staff  - Offer Toileting every  Hours, in advance of need  - Initiate/Maintain alarm  - Obtain necessary fall risk management equipment:   - Apply yellow socks and bracelet for high fall risk patients  - Consider moving patient to room near nurses station  Outcome: Progressing     Problem: Prexisting or High Potential for Compromised Skin Integrity  Goal: Skin integrity is maintained or improved  Description: INTERVENTIONS:  - Identify patients at risk for skin breakdown  - Assess and monitor skin integrity  - Assess and monitor nutrition and hydration status  - Monitor labs   - Assess for incontinence   - Turn and reposition patient  - Assist with mobility/ambulation  - Relieve pressure over bony prominences  - Avoid friction and shearing  - Provide appropriate hygiene as needed including keeping skin clean and dry  - Evaluate need for skin moisturizer/barrier cream  - Collaborate with interdisciplinary team   - Patient/family teaching  - Consider wound care consult   Outcome: Progressing     Problem: PAIN - ADULT  Goal: Verbalizes/displays adequate comfort level or baseline comfort level  Description: Interventions:  - Encourage patient to monitor pain and request assistance  - Assess pain using appropriate pain scale  - Administer analgesics based on type and severity of pain and evaluate response  - Implement non-pharmacological measures as appropriate and evaluate response  - Consider cultural and social influences on pain and pain management  - Notify physician/advanced practitioner if interventions unsuccessful or patient reports new pain  Outcome: Progressing     Problem: SAFETY ADULT  Goal: Maintain or return to baseline ADL function  Description: INTERVENTIONS:  -  Assess patient's ability to carry out ADLs; assess patient's baseline for ADL function and identify physical deficits which impact ability to perform ADLs (bathing, care of mouth/teeth, toileting, grooming, dressing, etc )  - Assess/evaluate cause of self-care deficits   - Assess range of motion  - Assess patient's mobility; develop plan if impaired  - Assess patient's need for assistive devices and provide as appropriate  - Encourage maximum independence but intervene and supervise when necessary  - Involve family in performance of ADLs  - Assess for home care needs following discharge   - Consider OT consult to assist with ADL evaluation and planning for discharge  - Provide patient education as appropriate  Outcome: Progressing  Goal: Maintains/Returns to pre admission functional level  Description: INTERVENTIONS:  - Perform BMAT or MOVE assessment daily    - Set and communicate daily mobility goal to care team and patient/family/caregiver  - Collaborate with rehabilitation services on mobility goals if consulted  - Perform Range of Motion times a day  - Reposition patient every  hours    - Dangle patient  times a day  - Stand patient times a day  - Ambulate patient  times a day  - Out of bed to chair  times a day   - Out of bed for meals  times a day  - Out of bed for toileting  - Record patient progress and toleration of activity level   Outcome: Progressing  Goal: Patient will remain free of falls  Description: INTERVENTIONS:  - Educate patient/family on patient safety including physical limitations  - Instruct patient to call for assistance with activity   - Consult OT/PT to assist with strengthening/mobility   - Keep Call bell within reach  - Keep bed low and locked with side rails adjusted as appropriate  - Keep care items and personal belongings within reach  - Initiate and maintain comfort rounds  - Make Fall Risk Sign visible to staff  - Offer Toileting every  Hours, in advance of need  - Initiate/Maintain alarm  - Obtain necessary fall risk management equipment:   - Apply yellow socks and bracelet for high fall risk patients  - Consider moving patient to room near nurses station  Outcome: Progressing
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
## 2022-04-14 ENCOUNTER — TRANSITIONAL CARE MANAGEMENT (OUTPATIENT)
Dept: INTERNAL MEDICINE CLINIC | Facility: OTHER | Age: 87
End: 2022-04-14

## 2022-04-14 ENCOUNTER — ANTICOAG VISIT (OUTPATIENT)
Dept: CARDIOLOGY CLINIC | Facility: CLINIC | Age: 87
End: 2022-04-14

## 2022-04-14 ENCOUNTER — TELEPHONE (OUTPATIENT)
Dept: INTERNAL MEDICINE CLINIC | Facility: OTHER | Age: 87
End: 2022-04-14

## 2022-04-14 DIAGNOSIS — I48.0 PAROXYSMAL ATRIAL FIBRILLATION (HCC): Primary | ICD-10-CM

## 2022-04-14 NOTE — PROGRESS NOTES
I spoke with Mariajose Gonzales, patient was discharged from hospital yesterday  She did not give patient any coumadin last night, she did not have anything on her calendar  I advised INR was 1 31 yesterday in hospital, will continue previous dose 2 5amg Mon Wed Fri, 5 mg all other days  Will recheck next week, 4/20/22  I inquired if visiting nurses will be coming, states she did not hear anything yet

## 2022-04-15 ENCOUNTER — TELEPHONE (OUTPATIENT)
Dept: INTERNAL MEDICINE CLINIC | Facility: CLINIC | Age: 87
End: 2022-04-15
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
## 2022-04-17 ENCOUNTER — TELEPHONE (OUTPATIENT)
Dept: OTHER | Facility: OTHER | Age: 87
End: 2022-04-17

## 2022-04-17 NOTE — TELEPHONE ENCOUNTER
The patient's family stated " he does not" need Physical Therapy anymore   This Message is for [facility]

## 2022-04-20 ENCOUNTER — ANTICOAG VISIT (OUTPATIENT)
Dept: CARDIOLOGY CLINIC | Facility: CLINIC | Age: 87
End: 2022-04-20

## 2022-04-20 DIAGNOSIS — I48.0 PAROXYSMAL ATRIAL FIBRILLATION (HCC): Primary | ICD-10-CM

## 2022-04-20 LAB — INR PPP: 3 (ref 0.84–1.19)

## 2022-04-20 NOTE — PROGRESS NOTES
Wilbur SUNG called, patients INR 3 0, advised to have patient take 5 mg Sun Tues Thurs, 2 5 mg all other days  Will recheck in 1 week, 4/27/22    She will also discuss using daily pill box for patient with wife

## 2022-04-25 ENCOUNTER — OFFICE VISIT (OUTPATIENT)
Dept: INTERNAL MEDICINE CLINIC | Facility: CLINIC | Age: 87
End: 2022-04-25
Payer: COMMERCIAL

## 2022-04-25 ENCOUNTER — TELEPHONE (OUTPATIENT)
Dept: INTERNAL MEDICINE CLINIC | Facility: CLINIC | Age: 87
End: 2022-04-25

## 2022-04-25 VITALS
SYSTOLIC BLOOD PRESSURE: 108 MMHG | HEIGHT: 69 IN | BODY MASS INDEX: 29.62 KG/M2 | DIASTOLIC BLOOD PRESSURE: 70 MMHG | TEMPERATURE: 98.2 F | WEIGHT: 200 LBS | OXYGEN SATURATION: 97 % | HEART RATE: 79 BPM

## 2022-04-25 DIAGNOSIS — F02.80 FRONTOTEMPORAL DEMENTIA (HCC): ICD-10-CM

## 2022-04-25 DIAGNOSIS — I48.0 PAROXYSMAL ATRIAL FIBRILLATION (HCC): ICD-10-CM

## 2022-04-25 DIAGNOSIS — G31.09 FRONTOTEMPORAL DEMENTIA (HCC): ICD-10-CM

## 2022-04-25 DIAGNOSIS — I25.10 CORONARY ARTERY DISEASE INVOLVING NATIVE CORONARY ARTERY OF NATIVE HEART WITHOUT ANGINA PECTORIS: ICD-10-CM

## 2022-04-25 DIAGNOSIS — S22.41XD CLOSED FRACTURE OF MULTIPLE RIBS OF RIGHT SIDE WITH ROUTINE HEALING, SUBSEQUENT ENCOUNTER: Primary | ICD-10-CM

## 2022-04-25 PROCEDURE — 1111F DSCHRG MED/CURRENT MED MERGE: CPT | Performed by: INTERNAL MEDICINE

## 2022-04-25 PROCEDURE — 99495 TRANSJ CARE MGMT MOD F2F 14D: CPT | Performed by: INTERNAL MEDICINE

## 2022-04-25 NOTE — PROGRESS NOTES
Transitional Care Management Review:  Grace Rausch is a 80 y o  male here for TCM follow up  During the TCM phone call patient stated:    TCM Call (since 3/25/2022)     Date and time call was made  4/14/2022  8:36 AM    Hospital care reviewed  Records reviewed        Patient was hospitialized at  [facility]        Date of Admission  04/09/22    Date of discharge  04/13/22    Diagnosis  multiple rib fractures    Disposition  Home    Current Symptoms  None      TCM Call (since 3/25/2022)     Post hospital issues  Poor ADL (Activities of Daily Living) performance    Should patient be enrolled in anticoag monitoring? Yes    Scheduled for follow up? Yes    Did you obtain your prescribed medications  Yes    Do you need help managing your prescriptions or medications  No    Is transportation to your appointment needed  No    I have advised the patient to call PCP with any new or worsening symptoms  [address] [place] [address] or [facility] other    Are you recieving any outpatient services  No    Are you recieving home care services  No    Are you using any community resources  No    Current waiver services  No    Have you fallen in the last 12 months  Yes    How many times  once     Interperter language line needed  No          Valarie Little MD      Assessment/Plan:    BMI Counseling: Body mass index is 29 53 kg/m²  The BMI is above normal  Nutrition recommendations include decreasing portion sizes, encouraging healthy choices of fruits and vegetables and decreasing fast food intake  Exercise recommendations include moderate physical activity 150 minutes/week  Rationale for BMI follow-up plan is due to patient being overweight or obese  1  Closed fracture of multiple ribs of right side with routine healing, subsequent encounter    2  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[facility] [address] )    3  Coronary artery disease involving native coronary artery of native heart without angina pectoris    4  Paroxysmal atrial fibrillation (HCC)           Subjective:      Patient ID: Adriana Gandhi is a 80 y o  male  Follow-up from hospitalization, doing well      The following portions of the patient's history were reviewed and updated as appropriate: He  has a past medical history of Allergic rhinitis, Anxiety, Aspiration of liquid, Asthma, Atrial fibrillation ([address] ), CAD (coronary artery disease), Cancer of kidney ([facility] ), COPD (chronic obstructive pulmonary disease) ([facility] [address] ), CPAP (continuous positive airway pressure) dependence, Dementia ([facility] ), Dementia ([address] ), Diabetes mellitus ([facility] ), Diabetes mellitus, type 2 ([facility] [facility] ), DJD (degenerative joint disease), Hypercholesterolemia, Hyperlipidemia, Hypertension, Incisional hernia, Kidney disease, Melanoma ([address] ), Obesity, Sleep apnea, and TIA (transient ischemic attack)    He   Patient Active Problem List    Diagnosis Date Noted    Ren[facility] cell carcinoma ([facility] ) 04/12/2022    Multiple rib fractures 04/09/2022    Abnormal head CT 04/09/2022    Cough 11/19/2021    Tracheitis 11/15/2021    Dementia ([facility] )     Recurrent falls 09/30/2020    Long term current use of antiarrhythmic medical therapy 08/17/2020    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 08/13/2020    Coagulation disorder ([facility] ) 08/13/2020    Head injury 03/09/2020    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, or unspecified chronic kidney disease 05/06/2019    Coronary artery disease involving native coronary artery of native heart without angina pectoris 03/12/2019    Type 2 diabetes mellitus with kidney complication, with long-term current use of insulin ([address] ) 02/04/2019    Paroxysmal atrial fibrillation ([facility] [address] ) 12/12/2018    Mixed hyperlipidemia 12/12/2018    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[address] ) 04/16/2018    CKD (chronic kidney disease), stage IV ([address] ) 03/30/2018    Essential hypertension 03/30/2018    Solitary kidney 03/30/2018    Benign prostate hyperplasia 10/27/2014    Elevated prostate specific antigen (PSA) 06/18/2013     He  has a past surgical history that includes Cardiac pacemaker placement; Nephrectomy (Left, 2005); pr esophagogastroduodenoscopy submucosal injection (N/A, 9/21/2016); Tonsillectomy; Cholecystectomy; pr esophagogastroduodenoscopy submucosal injection (N/A, 2/7/2018); Colonoscopy; Rotator cuff repair; pr esophagogastroduodenoscopy submucosal injection (N/A, 4/3/2019); and Hernia repair  His family history includes Brain cancer in his father; Cancer in his family; Colon cancer in his son; Diabetes in his family; Heart disease in his family; Hypertension in his family; Lung cancer in his father; Stroke in his family  He  reports that he has never smoked  He has never used smokeless tobacco  He reports that he does not drink alcohol and does not use drugs  Current Outpatient Medications   Medication Sig Dispense Refill    acetaminophen (TYLENOL) 325 mg tablet Take 3 tablets (975 mg total) by mouth every 8 (eight) hours 30 tablet 0    amLODIPine (NORVASC) 5 mg tablet TAKE 1 TABLET BY MOUTH DAILY   90 tablet 0    atorvastatin (LIPITOR) 10 mg tablet TAKE ONE TABLET BY MOUTH DAILY 90 tablet 0    BD Insulin Syringe U/F 30G X 1/2" 0 5 ML MISC USE AS DIRECTED WITH INSULIN 100 each 0    cholecalciferol (VITAMIN D3) 1,000 units tablet Take 2,000 Units by mouth daily      cyanocobalamin (VITAMIN B-12) 500 mcg tablet Take 500 mcg by mouth daily      docusate sodium (COLACE) 100 mg capsule Take 100 mg by mouth as needed       donepezil (ARICEPT) 10 mg tablet Take 1 tablet (10 mg total) by mouth daily at bedtime 90 tablet 3    fenofibrate (TRICOR) 145 mg tablet TAKE ONE TABLET BY MOUTH EVERY DAY 90 tablet 0    ferrous sulfate 325 (65 Fe) mg tablet Take 325 mg by mouth as needed       fluticasone (FLONASE) 50 mcg/act nasal spray 1 spray into each nostril daily      insulin glargine (LANTUS) 100 units/mL subcutaneous injection Inject 28 Units under the skin 2 (two) times a day 10 mL 0    insulin lispro (HumaLOG) 100 units/mL injection Inject 8 Units under the skin 2 (two) times a day with meals 4 8 mL 0    Insulin Pen Needle (Novofine Pen Needle) 32G X 6 MM MISC Use 4 (four) times a day 200 each 5    ipratropium (ATROVENT HFA) 17 mcg/act inhaler Inhale 2 puffs every 6 (six) hours   levalbuterol (XOPENEX) 0 63 mg/3 mL nebulizer solution Take 1 ampule by nebulization 2 (two) times a day as needed for wheezing   losartan (COZAAR) 25 mg tablet Take 1 tablet (25 mg total) by mouth daily 90 tablet 4    Magnesium 250 MG TABS Take 500 mg by mouth daily       Memantine HCl ER 28 MG CP24 TAKE ONE CAPSULE BY MOUTH EVERY DAY 90 capsule 1    metoprolol tartrate (LOPRESSOR) 100 mg tablet TAKE 1 TABLET BY MOUTH AT BEDTIME 90 tablet 0    metoprolol tartrate (LOPRESSOR) 50 mg tablet Take 1 tablet (50 mg total) by mouth daily in the early morning 180 tablet 2    ONETOUCH DELICA LANCETS 90M MISC       OneTouch Verio test strip USE TO TEST 3 TIMES A  strip 0    Probiotic Product (PROBIOTIC COLON SUPPORT PO) Take 1 tablet by mouth daily      propafenone (RYTHMOL) 225 mg tablet Take 1 tablet (225 mg total) by mouth 3 (three) times a day 270 tablet 3    sertraline (ZOLOFT) 100 mg tablet Take 1 tablet (100 mg total) by mouth daily 90 tablet 1    warfarin (COUMADIN) 2 5 mg tablet Take 2 5 mg by mouth 4 (four) times a week Takes Tuesday, wednesday, Thursday, Saturday and Sunday   warfarin (COUMADIN) 5 mg tablet TAKE 1 TABLET BY MOUTH DAILY (Patient taking differently: 5 mg Takes 5 mg Monday and Friday  ) 90 tablet 0    sodium chloride (OCEAN) 0 65 % nasal spray 1 spray into each nostril       No current facility-administered medications for this visit  He is allergic to Francis Schein tartrate], bextra [valdecoxib], dust mite extract, lyrica [pregabalin], mold extract [trichophyton], pollen extract, and tree extract       Review of Systems Constitutional: Negative for chills and fever  HENT: Negative for congestion, ear pain and sore throat  Eyes: Negative for pain  Respiratory: Negative for cough and shortness of breath  Cardiovascular: Positive for chest pain  Negative for leg swelling  Gastrointestinal: Negative for abdominal pain, nausea and vomiting  Endocrine: Negative for polyuria  Genitourinary: Negative for difficulty urinating, frequency and urgency  Musculoskeletal: Negative for arthralgias and back pain  Skin: Negative for rash  Neurological: Negative for weakness and headaches  Psychiatric/Behavioral: Negative for sleep disturbance  The patient is not nervous/anxious            Objective:      /70 (BP Location: Right arm, Patient Position: Sitting, Cuff Size: Standard)   Pulse 79   Temp 98 2 °F (36 8 °C) (Temporal)   Ht 5' 9" (1 753 m)   Wt 90 7 kg (200 lb)   SpO2 97%   BMI 29 53 kg/m²     Recent Results (from the past 336 hour(s))   Fingerstick Glucose (POCT)    Collection Time: 04/11/22  5:25 PM   Result Value Ref Range    POC Glucose >500 (HH) 65 - 140 mg/dl   Fingerstick Glucose (POCT)    Collection Time: 04/11/22  5:28 PM   Result Value Ref Range    POC Glucose 459 (H) 65 - 140 mg/dl   Fingerstick Glucose (POCT)    Collection Time: 04/11/22  9:01 PM   Result Value Ref Range    POC Glucose 445 (H) 65 - 140 mg/dl   Fingerstick Glucose (POCT)    Collection Time: 04/11/22 11:27 PM   Result Value Ref Range    POC Glucose 368 (H) 65 - 140 mg/dl   Fingerstick Glucose (POCT)    Collection Time: 04/11/22 11:29 PM   Result Value Ref Range    POC Glucose 378 (H) 65 - 140 mg/dl   Fingerstick Glucose (POCT)    Collection Time: 04/11/22 11:58 PM   Result Value Ref Range    POC Glucose 370 (H) 65 - 140 mg/dl   Fingerstick Glucose (POCT)    Collection Time: 04/12/22  2:06 AM   Result Value Ref Range    POC Glucose 216 (H) 65 - 140 mg/dl   Fingerstick Glucose (POCT)    Collection Time: 04/12/22  4:08 AM   Result Value Ref Range    POC Glucose 157 (H) 65 - 140 mg/dl   Fingerstick Glucose (POCT)    Collection Time: 04/12/22  6:01 AM   Result Value Ref Range    POC Glucose 143 (H) 65 - 140 mg/dl   Fingerstick Glucose (POCT)    Collection Time: 04/12/22  8:11 AM   Result Value Ref Range    POC Glucose 149 (H) 65 - 140 mg/dl   Fingerstick Glucose (POCT)    Collection Time: 04/12/22  9:55 AM   Result Value Ref Range    POC Glucose 198 (H) 65 - 140 mg/dl   Fingerstick Glucose (POCT)    Collection Time: 04/12/22 11:52 AM   Result Value Ref Range    POC Glucose 203 (H) 65 - 140 mg/dl   Protime-INR    Collection Time: 04/12/22 12:40 PM   Result Value Ref Range    Protime 16 9 (H) 11 6 - 14 5 seconds    INR 1 44 (H) 0 84 - 1 19   Fingerstick Glucose (POCT)    Collection Time: 04/12/22  4:37 PM   Result Value Ref Range    POC Glucose 357 (H) 65 - 140 mg/dl   Fingerstick Glucose (POCT)    Collection Time: 04/12/22  8:20 PM   Result Value Ref Range    POC Glucose 346 (H) 65 - 140 mg/dl   Protime-INR    Collection Time: 04/13/22  5:48 AM   Result Value Ref Range    Protime 15 7 (H) 11 6 - 14 5 seconds    INR 1 31 (H) 0 84 - 2 34   Basic metabolic panel    Collection Time: 04/13/22  5:48 AM   Result Value Ref Range    Sodium 137 136 - 145 mmol/L    Potassium 3 6 3 5 - 5 3 mmol/L    Chloride 107 100 - 108 mmol/L    CO2 23 21 - 32 mmol/L    ANION GAP 7 4 - 13 mmol/L    BUN 43 (H) 5 - 25 mg/dL    Creatinine 2 19 (H) 0 60 - 1 30 mg/dL    Glucose 113 65 - 140 mg/dL    Calcium 9 5 8 3 - 10 1 mg/dL    eGFR 26 ml/min/1 73sq m   Magnesium    Collection Time: 04/13/22  5:48 AM   Result Value Ref Range    Magnesium 2 4 1 6 - 2 6 mg/dL   Fingerstick Glucose (POCT)    Collection Time: 04/13/22  8:40 AM   Result Value Ref Range    POC Glucose 107 65 - 140 mg/dl   Fingerstick Glucose (POCT)    Collection Time: 04/13/22 11:47 AM   Result Value Ref Range    POC Glucose 147 (H) 65 - 140 mg/dl   Fingerstick Glucose (POCT)    Collection Time: 04/13/22  4:15 PM   Result Value Ref Range    POC Glucose 204 (H) 65 - 140 mg/dl   Protime-INR    Collection Time: 04/20/22 12:00 AM   Result Value Ref Range    INR 3 00 (A) 0 84 - 1 19        Physical Exam  Constitutional:       Appearance: Normal appearance  HENT:      Head: Normocephalic  Right Ear: Tympanic membrane, ear canal and external ear normal       Left Ear: Tympanic membrane, ear canal and external ear normal       Nose: Nose normal  No congestion  Mouth/Throat:      Mouth: Mucous membranes are moist       Pharynx: Oropharynx is clear  No oropharyngeal exudate or posterior oropharyngeal erythema  Eyes:      Extraocular Movements: Extraocular movements intact  Conjunctiva/sclera: Conjunctivae normal       Pupils: Pupils are equal, round, and reactive to light  Cardiovascular:      Rate and Rhythm: Normal rate and regular rhythm  Heart sounds: Normal heart sounds  No murmur heard  Pulmonary:      Effort: Pulmonary effort is normal       Breath sounds: Normal breath sounds  No wheezing or rales  Abdominal:      General: Bowel sounds are normal  There is no distension  Palpations: Abdomen is soft  Tenderness: There is no abdominal tenderness  Musculoskeletal:         General: Normal range of motion  Cervical back: Normal range of motion and neck supple  Right lower leg: No edema  Left lower leg: No edema  Lymphadenopathy:      Cervical: No cervical adenopathy  Skin:     General: Skin is warm  Neurological:      General: No focal deficit present  Mental Status: He is alert and oriented to person, place, and time

## 2022-04-26 ENCOUNTER — TELEPHONE (OUTPATIENT)
Dept: INTERNAL MEDICINE CLINIC | Facility: CLINIC | Age: 87
End: 2022-04-26

## 2022-04-26 NOTE — TELEPHONE ENCOUNTER
PT's spouse called because he received prescription of insulin lispro that was prescribed by Gage Fleming  Pt wanted to know if you would like him to take that or continue to use the lancets  Marcin Edmond requested a call back  Thank you

## 2022-04-27 ENCOUNTER — TELEPHONE (OUTPATIENT)
Dept: NEPHROLOGY | Facility: CLINIC | Age: 87
End: 2022-04-27

## 2022-04-27 ENCOUNTER — ANTICOAG VISIT (OUTPATIENT)
Dept: CARDIOLOGY CLINIC | Facility: CLINIC | Age: 87
End: 2022-04-27

## 2022-04-27 DIAGNOSIS — I48.0 PAROXYSMAL ATRIAL FIBRILLATION (HCC): Primary | ICD-10-CM

## 2022-04-27 DIAGNOSIS — I12.9 HYPERTENSIVE CHRONIC KIDNEY DISEASE WITH STAGE 1 THROUGH STAGE 4 CHRONIC KIDNEY DISEASE, OR UNSPECIFIED CHRONIC KIDNEY DISEASE: ICD-10-CM

## 2022-04-27 DIAGNOSIS — N18.4 CKD (CHRONIC KIDNEY DISEASE), STAGE IV (HCC): Primary | ICD-10-CM

## 2022-04-27 LAB — INR PPP: 1.7 (ref 0.84–1.19)

## 2022-04-27 NOTE — PROGRESS NOTES
Janay Perales, visiting nurse called, states patients INR 1 7 today, states Mireya Flores is not at the house now, she is unsure what patient has been taking or if he missed any doses  States she will speak with Mireya Flores, will write instruction on the calendar  Advised patient take 5 mg tonight instead of 2 5 mg, then go back to 5 mg Sun Tues Thurs, 2 5 mg all other days     Janay Perales will be coming back Fri, then next Wed, 5/4/22  Advised to obtain INR 5/4/22

## 2022-04-27 NOTE — TELEPHONE ENCOUNTER
----- Message from Addy Nicholas, DO sent at 4/27/2022 12:54 PM EDT -----  CBC, PTH, renal function panel, protein creatinine ratio  Thank you   ----- Message -----  From: Leesa Thompson  Sent: 4/25/2022  11:26 AM EDT  To: Addy Nicholas, DO    Any follow up labs?

## 2022-04-29 DIAGNOSIS — F03.90 DEMENTIA WITHOUT BEHAVIORAL DISTURBANCE, UNSPECIFIED DEMENTIA TYPE (HCC): ICD-10-CM

## 2022-04-29 DIAGNOSIS — E11.69 TYPE 2 DIABETES MELLITUS WITH OTHER SPECIFIED COMPLICATION, WITH LONG-TERM CURRENT USE OF INSULIN (HCC): ICD-10-CM

## 2022-04-29 DIAGNOSIS — Z79.4 TYPE 2 DIABETES MELLITUS WITH OTHER SPECIFIED COMPLICATION, WITH LONG-TERM CURRENT USE OF INSULIN (HCC): ICD-10-CM

## 2022-04-29 RX ORDER — PEN NEEDLE, DIABETIC 32 GX 1/4"
NEEDLE, DISPOSABLE MISCELLANEOUS 4 TIMES DAILY
Qty: 200 EACH | Refills: 5 | Status: CANCELLED | OUTPATIENT
Start: 2022-04-29

## 2022-04-29 RX ORDER — MEMANTINE HYDROCHLORIDE 28 MG/1
CAPSULE, EXTENDED RELEASE ORAL
Qty: 90 CAPSULE | Refills: 1 | Status: CANCELLED | OUTPATIENT
Start: 2022-04-29

## 2022-04-29 NOTE — TELEPHONE ENCOUNTER
Patients wife called stated needs novofine pen needle and memantine 28mg   Sent to [facility] [place]    Next ovs 05/25/2022  Last ovs 04/10/2022

## 2022-04-29 NOTE — TELEPHONE ENCOUNTER
[facility] and Pt does have refill for Insulin Pen Needle (Novofine Pen Needle) and Memantine HCl ER 28 MG CP24 waiting for them  They do not need new prescriptions    Called patient and let his wife know that prescriptions are ready at pharmacy

## 2022-05-02 ENCOUNTER — TELEPHONE (OUTPATIENT)
Dept: INTERNAL MEDICINE CLINIC | Facility: CLINIC | Age: 87
End: 2022-05-02

## 2022-05-02 ENCOUNTER — TELEPHONE (OUTPATIENT)
Dept: NEPHROLOGY | Facility: CLINIC | Age: 87
End: 2022-05-02

## 2022-05-02 NOTE — TELEPHONE ENCOUNTER
Estefani SUNG therapist called to let you know when she went to [facility] today for his occupational therapy, he was not home  Patient is our of compliance for therapy

## 2022-05-02 NOTE — TELEPHONE ENCOUNTER
Appointment Confirmation   Person confirmed appointment with  If not patient, name of the person Spouse    Date and time of appointment 5/3   11:00   Patient acknowledged and will be at appointment? yes    Did you advise the patient that they will need a urine sample if they are a new patient?  N/A    Did you advise the patient to bring their current medications for verification? (including any OTC) Yes    Additional Information

## 2022-05-03 ENCOUNTER — OFFICE VISIT (OUTPATIENT)
Dept: SURGERY | Facility: CLINIC | Age: 87
End: 2022-05-03
Payer: COMMERCIAL

## 2022-05-03 ENCOUNTER — OFFICE VISIT (OUTPATIENT)
Dept: NEPHROLOGY | Facility: CLINIC | Age: 87
End: 2022-05-03
Payer: COMMERCIAL

## 2022-05-03 VITALS — TEMPERATURE: 97.5 F | HEIGHT: 69 IN | WEIGHT: 204.4 LBS | BODY MASS INDEX: 30.27 KG/M2

## 2022-05-03 VITALS
BODY MASS INDEX: 29.95 KG/M2 | HEIGHT: 69 IN | DIASTOLIC BLOOD PRESSURE: 62 MMHG | SYSTOLIC BLOOD PRESSURE: 112 MMHG | WEIGHT: 202.2 LBS

## 2022-05-03 DIAGNOSIS — I12.9 HYPERTENSIVE CHRONIC KIDNEY DISEASE WITH STAGE 1 THROUGH STAGE 4 CHRONIC KIDNEY DISEASE, OR UNSPECIFIED CHRONIC KIDNEY DISEASE: ICD-10-CM

## 2022-05-03 DIAGNOSIS — Z90.5 SOLITARY KIDNEY, ACQUIRED: ICD-10-CM

## 2022-05-03 DIAGNOSIS — N18.4 CKD (CHRONIC KIDNEY DISEASE), STAGE IV (HCC): Primary | ICD-10-CM

## 2022-05-03 DIAGNOSIS — S22.41XD CLOSED FRACTURE OF MULTIPLE RIBS OF RIGHT SIDE WITH ROUTINE HEALING, SUBSEQUENT ENCOUNTER: Primary | ICD-10-CM

## 2022-05-03 DIAGNOSIS — N18.4 TYPE 2 DIABETES MELLITUS WITH STAGE 4 CHRONIC KIDNEY DISEASE, WITH LONG-TERM CURRENT USE OF INSULIN (HCC): ICD-10-CM

## 2022-05-03 DIAGNOSIS — E78.2 MIXED HYPERLIPIDEMIA: ICD-10-CM

## 2022-05-03 DIAGNOSIS — E11.22 TYPE 2 DIABETES MELLITUS WITH STAGE 4 CHRONIC KIDNEY DISEASE, WITH LONG-TERM CURRENT USE OF INSULIN (HCC): ICD-10-CM
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
## 2022-05-03 DIAGNOSIS — Z79.4 TYPE 2 DIABETES MELLITUS WITH STAGE 4 CHRONIC KIDNEY DISEASE, WITH LONG-TERM CURRENT USE OF INSULIN (HCC): ICD-10-CM
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
## 2022-05-03 PROCEDURE — 99211 OFF/OP EST MAY X REQ PHY/QHP: CPT | Performed by: EMERGENCY MEDICINE

## 2022-05-03 PROCEDURE — 99214 OFFICE O/P EST MOD 30 MIN: CPT | Performed by: INTERNAL MEDICINE

## 2022-05-03 NOTE — PROGRESS NOTES
Office Visit - General Surgery  Grace Rausch MRN: [id number]  Encounter: [id number]    Assessment and Plan  Problem List Items Addressed This Visit        Musculoskeletal and Integument    Multiple rib fractures - Primary     Patient sustained Right rib fractures 5-10  - Patient doing well  - Denies pain  - Oxygen saturation 97%   - No further need for follow up            Other    Fall     Patient is s/p fall, unwitnessed  - Found to have Right Rib fractures 5-10  - Here for follow up               Chief Complaint:  Grace Rausch is a 80 y o  male who presents for Follow-up (f/u fall  rib fx feeling better )  Patient states he has no pain  He is feeling well  He denies any symptoms of SOB, difficulty breathing, chest pain, abdominal pain       Subjective:  "I am feeling good" Patient making jokes, appears very comfortable      Past Medical History:   Diagnosis Date    Allergic rhinitis     Anxiety     Aspiration of liquid     and food per wife if he is eating too fast or talking when eating    Asthma     as a child    Atrial fibrillation ([address] )     CAD (coronary artery disease)     Cancer of kidney ([facility] )     COPD [facility]chronic obstructive pulmonary disease) ([facility]     CPAP (continuous positive airway pressure) dependence     Dementia ([address] )     Frontal lobe    Dementia ([address] )     Diabetes mellitus ([address] )     Diabetes mellitus, type 2 ([address] )     DJD (degenerative joint disease)     Hypercholesterolemia     Hyperlipidemia     Hypertension     Incisional hernia     Kidney disease     Melanoma ([address] )     left leg    Obesity     Sleep apnea     wears CPAP    TIA (transient ischemic attack)        Past Surgical History:   Procedure Laterality Date    CARDIAC PACEMAKER PLACEMENT      CHOLECYSTECTOMY      COLONOSCOPY      HERNIA REPAIR      Incisional hernia    NEPHRECTOMY Left 2005    [place] ESOPHAGOGASTRODUODENOSCOPY SUBMUCOSAL INJECTION N/A 9/21/2016    Procedure: ESOPHAGOGASTRODUODENOSCOPY (EGD); Surgeon: Darnell Fritz MD;  Location: [facility]; Service: Gastroenterology    [place] ESOPHAGOGASTRODUODENOSCOPY SUBMUCOSAL INJECTION N/A 2/7/2018    Procedure: ESOPHAGOGASTRODUODENOSCOPY (EGD); Surgeon: Darnell Fritz MD;  [facility]: [facility]; Service: Gastroenterology    [place] ESOPHAGOGASTRODUODENOSCOPY SUBMUCOSAL INJECTION N/A 4/3/2019    Procedure: ESOPHAGOGASTRODUODENOSCOPY (EGD) WITH BOTOX;  Surgeon: Darnell Fritz MD;  [facility]: [facility]; Service: Gastroenterology    ROTATOR CUFF REPAIR      TONSILLECTOMY         Family History   Problem Relation Age of Onset    Brain cancer Father     Lung cancer Father     Diabetes Family     Heart disease Family     Hypertension Family     Cancer Family         renal cell carcinoma    Stroke Family     Colon cancer Son        Social History     Tobacco Use    Smoking status: Never Smoker    Smokeless tobacco: Never Used   Vaping Use    Vaping Use: Never used   Substance Use Topics    Alcohol use: No    Drug use: No        Medications  Current Outpatient Medications on File Prior to Visit   Medication Sig Dispense Refill    acetaminophen (TYLENOL) 325 mg tablet Take 3 tablets (975 mg total) by mouth every 8 (eight) hours 30 tablet 0    amLODIPine (NORVASC) 5 mg tablet TAKE 1 TABLET BY MOUTH DAILY   90 tablet 0    atorvastatin (LIPITOR) 10 mg tablet TAKE ONE TABLET BY MOUTH DAILY 90 tablet 0    BD Insulin Syringe U/F 30G X 1/2" 0 5 ML MISC USE AS DIRECTED WITH INSULIN 100 each 0    cholecalciferol (VITAMIN D3) 1,000 units tablet Take 2,000 Units by mouth daily      cyanocobalamin (VITAMIN B-12) 500 mcg tablet Take 500 mcg by mouth daily      docusate sodium (COLACE) 100 mg capsule Take 100 mg by mouth as needed       donepezil (ARICEPT) 10 mg tablet Take 1 tablet (10 mg total) by mouth daily at bedtime 90 tablet 3    fenofibrate (TRICOR) 145 mg tablet TAKE ONE TABLET BY MOUTH EVERY DAY 90 tablet 0    ferrous sulfate 325 (65 Fe) mg tablet Take 325 mg by mouth as needed       fluticasone (FLONASE) 50 mcg/act nasal spray 1 spray into each nostril daily      insulin glargine (LANTUS) 100 units/mL subcutaneous injection Inject 28 Units under the skin 2 (two) times a day 10 mL 0    insulin lispro (HumaLOG) 100 units/mL injection Inject 8 Units under the skin 2 (two) times a day with meals 4 8 mL 0    Insulin Pen Needle (Novofine Pen Needle) 32G X 6 MM MISC Use 4 (four) times a day 200 each 5    ipratropium (ATROVENT HFA) 17 mcg/act inhaler Inhale 2 puffs every 6 (six) hours   levalbuterol (XOPENEX) 0 63 mg/3 mL nebulizer solution Take 1 ampule by nebulization 2 (two) times a day as needed for wheezing   losartan (COZAAR) 25 mg tablet Take 1 tablet (25 mg total) by mouth daily 90 tablet 4    Magnesium 250 MG TABS Take 500 mg by mouth daily       Memantine HCl ER 28 MG CP24 TAKE ONE CAPSULE BY MOUTH EVERY DAY 90 capsule 1    metoprolol tartrate (LOPRESSOR) 100 mg tablet TAKE 1 TABLET BY MOUTH AT BEDTIME 90 tablet 0    metoprolol tartrate (LOPRESSOR) 50 mg tablet Take 1 tablet (50 mg total) by mouth daily in the early morning 180 tablet 2    ONETOUCH DELICA LANCETS 82Y MISC       OneTouch Verio test strip USE TO TEST 3 TIMES A  strip 0    Probiotic Product (PROBIOTIC COLON SUPPORT PO) Take 1 tablet by mouth daily      propafenone (RYTHMOL) 225 mg tablet Take 1 tablet (225 mg total) by mouth 3 (three) times a day 270 tablet 3    sertraline (ZOLOFT) 100 mg tablet Take 1 tablet (100 mg total) by mouth daily 90 tablet 1    sodium chloride (OCEAN) 0 65 % nasal spray 1 spray into each nostril      warfarin (COUMADIN) 2 5 mg tablet Take 2 5 mg by mouth 4 (four) times a week Takes Tuesday, wednesday, Thursday, Saturday and Sunday   warfarin (COUMADIN) 5 mg tablet TAKE 1 TABLET BY MOUTH DAILY (Patient taking differently: 5 mg Takes 5 mg Monday and Friday  ) 90 tablet 0     No current facility-administered medications on file prior to visit  Allergies  Allergies   Allergen Reactions    Ambien [Zolpidem Tartrate] Hallucinations    Bextra [Valdecoxib] Hives    Dust Mite Extract Sneezing    Lyrica [Pregabalin] Confusion    Mold Extract [Trichophyton] Sneezing    Pollen Extract Sneezing    Tree Extract Other (See Comments) and Sneezing     congestion       Review of Systems   Constitutional: Negative  HENT: Negative  Respiratory: Negative  Cardiovascular: Negative  Gastrointestinal: Negative  Musculoskeletal: Negative  Neurological: Negative  Objective  Vitals:    05/03/22 1309   Temp: 97 5 °F (36 4 °C)       Physical Exam  Constitutional:       General: He is not in acute distress  Appearance: Normal appearance  He is not toxic-appearing  HENT:      Head: Normocephalic  Nose: Nose normal       Mouth/Throat:      Mouth: Mucous membranes are moist    Eyes:      Pupils: Pupils are equal, round, and reactive to light  Cardiovascular:      Rate and Rhythm: Normal rate and regular rhythm  Pulses: Normal pulses  Heart sounds: Normal heart sounds  No murmur heard  Pulmonary:      Effort: Pulmonary effort is normal  No respiratory distress  Breath sounds: Normal breath sounds  No wheezing  Abdominal:      General: Bowel sounds are normal  There is no distension  Palpations: Abdomen is soft  Tenderness: There is no abdominal tenderness  There is no guarding  Musculoskeletal:         General: Normal range of motion  Skin:     General: Skin is warm  Neurological:      Mental Status: He is alert

## 2022-05-03 NOTE — PROGRESS NOTES
NEPHROLOGY OUTPATIENT PROGRESS NOTE   Ginny Ballesteros 80 y o  male MRN: [id number]  Reason for visit:  Chronic kidney disease    ASSESSMENT and PLAN:  1  Chronic kidney disease, stage IV, baseline creatinine, 2 0-2 5 most recent creatinine 2 19 with an estimated GFR of 26  2  Solitary kidney function with history nephrectomy secondary to renal cell carcinoma   3  Diabetes with diabetic so she had kidney disease   4  Hypertension, blood pressure overall appears stable, home readings in the 120s  5  Renal cysts, previous CT with IV contrast showing no evidence of recurrence   6  Frontotemporal dementia    Overall renal function remains stable   Blood pressure under good control   No signs of volume overload   Recommend continue surveillance, repeat labs in 3 months assuming stable follow-up in 6 months    SUBJECTIVE / INTERVAL HISTORY:  Unfortunately recently had a fall with subsequent hospitalization  Found to have right-sided rib fractures  According to his wife his dementia has had some progression  No other hospitalizations  Denies any significant shortness of breath or fluid retention  Appetite is stable  OBJECTIVE:  /62 (BP Location: Left arm, Patient Position: Sitting, Cuff Size: Adult)   Ht 5' 9" (1 753 m)   Wt 91 7 kg (202 lb 3 2 oz)   BMI 29 86 kg/m²   Vitals:    05/03/22 1106   Weight: 91 7 kg (202 lb 3 2 oz)       Physical Exam  Constitutional:       Appearance: He is obese  He is not ill-appearing  HENT:      Head: Normocephalic and atraumatic  Eyes:      General: No scleral icterus  Cardiovascular:      Rate and Rhythm: Normal rate and regular rhythm  Pulmonary:      Effort: Pulmonary effort is normal       Breath sounds: Normal breath sounds  Abdominal:      General: There is distension  Palpations: Abdomen is soft  Tenderness: There is no abdominal tenderness  Musculoskeletal:      Right lower leg: No edema  Left lower leg: No edema     Skin:     General: Skin is warm and dry  Neurological:      Mental Status: He is alert  Mental status is at baseline  Medications:    Current Outpatient Medications:     acetaminophen (TYLENOL) 325 mg tablet, Take 3 tablets (975 mg total) by mouth every 8 (eight) hours, Disp: 30 tablet, Rfl: 0    amLODIPine (NORVASC) 5 mg tablet, TAKE 1 TABLET BY MOUTH DAILY  , Disp: 90 tablet, Rfl: 0    atorvastatin (LIPITOR) 10 mg tablet, TAKE ONE TABLET BY MOUTH DAILY, Disp: 90 tablet, Rfl: 0    BD Insulin Syringe U/F 30G X 1/2" 0 5 ML MISC, USE AS DIRECTED WITH INSULIN, Disp: 100 each, Rfl: 0    cholecalciferol (VITAMIN D3) 1,000 units tablet, Take 2,000 Units by mouth daily, Disp: , Rfl:     cyanocobalamin (VITAMIN B-12) 500 mcg tablet, Take 500 mcg by mouth daily, Disp: , Rfl:     docusate sodium (COLACE) 100 mg capsule, Take 100 mg by mouth as needed , Disp: , Rfl:     donepezil (ARICEPT) 10 mg tablet, Take 1 tablet (10 mg total) by mouth daily at bedtime, Disp: 90 tablet, Rfl: 3    fenofibrate (TRICOR) 145 mg tablet, TAKE ONE TABLET BY MOUTH EVERY DAY, Disp: 90 tablet, Rfl: 0    ferrous sulfate 325 (65 Fe) mg tablet, Take 325 mg by mouth as needed , Disp: , Rfl:     fluticasone (FLONASE) 50 mcg/act nasal spray, 1 spray into each nostril daily, Disp: , Rfl:     insulin glargine (LANTUS) 100 units/mL subcutaneous injection, Inject 28 Units under the skin 2 (two) times a day, Disp: 10 mL, Rfl: 0    insulin lispro (HumaLOG) 100 units/mL injection, Inject 8 Units under the skin 2 (two) times a day with meals, Disp: 4 8 mL, Rfl: 0    Insulin Pen Needle (Novofine Pen Needle) 32G X 6 MM MISC, Use 4 (four) times a day, Disp: 200 each, Rfl: 5    ipratropium (ATROVENT HFA) 17 mcg/act inhaler, Inhale 2 puffs every 6 (six) hours  , Disp: , Rfl:     levalbuterol (XOPENEX) 0 63 mg/3 mL nebulizer solution, Take 1 ampule by nebulization 2 (two) times a day as needed for wheezing , Disp: , Rfl:     losartan (COZAAR) 25 mg tablet, Take 1 tablet (25 mg total) by mouth daily, Disp: 90 tablet, Rfl: 4    Magnesium 250 MG TABS, Take 500 mg by mouth daily , Disp: , Rfl:     Memantine HCl ER 28 MG CP24, TAKE ONE CAPSULE BY MOUTH EVERY DAY, Disp: 90 capsule, Rfl: 1    metoprolol tartrate (LOPRESSOR) 100 mg tablet, TAKE 1 TABLET BY MOUTH AT BEDTIME, Disp: 90 tablet, Rfl: 0    metoprolol tartrate (LOPRESSOR) 50 mg tablet, Take 1 tablet (50 mg total) by mouth daily in the early morning, Disp: 180 tablet, Rfl: 2    ONETOUCH DELICA LANCETS 10F MISC, , Disp: , Rfl:     OneTouch Verio test strip, USE TO TEST 3 TIMES A DAY, Disp: 300 strip, Rfl: 0    Probiotic Product (PROBIOTIC COLON SUPPORT PO), Take 1 tablet by mouth daily, Disp: , Rfl:     propafenone (RYTHMOL) 225 mg tablet, Take 1 tablet (225 mg total) by mouth 3 (three) times a day, Disp: 270 tablet, Rfl: 3    sertraline (ZOLOFT) 100 mg tablet, Take 1 tablet (100 mg total) by mouth daily, Disp: 90 tablet, Rfl: 1    sodium chloride (OCEAN) 0 65 % nasal spray, 1 spray into each nostril, Disp: , Rfl:     warfarin (COUMADIN) 2 5 mg tablet, Take 2 5 mg by mouth 4 (four) times a week Takes Tuesday, wednesday, Thursday, Saturday and Sunday   , Disp: , Rfl:     warfarin (COUMADIN) 5 mg tablet, TAKE 1 TABLET BY MOUTH DAILY (Patient taking differently: 5 mg Takes 5 mg Monday and Friday  ), Disp: 90 tablet, Rfl: 0    Laboratory Results:  Results for orders placed or performed in visit on 04/27/22   Protime-INR   Result Value Ref Range    INR 1 70 (A) 0 84 - 1 19

## 2022-05-04 ENCOUNTER — ANTICOAG VISIT (OUTPATIENT)
Dept: CARDIOLOGY CLINIC | Facility: CLINIC | Age: 87
End: 2022-05-04

## 2022-05-04 DIAGNOSIS — I48.0 PAROXYSMAL ATRIAL FIBRILLATION (HCC): Primary | ICD-10-CM

## 2022-05-04 DIAGNOSIS — F03.90 DEMENTIA WITHOUT BEHAVIORAL DISTURBANCE, UNSPECIFIED DEMENTIA TYPE (HCC): ICD-10-CM

## 2022-05-04 LAB — INR PPP: 1.2 (ref 0.84–1.19)

## 2022-05-04 RX ORDER — MEMANTINE HYDROCHLORIDE 28 MG/1
CAPSULE, EXTENDED RELEASE ORAL
Qty: 90 CAPSULE | Refills: 1 | Status: SHIPPED | OUTPATIENT
Start: 2022-05-04 | End: 2022-05-06 | Stop reason: SDUPTHER

## 2022-05-04 NOTE — PROGRESS NOTES
Spoke with [facility], visiting nurse, advised INR 1 2 today, states wife has been giving him his medication every night, no changes in his medication or diet  Advised to have patient take 7 5 mg tonight instead of 5 mg, 5 mg Thurs, 5 mg Fri instead of 2 5 mg , then same 2 5 mg Sat, 5 mg Sun, 2 5 mg Mon, 5 mg Tues and will recheck Wed 5/11/22, that will be the last day visiting nurse will come

## 2022-05-05 ENCOUNTER — TELEPHONE (OUTPATIENT)
Dept: INTERNAL MEDICINE CLINIC | Facility: CLINIC | Age: 87
End: 2022-05-05

## 2022-05-05 NOTE — TELEPHONE ENCOUNTER
Patient is being discharged from Occupational therapy, he has met his goals  Nurse will still be seeing patient

## 2022-05-06 DIAGNOSIS — F03.90 DEMENTIA WITHOUT BEHAVIORAL DISTURBANCE, UNSPECIFIED DEMENTIA TYPE (HCC): ICD-10-CM

## 2022-05-06 RX ORDER — MEMANTINE HYDROCHLORIDE 28 MG/1
CAPSULE, EXTENDED RELEASE ORAL
Qty: 90 CAPSULE | Refills: 1 | Status: SHIPPED | OUTPATIENT
Start: 2022-05-06

## 2022-05-11 ENCOUNTER — ANTICOAG VISIT (OUTPATIENT)
Dept: CARDIOLOGY CLINIC | Facility: CLINIC | Age: 87
End: 2022-05-11

## 2022-05-11 DIAGNOSIS — I48.0 PAROXYSMAL ATRIAL FIBRILLATION (HCC): Primary | ICD-10-CM

## 2022-05-11 LAB — INR PPP: 1.4 (ref 0.84–1.19)

## 2022-05-11 NOTE — PROGRESS NOTES
Spoke with Aneudy Williamson, visiting nurse, per wife,  patient did not miss any doses, no changes in medications or diet  Advised to have patient take 7 5 mg tonight, they take 2 5 mg only Mon, 5 mg all other days  Will recheck next week 5/18/22  States she is discharging patient today, wife will be taking patient to the lab

## 2022-05-16 ENCOUNTER — OFFICE VISIT (OUTPATIENT)
Dept: SLEEP CENTER | Facility: CLINIC | Age: 87
End: 2022-05-16
Payer: COMMERCIAL

## 2022-05-16 VITALS
SYSTOLIC BLOOD PRESSURE: 112 MMHG | WEIGHT: 197.4 LBS | DIASTOLIC BLOOD PRESSURE: 58 MMHG | HEIGHT: 69 IN | HEART RATE: 79 BPM | BODY MASS INDEX: 29.24 KG/M2 | OXYGEN SATURATION: 96 %

## 2022-05-16 DIAGNOSIS — R09.82 ALLERGIC RHINITIS WITH POSTNASAL DRIP: ICD-10-CM

## 2022-05-16 DIAGNOSIS — I10 ESSENTIAL HYPERTENSION: ICD-10-CM

## 2022-05-16 DIAGNOSIS — G47.33 OSA (OBSTRUCTIVE SLEEP APNEA): Primary | ICD-10-CM

## 2022-05-16 DIAGNOSIS — E11.40 TYPE 2 DIABETES MELLITUS WITH DIABETIC NEUROPATHY, WITH LONG-TERM CURRENT USE OF INSULIN (HCC): ICD-10-CM

## 2022-05-16 DIAGNOSIS — E66.3 OVERWEIGHT (BMI 25.0-29.9): ICD-10-CM

## 2022-05-16 DIAGNOSIS — J30.9 ALLERGIC RHINITIS WITH POSTNASAL DRIP: ICD-10-CM

## 2022-05-16 DIAGNOSIS — G30.9 ALZHEIMER'S DEMENTIA WITHOUT BEHAVIORAL DISTURBANCE, UNSPECIFIED TIMING OF DEMENTIA ONSET (HCC): ICD-10-CM

## 2022-05-16 DIAGNOSIS — I48.91 ATRIAL FIBRILLATION, UNSPECIFIED TYPE (HCC): ICD-10-CM

## 2022-05-16 DIAGNOSIS — R68.2 DRY MOUTH: ICD-10-CM

## 2022-05-16 DIAGNOSIS — R45.86 MOOD DISTURBANCE: ICD-10-CM

## 2022-05-16 DIAGNOSIS — F02.80 ALZHEIMER'S DEMENTIA WITHOUT BEHAVIORAL DISTURBANCE, UNSPECIFIED TIMING OF DEMENTIA ONSET (HCC): ICD-10-CM

## 2022-05-16 DIAGNOSIS — E66.9 OBESITY (BMI 30-39.9): ICD-10-CM

## 2022-05-16 DIAGNOSIS — Z79.4 TYPE 2 DIABETES MELLITUS WITH DIABETIC NEUROPATHY, WITH LONG-TERM CURRENT USE OF INSULIN (HCC): ICD-10-CM

## 2022-05-16 PROCEDURE — 99214 OFFICE O/P EST MOD 30 MIN: CPT | Performed by: INTERNAL MEDICINE

## 2022-05-16 PROCEDURE — 1160F RVW MEDS BY RX/DR IN RCRD: CPT | Performed by: INTERNAL MEDICINE

## 2022-05-16 PROCEDURE — 1036F TOBACCO NON-USER: CPT | Performed by: INTERNAL MEDICINE

## 2022-05-16 RX ORDER — PEN NEEDLE, DIABETIC 29 G X1/2"
NEEDLE, DISPOSABLE MISCELLANEOUS 4 TIMES DAILY
Qty: 400 EACH | Refills: 1 | Status: SHIPPED | OUTPATIENT
Start: 2022-05-16

## 2022-05-16 RX ORDER — OXYCODONE HYDROCHLORIDE 5 MG/1
TABLET ORAL
COMMUNITY
Start: 2022-05-03 | End: 2022-06-29 | Stop reason: HOSPADM

## 2022-05-16 NOTE — PATIENT INSTRUCTIONS

## 2022-05-16 NOTE — PROGRESS NOTES
Follow-Up Note - [address]  80 y o  male  LLK:[birth year]  J:[id number]  DOS:5/16/2022    CC: I saw this patient for follow-up in clinic today for Sleep disordered breathing, Coexisting Sleep and Medical Problems  He was using a dream Station 1 machine and got a replacement from [email or web address] around a week ago  Results of prior studies:  A diagnostic study in 1999 demonstrated an AHI of 9 5 per hour  During his last titration study in 2007, BiPAP at 19/14 cm H2O was needed to remediate sleep disordered breathing  PFSH, Problem List, Medications & Allergies were reviewed in EMR  Interval changes: none reported  He  has a past medical history of Allergic rhinitis, Anxiety, Aspiration of liquid, Asthma, Atrial fibrillation ([facility] ), CAD (coronary artery disease), Cancer of kidney ([facility] ), COPD (chronic obstructive pulmonary disease) ([facility] ), CPAP (continuous positive airway pressure) dependence, Dementia ([facility] ), Dementia ([facility] ), Diabetes mellitus ([facility] ), Diabetes mellitus, type 2 ([address] ), DJD (degenerative joint disease), Hypercholesterolemia, Hyperlipidemia, Hypertension, Incisional hernia, Kidney disease, Melanoma ([address] ), Obesity, Sleep apnea, and TIA (transient ischemic attack)  He has a current medication list which includes the following prescription(s): acetaminophen, amlodipine, atorvastatin, bd insulin syringe u/f, cholecalciferol, vitamin b-12, docusate sodium, donepezil, fenofibrate, ferrous sulfate, fluticasone, insulin glargine, novofine pen needle, ipratropium, levalbuterol, losartan, magnesium, memantine hcl er, metoprolol tartrate, metoprolol tartrate, onetouch delica lancets 72A, onetouch verio, oxycodone, probiotic product, propafenone, sertraline, warfarin, warfarin, insulin lispro, and sodium chloride  PHYSIOLOGICAL DATA REVIEW AND INTERPRETATION:    using PAP > 4 hours/night 77%   Estimated CHIDI 3 6/hour with pressure of 19cm H2O (he should be using BiPAP but it appears machine is delivering CPAP); Patient has not been using ozone based devices to sanitize the machine  SUBJECTIVE: Regarding use of PAP, Dee Jo reports:   · He is experiencing some adverse effects: dry mouth/throat; has not noticed any fibres or foreign material in air line  · He is benefiting from use: sleeping better   Sleep Routine: Dee Jo reports getting 7 hrs sleep  ; he has no difficulty initiating or maintaining sleep   He arises spontaneously and feels refreshed  Dee Jo reports Excessive Daytime Sleepiness, dozes off when sedentary at home  He rated himself at Total score: 4 /24 on the [place] Sleepiness Scale  Habits: reports that he has never smoked  He has never used smokeless tobacco ,  reports no history of alcohol use ,  reports no history of drug use , Caffeine use:very little ; Exercise routine: none   ROS: reviewed & as attached  Significant for weight has been stable  He has some postnasal drip for which she uses Flonase  He reported no respiratory or cardiac symptoms  He has irritability,  Mood changes and neuro cognitive difficulties related to dementia  EXAM: /58   Pulse 79   Ht 5' 9" (1 753 m)   Wt 89 5 kg (197 lb 6 4 oz)   SpO2 96%   BMI 29 15 kg/m²     Wt Readings from Last 3 Encounters:   05/16/22 89 5 kg (197 lb 6 4 oz)   05/03/22 92 7 kg (204 lb 6 4 oz)   05/03/22 91 7 kg (202 lb 3 2 oz)      Patient is well groomed; well appearing  CNS: Alert, orientated, clear & coherent speech  Psych: cooperativeand in no distress  Cognitive impairment  H&N: EOMI; NC/AT:no facial pressure marks, no rashes  Skin/Extrem: col & hydration normal; no edema  Resp: Respiratory effort is normal  Physical findings otherwise essentially unchanged from previous  IMPRESSION: Problem List Items & Comorbidities Addressed this Visit    1  MELQUIADES (obstructive sleep apnea)  PAP DME Pressure Change    PAP DME Resupply/Reorder   2  Dry mouth     3  Allergic rhinitis with postnasal drip     4  Essential hypertension     5  Atrial fibrillation, unspecified type ([facility] )     6  Obesity (BMI 30-39 9)     7  Alzheimer's dementia without behavioral disturbance, unspecified timing of dementia onset ([facility] )     8  Mood disturbance     9  Overweight (BMI 25 0-29  9)         PLAN:  1  I reviewed results of prior studies and physiologic data with the patient  2  I discussed treatment options with risks and benefits  3  Treatment with  PAP is medically necessary and Maureen Louise is agreable to continue use  4  Care of equipment, methods to improve comfort using PAP and importance of compliance with therapy were discussed  5  Pressure setting: change 19/14 cmH2O     6  Rx provided to replace  supplies and Care coordinated with DME provider  7  Discussed strategies for weight reduction  8  Follow-up is advised in 1 year or sooner if needed to monitor progress, compliance and to adjust therapy  Thank you for allowing me to participate in the care of this patient  Sincerely,    Authenticated electronically by Anila Peter MD on 48/88/35   Board Certified Specialist     Portions of the record may have been created with voice recognition software  Occasional wrong word or "sound a like" substitutions may have occurred due to the inherent limitations of voice recognition software  There may also be notations and random deletions of words or characters from malfunctioning software  Read the chart carefully and recognize, using context, where substitutions/deletions have occurred

## 2022-05-16 NOTE — PROGRESS NOTES
Review of Systems      Genitourinary need to urinate more than twice a night   Cardiology none   Gastrointestinal none   Neurology forgetfulness, poor concentration or confusion,  and difficulty with memory   Constitutional fatigue   Integumentary none   Psychiatry aggressiveness or irritability and mood change   Musculoskeletal back pain   Pulmonary none   ENT throat clearing   Endocrine frequent urination   Hematological none

## 2022-05-17 ENCOUNTER — TELEPHONE (OUTPATIENT)
Dept: SLEEP CENTER | Facility: CLINIC | Age: 87
End: 2022-05-17

## 2022-05-17 LAB
DME PARACHUTE DELIVERY DATE REQUESTED: NORMAL
DME PARACHUTE ITEM DESCRIPTION: NORMAL
DME PARACHUTE ORDER STATUS: NORMAL
DME PARACHUTE SUPPLIER NAME: NORMAL
DME PARACHUTE SUPPLIER PHONE: NORMAL

## 2022-05-17 NOTE — TELEPHONE ENCOUNTER
RX for resupply and pressure change with clinical note sent to [address] [place] [address] via [facility]

## 2022-05-18 ENCOUNTER — APPOINTMENT (OUTPATIENT)
Dept: LAB | Age: 87
End: 2022-05-18
Payer: COMMERCIAL

## 2022-05-19 ENCOUNTER — ANTICOAG VISIT (OUTPATIENT)
Dept: CARDIOLOGY CLINIC | Facility: CLINIC | Age: 87
End: 2022-05-19

## 2022-05-19 DIAGNOSIS — I48.0 PAROXYSMAL ATRIAL FIBRILLATION (HCC): Primary | ICD-10-CM

## 2022-05-19 NOTE — PROGRESS NOTES
Spoke with Negrita Templeton, advised INR good, will have him take 5 mg daily and recheck in 1 week 5/25/22

## 2022-05-25 DIAGNOSIS — F33.0 MILD EPISODE OF RECURRENT MAJOR DEPRESSIVE DISORDER (HCC): ICD-10-CM

## 2022-05-25 RX ORDER — OXYCODONE HYDROCHLORIDE 5 MG/1
TABLET ORAL
Status: CANCELLED | OUTPATIENT
Start: 2022-05-25

## 2022-05-25 RX ORDER — SERTRALINE HYDROCHLORIDE 100 MG/1
100 TABLET, FILM COATED ORAL DAILY
Qty: 90 TABLET | Refills: 1 | Status: SHIPPED | OUTPATIENT
Start: 2022-05-25 | End: 2022-05-26 | Stop reason: SDUPTHER

## 2022-05-26 DIAGNOSIS — F33.0 MILD EPISODE OF RECURRENT MAJOR DEPRESSIVE DISORDER (HCC): ICD-10-CM

## 2022-05-26 RX ORDER — SERTRALINE HYDROCHLORIDE 100 MG/1
100 TABLET, FILM COATED ORAL DAILY
Qty: 90 TABLET | Refills: 1 | Status: SHIPPED | OUTPATIENT
Start: 2022-05-26

## 2022-05-27 DIAGNOSIS — I10 ESSENTIAL HYPERTENSION: ICD-10-CM

## 2022-05-27 RX ORDER — LOSARTAN POTASSIUM 25 MG/1
25 TABLET ORAL DAILY
Qty: 90 TABLET | Refills: 1 | Status: SHIPPED | OUTPATIENT
Start: 2022-05-27

## 2022-06-02 ENCOUNTER — APPOINTMENT (OUTPATIENT)
Dept: LAB | Age: 87
End: 2022-06-02
Payer: COMMERCIAL

## 2022-06-03 ENCOUNTER — ANTICOAG VISIT (OUTPATIENT)
Dept: CARDIOLOGY CLINIC | Facility: CLINIC | Age: 87
End: 2022-06-03

## 2022-06-03 DIAGNOSIS — I48.0 PAROXYSMAL ATRIAL FIBRILLATION (HCC): Primary | ICD-10-CM

## 2022-06-03 NOTE — PROGRESS NOTES
Spoke with Francia Howell, advised INR good, states she has been giving him 1/2 tablet on some days, full tablet on other days  Advised he was to be taking 1 tablet daily  Advised for tonight only give him 7 5 mg to boost, then have him take 5 mg daily, will recheck 6/16/22  States she did write down instructions and verbalizes understanding

## 2022-06-08 ENCOUNTER — APPOINTMENT (OUTPATIENT)
Dept: LAB | Age: 87
End: 2022-06-08
Payer: COMMERCIAL

## 2022-06-09 ENCOUNTER — ANTICOAG VISIT (OUTPATIENT)
Dept: CARDIOLOGY CLINIC | Facility: CLINIC | Age: 87
End: 2022-06-09

## 2022-06-09 DIAGNOSIS — I48.0 PAROXYSMAL ATRIAL FIBRILLATION (HCC): Primary | ICD-10-CM

## 2022-06-09 NOTE — PROGRESS NOTES
Spoke with Cindy Wells, advised INR low, when reviewing dose, states she was giving him 1/2 tablets (2 5 mg) instead of full tablets as directed  States she gave him 1/2 tablet last night  Advised to give patient full tablet (5 mg) daily and will recheck in 1 week 6/15/22  Cindy Wells states she did write it down and verbalizes understanding

## 2022-06-10 DIAGNOSIS — I10 ESSENTIAL HYPERTENSION: ICD-10-CM

## 2022-06-10 DIAGNOSIS — F03.90 DEMENTIA WITHOUT BEHAVIORAL DISTURBANCE, UNSPECIFIED DEMENTIA TYPE (HCC): ICD-10-CM

## 2022-06-10 RX ORDER — AMLODIPINE BESYLATE 5 MG/1
5 TABLET ORAL DAILY
Qty: 90 TABLET | Refills: 0 | Status: SHIPPED | OUTPATIENT
Start: 2022-06-10

## 2022-06-10 RX ORDER — AMLODIPINE BESYLATE 5 MG/1
5 TABLET ORAL DAILY
Qty: 90 TABLET | Refills: 0 | Status: CANCELLED | OUTPATIENT
Start: 2022-06-10

## 2022-06-10 RX ORDER — DONEPEZIL HYDROCHLORIDE 10 MG/1
10 TABLET, FILM COATED ORAL
Qty: 90 TABLET | Refills: 3 | Status: SHIPPED | OUTPATIENT
Start: 2022-06-10

## 2022-06-13 ENCOUNTER — APPOINTMENT (OUTPATIENT)
Dept: LAB | Age: 87
End: 2022-06-13
Payer: COMMERCIAL

## 2022-06-14 ENCOUNTER — ANTICOAG VISIT (OUTPATIENT)
Dept: CARDIOLOGY CLINIC | Facility: CLINIC | Age: 87
End: 2022-06-14

## 2022-06-14 DIAGNOSIS — I48.0 PAROXYSMAL ATRIAL FIBRILLATION (HCC): Primary | ICD-10-CM

## 2022-06-16 ENCOUNTER — REMOTE DEVICE CLINIC VISIT (OUTPATIENT)
Dept: CARDIOLOGY CLINIC | Facility: CLINIC | Age: 87
End: 2022-06-16
Payer: COMMERCIAL

## 2022-06-16 DIAGNOSIS — Z95.0 PRESENCE OF PERMANENT CARDIAC PACEMAKER: Primary | ICD-10-CM

## 2022-06-16 PROCEDURE — 93296 REM INTERROG EVL PM/IDS: CPT | Performed by: INTERNAL MEDICINE

## 2022-06-16 PROCEDURE — 93294 REM INTERROG EVL PM/LDLS PM: CPT | Performed by: INTERNAL MEDICINE

## 2022-06-16 NOTE — PROGRESS NOTES
MDT-DUAL CHAMBER PPM (AAIR-DDDR MODE)/ NOT MRI CONDITIONALKARA PT    CARELINK TRANSMISSION:  BATTERY VOLTAGE ADEQUATE (10 6 YR )   AP 94 3%  1 3%    ALL LEAD PARAMETERS WITHIN NORMAL LIMITS   NO SIGNIFICANT HIGH RATE EPISODES   NORMAL DEVICE FUNCTION  Teja Glasgow

## 2022-06-29 ENCOUNTER — TELEPHONE (OUTPATIENT)
Dept: INTERNAL MEDICINE CLINIC | Facility: CLINIC | Age: 87
End: 2022-06-29

## 2022-07-03 DIAGNOSIS — I48.0 PAROXYSMAL ATRIAL FIBRILLATION (HCC): ICD-10-CM

## 2022-07-05 ENCOUNTER — TELEPHONE (OUTPATIENT)
Dept: SLEEP CENTER | Facility: CLINIC | Age: 87
End: 2022-07-05

## 2022-07-05 RX ORDER — PROPAFENONE HYDROCHLORIDE 225 MG/1
TABLET, FILM COATED ORAL
Qty: 90 TABLET | Refills: 0 | Status: SHIPPED | OUTPATIENT
Start: 2022-07-05 | End: 2022-07-21 | Stop reason: SDUPTHER

## 2022-07-05 NOTE — TELEPHONE ENCOUNTER
Patient left message stating he cannot use his CPAP machine  It's not working  States [facility] said the doctor will need to send in a new prescription  He would like prescription sent to [facility]  Left message for patient to call back to discuss  Per chart, patient uses [facility] for DME provider  Need to determine if patient has reached out to [place] to troubleshoot his current machine

## 2022-07-08 ENCOUNTER — APPOINTMENT (OUTPATIENT)
Dept: LAB | Age: 87
End: 2022-07-08
Payer: COMMERCIAL

## 2022-07-11 ENCOUNTER — ANTICOAG VISIT (OUTPATIENT)
Dept: CARDIOLOGY CLINIC | Facility: CLINIC | Age: 87
End: 2022-07-11

## 2022-07-11 ENCOUNTER — APPOINTMENT (OUTPATIENT)
Dept: LAB | Age: 87
End: 2022-07-11
Payer: COMMERCIAL

## 2022-07-11 DIAGNOSIS — I48.91 ATRIAL FIBRILLATION, UNSPECIFIED TYPE (HCC): ICD-10-CM

## 2022-07-11 DIAGNOSIS — I48.0 PAROXYSMAL ATRIAL FIBRILLATION (HCC): Primary | ICD-10-CM

## 2022-07-11 LAB
INR PPP: 1.56 (ref 0.84–1.19)
PROTHROMBIN TIME: 17.9 SECONDS (ref 11.6–14.5)

## 2022-07-11 PROCEDURE — 36415 COLL VENOUS BLD VENIPUNCTURE: CPT

## 2022-07-11 PROCEDURE — 85610 PROTHROMBIN TIME: CPT

## 2022-07-11 NOTE — PROGRESS NOTES
Spoke with Suraj Joya, advised INR low again, states she has only been giving him a half tablet (2 5 mg)  Advised that for the last few times she was instructed to be taking 5 mg (whole tablet) daily  States she will give him 5 mg (full tablet) daily  Will recheck on 7/20/22

## 2022-07-12 ENCOUNTER — ANTICOAG VISIT (OUTPATIENT)
Dept: CARDIOLOGY CLINIC | Facility: CLINIC | Age: 87
End: 2022-07-12

## 2022-07-12 DIAGNOSIS — I48.0 PAROXYSMAL ATRIAL FIBRILLATION (HCC): Primary | ICD-10-CM

## 2022-07-12 NOTE — PROGRESS NOTES
Spoke with Cristino Churchill, advised INR just spoke with her yesterday about INR on Friday, and she was to give 5 mg daily and recheck 7/20/22 and patient has a new INR from yesterday  States someone wrote yesterdays date on her note, asking if she is taking patient too much for INR   Advised again to continue 5 mg daily and recheck on 7/20/22

## 2022-07-14 DIAGNOSIS — E11.40 TYPE 2 DIABETES MELLITUS WITH DIABETIC NEUROPATHY, WITH LONG-TERM CURRENT USE OF INSULIN (HCC): ICD-10-CM

## 2022-07-14 DIAGNOSIS — G31.09 FRONTOTEMPORAL DEMENTIA (HCC): ICD-10-CM

## 2022-07-14 DIAGNOSIS — F02.80 FRONTOTEMPORAL DEMENTIA (HCC): ICD-10-CM

## 2022-07-14 DIAGNOSIS — Z79.4 TYPE 2 DIABETES MELLITUS WITH DIABETIC NEUROPATHY, WITH LONG-TERM CURRENT USE OF INSULIN (HCC): ICD-10-CM

## 2022-07-14 RX ORDER — BLOOD SUGAR DIAGNOSTIC
1 STRIP MISCELLANEOUS 3 TIMES DAILY
Qty: 300 STRIP | Refills: 3 | Status: SHIPPED | OUTPATIENT
Start: 2022-07-14

## 2022-07-14 NOTE — TELEPHONE ENCOUNTER
Pt's wife is calling because she says [employer] turned off phone service and she needs letter to get it turned back on due to his health condition  I know we have done request for [employer] in the past to turn back on electric but are we allowed to do for [employer] too? If so what diagnosis do I use to do letter

## 2022-07-15 RX ORDER — INSULIN GLARGINE 100 [IU]/ML
28 INJECTION, SOLUTION SUBCUTANEOUS 2 TIMES DAILY
Qty: 10 ML | Refills: 2 | Status: SHIPPED | OUTPATIENT
Start: 2022-07-15 | End: 2022-09-14 | Stop reason: ALTCHOICE

## 2022-07-15 NOTE — TELEPHONE ENCOUNTER
Spoke to daughter and she cannot force her mother to move out of her house  It is difficult for her since she lives out of town  Ravi Núñez - nephew lives with Mrs Tomas  Had a meeting with family ,this is a complex situation  She does not POA over Sirisha Campos, only Mary Melizavimal has POA for Sirisha Campos  She does not have her name on her bank account and will not be her name on because of Jerri's bad credit, which has been on ongoing problem even before her dementia  Daughter is going to reach out to nephew Ravi Núñez  (who lives with her) to see how the situation is going

## 2022-07-19 DIAGNOSIS — I48.0 PAROXYSMAL ATRIAL FIBRILLATION (HCC): Primary | ICD-10-CM

## 2022-07-19 DIAGNOSIS — E78.5 HYPERLIPIDEMIA, UNSPECIFIED HYPERLIPIDEMIA TYPE: ICD-10-CM

## 2022-07-19 NOTE — TELEPHONE ENCOUNTER
LOV: 04/25/2022  NOV: 07/26/2022    Pt requested refill of sertraline, but is not due and he still has one refill at pharmacy  Will call to let him know  Pt mailbox full

## 2022-07-20 ENCOUNTER — APPOINTMENT (OUTPATIENT)
Dept: LAB | Age: 87
End: 2022-07-20
Payer: COMMERCIAL

## 2022-07-20 DIAGNOSIS — I48.91 ATRIAL FIBRILLATION, UNSPECIFIED TYPE (HCC): ICD-10-CM

## 2022-07-20 LAB
INR PPP: 1.31 (ref 0.84–1.19)
PROTHROMBIN TIME: 16.5 SECONDS (ref 11.6–14.5)

## 2022-07-20 PROCEDURE — 36415 COLL VENOUS BLD VENIPUNCTURE: CPT

## 2022-07-20 PROCEDURE — 85610 PROTHROMBIN TIME: CPT

## 2022-07-20 RX ORDER — FENOFIBRATE 145 MG/1
145 TABLET, COATED ORAL DAILY
Qty: 90 TABLET | Refills: 1 | Status: SHIPPED | OUTPATIENT
Start: 2022-07-20

## 2022-07-20 RX ORDER — WARFARIN SODIUM 2.5 MG/1
2.5 TABLET ORAL
Qty: 30 TABLET | Refills: 1 | Status: SHIPPED | OUTPATIENT
Start: 2022-07-21 | End: 2022-10-13 | Stop reason: DRUGHIGH

## 2022-07-21 ENCOUNTER — ANTICOAG VISIT (OUTPATIENT)
Dept: CARDIOLOGY CLINIC | Facility: CLINIC | Age: 87
End: 2022-07-21

## 2022-07-21 DIAGNOSIS — I48.0 PAROXYSMAL ATRIAL FIBRILLATION (HCC): ICD-10-CM

## 2022-07-21 DIAGNOSIS — I48.0 PAROXYSMAL ATRIAL FIBRILLATION (HCC): Primary | ICD-10-CM

## 2022-07-21 DIAGNOSIS — E78.5 HYPERLIPIDEMIA, UNSPECIFIED HYPERLIPIDEMIA TYPE: ICD-10-CM

## 2022-07-21 RX ORDER — ATORVASTATIN CALCIUM 10 MG/1
10 TABLET, FILM COATED ORAL DAILY
Qty: 90 TABLET | Refills: 0 | Status: SHIPPED | OUTPATIENT
Start: 2022-07-21 | End: 2022-07-28 | Stop reason: SDUPTHER

## 2022-07-21 RX ORDER — PROPAFENONE HYDROCHLORIDE 225 MG/1
225 TABLET, FILM COATED ORAL 3 TIMES DAILY
Qty: 90 TABLET | Refills: 0 | Status: SHIPPED | OUTPATIENT
Start: 2022-07-21 | End: 2022-09-14 | Stop reason: SDUPTHER

## 2022-07-21 NOTE — PROGRESS NOTES
Spoke with Jonathan Garay, advised INR is still low, she is asking if she should be giving him a whole tablet (5 mg)? I advised she should have been giving him a whole tablet (5 mg), she states when he fell he bled so much, states she will be giving him whole tablet (5 mg)   Advised to recheck in 2 weeks 8/3/22

## 2022-07-22 ENCOUNTER — TELEPHONE (OUTPATIENT)
Dept: NEPHROLOGY | Facility: CLINIC | Age: 87
End: 2022-07-22

## 2022-07-27 ENCOUNTER — OFFICE VISIT (OUTPATIENT)
Dept: INTERNAL MEDICINE CLINIC | Facility: CLINIC | Age: 87
End: 2022-07-27
Payer: COMMERCIAL

## 2022-07-27 VITALS
OXYGEN SATURATION: 97 % | BODY MASS INDEX: 29.62 KG/M2 | HEART RATE: 69 BPM | SYSTOLIC BLOOD PRESSURE: 130 MMHG | DIASTOLIC BLOOD PRESSURE: 76 MMHG | WEIGHT: 200 LBS | TEMPERATURE: 97.9 F | HEIGHT: 69 IN

## 2022-07-27 DIAGNOSIS — I25.10 CORONARY ARTERY DISEASE INVOLVING NATIVE CORONARY ARTERY OF NATIVE HEART WITHOUT ANGINA PECTORIS: ICD-10-CM

## 2022-07-27 DIAGNOSIS — E11.40 TYPE 2 DIABETES MELLITUS WITH DIABETIC NEUROPATHY, WITH LONG-TERM CURRENT USE OF INSULIN (HCC): Primary | ICD-10-CM

## 2022-07-27 DIAGNOSIS — I73.9 PAD (PERIPHERAL ARTERY DISEASE) (HCC): ICD-10-CM

## 2022-07-27 DIAGNOSIS — I48.0 PAROXYSMAL ATRIAL FIBRILLATION (HCC): ICD-10-CM

## 2022-07-27 DIAGNOSIS — Z79.4 TYPE 2 DIABETES MELLITUS WITH DIABETIC NEUROPATHY, WITH LONG-TERM CURRENT USE OF INSULIN (HCC): Primary | ICD-10-CM

## 2022-07-27 DIAGNOSIS — F03.90 DEMENTIA WITHOUT BEHAVIORAL DISTURBANCE, UNSPECIFIED DEMENTIA TYPE: ICD-10-CM

## 2022-07-27 LAB
CREAT UR-MCNC: 97.2 MG/DL
MICROALBUMIN UR-MCNC: 246 MG/L (ref 0–20)
MICROALBUMIN/CREAT 24H UR: 253 MG/G CREATININE (ref 0–30)
SL AMB POCT HEMOGLOBIN AIC: 7.9 (ref ?–6.5)

## 2022-07-27 PROCEDURE — 99214 OFFICE O/P EST MOD 30 MIN: CPT | Performed by: INTERNAL MEDICINE

## 2022-07-27 PROCEDURE — 83036 HEMOGLOBIN GLYCOSYLATED A1C: CPT | Performed by: INTERNAL MEDICINE

## 2022-07-27 PROCEDURE — 82043 UR ALBUMIN QUANTITATIVE: CPT | Performed by: INTERNAL MEDICINE

## 2022-07-27 PROCEDURE — 1160F RVW MEDS BY RX/DR IN RCRD: CPT | Performed by: INTERNAL MEDICINE

## 2022-07-27 PROCEDURE — 82570 ASSAY OF URINE CREATININE: CPT | Performed by: INTERNAL MEDICINE

## 2022-07-27 NOTE — PROGRESS NOTES
Diabetic Foot Exam    Patient's shoes and socks removed  Right Foot/Ankle   Right Foot Inspection  Skin Exam: skin normal and skin intact  No dry skin, no warmth, no callus, no erythema, no maceration, no abnormal color, no pre-ulcer, no ulcer and no callus  Sensory   Monofilament testing: diminished    Vascular  The right DP pulse is 1+  The right PT pulse is 1+  Left Foot/Ankle  Left Foot Inspection  Skin Exam: skin normal and skin intact  No dry skin, no warmth, no erythema, no maceration, normal color, no pre-ulcer, no ulcer and no callus  Sensory   Monofilament testing: diminished    Vascular  The left DP pulse is 1+  The left PT pulse is 1+       Assign Risk Category  No deformity present  Loss of protective sensation  No weak pulses  Risk: 1

## 2022-07-27 NOTE — PROGRESS NOTES
Assessment/Plan:           1  Type 2 diabetes mellitus with diabetic neuropathy, with long-term current use of insulin ([facility])  Comments:  A1c was elevated  Orders:  -     CBC and differential; Future  -     Comprehensive metabolic panel; Future  -     POCT hemoglobin A1c  -     Microalbumin / creatinine urine ratio    2  Dementia without behavioral disturbance, unspecified dementia type ([place] [address] )  Comments:  Patient has some progression of cognitive function decline  Continue donepezil and memantine    3  Paroxysmal atrial fibrillation ([facility])  Comments:  Rate is controlled and he is on warfarin  4  PAD (peripheral artery disease) ([place] [address] )    5  Coronary artery disease involving native coronary artery of native heart without angina pectoris         1  Type 2 diabetes mellitus with diabetic neuropathy, with long-term current use of insulin ([place] [address] )      2  Dementia without behavioral disturbance, unspecified dementia type ([facility])      3  Paroxysmal atrial fibrillation ([facility] )      4  PAD (peripheral artery disease) ([place] [address] )      5  Coronary artery disease involving native coronary artery of native heart without angina pectoris         No problem-specific Assessment & Plan notes found for this encounter  Subjective:      Patient ID: Abelino Gibbs is a 80 y o  male  HPI    The following portions of the patient's history were reviewed and updated as appropriate: He  has a past medical history of Allergic rhinitis, Anxiety, Aspiration of liquid, Asthma, Atrial fibrillation ([facility] ), CAD (coronary artery disease), Cancer of kidney ([facility] ), COPD (chronic obstructive pulmonary disease) ([facility] ), CPAP (continuous positive airway pressure) dependence, Dementia ([facility] ), Dementia ([facility] ), Diabetes mellitus ([facility] ), Diabetes mellitus, type 2 ([facility] ), DJD (degenerative joint disease), Hypercholesterolemia, Hyperlipidemia, Hypertension, Incisional hernia, Kidney disease, Melanoma ([facility] ), Obesity, Sleep apnea, and TIA (transient ischemic attack)    He Patient Active Problem List    Diagnosis Date Noted    Fall 05/03/2022    Renal cell carcinoma ([place] [address] ) 04/12/2022    Multiple rib fractures 04/09/2022    Abnormal head CT 04/09/2022    Cough 11/19/2021    Tracheitis 11/15/2021    Dementia ([place] [address] )     Recurrent falls 09/30/2020    Long term current use of antiarrhythmic medical therapy 08/17/2020    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 08/13/2020    Coagulation disorder ([place] [address] ) 08/13/2020    Head injury 03/09/2020    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, or unspecified chronic kidney disease 05/06/2019    Coronary artery disease involving native coronary artery of native heart without angina pectoris 03/12/2019    Type 2 diabetes mellitus with kidney complication, with long-term current use of insulin ([place] [address] ) 02/04/2019    Paroxysmal atrial fibrillation ([place] [address] ) 12/12/2018    Mixed hyperlipidemia 12/12/2018    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[place] [address] ) 04/16/2018    CKD (chronic kidney disease), stage IV ([place] [address] ) 03/30/2018    Essential hypertension 03/30/2018    Solitary kidney 03/30/2018    Benign prostate hyperplasia 10/27/2014    Elevated prostate specific antigen (PSA) 06/18/2013     He  has a past surgical history that includes Cardiac pacemaker placement; Nephrectomy (Left, 2005); pr esophagogastroduodenoscopy submucosal injection (N/A, 9/21/2016); Tonsillectomy; Cholecystectomy; pr esophagogastroduodenoscopy submucosal injection (N/A, 2/7/2018); Colonoscopy; Rotator cuff repair; pr esophagogastroduodenoscopy submucosal injection (N/A, 4/3/2019); and Hernia repair  His family history includes Brain cancer in his father; Cancer in his family; Colon cancer in his son; Diabetes in his family; Heart disease in his family; Hypertension in his family; Lung cancer in his father; Stroke in his family  He  reports that he has never smoked   He has never used smokeless tobacco  He reports that he does not drink alcohol and does not use drugs  Current Outpatient Medications   Medication Sig Dispense Refill    amLODIPine (NORVASC) 5 mg tablet Take 1 tablet (5 mg total) by mouth daily 90 tablet 0    atorvastatin (LIPITOR) 10 mg tablet Take 1 tablet (10 mg total) by mouth daily 90 tablet 0    cholecalciferol (VITAMIN D3) 1,000 units tablet Take 2,000 Units by mouth daily      donepezil (ARICEPT) 10 mg tablet Take 1 tablet (10 mg total) by mouth daily at bedtime 90 tablet 3    fenofibrate (TRICOR) 145 mg tablet Take 1 tablet (145 mg total) by mouth daily 90 tablet 1    ferrous sulfate 325 (65 Fe) mg tablet Take 325 mg by mouth as needed       glucose blood (OneTouch Verio) test strip Use 1 each 3 (three) times a day Test 300 strip 3    Insulin Pen Needle (Novofine Pen Needle) 32G X 6 MM MISC Use 4 (four) times a day 200 each 5    Insulin Syringe-Needle U-100 (BD Insulin Syringe U/F) 30G X 1/2" 0 5 ML MISC Use 4 (four) times a day 400 each 1    levalbuterol (XOPENEX) 0 63 mg/3 mL nebulizer solution Take 1 ampule by nebulization 2 (two) times a day as needed for wheezing   losartan (COZAAR) 25 mg tablet Take 1 tablet (25 mg total) by mouth daily 90 tablet 1    Magnesium 250 MG TABS Take 500 mg by mouth daily       Memantine HCl ER 28 MG CP24 TAKE ONE CAPSULE BY MOUTH EVERY DAY 90 capsule 1    metoprolol tartrate (LOPRESSOR) 100 mg tablet TAKE 1 TABLET BY MOUTH AT BEDTIME 90 tablet 0    propafenone (RYTHMOL) 225 mg tablet Take 1 tablet (225 mg total) by mouth 3 (three) times a day 90 tablet 0    sertraline (ZOLOFT) 100 mg tablet Take 1 tablet (100 mg total) by mouth daily 90 tablet 1    sodium chloride (OCEAN) 0 65 % nasal spray 1 spray into each nostril      warfarin (COUMADIN) 2 5 mg tablet Take 1 tablet (2 5 mg total) by mouth 4 (four) times a week Takes Tuesday, wednesday, Thursday, Saturday and Sunday   30 tablet 1    docusate sodium (COLACE) 100 mg capsule Take 100 mg by mouth as needed  (Patient not taking: Reported on 7/27/2022)      insulin glargine (LANTUS) 100 units/mL subcutaneous injection Inject 28 Units under the skin 2 (two) times a day (Patient not taking: Reported on 7/27/2022) 10 mL 2     No current facility-administered medications for this visit  Current Outpatient Medications on File Prior to Visit   Medication Sig    amLODIPine (NORVASC) 5 mg tablet Take 1 tablet (5 mg total) by mouth daily    atorvastatin (LIPITOR) 10 mg tablet Take 1 tablet (10 mg total) by mouth daily    cholecalciferol (VITAMIN D3) 1,000 units tablet Take 2,000 Units by mouth daily    donepezil (ARICEPT) 10 mg tablet Take 1 tablet (10 mg total) by mouth daily at bedtime    fenofibrate (TRICOR) 145 mg tablet Take 1 tablet (145 mg total) by mouth daily    ferrous sulfate 325 (65 Fe) mg tablet Take 325 mg by mouth as needed     glucose blood (OneTouch Verio) test strip Use 1 each 3 (three) times a day Test    Insulin Pen Needle (Novofine Pen Needle) 32G X 6 MM MISC Use 4 (four) times a day    Insulin Syringe-Needle U-100 (BD Insulin Syringe U/F) 30G X 1/2" 0 5 ML MISC Use 4 (four) times a day    levalbuterol (XOPENEX) 0 63 mg/3 mL nebulizer solution Take 1 ampule by nebulization 2 (two) times a day as needed for wheezing   losartan (COZAAR) 25 mg tablet Take 1 tablet (25 mg total) by mouth daily    Magnesium 250 MG TABS Take 500 mg by mouth daily     Memantine HCl ER 28 MG CP24 TAKE ONE CAPSULE BY MOUTH EVERY DAY    metoprolol tartrate (LOPRESSOR) 100 mg tablet TAKE 1 TABLET BY MOUTH AT BEDTIME    propafenone (RYTHMOL) 225 mg tablet Take 1 tablet (225 mg total) by mouth 3 (three) times a day    sertraline (ZOLOFT) 100 mg tablet Take 1 tablet (100 mg total) by mouth daily    sodium chloride (OCEAN) 0 65 % nasal spray 1 spray into each nostril    warfarin (COUMADIN) 2 5 mg tablet Take 1 tablet (2 5 mg total) by mouth 4 (four) times a week Takes Tuesday, wednesday, Thursday, Saturday and Sunday      docusate sodium (COLACE) 100 mg capsule Take 100 mg by mouth as needed  (Patient not taking: Reported on 7/27/2022)    insulin glargine (LANTUS) 100 units/mL subcutaneous injection Inject 28 Units under the skin 2 (two) times a day (Patient not taking: Reported on 7/27/2022)     No current facility-administered medications on file prior to visit  He is allergic to Francis Schein tartrate], bextra [valdecoxib], dust mite extract, lyrica [pregabalin], mold extract [trichophyton], pollen extract, and tree extract       Review of Systems   Constitutional: Negative for appetite change, chills, fatigue and fever  HENT: Negative for sore throat and trouble swallowing  Eyes: Negative for redness  Respiratory: Negative for shortness of breath  Cardiovascular: Negative for chest pain and palpitations  Gastrointestinal: Negative for abdominal pain, constipation and diarrhea  Genitourinary: Negative for dysuria and hematuria  Musculoskeletal: Negative for back pain and neck pain  Skin: Negative for rash  Neurological: Negative for seizures, weakness and headaches  Hematological: Negative for adenopathy  Psychiatric/Behavioral: Negative for confusion  The patient is not nervous/anxious            Objective:      /76 (BP Location: Left arm, Patient Position: Sitting, Cuff Size: Large)   Pulse 69   Temp 97 9 °F (36 6 °C) (Temporal)   Ht 5' 9" (1 753 m)   Wt 90 7 kg (200 lb)   SpO2 97% Comment: RA  BMI 29 53 kg/m²     Results Reviewed     None          Recent Results (from the past 1344 hour(s))   Protime-INR    Collection Time: 06/02/22  4:10 PM   Result Value Ref Range    Protime 15 7 (H) 11 6 - 14 5 seconds    INR 1 31 (H) 0 84 - 1 19   Protime-INR    Collection Time: 06/08/22  2:41 PM   Result Value Ref Range    Protime 17 7 (H) 11 6 - 14 5 seconds    INR 1 53 (H) 0 84 - 1 19   Protime-INR    Collection Time: 06/13/22  2:37 PM   Result Value Ref Range    Protime 22 5 (H) 11 6 - 14 5 seconds INR 2 10 (H) 0 84 - 1 19   Protime-INR    Collection Time: 07/08/22  5:31 PM   Result Value Ref Range    Protime 16 6 (H) 11 6 - 14 5 seconds    INR 1 41 (H) 0 84 - 1 19   Protime-INR    Collection Time: 07/11/22  2:34 PM   Result Value Ref Range    Protime 17 9 (H) 11 6 - 14 5 seconds    INR 1 56 (H) 0 84 - 1 19   Protime-INR    Collection Time: 07/20/22 12:06 PM   Result Value Ref Range    Protime 16 5 (H) 11 6 - 14 5 seconds    INR 1 31 (H) 0 84 - 1 19   POCT hemoglobin A1c    Collection Time: 07/27/22  2:41 PM   Result Value Ref Range    Hemoglobin A1C 7 9 (A) 6 5   Microalbumin / creatinine urine ratio    Collection Time: 07/27/22  2:41 PM   Result Value Ref Range    Creatinine, Ur 97 2 mg/dL    Microalbum  ,U,Random 246 0 (H) 0 0 - 20 0 mg/L    Microalb Creat Ratio 253 (H) 0 - 30 mg/g creatinine        Physical Exam  Constitutional:       General: He is not in acute distress  Appearance: Normal appearance  HENT:      Head: Normocephalic and atraumatic  Right Ear: Tympanic membrane normal       Left Ear: Tympanic membrane normal       Nose: Nose normal       Mouth/Throat:      Mouth: Mucous membranes are moist    Eyes:      Extraocular Movements: Extraocular movements intact  Pupils: Pupils are equal, round, and reactive to light  Cardiovascular:      Rate and Rhythm: Normal rate and regular rhythm  Pulses: Normal pulses  Heart sounds: Normal heart sounds  No murmur heard  No friction rub  Pulmonary:      Effort: Pulmonary effort is normal  No respiratory distress  Breath sounds: Normal breath sounds  No wheezing  Abdominal:      General: Abdomen is flat  Bowel sounds are normal  There is no distension  Palpations: Abdomen is soft  There is no mass  Tenderness: There is no abdominal tenderness  There is no guarding  Musculoskeletal:         General: Normal range of motion  Cervical back: Normal range of motion  Skin:     General: Skin is warm  Neurological:      General: No focal deficit present  Mental Status: He is alert  Mental status is at baseline  Cranial Nerves: No cranial nerve deficit  Comments: Slight decline in his cognitive functions     Psychiatric:         Mood and Affect: Mood normal          Behavior: Behavior normal

## 2022-07-28 ENCOUNTER — TELEPHONE (OUTPATIENT)
Dept: ADMINISTRATIVE | Facility: OTHER | Age: 87
End: 2022-07-28

## 2022-07-28 DIAGNOSIS — E78.5 HYPERLIPIDEMIA, UNSPECIFIED HYPERLIPIDEMIA TYPE: ICD-10-CM

## 2022-07-28 RX ORDER — ATORVASTATIN CALCIUM 10 MG/1
10 TABLET, FILM COATED ORAL DAILY
Qty: 90 TABLET | Refills: 1 | Status: SHIPPED | OUTPATIENT
Start: 2022-07-28

## 2022-07-28 NOTE — TELEPHONE ENCOUNTER
----- Message from [facility] sent at 7/28/2022 11:13 AM EDT -----  Regarding: dm eye exam  07/28/22 11:14 AM    Hello, our patient Aaron Rodriguez has had Diabetic Eye Exam completed/performed  Please assist in updating the patient chart by making an External outreach to [phone]   facility located in [address] [place] [address]  The date of service is 2022      Thank you,  Arabella Hansen  [facility] INTERNAL MED

## 2022-07-28 NOTE — TELEPHONE ENCOUNTER
Upon review of the In Basket request and the patient's chart, initial outreach has been made via telephone call, please see Contacts section for details       Thank you  Jose Calix

## 2022-08-01 ENCOUNTER — APPOINTMENT (OUTPATIENT)
Dept: LAB | Age: 87
End: 2022-08-01
Payer: COMMERCIAL

## 2022-08-01 ENCOUNTER — TELEPHONE (OUTPATIENT)
Dept: CARDIOLOGY CLINIC | Facility: CLINIC | Age: 87
End: 2022-08-01

## 2022-08-01 DIAGNOSIS — E78.2 MIXED HYPERLIPIDEMIA: ICD-10-CM

## 2022-08-01 DIAGNOSIS — I12.9 HYPERTENSIVE CHRONIC KIDNEY DISEASE WITH STAGE 1 THROUGH STAGE 4 CHRONIC KIDNEY DISEASE, OR UNSPECIFIED CHRONIC KIDNEY DISEASE: ICD-10-CM

## 2022-08-01 DIAGNOSIS — I48.0 PAROXYSMAL ATRIAL FIBRILLATION (HCC): ICD-10-CM

## 2022-08-01 DIAGNOSIS — Z79.4 TYPE 2 DIABETES MELLITUS WITH STAGE 4 CHRONIC KIDNEY DISEASE, WITH LONG-TERM CURRENT USE OF INSULIN (HCC): ICD-10-CM

## 2022-08-01 DIAGNOSIS — I48.0 PAROXYSMAL ATRIAL FIBRILLATION (HCC): Primary | ICD-10-CM

## 2022-08-01 DIAGNOSIS — Z90.5 SOLITARY KIDNEY, ACQUIRED: ICD-10-CM

## 2022-08-01 DIAGNOSIS — N18.4 CKD (CHRONIC KIDNEY DISEASE), STAGE IV (HCC): ICD-10-CM

## 2022-08-01 DIAGNOSIS — N18.4 TYPE 2 DIABETES MELLITUS WITH STAGE 4 CHRONIC KIDNEY DISEASE, WITH LONG-TERM CURRENT USE OF INSULIN (HCC): ICD-10-CM

## 2022-08-01 DIAGNOSIS — E11.22 TYPE 2 DIABETES MELLITUS WITH STAGE 4 CHRONIC KIDNEY DISEASE, WITH LONG-TERM CURRENT USE OF INSULIN (HCC): ICD-10-CM

## 2022-08-01 LAB
ALBUMIN SERPL BCP-MCNC: 3.6 G/DL (ref 3.5–5)
ANION GAP SERPL CALCULATED.3IONS-SCNC: 3 MMOL/L (ref 4–13)
BUN SERPL-MCNC: 21 MG/DL (ref 5–25)
CALCIUM SERPL-MCNC: 9.7 MG/DL (ref 8.3–10.1)
CHLORIDE SERPL-SCNC: 103 MMOL/L (ref 96–108)
CO2 SERPL-SCNC: 30 MMOL/L (ref 21–32)
CREAT SERPL-MCNC: 1.78 MG/DL (ref 0.6–1.3)
ERYTHROCYTE [DISTWIDTH] IN BLOOD BY AUTOMATED COUNT: 13 % (ref 11.6–15.1)
GFR SERPL CREATININE-BSD FRML MDRD: 33 ML/MIN/1.73SQ M
GLUCOSE SERPL-MCNC: 280 MG/DL (ref 65–140)
HCT VFR BLD AUTO: 43.5 % (ref 36.5–49.3)
HGB BLD-MCNC: 14.3 G/DL (ref 12–17)
MCH RBC QN AUTO: 28.7 PG (ref 26.8–34.3)
MCHC RBC AUTO-ENTMCNC: 32.9 G/DL (ref 31.4–37.4)
MCV RBC AUTO: 87 FL (ref 82–98)
PHOSPHATE SERPL-MCNC: 2.5 MG/DL (ref 2.3–4.1)
PLATELET # BLD AUTO: 209 THOUSANDS/UL (ref 149–390)
PMV BLD AUTO: 10.7 FL (ref 8.9–12.7)
POTASSIUM SERPL-SCNC: 4.7 MMOL/L (ref 3.5–5.3)
RBC # BLD AUTO: 4.99 MILLION/UL (ref 3.88–5.62)
SODIUM SERPL-SCNC: 136 MMOL/L (ref 135–147)
URATE SERPL-MCNC: 6.1 MG/DL (ref 3.5–8.5)
WBC # BLD AUTO: 7.15 THOUSAND/UL (ref 4.31–10.16)

## 2022-08-01 PROCEDURE — 80069 RENAL FUNCTION PANEL: CPT

## 2022-08-01 PROCEDURE — 84550 ASSAY OF BLOOD/URIC ACID: CPT

## 2022-08-01 PROCEDURE — 85027 COMPLETE CBC AUTOMATED: CPT

## 2022-08-02 ENCOUNTER — TELEPHONE (OUTPATIENT)
Dept: NEPHROLOGY | Facility: HOSPITAL | Age: 87
End: 2022-08-02

## 2022-08-02 ENCOUNTER — ANTICOAG VISIT (OUTPATIENT)
Dept: CARDIOLOGY CLINIC | Facility: CLINIC | Age: 87
End: 2022-08-02

## 2022-08-02 DIAGNOSIS — I48.0 PAROXYSMAL ATRIAL FIBRILLATION (HCC): Primary | ICD-10-CM

## 2022-08-02 NOTE — PROGRESS NOTES
Unable to leave message on home #  Left message on Jerri cell #, advised INR coming up, close to goal, advised to continue 5 mg daily and recheck in 2 weeks 8/15/22  Advised to call with questions or concerns

## 2022-08-02 NOTE — TELEPHONE ENCOUNTER
----- Message from Devorah Nagel, [address] sent at 8/2/2022  2:57 PM EDT -----  Please let patient know that BMP reviewed and renal function stable

## 2022-08-02 NOTE — TELEPHONE ENCOUNTER
Called patient and left a voicemail stating the following:    Patients recent BMP reviewed and renal function stable

## 2022-08-03 ENCOUNTER — APPOINTMENT (OUTPATIENT)
Dept: LAB | Age: 87
End: 2022-08-03
Payer: COMMERCIAL

## 2022-08-03 DIAGNOSIS — E11.22 TYPE 2 DIABETES MELLITUS WITH STAGE 4 CHRONIC KIDNEY DISEASE, UNSPECIFIED WHETHER LONG TERM INSULIN USE (HCC): ICD-10-CM

## 2022-08-03 DIAGNOSIS — N18.4 CHRONIC KIDNEY DISEASE, STAGE IV (SEVERE) (HCC): ICD-10-CM

## 2022-08-03 DIAGNOSIS — N18.4 TYPE 2 DIABETES MELLITUS WITH STAGE 4 CHRONIC KIDNEY DISEASE, UNSPECIFIED WHETHER LONG TERM INSULIN USE (HCC): ICD-10-CM

## 2022-08-03 LAB — PTH-INTACT SERPL-MCNC: 45.9 PG/ML (ref 18.4–80.1)

## 2022-08-03 PROCEDURE — 83970 ASSAY OF PARATHORMONE: CPT

## 2022-08-03 PROCEDURE — 36415 COLL VENOUS BLD VENIPUNCTURE: CPT

## 2022-08-08 ENCOUNTER — APPOINTMENT (OUTPATIENT)
Dept: LAB | Age: 87
End: 2022-08-08
Payer: COMMERCIAL

## 2022-08-09 ENCOUNTER — OFFICE VISIT (OUTPATIENT)
Dept: CARDIOLOGY CLINIC | Facility: CLINIC | Age: 87
End: 2022-08-09
Payer: COMMERCIAL

## 2022-08-09 ENCOUNTER — ANTICOAG VISIT (OUTPATIENT)
Dept: CARDIOLOGY CLINIC | Facility: CLINIC | Age: 87
End: 2022-08-09

## 2022-08-09 VITALS
WEIGHT: 198.6 LBS | OXYGEN SATURATION: 94 % | HEART RATE: 78 BPM | DIASTOLIC BLOOD PRESSURE: 76 MMHG | SYSTOLIC BLOOD PRESSURE: 130 MMHG | BODY MASS INDEX: 29.41 KG/M2 | HEIGHT: 69 IN

## 2022-08-09 DIAGNOSIS — E78.2 MIXED HYPERLIPIDEMIA: ICD-10-CM

## 2022-08-09 DIAGNOSIS — I10 ESSENTIAL HYPERTENSION: ICD-10-CM

## 2022-08-09 DIAGNOSIS — I48.0 PAROXYSMAL ATRIAL FIBRILLATION (HCC): ICD-10-CM

## 2022-08-09 DIAGNOSIS — I25.10 CORONARY ARTERY DISEASE INVOLVING NATIVE CORONARY ARTERY OF NATIVE HEART WITHOUT ANGINA PECTORIS: Primary | ICD-10-CM

## 2022-08-09 DIAGNOSIS — I48.0 PAROXYSMAL ATRIAL FIBRILLATION (HCC): Primary | ICD-10-CM

## 2022-08-09 PROCEDURE — 99214 OFFICE O/P EST MOD 30 MIN: CPT | Performed by: INTERNAL MEDICINE

## 2022-08-09 PROCEDURE — 1160F RVW MEDS BY RX/DR IN RCRD: CPT | Performed by: INTERNAL MEDICINE

## 2022-08-09 RX ORDER — INSULIN ASPART 100 [IU]/ML
INJECTION, SOLUTION INTRAVENOUS; SUBCUTANEOUS 2 TIMES DAILY
COMMUNITY
End: 2022-09-28 | Stop reason: SDUPTHER

## 2022-08-09 NOTE — PROGRESS NOTES
Assessment/Plan:    Mixed hyperlipidemia    Hyperlipidemia, stable  Coronary artery disease involving native coronary artery of native heart without angina pectoris    Coronary artery disease, stable  No symptoms of angina  Paroxysmal atrial fibrillation (HCC)    Paroxysmal atrial fibrillation, stable  The patient is currently in regular rhythm  Essential hypertension    Hypertension, stable and adequately controlled  Diagnoses and all orders for this visit:    Coronary artery disease involving native coronary artery of native heart without angina pectoris    Paroxysmal atrial fibrillation (HCC)    Essential hypertension    Mixed hyperlipidemia    Other orders  -     Insulin Aspart (NovoLOG PenFill) 100 UNITS/ML cartridge for injection; Inject under the skin 2 (two) times a day Takes 8 units twice daily  -     insulin detemir (LEVEMIR) 100 units/mL subcutaneous injection; Inject under the skin 28 units twice daily   -     B Complex Vitamins (B-COMPLEX/B-12 PO); Take by mouth  -     Multiple Vitamins-Minerals (ICAPS AREDS 2 PO); Take by mouth  -     Probiotic Product (HEALTHY COLON PO); Take by mouth Takes once daily  Subjective:   Feels well  Patient ID: Abelino Gibbs is a 80 y o  male  The patient presented to this office for the purpose of cardiac follow-up  He is known to have a history of coronary artery disease with paroxysmal atrial fibrillation and history of hypertension, diabetes mellitus and hyperlipidemia  Patient has been stable from the cardiac standpoint  He denies any symptoms of chest pain, shortness of breath, palpitation, dizziness or lightheadedness  He has no leg edema        The following portions of the patient's history were reviewed and updated as appropriate: allergies, current medications, past family history, past medical history, past social history, past surgical history and problem list     Review of Systems   Respiratory: Negative for apnea, cough, chest tightness, shortness of breath and wheezing  Cardiovascular: Negative for chest pain, palpitations and leg swelling  Gastrointestinal: Negative for abdominal pain  Neurological: Negative for dizziness and light-headedness  Psychiatric/Behavioral: Negative  Objective:  Stable cardiac-wise  /76 (BP Location: Left arm, Patient Position: Sitting, Cuff Size: Standard)   Pulse 78   Ht 5' 9" (1 753 m)   Wt 90 1 kg (198 lb 9 6 oz)   SpO2 94%   BMI 29 33 kg/m²          Physical Exam  Vitals reviewed  Constitutional:       General: He is not in acute distress  Appearance: He is well-developed  He is not diaphoretic  HENT:      Head: Normocephalic  Eyes:      Pupils: Pupils are equal, round, and reactive to light  Neck:      Thyroid: No thyromegaly  Vascular: No JVD  Cardiovascular:      Rate and Rhythm: Normal rate and regular rhythm  Heart sounds: S1 normal and S2 normal  No murmur heard  No friction rub  No gallop  Pulmonary:      Effort: Pulmonary effort is normal  No respiratory distress  Breath sounds: Normal breath sounds  No wheezing or rales  Chest:      Chest wall: No tenderness  Abdominal:      Palpations: Abdomen is soft  Musculoskeletal:         General: No tenderness or deformity  Normal range of motion  Cervical back: Normal range of motion  Right lower leg: No edema  Left lower leg: No edema  Skin:     General: Skin is warm and dry  Neurological:      Mental Status: He is alert and oriented to person, place, and time     Psychiatric:         Mood and Affect: Mood normal          Behavior: Behavior normal

## 2022-08-09 NOTE — LETTER
August 9, 2022     Kia Ferreira, [address] [place] [address]    Patient: Rosita Corey   YOB: 1935   Date of Visit: 8/9/2022       Dear Dr Regina Love:    Thank you for referring Danie José to me for evaluation  Below are my notes for this consultation  If you have questions, please do not hesitate to call me  I look forward to following your patient along with you  Sincerely,        Jose Osorio MD        CC: No Recipients  Jose Osorio MD  8/9/2022  1:22 PM  Sign when Signing Visit  Assessment/Plan:    Mixed hyperlipidemia    Hyperlipidemia, stable  Coronary artery disease involving native coronary artery of native heart without angina pectoris    Coronary artery disease, stable  No symptoms of angina  Paroxysmal atrial fibrillation (HCC)    Paroxysmal atrial fibrillation, stable  The patient is currently in regular rhythm  Essential hypertension    Hypertension, stable and adequately controlled  Diagnoses and all orders for this visit:    Coronary artery disease involving native coronary artery of native heart without angina pectoris    Paroxysmal atrial fibrillation (HCC)    Essential hypertension    Mixed hyperlipidemia    Other orders  -     Insulin Aspart (NovoLOG PenFill) 100 UNITS/ML cartridge for injection; Inject under the skin 2 (two) times a day Takes 8 units twice daily  -     insulin detemir (LEVEMIR) 100 units/mL subcutaneous injection; Inject under the skin 28 units twice daily   -     B Complex Vitamins (B-COMPLEX/B-12 PO); Take by mouth  -     Multiple Vitamins-Minerals (ICAPS AREDS 2 PO); Take by mouth  -     Probiotic Product (HEALTHY COLON PO); Take by mouth Takes once daily  Subjective:   Feels well  Patient ID: Rosita Corey is a 80 y o  male  The patient presented to this office for the purpose of cardiac follow-up    He is known to have a history of coronary artery disease with paroxysmal atrial fibrillation and history of hypertension, diabetes mellitus and hyperlipidemia  Patient has been stable from the cardiac standpoint  He denies any symptoms of chest pain, shortness of breath, palpitation, dizziness or lightheadedness  He has no leg edema  The following portions of the patient's history were reviewed and updated as appropriate: allergies, current medications, past family history, past medical history, past social history, past surgical history and problem list     Review of Systems   Respiratory: Negative for apnea, cough, chest tightness, shortness of breath and wheezing  Cardiovascular: Negative for chest pain, palpitations and leg swelling  Gastrointestinal: Negative for abdominal pain  Neurological: Negative for dizziness and light-headedness  Psychiatric/Behavioral: Negative  Objective:  Stable cardiac-wise  /76 (BP Location: Left arm, Patient Position: Sitting, Cuff Size: Standard)   Pulse 78   Ht 5' 9" (1 753 m)   Wt 90 1 kg (198 lb 9 6 oz)   SpO2 94%   BMI 29 33 kg/m²          Physical Exam  Vitals reviewed  Constitutional:       General: He is not in acute distress  Appearance: He is well-developed  He is not diaphoretic  HENT:      Head: Normocephalic  Eyes:      Pupils: Pupils are equal, round, and reactive to light  Neck:      Thyroid: No thyromegaly  Vascular: No JVD  Cardiovascular:      Rate and Rhythm: Normal rate and regular rhythm  Heart sounds: S1 normal and S2 normal  No murmur heard  No friction rub  No gallop  Pulmonary:      Effort: Pulmonary effort is normal  No respiratory distress  Breath sounds: Normal breath sounds  No wheezing or rales  Chest:      Chest wall: No tenderness  Abdominal:      Palpations: Abdomen is soft  Musculoskeletal:         General: No tenderness or deformity  Normal range of motion  Cervical back: Normal range of motion  Right lower leg: No edema  Left lower leg: No edema  Skin:     General: Skin is warm and dry  Neurological:      Mental Status: He is alert and oriented to person, place, and time     Psychiatric:         Mood and Affect: Mood normal          Behavior: Behavior normal

## 2022-08-10 NOTE — TELEPHONE ENCOUNTER
Upon review of the In Basket request we have found as a result of outreach that patient did not have the requested item(s) completed  Did Not have a diabetic eye exam in 2022  Most recent was done in 11/2020  Any additional questions or concerns should be emailed to the Practice Liaisons via [email or web address] email, please do not reply via In Basket      Thank you  Zheng Hernandez

## 2022-08-12 NOTE — TELEPHONE ENCOUNTER
Returned a call from patient's wife  She reports his machine is not working well     Advised she call [facility] to check the machine, provided the phone number

## 2022-08-15 DIAGNOSIS — G47.33 OSA (OBSTRUCTIVE SLEEP APNEA): Primary | ICD-10-CM

## 2022-08-15 NOTE — TELEPHONE ENCOUNTER
Patient called back his machine is broken and per [place] he needs a new machine     Last seen 5/16/2022  Dr Ann-Marie Velasquez please write RX for replacement of broken machine

## 2022-08-16 ENCOUNTER — APPOINTMENT (OUTPATIENT)
Dept: LAB | Age: 87
End: 2022-08-16
Payer: COMMERCIAL

## 2022-08-16 NOTE — TELEPHONE ENCOUNTER
RX for replacement of broken machine and clinical note and old sleep studies sent to [place] [employer] [place]

## 2022-08-17 ENCOUNTER — ANTICOAG VISIT (OUTPATIENT)
Dept: CARDIOLOGY CLINIC | Facility: CLINIC | Age: 87
End: 2022-08-17

## 2022-08-17 DIAGNOSIS — Z79.4 TYPE 2 DIABETES MELLITUS WITH DIABETIC NEUROPATHY, WITH LONG-TERM CURRENT USE OF INSULIN (HCC): ICD-10-CM

## 2022-08-17 DIAGNOSIS — E11.40 TYPE 2 DIABETES MELLITUS WITH DIABETIC NEUROPATHY, WITH LONG-TERM CURRENT USE OF INSULIN (HCC): ICD-10-CM

## 2022-08-17 DIAGNOSIS — I48.0 PAROXYSMAL ATRIAL FIBRILLATION (HCC): Primary | ICD-10-CM

## 2022-08-17 RX ORDER — PEN NEEDLE, DIABETIC 29 G X1/2"
NEEDLE, DISPOSABLE MISCELLANEOUS 4 TIMES DAILY
Qty: 400 EACH | Refills: 1 | Status: SHIPPED | OUTPATIENT
Start: 2022-08-17

## 2022-08-17 NOTE — PROGRESS NOTES
Spoke with Blayne Love, advised INR lower than last week, not sure what she was giving patient, states to give patient 7 5 mg today, then 7 5 mg Sun, 5 mg all other days, will recheck next week 8/25/22

## 2022-08-23 ENCOUNTER — TELEPHONE (OUTPATIENT)
Dept: NEUROLOGY | Facility: CLINIC | Age: 87
End: 2022-08-23

## 2022-08-23 ENCOUNTER — TELEPHONE (OUTPATIENT)
Dept: OTHER | Facility: OTHER | Age: 87
End: 2022-08-23

## 2022-08-23 NOTE — TELEPHONE ENCOUNTER
Patient is calling regarding cancelling an appointment      Date/Time: 8- @ 9:00 am    Patient was rescheduled: YES [] NO [x]    Patient requesting call back to reschedule: YES [x] NO []

## 2022-08-23 NOTE — TELEPHONE ENCOUNTER
Patient cancelled their appt that was suppose to be today w/ Lazarus Christine in [place] via josé  Patient called our office to reschedule the appt  Opening tomorrow w/ Lazarus Christine in [place]  Patient accepted  SCHEDULED: Wed, 8/24/22 at 10:15am w/ Lazarus Christine in [facility]  Insurance e-verified

## 2022-08-25 ENCOUNTER — APPOINTMENT (OUTPATIENT)
Dept: LAB | Age: 87
End: 2022-08-25
Payer: COMMERCIAL

## 2022-08-26 ENCOUNTER — ANTICOAG VISIT (OUTPATIENT)
Dept: CARDIOLOGY CLINIC | Facility: CLINIC | Age: 87
End: 2022-08-26

## 2022-08-26 DIAGNOSIS — I48.0 PAROXYSMAL ATRIAL FIBRILLATION (HCC): Primary | ICD-10-CM

## 2022-08-26 NOTE — PROGRESS NOTES
Reveiwed with wife states in giving every day reviewed dosoing will take 7 5 Friday and Monday 5 others recheck in 2 weeks 9/8/22

## 2022-08-29 ENCOUNTER — APPOINTMENT (OUTPATIENT)
Dept: LAB | Age: 87
End: 2022-08-29
Payer: MEDICARE

## 2022-08-29 LAB
INR PPP: 1.43 (ref 0.84–1.19)
PROTHROMBIN TIME: 17.7 SECONDS (ref 11.6–14.5)

## 2022-08-29 PROCEDURE — 85610 PROTHROMBIN TIME: CPT

## 2022-08-29 PROCEDURE — 36415 COLL VENOUS BLD VENIPUNCTURE: CPT

## 2022-08-30 ENCOUNTER — ANTICOAG VISIT (OUTPATIENT)
Dept: CARDIOLOGY CLINIC | Facility: CLINIC | Age: 87
End: 2022-08-30

## 2022-08-30 DIAGNOSIS — I48.0 PAROXYSMAL ATRIAL FIBRILLATION (HCC): Primary | ICD-10-CM

## 2022-08-30 RX ORDER — NAPROXEN SODIUM 220 MG
TABLET ORAL 4 TIMES DAILY
COMMUNITY
Start: 2022-08-18

## 2022-08-31 ENCOUNTER — APPOINTMENT (OUTPATIENT)
Dept: LAB | Age: 87
End: 2022-08-31
Payer: MEDICARE

## 2022-08-31 DIAGNOSIS — I48.0 PAROXYSMAL ATRIAL FIBRILLATION (HCC): ICD-10-CM

## 2022-08-31 LAB
ALBUMIN SERPL BCP-MCNC: 3.3 G/DL (ref 3.5–5)
ANION GAP SERPL CALCULATED.3IONS-SCNC: 2 MMOL/L (ref 4–13)
BUN SERPL-MCNC: 26 MG/DL (ref 5–25)
CALCIUM ALBUM COR SERPL-MCNC: 9.8 MG/DL (ref 8.3–10.1)
CALCIUM SERPL-MCNC: 9.2 MG/DL (ref 8.3–10.1)
CHLORIDE SERPL-SCNC: 105 MMOL/L (ref 96–108)
CO2 SERPL-SCNC: 30 MMOL/L (ref 21–32)
CREAT SERPL-MCNC: 1.94 MG/DL (ref 0.6–1.3)
ERYTHROCYTE [DISTWIDTH] IN BLOOD BY AUTOMATED COUNT: 13.2 % (ref 11.6–15.1)
GFR SERPL CREATININE-BSD FRML MDRD: 30 ML/MIN/1.73SQ M
GLUCOSE P FAST SERPL-MCNC: 275 MG/DL (ref 65–99)
HCT VFR BLD AUTO: 42 % (ref 36.5–49.3)
HGB BLD-MCNC: 13.8 G/DL (ref 12–17)
MCH RBC QN AUTO: 28.5 PG (ref 26.8–34.3)
MCHC RBC AUTO-ENTMCNC: 32.9 G/DL (ref 31.4–37.4)
MCV RBC AUTO: 87 FL (ref 82–98)
PHOSPHATE SERPL-MCNC: 2.4 MG/DL (ref 2.3–4.1)
PLATELET # BLD AUTO: 201 THOUSANDS/UL (ref 149–390)
PMV BLD AUTO: 10.7 FL (ref 8.9–12.7)
POTASSIUM SERPL-SCNC: 4.4 MMOL/L (ref 3.5–5.3)
RBC # BLD AUTO: 4.85 MILLION/UL (ref 3.88–5.62)
SODIUM SERPL-SCNC: 137 MMOL/L (ref 135–147)
URATE SERPL-MCNC: 6.4 MG/DL (ref 3.5–8.5)
WBC # BLD AUTO: 6.96 THOUSAND/UL (ref 4.31–10.16)

## 2022-08-31 PROCEDURE — 84550 ASSAY OF BLOOD/URIC ACID: CPT

## 2022-08-31 PROCEDURE — 36415 COLL VENOUS BLD VENIPUNCTURE: CPT

## 2022-08-31 PROCEDURE — 80069 RENAL FUNCTION PANEL: CPT

## 2022-08-31 PROCEDURE — 85027 COMPLETE CBC AUTOMATED: CPT

## 2022-09-06 ENCOUNTER — APPOINTMENT (OUTPATIENT)
Dept: LAB | Age: 87
End: 2022-09-06
Payer: MEDICARE

## 2022-09-06 DIAGNOSIS — I48.0 PAROXYSMAL ATRIAL FIBRILLATION (HCC): ICD-10-CM

## 2022-09-06 LAB
INR PPP: 1.15 (ref 0.84–1.19)
PROTHROMBIN TIME: 15 SECONDS (ref 11.6–14.5)

## 2022-09-06 PROCEDURE — 36415 COLL VENOUS BLD VENIPUNCTURE: CPT

## 2022-09-06 PROCEDURE — 85610 PROTHROMBIN TIME: CPT

## 2022-09-07 ENCOUNTER — ANTICOAG VISIT (OUTPATIENT)
Dept: CARDIOLOGY CLINIC | Facility: CLINIC | Age: 87
End: 2022-09-07

## 2022-09-07 DIAGNOSIS — I48.0 PAROXYSMAL ATRIAL FIBRILLATION (HCC): Primary | ICD-10-CM

## 2022-09-07 NOTE — PROGRESS NOTES
LM to call office need to speak about warfarin and patient  Pt never called back  10/8/22 called patient again reviewed instructions and she did repeat what he has been taking correctly 7 5 sun and fri 5 others   denies diet changes or missed doses  Does state he dosent eat like he use to decreased intake  Will take 10 mg today and tomorrow 7 5 Sun Tuesday 5 others repeat INR 9/15/22 wife repeated and wrote down directions

## 2022-09-08 ENCOUNTER — APPOINTMENT (OUTPATIENT)
Dept: LAB | Age: 87
End: 2022-09-08
Payer: MEDICARE

## 2022-09-08 ENCOUNTER — TELEPHONE (OUTPATIENT)
Dept: INTERNAL MEDICINE CLINIC | Facility: CLINIC | Age: 87
End: 2022-09-08

## 2022-09-08 DIAGNOSIS — I48.0 PAROXYSMAL ATRIAL FIBRILLATION (HCC): ICD-10-CM

## 2022-09-08 NOTE — TELEPHONE ENCOUNTER
Sandra Salazar called and ask to refill Lispro for Rebel Tolbert, I explained that its not on his meds list, she said that it must have been given to him at the ER, she has called for times after this asking for the refill

## 2022-09-09 ENCOUNTER — ANTICOAG VISIT (OUTPATIENT)
Dept: CARDIOLOGY CLINIC | Facility: CLINIC | Age: 87
End: 2022-09-09

## 2022-09-09 DIAGNOSIS — I48.0 PAROXYSMAL ATRIAL FIBRILLATION (HCC): Primary | ICD-10-CM

## 2022-09-09 NOTE — PROGRESS NOTES
Received INR today spoke with wife yesterday reviewed INR results from 9/6 had given directions and have no idea why she took him again yesterday from INR    Did not address with wife she is very confused and did yesterday review directions given and ask to go for next INR 9/15

## 2022-09-13 ENCOUNTER — APPOINTMENT (OUTPATIENT)
Dept: LAB | Age: 87
End: 2022-09-13
Payer: MEDICARE

## 2022-09-13 ENCOUNTER — TELEPHONE (OUTPATIENT)
Dept: INTERNAL MEDICINE CLINIC | Facility: CLINIC | Age: 87
End: 2022-09-13

## 2022-09-13 DIAGNOSIS — I48.0 PAROXYSMAL ATRIAL FIBRILLATION (HCC): ICD-10-CM

## 2022-09-13 LAB
INR PPP: 1.61 (ref 0.84–1.19)
PROTHROMBIN TIME: 19.4 SECONDS (ref 11.6–14.5)

## 2022-09-13 PROCEDURE — 85610 PROTHROMBIN TIME: CPT

## 2022-09-13 PROCEDURE — 36415 COLL VENOUS BLD VENIPUNCTURE: CPT

## 2022-09-13 NOTE — TELEPHONE ENCOUNTER
pts wife Mary Echols called several times for Lispro insulin, I called [facility] and they said that he was taking levemir and Lantus  Dr Sanjana Novak prescribed the Lispro on 5/26/22  Mary Echols doesn't know who that doctor is   Can you go over his insulin with her at his appt tomorroe
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
## 2022-09-14 ENCOUNTER — ANTICOAG VISIT (OUTPATIENT)
Dept: CARDIOLOGY CLINIC | Facility: CLINIC | Age: 87
End: 2022-09-14

## 2022-09-14 ENCOUNTER — OFFICE VISIT (OUTPATIENT)
Dept: INTERNAL MEDICINE CLINIC | Facility: CLINIC | Age: 87
End: 2022-09-14
Payer: MEDICARE

## 2022-09-14 VITALS
OXYGEN SATURATION: 96 % | SYSTOLIC BLOOD PRESSURE: 124 MMHG | HEIGHT: 69 IN | HEART RATE: 95 BPM | BODY MASS INDEX: 28.88 KG/M2 | DIASTOLIC BLOOD PRESSURE: 70 MMHG | TEMPERATURE: 98.5 F | WEIGHT: 195 LBS

## 2022-09-14 DIAGNOSIS — Z23 NEEDS FLU SHOT: ICD-10-CM

## 2022-09-14 DIAGNOSIS — I48.0 PAROXYSMAL ATRIAL FIBRILLATION (HCC): ICD-10-CM

## 2022-09-14 DIAGNOSIS — I25.10 CORONARY ARTERY DISEASE INVOLVING NATIVE CORONARY ARTERY OF NATIVE HEART WITHOUT ANGINA PECTORIS: ICD-10-CM

## 2022-09-14 DIAGNOSIS — E11.40 TYPE 2 DIABETES MELLITUS WITH DIABETIC NEUROPATHY, WITH LONG-TERM CURRENT USE OF INSULIN (HCC): Primary | ICD-10-CM

## 2022-09-14 DIAGNOSIS — E78.5 HYPERLIPIDEMIA, UNSPECIFIED HYPERLIPIDEMIA TYPE: ICD-10-CM

## 2022-09-14 DIAGNOSIS — F03.90 DEMENTIA WITHOUT BEHAVIORAL DISTURBANCE, UNSPECIFIED DEMENTIA TYPE: ICD-10-CM

## 2022-09-14 DIAGNOSIS — F02.80 FRONTOTEMPORAL DEMENTIA (HCC): ICD-10-CM

## 2022-09-14 DIAGNOSIS — I48.0 PAROXYSMAL ATRIAL FIBRILLATION (HCC): Primary | ICD-10-CM

## 2022-09-14 DIAGNOSIS — G31.09 FRONTOTEMPORAL DEMENTIA (HCC): ICD-10-CM

## 2022-09-14 DIAGNOSIS — Z79.4 TYPE 2 DIABETES MELLITUS WITH DIABETIC NEUROPATHY, WITH LONG-TERM CURRENT USE OF INSULIN (HCC): Primary | ICD-10-CM

## 2022-09-14 PROBLEM — G30.9 ALZHEIMER DISEASE (HCC): Status: ACTIVE | Noted: 2022-09-14

## 2022-09-14 PROCEDURE — 1160F RVW MEDS BY RX/DR IN RCRD: CPT | Performed by: INTERNAL MEDICINE

## 2022-09-14 PROCEDURE — 99214 OFFICE O/P EST MOD 30 MIN: CPT | Performed by: INTERNAL MEDICINE

## 2022-09-14 PROCEDURE — 90662 IIV NO PRSV INCREASED AG IM: CPT

## 2022-09-14 PROCEDURE — 3725F SCREEN DEPRESSION PERFORMED: CPT | Performed by: INTERNAL MEDICINE

## 2022-09-14 PROCEDURE — G0008 ADMIN INFLUENZA VIRUS VAC: HCPCS

## 2022-09-14 RX ORDER — PROPAFENONE HYDROCHLORIDE 225 MG/1
225 TABLET, FILM COATED ORAL 3 TIMES DAILY
Qty: 90 TABLET | Refills: 3 | Status: SHIPPED | OUTPATIENT
Start: 2022-09-14

## 2022-09-14 NOTE — TELEPHONE ENCOUNTER
[id number] Nidia velez wife called me back, her and Shalini Mcdonough were in the car so she said that she will call me when she gets home, If she doesn't call me back before I leave I will call her tomorrow morning

## 2022-09-14 NOTE — TELEPHONE ENCOUNTER
Marimar Zaidi was supposed to call me back yesterday, I called all the numbers on her and lloyd contact list and the VM are full

## 2022-09-14 NOTE — PROGRESS NOTES
Assessment/Plan:           1  Type 2 diabetes mellitus with diabetic neuropathy, with long-term current use of insulin ([facility])  Comments:  Continue Levemir 28 units b i d   Orders:  -     Ambulatory Referral to Ophthalmology; Future  -     insulin detemir (LEVEMIR) 100 units/mL subcutaneous injection; Inject 28 Units under the skin 2 (two) times a day 28 units twice daily   -     CBC and differential; Future; Expected date: 11/01/2022  -     Comprehensive metabolic panel; Future; Expected date: 11/01/2022  -     Hemoglobin A1C; Future; Expected date: 11/01/2022  -     Urinalysis with microscopic; Future; Expected date: 11/01/2022  -     Microalbumin / creatinine urine ratio; Future; Expected date: 11/01/2022    2  Paroxysmal atrial fibrillation (HCC)  Comments: This is stable patient in regular rhythm  On warfarin Follows with Cardiology  Orders:  -     propafenone (RYTHMOL) 225 mg tablet; Take 1 tablet (225 mg total) by mouth 3 (three) times a day    3  Hyperlipidemia, unspecified hyperlipidemia type    4  Dementia without behavioral disturbance, unspecified dementia type ([email or web address]    5  Coronary artery disease involving native coronary artery of native heart without angina pectoris    6  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[email or web address] )    7  Needs flu shot  -     influenza vaccine, high-dose, PF 0 7 mL (FLUZONE HIGH-DOSE)         1  Type 2 diabetes mellitus with diabetic neuropathy, with long-term current use of insulin ([facility] )    - Ambulatory Referral to Ophthalmology; Future  - insulin detemir (LEVEMIR) 100 units/mL subcutaneous injection; Inject 28 Units under the skin 2 (two) times a day 28 units twice daily  Dispense: 20 mL; Refill: 5    2  Paroxysmal atrial fibrillation (HCC)    - propafenone (RYTHMOL) 225 mg tablet; Take 1 tablet (225 mg total) by mouth 3 (three) times a day  Dispense: 90 tablet; Refill: 3    3  Hyperlipidemia, unspecified hyperlipidemia type      4   Dementia without behavioral disturbance, unspecified dementia type ([facility] )      5  Coronary artery disease involving native coronary artery of native heart without angina pectoris      6  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 )         No problem-specific Assessment & Plan notes found for this encounter  Subjective:      Patient ID: Edmond Flores is a 80 y o  male  HPI    The following portions of the patient's history were reviewed and updated as appropriate: He  has a past medical history of Allergic rhinitis, Anxiety, Aspiration of liquid, Asthma, Atrial fibrillation ([facility] ), CAD (coronary artery disease), Cancer of kidney ([facility] ), COPD (chronic obstructive pulmonary disease) ([facility] ), CPAP (continuous positive airway pressure) dependence, Dementia ([facility] ), Dementia ([facility] ), Diabetes mellitus ([facility] ), Diabetes mellitus, type 2 ([facility] ), DJD (degenerative joint disease), Hypercholesterolemia, Hyperlipidemia, Hypertension, Incisional hernia, Kidney disease, Melanoma ([facility] ), Obesity, Sleep apnea, and TIA (transient ischemic attack)    He   Patient Active Problem List    Diagnosis Date Noted    Fall 05/03/2022    Renal cell carcinoma ([facility] ) 04/12/2022    Multiple rib fractures 04/09/2022    Abnormal head CT 04/09/2022    Cough 11/19/2021    Tracheitis 11/15/2021    Dementia ([facility] )     Recurrent falls 09/30/2020    Long term current use of antiarrhythmic medical therapy 08/17/2020    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 08/13/2020    Coagulation disorder (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 ) 04/16/2018    CKD (chronic kidney disease), stage IV ([facility] ) 03/30/2018    Essential hypertension 03/30/2018    Solitary kidney 03/30/2018    Benign prostate hyperplasia 10/27/2014    Elevated prostate specific antigen (PSA) 06/18/2013     He  has a past surgical history that includes Cardiac pacemaker placement; Nephrectomy (Left, 2005); pr esophagogastroduodenoscopy submucosal injection (N/A, 9/21/2016); Tonsillectomy; Cholecystectomy; pr esophagogastroduodenoscopy submucosal injection (N/A, 2/7/2018); Colonoscopy; Rotator cuff repair; pr esophagogastroduodenoscopy submucosal injection (N/A, 4/3/2019); and Hernia repair  His family history includes Brain cancer in his father; Cancer in his family; Colon cancer in his son; Diabetes in his family; Heart disease in his family; Hypertension in his family; Lung cancer in his father; Stroke in his family  He  reports that he has never smoked  He has never used smokeless tobacco  He reports that he does not drink alcohol and does not use drugs  Current Outpatient Medications   Medication Sig Dispense Refill    amLODIPine (NORVASC) 5 mg tablet Take 1 tablet (5 mg total) by mouth daily 90 tablet 0    atorvastatin (LIPITOR) 10 mg tablet Take 1 tablet (10 mg total) by mouth daily 90 tablet 1    B Complex Vitamins (B-COMPLEX/B-12 PO) Take by mouth      cholecalciferol (VITAMIN D3) 1,000 units tablet Take 2,000 Units by mouth daily      donepezil (ARICEPT) 10 mg tablet Take 1 tablet (10 mg total) by mouth daily at bedtime 90 tablet 3    fenofibrate (TRICOR) 145 mg tablet Take 1 tablet (145 mg total) by mouth daily 90 tablet 1    ferrous sulfate 325 (65 Fe) mg tablet Take 325 mg by mouth as needed       glucose blood (OneTouch Verio) test strip Use 1 each 3 (three) times a day Test 300 strip 3    Insulin Aspart (NovoLOG PenFill) 100 UNITS/ML cartridge for injection Inject under the skin 2 (two) times a day Takes 8 units twice daily        insulin detemir (LEVEMIR) 100 units/mL subcutaneous injection Inject 28 Units under the skin 2 (two) times a day 28 units twice daily  20 mL 5    Insulin Pen Needle (Novofine Pen Needle) 32G X 6 MM MISC Use 4 (four) times a day 200 each 5    Insulin Syringe-Needle U-100 (BD Insulin Syringe U/F) 30G X 1/2" 0 5 ML MISC Use 4 (four) times a day 400 each 1    Insulin Syringe-Needle U-100 (INSULIN SYRINGE  5CC/30GX5/16") 30G X 5/16" 0 5 ML MISC 4 (four) times a day      levalbuterol (XOPENEX) 0 63 mg/3 mL nebulizer solution Take 1 ampule by nebulization 2 (two) times a day as needed for wheezing   losartan (COZAAR) 25 mg tablet Take 1 tablet (25 mg total) by mouth daily 90 tablet 1    Magnesium 250 MG TABS Take 500 mg by mouth daily       Memantine HCl ER 28 MG CP24 TAKE ONE CAPSULE BY MOUTH EVERY DAY 90 capsule 1    metoprolol tartrate (LOPRESSOR) 100 mg tablet TAKE 1 TABLET BY MOUTH AT BEDTIME (Patient taking differently: 50mg in AM, 100mg in PM) 90 tablet 0    Multiple Vitamins-Minerals (ICAPS AREDS 2 PO) Take by mouth      Probiotic Product (HEALTHY COLON PO) Take by mouth Takes once daily   propafenone (RYTHMOL) 225 mg tablet Take 1 tablet (225 mg total) by mouth 3 (three) times a day 90 tablet 3    sertraline (ZOLOFT) 100 mg tablet Take 1 tablet (100 mg total) by mouth daily 90 tablet 1    sodium chloride (OCEAN) 0 65 % nasal spray 1 spray into each nostril      warfarin (COUMADIN) 2 5 mg tablet Take 1 tablet (2 5 mg total) by mouth 4 (four) times a week Takes Tuesday, wednesday, Thursday, Saturday and Sunday  30 tablet 1     No current facility-administered medications for this visit       Current Outpatient Medications on File Prior to Visit   Medication Sig    amLODIPine (NORVASC) 5 mg tablet Take 1 tablet (5 mg total) by mouth daily    atorvastatin (LIPITOR) 10 mg tablet Take 1 tablet (10 mg total) by mouth daily    B Complex Vitamins (B-COMPLEX/B-12 PO) Take by mouth    cholecalciferol (VITAMIN D3) 1,000 units tablet Take 2,000 Units by mouth daily    donepezil (ARICEPT) 10 mg tablet Take 1 tablet (10 mg total) by mouth daily at bedtime    fenofibrate (TRICOR) 145 mg tablet Take 1 tablet (145 mg total) by mouth daily    ferrous sulfate 325 (65 Fe) mg tablet Take 325 mg by mouth as needed     glucose blood (OneTouch Verio) test strip Use 1 each 3 (three) times a day Test    Insulin Aspart (NovoLOG PenFill) 100 UNITS/ML cartridge for injection Inject under the skin 2 (two) times a day Takes 8 units twice daily   Insulin Pen Needle (Novofine Pen Needle) 32G X 6 MM MISC Use 4 (four) times a day    Insulin Syringe-Needle U-100 (BD Insulin Syringe U/F) 30G X 1/2" 0 5 ML MISC Use 4 (four) times a day    Insulin Syringe-Needle U-100 (INSULIN SYRINGE  5CC/30GX5/16") 30G X 5/16" 0 5 ML MISC 4 (four) times a day    levalbuterol (XOPENEX) 0 63 mg/3 mL nebulizer solution Take 1 ampule by nebulization 2 (two) times a day as needed for wheezing   losartan (COZAAR) 25 mg tablet Take 1 tablet (25 mg total) by mouth daily    Magnesium 250 MG TABS Take 500 mg by mouth daily     Memantine HCl ER 28 MG CP24 TAKE ONE CAPSULE BY MOUTH EVERY DAY    metoprolol tartrate (LOPRESSOR) 100 mg tablet TAKE 1 TABLET BY MOUTH AT BEDTIME (Patient taking differently: 50mg in AM, 100mg in PM)    Multiple Vitamins-Minerals (ICAPS AREDS 2 PO) Take by mouth    Probiotic Product (HEALTHY COLON PO) Take by mouth Takes once daily   sertraline (ZOLOFT) 100 mg tablet Take 1 tablet (100 mg total) by mouth daily    sodium chloride (OCEAN) 0 65 % nasal spray 1 spray into each nostril    warfarin (COUMADIN) 2 5 mg tablet Take 1 tablet (2 5 mg total) by mouth 4 (four) times a week Takes Tuesday, wednesday, Thursday, Saturday and Sunday   [DISCONTINUED] insulin detemir (LEVEMIR) 100 units/mL subcutaneous injection Inject under the skin 28 units twice daily      [DISCONTINUED] propafenone (RYTHMOL) 225 mg tablet Take 1 tablet (225 mg total) by mouth 3 (three) times a day    [DISCONTINUED] docusate sodium (COLACE) 100 mg capsule Take 100 mg by mouth as needed  (Patient not taking: No sig reported)    [DISCONTINUED] insulin glargine (LANTUS) 100 units/mL subcutaneous injection Inject 28 Units under the skin 2 (two) times a day (Patient not taking: No sig reported)     No current facility-administered medications on file prior to visit  He is allergic to Francis Schein tartrate], bextra [valdecoxib], dust mite extract, lyrica [pregabalin], mold extract [trichophyton], pollen extract, and tree extract       Review of Systems   Constitutional: Negative for appetite change, chills, fatigue and fever  HENT: Negative for sore throat and trouble swallowing  Eyes: Negative for redness  Respiratory: Negative for shortness of breath  Cardiovascular: Negative for chest pain and palpitations  Gastrointestinal: Negative for abdominal pain, constipation and diarrhea  Genitourinary: Negative for dysuria and hematuria  Musculoskeletal: Positive for gait problem  Negative for back pain and neck pain  Skin: Negative for rash  Neurological: Negative for seizures, weakness and headaches  Hematological: Negative for adenopathy  Psychiatric/Behavioral: Negative for confusion  The patient is not nervous/anxious  Objective:      /70 (BP Location: Left arm, Patient Position: Sitting, Cuff Size: Adult)   Pulse 95   Temp 98 5 °F (36 9 °C) (Temporal)   Ht 5' 9" (1 753 m)   Wt 88 5 kg (195 lb)   SpO2 96% Comment: ra  BMI 28 80 kg/m²     Results Reviewed     None          Recent Results (from the past 1344 hour(s))   POCT hemoglobin A1c    Collection Time: 07/27/22  2:41 PM   Result Value Ref Range    Hemoglobin A1C 7 9 (A) 6 5   Microalbumin / creatinine urine ratio    Collection Time: 07/27/22  2:41 PM   Result Value Ref Range    Creatinine, Ur 97 2 mg/dL    Microalbum  ,U,Random 246 0 (H) 0 0 - 20 0 mg/L    Microalb Creat Ratio 253 (H) 0 - 30 mg/g creatinine   Protime-INR    Collection Time: 08/01/22  3:16 PM   Result Value Ref Range    Protime 22 1 (H) 11 6 - 14 5 seconds    INR 1 91 (H) 0 84 - 1 19   CBC    Collection Time: 08/01/22  3:16 PM   Result Value Ref Range    WBC 7 15 4 31 - 10 16 Thousand/uL    RBC 4 99 3 88 - 5 62 Million/uL    Hemoglobin 14 3 12 0 - 17 0 g/dL    Hematocrit 43 5 36 5 - 49 3 %    MCV 87 82 - 98 fL    MCH 28 7 26 8 - 34 3 pg    MCHC 32 9 31 4 - 37 4 g/dL    RDW 13 0 11 6 - 15 1 %    Platelets 604 372 - 470 Thousands/uL    MPV 10 7 8 9 - 12 7 fL   Renal function panel    Collection Time: 08/01/22  3:16 PM   Result Value Ref Range    Albumin 3 6 3 5 - 5 0 g/dL    Calcium 9 7 8 3 - 10 1 mg/dL    Phosphorus 2 5 2 3 - 4 1 mg/dL    Glucose 280 (H) 65 - 140 mg/dL    BUN 21 5 - 25 mg/dL    Creatinine 1 78 (H) 0 60 - 1 30 mg/dL    Sodium 136 135 - 147 mmol/L    Potassium 4 7 3 5 - 5 3 mmol/L    Chloride 103 96 - 108 mmol/L    CO2 30 21 - 32 mmol/L    ANION GAP 3 (L) 4 - 13 mmol/L    eGFR 33 ml/min/1 73sq m   Uric acid    Collection Time: 08/01/22  3:16 PM   Result Value Ref Range    Uric Acid 6 1 3 5 - 8 5 mg/dL   PTH, intact    Collection Time: 08/03/22  1:23 PM   Result Value Ref Range    PTH 45 9 18 4 - 80 1 pg/mL   Protime-INR    Collection Time: 08/08/22  2:16 PM   Result Value Ref Range    Protime 16 5 (H) 11 6 - 14 5 seconds    INR 1 30 (H) 0 84 - 1 19   Protime-INR    Collection Time: 08/16/22  4:28 PM   Result Value Ref Range    Protime 15 7 (H) 11 6 - 14 5 seconds    INR 1 23 (H) 0 84 - 1 19   BiLevel PAP Package    Collection Time: 08/19/22  2:26 PM   Result Value Ref Range    Supplier Name [employer]     Supplier Phone Number [phone]     Order Status eFaxed to Supplier     Delivery Note      Delivery Request Date 08/19/2022     Item Description BiLevel Machine, Generic     Item Description       PAP Mask, Full Face, Fit Upon Setup, N/A, 1 per 3 months    Item Description PAP Headgear, 1 per 6 months     Item Description PAP Humidifier, Heated     Item Description Disposable PAP Filter, 2 per 1 month     Item Description Non-Disposable PAP Filter, 1 per 6 months     Item Description Heated PAP Tubing, 1 per 3 months     Item Description       PAP Mask Interface Cushion, Full Face, 1 per 1 month    Item Description Standard Non-Heated PAP Tubing, 1 per 3 months     Item Description PAP Chinstrap, 1 per 6 months    Protime-INR    Collection Time: 08/25/22  5:13 PM   Result Value Ref Range    Protime 16 7 (H) 11 6 - 14 5 seconds    INR 1 33 (H) 0 84 - 1 19   Protime-INR    Collection Time: 08/29/22  4:08 PM   Result Value Ref Range    Protime 17 7 (H) 11 6 - 14 5 seconds    INR 1 43 (H) 0 84 - 1 19   CBC and Platelet    Collection Time: 08/31/22  2:00 PM   Result Value Ref Range    WBC 6 96 4 31 - 10 16 Thousand/uL    RBC 4 85 3 88 - 5 62 Million/uL    Hemoglobin 13 8 12 0 - 17 0 g/dL    Hematocrit 42 0 36 5 - 49 3 %    MCV 87 82 - 98 fL    MCH 28 5 26 8 - 34 3 pg    MCHC 32 9 31 4 - 37 4 g/dL    RDW 13 2 11 6 - 15 1 %    Platelets 370 152 - 322 Thousands/uL    MPV 10 7 8 9 - 12 7 fL   Renal function panel    Collection Time: 08/31/22  2:00 PM   Result Value Ref Range    Albumin 3 3 (L) 3 5 - 5 0 g/dL    Calcium 9 2 8 3 - 10 1 mg/dL    Corrected Calcium 9 8 8 3 - 10 1 mg/dL    Phosphorus 2 4 2 3 - 4 1 mg/dL    BUN 26 (H) 5 - 25 mg/dL    Creatinine 1 94 (H) 0 60 - 1 30 mg/dL    Sodium 137 135 - 147 mmol/L    Potassium 4 4 3 5 - 5 3 mmol/L    Chloride 105 96 - 108 mmol/L    CO2 30 21 - 32 mmol/L    ANION GAP 2 (L) 4 - 13 mmol/L    eGFR 30 ml/min/1 73sq m    Glucose, Fasting 275 (H) 65 - 99 mg/dL   Uric acid    Collection Time: 08/31/22  2:00 PM   Result Value Ref Range    Uric Acid 6 4 3 5 - 8 5 mg/dL   Protime-INR    Collection Time: 08/31/22  2:00 PM   Result Value Ref Range    Protime 16 1 (H) 11 6 - 14 5 seconds    INR 1 26 (H) 0 84 - 1 19   Protime-INR    Collection Time: 09/06/22  4:38 PM   Result Value Ref Range Protime 15 0 (H) 11 6 - 14 5 seconds    INR 1 15 0 84 - 1 19   Protime-INR    Collection Time: 09/08/22  2:22 PM   Result Value Ref Range    Protime 16 7 (H) 11 6 - 14 5 seconds    INR 1 33 (H) 0 84 - 1 19   Protime-INR    Collection Time: 09/13/22  3:00 PM   Result Value Ref Range    Protime 19 4 (H) 11 6 - 14 5 seconds    INR 1 61 (H) 0 84 - 1 19        Physical Exam  Constitutional:       General: He is not in acute distress  Appearance: Normal appearance  HENT:      Head: Normocephalic and atraumatic  Nose: Nose normal       Mouth/Throat:      Mouth: Mucous membranes are moist    Eyes:      Extraocular Movements: Extraocular movements intact  Pupils: Pupils are equal, round, and reactive to light  Cardiovascular:      Rate and Rhythm: Normal rate and regular rhythm  Pulses: Normal pulses  Heart sounds: Normal heart sounds  No murmur heard  No friction rub  Pulmonary:      Effort: Pulmonary effort is normal  No respiratory distress  Breath sounds: Normal breath sounds  No wheezing  Abdominal:      General: Abdomen is flat  Bowel sounds are normal  There is no distension  Palpations: Abdomen is soft  There is no mass  Tenderness: There is no abdominal tenderness  There is no guarding  Musculoskeletal:         General: Normal range of motion  Cervical back: Normal range of motion  Skin:     General: Skin is warm  Neurological:      General: No focal deficit present  Mental Status: He is alert  Mental status is at baseline  He is disoriented  Cranial Nerves: No cranial nerve deficit        Gait: Gait abnormal    Psychiatric:         Mood and Affect: Mood normal          Behavior: Behavior normal

## 2022-09-14 NOTE — PROGRESS NOTES
Spoke with Matty Sullivan, advised INR still low but coming up, advised to take 7 5 mg We Fri Sun, 5 mg all other days, advised if we do not call Tues to have patient take 7 5 mg  Will recheck 9/20/22    Matty Sullivan wrote instructions down and verbalizes understanding

## 2022-09-15 ENCOUNTER — IN-CLINIC DEVICE VISIT (OUTPATIENT)
Dept: CARDIOLOGY CLINIC | Facility: CLINIC | Age: 87
End: 2022-09-15
Payer: MEDICARE

## 2022-09-15 DIAGNOSIS — Z95.0 CARDIAC PACEMAKER IN SITU: Primary | ICD-10-CM

## 2022-09-15 PROCEDURE — 93280 PM DEVICE PROGR EVAL DUAL: CPT | Performed by: INTERNAL MEDICINE

## 2022-09-15 NOTE — PROGRESS NOTES
Results for orders placed or performed in visit on 09/15/22   Cardiac EP device report    Narrative    MDT-DUAL CHAMBER PPM (AAIR-DDDR MODE)/ NOT MRI CONDITIONALKARA PT   DEVICE INTERROGATED IN THE [facility]: BATTERY VOLTAGE ADEQUATE-10 YRS  AP 74%  0%  ALL AVAILABLE LEAD PARAMETERS WITHIN NORMAL LIMITS  1 AT/AF NOTED; 3:45 MINS LONG; PT ON WARFARIN  EGRAM PRESENTS AS PAF  DECREASE MADE TO RV & RA AMP TO PROMOTE DEVICE LONGEVITY WHILE MAINTAINING AN APPROPRIATE SAFETY MARGIN  NORMAL DEVICE FUNCTION   NC

## 2022-09-20 ENCOUNTER — TELEPHONE (OUTPATIENT)
Dept: INTERNAL MEDICINE CLINIC | Facility: CLINIC | Age: 87
End: 2022-09-20

## 2022-09-20 NOTE — TELEPHONE ENCOUNTER
PT spouse called for refill of insulin lispro injections 100 units be sent to [facility] on the corner of [facility]      LA 9/14/2022  NA 12/12/2022

## 2022-09-22 NOTE — TELEPHONE ENCOUNTER
I have been going back and forth with the patients wife, Lispro is not on his meds list and she was supposed to call me back and let me know what insulin he is taking and how much   please see previous encounter, the last couple times I have spoke with Cuco Cooley she is in the car and doesn't have the info

## 2022-09-23 ENCOUNTER — TELEPHONE (OUTPATIENT)
Dept: SLEEP CENTER | Facility: CLINIC | Age: 87
End: 2022-09-23

## 2022-09-23 ENCOUNTER — TELEPHONE (OUTPATIENT)
Dept: INTERNAL MEDICINE CLINIC | Facility: CLINIC | Age: 87
End: 2022-09-23

## 2022-09-23 LAB
DME PARACHUTE DELIVERY DATE REQUESTED: NORMAL
DME PARACHUTE DELIVERY NOTE: NORMAL
DME PARACHUTE ITEM DESCRIPTION: NORMAL
DME PARACHUTE ORDER STATUS: NORMAL
DME PARACHUTE SUPPLIER NAME: NORMAL
DME PARACHUTE SUPPLIER PHONE: NORMAL

## 2022-09-23 NOTE — TELEPHONE ENCOUNTER
Called patient, spoke with Brianne Cox (communication consent in media)  Advised that [facility] had left a message stating they would not fulfill the recent Rx for BiPAP written by Dr Ricci Pruett on 8/15/22  Brianne Cox confirmed that Conner Shankar would like to switch DME providers to [facility] as he was unhappy with [facility]'s customer service  DME setup scheduled 10/7/2022 in [place]  Compliance follow-up scheduled with Dr Ricci Pruett 1/23/23  Patient placed on cancellation list for sooner appointment with 31-90 day compliance window (11/7/2022-1/5/2023)  Rx for BiPAP and clinicals sent to [facility] via [place]

## 2022-09-28 DIAGNOSIS — Z79.4 TYPE 2 DIABETES MELLITUS WITH OTHER SPECIFIED COMPLICATION, WITH LONG-TERM CURRENT USE OF INSULIN (HCC): ICD-10-CM

## 2022-09-28 DIAGNOSIS — E11.69 TYPE 2 DIABETES MELLITUS WITH OTHER SPECIFIED COMPLICATION, WITH LONG-TERM CURRENT USE OF INSULIN (HCC): ICD-10-CM

## 2022-09-29 RX ORDER — PEN NEEDLE, DIABETIC 32 GX 1/4"
NEEDLE, DISPOSABLE MISCELLANEOUS 4 TIMES DAILY
Qty: 200 EACH | Refills: 5 | Status: SHIPPED | OUTPATIENT
Start: 2022-09-29

## 2022-09-29 RX ORDER — INSULIN ASPART 100 [IU]/ML
8 INJECTION, SOLUTION INTRAVENOUS; SUBCUTANEOUS 2 TIMES DAILY
Qty: 3 ML | Refills: 2 | Status: SHIPPED | OUTPATIENT
Start: 2022-09-29

## 2022-09-30 ENCOUNTER — APPOINTMENT (OUTPATIENT)
Dept: LAB | Age: 87
End: 2022-09-30
Payer: COMMERCIAL

## 2022-09-30 DIAGNOSIS — I48.0 PAROXYSMAL ATRIAL FIBRILLATION (HCC): ICD-10-CM

## 2022-10-03 ENCOUNTER — ANTICOAG VISIT (OUTPATIENT)
Dept: CARDIOLOGY CLINIC | Facility: CLINIC | Age: 87
End: 2022-10-03

## 2022-10-03 DIAGNOSIS — I48.0 PAROXYSMAL ATRIAL FIBRILLATION (HCC): Primary | ICD-10-CM

## 2022-10-03 NOTE — PROGRESS NOTES
Spoke with Samson Harris, advised INR coming up, she is unsure of what dose she was giving patient, maybe 7 5 mg alternating with 5 mg   Advised to have patient take 5 mg Mon Thurs Sat, 7 5 mg all other days, will recheck 10/13/22  Samson Harris did write down instructions, I advised her to write doses on calendar, she states she does  Offered My Chart, states she has issues using computers

## 2022-10-07 LAB
DME PARACHUTE DELIVERY DATE EXPECTED: NORMAL
DME PARACHUTE DELIVERY DATE REQUESTED: NORMAL
DME PARACHUTE DELIVERY NOTE: NORMAL
DME PARACHUTE ITEM DESCRIPTION: NORMAL
DME PARACHUTE ORDER STATUS: NORMAL
DME PARACHUTE SUPPLIER NAME: NORMAL
DME PARACHUTE SUPPLIER PHONE: NORMAL

## 2022-10-10 DIAGNOSIS — I10 ESSENTIAL HYPERTENSION: ICD-10-CM

## 2022-10-10 RX ORDER — AMLODIPINE BESYLATE 5 MG/1
TABLET ORAL
Qty: 90 TABLET | Refills: 0 | Status: SHIPPED | OUTPATIENT
Start: 2022-10-10 | End: 2022-10-13 | Stop reason: SDUPTHER

## 2022-10-12 ENCOUNTER — APPOINTMENT (OUTPATIENT)
Dept: LAB | Age: 87
End: 2022-10-12
Payer: COMMERCIAL

## 2022-10-12 PROBLEM — R05.9 COUGH: Status: RESOLVED | Noted: 2021-11-19 | Resolved: 2022-10-12

## 2022-10-13 ENCOUNTER — ANTICOAG VISIT (OUTPATIENT)
Dept: CARDIOLOGY CLINIC | Facility: CLINIC | Age: 87
End: 2022-10-13

## 2022-10-13 DIAGNOSIS — I48.0 PAROXYSMAL ATRIAL FIBRILLATION (HCC): Primary | ICD-10-CM

## 2022-10-13 DIAGNOSIS — F03.90 DEMENTIA WITHOUT BEHAVIORAL DISTURBANCE (HCC): ICD-10-CM

## 2022-10-13 DIAGNOSIS — I10 ESSENTIAL HYPERTENSION: ICD-10-CM

## 2022-10-13 RX ORDER — INSULIN ASPART 100 [IU]/ML
INJECTION, SOLUTION INTRAVENOUS; SUBCUTANEOUS
COMMUNITY
Start: 2022-09-29

## 2022-10-13 RX ORDER — WARFARIN SODIUM 5 MG/1
TABLET ORAL
Qty: 60 TABLET | Refills: 2 | Status: SHIPPED | OUTPATIENT
Start: 2022-10-13

## 2022-10-13 NOTE — PROGRESS NOTES
Spoke with Navin Sweeney, advised INR lower than 2 weeks ago  Questioned what dose she was giving, she started telling me about Sept, I asked about Oct, and did verify that she was giving patient correct dose as advised, up to 10/9/22, but does not know if she gave patient anything for 10/10/22 to 10/12/22  Advised to have patient take 5 mg Mon Sat, 7 5 mg all other days  Will recheck 10/19/22    Navin Sweeney did write instructions on calendar  Navin Sweeney also states she think she ran out Coumadin she cannot find a bottle  Advised will place script for Coumadin 5 mg tablets, to [address]     Script sent in another task

## 2022-10-14 RX ORDER — AMLODIPINE BESYLATE 5 MG/1
5 TABLET ORAL DAILY
Qty: 90 TABLET | Refills: 0 | Status: SHIPPED | OUTPATIENT
Start: 2022-10-14

## 2022-10-14 RX ORDER — DONEPEZIL HYDROCHLORIDE 10 MG/1
10 TABLET, FILM COATED ORAL
Qty: 90 TABLET | Refills: 3 | Status: SHIPPED | OUTPATIENT
Start: 2022-10-14

## 2022-10-19 ENCOUNTER — APPOINTMENT (OUTPATIENT)
Dept: LAB | Age: 87
End: 2022-10-19
Payer: COMMERCIAL

## 2022-10-19 DIAGNOSIS — I48.0 PAROXYSMAL ATRIAL FIBRILLATION (HCC): ICD-10-CM

## 2022-10-20 ENCOUNTER — ANTICOAG VISIT (OUTPATIENT)
Dept: CARDIOLOGY CLINIC | Facility: CLINIC | Age: 87
End: 2022-10-20

## 2022-10-20 ENCOUNTER — TELEPHONE (OUTPATIENT)
Dept: CARDIOLOGY CLINIC | Facility: CLINIC | Age: 87
End: 2022-10-20

## 2022-10-20 DIAGNOSIS — I48.0 PAROXYSMAL ATRIAL FIBRILLATION (HCC): Primary | ICD-10-CM

## 2022-10-20 NOTE — PROGRESS NOTES
Spoke with Jesus Antoine, advised INR is very low, like he is not even taking Coumadin  States she has been giving him what I recommend, she did review correct dosing  I asked if she has 5 mg tablets  She states that she is out and needs to go to [place] to get refill  I advised she told me she was out last week and I sent a refill to [facility]  States she needs to  other medications and will go to , but funds are low since she is not working  Advised to make sure she picks up script today and will have patient take 10 mg tonight, 5 mg Sat Mon, 7 5 mg all other days  Will recheck next week 10/26/22    Jerri verbalizes understanding

## 2022-10-20 NOTE — TELEPHONE ENCOUNTER
Hi Dr Power Ferrera,  Just wanted to let you know this was my conversation with Mario Valdez today:    Spoke with Mario Valdez, advised INR is very low, like he is not even taking Coumadin  States she has been giving him what I recommend, she did review correct dosing  I asked if she has 5 mg tablets  She states that she is out and needs to go to [place] to get refill  I advised she told me she was out last week and I sent a refill to [facility]  States she needs to  other medications and will go to , but funds are low since she is not working  Advised to make sure she picks up script today and will have patient take 10 mg tonight, 5 mg Sat Mon, 7 5 mg all other days  Will recheck next week 10/26/22    Jerri verbalizes understanding

## 2022-10-31 ENCOUNTER — APPOINTMENT (OUTPATIENT)
Dept: LAB | Age: 87
End: 2022-10-31

## 2022-10-31 DIAGNOSIS — I48.0 PAROXYSMAL ATRIAL FIBRILLATION (HCC): ICD-10-CM

## 2022-10-31 LAB
INR PPP: 0.96 (ref 0.84–1.19)
PROTHROMBIN TIME: 13 SECONDS (ref 11.6–14.5)

## 2022-11-01 ENCOUNTER — ANTICOAG VISIT (OUTPATIENT)
Dept: CARDIOLOGY CLINIC | Facility: CLINIC | Age: 87
End: 2022-11-01

## 2022-11-01 ENCOUNTER — TELEPHONE (OUTPATIENT)
Dept: CARDIOLOGY CLINIC | Facility: CLINIC | Age: 87
End: 2022-11-01

## 2022-11-01 DIAGNOSIS — I48.0 PAROXYSMAL ATRIAL FIBRILLATION (HCC): Primary | ICD-10-CM

## 2022-11-01 NOTE — TELEPHONE ENCOUNTER
Dr Bladimir Camilo and Dr Umm Morales,     Patients INR is very low again  I am concerned that Azucena Eastman is not giving patient his medications  My last conversation with Azucena Eastman she told me that she was out of Coumadin and a script was sent  I spoke with the pharmacist, states she did not  warfarin that was ordered 10/13/22 and amlodipine and donepezil that was ordered 10/14/22  I did give her instructions for Coumadin  Not sure what else I can do     Thanks  Lucero Álvarez

## 2022-11-01 NOTE — PROGRESS NOTES
Spoke with Rut Rodriguez, advised INR very low again, states she has been giving him his medication every day  Advised that I spoke with the pharmacist and he advised that Warfarin, amlodipine and donepezil were not picked up  She states that she will need to  medication  Advised to have patient take 10 mg tonight then 5 mg Mon Sat, 7 5 mg all other days  Will recheck 11/7/22  Instructions given day by day, she documented on calendar

## 2022-11-03 ENCOUNTER — APPOINTMENT (OUTPATIENT)
Dept: LAB | Age: 87
End: 2022-11-03

## 2022-11-03 DIAGNOSIS — Z90.5 SOLITARY KIDNEY, ACQUIRED: ICD-10-CM

## 2022-11-03 DIAGNOSIS — I12.9 HYPERTENSIVE CHRONIC KIDNEY DISEASE WITH STAGE 1 THROUGH STAGE 4 CHRONIC KIDNEY DISEASE, OR UNSPECIFIED CHRONIC KIDNEY DISEASE: ICD-10-CM

## 2022-11-03 DIAGNOSIS — E78.2 MIXED HYPERLIPIDEMIA: ICD-10-CM

## 2022-11-03 DIAGNOSIS — N18.4 TYPE 2 DIABETES MELLITUS WITH STAGE 4 CHRONIC KIDNEY DISEASE, WITH LONG-TERM CURRENT USE OF INSULIN (HCC): ICD-10-CM

## 2022-11-03 DIAGNOSIS — Z79.4 TYPE 2 DIABETES MELLITUS WITH STAGE 4 CHRONIC KIDNEY DISEASE, WITH LONG-TERM CURRENT USE OF INSULIN (HCC): ICD-10-CM

## 2022-11-03 DIAGNOSIS — N18.4 CKD (CHRONIC KIDNEY DISEASE), STAGE IV (HCC): ICD-10-CM

## 2022-11-03 DIAGNOSIS — E11.22 TYPE 2 DIABETES MELLITUS WITH STAGE 4 CHRONIC KIDNEY DISEASE, WITH LONG-TERM CURRENT USE OF INSULIN (HCC): ICD-10-CM

## 2022-11-03 DIAGNOSIS — I48.0 PAROXYSMAL ATRIAL FIBRILLATION (HCC): ICD-10-CM

## 2022-11-03 LAB
ALBUMIN SERPL BCP-MCNC: 3.2 G/DL (ref 3.5–5)
ANION GAP SERPL CALCULATED.3IONS-SCNC: 5 MMOL/L (ref 4–13)
BUN SERPL-MCNC: 22 MG/DL (ref 5–25)
CALCIUM ALBUM COR SERPL-MCNC: 9.9 MG/DL (ref 8.3–10.1)
CALCIUM SERPL-MCNC: 9.3 MG/DL (ref 8.3–10.1)
CHLORIDE SERPL-SCNC: 103 MMOL/L (ref 96–108)
CO2 SERPL-SCNC: 28 MMOL/L (ref 21–32)
CREAT SERPL-MCNC: 1.75 MG/DL (ref 0.6–1.3)
ERYTHROCYTE [DISTWIDTH] IN BLOOD BY AUTOMATED COUNT: 13.3 % (ref 11.6–15.1)
GFR SERPL CREATININE-BSD FRML MDRD: 34 ML/MIN/1.73SQ M
GLUCOSE SERPL-MCNC: 304 MG/DL (ref 65–140)
HCT VFR BLD AUTO: 42.1 % (ref 36.5–49.3)
HGB BLD-MCNC: 14.2 G/DL (ref 12–17)
MCH RBC QN AUTO: 29.2 PG (ref 26.8–34.3)
MCHC RBC AUTO-ENTMCNC: 33.7 G/DL (ref 31.4–37.4)
MCV RBC AUTO: 86 FL (ref 82–98)
PHOSPHATE SERPL-MCNC: 3.1 MG/DL (ref 2.3–4.1)
PLATELET # BLD AUTO: 234 THOUSANDS/UL (ref 149–390)
PMV BLD AUTO: 11 FL (ref 8.9–12.7)
POTASSIUM SERPL-SCNC: 4.6 MMOL/L (ref 3.5–5.3)
PTH-INTACT SERPL-MCNC: 43.5 PG/ML (ref 18.4–80.1)
RBC # BLD AUTO: 4.87 MILLION/UL (ref 3.88–5.62)
SODIUM SERPL-SCNC: 136 MMOL/L (ref 135–147)
URATE SERPL-MCNC: 5.9 MG/DL (ref 3.5–8.5)
WBC # BLD AUTO: 8 THOUSAND/UL (ref 4.31–10.16)

## 2022-11-04 ENCOUNTER — ANTICOAG VISIT (OUTPATIENT)
Dept: CARDIOLOGY CLINIC | Facility: CLINIC | Age: 87
End: 2022-11-04

## 2022-11-04 DIAGNOSIS — I48.0 PAROXYSMAL ATRIAL FIBRILLATION (HCC): Primary | ICD-10-CM

## 2022-11-04 NOTE — PROGRESS NOTES
Spoke with Ferne Bence, states patient had INR today because he had other blood work done  Advised it is still normal, she did verify dose that we discussed  Advised to have patient take 10 mg tonight, then 5 mg Sat, 7 5 mg Sun, and recheck 11/7/22

## 2022-11-07 DIAGNOSIS — F33.0 MILD EPISODE OF RECURRENT MAJOR DEPRESSIVE DISORDER (HCC): ICD-10-CM

## 2022-11-07 DIAGNOSIS — I10 ESSENTIAL HYPERTENSION: ICD-10-CM

## 2022-11-07 RX ORDER — SERTRALINE HYDROCHLORIDE 100 MG/1
100 TABLET, FILM COATED ORAL DAILY
Qty: 90 TABLET | Refills: 1 | Status: SHIPPED | OUTPATIENT
Start: 2022-11-07

## 2022-11-07 RX ORDER — LOSARTAN POTASSIUM 25 MG/1
25 TABLET ORAL DAILY
Qty: 90 TABLET | Refills: 1 | Status: SHIPPED | OUTPATIENT
Start: 2022-11-07

## 2022-11-09 ENCOUNTER — APPOINTMENT (OUTPATIENT)
Dept: LAB | Age: 87
End: 2022-11-09

## 2022-11-09 DIAGNOSIS — I48.0 PAROXYSMAL ATRIAL FIBRILLATION (HCC): ICD-10-CM

## 2022-11-10 ENCOUNTER — TELEPHONE (OUTPATIENT)
Dept: INTERNAL MEDICINE CLINIC | Facility: CLINIC | Age: 87
End: 2022-11-10

## 2022-11-10 ENCOUNTER — ANTICOAG VISIT (OUTPATIENT)
Dept: CARDIOLOGY CLINIC | Facility: CLINIC | Age: 87
End: 2022-11-10

## 2022-11-10 DIAGNOSIS — I48.0 PAROXYSMAL ATRIAL FIBRILLATION (HCC): Primary | ICD-10-CM

## 2022-11-10 NOTE — PROGRESS NOTES
Attempted to contact Yuliana Goodwin on home and cell #, no answer and mailbox is full on both, unable to leave message     Will attempt later

## 2022-11-10 NOTE — TELEPHONE ENCOUNTER
Pt is scheduled for follow up 12/2/22 but is due for his awv after 12/13/22, tried calling to schedule appt after this date but mailbox is full, made several attempts

## 2022-11-11 NOTE — PROGRESS NOTES
11/11/22~spoke with Yuliana Goodwin, advised INR normal, advised that pharmacy advised she did not  Coumadin, states she will have to pick it up today, again states she has not had income since she retired  I advised of the importance of taking the medication as directed  Jerri verbalizes understanding    Advised to take 10 mg tonight, 5 mg Sat Mon, 7 5 mg Sun and recheck 11/15/22

## 2022-11-17 ENCOUNTER — TELEPHONE (OUTPATIENT)
Dept: INTERNAL MEDICINE CLINIC | Facility: CLINIC | Age: 87
End: 2022-11-17

## 2022-11-17 NOTE — TELEPHONE ENCOUNTER
patients wife called to get emergency medical certification form filled out for [employer] ,  signed scanned into account

## 2022-11-21 ENCOUNTER — TELEPHONE (OUTPATIENT)
Dept: INTERNAL MEDICINE CLINIC | Facility: CLINIC | Age: 87
End: 2022-11-21

## 2022-11-21 NOTE — TELEPHONE ENCOUNTER
received call from wife to get [employer] gas turned on had  sign paper  and fax to [facility]

## 2022-12-01 ENCOUNTER — RA CDI HCC (OUTPATIENT)
Dept: OTHER | Facility: HOSPITAL | Age: 87
End: 2022-12-01

## 2022-12-01 NOTE — PROGRESS NOTES
[facility]  coding opportunities          Chart Reviewed number of suggestions sent to Provider: 1   E11 51- PAD reported 7/27/22 -not on problem list   Patients Insurance     Medicare Insurance: [address] [place] [address]

## 2022-12-02 DIAGNOSIS — Z79.4 TYPE 2 DIABETES MELLITUS WITH OTHER SPECIFIED COMPLICATION, WITH LONG-TERM CURRENT USE OF INSULIN (HCC): ICD-10-CM

## 2022-12-02 DIAGNOSIS — E11.69 TYPE 2 DIABETES MELLITUS WITH OTHER SPECIFIED COMPLICATION, WITH LONG-TERM CURRENT USE OF INSULIN (HCC): ICD-10-CM

## 2022-12-02 RX ORDER — INSULIN ASPART 100 [IU]/ML
8 INJECTION, SOLUTION INTRAVENOUS; SUBCUTANEOUS 2 TIMES DAILY
Qty: 3 ML | Refills: 2 | Status: SHIPPED | OUTPATIENT
Start: 2022-12-02

## 2022-12-06 ENCOUNTER — TELEPHONE (OUTPATIENT)
Dept: CARDIOLOGY CLINIC | Facility: CLINIC | Age: 87
End: 2022-12-06

## 2022-12-06 ENCOUNTER — APPOINTMENT (OUTPATIENT)
Dept: LAB | Age: 87
End: 2022-12-06

## 2022-12-06 NOTE — TELEPHONE ENCOUNTER
Spoke with Emmit Frankel, advised INR is overdue, states her car was in the shop, will try to get it today or tomorrow

## 2022-12-07 ENCOUNTER — APPOINTMENT (OUTPATIENT)
Dept: LAB | Age: 87
End: 2022-12-07

## 2022-12-07 ENCOUNTER — ANTICOAG VISIT (OUTPATIENT)
Dept: CARDIOLOGY CLINIC | Facility: CLINIC | Age: 87
End: 2022-12-07

## 2022-12-07 DIAGNOSIS — Z79.4 TYPE 2 DIABETES MELLITUS WITH DIABETIC NEUROPATHY, WITH LONG-TERM CURRENT USE OF INSULIN (HCC): ICD-10-CM

## 2022-12-07 DIAGNOSIS — E11.40 TYPE 2 DIABETES MELLITUS WITH DIABETIC NEUROPATHY, WITH LONG-TERM CURRENT USE OF INSULIN (HCC): ICD-10-CM

## 2022-12-07 DIAGNOSIS — I48.0 PAROXYSMAL ATRIAL FIBRILLATION (HCC): Primary | ICD-10-CM

## 2022-12-07 DIAGNOSIS — I48.91 ATRIAL FIBRILLATION, UNSPECIFIED TYPE (HCC): ICD-10-CM

## 2022-12-07 LAB
ALBUMIN SERPL BCP-MCNC: 3.4 G/DL (ref 3.5–5)
ALP SERPL-CCNC: 88 U/L (ref 46–116)
ALT SERPL W P-5'-P-CCNC: 26 U/L (ref 12–78)
ANION GAP SERPL CALCULATED.3IONS-SCNC: 3 MMOL/L (ref 4–13)
AST SERPL W P-5'-P-CCNC: 16 U/L (ref 5–45)
BACTERIA UR QL AUTO: ABNORMAL /HPF
BASOPHILS # BLD AUTO: 0.06 THOUSANDS/ÂΜL (ref 0–0.1)
BASOPHILS NFR BLD AUTO: 1 % (ref 0–1)
BILIRUB SERPL-MCNC: 0.42 MG/DL (ref 0.2–1)
BILIRUB UR QL STRIP: NEGATIVE
BUN SERPL-MCNC: 25 MG/DL (ref 5–25)
CALCIUM ALBUM COR SERPL-MCNC: 10.1 MG/DL (ref 8.3–10.1)
CALCIUM SERPL-MCNC: 9.6 MG/DL (ref 8.3–10.1)
CHLORIDE SERPL-SCNC: 106 MMOL/L (ref 96–108)
CLARITY UR: CLEAR
CO2 SERPL-SCNC: 30 MMOL/L (ref 21–32)
COLOR UR: YELLOW
CREAT SERPL-MCNC: 2.04 MG/DL (ref 0.6–1.3)
CREAT UR-MCNC: 246 MG/DL
EOSINOPHIL # BLD AUTO: 0.2 THOUSAND/ÂΜL (ref 0–0.61)
EOSINOPHIL NFR BLD AUTO: 3 % (ref 0–6)
ERYTHROCYTE [DISTWIDTH] IN BLOOD BY AUTOMATED COUNT: 12.7 % (ref 11.6–15.1)
EST. AVERAGE GLUCOSE BLD GHB EST-MCNC: 206 MG/DL
GFR SERPL CREATININE-BSD FRML MDRD: 28 ML/MIN/1.73SQ M
GLUCOSE SERPL-MCNC: 154 MG/DL (ref 65–140)
GLUCOSE UR STRIP-MCNC: ABNORMAL MG/DL
HBA1C MFR BLD: 8.8 %
HCT VFR BLD AUTO: 43.7 % (ref 36.5–49.3)
HGB BLD-MCNC: 14.4 G/DL (ref 12–17)
HGB UR QL STRIP.AUTO: NEGATIVE
IMM GRANULOCYTES # BLD AUTO: 0.02 THOUSAND/UL (ref 0–0.2)
IMM GRANULOCYTES NFR BLD AUTO: 0 % (ref 0–2)
INR PPP: 1.79 (ref 0.84–1.19)
KETONES UR STRIP-MCNC: NEGATIVE MG/DL
LEUKOCYTE ESTERASE UR QL STRIP: NEGATIVE
LYMPHOCYTES # BLD AUTO: 1.99 THOUSANDS/ÂΜL (ref 0.6–4.47)
LYMPHOCYTES NFR BLD AUTO: 25 % (ref 14–44)
MCH RBC QN AUTO: 29.6 PG (ref 26.8–34.3)
MCHC RBC AUTO-ENTMCNC: 33 G/DL (ref 31.4–37.4)
MCV RBC AUTO: 90 FL (ref 82–98)
MICROALBUMIN UR-MCNC: 273 MG/L (ref 0–20)
MICROALBUMIN/CREAT 24H UR: 111 MG/G CREATININE (ref 0–30)
MONOCYTES # BLD AUTO: 0.5 THOUSAND/ÂΜL (ref 0.17–1.22)
MONOCYTES NFR BLD AUTO: 6 % (ref 4–12)
MUCOUS THREADS UR QL AUTO: ABNORMAL
NEUTROPHILS # BLD AUTO: 5.05 THOUSANDS/ÂΜL (ref 1.85–7.62)
NEUTS SEG NFR BLD AUTO: 65 % (ref 43–75)
NITRITE UR QL STRIP: NEGATIVE
NON-SQ EPI CELLS URNS QL MICRO: ABNORMAL /HPF
NRBC BLD AUTO-RTO: 0 /100 WBCS
PH UR STRIP.AUTO: 6.5 [PH]
PLATELET # BLD AUTO: 214 THOUSANDS/UL (ref 149–390)
PMV BLD AUTO: 10.7 FL (ref 8.9–12.7)
POTASSIUM SERPL-SCNC: 4.1 MMOL/L (ref 3.5–5.3)
PROT SERPL-MCNC: 7.5 G/DL (ref 6.4–8.4)
PROT UR STRIP-MCNC: ABNORMAL MG/DL
PROTHROMBIN TIME: 21.1 SECONDS (ref 11.6–14.5)
RBC # BLD AUTO: 4.86 MILLION/UL (ref 3.88–5.62)
RBC #/AREA URNS AUTO: ABNORMAL /HPF
SODIUM SERPL-SCNC: 139 MMOL/L (ref 135–147)
SP GR UR STRIP.AUTO: 1.02 (ref 1–1.03)
UROBILINOGEN UR STRIP-ACNC: <2 MG/DL
WBC # BLD AUTO: 7.82 THOUSAND/UL (ref 4.31–10.16)
WBC #/AREA URNS AUTO: ABNORMAL /HPF

## 2022-12-07 NOTE — PROGRESS NOTES
Spoke with Jesus Schultz, advised INR still low, advised to give 10 mg tonight only, then 5 mg Mon Sat, 7 5 mg all other days  Jerri repeated instructions    Will recheck 12/15/22

## 2022-12-08 ENCOUNTER — ANTICOAG VISIT (OUTPATIENT)
Dept: CARDIOLOGY CLINIC | Facility: CLINIC | Age: 87
End: 2022-12-08

## 2022-12-08 DIAGNOSIS — I48.0 PAROXYSMAL ATRIAL FIBRILLATION (HCC): Primary | ICD-10-CM

## 2022-12-08 NOTE — PROGRESS NOTES
Patient had other blood work and another INR was completed   Will continue with previous plan and repeat INR 12/15/22

## 2022-12-15 ENCOUNTER — APPOINTMENT (OUTPATIENT)
Dept: LAB | Age: 87
End: 2022-12-15

## 2022-12-15 ENCOUNTER — TELEPHONE (OUTPATIENT)
Dept: OTHER | Facility: OTHER | Age: 87
End: 2022-12-15

## 2022-12-15 DIAGNOSIS — I48.0 PAROXYSMAL ATRIAL FIBRILLATION (HCC): ICD-10-CM

## 2022-12-15 LAB
INR PPP: 6.79 (ref 0.84–1.19)
PROTHROMBIN TIME: 59.2 SECONDS (ref 11.6–14.5)

## 2022-12-16 ENCOUNTER — NURSE TRIAGE (OUTPATIENT)
Dept: OTHER | Facility: OTHER | Age: 87
End: 2022-12-16

## 2022-12-16 ENCOUNTER — ANTICOAG VISIT (OUTPATIENT)
Dept: CARDIOLOGY CLINIC | Facility: CLINIC | Age: 87
End: 2022-12-16

## 2022-12-16 ENCOUNTER — TELEPHONE (OUTPATIENT)
Dept: INTERNAL MEDICINE CLINIC | Facility: CLINIC | Age: 87
End: 2022-12-16

## 2022-12-16 ENCOUNTER — HOSPITAL ENCOUNTER (EMERGENCY)
Facility: HOSPITAL | Age: 87
Discharge: HOME | End: 2022-12-16
Attending: EMERGENCY MEDICINE

## 2022-12-16 VITALS
DIASTOLIC BLOOD PRESSURE: 74 MMHG | HEART RATE: 62 BPM | TEMPERATURE: 97.7 F | OXYGEN SATURATION: 95 % | SYSTOLIC BLOOD PRESSURE: 126 MMHG | RESPIRATION RATE: 22 BRPM

## 2022-12-16 DIAGNOSIS — I48.0 PAROXYSMAL ATRIAL FIBRILLATION (HCC): Primary | ICD-10-CM

## 2022-12-16 DIAGNOSIS — R53.83 FATIGUE: Primary | ICD-10-CM

## 2022-12-16 LAB
ALBUMIN SERPL BCP-MCNC: 3.6 G/DL (ref 3.5–5)
ALP SERPL-CCNC: 68 U/L (ref 46–116)
ALT SERPL W P-5'-P-CCNC: 18 U/L (ref 12–78)
ANION GAP SERPL CALCULATED.3IONS-SCNC: 4 MMOL/L (ref 4–13)
APTT PPP: 50 SECONDS (ref 23–37)
AST SERPL W P-5'-P-CCNC: 10 U/L (ref 5–45)
BACTERIA UR QL AUTO: ABNORMAL /HPF
BASOPHILS # BLD AUTO: 0.04 THOUSANDS/ÂΜL (ref 0–0.1)
BASOPHILS NFR BLD AUTO: 1 % (ref 0–1)
BILIRUB SERPL-MCNC: 0.39 MG/DL (ref 0.2–1)
BILIRUB UR QL STRIP: NEGATIVE
BUN SERPL-MCNC: 29 MG/DL (ref 5–25)
CALCIUM SERPL-MCNC: 9.1 MG/DL (ref 8.3–10.1)
CHLORIDE SERPL-SCNC: 107 MMOL/L (ref 96–108)
CLARITY UR: CLEAR
CO2 SERPL-SCNC: 27 MMOL/L (ref 21–32)
COLOR UR: YELLOW
CREAT SERPL-MCNC: 2.03 MG/DL (ref 0.6–1.3)
EOSINOPHIL # BLD AUTO: 0.16 THOUSAND/ÂΜL (ref 0–0.61)
EOSINOPHIL NFR BLD AUTO: 3 % (ref 0–6)
ERYTHROCYTE [DISTWIDTH] IN BLOOD BY AUTOMATED COUNT: 12.8 % (ref 11.6–15.1)
FLUAV RNA RESP QL NAA+PROBE: NEGATIVE
FLUBV RNA RESP QL NAA+PROBE: NEGATIVE
GFR SERPL CREATININE-BSD FRML MDRD: 28 ML/MIN/1.73SQ M
GLUCOSE SERPL-MCNC: 173 MG/DL (ref 65–140)
GLUCOSE SERPL-MCNC: 186 MG/DL (ref 65–140)
GLUCOSE UR STRIP-MCNC: NEGATIVE MG/DL
HCT VFR BLD AUTO: 41.5 % (ref 36.5–49.3)
HGB BLD-MCNC: 14.1 G/DL (ref 12–17)
HGB UR QL STRIP.AUTO: ABNORMAL
IMM GRANULOCYTES # BLD AUTO: 0.02 THOUSAND/UL (ref 0–0.2)
IMM GRANULOCYTES NFR BLD AUTO: 0 % (ref 0–2)
INR PPP: 6.73 (ref 0.84–1.19)
KETONES UR STRIP-MCNC: ABNORMAL MG/DL
LEUKOCYTE ESTERASE UR QL STRIP: NEGATIVE
LYMPHOCYTES # BLD AUTO: 1.94 THOUSANDS/ÂΜL (ref 0.6–4.47)
LYMPHOCYTES NFR BLD AUTO: 31 % (ref 14–44)
MCH RBC QN AUTO: 29.4 PG (ref 26.8–34.3)
MCHC RBC AUTO-ENTMCNC: 34 G/DL (ref 31.4–37.4)
MCV RBC AUTO: 87 FL (ref 82–98)
MONOCYTES # BLD AUTO: 0.41 THOUSAND/ÂΜL (ref 0.17–1.22)
MONOCYTES NFR BLD AUTO: 6 % (ref 4–12)
MUCOUS THREADS UR QL AUTO: ABNORMAL
NEUTROPHILS # BLD AUTO: 3.79 THOUSANDS/ÂΜL (ref 1.85–7.62)
NEUTS SEG NFR BLD AUTO: 59 % (ref 43–75)
NITRITE UR QL STRIP: NEGATIVE
NON-SQ EPI CELLS URNS QL MICRO: ABNORMAL /HPF
NRBC BLD AUTO-RTO: 0 /100 WBCS
PH UR STRIP.AUTO: 6 [PH] (ref 4.5–8)
PLATELET # BLD AUTO: 209 THOUSANDS/UL (ref 149–390)
PMV BLD AUTO: 9.6 FL (ref 8.9–12.7)
POTASSIUM SERPL-SCNC: 4 MMOL/L (ref 3.5–5.3)
PROT SERPL-MCNC: 7.2 G/DL (ref 6.4–8.4)
PROT UR STRIP-MCNC: ABNORMAL MG/DL
PROTHROMBIN TIME: 58.8 SECONDS (ref 11.6–14.5)
RBC # BLD AUTO: 4.79 MILLION/UL (ref 3.88–5.62)
RBC #/AREA URNS AUTO: ABNORMAL /HPF
RSV RNA RESP QL NAA+PROBE: NEGATIVE
SARS-COV-2 RNA RESP QL NAA+PROBE: NEGATIVE
SODIUM SERPL-SCNC: 138 MMOL/L (ref 135–147)
SP GR UR STRIP.AUTO: 1.02 (ref 1–1.03)
UROBILINOGEN UR QL STRIP.AUTO: 0.2 E.U./DL
WBC # BLD AUTO: 6.36 THOUSAND/UL (ref 4.31–10.16)
WBC #/AREA URNS AUTO: ABNORMAL /HPF

## 2022-12-16 RX ADMIN — SODIUM CHLORIDE 500 ML: 0.9 INJECTION, SOLUTION INTRAVENOUS at 19:48

## 2022-12-16 NOTE — TELEPHONE ENCOUNTER
Regarding: low BP, dehydrated - patient not taking Dr Malathi Brice medical advice -medical advice requested by wife  ----- Message from Kylee Garcia sent at 12/16/2022  5:12 PM EST -----  "Dr Malathi Brice wants him to go to the ER and he does not want to go, im not sure what to do  he wants him to go on IV because his blood pressure is very low and he's dehydrated "

## 2022-12-16 NOTE — TELEPHONE ENCOUNTER
Returning call to [phone] regarding pt's refusal to go to ED  Again stated that the pt is very weak and sick  Noted pt has a history of dementia  Offered to call EMS for assistance getting to ED  Call placed to EMS

## 2022-12-16 NOTE — TELEPHONE ENCOUNTER
Lab Result: INR 6 79   Date/Time Drawn: 12/15 @14:36   Ordering Provider: Marilee Thakur   Lab Personnel's Name: Nicanor Talamantes       The following critical/stat result was read back to the lab as stated above and [employer] to the on-call provider  The provider confirmed receipt of the message

## 2022-12-16 NOTE — TELEPHONE ENCOUNTER
Reason for Disposition  • Caller has already spoken with another triager or PCP AND has further questions AND triager able to answer questions      Protocols used: NO CONTACT OR DUPLICATE CONTACT CALL-ADULT-

## 2022-12-16 NOTE — TELEPHONE ENCOUNTER
Spoke to [place]  Per Dr Radha Mccollum, I told her to stop Ayaan's amlodipine  Dr Wolf also said to take Laila Son to the Er because he may be dehydrated

## 2022-12-16 NOTE — PROGRESS NOTES
Spoke with Juan R Stahl, advised INR really high, advised to hold tonight Sat Ced [place] will recheck INR 12/19/22  Advised if she does not hear from us Mon to give patient 5 mg Mon

## 2022-12-17 NOTE — ED ATTENDING ATTESTATION
12/16/2022  Kiarra WESTON MD, saw and evaluated the patient  I have discussed the patient with the resident/non-physician practitioner and agree with the resident's/non-physician practitioner's findings, Plan of Care, and MDM as documented in the resident's/non-physician practitioner's note, except where noted  All available labs and Radiology studies were reviewed  I was present for key portions of any procedure(s) performed by the resident/non-physician practitioner and I was immediately available to provide assistance  At this point I agree with the current assessment done in the Emergency Department    I have conducted an independent evaluation of this patient a history and physical is as follows:  Patient presents for evaluation of generalized weakness and a low blood sugar was reportedly 81 by the patient's wife  Patient has a history of dementia history of CKD history of diabetes patient states he seems more fatigued than usual  No syncope no actual falls the patient has no complaints of headache no complaints of neck pain no chest or abdominal pain no shortness of breath no cough no diarrhea no melena or bright red blood per rectum  The patient had his INR checked and was told to hold his home and in  Exam the patient is afebrile vital signs are normal the patient is pleasant he has no complaints he is awake and alert follows commands  HEENT exam normocephalic atraumatic pupils equal round react to light neck is nontender no JVD lungs are clear heart is regular with no murmurs abdomen soft positive bowel sounds nondistended nontender extremities are normal    Exam moves all extremities purposefully face is symmetric  Lab work-up and EKG unremarkable  INR elevated approximately 6 5  Patient was able to stand and walk without difficulty while in the ER    ED Course   EKG shows a atrial paced no ischemic changes    We will continue to hold Coumadin full return precautions  Critical Care Time  Procedures

## 2022-12-17 NOTE — ED PROVIDER NOTES
History  Chief Complaint   Patient presents with   • Hyperglycemia - no symptoms     Pt brought in by EMS  Wife was concerned about pts blood sugar being elated today  Pt has no complaints      87M PMH DM, CKD, dementia presented to the emergency department for fatigue  His wife states he has seemed more fatigued than normal stumbled a little while walking today, he did not fall to the ground  She states his blood sugar was 81 which is lower than usual for him, he received 5 units of insulin with breakfast   She also thought his blood pressure was low but when checking her paper to record her blood pressure she does not have a value for today and is not sure if she had actually checked it today  He has been holding his Coumadin for a supratherapeutic INR, he denies blood in the stool or hematuria  Patient denies all complaints and states that he feels his normal self  Prior to Admission Medications   Prescriptions Last Dose Informant Patient Reported? Taking? B Complex Vitamins (B-COMPLEX/B-12 PO)   Yes No   Sig: Take by mouth   Insulin Aspart (NovoLOG PenFill) 100 UNITS/ML cartridge for injection   No No   Sig: Inject 8 Units under the skin 2 (two) times a day Takes 8 units twice daily     Insulin Pen Needle (Novofine Pen Needle) 32G X 6 MM MISC   No No   Sig: Use 4 (four) times a day   Insulin Syringe-Needle U-100 (BD Insulin Syringe U/F) 30G X 1/2" 0 5 ML MISC   No No   Sig: Use 4 (four) times a day   Insulin Syringe-Needle U-100 (INSULIN SYRINGE  5CC/30GX5/16") 30G X 5/16" 0 5 ML MISC   Yes No   Si (four) times a day   Magnesium 250 MG TABS   Yes No   Sig: Take 500 mg by mouth daily    Memantine HCl ER 28 MG CP24   No No   Sig: TAKE ONE CAPSULE BY MOUTH EVERY DAY   Multiple Vitamins-Minerals (ICAPS AREDS 2 PO)   Yes No   Sig: Take by mouth   NovoLOG FlexPen 100 units/mL injection pen   Yes No   Sig: INJECT 8 UNITS UNDER THE SKIN TWICE DAILY   Probiotic Product (HEALTHY COLON PO)   Yes No   Sig: Take by mouth Takes once daily  amLODIPine (NORVASC) 5 mg tablet   No No   Sig: Take 1 tablet (5 mg total) by mouth daily   atorvastatin (LIPITOR) 10 mg tablet   No No   Sig: Take 1 tablet (10 mg total) by mouth daily   cholecalciferol (VITAMIN D3) 1,000 units tablet   Yes No   Sig: Take 2,000 Units by mouth daily   donepezil (ARICEPT) 10 mg tablet   No No   Sig: Take 1 tablet (10 mg total) by mouth daily at bedtime   fenofibrate (TRICOR) 145 mg tablet   No No   Sig: Take 1 tablet (145 mg total) by mouth daily   ferrous sulfate 325 (65 Fe) mg tablet   Yes No   Sig: Take 325 mg by mouth as needed    glucose blood (OneTouch Verio) test strip   No No   Sig: Use 1 each 3 (three) times a day Test   insulin detemir (LEVEMIR) 100 units/mL subcutaneous injection   No No   Sig: Inject 28 Units under the skin 2 (two) times a day 28 units twice daily  levalbuterol (XOPENEX) 0 63 mg/3 mL nebulizer solution   Yes No   Sig: Take 1 ampule by nebulization 2 (two) times a day as needed for wheezing     losartan (COZAAR) 25 mg tablet   No No   Sig: Take 1 tablet (25 mg total) by mouth daily   metoprolol tartrate (LOPRESSOR) 100 mg tablet   No No   Sig: TAKE 1 TABLET BY MOUTH AT BEDTIME   Patient taking differently: 50mg in AM, 100mg in PM   propafenone (RYTHMOL) 225 mg tablet   No No   Sig: Take 1 tablet (225 mg total) by mouth 3 (three) times a day   sertraline (ZOLOFT) 100 mg tablet   No No   Sig: Take 1 tablet (100 mg total) by mouth daily   sodium chloride (OCEAN) 0 65 % nasal spray   Yes No   Si spray into each nostril   warfarin (Coumadin) 5 mg tablet   No No   Sig: Take 1 to 1 1/2 tablet daily as directed      Facility-Administered Medications: None       Past Medical History:   Diagnosis Date   • Allergic rhinitis    • Anxiety    • Aspiration of liquid     and food per wife if he is eating too fast or talking when eating   • Asthma     as a child   • Atrial fibrillation ([facility] [address] )    • CAD (coronary artery disease)    • Cancer of kidney ([place] [address] )    • COPD (chronic obstructive pulmonary disease) (HCC)    • CPAP (continuous positive airway pressure) dependence    • Dementia (HCC)     Frontal lobe   • Dementia (HCC)    • Diabetes mellitus ([facility] )    • Diabetes mellitus, type 2 (HCC)    • DJD (degenerative joint disease)    • Hypercholesterolemia    • Hyperlipidemia    • Hypertension    • Incisional hernia    • Kidney disease    • Melanoma ([place] [address] )     left leg   • Obesity    • Sleep apnea     wears CPAP   • TIA (transient ischemic attack)        Past Surgical History:   Procedure Laterality Date   • CARDIAC PACEMAKER PLACEMENT     • CHOLECYSTECTOMY     • COLONOSCOPY     • HERNIA REPAIR      Incisional hernia   • NEPHRECTOMY Left 2005   • ND ESOPHAGOGASTRODUODENOSCOPY SUBMUCOSAL INJECTION N/A 9/21/2016    Procedure: ESOPHAGOGASTRODUODENOSCOPY (EGD); Surgeon: Nesha Browning MD;  Location: [facility]; Service: Gastroenterology   • ND ESOPHAGOGASTRODUODENOSCOPY SUBMUCOSAL INJECTION N/A 2/7/2018    Procedure: ESOPHAGOGASTRODUODENOSCOPY (EGD); Surgeon: Nesha Browning MD;  Location: [facility]; Service: Gastroenterology   • ND ESOPHAGOGASTRODUODENOSCOPY SUBMUCOSAL INJECTION N/A 4/3/2019    Procedure: ESOPHAGOGASTRODUODENOSCOPY (EGD) WITH BOTOX;  Surgeon: Nesha Browning MD;  Location: [facility]; Service: Gastroenterology   • ROTATOR CUFF REPAIR     • TONSILLECTOMY         Family History   Problem Relation Age of Onset   • Brain cancer Father    • Lung cancer Father    • Diabetes Family    • Heart disease Family    • Hypertension Family    • Cancer Family         renal cell carcinoma   • Stroke Family    • Colon cancer Son      I have reviewed and agree with the history as documented      E-Cigarette/Vaping   • E-Cigarette Use Never User      E-Cigarette/Vaping Substances   • Nicotine No    • THC No    • CBD No    • Flavoring No    • Other No    • Unknown No      Social History     Tobacco Use   • Smoking status: Never   • Smokeless tobacco: Never Vaping Use   • Vaping Use: Never used   Substance Use Topics   • Alcohol use: No   • Drug use: No        Review of Systems   Constitutional: Negative for fever  HENT: Negative for congestion and sore throat  Respiratory: Negative for cough and shortness of breath  Cardiovascular: Negative for chest pain and leg swelling  Gastrointestinal: Negative for abdominal pain, blood in stool, constipation, diarrhea, nausea and vomiting  Genitourinary: Negative for dysuria and hematuria  Neurological: Negative for weakness and numbness  Psychiatric/Behavioral: Confusion: Baseline  All other systems reviewed and are negative  Physical Exam  ED Triage Vitals   Temperature Pulse Respirations Blood Pressure SpO2   12/16/22 1815 12/16/22 1815 12/16/22 1815 12/16/22 1815 12/16/22 1815   97 7 °F (36 5 °C) 84 18 142/76 98 %      Temp Source Heart Rate Source Patient Position - Orthostatic VS BP Location FiO2 (%)   12/16/22 1815 12/16/22 1815 12/16/22 2058 12/16/22 1815 --   Oral Monitor Sitting Left arm       Pain Score       12/16/22 2058       No Pain             Orthostatic Vital Signs  Vitals:    12/16/22 1815 12/16/22 2058   BP: 142/76 126/74   Pulse: 84 62   Patient Position - Orthostatic VS:  Sitting       Physical Exam  Vitals and nursing note reviewed  Constitutional:       General: He is not in acute distress  Appearance: He is not ill-appearing  HENT:      Head: Normocephalic  Mouth/Throat:      Mouth: Mucous membranes are moist       Pharynx: Oropharynx is clear  No oropharyngeal exudate or posterior oropharyngeal erythema  Eyes:      Extraocular Movements: Extraocular movements intact  Pupils: Pupils are equal, round, and reactive to light  Cardiovascular:      Rate and Rhythm: Normal rate and regular rhythm  Heart sounds: No murmur heard  Pulmonary:      Effort: Pulmonary effort is normal  No respiratory distress  Breath sounds: Normal breath sounds   No wheezing, rhonchi or rales  Abdominal:      General: Abdomen is flat  There is no distension  Palpations: Abdomen is soft  Tenderness: There is no abdominal tenderness  There is no right CVA tenderness, left CVA tenderness, guarding or rebound  Musculoskeletal:      Cervical back: Normal range of motion  No rigidity  Right lower leg: No edema  Left lower leg: No edema  Skin:     General: Skin is warm and dry  Neurological:      Mental Status: He is alert  Cranial Nerves: No cranial nerve deficit  Sensory: No sensory deficit (Light touch sensation intact and symmetric throughout bilateral upper and lower extremities and face)  Motor: No weakness (Motor strength 5/5 intact and symmetric throughout bilateral upper and lower extremities)           ED Medications  Medications   sodium chloride 0 9 % bolus 500 mL (0 mL Intravenous Stopped 12/16/22 2124)       Diagnostic Studies  Results Reviewed     Procedure Component Value Units Date/Time    Urine Microscopic [[id number]]  (Abnormal) Collected: 12/16/22 2022    Lab Status: Final result Specimen: Urine, Clean Catch Updated: 12/16/22 2056     RBC, UA 1-2 /hpf      WBC, UA None Seen /hpf      Epithelial Cells Occasional /hpf      Bacteria, UA Occasional /hpf      MUCUS THREADS Occasional    Urine Macroscopic, POC [[id number]]  (Abnormal) Collected: 12/16/22 2022    Lab Status: Final result Specimen: Urine Updated: 12/16/22 2024     Color, UA Yellow     Clarity, UA Clear     pH, UA 6 0     Leukocytes, UA Negative     Nitrite, UA Negative     Protein, UA 30 (1+) mg/dl      Glucose, UA Negative mg/dl      Ketones, UA Trace mg/dl      Urobilinogen, UA 0 2 E U /dl      Bilirubin, UA Negative     Occult Blood, UA Trace     Specific [place], UA 1 025    Narrative:      CLINITEK RESULT    FLU/RSV/COVID - if FLU/RSV clinically relevant [[id number]]  (Normal) Collected: 12/16/22 1907    Lab Status: Final result Specimen: Nares from Nose Updated: 12/16/22 2016     SARS-CoV-2 Negative     INFLUENZA A PCR Negative     INFLUENZA B PCR Negative     RSV PCR Negative    Narrative:      FOR PEDIATRIC PATIENTS - copy/paste COVID Guidelines URL to browser: [email or web address]    SARS-CoV-2 assay is a Nucleic Acid Amplification assay intended for the  qualitative detection of nucleic acid from SARS-CoV-2 in nasopharyngeal  swabs  Results are for the presumptive identification of SARS-CoV-2 RNA  Positive results are indicative of infection with SARS-CoV-2, the virus  causing COVID-19, but do not rule out bacterial infection or co-infection  with other viruses  Laboratories within the [place] and its  territories are required to report all positive results to the appropriate  public health authorities  Negative results do not preclude SARS-CoV-2  infection and should not be used as the sole basis for treatment or other  patient management decisions  Negative results must be combined with  clinical observations, patient history, and epidemiological information  This test has not been FDA cleared or approved  This test has been authorized by FDA under an Emergency Use Authorization  (EUA)  This test is only authorized for the duration of time the  declaration that circumstances exist justifying the authorization of the  emergency use of an in vitro diagnostic tests for detection of SARS-CoV-2  virus and/or diagnosis of COVID-19 infection under section 564(b)(1) of  the Act, 21 U  S C  027SUZ-9(X)(5), unless the authorization is terminated  or revoked sooner  The test has been validated but independent review by FDA  and CLIA is pending  Test performed using [employer] GeneXpert: This RT-PCR assay targets N2,  a region unique to SARS-CoV-2  A conserved region in the E-gene was chosen  for pan-Sarbecovirus detection which includes SARS-CoV-2      According to CMS-2020-01-R, this platform meets the definition of high-Validus-IVC technology      APTT [[id number]]  (Abnormal) Collected: 12/16/22 1907    Lab Status: Final result Specimen: Blood from Arm, Right Updated: 12/16/22 2002     PTT 50 seconds     Protime-INR [[id number]]  (Abnormal) Collected: 12/16/22 1907    Lab Status: Final result Specimen: Blood from Arm, Right Updated: 12/16/22 2002     Protime 58 8 seconds      INR 6 73    Comprehensive metabolic panel [[id number]]  (Abnormal) Collected: 12/16/22 1907    Lab Status: Final result Specimen: Blood from Arm, Right Updated: 12/16/22 1947     Sodium 138 mmol/L      Potassium 4 0 mmol/L      Chloride 107 mmol/L      CO2 27 mmol/L      ANION GAP 4 mmol/L      BUN 29 mg/dL      Creatinine 2 03 mg/dL      Glucose 173 mg/dL      Calcium 9 1 mg/dL      AST 10 U/L      ALT 18 U/L      Alkaline Phosphatase 68 U/L      Total Protein 7 2 g/dL      Albumin 3 6 g/dL      Total Bilirubin 0 39 mg/dL      eGFR 28 ml/min/1 73sq m     Narrative:      [facility] guidelines for Chronic Kidney Disease (CKD):   •  Stage 1 with normal or high GFR (GFR > 90 mL/min/1 73 square meters)  •  Stage 2 Mild CKD (GFR = 60-89 mL/min/1 73 square meters)  •  Stage 3A Moderate CKD (GFR = 45-59 mL/min/1 73 square meters)  •  Stage 3B Moderate CKD (GFR = 30-44 mL/min/1 73 square meters)  •  Stage 4 Severe CKD (GFR = 15-29 mL/min/1 73 square meters)  •  Stage 5 End Stage CKD (GFR <15 mL/min/1 73 square meters)  Note: GFR calculation is accurate only with a steady state creatinine    CBC and differential [[id number]] Collected: 12/16/22 1907    Lab Status: Final result Specimen: Blood from Arm, Right Updated: 12/16/22 1916     WBC 6 36 Thousand/uL      RBC 4 79 Million/uL      Hemoglobin 14 1 g/dL      Hematocrit 41 5 %      MCV 87 fL      MCH 29 4 pg      MCHC 34 0 g/dL      RDW 12 8 %      MPV 9 6 fL      Platelets 541 Thousands/uL      nRBC 0 /100 WBCs      Neutrophils Relative 59 %      Immat GRANS % 0 %      Lymphocytes Relative 31 %      Monocytes Relative 6 %      Eosinophils Relative 3 %      Basophils Relative 1 %      Neutrophils Absolute 3 79 Thousands/µL      Immature Grans Absolute 0 02 Thousand/uL      Lymphocytes Absolute 1 94 Thousands/µL      Monocytes Absolute 0 41 Thousand/µL      Eosinophils Absolute 0 16 Thousand/µL      Basophils Absolute 0 04 Thousands/µL     Fingerstick Glucose (POCT) [[id number]]  (Abnormal) Collected: 12/16/22 1817    Lab Status: Final result Updated: 12/16/22 1818     POC Glucose 186 mg/dl                  No orders to display         Procedures  ECG 12 Lead Documentation Only    Date/Time: 12/16/2022 8:16 PM  Performed by: Mariel Finn MD  Authorized by: Mariel Finn MD     ECG reviewed by me, the ED Provider: yes    Patient location:  ED  Previous ECG:     Previous ECG:  Compared to current    Similarity:  No change  Interpretation:     Interpretation: normal    Rate:     ECG rate:  67    ECG rate assessment: normal    Rhythm:     Rhythm: paced    Ectopy:     Ectopy: none    QRS:     QRS axis:  Normal    QRS intervals:  Normal  Conduction:     Conduction: normal    ST segments:     ST segments:  Normal  T waves:     T waves: normal            ED Course                                       MDM  Number of Diagnoses or Management Options  Fatigue  Diagnosis management comments: 87M PMH DM, CKD, dementia presented to the emergency department for fatigue  Work-up including vital signs, physical exam, EKG, labs, urinalysis  EKG without acute changes, urinalysis without infection  Elevated INR similar to yesterday, no active bleeding, currently holding Coumadin per primary's instructions, plan to continue holding under primary doctor's direction  Labs otherwise unremarkable, no hypoglycemia, no hypotension, kidney function similar to prior  Patient currently asymptomatic, ambulating without difficulty    Stable for discharge home with primary care follow-up, discharge instructions and return precautions given  Disposition  Final diagnoses:   Fatigue     Time reflects when diagnosis was documented in both MDM as applicable and the Disposition within this note     Time User Action Codes Description Comment    12/16/2022  9:25 PM Sandy Bobwilmer Add [R53 [id number]] Fatigue       ED Disposition     ED Disposition   Discharge    Condition   Stable    Date/Time   Fri Dec 16, 2022  9:26 PM    Comment   Rosi Wesley discharge to home/self care  Follow-up Information     Follow up With Specialties Details Why Contact Info    Carolyn Radford MD Internal Medicine Call   [address] [place] [address]  [phone]            Discharge Medication List as of 12/16/2022  9:26 PM      CONTINUE these medications which have NOT CHANGED    Details   amLODIPine (NORVASC) 5 mg tablet Take 1 tablet (5 mg total) by mouth daily, Starting Fri 10/14/2022, Normal      atorvastatin (LIPITOR) 10 mg tablet Take 1 tablet (10 mg total) by mouth daily, Starting Thu 7/28/2022, Normal      B Complex Vitamins (B-COMPLEX/B-12 PO) Take by mouth, Historical Med      cholecalciferol (VITAMIN D3) 1,000 units tablet Take 2,000 Units by mouth daily, Historical Med      donepezil (ARICEPT) 10 mg tablet Take 1 tablet (10 mg total) by mouth daily at bedtime, Starting Fri 10/14/2022, Normal      fenofibrate (TRICOR) 145 mg tablet Take 1 tablet (145 mg total) by mouth daily, Starting Wed 7/20/2022, Normal      ferrous sulfate 325 (65 Fe) mg tablet Take 325 mg by mouth as needed , Historical Med      glucose blood (OneTouch Verio) test strip Use 1 each 3 (three) times a day Test, Starting Thu 7/14/2022, Normal      Insulin Aspart (NovoLOG PenFill) 100 UNITS/ML cartridge for injection Inject 8 Units under the skin 2 (two) times a day Takes 8 units twice daily  , Starting Fri 12/2/2022, Normal      insulin detemir (LEVEMIR) 100 units/mL subcutaneous injection Inject 28 Units under the skin 2 (two) times a day 28 units twice daily  , Starting Wed 9/14/2022, Normal      Insulin Pen Needle (Novofine Pen Needle) 32G X 6 MM MISC Use 4 (four) times a day, Starting Thu 9/29/2022, Normal      Insulin Syringe-Needle U-100 (BD Insulin Syringe U/F) 30G X 1/2" 0 5 ML MISC Use 4 (four) times a day, Starting Wed 8/17/2022, Normal      Insulin Syringe-Needle U-100 (INSULIN SYRINGE  5CC/30GX5/16") 30G X 5/16" 0 5 ML MISC 4 (four) times a day, Starting Thu 8/18/2022, Historical Med      levalbuterol (XOPENEX) 0 63 mg/3 mL nebulizer solution Take 1 ampule by nebulization 2 (two) times a day as needed for wheezing , Historical Med      losartan (COZAAR) 25 mg tablet Take 1 tablet (25 mg total) by mouth daily, Starting Mon 11/7/2022, Normal      Magnesium 250 MG TABS Take 500 mg by mouth daily , Historical Med      Memantine HCl ER 28 MG CP24 TAKE ONE CAPSULE BY MOUTH EVERY DAY, Normal      metoprolol tartrate (LOPRESSOR) 100 mg tablet TAKE 1 TABLET BY MOUTH AT BEDTIME, Normal      Multiple Vitamins-Minerals (ICAPS AREDS 2 PO) Take by mouth, Historical Med      NovoLOG FlexPen 100 units/mL injection pen INJECT 8 UNITS UNDER THE SKIN TWICE DAILY, Historical Med      Probiotic Product (HEALTHY COLON PO) Take by mouth Takes once daily  , Historical Med      propafenone (RYTHMOL) 225 mg tablet Take 1 tablet (225 mg total) by mouth 3 (three) times a day, Starting Wed 9/14/2022, Normal      sertraline (ZOLOFT) 100 mg tablet Take 1 tablet (100 mg total) by mouth daily, Starting Mon 11/7/2022, Normal      sodium chloride (OCEAN) 0 65 % nasal spray 1 spray into each nostril, Starting Wed 10/31/2018, Until Wed 9/14/2022 at 2359, Historical Med      warfarin (Coumadin) 5 mg tablet Take 1 to 1 1/2 tablet daily as directed, Normal           No discharge procedures on file  PDMP Review     None           ED Provider  Attending physically available and evaluated Jay Rosales I managed the patient along with the ED Attending      Electronically Signed by         Hua Sánchez MD  12/17/22 1850

## 2022-12-17 NOTE — DISCHARGE INSTRUCTIONS
Call your primary care doctor to schedule a follow-up appointment  Continue to hold the Coumadin until directed by your primary care doctor  Return to the emergency department if symptoms worsen or if you have any other concerns

## 2022-12-19 ENCOUNTER — OFFICE VISIT (OUTPATIENT)
Dept: INTERNAL MEDICINE CLINIC | Facility: CLINIC | Age: 87
End: 2022-12-19

## 2022-12-19 VITALS
HEIGHT: 69 IN | WEIGHT: 197 LBS | BODY MASS INDEX: 29.18 KG/M2 | HEART RATE: 77 BPM | OXYGEN SATURATION: 97 % | DIASTOLIC BLOOD PRESSURE: 62 MMHG | TEMPERATURE: 98.7 F | SYSTOLIC BLOOD PRESSURE: 114 MMHG

## 2022-12-19 DIAGNOSIS — I48.0 PAROXYSMAL ATRIAL FIBRILLATION (HCC): ICD-10-CM

## 2022-12-19 DIAGNOSIS — F03.90 DEMENTIA WITHOUT BEHAVIORAL DISTURBANCE (HCC): Primary | ICD-10-CM

## 2022-12-19 DIAGNOSIS — N18.4 CKD (CHRONIC KIDNEY DISEASE), STAGE IV (HCC): ICD-10-CM

## 2022-12-19 DIAGNOSIS — I10 ESSENTIAL HYPERTENSION: ICD-10-CM

## 2022-12-19 DIAGNOSIS — E11.69 TYPE 2 DIABETES MELLITUS WITH OTHER SPECIFIED COMPLICATION, WITH LONG-TERM CURRENT USE OF INSULIN (HCC): ICD-10-CM

## 2022-12-19 DIAGNOSIS — Z79.4 TYPE 2 DIABETES MELLITUS WITH OTHER SPECIFIED COMPLICATION, WITH LONG-TERM CURRENT USE OF INSULIN (HCC): ICD-10-CM

## 2022-12-19 DIAGNOSIS — F33.0 MILD EPISODE OF RECURRENT MAJOR DEPRESSIVE DISORDER (HCC): ICD-10-CM

## 2022-12-19 NOTE — PROGRESS NOTES
Assessment/Plan:           1  Dementia without behavioral disturbance (HCC)  Comments:  Dementia is worsening  Names the president as Santino Babcock    2  Type 2 diabetes mellitus with other specified complication, with long-term current use of insulin (HCC)  Comments:  Continue insulin  Advised wife to check blood sugars at least 3 times a day and bring the readings to the office    3  Mild episode of recurrent major depressive disorder ([address] )    4  Essential hypertension  Comments:  DC'd amlodipine because blood pressure was lower  5  Paroxysmal atrial fibrillation (HCC)  Comments:  Rate is controlled and patient is on warfarin    6  CKD (chronic kidney disease), stage IV ([address] )         There are no diagnoses linked to this encounter  No problem-specific Assessment & Plan notes found for this encounter  Subjective:      Patient ID: Kay Blas is a 80 y o  male  HPI    The following portions of the patient's history were reviewed and updated as appropriate: He  has a past medical history of Allergic rhinitis, Anxiety, Aspiration of liquid, Asthma, Atrial fibrillation ([address] ), CAD (coronary artery disease), Cancer of kidney ([address] ), COPD (chronic obstructive pulmonary disease) ([address] ), CPAP (continuous positive airway pressure) dependence, Dementia ([address] ), Dementia ([address] ), Diabetes mellitus ([address] ), Diabetes mellitus, type 2 ([address] ), DJD (degenerative joint disease), Hypercholesterolemia, Hyperlipidemia, Hypertension, Incisional hernia, Kidney disease, Melanoma ([address] ), Obesity, Sleep apnea, and TIA (transient ischemic attack)    He   Patient Active Problem List    Diagnosis Date Noted   • Fall 05/03/2022   • Renal cell carcinoma ([address] ) 04/12/2022   • Multiple rib fractures 04/09/2022   • Abnormal head CT 04/09/2022   • Tracheitis 11/15/2021   • Dementia ([address] )    • Recurrent falls 09/30/2020   • Long term current use of antiarrhythmic medical therapy 08/17/2020   • 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 08/13/2020   • Coagulation disorder ([place] [address] ) 08/13/2020   • Head injury 03/09/2020   • 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, or unspecified chronic kidney disease 05/06/2019   • Coronary artery disease involving native coronary artery of native heart without angina pectoris 03/12/2019   • Type 2 diabetes mellitus with kidney complication, with long-term current use of insulin ([place] [address] ) 02/04/2019   • Paroxysmal atrial fibrillation ([place] [address] ) 12/12/2018   • Mixed hyperlipidemia 12/12/2018   • 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[place] [address] ) 04/16/2018   • CKD (chronic kidney disease), stage IV ([place] [address] ) 03/30/2018   • Essential hypertension 03/30/2018   • Solitary kidney 03/30/2018   • Benign prostate hyperplasia 10/27/2014   • Elevated prostate specific antigen (PSA) 06/18/2013     He  has a past surgical history that includes Cardiac pacemaker placement; Nephrectomy (Left, 2005); pr esophagogastroduodenoscopy submucosal injection (N/A, 9/21/2016); Tonsillectomy; Cholecystectomy; pr esophagogastroduodenoscopy submucosal injection (N/A, 2/7/2018); Colonoscopy; Rotator cuff repair; pr esophagogastroduodenoscopy submucosal injection (N/A, 4/3/2019); and Hernia repair  His family history includes Brain cancer in his father; Cancer in his family; Colon cancer in his son; Diabetes in his family; Heart disease in his family; Hypertension in his family; Lung cancer in his father; Stroke in his family  He  reports that he has never smoked  He has never used smokeless tobacco  He reports that he does not drink alcohol and does not use drugs    Current Outpatient Medications   Medication Sig Dispense Refill   • atorvastatin (LIPITOR) 10 mg tablet Take 1 tablet (10 mg total) by mouth daily 90 tablet 1   • B Complex Vitamins (B-COMPLEX/B-12 PO) Take by mouth     • cholecalciferol (VITAMIN D3) 1,000 units tablet Take 2,000 Units by mouth daily     • donepezil (ARICEPT) 10 mg tablet Take 1 tablet (10 mg total) by mouth daily at bedtime 90 tablet 3   • fenofibrate (TRICOR) 145 mg tablet Take 1 tablet (145 mg total) by mouth daily 90 tablet 1   • ferrous sulfate 325 (65 Fe) mg tablet Take 325 mg by mouth as needed      • glucose blood (OneTouch Verio) test strip Use 1 each 3 (three) times a day Test 300 strip 3   • Insulin Aspart (NovoLOG PenFill) 100 UNITS/ML cartridge for injection Inject 8 Units under the skin 2 (two) times a day Takes 8 units twice daily  3 mL 2   • insulin detemir (LEVEMIR) 100 units/mL subcutaneous injection Inject 28 Units under the skin 2 (two) times a day 28 units twice daily  20 mL 5   • Insulin Pen Needle (Novofine Pen Needle) 32G X 6 MM MISC Use 4 (four) times a day 200 each 5   • Insulin Syringe-Needle U-100 (BD Insulin Syringe U/F) 30G X 1/2" 0 5 ML MISC Use 4 (four) times a day 400 each 1   • Insulin Syringe-Needle U-100 (INSULIN SYRINGE  5CC/30GX5/16") 30G X 5/16" 0 5 ML MISC 4 (four) times a day     • levalbuterol (XOPENEX) 0 63 mg/3 mL nebulizer solution Take 1 ampule by nebulization 2 (two) times a day as needed for wheezing  • losartan (COZAAR) 25 mg tablet Take 1 tablet (25 mg total) by mouth daily 90 tablet 1   • Magnesium 250 MG TABS Take 500 mg by mouth daily      • Memantine HCl ER 28 MG CP24 TAKE ONE CAPSULE BY MOUTH EVERY DAY 90 capsule 1   • metoprolol tartrate (LOPRESSOR) 100 mg tablet TAKE 1 TABLET BY MOUTH AT BEDTIME (Patient taking differently: 50mg in AM, 100mg in PM) 90 tablet 0   • Multiple Vitamins-Minerals (ICAPS AREDS 2 PO) Take by mouth     • NovoLOG FlexPen 100 units/mL injection pen INJECT 8 UNITS UNDER THE SKIN TWICE DAILY     • Probiotic Product (HEALTHY COLON PO) Take by mouth Takes once daily       • propafenone (RYTHMOL) 225 mg tablet Take 1 tablet (225 mg total) by mouth 3 (three) times a day 90 tablet 3   • sertraline (ZOLOFT) 100 mg tablet Take 1 tablet (100 mg total) by mouth daily 90 tablet 1   • sodium chloride (OCEAN) 0 65 % nasal spray 1 spray into each nostril     • warfarin (Coumadin) 5 mg tablet Take 1 to 1 1/2 tablet daily as directed 60 tablet 2     No current facility-administered medications for this visit  Current Outpatient Medications on File Prior to Visit   Medication Sig   • atorvastatin (LIPITOR) 10 mg tablet Take 1 tablet (10 mg total) by mouth daily   • B Complex Vitamins (B-COMPLEX/B-12 PO) Take by mouth   • cholecalciferol (VITAMIN D3) 1,000 units tablet Take 2,000 Units by mouth daily   • donepezil (ARICEPT) 10 mg tablet Take 1 tablet (10 mg total) by mouth daily at bedtime   • fenofibrate (TRICOR) 145 mg tablet Take 1 tablet (145 mg total) by mouth daily   • ferrous sulfate 325 (65 Fe) mg tablet Take 325 mg by mouth as needed    • glucose blood (OneTouch Verio) test strip Use 1 each 3 (three) times a day Test   • Insulin Aspart (NovoLOG PenFill) 100 UNITS/ML cartridge for injection Inject 8 Units under the skin 2 (two) times a day Takes 8 units twice daily  • insulin detemir (LEVEMIR) 100 units/mL subcutaneous injection Inject 28 Units under the skin 2 (two) times a day 28 units twice daily  • Insulin Pen Needle (Novofine Pen Needle) 32G X 6 MM MISC Use 4 (four) times a day   • Insulin Syringe-Needle U-100 (BD Insulin Syringe U/F) 30G X 1/2" 0 5 ML MISC Use 4 (four) times a day   • Insulin Syringe-Needle U-100 (INSULIN SYRINGE  5CC/30GX5/16") 30G X 5/16" 0 5 ML MISC 4 (four) times a day   • levalbuterol (XOPENEX) 0 63 mg/3 mL nebulizer solution Take 1 ampule by nebulization 2 (two) times a day as needed for wheezing     • losartan (COZAAR) 25 mg tablet Take 1 tablet (25 mg total) by mouth daily   • Magnesium 250 MG TABS Take 500 mg by mouth daily    • Memantine HCl ER 28 MG CP24 TAKE ONE CAPSULE BY MOUTH EVERY DAY   • metoprolol tartrate (LOPRESSOR) 100 mg tablet TAKE 1 TABLET BY MOUTH AT BEDTIME (Patient taking differently: 50mg in AM, 100mg in PM)   • Multiple Vitamins-Minerals (ICAPS AREDS 2 PO) Take by mouth   • NovoLOG FlexPen 100 units/mL injection pen INJECT 8 UNITS UNDER THE SKIN TWICE DAILY   • Probiotic Product (HEALTHY COLON PO) Take by mouth Takes once daily  • propafenone (RYTHMOL) 225 mg tablet Take 1 tablet (225 mg total) by mouth 3 (three) times a day   • sertraline (ZOLOFT) 100 mg tablet Take 1 tablet (100 mg total) by mouth daily   • sodium chloride (OCEAN) 0 65 % nasal spray 1 spray into each nostril   • warfarin (Coumadin) 5 mg tablet Take 1 to 1 1/2 tablet daily as directed   • [DISCONTINUED] amLODIPine (NORVASC) 5 mg tablet Take 1 tablet (5 mg total) by mouth daily     No current facility-administered medications on file prior to visit  He is allergic to Francis Schein tartrate], bextra [valdecoxib], dust mite extract, lyrica [pregabalin], mold extract [trichophyton], pollen extract, and tree extract       Review of Systems   Constitutional: Negative for appetite change, chills, fatigue and fever  HENT: Negative for sore throat and trouble swallowing  Eyes: Negative for redness  Respiratory: Negative for shortness of breath  Cardiovascular: Negative for chest pain and palpitations  Gastrointestinal: Negative for abdominal pain, constipation and diarrhea  Genitourinary: Negative for dysuria and hematuria  Musculoskeletal: Positive for gait problem  Negative for back pain and neck pain  Skin: Negative for rash  Neurological: Negative for seizures, weakness and headaches  Hematological: Negative for adenopathy  Psychiatric/Behavioral: Negative for confusion  The patient is not nervous/anxious            Objective:      /62 (BP Location: Left arm, Patient Position: Sitting, Cuff Size: Adult)   Pulse 77   Temp 98 7 °F (37 1 °C) (Temporal)   Ht 5' 9" (1 753 m)   Wt 89 4 kg (197 lb)   SpO2 97%   BMI 29 09 kg/m²     Results Reviewed     None          Recent Results (from the past 1344 hour(s))   Protime-INR    Collection Time: 10/31/22  3:29 PM   Result Value Ref Range    Protime 13 0 11 6 - 14 5 seconds    INR 0 96 0 84 - 1 19   Protime-INR    Collection Time: 11/03/22  4:44 PM   Result Value Ref Range    Protime 13 1 11 6 - 14 5 seconds    INR 0 97 0 84 - 1 19   PTH, intact    Collection Time: 11/03/22  4:44 PM   Result Value Ref Range    PTH 43 5 18 4 - 80 1 pg/mL   Renal function panel    Collection Time: 11/03/22  4:44 PM   Result Value Ref Range    Albumin 3 2 (L) 3 5 - 5 0 g/dL    Calcium 9 3 8 3 - 10 1 mg/dL    Corrected Calcium 9 9 8 3 - 10 1 mg/dL    Phosphorus 3 1 2 3 - 4 1 mg/dL    Glucose 304 (H) 65 - 140 mg/dL    BUN 22 5 - 25 mg/dL    Creatinine 1 75 (H) 0 60 - 1 30 mg/dL    Sodium 136 135 - 147 mmol/L    Potassium 4 6 3 5 - 5 3 mmol/L    Chloride 103 96 - 108 mmol/L    CO2 28 21 - 32 mmol/L    ANION GAP 5 4 - 13 mmol/L    eGFR 34 ml/min/1 73sq m   Uric acid    Collection Time: 11/03/22  4:44 PM   Result Value Ref Range    Uric Acid 5 9 3 5 - 8 5 mg/dL   CBC    Collection Time: 11/03/22  4:44 PM   Result Value Ref Range    WBC 8 00 4 31 - 10 16 Thousand/uL    RBC 4 87 3 88 - 5 62 Million/uL    Hemoglobin 14 2 12 0 - 17 0 g/dL    Hematocrit 42 1 36 5 - 49 3 %    MCV 86 82 - 98 fL    MCH 29 2 26 8 - 34 3 pg    MCHC 33 7 31 4 - 37 4 g/dL    RDW 13 3 11 6 - 15 1 %    Platelets 214 885 - 791 Thousands/uL    MPV 11 0 8 9 - 12 7 fL   Protime-INR    Collection Time: 11/09/22  1:27 PM   Result Value Ref Range    Protime 13 4 11 6 - 14 5 seconds    INR 1 00 0 84 - 1 19   Protime-INR    Collection Time: 12/06/22  1:51 PM   Result Value Ref Range    Protime 19 7 (H) 11 6 - 14 5 seconds    INR 1 65 (H) 0 84 - 1 19   Protime-INR    Collection Time: 12/07/22  4:01 PM   Result Value Ref Range    Protime 21 1 (H) 11 6 - 14 5 seconds    INR 1 79 (H) 0 84 - 1 19   CBC and differential    Collection Time: 12/07/22  4:01 PM   Result Value Ref Range    WBC 7 82 4 31 - 10 16 Thousand/uL    RBC 4 86 3 88 - 5 62 Million/uL    Hemoglobin 14 4 12 0 - 17 0 g/dL    Hematocrit 43 7 36 5 - 49 3 %    MCV 90 82 - 98 fL    MCH 29 6 26 8 - 34 3 pg    MCHC 33 0 31 4 - 37 4 g/dL    RDW 12 7 11 6 - 15 1 %    MPV 10 7 8 9 - 12 7 fL    Platelets 813 548 - 526 Thousands/uL    nRBC 0 /100 WBCs    Neutrophils Relative 65 43 - 75 %    Immat GRANS % 0 0 - 2 %    Lymphocytes Relative 25 14 - 44 %    Monocytes Relative 6 4 - 12 %    Eosinophils Relative 3 0 - 6 %    Basophils Relative 1 0 - 1 %    Neutrophils Absolute 5 05 1 85 - 7 62 Thousands/µL    Immature Grans Absolute 0 02 0 00 - 0 20 Thousand/uL    Lymphocytes Absolute 1 99 0 60 - 4 47 Thousands/µL    Monocytes Absolute 0 50 0 17 - 1 22 Thousand/µL    Eosinophils Absolute 0 20 0 00 - 0 61 Thousand/µL    Basophils Absolute 0 06 0 00 - 0 10 Thousands/µL   Comprehensive metabolic panel    Collection Time: 12/07/22  4:01 PM   Result Value Ref Range    Sodium 139 135 - 147 mmol/L    Potassium 4 1 3 5 - 5 3 mmol/L    Chloride 106 96 - 108 mmol/L    CO2 30 21 - 32 mmol/L    ANION GAP 3 (L) 4 - 13 mmol/L    BUN 25 5 - 25 mg/dL    Creatinine 2 04 (H) 0 60 - 1 30 mg/dL    Glucose 154 (H) 65 - 140 mg/dL    Calcium 9 6 8 3 - 10 1 mg/dL    Corrected Calcium 10 1 8 3 - 10 1 mg/dL    AST 16 5 - 45 U/L    ALT 26 12 - 78 U/L    Alkaline Phosphatase 88 46 - 116 U/L    Total Protein 7 5 6 4 - 8 4 g/dL    Albumin 3 4 (L) 3 5 - 5 0 g/dL    Total Bilirubin 0 42 0 20 - 1 00 mg/dL    eGFR 28 ml/min/1 73sq m   Hemoglobin A1C    Collection Time: 12/07/22  4:01 PM   Result Value Ref Range    Hemoglobin A1C 8 8 (H) Normal 3 8-5 6%; PreDiabetic 5 7-6 4%;  Diabetic >=6 5%; Glycemic control for adults with diabetes <7 0% %     mg/dl   Urinalysis with microscopic    Collection Time: 12/07/22  4:01 PM   Result Value Ref Range    Color, UA Yellow     Clarity, UA Clear     Specific Gravity, UA 1 020 1 003 - 1 030    pH, UA 6 5 4 5, 5 0, 5 5, 6 0, 6 5, 7 0, 7 5, 8 0    Leukocytes, UA Negative Negative    Nitrite, UA Negative Negative    Protein, UA 50 (1+) (A) Negative mg/dl    Glucose, UA Trace (A) Negative mg/dl Ketones, UA Negative Negative mg/dl    Urobilinogen, UA <2 0 <2 0 mg/dl mg/dl    Bilirubin, UA Negative Negative    Occult Blood, UA Negative Negative    RBC, UA 1-2 None Seen, 1-2 /hpf    WBC, UA 1-2 None Seen, 1-2 /hpf    Epithelial Cells None Seen None Seen, Occasional /hpf    Bacteria, UA None Seen None Seen, Occasional /hpf    MUCUS THREADS Occasional (A) None Seen   Microalbumin / creatinine urine ratio    Collection Time: 12/07/22  4:01 PM   Result Value Ref Range    Creatinine, Ur 246 0 mg/dL    Microalbum  ,U,Random 273 0 (H) 0 0 - 20 0 mg/L    Microalb Creat Ratio 111 (H) 0 - 30 mg/g creatinine   Protime-INR    Collection Time: 12/15/22  2:36 PM   Result Value Ref Range    Protime 59 2 (H) 11 6 - 14 5 seconds    INR 6 79 (HH) 0 84 - 1 19   Fingerstick Glucose (POCT)    Collection Time: 12/16/22  6:17 PM   Result Value Ref Range    POC Glucose 186 (H) 65 - 140 mg/dl   CBC and differential    Collection Time: 12/16/22  7:07 PM   Result Value Ref Range    WBC 6 36 4 31 - 10 16 Thousand/uL    RBC 4 79 3 88 - 5 62 Million/uL    Hemoglobin 14 1 12 0 - 17 0 g/dL    Hematocrit 41 5 36 5 - 49 3 %    MCV 87 82 - 98 fL    MCH 29 4 26 8 - 34 3 pg    MCHC 34 0 31 4 - 37 4 g/dL    RDW 12 8 11 6 - 15 1 %    MPV 9 6 8 9 - 12 7 fL    Platelets 507 279 - 602 Thousands/uL    nRBC 0 /100 WBCs    Neutrophils Relative 59 43 - 75 %    Immat GRANS % 0 0 - 2 %    Lymphocytes Relative 31 14 - 44 %    Monocytes Relative 6 4 - 12 %    Eosinophils Relative 3 0 - 6 %    Basophils Relative 1 0 - 1 %    Neutrophils Absolute 3 79 1 85 - 7 62 Thousands/µL    Immature Grans Absolute 0 02 0 00 - 0 20 Thousand/uL    Lymphocytes Absolute 1 94 0 60 - 4 47 Thousands/µL    Monocytes Absolute 0 41 0 17 - 1 22 Thousand/µL    Eosinophils Absolute 0 16 0 00 - 0 61 Thousand/µL    Basophils Absolute 0 04 0 00 - 0 10 Thousands/µL   Comprehensive metabolic panel    Collection Time: 12/16/22  7:07 PM   Result Value Ref Range    Sodium 138 135 - 147 mmol/L Potassium 4 0 3 5 - 5 3 mmol/L    Chloride 107 96 - 108 mmol/L    CO2 27 21 - 32 mmol/L    ANION GAP 4 4 - 13 mmol/L    BUN 29 (H) 5 - 25 mg/dL    Creatinine 2 03 (H) 0 60 - 1 30 mg/dL    Glucose 173 (H) 65 - 140 mg/dL    Calcium 9 1 8 3 - 10 1 mg/dL    AST 10 5 - 45 U/L    ALT 18 12 - 78 U/L    Alkaline Phosphatase 68 46 - 116 U/L    Total Protein 7 2 6 4 - 8 4 g/dL    Albumin 3 6 3 5 - 5 0 g/dL    Total Bilirubin 0 39 0 20 - 1 00 mg/dL    eGFR 28 ml/min/1 73sq m   Protime-INR    Collection Time: 12/16/22  7:07 PM   Result Value Ref Range    Protime 58 8 (H) 11 6 - 14 5 seconds    INR 6 73 (HH) 0 84 - 1 19   APTT    Collection Time: 12/16/22  7:07 PM   Result Value Ref Range    PTT 50 (H) 23 - 37 seconds   FLU/RSV/COVID - if FLU/RSV clinically relevant    Collection Time: 12/16/22  7:07 PM    Specimen: Nose; Nares   Result Value Ref Range    SARS-CoV-2 Negative Negative    INFLUENZA A PCR Negative Negative    INFLUENZA B PCR Negative Negative    RSV PCR Negative Negative   Urine Macroscopic, POC    Collection Time: 12/16/22  8:22 PM   Result Value Ref Range    Color, UA Yellow     Clarity, UA Clear     pH, UA 6 0 4 5 - 8 0    Leukocytes, UA Negative Negative    Nitrite, UA Negative Negative    Protein, UA 30 (1+) (A) Negative mg/dl    Glucose, UA Negative Negative mg/dl    Ketones, UA Trace (A) Negative mg/dl    Urobilinogen, UA 0 2 0 2, 1 0 E U /dl E U /dl    Bilirubin, UA Negative Negative    Occult Blood, UA Trace (A) Negative    Specific Gravity, UA 1 025 1 003 - 1 030   Urine Microscopic    Collection Time: 12/16/22  8:22 PM   Result Value Ref Range    RBC, UA 1-2 None Seen, 1-2 /hpf    WBC, UA None Seen None Seen, 1-2 /hpf    Epithelial Cells Occasional None Seen, Occasional /hpf    Bacteria, UA Occasional None Seen, Occasional /hpf    MUCUS THREADS Occasional (A) None Seen        Physical Exam  Constitutional:       General: He is not in acute distress  Appearance: Normal appearance     HENT: Head: Normocephalic and atraumatic  Nose: Nose normal       Mouth/Throat:      Mouth: Mucous membranes are moist    Eyes:      Extraocular Movements: Extraocular movements intact  Pupils: Pupils are equal, round, and reactive to light  Cardiovascular:      Rate and Rhythm: Normal rate  Rhythm irregular  Pulses: Normal pulses  Heart sounds: Normal heart sounds  No murmur heard  No friction rub  Pulmonary:      Effort: Pulmonary effort is normal  No respiratory distress  Breath sounds: Normal breath sounds  No wheezing  Abdominal:      General: Abdomen is flat  Bowel sounds are normal  There is no distension  Palpations: Abdomen is soft  There is no mass  Tenderness: There is no abdominal tenderness  There is no guarding  Musculoskeletal:         General: Normal range of motion  Neurological:      General: No focal deficit present  Mental Status: He is alert  Mental status is at baseline  Cranial Nerves: No cranial nerve deficit        Gait: Gait abnormal       Comments: Oriented to place and person but not time or date names the president as Myles Goetz   Psychiatric:         Mood and Affect: Mood normal          Behavior: Behavior normal

## 2022-12-20 ENCOUNTER — TELEPHONE (OUTPATIENT)
Dept: INTERNAL MEDICINE CLINIC | Facility: CLINIC | Age: 87
End: 2022-12-20

## 2022-12-22 LAB
ATRIAL RATE: 67 BPM
P AXIS: 125 DEGREES
PR INTERVAL: 252 MS
QRS AXIS: 61 DEGREES
QRSD INTERVAL: 104 MS
QT INTERVAL: 406 MS
QTC INTERVAL: 429 MS
T WAVE AXIS: 54 DEGREES
VENTRICULAR RATE: 67 BPM

## 2022-12-27 DIAGNOSIS — Z79.4 TYPE 2 DIABETES MELLITUS WITH OTHER SPECIFIED COMPLICATION, WITH LONG-TERM CURRENT USE OF INSULIN (HCC): Primary | ICD-10-CM

## 2022-12-27 DIAGNOSIS — E11.69 TYPE 2 DIABETES MELLITUS WITH OTHER SPECIFIED COMPLICATION, WITH LONG-TERM CURRENT USE OF INSULIN (HCC): Primary | ICD-10-CM

## 2022-12-27 RX ORDER — INSULIN ASPART 100 [IU]/ML
INJECTION, SOLUTION INTRAVENOUS; SUBCUTANEOUS
Qty: 15 ML | Status: CANCELLED | OUTPATIENT
Start: 2022-12-27

## 2022-12-28 ENCOUNTER — TELEPHONE (OUTPATIENT)
Dept: INTERNAL MEDICINE CLINIC | Facility: CLINIC | Age: 87
End: 2022-12-28

## 2022-12-28 ENCOUNTER — OFFICE VISIT (OUTPATIENT)
Dept: URGENT CARE | Age: 87
End: 2022-12-28

## 2022-12-28 ENCOUNTER — TELEPHONE (OUTPATIENT)
Dept: OTHER | Facility: OTHER | Age: 87
End: 2022-12-28

## 2022-12-28 ENCOUNTER — APPOINTMENT (OUTPATIENT)
Dept: LAB | Age: 87
End: 2022-12-28

## 2022-12-28 VITALS
TEMPERATURE: 97.4 F | DIASTOLIC BLOOD PRESSURE: 62 MMHG | OXYGEN SATURATION: 97 % | BODY MASS INDEX: 29.09 KG/M2 | SYSTOLIC BLOOD PRESSURE: 135 MMHG | HEART RATE: 64 BPM | WEIGHT: 197 LBS | RESPIRATION RATE: 18 BRPM

## 2022-12-28 DIAGNOSIS — M70.61 TROCHANTERIC BURSITIS OF RIGHT HIP: Primary | ICD-10-CM

## 2022-12-28 NOTE — PROGRESS NOTES
[email or web address] [facility]        NAME: Alex Tineo is a 80 y o  male  : 1935    MRN: [id number]  DATE: 2022  TIME: 6:32 PM    Assessment and Plan   Trochanteric bursitis of right hip [M70 61]  1  Trochanteric bursitis of right hip              Patient Instructions       Follow up with PCP in 3-5 days  Proceed to  ER if symptoms worsen  Chief Complaint     Chief Complaint   Patient presents with   • Back Pain     Right hip pain and having trouble walking it started yesterday  History of Present Illness       Patient here for evaluation of pain in his right hip  Patient denies any known injury or trauma or recent falls  Patient has increased pain with movement  Review of Systems   Review of Systems   Constitutional: Negative  Musculoskeletal: Positive for arthralgias and gait problem  Negative for joint swelling and myalgias  Skin: Negative  Neurological: Negative for weakness and numbness  Current Medications       Current Outpatient Medications:   •  atorvastatin (LIPITOR) 10 mg tablet, Take 1 tablet (10 mg total) by mouth daily, Disp: 90 tablet, Rfl: 1  •  B Complex Vitamins (B-COMPLEX/B-12 PO), Take by mouth, Disp: , Rfl:   •  cholecalciferol (VITAMIN D3) 1,000 units tablet, Take 2,000 Units by mouth daily, Disp: , Rfl:   •  donepezil (ARICEPT) 10 mg tablet, Take 1 tablet (10 mg total) by mouth daily at bedtime, Disp: 90 tablet, Rfl: 3  •  fenofibrate (TRICOR) 145 mg tablet, Take 1 tablet (145 mg total) by mouth daily, Disp: 90 tablet, Rfl: 1  •  ferrous sulfate 325 (65 Fe) mg tablet, Take 325 mg by mouth as needed , Disp: , Rfl:   •  glucose blood (OneTouch Verio) test strip, Use 1 each 3 (three) times a day Test, Disp: 300 strip, Rfl: 3  •  Insulin Aspart (NovoLOG PenFill) 100 UNITS/ML cartridge for injection, Inject 8 Units under the skin 2 (two) times a day Takes 8 units twice daily  , Disp: 3 mL, Rfl: 2  •  insulin detemir (LEVEMIR) 100 units/mL subcutaneous injection, Inject 28 Units under the skin 2 (two) times a day 28 units twice daily  , Disp: 20 mL, Rfl: 5  •  Insulin Pen Needle (Novofine Pen Needle) 32G X 6 MM MISC, Use 4 (four) times a day, Disp: 200 each, Rfl: 5  •  Insulin Syringe-Needle U-100 (BD Insulin Syringe U/F) 30G X 1/2" 0 5 ML MISC, Use 4 (four) times a day, Disp: 400 each, Rfl: 1  •  Insulin Syringe-Needle U-100 (INSULIN SYRINGE  5CC/30GX5/16") 30G X 5/16" 0 5 ML MISC, 4 (four) times a day, Disp: , Rfl:   •  levalbuterol (XOPENEX) 0 63 mg/3 mL nebulizer solution, Take 1 ampule by nebulization 2 (two) times a day as needed for wheezing , Disp: , Rfl:   •  losartan (COZAAR) 25 mg tablet, Take 1 tablet (25 mg total) by mouth daily, Disp: 90 tablet, Rfl: 1  •  Magnesium 250 MG TABS, Take 500 mg by mouth daily , Disp: , Rfl:   •  Memantine HCl ER 28 MG CP24, TAKE ONE CAPSULE BY MOUTH EVERY DAY, Disp: 90 capsule, Rfl: 1  •  metoprolol tartrate (LOPRESSOR) 100 mg tablet, TAKE 1 TABLET BY MOUTH AT BEDTIME (Patient taking differently: 50mg in AM, 100mg in PM), Disp: 90 tablet, Rfl: 0  •  Multiple Vitamins-Minerals (ICAPS AREDS 2 PO), Take by mouth, Disp: , Rfl:   •  NovoLOG FlexPen 100 units/mL injection pen, INJECT 8 UNITS UNDER THE SKIN TWICE DAILY, Disp: , Rfl:   •  Probiotic Product (HEALTHY COLON PO), Take by mouth Takes once daily  , Disp: , Rfl:   •  propafenone (RYTHMOL) 225 mg tablet, Take 1 tablet (225 mg total) by mouth 3 (three) times a day, Disp: 90 tablet, Rfl: 3  •  sertraline (ZOLOFT) 100 mg tablet, Take 1 tablet (100 mg total) by mouth daily, Disp: 90 tablet, Rfl: 1  •  sodium chloride (OCEAN) 0 65 % nasal spray, 1 spray into each nostril, Disp: , Rfl:   •  warfarin (Coumadin) 5 mg tablet, Take 1 to 1 1/2 tablet daily as directed, Disp: 60 tablet, Rfl: 2    Current Allergies     Allergies as of 12/28/2022 - Reviewed 12/28/2022   Allergen Reaction Noted   • Ambien [zolpidem tartrate] Hallucinations 09/21/2016   • Bextra [valdecoxib] Hives 04/29/2013   • Dust mite extract Sneezing 11/08/2017   • Lyrica [pregabalin] Confusion 03/20/2016   • Mold extract [trichophyton] Sneezing 11/08/2017   • Pollen extract Sneezing 11/08/2017   • Tree extract Other (See Comments) and Sneezing 11/08/2017            The following portions of the patient's history were reviewed and updated as appropriate: allergies, current medications, past family history, past medical history, past social history, past surgical history and problem list      Past Medical History:   Diagnosis Date   • Allergic rhinitis    • Anxiety    • Aspiration of liquid     and food per wife if he is eating too fast or talking when eating   • Asthma     as a child   • Atrial fibrillation ([facility] )    • CAD (coronary artery disease)    • Cancer of kidney ([facility] )    • COPD (chronic obstructive pulmonary disease) ([facility] )    • CPAP (continuous positive airway pressure) dependence    • 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   • Dementia ([facility] )    • Diabetes mellitus ([facility] )    • Diabetes mellitus, type 2 ([facility] [address] )    • DJD (degenerative joint disease)    • Hypercholesterolemia    • Hyperlipidemia    • Hypertension    • Incisional hernia    • Kidney disease    • Melanoma ([facility] [address] [facility] )     left leg   • Obesity    • Sleep apnea     wears CPAP   • TIA (transient ischemic attack)        Past Surgical History:   Procedure Laterality Date   • CARDIAC PACEMAKER PLACEMENT     • CHOLECYSTECTOMY     • COLONOSCOPY     • HERNIA REPAIR      Incisional hernia   • NEPHRECTOMY Left 2005   • [place] ESOPHAGOGASTRODUODENOSCOPY SUBMUCOSAL INJECTION N/A 9/21/2016    Procedure: ESOPHAGOGASTRODUODENOSCOPY (EGD); Surgeon: Virginia Vences MD;  Location: [facility]; Service: Gastroenterology   • [place] ESOPHAGOGASTRODUODENOSCOPY SUBMUCOSAL INJECTION N/A 2/7/2018    Procedure: ESOPHAGOGASTRODUODENOSCOPY (EGD); Surgeon: Virginia Vences MD;  Location: [facility];   Service: Gastroenterology   • [place] ESOPHAGOGASTRODUODENOSCOPY SUBMUCOSAL INJECTION N/A 4/3/2019    Procedure: ESOPHAGOGASTRODUODENOSCOPY (EGD) WITH BOTOX;  Surgeon: Preet Huston MD;  Location: [facility]; Service: Gastroenterology   • ROTATOR CUFF REPAIR     • TONSILLECTOMY         Family History   Problem Relation Age of Onset   • Brain cancer Father    • Lung cancer Father    • Diabetes Family    • Heart disease Family    • Hypertension Family    • Cancer Family         renal cell carcinoma   • Stroke Family    • Colon cancer Son          Medications have been verified  Objective   /62   Pulse 64   Temp (!) 97 4 °F (36 3 °C) (Temporal)   Resp 18   Wt 89 4 kg (197 lb)   SpO2 97%   BMI 29 09 kg/m²   No LMP for male patient  Physical Exam     Physical Exam  Vitals and nursing note reviewed  Constitutional:       General: He is not in acute distress  Appearance: Normal appearance  He is well-developed  He is not ill-appearing, toxic-appearing or diaphoretic  HENT:      Head: Normocephalic and atraumatic  Eyes:      Extraocular Movements: Extraocular movements intact  Conjunctiva/sclera: Conjunctivae normal       Pupils: Pupils are equal, round, and reactive to light  Musculoskeletal:         General: Tenderness (Right greater trochanter) present  No swelling or signs of injury  Normal range of motion  Skin:     General: Skin is warm and dry  Findings: No bruising, erythema, lesion or rash  Neurological:      General: No focal deficit present  Mental Status: He is alert and oriented to person, place, and time  Sensory: No sensory deficit  Gait: Gait abnormal (Patient walking slowly with occasional limp due to the pain with movement)  Psychiatric:         Mood and Affect: Mood normal          Behavior: Behavior normal          Thought Content:  Thought content normal          Judgment: Judgment normal  n/a

## 2022-12-28 NOTE — PATIENT INSTRUCTIONS
Rest injured body part  Ice 10-15 minutes off and on every 3-4 hours while awake for 48 hours after injury  After 48 hours you may start using warm compresses if appropriate      Use over-the-counter Lidoderm patches as directed    Take Tylenol as directed    Go to the emergency room if your symptoms are worsening    Follow-up with orthopedics for further evaluation if symptoms persist  [phone]

## 2022-12-29 ENCOUNTER — APPOINTMENT (EMERGENCY)
Dept: RADIOLOGY | Facility: HOSPITAL | Age: 87
End: 2022-12-29

## 2022-12-29 ENCOUNTER — HOSPITAL ENCOUNTER (EMERGENCY)
Facility: HOSPITAL | Age: 87
Discharge: HOME | End: 2022-12-29
Attending: EMERGENCY MEDICINE

## 2022-12-29 ENCOUNTER — ANTICOAG VISIT (OUTPATIENT)
Dept: CARDIOLOGY CLINIC | Facility: CLINIC | Age: 87
End: 2022-12-29

## 2022-12-29 ENCOUNTER — TELEPHONE (OUTPATIENT)
Dept: OTHER | Facility: OTHER | Age: 87
End: 2022-12-29

## 2022-12-29 VITALS
BODY MASS INDEX: 29.53 KG/M2 | OXYGEN SATURATION: 95 % | SYSTOLIC BLOOD PRESSURE: 137 MMHG | DIASTOLIC BLOOD PRESSURE: 64 MMHG | RESPIRATION RATE: 20 BRPM | TEMPERATURE: 97.8 F | WEIGHT: 200 LBS | HEART RATE: 60 BPM

## 2022-12-29 DIAGNOSIS — M54.50 ACUTE RIGHT-SIDED LOW BACK PAIN WITHOUT SCIATICA: Primary | ICD-10-CM

## 2022-12-29 DIAGNOSIS — R79.1 ELEVATED INR: ICD-10-CM

## 2022-12-29 DIAGNOSIS — I48.0 PAROXYSMAL ATRIAL FIBRILLATION (HCC): Primary | ICD-10-CM

## 2022-12-29 LAB
ALBUMIN SERPL BCP-MCNC: 3.9 G/DL (ref 3.5–5)
ALP SERPL-CCNC: 64 U/L (ref 46–116)
ALT SERPL W P-5'-P-CCNC: 21 U/L (ref 12–78)
ANION GAP SERPL CALCULATED.3IONS-SCNC: 3 MMOL/L (ref 4–13)
APTT PPP: 65 SECONDS (ref 23–37)
AST SERPL W P-5'-P-CCNC: 13 U/L (ref 5–45)
BACTERIA UR QL AUTO: ABNORMAL /HPF
BASOPHILS # BLD AUTO: 0.03 THOUSANDS/ÂΜL (ref 0–0.1)
BASOPHILS NFR BLD AUTO: 1 % (ref 0–1)
BILIRUB SERPL-MCNC: 0.66 MG/DL (ref 0.2–1)
BILIRUB UR QL STRIP: NEGATIVE
BUN SERPL-MCNC: 33 MG/DL (ref 5–25)
CALCIUM SERPL-MCNC: 9.1 MG/DL (ref 8.3–10.1)
CHLORIDE SERPL-SCNC: 107 MMOL/L (ref 96–108)
CLARITY UR: CLEAR
CO2 SERPL-SCNC: 27 MMOL/L (ref 21–32)
COLOR UR: ABNORMAL
CREAT SERPL-MCNC: 1.82 MG/DL (ref 0.6–1.3)
EOSINOPHIL # BLD AUTO: 0.22 THOUSAND/ÂΜL (ref 0–0.61)
EOSINOPHIL NFR BLD AUTO: 4 % (ref 0–6)
ERYTHROCYTE [DISTWIDTH] IN BLOOD BY AUTOMATED COUNT: 12.7 % (ref 11.6–15.1)
GFR SERPL CREATININE-BSD FRML MDRD: 32 ML/MIN/1.73SQ M
GLUCOSE SERPL-MCNC: 243 MG/DL (ref 65–140)
GLUCOSE UR STRIP-MCNC: ABNORMAL MG/DL
HCT VFR BLD AUTO: 38.5 % (ref 36.5–49.3)
HGB BLD-MCNC: 12.8 G/DL (ref 12–17)
HGB UR QL STRIP.AUTO: ABNORMAL
IMM GRANULOCYTES # BLD AUTO: 0.02 THOUSAND/UL (ref 0–0.2)
IMM GRANULOCYTES NFR BLD AUTO: 0 % (ref 0–2)
INR PPP: 7.78 (ref 0.84–1.19)
KETONES UR STRIP-MCNC: NEGATIVE MG/DL
LEUKOCYTE ESTERASE UR QL STRIP: NEGATIVE
LYMPHOCYTES # BLD AUTO: 1.6 THOUSANDS/ÂΜL (ref 0.6–4.47)
LYMPHOCYTES NFR BLD AUTO: 26 % (ref 14–44)
MCH RBC QN AUTO: 28.6 PG (ref 26.8–34.3)
MCHC RBC AUTO-ENTMCNC: 33.2 G/DL (ref 31.4–37.4)
MCV RBC AUTO: 86 FL (ref 82–98)
MONOCYTES # BLD AUTO: 0.52 THOUSAND/ÂΜL (ref 0.17–1.22)
MONOCYTES NFR BLD AUTO: 8 % (ref 4–12)
NEUTROPHILS # BLD AUTO: 3.81 THOUSANDS/ÂΜL (ref 1.85–7.62)
NEUTS SEG NFR BLD AUTO: 61 % (ref 43–75)
NITRITE UR QL STRIP: NEGATIVE
NON-SQ EPI CELLS URNS QL MICRO: ABNORMAL /HPF
NRBC BLD AUTO-RTO: 0 /100 WBCS
PH UR STRIP.AUTO: 6.5 [PH]
PLATELET # BLD AUTO: 205 THOUSANDS/UL (ref 149–390)
PMV BLD AUTO: 9.9 FL (ref 8.9–12.7)
POTASSIUM SERPL-SCNC: 4.5 MMOL/L (ref 3.5–5.3)
PROT SERPL-MCNC: 7.6 G/DL (ref 6.4–8.4)
PROT UR STRIP-MCNC: ABNORMAL MG/DL
PROTHROMBIN TIME: 65.7 SECONDS (ref 11.6–14.5)
RBC # BLD AUTO: 4.48 MILLION/UL (ref 3.88–5.62)
RBC #/AREA URNS AUTO: ABNORMAL /HPF
SODIUM SERPL-SCNC: 137 MMOL/L (ref 135–147)
SP GR UR STRIP.AUTO: 1.01 (ref 1–1.03)
UROBILINOGEN UR STRIP-ACNC: <2 MG/DL
WBC # BLD AUTO: 6.2 THOUSAND/UL (ref 4.31–10.16)
WBC #/AREA URNS AUTO: ABNORMAL /HPF

## 2022-12-29 RX ADMIN — IOHEXOL 100 ML: 350 INJECTION, SOLUTION INTRAVENOUS at 13:08

## 2022-12-29 RX ADMIN — SODIUM CHLORIDE 1000 ML: 0.9 INJECTION, SOLUTION INTRAVENOUS at 11:46

## 2022-12-29 RX ADMIN — MORPHINE SULFATE 2 MG: 2 INJECTION, SOLUTION INTRAMUSCULAR; INTRAVENOUS at 11:54

## 2022-12-29 NOTE — TELEPHONE ENCOUNTER
Patient's pharmacy is calling to request a NovoLOG FlexPen 100 units/mL injection pen [[id number]] on the patient's behalf

## 2022-12-29 NOTE — ED PROVIDER NOTES
History  Chief Complaint   Patient presents with   • Back Pain     Pt reports right flank pain since yesterday, denies any urinary distress, no trauma     66-year-old male past medical history significant for A  fib on Coumadin, hyperlipidemia, hypertension presenting to the ED today for right-sided flank pain  Patient has had the pain since yesterday  He denies any urinary symptoms  No trauma to the area but he did have a fall about 2 weeks ago and he is wearing an abdominal binder given the trauma from 2 weeks ago  He has been having supratherapeutic INRs and has been holding his Coumadin symptoms  Upon chart review INR was above 7 last time  He denies any hematuria  Was seen at urgent care with thought that the patient had trochanteric bursitis and sent home  He has been taking Tylenol with minimal relief  Denies any nausea or vomiting  No chest pain or shortness of breath  Prior to Admission Medications   Prescriptions Last Dose Informant Patient Reported? Taking? B Complex Vitamins (B-COMPLEX/B-12 PO)   Yes No   Sig: Take by mouth   Insulin Aspart (NovoLOG PenFill) 100 UNITS/ML cartridge for injection   No No   Sig: Inject 8 Units under the skin 2 (two) times a day Takes 8 units twice daily     Insulin Pen Needle (Novofine Pen Needle) 32G X 6 MM MISC   No No   Sig: Use 4 (four) times a day   Insulin Syringe-Needle U-100 (BD Insulin Syringe U/F) 30G X 1/2" 0 5 ML MISC   No No   Sig: Use 4 (four) times a day   Insulin Syringe-Needle U-100 (INSULIN SYRINGE  5CC/30GX5/16") 30G X 5/16" 0 5 ML MISC   Yes No   Si (four) times a day   Magnesium 250 MG TABS   Yes No   Sig: Take 500 mg by mouth daily    Memantine HCl ER 28 MG CP24   No No   Sig: TAKE ONE CAPSULE BY MOUTH EVERY DAY   Multiple Vitamins-Minerals (ICAPS AREDS 2 PO)   Yes No   Sig: Take by mouth   NovoLOG FlexPen 100 units/mL injection pen   Yes No   Sig: INJECT 8 UNITS UNDER THE SKIN TWICE DAILY   Probiotic Product (HEALTHY COLON PO) Yes No   Sig: Take by mouth Takes once daily  atorvastatin (LIPITOR) 10 mg tablet   No No   Sig: Take 1 tablet (10 mg total) by mouth daily   cholecalciferol (VITAMIN D3) 1,000 units tablet   Yes No   Sig: Take 2,000 Units by mouth daily   donepezil (ARICEPT) 10 mg tablet   No No   Sig: Take 1 tablet (10 mg total) by mouth daily at bedtime   fenofibrate (TRICOR) 145 mg tablet   No No   Sig: Take 1 tablet (145 mg total) by mouth daily   ferrous sulfate 325 (65 Fe) mg tablet   Yes No   Sig: Take 325 mg by mouth as needed    glucose blood (OneTouch Verio) test strip   No No   Sig: Use 1 each 3 (three) times a day Test   insulin detemir (LEVEMIR) 100 units/mL subcutaneous injection   No No   Sig: Inject 28 Units under the skin 2 (two) times a day 28 units twice daily  levalbuterol (XOPENEX) 0 63 mg/3 mL nebulizer solution   Yes No   Sig: Take 1 ampule by nebulization 2 (two) times a day as needed for wheezing     losartan (COZAAR) 25 mg tablet   No No   Sig: Take 1 tablet (25 mg total) by mouth daily   metoprolol tartrate (LOPRESSOR) 100 mg tablet   No No   Sig: TAKE 1 TABLET BY MOUTH AT BEDTIME   Patient taking differently: 50mg in AM, 100mg in PM   propafenone (RYTHMOL) 225 mg tablet   No No   Sig: Take 1 tablet (225 mg total) by mouth 3 (three) times a day   sertraline (ZOLOFT) 100 mg tablet   No No   Sig: Take 1 tablet (100 mg total) by mouth daily   sodium chloride (OCEAN) 0 65 % nasal spray   Yes No   Si spray into each nostril   warfarin (Coumadin) 5 mg tablet   No No   Sig: Take 1 to 1 1/2 tablet daily as directed      Facility-Administered Medications: None       Past Medical History:   Diagnosis Date   • Allergic rhinitis    • Anxiety    • Aspiration of liquid     and food per wife if he is eating too fast or talking when eating   • Asthma     as a child   • Atrial fibrillation ([facility] )    • CAD (coronary artery disease)    • Cancer of kidney ([facility] )    • COPD (chronic obstructive pulmonary disease) ([facility] ) • CPAP (continuous positive airway pressure) dependence    • Dementia (HCC)     Frontal lobe   • Dementia (HCC)    • Diabetes mellitus ([facility] )    • Diabetes mellitus, type 2 (HCC)    • DJD (degenerative joint disease)    • Hypercholesterolemia    • Hyperlipidemia    • Hypertension    • Incisional hernia    • Kidney disease    • Melanoma ([facility] )     left leg   • Obesity    • Sleep apnea     wears CPAP   • TIA (transient ischemic attack)        Past Surgical History:   Procedure Laterality Date   • CARDIAC PACEMAKER PLACEMENT     • CHOLECYSTECTOMY     • COLONOSCOPY     • HERNIA REPAIR      Incisional hernia   • NEPHRECTOMY Left 2005   • FL ESOPHAGOGASTRODUODENOSCOPY SUBMUCOSAL INJECTION N/A 9/21/2016    Procedure: ESOPHAGOGASTRODUODENOSCOPY (EGD); Surgeon: Preet Huston MD;  Location: [facility]; Service: Gastroenterology   • FL ESOPHAGOGASTRODUODENOSCOPY SUBMUCOSAL INJECTION N/A 2/7/2018    Procedure: ESOPHAGOGASTRODUODENOSCOPY (EGD); Surgeon: Preet Huston MD;  Location: [facility]; Service: Gastroenterology   • FL ESOPHAGOGASTRODUODENOSCOPY SUBMUCOSAL INJECTION N/A 4/3/2019    Procedure: ESOPHAGOGASTRODUODENOSCOPY (EGD) WITH BOTOX;  Surgeon: Preet Huston MD;  Location: [facility]; Service: Gastroenterology   • ROTATOR CUFF REPAIR     • TONSILLECTOMY         Family History   Problem Relation Age of Onset   • Brain cancer Father    • Lung cancer Father    • Diabetes Family    • Heart disease Family    • Hypertension Family    • Cancer Family         renal cell carcinoma   • Stroke Family    • Colon cancer Son      I have reviewed and agree with the history as documented  E-Cigarette/Vaping   • E-Cigarette Use Never User      E-Cigarette/Vaping Substances   • Nicotine No    • THC No    • CBD No    • Flavoring No    • Other No    • Unknown No      Social History     Tobacco Use   • Smoking status: Never   • Smokeless tobacco: Never   Vaping Use   • Vaping Use: Never used   Substance Use Topics   • Alcohol use:  No • Drug use: No        Review of Systems   Constitutional: Negative for chills and fever  HENT: Negative for hearing loss  Eyes: Negative for visual disturbance  Respiratory: Negative for shortness of breath  Cardiovascular: Negative for chest pain  Gastrointestinal: Negative for abdominal pain, constipation, diarrhea, nausea and vomiting  Genitourinary: Negative for difficulty urinating  Musculoskeletal: Positive for back pain  Negative for myalgias  Skin: Negative for rash  Neurological: Negative for dizziness  Psychiatric/Behavioral: Negative for agitation  All other systems reviewed and are negative  Physical Exam  ED Triage Vitals   Temperature Pulse Respirations Blood Pressure SpO2   12/29/22 1122 12/29/22 1122 12/29/22 1122 12/29/22 1122 12/29/22 1122   97 8 °F (36 6 °C) 75 20 143/87 97 %      Temp Source Heart Rate Source Patient Position - Orthostatic VS BP Location FiO2 (%)   12/29/22 1122 12/29/22 1122 12/29/22 1122 12/29/22 1122 --   Tympanic Monitor Sitting Right arm       Pain Score       12/29/22 1154       7             Orthostatic Vital Signs  Vitals:    12/29/22 1122 12/29/22 1200 12/29/22 1300   BP: 143/87 131/61 137/64   Pulse: 75 63 60   Patient Position - Orthostatic VS: Sitting         Physical Exam  Vitals and nursing note reviewed  Constitutional:       General: He is not in acute distress  Appearance: Normal appearance  He is not ill-appearing  HENT:      Head: Normocephalic and atraumatic  Right Ear: External ear normal       Left Ear: External ear normal       Nose: Nose normal       Mouth/Throat:      Mouth: Mucous membranes are moist       Pharynx: Oropharynx is clear  No oropharyngeal exudate  Eyes:      Extraocular Movements: Extraocular movements intact  Conjunctiva/sclera: Conjunctivae normal       Pupils: Pupils are equal, round, and reactive to light  Cardiovascular:      Rate and Rhythm: Normal rate and regular rhythm  Pulses: Normal pulses  Heart sounds: Normal heart sounds  Pulmonary:      Effort: Pulmonary effort is normal  No respiratory distress  Breath sounds: Normal breath sounds  No wheezing or rales  Abdominal:      General: Abdomen is flat  Bowel sounds are normal  There is no distension  Palpations: Abdomen is soft  Tenderness: There is no abdominal tenderness  There is right CVA tenderness  There is no left CVA tenderness  Musculoskeletal:         General: No swelling  Normal range of motion  Cervical back: Normal range of motion  Skin:     General: Skin is warm and dry  Capillary Refill: Capillary refill takes less than 2 seconds  Neurological:      General: No focal deficit present  Mental Status: He is alert and oriented to person, place, and time  Mental status is at baseline  Cranial Nerves: No cranial nerve deficit  Sensory: No sensory deficit  Motor: No weakness  Gait: Gait normal    Psychiatric:         Mood and Affect: Mood normal          Behavior: Behavior normal          ED Medications  Medications   sodium chloride 0 9 % bolus 1,000 mL (0 mL Intravenous Stopped 12/29/22 1300)   morphine injection 2 mg (2 mg Intravenous Given 12/29/22 1154)   iohexol (OMNIPAQUE) 350 MG/ML injection (SINGLE-DOSE) 100 mL (100 mL Intravenous Given 12/29/22 1308)       Diagnostic Studies  Results Reviewed     Procedure Component Value Units Date/Time    Urine Microscopic [[id number]]  (Abnormal) Collected: 12/29/22 1209    Lab Status: Final result Specimen: Urine, Clean Catch Updated: 12/29/22 1432     RBC, UA 4-10 /hpf      WBC, UA None Seen /hpf      Epithelial Cells None Seen /hpf      Bacteria, UA Occasional /hpf     Protime-INR [[id number]]  (Abnormal) Collected: 12/29/22 1145    Lab Status: Final result Specimen: Blood from Arm, Right Updated: 12/29/22 1406     Protime 65 7 seconds      INR 7 78    Narrative:      Verified by repeat analysis    No clots detected        APTT [[id number]]  (Abnormal) Collected: 12/29/22 1145    Lab Status: Final result Specimen: Blood from Arm, Right Updated: 12/29/22 1406     PTT 65 seconds     Comprehensive metabolic panel [[id number]]  (Abnormal) Collected: 12/29/22 1145    Lab Status: Final result Specimen: Blood from Arm, Right Updated: 12/29/22 1246     Sodium 137 mmol/L      Potassium 4 5 mmol/L      Chloride 107 mmol/L      CO2 27 mmol/L      ANION GAP 3 mmol/L      BUN 33 mg/dL      Creatinine 1 82 mg/dL      Glucose 243 mg/dL      Calcium 9 1 mg/dL      AST 13 U/L      ALT 21 U/L      Alkaline Phosphatase 64 U/L      Total Protein 7 6 g/dL      Albumin 3 9 g/dL      Total Bilirubin 0 66 mg/dL      eGFR 32 ml/min/1 73sq m     Narrative:      [place] guidelines for Chronic Kidney Disease (CKD):   •  Stage 1 with normal or high GFR (GFR > 90 mL/min/1 73 square meters)  •  Stage 2 Mild CKD (GFR = 60-89 mL/min/1 73 square meters)  •  Stage 3A Moderate CKD (GFR = 45-59 mL/min/1 73 square meters)  •  Stage 3B Moderate CKD (GFR = 30-44 mL/min/1 73 square meters)  •  Stage 4 Severe CKD (GFR = 15-29 mL/min/1 73 square meters)  •  Stage 5 End Stage CKD (GFR <15 mL/min/1 73 square meters)  Note: GFR calculation is accurate only with a steady state creatinine    UA w Reflex to Microscopic w Reflex to Culture [[id number]]  (Abnormal) Collected: 12/29/22 1209    Lab Status: Final result Specimen: Urine, Clean Catch Updated: 12/29/22 1241     Color, UA Light Yellow     Clarity, UA Clear     Specific Gravity, UA 1 015     pH, UA 6 5     Leukocytes, UA Negative     Nitrite, UA Negative     Protein, UA 30 (1+) mg/dl      Glucose,  (1/2%) mg/dl      Ketones, UA Negative mg/dl      Urobilinogen, UA <2 0 mg/dl      Bilirubin, UA Negative     Occult Blood, UA Small    CBC and differential [[id number]] Collected: 12/29/22 1145    Lab Status: Final result Specimen: Blood from Arm, Right Updated: 12/29/22 1211     WBC 6 20 Thousand/uL      RBC 4 48 Million/uL      Hemoglobin 12 8 g/dL      Hematocrit 38 5 %      MCV 86 fL      MCH 28 6 pg      MCHC 33 2 g/dL      RDW 12 7 %      MPV 9 9 fL      Platelets 853 Thousands/uL      nRBC 0 /100 WBCs      Neutrophils Relative 61 %      Immat GRANS % 0 %      Lymphocytes Relative 26 %      Monocytes Relative 8 %      Eosinophils Relative 4 %      Basophils Relative 1 %      Neutrophils Absolute 3 81 Thousands/µL      Immature Grans Absolute 0 02 Thousand/uL      Lymphocytes Absolute 1 60 Thousands/µL      Monocytes Absolute 0 52 Thousand/µL      Eosinophils Absolute 0 22 Thousand/µL      Basophils Absolute 0 03 Thousands/µL                  CT abdomen pelvis with contrast   Final Result by Sim Palumbo MD (12/29 6984)      1  No acute abnormality in the abdomen or pelvis  2   Stable mid to distal esophageal thickening suggestive of reflux esophagitis      3  Prostatomegaly  4   Numerous right renal lesions, most of which are cysts though there are a few small indeterminate lesions which could be solid and appear mildly increased from April 2022  These can be more definitively characterized with MRI versus continued    ultrasound surveillance in 6 months  The study was marked in [facility] for immediate notification  Workstation performed: [id number]               Procedures  Procedures      ED Course               Identification of Seniors at [address] [place] [address] Most Recent Value   (ISAR) Identification of Seniors at Risk    Before the illness or injury that brought you to the Emergency, did you need someone to help you on a regular basis? 0 Filed at: 12/29/2022 1121   In the last 24 hours, have you needed more help than usual? 0 Filed at: 12/29/2022 1121   Have you been hospitalized for one or more nights during the past 6 months?  0 Filed at: 12/29/2022 1121   In general, do you see well? 0 Filed at: 12/29/2022 1121   In general, do you have serious problems with your memory? 0 Filed at: 12/29/2022 1121   Do you take more than three different medications every day? 1 Filed at: 12/29/2022 1121   ISAR Score 1 Filed at: 12/29/2022 1121                    SBIRT 22yo+    Flowsheet Row Most Recent Value   SBIRT (25 yo +)    In order to provide better care to our patients, we are screening all of our patients for alcohol and drug use  Would it be okay to ask you these screening questions? No Filed at: 12/29/2022 1135                MDM  Number of Diagnoses or Management Options  Acute right-sided low back pain without sciatica  Elevated INR  Diagnosis management comments: 24-year-old male presenting to the ED today for right-sided lower back pain  At this time given his age as well as a supratherapeutic INR will be concern for any possible hematoma  Could also be concern for possible AAA  At this time we will value with a CBC, CMP, CT abdomen pelvis with IV contrast   We will also check coag panel given his supratherapeutic INR  Also check urinalysis  CT abdomen pelvis just showing enlarging cyst   Discussed these findings with the patient and wife was at bedside  Wife understood the findings and understood that the patient needs repeat imaging in 6 months  Also discussed with her that she should talk to the patient's primary care provider to schedule this repeat imaging as well as repeat lab testing  Questions were answered to her satisfaction about these imaging findings    Instructed patient to continue holding his Coumadin as well as to discuss with primary care provider for repeat INR testing      Disposition  Final diagnoses:   Acute right-sided low back pain without sciatica   Elevated INR     Time reflects when diagnosis was documented in both MDM as applicable and the Disposition within this note     Time User Action Codes Description Comment    12/29/2022  2:14 PM Azra Hein Add [M54 50] Acute right-sided low back pain without sciatica     12/29/2022 2:19 PM Tory Fercho Add [R79 1] Elevated INR       ED Disposition     ED Disposition   Discharge    Condition   Stable    Date/Time   Thu Dec 29, 2022  2:14 PM    [facility] discharge to home/self care                 Follow-up Information     Follow up With Specialties Details Why Contact Info Additional Information    Cecile Villa MD Internal Medicine Schedule an appointment as soon as possible for a visit in 2 days for follow up to discuss repeat INR [address] [facility] [address]  [phone]       Physical Therapy at [address] [facility] Physical Therapy Call  to discuss physical therapy [address]  [phone] Physical Therapy at [address] [facility], [place] [address], [place], [place], [address]    [address] [facility] [place] Emergency Department Emergency Medicine Go to  If symptoms worsen, As needed [address] Emergency [address], [address], [place], [place], [address]          Discharge Medication List as of 12/29/2022  2:22 PM      CONTINUE these medications which have NOT CHANGED    Details   atorvastatin (LIPITOR) 10 mg tablet Take 1 tablet (10 mg total) by mouth daily, Starting Thu 7/28/2022, Normal      B Complex Vitamins (B-COMPLEX/B-12 PO) Take by mouth, Historical Med      cholecalciferol (VITAMIN D3) 1,000 units tablet Take 2,000 Units by mouth daily, Historical Med      donepezil (ARICEPT) 10 mg tablet Take 1 tablet (10 mg total) by mouth daily at bedtime, Starting Fri 10/14/2022, Normal      fenofibrate (TRICOR) 145 mg tablet Take 1 tablet (145 mg total) by mouth daily, Starting Wed 7/20/2022, Normal      ferrous sulfate 325 (65 Fe) mg tablet Take 325 mg by mouth as needed , Historical Med      glucose blood (OneTouch Verio) test strip Use 1 each 3 (three) times a day Test, Starting Thu 7/14/2022, Normal      Insulin Aspart (NovoLOG PenFill) 100 UNITS/ML cartridge for injection Inject 8 Units under the skin 2 (two) times a day Takes 8 units twice daily  , Starting Fri 12/2/2022, Normal      insulin detemir (LEVEMIR) 100 units/mL subcutaneous injection Inject 28 Units under the skin 2 (two) times a day 28 units twice daily  , Starting Wed 9/14/2022, Normal      Insulin Pen Needle (Novofine Pen Needle) 32G X 6 MM MISC Use 4 (four) times a day, Starting Thu 9/29/2022, Normal      Insulin Syringe-Needle U-100 (BD Insulin Syringe U/F) 30G X 1/2" 0 5 ML MISC Use 4 (four) times a day, Starting Wed 8/17/2022, Normal      Insulin Syringe-Needle U-100 (INSULIN SYRINGE  5CC/30GX5/16") 30G X 5/16" 0 5 ML MISC 4 (four) times a day, Starting Thu 8/18/2022, Historical Med      levalbuterol (XOPENEX) 0 63 mg/3 mL nebulizer solution Take 1 ampule by nebulization 2 (two) times a day as needed for wheezing , Historical Med      losartan (COZAAR) 25 mg tablet Take 1 tablet (25 mg total) by mouth daily, Starting Mon 11/7/2022, Normal      Magnesium 250 MG TABS Take 500 mg by mouth daily , Historical Med      Memantine HCl ER 28 MG CP24 TAKE ONE CAPSULE BY MOUTH EVERY DAY, Normal      metoprolol tartrate (LOPRESSOR) 100 mg tablet TAKE 1 TABLET BY MOUTH AT BEDTIME, Normal      Multiple Vitamins-Minerals (ICAPS AREDS 2 PO) Take by mouth, Historical Med      NovoLOG FlexPen 100 units/mL injection pen INJECT 8 UNITS UNDER THE SKIN TWICE DAILY, Historical Med      Probiotic Product (HEALTHY COLON PO) Take by mouth Takes once daily  , Historical Med      propafenone (RYTHMOL) 225 mg tablet Take 1 tablet (225 mg total) by mouth 3 (three) times a day, Starting Wed 9/14/2022, Normal      sertraline (ZOLOFT) 100 mg tablet Take 1 tablet (100 mg total) by mouth daily, Starting Mon 11/7/2022, Normal      sodium chloride (OCEAN) 0 65 % nasal spray 1 spray into each nostril, Starting Wed 10/31/2018, Until Mon 12/19/2022 at 2359, Historical Med      warfarin (Coumadin) 5 mg tablet Take 1 to 1 1/2 tablet daily as directed, Normal           No discharge procedures on file  PDMP Review     None           ED Provider  Attending physically available and evaluated Lamont Holder I managed the patient along with the ED Attending      Electronically Signed by         Jl Galarza MD  12/29/22 2602

## 2022-12-29 NOTE — TELEPHONE ENCOUNTER
Lab Result: Critical Lab INR 7 02   Date/Time Drawn: 12/28/22  @ 17:05 PM    Ordering Provider: Dr Amber Hidalgo Name: Shira Gunter following critical/stat result was read back to the lab as stated above and [employer] to the on-call provider  The provider confirmed receipt of the message

## 2022-12-29 NOTE — LETTER
[address] [place] [address]  [address] [facility] [address]      January 11, 2023    MRN: [id number]     Phone: [phone]     Dear Mr Estelle Gallo recently had a(n)  performed on  at  [address] [place] [address] that was requested by Azael Reyes MD  The study was reviewed by a radiologist, which is a physician who specializes in medical imaging  The radiologist issued a report describing his or her findings  In that report there was a finding that the radiologist felt warranted further discussion with your health care provider and that discussion would be beneficial to you  The results were sent to Azael Reyes MD on    We recommend that you contact Azael Reyes MD at  or set up an appointment to discuss the results of the imaging test  If you have already heard from Azael Reyes MD regarding the results of your study, you can disregard this letter  This letter is not meant to alarm you, but intended to encourage you to follow-up on your results with the provider that sent you for the imaging study  In addition, we have enclosed answers to frequently asked questions by other patients who have also received a letter to review results with their health care provider (see page two)  Thank you for choosing [facility] for your medical imaging needs  FREQUENTLY ASKED QUESTIONS    1  Why am I receiving this letter? [facility] requires us to notify patients who have findings on imaging exams that may require more testing or follow-up with a health professional within the next 3 months  2  How serious is the finding on the imaging test?  This letter is sent to all patients who may need follow-up or more testing within the next 3 months    Receiving this letter does not necessarily mean you have a life-threatening imaging finding or disease  Recommendations in the radiologist’s imaging report are general in nature and it is up to your healthcare provider to say whether those recommendations make sense for your situation  You are strongly encouraged to talk to your health care provider about the results and ask whether additional steps need to be taken  3  Where can I get a copy of the final report for my recent radiology exam?  To get a full copy of the report you can access your records online at [email or web address] or please contact [facility] Medical Records Department at [phone] Monday through Friday between 8 am and 6 pm          4  What do I need to do now? Please contact your health care provider who requested the imaging study to discuss what further actions (if any) are needed  You may have already heard from (your ordering provider) in regard to this test in which case you can disregard this letter  NOTICE IN ACCORDANCE WITH THE [place] STATE “PATIENT TEST RESULT INFORMATION ACT OF 2018”    You are receiving this notice as a result of a determination by your diagnostic imaging service that further discussions of your test results are warranted and would be beneficial to you  The complete results of your test or tests have been or will be sent to the health care practitioner that ordered the test or tests  It is recommended that you contact your health care practitioner to discuss your results as soon as possible

## 2022-12-29 NOTE — DISCHARGE INSTRUCTIONS
Schedule an appointment to discuss your INR and to schedule another outpatient lab draw  You can call the physical therapy office to schedule physical therapy for your back  Return to the ED for any worsening symptoms

## 2022-12-29 NOTE — PROGRESS NOTES
Spoke with Julian Terrell, advised INR still very high, she does not remember if she gave him Coumadin last night or if she received a call from on call doctor  Advised to hold Coumadin tonight, tomorrow, Sat and Sun, take 2 5 mg Mon and go for INR 1/3/22  Julian Terrell states patient not himself, very unsteady, encouraging patient to use walker  States he has been like this for about 1 week  Went to Urgent Care yesterday, upset that they didn't do xray  Advised with INR so high I would recommend patient be seen in ER  Jerri agreeable

## 2022-12-29 NOTE — ED ATTENDING ATTESTATION
Enrique Starr MD, saw and evaluated the patient  I have discussed the patient with the resident and agree with the resident's findings, Plan of Care, and MDM as documented in the resident's note, except where noted  All available labs and Radiology studies were reviewed  I was present for key portions of any procedure(s) performed by the resident and I was immediately available to provide assistance  At this point I agree with the current assessment done in the Emergency Department  I have conducted an independent evaluation of this patient a history and physical is as follows:    79 yo male with a complicated past medical history including BPH, CKD, HTN, dementia, atrial fibrillation on Coumadin, DM, CAD, indwelling pacer, dementia, COPD, asthma, and hyperlipidemia presents to the ED complaining right flank pain x 1 day  The patient reports a moderate "ache" in the right side/flank since yesterday  No recent trauma to the area but the patient did suffer a fall about 2 weeks ago  No dysuria or hematuria  No abdominal pain  He denies nausea and vomiting  No fevers or chills  The patient is currently holding his Coumadin for supratherapeutic INRs >7  No active bleeding  No chest pain, shortness of breath or cough  No relief with OTC APAP  No other specific complaints  ROS: per resident physician note    Gen: NAD, AA&Ox3  HEENT: PERRL, EOMI  Neck: supple  CV: RRR  Lungs: CTA B/L  Abdomen: soft, NT/ND  Back: (+) mild tenderness to palpation over right flank, no swelling/ecchymoses/discoloration  Ext: no swelling or deformity  Neuro: 5/5 strength all extremities, sensation grossly intact  Skin: no rash    ED Course  The patient is comfortable appearing with stable vital signs  Exam is significant for a poorly localized right flank tenderness  Unclear etiology of pain  Will check basic labs, UA, INR, and CT A/P  Disposition per workup and reassessment  Will continue to monitor in the ED        Critical Care Time  Procedures

## 2023-01-03 ENCOUNTER — TELEPHONE (OUTPATIENT)
Dept: OTHER | Facility: OTHER | Age: 88
End: 2023-01-03

## 2023-01-03 ENCOUNTER — RA CDI HCC (OUTPATIENT)
Dept: OTHER | Facility: HOSPITAL | Age: 88
End: 2023-01-03

## 2023-01-03 ENCOUNTER — APPOINTMENT (OUTPATIENT)
Dept: LAB | Age: 88
End: 2023-01-03

## 2023-01-03 ENCOUNTER — TELEPHONE (OUTPATIENT)
Dept: CARDIOLOGY CLINIC | Facility: CLINIC | Age: 88
End: 2023-01-03

## 2023-01-03 ENCOUNTER — TELEPHONE (OUTPATIENT)
Dept: INTERNAL MEDICINE CLINIC | Facility: CLINIC | Age: 88
End: 2023-01-03

## 2023-01-03 DIAGNOSIS — I48.0 PAROXYSMAL ATRIAL FIBRILLATION (HCC): ICD-10-CM

## 2023-01-03 LAB
INR PPP: 7.86 (ref 0.84–1.19)
PROTHROMBIN TIME: 66.2 SECONDS (ref 11.6–14.5)

## 2023-01-03 NOTE — PROGRESS NOTES
[facility]  coding opportunities     E11 22 and E11 51     Chart Reviewed number of suggestions sent to Provider: 2     Patients Insurance     Medicare Insurance: [address] [place] [address]

## 2023-01-03 NOTE — PROGRESS NOTES
1/3/23~received message from No [facility] office, Kelli Kang called requesting new INR script  Spoke with Kelli Kang, thinks patient needs new INR script, not sure when to go for INR  Advised he was to go today, advised INR very high last week, she should not have been giving patient Coumadin all weekend except yesterday 2 5 mg, she is unsure what dose she gave him and will take patient right away for INR  Advised if she doesn't hear from us today to give patient 2 5 mg matteo Guthrie verbalizes understanding

## 2023-01-04 ENCOUNTER — ANTICOAG VISIT (OUTPATIENT)
Dept: CARDIOLOGY CLINIC | Facility: CLINIC | Age: 88
End: 2023-01-04

## 2023-01-04 DIAGNOSIS — I48.0 PAROXYSMAL ATRIAL FIBRILLATION (HCC): Primary | ICD-10-CM

## 2023-01-04 NOTE — TELEPHONE ENCOUNTER
Lab Result: Critical Lab INR 7 [id number]   Date/Time Drawn: 1/3/23 @ 15:40 PM   Ordering Provider: Dr Muñoz Portal Name: Kyung Traore following critical/stat result was read back to the lab as stated above and [employer] to the on-call provider  The provider confirmed receipt of the message

## 2023-01-04 NOTE — PROGRESS NOTES
Spoke with Inna Platt, advised INR high again  States she is driving to appointment, advised to call office when she gets home, will need to discuss  Inna Platt will call      Messaged Dr Penelope Dominguez via [facility] Text  His response:  Please ask her to stop Coumadin/Warfarin completely until seen in my office  Continue to follow INR to make sure he is off Coumadin     Next visit 2/14/23    Spoke with Inna Platt, advised per Dr Penelope Dominguez do not give patient Coumadin, will need to continue to check that his INR is coming down, will recheck 1/6/23

## 2023-01-06 ENCOUNTER — APPOINTMENT (OUTPATIENT)
Dept: LAB | Age: 88
End: 2023-01-06

## 2023-01-06 ENCOUNTER — TELEPHONE (OUTPATIENT)
Dept: OTHER | Facility: OTHER | Age: 88
End: 2023-01-06

## 2023-01-06 ENCOUNTER — TELEPHONE (OUTPATIENT)
Dept: CARDIOLOGY CLINIC | Facility: CLINIC | Age: 88
End: 2023-01-06

## 2023-01-06 DIAGNOSIS — I48.0 PAROXYSMAL ATRIAL FIBRILLATION (HCC): Primary | ICD-10-CM

## 2023-01-06 DIAGNOSIS — I48.0 PAROXYSMAL ATRIAL FIBRILLATION (HCC): ICD-10-CM

## 2023-01-06 LAB
INR PPP: 5.27 (ref 0.84–1.19)
PROTHROMBIN TIME: 48.6 SECONDS (ref 11.6–14.5)

## 2023-01-06 NOTE — TELEPHONE ENCOUNTER
This is Mrs Philippe Lopez calling his birth date [id number]  He really needs a new pro time INR order because he goes once a week to have his INR done, and this one is dated July 30th, 2021  So I'm taking it with me to [facility] so he can get his blood work done  But if you can send us a new copy of a new order so that when we go to the next time, we'll have the new order  He goes once a week for this blood work  If you need to leave me a message, you can call [phone]  Thank you

## 2023-01-07 NOTE — TELEPHONE ENCOUNTER
Lab Result: Critical Lab INR 5 [id number]   Date/Time Drawn: 1/6/23  @ 12:41 AM    Ordering Provider: Dr Emmanuel Curling Name: Almas Navarro following critical/stat result was read back to the lab as stated above and [employer] to the on-call provider  The provider confirmed receipt of the message

## 2023-01-09 ENCOUNTER — ANTICOAG VISIT (OUTPATIENT)
Dept: CARDIOLOGY CLINIC | Facility: CLINIC | Age: 88
End: 2023-01-09

## 2023-01-09 DIAGNOSIS — I48.0 PAROXYSMAL ATRIAL FIBRILLATION (HCC): Primary | ICD-10-CM

## 2023-01-09 NOTE — PROGRESS NOTES
Spoke with Brigid Cerda, advised INR coming down, continue no Coumadin until seen 2/14/23 by Dr Saranya Bolanos  Advised to recheck 1/11/23 to make sure INR still coming down

## 2023-01-10 NOTE — PROGRESS NOTES
Attempted to contact Stephanievirgil Julio, home # busy, did not answer cell and mail box full, unable to leave message Yes

## 2023-01-11 ENCOUNTER — OFFICE VISIT (OUTPATIENT)
Dept: INTERNAL MEDICINE CLINIC | Facility: CLINIC | Age: 88
End: 2023-01-11

## 2023-01-11 VITALS
WEIGHT: 190 LBS | HEIGHT: 69 IN | DIASTOLIC BLOOD PRESSURE: 80 MMHG | HEART RATE: 70 BPM | OXYGEN SATURATION: 98 % | TEMPERATURE: 97.1 F | BODY MASS INDEX: 28.14 KG/M2 | SYSTOLIC BLOOD PRESSURE: 120 MMHG

## 2023-01-11 DIAGNOSIS — F02.80 FRONTOTEMPORAL DEMENTIA (HCC): Primary | ICD-10-CM

## 2023-01-11 DIAGNOSIS — F03.90 DEMENTIA WITHOUT BEHAVIORAL DISTURBANCE (HCC): ICD-10-CM

## 2023-01-11 DIAGNOSIS — I48.0 PAROXYSMAL ATRIAL FIBRILLATION (HCC): ICD-10-CM

## 2023-01-11 DIAGNOSIS — Z79.4 TYPE 2 DIABETES MELLITUS WITH OTHER SPECIFIED COMPLICATION, WITH LONG-TERM CURRENT USE OF INSULIN (HCC): ICD-10-CM

## 2023-01-11 DIAGNOSIS — I10 ESSENTIAL HYPERTENSION: ICD-10-CM

## 2023-01-11 DIAGNOSIS — G31.09 FRONTOTEMPORAL DEMENTIA (HCC): Primary | ICD-10-CM

## 2023-01-11 DIAGNOSIS — E11.40 TYPE 2 DIABETES MELLITUS WITH DIABETIC NEUROPATHY, WITH LONG-TERM CURRENT USE OF INSULIN (HCC): ICD-10-CM

## 2023-01-11 DIAGNOSIS — M54.50 CHRONIC RIGHT-SIDED LOW BACK PAIN WITHOUT SCIATICA: ICD-10-CM
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
## 2023-01-11 DIAGNOSIS — G89.29 CHRONIC RIGHT-SIDED LOW BACK PAIN WITHOUT SCIATICA: ICD-10-CM

## 2023-01-11 DIAGNOSIS — Z79.4 TYPE 2 DIABETES MELLITUS WITH DIABETIC NEUROPATHY, WITH LONG-TERM CURRENT USE OF INSULIN (HCC): ICD-10-CM

## 2023-01-11 DIAGNOSIS — E11.69 TYPE 2 DIABETES MELLITUS WITH OTHER SPECIFIED COMPLICATION, WITH LONG-TERM CURRENT USE OF INSULIN (HCC): ICD-10-CM
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
## 2023-01-16 ENCOUNTER — APPOINTMENT (OUTPATIENT)
Dept: LAB | Age: 88
End: 2023-01-16

## 2023-01-16 LAB
INR PPP: 4.43 (ref 0.84–1.19)
PROTHROMBIN TIME: 42.5 SECONDS (ref 11.6–14.5)

## 2023-01-17 ENCOUNTER — ANTICOAG VISIT (OUTPATIENT)
Dept: CARDIOLOGY CLINIC | Facility: CLINIC | Age: 88
End: 2023-01-17

## 2023-01-17 DIAGNOSIS — I48.0 PAROXYSMAL ATRIAL FIBRILLATION (HCC): Primary | ICD-10-CM

## 2023-01-17 NOTE — PROGRESS NOTES
Spoke with Mike Cortez, advised INR coming down but still high, advised no Coumadin until patient seen by Dr Pat Hernandez 2/14/23  Advised she should not even have Coumadin near his other medications  Will recheck next week 1/23/23  Jerri verbalizes understanding

## 2023-01-24 ENCOUNTER — TELEPHONE (OUTPATIENT)
Dept: INTERNAL MEDICINE CLINIC | Facility: CLINIC | Age: 88
End: 2023-01-24

## 2023-01-24 ENCOUNTER — APPOINTMENT (OUTPATIENT)
Dept: LAB | Age: 88
End: 2023-01-24

## 2023-01-25 ENCOUNTER — ANTICOAG VISIT (OUTPATIENT)
Dept: CARDIOLOGY CLINIC | Facility: CLINIC | Age: 88
End: 2023-01-25

## 2023-01-25 ENCOUNTER — HOME HEALTH ADMISSION (OUTPATIENT)
Dept: HOME HEALTH SERVICES | Facility: HOME HEALTHCARE | Age: 88
End: 2023-01-25

## 2023-01-25 DIAGNOSIS — F02.B18 MODERATE LATE ONSET ALZHEIMER'S DEMENTIA WITH OTHER BEHAVIORAL DISTURBANCE (HCC): ICD-10-CM

## 2023-01-25 DIAGNOSIS — G30.1 MODERATE LATE ONSET ALZHEIMER'S DEMENTIA WITH OTHER BEHAVIORAL DISTURBANCE (HCC): ICD-10-CM

## 2023-01-25 DIAGNOSIS — Z91.14 NON COMPLIANCE W MEDICATION REGIMEN: Primary | ICD-10-CM

## 2023-01-25 DIAGNOSIS — I48.0 PAROXYSMAL ATRIAL FIBRILLATION (HCC): Primary | ICD-10-CM

## 2023-01-25 NOTE — PROGRESS NOTES
Incorrect order placed  Spoke with [facility], new order placed  She will be handling case

## 2023-01-25 NOTE — PROGRESS NOTES
Discussed situation with Dr Jose Caban, will order [facility] to come in to verify that patient not taking coumadin  Spoke with [facility] at [facility] intake [facility], order placed and she will be working on case       Attempted to contact [id number], home and cell #, mailbox full, unable to leave message

## 2023-01-26 ENCOUNTER — TELEPHONE (OUTPATIENT)
Dept: INTERNAL MEDICINE CLINIC | Facility: OTHER | Age: 88
End: 2023-01-26

## 2023-01-26 ENCOUNTER — HOME CARE VISIT (OUTPATIENT)
Dept: HOME HEALTH SERVICES | Facility: HOME HEALTHCARE | Age: 88
End: 2023-01-26

## 2023-01-26 VITALS
SYSTOLIC BLOOD PRESSURE: 98 MMHG | RESPIRATION RATE: 18 BRPM | TEMPERATURE: 98.2 F | DIASTOLIC BLOOD PRESSURE: 66 MMHG | HEART RATE: 61 BPM | OXYGEN SATURATION: 95 %

## 2023-01-26 NOTE — PROGRESS NOTES
[facility] states Harjeet King has been giving patient Coumadin and last refilled 1/15/23  Advised patient was not to be getting coumadin since 1/4/23, she will cross off Coumadin on med list and will take Coumadin out of med box  Advised to have patient get INR 1/31/23  She also let Jerri know that I was not able to call  Harjeet King states she is having phone issues

## 2023-01-26 NOTE — TELEPHONE ENCOUNTER
Kat Alicia visiting home nurse for [facility]  [facility] called to let us know that she saw the patient today  She said that Dr Yves Saldaña back on 1/4 told patient and his wife that with a significant rise of his PT/INR that he should stop taking his coumadin  Patient does have a hx of alzheimer's / dementia and his wife manages his medication  At the home visit today, Kat Alicia asked patient's wife if he has still be taking his coumadin and she said yes he's been taking it this entire time  Patient's wife had no recollection of being told that patient should no longer be taking the coumadin  Kat Alicia stated that she is just as forgetful and Beata Logan and she thinks it would be a good idea to get patient some help in the home  She stated that she is unable to admit him to [facility] because he is not home bound  I have [address] on this note one of the care managers for this region and thought maybe she could be of some assistance  Not sure if we need an order but Kat Alicia just wanted to make Dr Milagro Waters aware of what was going on and come up with a future action plan for patient

## 2023-01-27 ENCOUNTER — APPOINTMENT (OUTPATIENT)
Dept: LAB | Age: 88
End: 2023-01-27

## 2023-01-27 NOTE — TELEPHONE ENCOUNTER
Edilma Lo is returning call, left message on voicemail  She says she is pt's step daughter  Her and nephew Sarah Braun have [address], pt's wife but not of Estuardo Braun lives with them full time  She says she has already spoke to [facility]  She says Dr Cheryl Hanson as mandatory  can also call if he feels it necessary for them to open up an investigation  She was told by  that she cannot force them to sell house and move to a facility because Ayaan's name is on mortgage  They currently do not want to sell there hous  Her  at the [facility] is involved in this as well as neighbors and other family members  Edilma Lo is currently in [facility] until May, there is a charge for her to make phone calls to the [address] so she gave her email address for further communication: [email or web address]

## 2023-01-30 ENCOUNTER — PATIENT OUTREACH (OUTPATIENT)
Dept: INTERNAL MEDICINE CLINIC | Facility: CLINIC | Age: 88
End: 2023-01-30

## 2023-01-30 DIAGNOSIS — Z78.9 NEED FOR FOLLOW-UP BY SOCIAL WORKER: Primary | ICD-10-CM

## 2023-01-30 NOTE — PROGRESS NOTES
In-basket message received from ANAMARIA Alfonso, OP RNCM to outreach pt's dtr  Chart review completed  Per in basket message, pt had [facility] RN intake and the nurse noted pt taking medication that he was instructed to no longer take  Per in basket message, pt resides with his wife and nephew Sarah Braun  No communication consent for pt's nephew Sarah Braun, only for dtr  Dtr is currently not POA  Per chart, wife observed to also need assistance in the home  Per chart, pt and wife have some financial difficulties  E-mail sent to pt's dtr Edilma Lo regarding communication with AAA worker about pt's medication administration  OP SWCM will await f/u with dtr regarding needs  Addendum:  P/c from Edilma Lo  She spoke with [facility] to open an investigation in the home  Pt's wife Renella Curling is POA of pt  Edilma Lo reports that Renella Curling suffers with dementia  Renella Curling is very forgetful and her  contacted Edilma Lo via [employer] of concerns due to her forgetfulness  Renella Curling is voicing to neighbors that they have no food in the house and asks for money  Pt's nephew, who is 22, lives with them in the home, but is not in charge of watching them  He works full time and lives in the basement  He does get them food when grocery shopping and helps with paying bills  They are independent with ADLs  Pt has back issues and using walker, but mainly spends time on the main floor of the house except for sleeping  Renella Curling and pt do not share financial statements with dtr  Edilma Lo states they have deposit for [facility] independent care  Pt and wife are first in line and have been called with openings, but wife denies wanting to go and pt follows with her  Edilma Lo believes they need more than just independent care, but it is a step in the right direction if they choose to move there  Pt and Jerri state they don't want to move, but Renella Curling has contacted friends about selling the house       AAA referral is getting sent over to [place] to do an investigation by Nicole Kate is most concerned on their financial standings  Gypsy Lora and pt ask for money from neighbors, family members, friends, etc  Her car was almost repossessed 4 times due to missing payments  PCP has discussed pt's wife not being able to drive  Gail Wing inquiring about PCP writing letter to revoke [employer]  Pt has not driven for years  Luciana's brother passed away recently and pt's wife Gypsy Lora has had significant decline in dementia diagnosis since  Pt under care of wife and Gail Wing is concerned of how this will effect pt

## 2023-02-02 ENCOUNTER — APPOINTMENT (OUTPATIENT)
Dept: LAB | Age: 88
End: 2023-02-02

## 2023-02-02 DIAGNOSIS — I48.0 PAROXYSMAL ATRIAL FIBRILLATION (HCC): ICD-10-CM

## 2023-02-02 LAB
INR PPP: 1.21 (ref 0.84–1.19)
PROTHROMBIN TIME: 15.5 SECONDS (ref 11.6–14.5)

## 2023-02-03 ENCOUNTER — ANTICOAG VISIT (OUTPATIENT)
Dept: CARDIOLOGY CLINIC | Facility: CLINIC | Age: 88
End: 2023-02-03

## 2023-02-03 ENCOUNTER — APPOINTMENT (OUTPATIENT)
Dept: LAB | Age: 88
End: 2023-02-03

## 2023-02-03 DIAGNOSIS — I48.0 PAROXYSMAL ATRIAL FIBRILLATION (HCC): ICD-10-CM

## 2023-02-03 DIAGNOSIS — I48.0 PAROXYSMAL ATRIAL FIBRILLATION (HCC): Primary | ICD-10-CM

## 2023-02-03 LAB
INR PPP: 1.13 (ref 0.84–1.19)
PROTHROMBIN TIME: 14.7 SECONDS (ref 11.6–14.5)

## 2023-02-03 NOTE — PROGRESS NOTES
Left message on cell # for Sandee Marcus, advised INR coming down, almost normal, advised no need for further INR at this time, Dr Venice Griggs will discuss further at visit 2/14/23

## 2023-02-06 ENCOUNTER — ANTICOAG VISIT (OUTPATIENT)
Dept: CARDIOLOGY CLINIC | Facility: CLINIC | Age: 88
End: 2023-02-06

## 2023-02-06 DIAGNOSIS — I48.0 PAROXYSMAL ATRIAL FIBRILLATION (HCC): Primary | ICD-10-CM

## 2023-02-06 NOTE — PROGRESS NOTES
Left message on home #, advised patients INR is normal, NO further INR testing is needed at this time  Advised to call office with questions or concerns

## 2023-02-07 ENCOUNTER — APPOINTMENT (OUTPATIENT)
Dept: LAB | Age: 88
End: 2023-02-07

## 2023-02-07 DIAGNOSIS — I48.0 PAROXYSMAL ATRIAL FIBRILLATION (HCC): ICD-10-CM

## 2023-02-07 LAB
INR PPP: 1.1 (ref 0.84–1.19)
PROTHROMBIN TIME: 14.4 SECONDS (ref 11.6–14.5)

## 2023-02-08 ENCOUNTER — APPOINTMENT (OUTPATIENT)
Dept: LAB | Age: 88
End: 2023-02-08

## 2023-02-08 ENCOUNTER — ANTICOAG VISIT (OUTPATIENT)
Dept: CARDIOLOGY CLINIC | Facility: CLINIC | Age: 88
End: 2023-02-08

## 2023-02-08 DIAGNOSIS — I48.0 PAROXYSMAL ATRIAL FIBRILLATION (HCC): ICD-10-CM

## 2023-02-08 DIAGNOSIS — E78.5 HYPERLIPIDEMIA, UNSPECIFIED HYPERLIPIDEMIA TYPE: ICD-10-CM

## 2023-02-08 DIAGNOSIS — I48.0 PAROXYSMAL ATRIAL FIBRILLATION (HCC): Primary | ICD-10-CM

## 2023-02-08 LAB
INR PPP: 1.1 (ref 0.84–1.19)
PROTHROMBIN TIME: 14.5 SECONDS (ref 11.6–14.5)

## 2023-02-08 RX ORDER — ATORVASTATIN CALCIUM 10 MG/1
10 TABLET, FILM COATED ORAL DAILY
Qty: 90 TABLET | Refills: 1 | Status: SHIPPED | OUTPATIENT
Start: 2023-02-08

## 2023-02-08 RX ORDER — ATORVASTATIN CALCIUM 10 MG/1
TABLET, FILM COATED ORAL
Qty: 90 TABLET | Refills: 1 | OUTPATIENT
Start: 2023-02-08

## 2023-02-08 NOTE — PROGRESS NOTES
Attempted to contact Jerri, unable to leave message on home or cell #  Standing INR order cancelled

## 2023-02-09 ENCOUNTER — ANTICOAG VISIT (OUTPATIENT)
Dept: CARDIOLOGY CLINIC | Facility: CLINIC | Age: 88
End: 2023-02-09

## 2023-02-09 DIAGNOSIS — I48.0 PAROXYSMAL ATRIAL FIBRILLATION (HCC): Primary | ICD-10-CM

## 2023-02-09 NOTE — PROGRESS NOTES
Left message for Cathaleen Heimlich, advised patient does NOT need to go for any further INR's, please do not take patient for blood work, advised to call with questions or concerns       Spoke with IT to verify that order was cancelled, she is not sure how they got an order, will send to analyst

## 2023-02-13 ENCOUNTER — PATIENT OUTREACH (OUTPATIENT)
Dept: INTERNAL MEDICINE CLINIC | Facility: CLINIC | Age: 88
End: 2023-02-13

## 2023-02-13 NOTE — PROGRESS NOTES
Email received from pt's step-daughter Lex Cabrales who is currently in [facility]  Lex Cabrales is asking for assistance setting up services for pt  Pt's wife is POA so Lex Cabrales is unable to make any choices or decisions for pt  Pt and his wife both declining due to dementia/Alzheimer's  Dannynazanin Del Valledwayne reports she reached out to [facility]  and left , waiting for return call  Luciana's nephew lives with pt and his wife, but is not primary caretaker  Lex Cabrales is receiving calls from neighbors and cousins that pt's wife is asking neighbors and cousins for money and food, stating they have nothing, but Luciana's nephew has been grocery shopping for them  Lex Cabrales is in [facility] for a couple more months and unable to physically be there for them to assess situation  Lex Cabrales requesting information on guardian services for pt and his wife  Luciana asking OP SWCM to place referral for [facility] and any other services that would be available to them  OP SWCM completed M[facility] referral  OP SWCM informed Luciana by email regarding update

## 2023-02-14 ENCOUNTER — OFFICE VISIT (OUTPATIENT)
Dept: CARDIOLOGY CLINIC | Facility: CLINIC | Age: 88
End: 2023-02-14

## 2023-02-14 VITALS
WEIGHT: 180 LBS | DIASTOLIC BLOOD PRESSURE: 72 MMHG | BODY MASS INDEX: 26.66 KG/M2 | SYSTOLIC BLOOD PRESSURE: 120 MMHG | HEIGHT: 69 IN | OXYGEN SATURATION: 96 % | HEART RATE: 71 BPM

## 2023-02-14 DIAGNOSIS — I48.0 PAROXYSMAL ATRIAL FIBRILLATION (HCC): Primary | ICD-10-CM

## 2023-02-14 DIAGNOSIS — E78.2 MIXED HYPERLIPIDEMIA: ICD-10-CM

## 2023-02-14 DIAGNOSIS — I25.10 CORONARY ARTERY DISEASE INVOLVING NATIVE CORONARY ARTERY OF NATIVE HEART WITHOUT ANGINA PECTORIS: ICD-10-CM

## 2023-02-14 DIAGNOSIS — I10 ESSENTIAL HYPERTENSION: ICD-10-CM

## 2023-02-14 NOTE — PROGRESS NOTES
Assessment/Plan:    Essential hypertension  Hypertension, stable  Paroxysmal atrial fibrillation (HCC)  Paroxysmal atrial fibrillation  The patient is in regular rhythm and is asymptomatic  He is no longer on Coumadin as described in the HPI  Coronary artery disease involving native coronary artery of native heart without angina pectoris  Coronary artery disease, stable  No symptoms of angina or signs of heart failure  Mixed hyperlipidemia  Hyperlipidemia, stable  Diagnoses and all orders for this visit:    Paroxysmal atrial fibrillation [facility])    Essential hypertension    Coronary artery disease involving native coronary artery of native heart without angina pectoris    Mixed hyperlipidemia          Subjective: Feels well  Patient ID: Judi Wilson is a 80 y o  male  The patient presented to this office for the purpose of cardiac follow-up  He is known to have a history of coronary artery disease with paroxysmal atrial fibrillation and history of hypertension and hyperlipidemia  The patient is feeling well denying any symptoms of chest pain, shortness of breath, palpitation, dizziness or lightheadedness  He has no leg edema  The patient was noted to have multiple bruises in the past and this was associated with elevated INR secondary to Coumadin  After several attempts at regulating this it was decided that we will be safest for the patient to be off of Coumadin at any rate the patient has been in regular rhythm  The following portions of the patient's history were reviewed and updated as appropriate: allergies, current medications, past family history, past medical history, past social history, past surgical history and problem list     Review of Systems   Respiratory: Negative for apnea, cough, chest tightness, shortness of breath and wheezing  Cardiovascular: Negative for chest pain, palpitations and leg swelling  Gastrointestinal: Negative for abdominal pain  Neurological: Negative for dizziness and light-headedness  Psychiatric/Behavioral: Negative  Objective: Stable cardiac wise  /72 (BP Location: Left arm, Patient Position: Sitting, Cuff Size: Standard)   Pulse 71   Ht 5' 9" (1 753 m)   Wt 81 6 kg (180 lb)   SpO2 96%   BMI 26 58 kg/m²          Physical Exam  Vitals reviewed  Constitutional:       General: He is not in acute distress  Appearance: He is well-developed  He is not diaphoretic  HENT:      Head: Normocephalic  Eyes:      Pupils: Pupils are equal, round, and reactive to light  Neck:      Thyroid: No thyromegaly  Vascular: No JVD  Cardiovascular:      Rate and Rhythm: Normal rate and regular rhythm  Heart sounds: S1 normal and S2 normal  No murmur heard  No friction rub  No gallop  Pulmonary:      Effort: Pulmonary effort is normal  No respiratory distress  Breath sounds: Normal breath sounds  No wheezing or rales  Chest:      Chest wall: No tenderness  Abdominal:      Palpations: Abdomen is soft  Musculoskeletal:         General: No tenderness or deformity  Normal range of motion  Cervical back: Normal range of motion  Right lower leg: No edema  Left lower leg: No edema  Skin:     General: Skin is warm and dry  Neurological:      Mental Status: He is alert and oriented to person, place, and time     Psychiatric:         Mood and Affect: Mood normal          Behavior: Behavior normal

## 2023-02-14 NOTE — ASSESSMENT & PLAN NOTE
Paroxysmal atrial fibrillation  The patient is in regular rhythm and is asymptomatic  He is no longer on Coumadin as described in the HPI

## 2023-02-14 NOTE — LETTER
February 14, 2023     Nila Maciel, [address] [place] [address]    Patient: Lanny Del Real   YOB: 1935   Date of Visit: 2/14/2023       Dear Dr Maria L Britton:    Thank you for referring Kyleigh Loera to me for evaluation  Below are my notes for this consultation  If you have questions, please do not hesitate to call me  I look forward to following your patient along with you  Sincerely,        Eve Loya MD        CC: No Recipients  Eve Loya MD  2/14/2023  1:30 PM  Sign when Signing Visit  Assessment/Plan:    Essential hypertension  Hypertension, stable  Paroxysmal atrial fibrillation (HCC)  Paroxysmal atrial fibrillation  The patient is in regular rhythm and is asymptomatic  He is no longer on Coumadin as described in the HPI  Coronary artery disease involving native coronary artery of native heart without angina pectoris  Coronary artery disease, stable  No symptoms of angina or signs of heart failure  Mixed hyperlipidemia  Hyperlipidemia, stable  Diagnoses and all orders for this visit:    Paroxysmal atrial fibrillation [facility]    Essential hypertension    Coronary artery disease involving native coronary artery of native heart without angina pectoris    Mixed hyperlipidemia         Subjective: Feels well  Patient ID: Lanny Del Real is a 80 y o  male  The patient presented to this office for the purpose of cardiac follow-up  He is known to have a history of coronary artery disease with paroxysmal atrial fibrillation and history of hypertension and hyperlipidemia  The patient is feeling well denying any symptoms of chest pain, shortness of breath, palpitation, dizziness or lightheadedness  He has no leg edema  The patient was noted to have multiple bruises in the past and this was associated with elevated INR secondary to Coumadin    After several attempts at regulating this it was decided that we will be safest for the patient to be off of Coumadin at any rate the patient has been in regular rhythm  The following portions of the patient's history were reviewed and updated as appropriate: allergies, current medications, past family history, past medical history, past social history, past surgical history and problem list     Review of Systems   Respiratory: Negative for apnea, cough, chest tightness, shortness of breath and wheezing  Cardiovascular: Negative for chest pain, palpitations and leg swelling  Gastrointestinal: Negative for abdominal pain  Neurological: Negative for dizziness and light-headedness  Psychiatric/Behavioral: Negative  Objective: Stable cardiac wise  /72 (BP Location: Left arm, Patient Position: Sitting, Cuff Size: Standard)   Pulse 71   Ht 5' 9" (1 753 m)   Wt 81 6 kg (180 lb)   SpO2 96%   BMI 26 58 kg/m²         Physical Exam  Vitals reviewed  Constitutional:       General: He is not in acute distress  Appearance: He is well-developed  He is not diaphoretic  HENT:      Head: Normocephalic  Eyes:      Pupils: Pupils are equal, round, and reactive to light  Neck:      Thyroid: No thyromegaly  Vascular: No JVD  Cardiovascular:      Rate and Rhythm: Normal rate and regular rhythm  Heart sounds: S1 normal and S2 normal  No murmur heard  No friction rub  No gallop  Pulmonary:      Effort: Pulmonary effort is normal  No respiratory distress  Breath sounds: Normal breath sounds  No wheezing or rales  Chest:      Chest wall: No tenderness  Abdominal:      Palpations: Abdomen is soft  Musculoskeletal:         General: No tenderness or deformity  Normal range of motion  Cervical back: Normal range of motion  Right lower leg: No edema  Left lower leg: No edema  Skin:     General: Skin is warm and dry  Neurological:      Mental Status: He is alert and oriented to person, place, and time     Psychiatric:         Mood and Affect: Mood normal          Behavior: Behavior normal

## 2023-02-15 DIAGNOSIS — E11.22 TYPE 2 DIABETES MELLITUS WITH STAGE 4 CHRONIC KIDNEY DISEASE, WITH LONG-TERM CURRENT USE OF INSULIN (HCC): Primary | ICD-10-CM

## 2023-02-15 DIAGNOSIS — Z79.4 TYPE 2 DIABETES MELLITUS WITH STAGE 4 CHRONIC KIDNEY DISEASE, WITH LONG-TERM CURRENT USE OF INSULIN (HCC): Primary | ICD-10-CM

## 2023-02-15 DIAGNOSIS — N18.4 TYPE 2 DIABETES MELLITUS WITH STAGE 4 CHRONIC KIDNEY DISEASE, WITH LONG-TERM CURRENT USE OF INSULIN (HCC): Primary | ICD-10-CM

## 2023-02-15 RX ORDER — INSULIN ASPART 100 [IU]/ML
8 INJECTION, SOLUTION INTRAVENOUS; SUBCUTANEOUS 2 TIMES DAILY WITH MEALS
Qty: 15 ML | Refills: 5 | Status: SHIPPED | OUTPATIENT
Start: 2023-02-15

## 2023-02-15 NOTE — TELEPHONE ENCOUNTER
Per pharmacy, patient needs a refill on Novolog Flex pen, however the office keeps on sending cartridges to the pharmacy

## 2023-02-20 ENCOUNTER — PATIENT OUTREACH (OUTPATIENT)
Dept: INTERNAL MEDICINE CLINIC | Facility: CLINIC | Age: 88
End: 2023-02-20

## 2023-02-20 NOTE — PROGRESS NOTES
Pt's step-daughter Louise Bain sent email to OP [facility] and left VM about email  Louise Bain is working with an  to get guardianship over pt and pt's wife (her mom) Jonathan Manners  For guardianship, PCP  has to fill out competency paperwork  Luciana forwarded these to OP [facility] in email and asked that [facility] send to  to get filled out and sent back  Louise Bain is also concerned about pt's wife Jonathan Manners driving and if  is able to sent letter to [facility] to revoke her license  OP [facility] sent all this in IB message to Dr Carlos Sultana  Awaiting response

## 2023-02-27 DIAGNOSIS — I48.91 ATRIAL FIBRILLATION, UNSPECIFIED TYPE (HCC): ICD-10-CM

## 2023-02-28 RX ORDER — METOPROLOL TARTRATE 100 MG/1
100 TABLET ORAL
Qty: 90 TABLET | Refills: 3 | Status: SHIPPED | OUTPATIENT
Start: 2023-02-28

## 2023-03-14 ENCOUNTER — PATIENT OUTREACH (OUTPATIENT)
Dept: INTERNAL MEDICINE CLINIC | Facility: CLINIC | Age: 88
End: 2023-03-14

## 2023-03-14 NOTE — PROGRESS NOTES
IB message sent to Dr Elton Lu about pt's step-daughter, Luciana's concerns and needs for pt and pt's wife  Dolores Spurling needs competency paperwork filled out from PCP in order to obtain guardianship  OP SWCM asked Dr Elton Lu about filling out paperwork  IB message sent to Dr Elton Lu about contacting Dolores Spurling via phone or email about competency paperwork and needs for pt and pt's wife  Dr Elton Lu IB message sent to OP [facility] of getting in touch with Dolores Spurling himself  Email message sent from Dolores Spurling to thank Dr Elton Lu for getting pt's wife's license suspended  Also Dolores Spurling wanted to know status of competency paperwork if Dr Elton Lu can fill them out  Dolores Spurling is still working with AAA  to get necessary services in place to address the concerns she has for pt and pt's wife  Email message sent to Dolores Spurling to confirm if Dr Elton Lu got in touch with her about the paperwork so she can move forward with guardianship  Also discussed not hearing from AAA worker regarding pt and pt's wife  OP SWCM asked Dolores Spurling to respond with any other concerns or questions  OP SWCM will continue to have pt on surveillance waiting to get in touch with Dolores Spurling and Dr Elton Lu

## 2023-03-17 ENCOUNTER — TELEPHONE (OUTPATIENT)
Dept: INTERNAL MEDICINE CLINIC | Facility: CLINIC | Age: 88
End: 2023-03-17

## 2023-03-17 NOTE — TELEPHONE ENCOUNTER
received fax from [employer] [facility] need prior Auth for one touch verio strips , scanned into  and sent to clinical pool for review

## 2023-03-20 DIAGNOSIS — E11.40 TYPE 2 DIABETES MELLITUS WITH DIABETIC NEUROPATHY, WITH LONG-TERM CURRENT USE OF INSULIN (HCC): ICD-10-CM

## 2023-03-20 DIAGNOSIS — Z79.4 TYPE 2 DIABETES MELLITUS WITH DIABETIC NEUROPATHY, WITH LONG-TERM CURRENT USE OF INSULIN (HCC): ICD-10-CM

## 2023-03-20 RX ORDER — BLOOD SUGAR DIAGNOSTIC
1 STRIP MISCELLANEOUS 3 TIMES DAILY
Qty: 300 STRIP | Refills: 3 | Status: SHIPPED | OUTPATIENT
Start: 2023-03-20

## 2023-03-30 ENCOUNTER — TELEPHONE (OUTPATIENT)
Dept: CARDIOLOGY CLINIC | Facility: CLINIC | Age: 88
End: 2023-03-30

## 2023-05-02 ENCOUNTER — TELEPHONE (OUTPATIENT)
Dept: INTERNAL MEDICINE CLINIC | Facility: CLINIC | Age: 88
End: 2023-05-02

## 2023-05-03 ENCOUNTER — APPOINTMENT (OUTPATIENT)
Dept: LAB | Age: 88
End: 2023-05-03

## 2023-05-03 DIAGNOSIS — Z00.00 ROUTINE CHECK-UP: ICD-10-CM

## 2023-05-03 LAB
DME PARACHUTE DELIVERY DATE EXPECTED: NORMAL
DME PARACHUTE DELIVERY DATE REQUESTED: NORMAL
DME PARACHUTE DELIVERY NOTE: NORMAL
DME PARACHUTE ITEM DESCRIPTION: NORMAL
DME PARACHUTE ORDER STATUS: NORMAL
DME PARACHUTE SUPPLIER NAME: NORMAL
DME PARACHUTE SUPPLIER PHONE: NORMAL
INR PPP: 1.04 (ref 0.84–1.19)
PROTHROMBIN TIME: 13.8 SECONDS (ref 11.6–14.5)

## 2023-05-05 ENCOUNTER — APPOINTMENT (EMERGENCY)
Dept: RADIOLOGY | Facility: HOSPITAL | Age: 88
End: 2023-05-05

## 2023-05-05 ENCOUNTER — HOSPITAL ENCOUNTER (EMERGENCY)
Facility: HOSPITAL | Age: 88
Discharge: HOME | End: 2023-05-05
Attending: SURGERY

## 2023-05-05 VITALS
OXYGEN SATURATION: 97 % | OXYGEN SATURATION: 97 % | HEART RATE: 62 BPM | SYSTOLIC BLOOD PRESSURE: 128 MMHG | RESPIRATION RATE: 18 BRPM | TEMPERATURE: 98 F | WEIGHT: 181.22 LBS | RESPIRATION RATE: 18 BRPM | DIASTOLIC BLOOD PRESSURE: 69 MMHG | WEIGHT: 181.22 LBS | HEART RATE: 62 BPM | SYSTOLIC BLOOD PRESSURE: 128 MMHG | TEMPERATURE: 98 F | DIASTOLIC BLOOD PRESSURE: 69 MMHG
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
## 2023-05-05 DIAGNOSIS — S09.90XA INJURY OF HEAD, INITIAL ENCOUNTER: ICD-10-CM

## 2023-05-06 NOTE — PROGRESS NOTES
23   Clinical Encounter Type   Visited With Family   Crisis Visit Trauma      Pastoral Care Progress Note    2023  Patient: Deshawn Huerta : 1935  Admission Date & Time: 2023  MRN: [id number] CSN: [id number]           support and hospitality for spouse while waiting  No needs at this time

## 2023-05-06 NOTE — DISCHARGE INSTRUCTIONS
You have been seen for a fall  You should return to the ED if you develop mental status changes, falls, dizziness, syncope, or other worsening symptoms  Follow up with your primary care doctor

## 2023-05-06 NOTE — H&P
H&P - Trauma   Ruddy Simpson 80 y o  male MRN: [id number]  Unit/Bed#: ED 28 Encounter: [id number]    Trauma Alert: Level B   Model of Arrival: Ambulance    Trauma Team: Attending Joseph Clarke and David Lomas  Consultants:     None     Assessment/Plan   Active Problems / Assessment:   Fall  Head Injury     Plan:   CT head and C spine  Scans negative  Discharge home    History of Present Illness     Chief Complaint: fall  Mechanism:Fall     HPI:    Ruddy Simpson is a 80 y o  male with a PMH of htn, hld, afib, cad, dementia, and DM who presents after a fall  Patient reports a mechanical slip and fall in his driveway just prior to arrival  Patient had no prodromal symptoms  He did hit his head, but did not lose consciousness  He is supposed to use a walker, but does not like to according to his wife  He was not using his walker when he fell  Patient has no complaints  His head does not hurt  Review of Systems   Constitutional: Negative for chills and fever  HENT: Negative  Negative for congestion and rhinorrhea  Eyes: Negative for pain and visual disturbance  Respiratory: Negative for cough, chest tightness and shortness of breath  Cardiovascular: Negative for chest pain and palpitations  Gastrointestinal: Negative for abdominal pain, diarrhea, nausea and vomiting  Endocrine: Negative  Genitourinary: Negative for difficulty urinating and hematuria  Musculoskeletal: Negative for back pain and neck pain  Skin: Negative for pallor and rash  Allergic/Immunologic: Negative  Neurological: Negative for dizziness, weakness, light-headedness and headaches  Hematological: Negative  Psychiatric/Behavioral: Negative  12-point, complete review of systems was reviewed and negative except as stated above  Historical Information       PMH: htn, hld, afib, cad, dementia, CKD, and DM  No known surgical history           There is no immunization history on file for this patient    Last Tetanus: unknown  Family History: Non-contributory    1  Before the illness or injury that brought you to the Emergency, did you need someone to help you on a regular basis? 1=Yes   2  Since the illness or injury that brought you to the Emergency, have you needed more help than usual to take care of yourself? 0=No   3  Have you been hospitalized for one or more nights during the past 6 months (excluding a stay in the Emergency Department)? 0=No   4  In general, do you see well? 0=Yes   5  In general, do you have serious problems with your memory? 1=Yes   6  Do you take more than three different medications everyday? 1=Yes   TOTAL   3     Did you order a geriatric consult if the score was 2 or greater?: no     Meds/Allergies   all current active meds have been reviewed  Allergies have not been reviewed; Not on File    Objective   Initial Vitals:   Temperature: 98 °F (36 7 °C) (05/05/23 1955)  Pulse: 74 (05/05/23 1949)  Respirations: 18 (05/05/23 1949)  Blood Pressure: 153/66 (05/05/23 1949)    Primary Survey:   Airway:        Status: patent;        Pre-hospital Interventions: none        Hospital Interventions: none  Breathing:        Pre-hospital Interventions: none       Effort: normal       Right breath sounds: normal       Left breath sounds: normal  Circulation:        Rhythm: regular       Rate: regular   Right Pulses Left Pulses    R radial: 2+    R pedal: 2+     L radial: 2+    L pedal: 2+       Disability:        GCS: Eye: 4; Verbal: 4 Motor: 6 Total: 14       Right Pupil:       Left Pupil:     R Motor Strength L Motor Strength    R : 5/5  R dorsiflex: 5/5  R plantarflex: 5/5 L : 5/5  L dorsiflex: 5/5  L plantarflex: 5/5        Sensory:  No sensory deficit  Exposure:       Completed: Yes      Secondary Survey:  Physical Exam  Vitals and nursing note reviewed  Constitutional:       General: He is not in acute distress  Appearance: Normal appearance  He is not ill-appearing     HENT:      Head: Normocephalic and atraumatic  Nose: Nose normal       Mouth/Throat:      Mouth: Mucous membranes are moist    Eyes:      Pupils: Pupils are equal, round, and reactive to light  Cardiovascular:      Rate and Rhythm: Normal rate and regular rhythm  Pulses:           Radial pulses are 2+ on the right side and 2+ on the left side  Dorsalis pedis pulses are 2+ on the right side and 2+ on the left side  Pulmonary:      Effort: Pulmonary effort is normal       Breath sounds: Normal breath sounds  No decreased breath sounds  Chest:      Chest wall: No tenderness  Abdominal:      General: Abdomen is flat  There is no distension  Palpations: Abdomen is soft  Tenderness: There is no abdominal tenderness  Musculoskeletal:         General: Normal range of motion  Cervical back: Normal range of motion and neck supple  No bony tenderness  Thoracic back: No bony tenderness  Lumbar back: No bony tenderness  Left hip: No bony tenderness  Skin:     General: Skin is warm and dry  Neurological:      General: No focal deficit present  Mental Status: He is alert  Mental status is at baseline  He is disoriented  Motor: No weakness  Invasive Devices     None               Lab Results: Results: I have personally reviewed all pertinent laboratory/tests results    Imaging Results: I have personally reviewed pertinent reports      Chest Xray(s): N/A   FAST exam(s): negative for acute findings   CT Scan(s): negative for acute findings   Additional Xray(s): N/A         Code Status: No Order  Advance Directive and Living Will:      Power of :    POLST:    I have spent 30 minutes with Patient and family today in which greater than 50% of this time was spent in counseling/coordination of care regarding Diagnostic results, Instructions for management, Patient and family education, Impressions, Documenting in the medical record, Reviewing / ordering tests, medicine, procedures   and Obtaining or reviewing history

## 2023-05-06 NOTE — PROCEDURES
POC FAST US    Date/Time: 5/5/2023 8:00 PM  Performed by: Vini Salamanca DO  Authorized by: Vini Salamanca DO     Patient location:  Trauma  Procedure details:     Exam Type:  Diagnostic    Indications comment:  Fall    Assess for:  Intra-abdominal fluid    Technique: FAST      Views obtained:  Heart - Pericardial sac, RUQ - Joe's Pouch, LUQ - Splenorenal space and Suprapubic - Pouch of Dinh (LUQ view limited)    Image quality: diagnostic      Image availability:  Images available in PACS  FAST Findings:     RUQ (Hepatorenal) free fluid: absent      LUQ (Splenorenal) free fluid: absent      Suprapubic free fluid: absent      Cardiac wall motion: identified      Pericardial effusion: absent    Interpretation:     Impressions: negative

## 2023-05-07 NOTE — ED PROVIDER NOTES
Emergency Department Airway Evaluation and Management Form    History  Obtained from: EMS/pt     Patient has no allergy information on record  Chief Complaint   Patient presents with   [facility] Fall     See trauma doc     HPI     Fall on with head strike  No past medical history on file  No past surgical history on file  No family history on file  I have reviewed and agree with the history as documented      Review of Systems    Physical Exam  /69   Pulse 62   Temp 98 °F (36 7 °C) (Oral)   Resp 18   Wt 82 2 kg (181 lb 3 5 oz)   SpO2 97%     Physical Exam    ED Medications  Medications - No data to display    Intubation  Procedures    Notes      Final Diagnosis  Final diagnoses:   Fall, initial encounter   Injury of head, initial encounter       ED Provider  Electronically Signed by     Jesús Dewitt MD  05/07/23 5807

## 2023-05-08 ENCOUNTER — PATIENT OUTREACH (OUTPATIENT)
Dept: INTERNAL MEDICINE CLINIC | Facility: CLINIC | Age: 88
End: 2023-05-08

## 2023-05-08 NOTE — PROGRESS NOTES
Pt's dtr Kostas Winter called [facility]  Kostas Winter stated that pt had a recent fall and had to go to the ED  Pt's wife's license was revoked by Dr Jl Archibald due to cognitive state  Pt's wife took him out for a walk to the [facility] and he had fallen and gotten transported to the hospital      Per Kostas Winter, pt's wife is struggling most without having her license  She states she is getting calls from pt's wife and others who have talked to her that she is suicidal and depressed  Kostas Winter has reported to [facility] and hotline regarding these statements as she is worried her mom will do something to herself  She also worries that due to cognitive issues, she may accidentally harm pt by incorrect medication administration  OP [facility] discussed transportation services  Pt and wife can utilize Lyft services to and from [facility] dr sweeney  They also can utilize [employer]  for other needs  Kostas Winter is going to research [employer] and complete that application for pt and wife  Per Soddy-Daisykeke Winter, pt and wife are behind on all bills and services have been cut off in their home  Kostas Winter has difficulty reaching pt and wife at times due to home phone being cut off and cell phone being either cut off or dead  Pt and wife also struggling to remember how to use phone  Kostas [place] into [place] for [place] and [place]  Because of their needs, she is getting bumped up to [facility] but pt and wife are unable to afford some of these places  Discussed placing HHA/VNA [place] in [place] for [place] and [place] for medication administration  Soddy-Daisy Moy states that they are both dependent in ADL's  Kostas Winter stated that [facility] is coming on Friday  They were finally able to get in touch with pt and wife to set that up  Discussed assistance for [facility] to fill out applications if Kostas Winter needs the extra help for pt and wife      IB message sent to Dr Jl Archibald about referral for wife to help with transportation assistance for pt and wife, dtr wants wife to see psychiatrist and VNA home health referral for pt and wife

## 2023-05-09 ENCOUNTER — TELEPHONE (OUTPATIENT)
Dept: INTERNAL MEDICINE CLINIC | Facility: CLINIC | Age: 88
End: 2023-05-09

## 2023-05-09 NOTE — TELEPHONE ENCOUNTER
Pt's step daughter says he will be moving into [facility] this weekend  He takes insulin injection and daughter says they are asking if there is another form of insulin that he can have like a pump or can he have another med in pill form      Linda Bowman [phone]  She says ok to leave detailed info on phone if no answer

## 2023-05-09 NOTE — TELEPHONE ENCOUNTER
No he is going to need someone to inject insulin  It cannot be substituted with the pill  If he goes on hospice that might be something that could be potentially looked at    In that case he would have to go to palliative care

## 2023-05-11 ENCOUNTER — TELEPHONE (OUTPATIENT)
Dept: INTERNAL MEDICINE CLINIC | Facility: CLINIC | Age: 88
End: 2023-05-11

## 2023-05-11 NOTE — TELEPHONE ENCOUNTER
Pt's step daughter dropped off list of meds that Ricky Vazquez had  Daughter just wants  someone to look over it and confirm if he should be taking all of these or if there are any that he should not be taking because in the home she only found some of the pill bottles  She is trying to get all the meds organized  I have scanned list that she dropped off to media

## 2023-05-15 ENCOUNTER — PATIENT OUTREACH (OUTPATIENT)
Dept: INTERNAL MEDICINE CLINIC | Facility: CLINIC | Age: 88
End: 2023-05-15

## 2023-05-15 NOTE — PROGRESS NOTES
Pt's dtr Rob Pan called with update  Pt and his wife are going to be placed at [facility] [address] this week  Rob Pan decided that independent living was not going to be enough for their needs  Rob Pan stated there is an emergency petition court meeting tomorrow to get emergency guardianship with the [employer]  Luciana asked OP SWCM to set up transportation as pt and wife both have upcoming appts on 5/17 with Dr Duran Masters  OP SWCM double checked pt and wife's appt times  Pt's wife cancelled her appt, but not pt's  Rob Pan is going to double check with pt's wife if appt will be kept  Pt will keep appt and OP SWCM will set up transportation through [facility] for this appt  OP SWCM will check with Dr Duran Masters about appt for wife  OP SWCM set up Lyft transport for pt

## 2023-05-17 ENCOUNTER — OFFICE VISIT (OUTPATIENT)
Dept: INTERNAL MEDICINE CLINIC | Facility: CLINIC | Age: 88
End: 2023-05-17

## 2023-05-17 VITALS
HEART RATE: 77 BPM | BODY MASS INDEX: 26.81 KG/M2 | HEIGHT: 69 IN | WEIGHT: 181 LBS | SYSTOLIC BLOOD PRESSURE: 146 MMHG | DIASTOLIC BLOOD PRESSURE: 80 MMHG | TEMPERATURE: 97.5 F | OXYGEN SATURATION: 97 %

## 2023-05-17 DIAGNOSIS — I10 ESSENTIAL HYPERTENSION: ICD-10-CM

## 2023-05-17 DIAGNOSIS — E11.40 TYPE 2 DIABETES MELLITUS WITH DIABETIC NEUROPATHY, WITH LONG-TERM CURRENT USE OF INSULIN (HCC): Primary | ICD-10-CM

## 2023-05-17 DIAGNOSIS — G31.09 FRONTOTEMPORAL DEMENTIA (HCC): ICD-10-CM

## 2023-05-17 DIAGNOSIS — Z79.4 TYPE 2 DIABETES MELLITUS WITH DIABETIC NEUROPATHY, WITH LONG-TERM CURRENT USE OF INSULIN (HCC): Primary | ICD-10-CM

## 2023-05-17 DIAGNOSIS — Z00.00 MEDICARE ANNUAL WELLNESS VISIT, SUBSEQUENT: ICD-10-CM

## 2023-05-17 DIAGNOSIS — N18.4 CKD (CHRONIC KIDNEY DISEASE), STAGE IV (HCC): ICD-10-CM

## 2023-05-17 DIAGNOSIS — I48.0 PAROXYSMAL ATRIAL FIBRILLATION (HCC): ICD-10-CM

## 2023-05-17 DIAGNOSIS — F02.80 FRONTOTEMPORAL DEMENTIA (HCC): ICD-10-CM

## 2023-05-17 DIAGNOSIS — E78.5 HYPERLIPIDEMIA, UNSPECIFIED HYPERLIPIDEMIA TYPE: ICD-10-CM

## 2023-05-17 NOTE — PROGRESS NOTES
Assessment and Plan:     Problem List Items Addressed This Visit        Cardiovascular and Mediastinum    Essential hypertension    Paroxysmal atrial fibrillation (HCC)       Nervous and Auditory    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[facility] [address] )       Genitourinary    CKD (chronic kidney disease), stage IV ([facility] [address] )   Other Visit Diagnoses     Type 2 diabetes mellitus with diabetic neuropathy, with long-term current use of insulin ([facility] [address] )    -  Primary    Continue insulin  Hyperlipidemia, unspecified hyperlipidemia type        Medicare annual wellness visit, subsequent                1  Type 2 diabetes mellitus with diabetic neuropathy, with long-term current use of insulin (HCC)      Continue insulin  2  Paroxysmal atrial fibrillation (HCC)      Rate is controlled  3  Hyperlipidemia, unspecified hyperlipidemia type        4  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[address] )      Placement in a facility is recommended  5  Essential hypertension        6  CKD (chronic kidney disease), stage IV ([address] )        7  Medicare annual wellness visit, subsequent             Preventive health issues were discussed with patient, and age appropriate screening tests were ordered as noted in patient's After Visit Summary  Personalized health advice and appropriate referrals for health education or preventive services given if needed, as noted in patient's After Visit Summary  History of Present Illness:     Patient presents for a Medicare Wellness Visit    Is a 35-year-old gentleman with a history of frontotemporal dementia  It is concerning that he is living with his wife who also has cognitive disorder  Wife's daughter is involved with the authorities to get them placed through a senior care   Patient denies chest pain or shortness of breath  He did urinate in his pant while in the examination room       Patient Care Team:  Noris Lund MD as PCP - General (Internal Medicine)  MD Lucero Oswald DO Audley Echevaria, MD John Paul Moctezuma DO (Nephrology)  Chasidy Starr as  Care Manager (Care Coordination)  Rebekah Vargas MD (Internal Medicine)     Review of Systems:     Review of Systems   Constitutional: Negative for appetite change, chills, fatigue and fever  HENT: Negative for sore throat and trouble swallowing  Eyes: Negative for redness  Respiratory: Negative for shortness of breath  Cardiovascular: Negative for chest pain and palpitations  Gastrointestinal: Negative for abdominal pain, constipation and diarrhea  Genitourinary: Negative for dysuria and hematuria  Musculoskeletal: Negative for back pain and neck pain  Skin: Negative for rash  Neurological: Negative for seizures, weakness and headaches  Hematological: Negative for adenopathy  Psychiatric/Behavioral: Positive for behavioral problems and confusion  The patient is not nervous/anxious           Problem List:     Patient Active Problem List   Diagnosis   • Benign prostate hyperplasia   • Elevated prostate specific antigen (PSA)   • CKD (chronic kidney disease), stage IV (HCC)   • Essential hypertension   • Solitary kidney   • 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HCC)   • Paroxysmal atrial fibrillation (HCC)   • Mixed hyperlipidemia   • Type 2 diabetes mellitus with kidney complication, with long-term current use of insulin ([facility] [address] )   • Coronary artery disease involving native coronary artery of native heart without angina pectoris   • 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, or unspecified chronic kidney disease   • Head injury   • 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   • Coagulation disorder ([address] )   • Long term current use of antiarrhythmic medical therapy   • Recurrent falls   • Dementia ([address] )   • Tracheitis   • Multiple rib fractures   • Abnormal head CT   • Renal cell carcinoma (HCC)   • Fall      Past Medical and Surgical History:     Past Medical History:   Diagnosis Date   • Allergic rhinitis    • Anxiety    • Aspiration of liquid     and food per wife if he is eating too fast or talking when eating   • Asthma     as a child   • Atrial fibrillation ([place] [address] )    • CAD (coronary artery disease)    • Cancer of kidney ([place] [address] )    • COPD (chronic obstructive pulmonary disease) (HCC)    • CPAP (continuous positive airway pressure) dependence    • Dementia ([place] [address] )     Frontal lobe   • Dementia (HCC)    • Diabetes mellitus ([place] [address] )    • Diabetes mellitus, type 2 (HCC)    • DJD (degenerative joint disease)    • Hypercholesterolemia    • Hyperlipidemia    • Hypertension    • Incisional hernia    • Kidney disease    • Melanoma ([place] [address] )     left leg   • Obesity    • Sleep apnea     wears CPAP   • TIA (transient ischemic attack)      Past Surgical History:   Procedure Laterality Date   • CARDIAC PACEMAKER PLACEMENT     • CHOLECYSTECTOMY     • COLONOSCOPY     • HERNIA REPAIR      Incisional hernia   • NEPHRECTOMY Left 2005   • AK ESOPHAGOGASTRODUODENOSCOPY SUBMUCOSAL INJECTION N/A 9/21/2016    Procedure: ESOPHAGOGASTRODUODENOSCOPY (EGD); Surgeon: Martín Rucker MD;  Location: [facility]; Service: Gastroenterology   • AK ESOPHAGOGASTRODUODENOSCOPY SUBMUCOSAL INJECTION N/A 2/7/2018    Procedure: ESOPHAGOGASTRODUODENOSCOPY (EGD); Surgeon: Martín Rucker MD;  Location: [facility]; Service: Gastroenterology   • AK ESOPHAGOGASTRODUODENOSCOPY SUBMUCOSAL INJECTION N/A 4/3/2019    Procedure: ESOPHAGOGASTRODUODENOSCOPY (EGD) WITH BOTOX;  Surgeon: Martín Rucker MD;  Location: [facility];   Service: Gastroenterology   • ROTATOR CUFF REPAIR     • TONSILLECTOMY        Family History:     Family History   Problem Relation Age of Onset   • Brain cancer Father    • Lung cancer Father    • Diabetes Family    • Heart disease Family    • Hypertension Family    • Cancer Family         renal cell carcinoma   • Stroke Family    • Colon cancer Son       Social History:     Social History     Socioeconomic History   • Marital status: /Civil Union     Spouse name: None   • Number of children: None   • Years of education: None   • Highest education level: None   Occupational History   • None   Tobacco Use   • Smoking status: Never   • Smokeless tobacco: Never   Vaping Use   • Vaping Use: Never used   Substance and Sexual Activity   • Alcohol use: No   • Drug use: No   • Sexual activity: Not Currently   Other Topics Concern   • None   Social History Narrative    Travel outside [place]: No      Social Determinants of Health     Financial Resource Strain: Low Risk    • Difficulty of Paying Living Expenses: Not hard at all   Food Insecurity: Not on file   Transportation Needs: No Transportation Needs   • Lack of Transportation (Medical): No   • Lack of Transportation (Non-Medical): No   Physical Activity: Not on file   Stress: Not on file   Social Connections: Not on file   Intimate Partner Violence: Not on file   Housing Stability: Not on file      Medications and Allergies:     Current Outpatient Medications   Medication Sig Dispense Refill   • atorvastatin (LIPITOR) 10 mg tablet Take 1 tablet (10 mg total) by mouth daily 90 tablet 1   • B Complex Vitamins (B-COMPLEX/B-12 PO) Take by mouth     • cholecalciferol (VITAMIN D3) 1,000 units tablet Take 2,000 Units by mouth daily     • donepezil (ARICEPT) 10 mg tablet Take 1 tablet (10 mg total) by mouth daily at bedtime 90 tablet 3   • fenofibrate (TRICOR) 145 mg tablet Take 1 tablet (145 mg total) by mouth daily 90 tablet 1   • ferrous sulfate 325 (65 Fe) mg tablet Take 325 mg by mouth as needed      • glucose blood (OneTouch Verio) test strip Use 1 each 3 (three) times a day Test 300 strip 3   • insulin aspart (NovoLOG FlexPen) 100 UNIT/ML injection pen Inject 8 Units under the skin 2 (two) times a day with meals 15 mL 5   • Insulin Aspart (NovoLOG PenFill) 100 UNITS/ML cartridge for injection Inject 8 Units under the skin 2 (two) times a day Takes 8 units twice daily   3 mL 2   • insulin "detemir (LEVEMIR) 100 units/mL subcutaneous injection Inject 25 Units under the skin 2 (two) times a day 28 units twice daily  20 mL 5   • Insulin Pen Needle (Novofine Pen Needle) 32G X 6 MM MISC Use 4 (four) times a day 200 each 5   • Insulin Syringe-Needle U-100 (BD Insulin Syringe U/F) 30G X 1/2\" 0 5 ML MISC Use 4 (four) times a day 400 each 1   • Insulin Syringe-Needle U-100 (INSULIN SYRINGE  5CC/30GX5/16\") 30G X 5/16\" 0 5 ML MISC 4 (four) times a day     • levalbuterol (XOPENEX) 0 63 mg/3 mL nebulizer solution Take 1 ampule by nebulization 2 (two) times a day as needed for wheezing  • losartan (COZAAR) 25 mg tablet Take 1 tablet (25 mg total) by mouth daily 90 tablet 1   • Magnesium 250 MG TABS Take 500 mg by mouth daily      • Memantine HCl ER 28 MG CP24 TAKE ONE CAPSULE BY MOUTH EVERY DAY 90 capsule 1   • metoprolol tartrate (LOPRESSOR) 100 mg tablet Take 1 tablet (100 mg total) by mouth daily at bedtime 90 tablet 3   • Multiple Vitamins-Minerals (ICAPS AREDS 2 PO) Take by mouth     • Probiotic Product (HEALTHY COLON PO) Take by mouth Takes once daily  • propafenone (RYTHMOL) 225 mg tablet Take 1 tablet (225 mg total) by mouth 3 (three) times a day 90 tablet 3   • sertraline (ZOLOFT) 100 mg tablet Take 1 tablet (100 mg total) by mouth daily 90 tablet 1   • sodium chloride (OCEAN) 0 65 % nasal spray 1 spray into each nostril     • warfarin (Coumadin) 5 mg tablet Take 1 to 1 1/2 tablet daily as directed (Patient taking differently: 2 5 mg Takes 2 5 mg) 60 tablet 2     No current facility-administered medications for this visit       Allergies   Allergen Reactions   • Ambien [Zolpidem Tartrate] Hallucinations   • Bextra [Valdecoxib] Hives   • Dust Mite Extract Sneezing   • Lyrica [Pregabalin] Confusion   • Mold Extract [Trichophyton] Sneezing   • Pollen Extract Sneezing   • Tree Extract Other (See Comments) and Sneezing     congestion      Immunizations:     Immunization History   Administered Date(s) " Administered   • COVID-19 PFIZER VACCINE 0 3 ML IM 01/22/2021, 02/11/2021, 11/13/2021   • INFLUENZA 10/30/2014, 10/05/2015, 10/20/2016, 10/13/2017, 10/10/2019, 09/09/2020, 09/14/2022   • Influenza, high dose seasonal 0 7 mL 09/16/2021, 09/14/2022   • Pneumococcal Conjugate 13-Valent 10/12/2015   • Tdap 04/09/2022   • Zoster 03/01/2016   • Zoster Vaccine Recombinant 02/26/2021   • influenza, trivalent, adjuvanted 09/09/2020      Health Maintenance: There are no preventive care reminders to display for this patient  Topic Date Due   • Pneumococcal Vaccine: 65+ Years (2 - PPSV23 if available, else PCV20) 10/12/2016   • COVID-19 Vaccine (4 - Booster for Pfizer series) 01/08/2022      Medicare Screening Tests and Risk Assessments:         Health Risk Assessment:   Patient rates overall health as good  Patient feels that their physical health rating is same  Patient is satisfied with their life  Eyesight was rated as same  Hearing was rated as same  Patient feels that their emotional and mental health rating is same  Patients states they are sometimes angry  Patient states they are often unusually tired/fatigued  Pain experienced in the last 7 days has been none  Patient states that he has experienced weight loss or gain in last 6 months  Depression Screening:   PHQ-2 Score: 0      Fall Risk Screening: In the past year, patient has experienced: history of falling in past year    Number of falls: 2 or more    Home Safety:  Patient does not have trouble with stairs inside or outside of their home  Patient has working smoke alarms and has working carbon monoxide detector  Home safety hazards include: none  Nutrition:   Current diet is Diabetic  Medications:   Patient is currently taking over-the-counter supplements  OTC medications include: see medication list  Patient is not able to manage medications   Wife manages meds    Activities of Daily Living (ADLs)/Instrumental Activities of Daily Living "(IADLs):   Walk and transfer into and out of bed and chair?: Yes  Dress and groom yourself?: Yes    Bathe or shower yourself?: Yes    Feed yourself? Yes  Do your laundry/housekeeping?: Yes  Manage your money, pay your bills and track your expenses?: Yes  Make your own meals?: No    Do your own shopping?: No    Previous Hospitalizations:   Any hospitalizations or ED visits within the last 12 months?: Yes    How many hospitalizations have you had in the last year?: 1-2    Advance Care Planning:   Living will: Yes    Durable POA for healthcare: Yes    Advanced directive: Yes      PREVENTIVE SCREENINGS      Cardiovascular Screening:    General: Screening Not Indicated and History Lipid Disorder      Diabetes Screening:     General: Screening Not Indicated and History Diabetes      Colorectal Cancer Screening:     General: Screening Not Indicated      Prostate Cancer Screening:    General: Screening Not Indicated      Lung Cancer Screening:     General: Screening Not Indicated    Screening, Brief Intervention, and Referral to Treatment (SBIRT)    Screening  Typical number of drinks in a day: 0  Typical number of drinks in a week: 0  Interpretation: Low risk drinking behavior  AUDIT-C Screenin) How often did you have a drink containing alcohol in the past year? never  2) How many drinks did you have on a typical day when you were drinking in the past year? 0  3) How often did you have 6 or more drinks on one occasion in the past year? never    AUDIT-C Score: 0  Interpretation: Score 0-3 (male): Negative screen for alcohol misuse    Other Counseling Topics:   Car/seat belt/driving safety, skin self-exam, sunscreen and regular weightbearing exercise and calcium and vitamin D intake  No results found       Physical Exam:     /80 (BP Location: Left arm, Patient Position: Sitting, Cuff Size: Adult)   Pulse 77   Temp 97 5 °F (36 4 °C) (Temporal)   Ht 5' 9\" (1 753 m)   Wt 82 1 kg (181 lb)   SpO2 97%   BMI " 26 73 kg/m²     Physical Exam  Vitals and nursing note reviewed  Constitutional:       General: He is not in acute distress  Appearance: He is well-developed  HENT:      Head: Normocephalic and atraumatic  Mouth/Throat:      Mouth: Mucous membranes are moist    Eyes:      Conjunctiva/sclera: Conjunctivae normal    Cardiovascular:      Rate and Rhythm: Normal rate and regular rhythm  Heart sounds: No murmur heard  Pulmonary:      Effort: Pulmonary effort is normal  No respiratory distress  Breath sounds: Normal breath sounds  Abdominal:      Palpations: Abdomen is soft  Tenderness: There is no abdominal tenderness  Musculoskeletal:         General: No swelling  Cervical back: Neck supple  Skin:     General: Skin is warm and dry  Capillary Refill: Capillary refill takes less than 2 seconds  Neurological:      General: No focal deficit present  Mental Status: He is alert  Mental status is at baseline     Psychiatric:         Mood and Affect: Mood normal           Eun Johnson MD

## 2023-05-19 ENCOUNTER — PATIENT OUTREACH (OUTPATIENT)
Dept: INTERNAL MEDICINE CLINIC | Facility: CLINIC | Age: 88
End: 2023-05-19

## 2023-05-19 NOTE — PROGRESS NOTES
Pt's dtr called to confirm transportation was set up for pt's  appt later this afternoon  Pt documented to be at appt and transportation was successful

## 2023-05-20 ENCOUNTER — HOSPITAL ENCOUNTER (INPATIENT)
Facility: HOSPITAL | Age: 88
LOS: 3 days | End: 2023-05-24
Attending: EMERGENCY MEDICINE | Admitting: INTERNAL MEDICINE

## 2023-05-20 DIAGNOSIS — G31.09 FRONTOTEMPORAL DEMENTIA (HCC): Primary | ICD-10-CM

## 2023-05-20 DIAGNOSIS — Z00.8 MEDICAL CLEARANCE FOR PSYCHIATRIC ADMISSION: ICD-10-CM

## 2023-05-20 DIAGNOSIS — F02.80 FRONTOTEMPORAL DEMENTIA (HCC): Primary | ICD-10-CM

## 2023-05-20 LAB
ALBUMIN SERPL BCP-MCNC: 3.8 G/DL (ref 3.5–5)
ALP SERPL-CCNC: 54 U/L (ref 34–104)
ALT SERPL W P-5'-P-CCNC: 9 U/L (ref 7–52)
ANION GAP SERPL CALCULATED.3IONS-SCNC: 6 MMOL/L (ref 4–13)
AST SERPL W P-5'-P-CCNC: 24 U/L (ref 13–39)
BACTERIA UR QL AUTO: NORMAL /HPF
BASOPHILS # BLD AUTO: 0.05 THOUSANDS/ÂΜL (ref 0–0.1)
BASOPHILS NFR BLD AUTO: 1 % (ref 0–1)
BILIRUB SERPL-MCNC: 0.49 MG/DL (ref 0.2–1)
BILIRUB UR QL STRIP: NEGATIVE
BUN SERPL-MCNC: 25 MG/DL (ref 5–25)
CALCIUM SERPL-MCNC: 9.3 MG/DL (ref 8.4–10.2)
CHLORIDE SERPL-SCNC: 101 MMOL/L (ref 96–108)
CLARITY UR: CLEAR
CO2 SERPL-SCNC: 27 MMOL/L (ref 21–32)
COLOR UR: ABNORMAL
CREAT SERPL-MCNC: 1.88 MG/DL (ref 0.6–1.3)
EOSINOPHIL # BLD AUTO: 0.21 THOUSAND/ÂΜL (ref 0–0.61)
EOSINOPHIL NFR BLD AUTO: 3 % (ref 0–6)
ERYTHROCYTE [DISTWIDTH] IN BLOOD BY AUTOMATED COUNT: 12.6 % (ref 11.6–15.1)
GFR SERPL CREATININE-BSD FRML MDRD: 31 ML/MIN/1.73SQ M
GLUCOSE SERPL-MCNC: 227 MG/DL (ref 65–140)
GLUCOSE SERPL-MCNC: 290 MG/DL (ref 65–140)
GLUCOSE UR STRIP-MCNC: ABNORMAL MG/DL
HCT VFR BLD AUTO: 39.7 % (ref 36.5–49.3)
HGB BLD-MCNC: 12.9 G/DL (ref 12–17)
HGB UR QL STRIP.AUTO: NEGATIVE
IMM GRANULOCYTES # BLD AUTO: 0.02 THOUSAND/UL (ref 0–0.2)
IMM GRANULOCYTES NFR BLD AUTO: 0 % (ref 0–2)
INR PPP: 1.05 (ref 0.84–1.19)
KETONES UR STRIP-MCNC: NEGATIVE MG/DL
LEUKOCYTE ESTERASE UR QL STRIP: NEGATIVE
LYMPHOCYTES # BLD AUTO: 1.8 THOUSANDS/ÂΜL (ref 0.6–4.47)
LYMPHOCYTES NFR BLD AUTO: 27 % (ref 14–44)
MCH RBC QN AUTO: 28.4 PG (ref 26.8–34.3)
MCHC RBC AUTO-ENTMCNC: 32.5 G/DL (ref 31.4–37.4)
MCV RBC AUTO: 87 FL (ref 82–98)
MONOCYTES # BLD AUTO: 0.47 THOUSAND/ÂΜL (ref 0.17–1.22)
MONOCYTES NFR BLD AUTO: 7 % (ref 4–12)
NEUTROPHILS # BLD AUTO: 4.23 THOUSANDS/ÂΜL (ref 1.85–7.62)
NEUTS SEG NFR BLD AUTO: 62 % (ref 43–75)
NITRITE UR QL STRIP: NEGATIVE
NON-SQ EPI CELLS URNS QL MICRO: NORMAL /HPF
NRBC BLD AUTO-RTO: 0 /100 WBCS
PH UR STRIP.AUTO: 7 [PH]
PLATELET # BLD AUTO: 227 THOUSANDS/UL (ref 149–390)
PMV BLD AUTO: 9.9 FL (ref 8.9–12.7)
POTASSIUM SERPL-SCNC: 5.5 MMOL/L (ref 3.5–5.3)
PROT SERPL-MCNC: 7.3 G/DL (ref 6.4–8.4)
PROT UR STRIP-MCNC: ABNORMAL MG/DL
PROTHROMBIN TIME: 13.7 SECONDS (ref 11.6–14.5)
RBC # BLD AUTO: 4.55 MILLION/UL (ref 3.88–5.62)
RBC #/AREA URNS AUTO: NORMAL /HPF
SODIUM SERPL-SCNC: 134 MMOL/L (ref 135–147)
SP GR UR STRIP.AUTO: 1.01 (ref 1–1.03)
TSH SERPL DL<=0.05 MIU/L-ACNC: 3.21 UIU/ML (ref 0.45–4.5)
UROBILINOGEN UR STRIP-ACNC: <2 MG/DL
WBC # BLD AUTO: 6.78 THOUSAND/UL (ref 4.31–10.16)
WBC #/AREA URNS AUTO: NORMAL /HPF

## 2023-05-20 RX ORDER — FENOFIBRATE 145 MG/1
145 TABLET, COATED ORAL DAILY
Status: DISCONTINUED | OUTPATIENT
Start: 2023-05-21 | End: 2023-05-20

## 2023-05-20 RX ORDER — LEVALBUTEROL INHALATION SOLUTION 0.63 MG/3ML
0.63 SOLUTION RESPIRATORY (INHALATION) EVERY 6 HOURS PRN
Status: DISCONTINUED | OUTPATIENT
Start: 2023-05-20 | End: 2023-05-24 | Stop reason: HOSPADM

## 2023-05-20 RX ORDER — INSULIN LISPRO 100 [IU]/ML
8 INJECTION, SOLUTION INTRAVENOUS; SUBCUTANEOUS
Status: DISCONTINUED | OUTPATIENT
Start: 2023-05-21 | End: 2023-05-22

## 2023-05-20 RX ORDER — FERROUS SULFATE 325(65) MG
325 TABLET ORAL
Status: DISCONTINUED | OUTPATIENT
Start: 2023-05-21 | End: 2023-05-24 | Stop reason: HOSPADM

## 2023-05-20 RX ORDER — LOSARTAN POTASSIUM 25 MG/1
25 TABLET ORAL DAILY
Status: DISCONTINUED | OUTPATIENT
Start: 2023-05-21 | End: 2023-05-22

## 2023-05-20 RX ORDER — SERTRALINE HYDROCHLORIDE 100 MG/1
100 TABLET, FILM COATED ORAL DAILY
Status: DISCONTINUED | OUTPATIENT
Start: 2023-05-21 | End: 2023-05-24 | Stop reason: HOSPADM

## 2023-05-20 RX ORDER — FENOFIBRATE 48 MG/1
48 TABLET, COATED ORAL DAILY
Status: DISCONTINUED | OUTPATIENT
Start: 2023-05-21 | End: 2023-05-24

## 2023-05-20 RX ORDER — PROPAFENONE HYDROCHLORIDE 150 MG/1
225 TABLET, COATED ORAL 3 TIMES DAILY
Status: DISCONTINUED | OUTPATIENT
Start: 2023-05-20 | End: 2023-05-24 | Stop reason: HOSPADM

## 2023-05-20 RX ORDER — LANOLIN ALCOHOL/MO/W.PET/CERES
400 CREAM (GRAM) TOPICAL DAILY
Status: DISCONTINUED | OUTPATIENT
Start: 2023-05-21 | End: 2023-05-24 | Stop reason: HOSPADM

## 2023-05-20 RX ORDER — MELATONIN
2000 DAILY
Status: DISCONTINUED | OUTPATIENT
Start: 2023-05-21 | End: 2023-05-24 | Stop reason: HOSPADM

## 2023-05-20 RX ORDER — WARFARIN SODIUM 2.5 MG/1
2.5 TABLET ORAL
Status: DISCONTINUED | OUTPATIENT
Start: 2023-05-20 | End: 2023-05-21

## 2023-05-20 RX ORDER — MEMANTINE HYDROCHLORIDE 5 MG/1
10 TABLET ORAL EVERY 12 HOURS SCHEDULED
Status: DISCONTINUED | OUTPATIENT
Start: 2023-05-20 | End: 2023-05-24 | Stop reason: HOSPADM

## 2023-05-20 RX ORDER — ATORVASTATIN CALCIUM 10 MG/1
10 TABLET, FILM COATED ORAL DAILY
Status: DISCONTINUED | OUTPATIENT
Start: 2023-05-21 | End: 2023-05-24 | Stop reason: HOSPADM

## 2023-05-20 RX ORDER — DONEPEZIL HYDROCHLORIDE 10 MG/1
10 TABLET, FILM COATED ORAL
Status: DISCONTINUED | OUTPATIENT
Start: 2023-05-20 | End: 2023-05-24 | Stop reason: HOSPADM

## 2023-05-20 RX ORDER — METOPROLOL TARTRATE 100 MG/1
100 TABLET ORAL
Status: DISCONTINUED | OUTPATIENT
Start: 2023-05-20 | End: 2023-05-24 | Stop reason: HOSPADM

## 2023-05-20 RX ADMIN — WARFARIN SODIUM 2.5 MG: 2.5 TABLET ORAL at 21:29

## 2023-05-20 RX ADMIN — DONEPEZIL HYDROCHLORIDE 10 MG: 10 TABLET, FILM COATED ORAL at 21:22

## 2023-05-20 RX ADMIN — METOPROLOL TARTRATE 100 MG: 100 TABLET, FILM COATED ORAL at 21:21

## 2023-05-20 RX ADMIN — INSULIN DETEMIR 25 UNITS: 100 INJECTION, SOLUTION SUBCUTANEOUS at 21:22

## 2023-05-20 RX ADMIN — MEMANTINE 10 MG: 5 TABLET ORAL at 21:22

## 2023-05-20 RX ADMIN — PROPAFENONE HYDROCHLORIDE 225 MG: 150 TABLET, COATED ORAL at 21:22

## 2023-05-20 NOTE — DISCHARGE INSTRUCTIONS
If these behaviors continue, please have patient follow-up with his PCP  Return to the emergency department for any new or worsening symptoms including increasing confusion, fevers, or other concerning symptoms

## 2023-05-20 NOTE — ASSESSMENT & PLAN NOTE
Patient moved into assisted living with his wife yesterday  Today there was an apparent outburst and assisted living sent him to the hospital  They are refusing to take him back  He has significant dementia but has not told do not exhibited any combative behavior here in the hospital    I did ask on the ER to have crisis see the patient but they said that they cannot put him in the inpatient geriatric psychiatry unit  We will admit to the hospital    I will have psychiatry see him virtually  See if he has any combative behavior

## 2023-05-20 NOTE — ED NOTES
RN called [facility] at this time; RN was asked if mental evaluation was performed, RN explained that due to pts form of dementia, his described behavior is appropriate and that no further evaluation is deemed appropriate at this time  Provider will be involved if further questioning is warranted by de Terrell RN  05/20/23 [id number]

## 2023-05-20 NOTE — H&P
[address] [place] [address]  H&P  Name: Ed Diego 80 y o  male I MRN: [id number]  Unit/Bed#: [facility] POOL I Date of Admission: 5/20/2023   Date of Service: 5/20/2023 I Hospital Day: 0      Assessment/Plan   * Frontotemporal dementia [facility])  Assessment & Plan  Patient moved into assisted living with his wife yesterday  Today there was an apparent outburst and assisted living sent him to the hospital  They are refusing to take him back  He has significant dementia but has not told do not exhibited any combative behavior here in the hospital    I did ask on the ER to have crisis see the patient but they said that they cannot put him in the inpatient geriatric psychiatry unit  We will admit to the hospital    I will have psychiatry see him virtually  See if he has any combative behavior    Type 2 diabetes mellitus with kidney complication, with long-term current use of insulin ([facility] [address] )  Assessment & Plan  Lab Results   Component Value Date    HGBA1C 8 8 (H) 12/07/2022       No results for input(s): POCGLU in the last 72 hours  Blood Sugar Average: Last 72 hrs:     Continue levemir and humalog    Paroxysmal atrial fibrillation (HCC)  Assessment & Plan  On coumadin, metoprolol, rythmol             Chief Complaint:   Apparent outburst of assisted living      History of Present Illness:    Ed Diego is a 80 y o  male who presents with outburst of assisted living  Patient has significant dementia  Already on high-dose Namenda  He and his wife moved to an assisted living yesterday  Apparently this morning he had a significant outburst and they were worried about safety so they sent him to the emergency room  He has been calm here in the emergency room from what I have witnessed  The assisted living is refusing to take him back  They want him seen by crisis  Crisis feels that he cannot be placed in inpatient geriatric psych  So right now he has nowhere to go    We are bringing him into the hospital   I Case Management Discharge Note      Final Note: DC'd to skilled bed at [facility] via [facility] stretcher.  Jyothi HARTMAN RN         Selected Continued Care - Discharged on 3/14/2023 Admission date: 3/7/2023 - Discharge disposition: Skilled Nursing Facility (DC - External)    Destination Coordination complete.    Service Provider Selected Services Address Phone Fax Patient Preferred    [facility] [address] [facility] [phone] [phone] --          Durable Medical Equipment    No services have been selected for the patient.              Dialysis/Infusion    No services have been selected for the patient.              Home Medical Care    No services have been selected for the patient.              Therapy    No services have been selected for the patient.              Community Resources    No services have been selected for the patient.              Community & DME    No services have been selected for the patient.                       Final Discharge Disposition Code: 03 - skilled nursing facility (SNF)   will have psychiatry see him virtually to see if there is any medication adjustments to be on  For me he is pleasant  Not combative  He is forgetful  He did not even know that he moved to an assisted living yesterday  But he is quite confused with dementia but this is likely his baseline         Review of Systems:    Review of Systems   Constitutional: Negative for chills and fever  HENT: Negative for ear pain and sore throat  Eyes: Negative for pain and visual disturbance  Respiratory: Negative for cough and shortness of breath  Cardiovascular: Negative for chest pain and palpitations  Gastrointestinal: Negative for abdominal pain and vomiting  Genitourinary: Negative for dysuria and hematuria  Musculoskeletal: Negative for arthralgias and back pain  Skin: Negative for color change and rash  Neurological: Negative for seizures and syncope  All other systems reviewed and are negative          Past Medical and Surgical History:     Past Medical History:   Diagnosis Date   • Allergic rhinitis    • Anxiety    • Aspiration of liquid     and food per wife if he is eating too fast or talking when eating   • Asthma     as a child   • Atrial fibrillation ([facility] )    • CAD (coronary artery disease)    • Cancer of kidney ([facility] )    • COPD (chronic obstructive pulmonary disease) (HCC)    • CPAP (continuous positive airway pressure) dependence    • Dementia (HCC)     Frontal lobe   • Dementia (HCC)    • Diabetes mellitus (HCC)    • Diabetes mellitus, type 2 (HCC)    • DJD (degenerative joint disease)    • Hypercholesterolemia    • Hyperlipidemia    • Hypertension    • Incisional hernia    • Kidney disease    • Melanoma ([facility] )     left leg   • Obesity    • Sleep apnea     wears CPAP   • TIA (transient ischemic attack)        Past Surgical History:   Procedure Laterality Date   • CARDIAC PACEMAKER PLACEMENT     • CHOLECYSTECTOMY     • COLONOSCOPY     • HERNIA REPAIR      Incisional hernia   • NEPHRECTOMY Left 2005   • UT ESOPHAGOGASTRODUODENOSCOPY SUBMUCOSAL INJECTION N/A 9/21/2016    Procedure: ESOPHAGOGASTRODUODENOSCOPY (EGD); Surgeon: Irwin Degroot MD;  Location: [facility]; Service: Gastroenterology   • UT ESOPHAGOGASTRODUODENOSCOPY SUBMUCOSAL INJECTION N/A 2/7/2018    Procedure: ESOPHAGOGASTRODUODENOSCOPY (EGD); Surgeon: Irwin Degroot MD;  Location: [facility]; Service: Gastroenterology   • UT ESOPHAGOGASTRODUODENOSCOPY SUBMUCOSAL INJECTION N/A 4/3/2019    Procedure: ESOPHAGOGASTRODUODENOSCOPY (EGD) WITH BOTOX;  Surgeon: Irwin Degroot MD;  Location: [facility]; Service: Gastroenterology   • ROTATOR CUFF REPAIR     • TONSILLECTOMY           Home Medications:    Prior to Admission medications    Medication Sig Start Date End Date Taking?  Authorizing Provider   atorvastatin (LIPITOR) 10 mg tablet Take 1 tablet (10 mg total) by mouth daily 2/8/23  Yes Luke Weber MD   donepezil (ARICEPT) 10 mg tablet Take 1 tablet (10 mg total) by mouth daily at bedtime 10/14/22  Yes Luke Weber MD   fenofibrate (TRICOR) 145 mg tablet Take 1 tablet (145 mg total) by mouth daily 7/20/22  Yes Luke Weber MD   glucose blood (OneTouch Verio) test strip Use 1 each 3 (three) times a day Test 3/20/23  Yes Luke Weber MD   insulin aspart (NovoLOG FlexPen) 100 UNIT/ML injection pen Inject 8 Units under the skin 2 (two) times a day with meals 2/15/23  Yes Luke Weber MD   losartan (COZAAR) 25 mg tablet Take 1 tablet (25 mg total) by mouth daily 11/7/22  Yes Luke Weber MD   metoprolol tartrate (LOPRESSOR) 100 mg tablet Take 1 tablet (100 mg total) by mouth daily at bedtime 2/28/23  Yes Lucinda Steve MD   sertraline (ZOLOFT) 100 mg tablet Take 1 tablet (100 mg total) by mouth daily 11/7/22  Yes Luke Weber MD   B Complex Vitamins (B-COMPLEX/B-12 PO) Take by mouth    Historical Provider, MD   cholecalciferol (VITAMIN D3) 1,000 units tablet Take 2,000 Units by mouth daily    Historical Provider, MD   ferrous sulfate 325 "(65 Fe) mg tablet Take 325 mg by mouth as needed     Historical Provider, MD   Insulin Aspart (NovoLOG PenFill) 100 UNITS/ML cartridge for injection Inject 8 Units under the skin 2 (two) times a day Takes 8 units twice daily  12/2/22   Luke Weber MD   insulin detemir (LEVEMIR) 100 units/mL subcutaneous injection Inject 25 Units under the skin 2 (two) times a day 28 units twice daily  1/11/23   Luke Weber MD   Insulin Pen Needle (Novofine Pen Needle) 32G X 6 MM MISC Use 4 (four) times a day 9/29/22   Luke Weber MD   Insulin Syringe-Needle U-100 (BD Insulin Syringe U/F) 30G X 1/2\" 0 5 ML MISC Use 4 (four) times a day 8/17/22   Luke Weber MD   Insulin Syringe-Needle U-100 (INSULIN SYRINGE  5CC/30GX5/16\") 30G X 5/16\" 0 5 ML MISC 4 (four) times a day 8/18/22   Historical Provider, MD   levalbuterol (XOPENEX) 0 63 mg/3 mL nebulizer solution Take 1 ampule by nebulization 2 (two) times a day as needed for wheezing  Historical Provider, MD   Magnesium 250 MG TABS Take 500 mg by mouth daily     Historical Provider, MD   Memantine HCl ER 28 MG CP24 TAKE ONE CAPSULE BY MOUTH EVERY DAY 5/6/22   Luke Weber MD   Multiple Vitamins-Minerals (ICAPS AREDS 2 PO) Take by mouth    Historical Provider, MD   Probiotic Product (HEALTHY COLON PO) Take by mouth Takes once daily  Historical Provider, MD   propafenone (RYTHMOL) 225 mg tablet Take 1 tablet (225 mg total) by mouth 3 (three) times a day 9/14/22   Luke Weber MD   sodium chloride (OCEAN) 0 65 % nasal spray 1 spray into each nostril 10/31/18 5/17/23  Historical Provider, MD   warfarin (Coumadin) 5 mg tablet Take 1 to 1 1/2 tablet daily as directed  Patient taking differently: 2 5 mg Takes 2 5 mg 10/13/22   Lucinda Steve MD     I have reviewed home medications with patient personally  Allergies:    Allergies   Allergen Reactions   • Ambien [Zolpidem Tartrate] Hallucinations   • Bextra [Valdecoxib] Hives   • Dust Mite Extract Sneezing   • Lyrica " [Pregabalin] Confusion   • Mold Extract [Trichophyton] Sneezing   • Pollen Extract Sneezing   • Tree Extract Other (See Comments) and Sneezing     congestion         Social History:    Substance Use History:   Social History     Substance and Sexual Activity   Alcohol Use No     Social History     Tobacco Use   Smoking Status Never   Smokeless Tobacco Never     Social History     Substance and Sexual Activity   Drug Use No         Family History:    non-contributory      Physical Exam:     Vitals:   Blood Pressure: 143/69 (05/20/23 1649)  Pulse: 70 (05/20/23 1649)  Temperature: 98 1 °F (36 7 °C) (05/20/23 1136)  Temp Source: Oral (05/20/23 1136)  Respirations: 18 (05/20/23 1649)  SpO2: 97 % (05/20/23 1649)    Physical Exam  Vitals and nursing note reviewed  HENT:      Head: Normocephalic and atraumatic  Eyes:      Pupils: Pupils are equal, round, and reactive to light  Cardiovascular:      Rate and Rhythm: Normal rate and regular rhythm  Heart sounds: No murmur heard  No friction rub  No gallop  Pulmonary:      Effort: Pulmonary effort is normal       Breath sounds: Normal breath sounds  No wheezing or rales  Abdominal:      General: Bowel sounds are normal       Palpations: Abdomen is soft  Tenderness: There is no abdominal tenderness  Musculoskeletal:      Right lower leg: No edema  Left lower leg: No edema       Additional Data:     Lab Results: I have personally reviewed pertinent reports        Results from last 7 days   Lab Units 05/20/23  1418   WBC Thousand/uL 6 78   HEMOGLOBIN g/dL 12 9   HEMATOCRIT % 39 7   PLATELETS Thousands/uL 227   NEUTROS PCT % 62   LYMPHS PCT % 27   MONOS PCT % 7   EOS PCT % 3     Results from last 7 days   Lab Units 05/20/23  1418   POTASSIUM mmol/L 5 5*   CHLORIDE mmol/L 101   CO2 mmol/L 27   BUN mg/dL 25   CREATININE mg/dL 1 88*   CALCIUM mg/dL 9 3   ALK PHOS U/L 54   ALT U/L 9   AST U/L 24                     Imaging: I have personally reviewed pertinent reports  No orders to display       ·       VTE Prophylaxis: Warfarin (Coumadin)        Anticipated Length of Stay:  Patient will be admitted on an Observation basis with an anticipated length of stay of less than 2 midnights  Justification for Hospital Stay: Hopefully he can be evaluated by psychiatry and they can feel he is not combative and can go back to assisted living  ** Please Note: This note has been constructed using a voice recognition system   **

## 2023-05-20 NOTE — ED NOTES
RN called facility to perform med reconciliation due to pt being admitted  Spoke with Bobbi Salazar, medication list sent with pt us updated per facility               Stephane Armenta RN  05/20/23 6705

## 2023-05-20 NOTE — ASSESSMENT & PLAN NOTE
Lab Results   Component Value Date    HGBA1C 8 8 (H) 12/07/2022       No results for input(s): POCGLU in the last 72 hours      Blood Sugar Average: Last 72 hrs:     Continue levemir and humalog

## 2023-05-20 NOTE — ED PROVIDER NOTES
"History  Chief Complaint   Patient presents with   • Aggressive Behavior     Pt from [facility], EMS reports pt acted aggressively towards staff; became defensive over wife  Pt offers no complaints; oriented to self  70-year-old male with a past medical history of frontotemporal dementia sent in from [facility] after a reported aggressive outburst   Per EMS, patient was acting aggressively towards staff and becoming defensive over his wife  Patient does not remember this incident  He currently has no complaints, denies any pain or other somatic symptoms  Prior to Admission Medications   Prescriptions Last Dose Informant Patient Reported? Taking? B Complex Vitamins (B-COMPLEX/B-12 PO)   Yes No   Sig: Take by mouth   Insulin Aspart (NovoLOG PenFill) 100 UNITS/ML cartridge for injection   No No   Sig: Inject 8 Units under the skin 2 (two) times a day Takes 8 units twice daily  Insulin Pen Needle (Novofine Pen Needle) 32G X 6 MM MISC   No No   Sig: Use 4 (four) times a day   Insulin Syringe-Needle U-100 (BD Insulin Syringe U/F) 30G X 1/2\" 0 5 ML MISC   No No   Sig: Use 4 (four) times a day   Insulin Syringe-Needle U-100 (INSULIN SYRINGE  5CC/30GX5/16\") 30G X 16\" 0 5 ML MISC   Yes No   Si (four) times a day   Magnesium 250 MG TABS   Yes No   Sig: Take 500 mg by mouth daily    Memantine HCl ER 28 MG CP24   No No   Sig: TAKE ONE CAPSULE BY MOUTH EVERY DAY   Multiple Vitamins-Minerals (ICAPS AREDS 2 PO)   Yes No   Sig: Take by mouth   Probiotic Product (HEALTHY COLON PO)   Yes No   Sig: Take by mouth Takes once daily     atorvastatin (LIPITOR) 10 mg tablet 2023  No Yes   Sig: Take 1 tablet (10 mg total) by mouth daily   cholecalciferol (VITAMIN D3) 1,000 units tablet   Yes No   Sig: Take 2,000 Units by mouth daily   donepezil (ARICEPT) 10 mg tablet 2023  No Yes   Sig: Take 1 tablet (10 mg total) by mouth daily at bedtime   fenofibrate (TRICOR) 145 mg tablet 2023  No Yes   Sig: Take 1 tablet " (145 mg total) by mouth daily   ferrous sulfate 325 (65 Fe) mg tablet   Yes No   Sig: Take 325 mg by mouth as needed    glucose blood (OneTouch Verio) test strip 2023  No Yes   Sig: Use 1 each 3 (three) times a day Test   insulin aspart (NovoLOG FlexPen) 100 UNIT/ML injection pen 2023  No Yes   Sig: Inject 8 Units under the skin 2 (two) times a day with meals   insulin detemir (LEVEMIR) 100 units/mL subcutaneous injection   No No   Sig: Inject 25 Units under the skin 2 (two) times a day 28 units twice daily  levalbuterol (XOPENEX) 0 63 mg/3 mL nebulizer solution   Yes No   Sig: Take 1 ampule by nebulization 2 (two) times a day as needed for wheezing     losartan (COZAAR) 25 mg tablet 2023  No Yes   Sig: Take 1 tablet (25 mg total) by mouth daily   metoprolol tartrate (LOPRESSOR) 100 mg tablet 2023  No Yes   Sig: Take 1 tablet (100 mg total) by mouth daily at bedtime   propafenone (RYTHMOL) 225 mg tablet   No No   Sig: Take 1 tablet (225 mg total) by mouth 3 (three) times a day   sertraline (ZOLOFT) 100 mg tablet 2023  No Yes   Sig: Take 1 tablet (100 mg total) by mouth daily   sodium chloride (OCEAN) 0 65 % nasal spray   Yes No   Si spray into each nostril   warfarin (Coumadin) 5 mg tablet   No No   Sig: Take 1 to 1 1/2 tablet daily as directed   Patient taking differently: 2 5 mg Takes 2 5 mg      Facility-Administered Medications: None       Past Medical History:   Diagnosis Date   • Allergic rhinitis    • Anxiety    • Aspiration of liquid     and food per wife if he is eating too fast or talking when eating   • Asthma     as a child   • Atrial fibrillation ([facility] )    • CAD (coronary artery disease)    • Cancer of kidney ([facility] )    • COPD (chronic obstructive pulmonary disease) ([facility])    • CPAP (continuous positive airway pressure) dependence    • Dementia ([facility] )     Frontal lobe   • Dementia ([facility] )    • Diabetes mellitus ([facility] )    • Diabetes mellitus, type 2 (HCC)    • DJD (degenerative joint disease)    • Hypercholesterolemia    • Hyperlipidemia    • Hypertension    • Incisional hernia    • Kidney disease    • Melanoma ([address] )     left leg   • Obesity    • Sleep apnea     wears CPAP   • TIA (transient ischemic attack)        Past Surgical History:   Procedure Laterality Date   • CARDIAC PACEMAKER PLACEMENT     • CHOLECYSTECTOMY     • COLONOSCOPY     • HERNIA REPAIR      Incisional hernia   • NEPHRECTOMY Left 2005   • CO ESOPHAGOGASTRODUODENOSCOPY SUBMUCOSAL INJECTION N/A 9/21/2016    Procedure: ESOPHAGOGASTRODUODENOSCOPY (EGD); Surgeon: Bill Lord MD;  Location: [facility]; Service: Gastroenterology   • CO ESOPHAGOGASTRODUODENOSCOPY SUBMUCOSAL INJECTION N/A 2/7/2018    Procedure: ESOPHAGOGASTRODUODENOSCOPY (EGD); Surgeon: Bill Lord MD;  Location: [facility]; Service: Gastroenterology   • CO ESOPHAGOGASTRODUODENOSCOPY SUBMUCOSAL INJECTION N/A 4/3/2019    Procedure: ESOPHAGOGASTRODUODENOSCOPY (EGD) WITH BOTOX;  Surgeon: Bill Lord MD;  Location: [facility]; Service: Gastroenterology   • ROTATOR CUFF REPAIR     • TONSILLECTOMY         Family History   Problem Relation Age of Onset   • Brain cancer Father    • Lung cancer Father    • Diabetes Family    • Heart disease Family    • Hypertension Family    • Cancer Family         renal cell carcinoma   • Stroke Family    • Colon cancer Son      I have reviewed and agree with the history as documented      E-Cigarette/Vaping   • E-Cigarette Use Never User      E-Cigarette/Vaping Substances   • Nicotine No    • THC No    • CBD No    • Flavoring No    • Other No    • Unknown No      Social History     Tobacco Use   • Smoking status: Never   • Smokeless tobacco: Never   Vaping Use   • Vaping Use: Never used   Substance Use Topics   • Alcohol use: No   • Drug use: No        Review of Systems   Unable to perform ROS: Dementia       Physical Exam  ED Triage Vitals   Temperature Pulse Respirations Blood Pressure SpO2   05/20/23 1136 05/20/23 1136 05/20/23 1136 05/20/23 1136 05/20/23 1136   98 1 °F (36 7 °C) 68 18 137/64 100 %      Temp Source Heart Rate Source Patient Position - Orthostatic VS BP Location FiO2 (%)   05/20/23 1136 05/20/23 1136 05/20/23 1136 05/20/23 1136 --   Oral Monitor Lying Left arm       Pain Score       05/20/23 1649       No Pain             Orthostatic Vital Signs  Vitals:    05/20/23 1136 05/20/23 1649   BP: 137/64 143/69   Pulse: 68 70   Patient Position - Orthostatic VS: Lying Lying       Physical Exam  Vitals and nursing note reviewed  Constitutional:       General: He is awake  He is not in acute distress  Appearance: He is not toxic-appearing  HENT:      Head: Normocephalic and atraumatic  Eyes:      General: Vision grossly intact  Gaze aligned appropriately  Cardiovascular:      Rate and Rhythm: Normal rate and regular rhythm  Heart sounds: Normal heart sounds  Pulmonary:      Effort: Pulmonary effort is normal  No respiratory distress  Breath sounds: Normal breath sounds  Abdominal:      Palpations: Abdomen is soft  Tenderness: There is no abdominal tenderness  Musculoskeletal:      Cervical back: Full passive range of motion without pain and neck supple  Skin:     General: Skin is warm and dry  Neurological:      General: No focal deficit present  Mental Status: He is alert        Comments: Oriented to self         ED Medications  Medications - No data to display    Diagnostic Studies  Results Reviewed     Procedure Component Value Units Date/Time    Urine Microscopic [[id number]]  (Normal) Collected: 05/20/23 1513    Lab Status: Final result Specimen: Urine, Clean Catch Updated: 05/20/23 1525     RBC, UA 1-2 /hpf      WBC, UA None Seen /hpf      Epithelial Cells None Seen /hpf      Bacteria, UA Occasional /hpf     UA w Reflex to Microscopic w Reflex to Culture [[id number]]  (Abnormal) Collected: 05/20/23 1513    Lab Status: Final result Specimen: Urine, Clean Catch Updated: 05/20/23 1524     Color, UA Light Yellow     Clarity, UA Clear     Specific Gravity, UA 1 015     pH, UA 7 0     Leukocytes, UA Negative     Nitrite, UA Negative     Protein, UA 30 (1+) mg/dl      Glucose,  (1/2%) mg/dl      Ketones, UA Negative mg/dl      Urobilinogen, UA <2 0 mg/dl      Bilirubin, UA Negative     Occult Blood, UA Negative    TSH, 3rd generation with Free T4 reflex [[id number]]  (Normal) Collected: 05/20/23 1418    Lab Status: Final result Specimen: Blood from Arm, Left Updated: 05/20/23 1457     TSH 3RD GENERATON 3 212 uIU/mL     Comprehensive metabolic panel [[id number]]  (Abnormal) Collected: 05/20/23 1418    Lab Status: Final result Specimen: Blood from Arm, Left Updated: 05/20/23 1442     Sodium 134 mmol/L      Potassium 5 5 mmol/L      Chloride 101 mmol/L      CO2 27 mmol/L      ANION GAP 6 mmol/L      BUN 25 mg/dL      Creatinine 1 88 mg/dL      Glucose 290 mg/dL      Calcium 9 3 mg/dL      AST 24 U/L      ALT 9 U/L      Alkaline Phosphatase 54 U/L      Total Protein 7 3 g/dL      Albumin 3 8 g/dL      Total Bilirubin 0 49 mg/dL      eGFR 31 ml/min/1 73sq m     Narrative:      [place] guidelines for Chronic Kidney Disease (CKD):   •  Stage 1 with normal or high GFR (GFR > 90 mL/min/1 73 square meters)  •  Stage 2 Mild CKD (GFR = 60-89 mL/min/1 73 square meters)  •  Stage 3A Moderate CKD (GFR = 45-59 mL/min/1 73 square meters)  •  Stage 3B Moderate CKD (GFR = 30-44 mL/min/1 73 square meters)  •  Stage 4 Severe CKD (GFR = 15-29 mL/min/1 73 square meters)  •  Stage 5 End Stage CKD (GFR <15 mL/min/1 73 square meters)  Note: GFR calculation is accurate only with a steady state creatinine    CBC and differential [[id number]] Collected: 05/20/23 1418    Lab Status: Final result Specimen: Blood from Arm, Left Updated: 05/20/23 1425     WBC 6 78 Thousand/uL      RBC 4 55 Million/uL      Hemoglobin 12 9 g/dL      Hematocrit 39 7 %      MCV 87 fL      MCH 28 4 pg      MCHC 32 5 g/dL      RDW 12 6 %      MPV 9 9 fL      Platelets 221 Thousands/uL      nRBC 0 /100 WBCs      Neutrophils Relative 62 %      Immat GRANS % 0 %      Lymphocytes Relative 27 %      Monocytes Relative 7 %      Eosinophils Relative 3 %      Basophils Relative 1 %      Neutrophils Absolute 4 23 Thousands/µL      Immature Grans Absolute 0 02 Thousand/uL      Lymphocytes Absolute 1 80 Thousands/µL      Monocytes Absolute 0 47 Thousand/µL      Eosinophils Absolute 0 21 Thousand/µL      Basophils Absolute 0 05 Thousands/µL                  No orders to display         Procedures  Procedures      ED Course  ED Course as of 05/20/23 1709   Sat May 20, 2023   1322 Spoke with nurse from patient's facility, who states that there is some concern about him coming back  He states that they do not have the resources to handle his aggressive outbursts  Nursing facility is going to contact patient's POA and call back to let us know if they can accept him or not  I explained that a psych consult would not be appropriate at this time as this is an expected behavior with frontotemporal dementia  Facility to call back once they have talked to the POA  56 Patient's POA called and we discussed that at this time, patient would likely require a higher level of care than his facility is able to provide  Patient will likely require admission pending placement  Basic labs ordered  Message sent to case management at this time  1442 Creatinine(!): 1 88  At baseline    1442 Potassium(!): 5 5  Moderately hemolyzed, suspect this is falsely elevated                             SBIRT 20yo+    Flowsheet Row Most Recent Value   Initial Alcohol Screen: US AUDIT-C     1  How often do you have a drink containing alcohol? 0 Filed at: 05/20/2023 1139   2  How many drinks containing alcohol do you have on a typical day you are drinking? 0 Filed at: 05/20/2023 1139   3b  FEMALE Any Age, or MALE 65+:  How often do you have 4 or more drinks on one occassion? 0 Filed at: 05/20/2023 1139   Audit-C Score 0 Filed at: 05/20/2023 1139   CLARK: How many times in the past year have you    Used an illegal drug or used a prescription medication for non-medical reasons? Never Filed at: 05/20/2023 1139                Medical Decision Making  59-year-old male with with a history of frontotemporal dementia brought in for evaluation after a reported aggressive outburst   On exam, patient with normal vitals, in no acute distress  Patient acting appropriately, and overall with a normal exam   At this time, suspect that patient's outburst was secondary to his underlying frontotemporal dementia  Do not suspect serious illness, no work-up indicated at this time  We will discharge patient back to facility with symptomatic care instructions and strict ED return precautions  Upon discharge, the facility that patient came from said that they cannot accept him back  They are concerned that they do not have the proper resources to keep the patient or other residents safe at this time  Patient's POA was contacted and I then spoke with her  She is agreeable to patient being admitted for potential placement at a facility with a higher level of care  Spoke with ED case manager who states that patient will require admission for placement  Orvis Jamaal attending who agreed to admit patient to their service  [facility] chronic illness or injury  Amount and/or Complexity of Data Reviewed  Labs: ordered  Decision-making details documented in ED Course  Risk  Decision regarding hospitalization              Disposition  Final diagnoses:   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[address] )     Time reflects when diagnosis was documented in both MDM as applicable and the Disposition within this note     Time User Action Codes Description Comment    5/20/2023 12:15 PM Roshan Poole Add [G31 09,  F02 80] Frontotemporal dementia [facility])       ED Disposition     ED Disposition   Admit Condition   Stable    Date/Time   Sat May 20, 2023  5:08 PM    Comment   Case was discussed with CIPRIANO and the patient's admission status was agreed to be Admission Status: observation status to the service of Dr Lance Castelan  Follow-up Information     Follow up With Specialties Details Why Contact Info Additional Information    Mychal Guido MD Internal Medicine Schedule an appointment as soon as possible for a visit in 1 week  [address]  [address]  [address] Emergency Department Emergency Medicine Go to  If symptoms worsen [facility] [address]  [address] [facility] Emergency Department, [address] [facility] [address] [place], [place], [address]          Patient's Medications   Discharge Prescriptions    No medications on file     No discharge procedures on file  PDMP Review     None           ED Provider  Attending physically available and evaluated Domonique Patrick I managed the patient along with the ED Attending      Electronically Signed by         Jazlyn Escobedo DO  05/20/23 1111 Andrew Rosenberg DO  05/20/23 0647

## 2023-05-20 NOTE — ED NOTES
Lashay Brito from the [facility] requesting that pt be seen by Psychiatry for possible medication adjustments         Eugenia Bucio, RN  05/20/23 7184

## 2023-05-20 NOTE — ED ATTENDING ATTESTATION
5/20/2023  Acosta WESTON, saw and evaluated the patient  I have discussed the patient with the resident/non-physician practitioner and agree with the resident's/non-physician practitioner's findings, Plan of Care, and MDM as documented in the resident's/non-physician practitioner's note, except where noted  All available labs and Radiology studies were reviewed  I was present for key portions of any procedure(s) performed by the resident/non-physician practitioner and I was immediately available to provide assistance  At this point I agree with the current assessment done in the Emergency Department  I have conducted an independent evaluation of this patient a history and physical is as follows: An 81 yo male with pmhx of HTN, afib, frontotemporal dementia, CKD, DM, HLD, BPH, CAD and renal cell carcinoma s/p nephrectomy; BIBA from [facility] after an episode of agitation and aggression toward staff  Pt does not recall this episode, currently denies all symptoms  On review of records, pt was recently moved from his private home to [address] [place], [place] [address]ead [facility] with his wife this week  Pt otherwise denies fever, chills, chest pain, SOB, abd pain, N/V/D, peripheral edema and rashes  Physical Exam  General Appearance: awake and alert, nad, non toxic appearing  Skin:  Warm, dry, intact  HEENT: atraumatic, normocephalic  Neck: Supple, trachea midline  Cardiac: RRR; no murmurs, rub, gallops  Pulmonary: lungs CTAB; no wheezes, rales, rhonchi  Gastrointestinal: abdomen soft, nontender, nondistended; no guarding or rebound tenderness; good bowel sounds, no mass or bruits  Extremities:  no pedal edema, 2+ pulses; no calf tenderness, no clubbing, no cyanosis  Neuro:  no focal motor or sensory deficits, CN 2-12 grossly intact  Psych:  Normal mood and affect, normal judgement and insight    Assessment and Plan:  Aggression, episode occurring at his nursing facility earlier this morning    Now resolved, pt resting comfortably and cooperative  Likely secondary to pt's dementia, no further work up necessary  Will proceed with discharge back home      ED Course         Critical Care Time  Procedures

## 2023-05-20 NOTE — CASE MANAGEMENT
Case Management ED Progress Note    Patient name Agustina Landry  Location ED-14/ED-14 MRN [id number]  : 1935 Date 2023        OBJECTIVE:  Predictive Model Details         31% Factor Value    Risk of Hospital Admission or ED Visit Model Is in Relationship Yes     Number of ED Visits 4     Has Chronic Kidney Disease Yes     Has Atrial Fibrillation Yes     Has Peripheral Vascular Disease Yes     Has CVD Yes     Has Depression Yes     Has Diabetes Yes     Has PCP Yes            Chief Complaint: Aggressive behavior due to dementia ([address] )   Patient Class: Emergency  Preferred Pharmacy:   [address]  [address]  9 [facility] [address]  Phone: [phone] Fax: [address] [facility] [address] [facility] [address]  Phone: [phone] Fax: [phone]    Primary Care Provider: Pat Reza MD    Primary Insurance: [facility] [employer]  Secondary Insurance:     ED Progress Note:    Patient recently moved into [facility] senior care and had an aggressive outburst  CM communicated with ED and Admitting Providers  Patient may need psych eval for inpatient psych for med management/adjustment or locked dementia unit if [facility] is unable to accept patient back there  CM called patient's guardian, Devan Jones at [phone]  She said that patient and wife was moved to [facility]'s secure unit but was trying to get out  Patient started putting his hands on staff  She said that patient's daughter, Aisha Garcia is very involved and is the main contact  Aisha Garcia is an  and resides in [place]  Devan Jones was recently appointed emergency guardian because Stephanieantonio Jose resides in [place] which is not close by  She said that an agency helped Aisha Mendenhallo find an DAISY for patient and wife to go to  CM dept will need to discuss discharge plans with Aisha Garcia and [facility]

## 2023-05-20 NOTE — PLAN OF CARE
Problem: Potential for Falls  Goal: Patient will remain free of falls  Description: INTERVENTIONS:  - Educate patient/family on patient safety including physical limitations  - Instruct patient to call for assistance with activity   - Consult OT/PT to assist with strengthening/mobility   - Keep Call bell within reach  - Keep bed low and locked with side rails adjusted as appropriate  - Keep care items and personal belongings within reach  - Initiate and maintain comfort rounds  - Make Fall Risk Sign visible to staff  - Offer Toileting every 2 Hours, in advance of need  - Initiate/Maintain bed alarm  - Obtain necessary fall risk management equipment: yellow bracelet  - Apply yellow socks and bracelet for high fall risk patients  - Consider moving patient to room near nurses station  Outcome: Progressing     Problem: MOBILITY - ADULT  Goal: Maintain or return to baseline ADL function  Description: INTERVENTIONS:  -  Assess patient's ability to carry out ADLs; assess patient's baseline for ADL function and identify physical deficits which impact ability to perform ADLs (bathing, care of mouth/teeth, toileting, grooming, dressing, etc )  - Assess/evaluate cause of self-care deficits   - Assess range of motion  - Assess patient's mobility; develop plan if impaired  - Assess patient's need for assistive devices and provide as appropriate  - Encourage maximum independence but intervene and supervise when necessary  - Involve family in performance of ADLs  - Assess for home care needs following discharge   - Consider OT consult to assist with ADL evaluation and planning for discharge  - Provide patient education as appropriate  Outcome: Progressing  Goal: Maintains/Returns to pre admission functional level  Description: INTERVENTIONS:  - Perform BMAT or MOVE assessment daily    - Set and communicate daily mobility goal to care team and patient/family/caregiver     - Collaborate with rehabilitation services on mobility goals if consulted  - Perform Range of Motion 4 times a day  - Reposition patient every 2 hours    - Dangle patient 3 times a day  - Stand patient 3 times a day  - Ambulate patient 3 times a day  - Out of bed to chair 3 times a day   - Out of bed for meals 3 times a day  - Out of bed for toileting  - Record patient progress and toleration of activity level   Outcome: Progressing     Problem: PAIN - ADULT  Goal: Verbalizes/displays adequate comfort level or baseline comfort level  Description: Interventions:  - Encourage patient to monitor pain and request assistance  - Assess pain using appropriate pain scale  - Administer analgesics based on type and severity of pain and evaluate response  - Implement non-pharmacological measures as appropriate and evaluate response  - Consider cultural and social influences on pain and pain management  - Notify physician/advanced practitioner if interventions unsuccessful or patient reports new pain  Outcome: Progressing     Problem: INFECTION - ADULT  Goal: Absence or prevention of progression during hospitalization  Description: INTERVENTIONS:  - Assess and monitor for signs and symptoms of infection  - Monitor lab/diagnostic results  - Monitor all insertion sites, i e  indwelling lines, tubes, and drains  - Monitor endotracheal if appropriate and nasal secretions for changes in amount and color  - [place] appropriate cooling/warming therapies per order  - Administer medications as ordered  - Instruct and encourage patient and family to use good hand hygiene technique  - Identify and instruct in appropriate isolation precautions for identified infection/condition  Outcome: Progressing  Goal: Absence of fever/infection during neutropenic period  Description: INTERVENTIONS:  - Monitor WBC    Outcome: Progressing     Problem: SAFETY ADULT  Goal: Patient will remain free of falls  Description: INTERVENTIONS:  - Educate patient/family on patient safety including physical limitations  - Instruct patient to call for assistance with activity   - Consult OT/PT to assist with strengthening/mobility   - Keep Call bell within reach  - Keep bed low and locked with side rails adjusted as appropriate  - Keep care items and personal belongings within reach  - Initiate and maintain comfort rounds  - Make Fall Risk Sign visible to staff  - Offer Toileting every 2 Hours, in advance of need  - Initiate/Maintain bed alarm  - Obtain necessary fall risk management equipment:   - Apply yellow socks and bracelet for high fall risk patients  - Consider moving patient to room near nurses station  Outcome: Progressing  Goal: Maintain or return to baseline ADL function  Description: INTERVENTIONS:  -  Assess patient's ability to carry out ADLs; assess patient's baseline for ADL function and identify physical deficits which impact ability to perform ADLs (bathing, care of mouth/teeth, toileting, grooming, dressing, etc )  - Assess/evaluate cause of self-care deficits   - Assess range of motion  - Assess patient's mobility; develop plan if impaired  - Assess patient's need for assistive devices and provide as appropriate  - Encourage maximum independence but intervene and supervise when necessary  - Involve family in performance of ADLs  - Assess for home care needs following discharge   - Consider OT consult to assist with ADL evaluation and planning for discharge  - Provide patient education as appropriate  Outcome: Progressing  Goal: Maintains/Returns to pre admission functional level  Description: INTERVENTIONS:  - Perform BMAT or MOVE assessment daily    - Set and communicate daily mobility goal to care team and patient/family/caregiver  - Collaborate with rehabilitation services on mobility goals if consulted  - Perform Range of Motion 4 times a day  - Reposition patient every 2 hours    - Dangle patient 3 times a day  - Stand patient 3 times a day  - Ambulate patient 3 times a day  - Out of bed to chair 3 times a day   - Out of bed for meals 3 times a day  - Out of bed for toileting  - Record patient progress and toleration of activity level   Outcome: Progressing     Problem: DISCHARGE PLANNING  Goal: Discharge to home or other facility with appropriate resources  Description: INTERVENTIONS:  - Identify barriers to discharge w/patient and caregiver  - Arrange for needed discharge resources and transportation as appropriate  - Identify discharge learning needs (meds, wound care, etc )  - Arrange for interpretive services to assist at discharge as needed  - Refer to Case Management Department for coordinating discharge planning if the patient needs post-hospital services based on physician/advanced practitioner order or complex needs related to functional status, cognitive ability, or social support system  Outcome: Progressing     Problem: Knowledge Deficit  Goal: Patient/family/caregiver demonstrates understanding of disease process, treatment plan, medications, and discharge instructions  Description: Complete learning assessment and assess knowledge base    Interventions:  - Provide teaching at level of understanding  - Provide teaching via preferred learning methods  Outcome: Progressing

## 2023-05-21 LAB
ANION GAP SERPL CALCULATED.3IONS-SCNC: 6 MMOL/L (ref 4–13)
BASOPHILS # BLD AUTO: 0.06 THOUSANDS/ÂΜL (ref 0–0.1)
BASOPHILS NFR BLD AUTO: 1 % (ref 0–1)
BUN SERPL-MCNC: 26 MG/DL (ref 5–25)
CALCIUM SERPL-MCNC: 9.4 MG/DL (ref 8.4–10.2)
CHLORIDE SERPL-SCNC: 102 MMOL/L (ref 96–108)
CO2 SERPL-SCNC: 30 MMOL/L (ref 21–32)
CREAT SERPL-MCNC: 1.89 MG/DL (ref 0.6–1.3)
EOSINOPHIL # BLD AUTO: 0.27 THOUSAND/ÂΜL (ref 0–0.61)
EOSINOPHIL NFR BLD AUTO: 4 % (ref 0–6)
ERYTHROCYTE [DISTWIDTH] IN BLOOD BY AUTOMATED COUNT: 12.6 % (ref 11.6–15.1)
GFR SERPL CREATININE-BSD FRML MDRD: 31 ML/MIN/1.73SQ M
GLUCOSE P FAST SERPL-MCNC: 130 MG/DL (ref 65–99)
GLUCOSE SERPL-MCNC: 125 MG/DL (ref 65–140)
GLUCOSE SERPL-MCNC: 130 MG/DL (ref 65–140)
GLUCOSE SERPL-MCNC: 169 MG/DL (ref 65–140)
GLUCOSE SERPL-MCNC: 98 MG/DL (ref 65–140)
HCT VFR BLD AUTO: 42.1 % (ref 36.5–49.3)
HGB BLD-MCNC: 13.7 G/DL (ref 12–17)
IMM GRANULOCYTES # BLD AUTO: 0.02 THOUSAND/UL (ref 0–0.2)
IMM GRANULOCYTES NFR BLD AUTO: 0 % (ref 0–2)
INR PPP: 1.03 (ref 0.84–1.19)
LYMPHOCYTES # BLD AUTO: 1.86 THOUSANDS/ÂΜL (ref 0.6–4.47)
LYMPHOCYTES NFR BLD AUTO: 29 % (ref 14–44)
MAGNESIUM SERPL-MCNC: 2.2 MG/DL (ref 1.9–2.7)
MCH RBC QN AUTO: 28 PG (ref 26.8–34.3)
MCHC RBC AUTO-ENTMCNC: 32.5 G/DL (ref 31.4–37.4)
MCV RBC AUTO: 86 FL (ref 82–98)
MONOCYTES # BLD AUTO: 0.52 THOUSAND/ÂΜL (ref 0.17–1.22)
MONOCYTES NFR BLD AUTO: 8 % (ref 4–12)
NEUTROPHILS # BLD AUTO: 3.71 THOUSANDS/ÂΜL (ref 1.85–7.62)
NEUTS SEG NFR BLD AUTO: 58 % (ref 43–75)
NRBC BLD AUTO-RTO: 0 /100 WBCS
PLATELET # BLD AUTO: 276 THOUSANDS/UL (ref 149–390)
PMV BLD AUTO: 10.5 FL (ref 8.9–12.7)
POTASSIUM SERPL-SCNC: 4 MMOL/L (ref 3.5–5.3)
PROTHROMBIN TIME: 13.5 SECONDS (ref 11.6–14.5)
RBC # BLD AUTO: 4.89 MILLION/UL (ref 3.88–5.62)
SODIUM SERPL-SCNC: 138 MMOL/L (ref 135–147)
WBC # BLD AUTO: 6.44 THOUSAND/UL (ref 4.31–10.16)

## 2023-05-21 RX ORDER — WATER 1000 ML/1000ML
INJECTION, SOLUTION INTRAVENOUS
Status: DISPENSED
Start: 2023-05-21 | End: 2023-05-22

## 2023-05-21 RX ORDER — OLANZAPINE 10 MG/1
2.5 INJECTION, POWDER, LYOPHILIZED, FOR SOLUTION INTRAMUSCULAR EVERY 6 HOURS PRN
Status: DISCONTINUED | OUTPATIENT
Start: 2023-05-21 | End: 2023-05-24 | Stop reason: HOSPADM

## 2023-05-21 RX ORDER — WARFARIN SODIUM 5 MG/1
5 TABLET ORAL
Status: COMPLETED | OUTPATIENT
Start: 2023-05-21 | End: 2023-05-21

## 2023-05-21 RX ORDER — OLANZAPINE 10 MG/1
2.5 INJECTION, POWDER, LYOPHILIZED, FOR SOLUTION INTRAMUSCULAR ONCE
Status: DISCONTINUED | OUTPATIENT
Start: 2023-05-21 | End: 2023-05-22

## 2023-05-21 RX ADMIN — INSULIN LISPRO 8 UNITS: 100 INJECTION, SOLUTION INTRAVENOUS; SUBCUTANEOUS at 08:30

## 2023-05-21 RX ADMIN — ATORVASTATIN CALCIUM 10 MG: 10 TABLET, FILM COATED ORAL at 08:33

## 2023-05-21 RX ADMIN — Medication 2000 UNITS: at 08:33

## 2023-05-21 RX ADMIN — DONEPEZIL HYDROCHLORIDE 10 MG: 10 TABLET, FILM COATED ORAL at 21:39

## 2023-05-21 RX ADMIN — INSULIN DETEMIR 25 UNITS: 100 INJECTION, SOLUTION SUBCUTANEOUS at 08:35

## 2023-05-21 RX ADMIN — INSULIN DETEMIR 25 UNITS: 100 INJECTION, SOLUTION SUBCUTANEOUS at 21:39

## 2023-05-21 RX ADMIN — PROPAFENONE HYDROCHLORIDE 225 MG: 150 TABLET, COATED ORAL at 08:34

## 2023-05-21 RX ADMIN — FENOFIBRATE 48 MG: 48 TABLET, FILM COATED ORAL at 08:34

## 2023-05-21 RX ADMIN — INSULIN LISPRO 8 UNITS: 100 INJECTION, SOLUTION INTRAVENOUS; SUBCUTANEOUS at 17:00

## 2023-05-21 RX ADMIN — MULTIPLE VITAMINS W/ MINERALS TAB 1 TABLET: TAB ORAL at 08:33

## 2023-05-21 RX ADMIN — PROPAFENONE HYDROCHLORIDE 225 MG: 150 TABLET, COATED ORAL at 21:40

## 2023-05-21 RX ADMIN — METOPROLOL TARTRATE 100 MG: 100 TABLET, FILM COATED ORAL at 21:39

## 2023-05-21 RX ADMIN — FERROUS SULFATE TAB 325 MG (65 MG ELEMENTAL FE) 325 MG: 325 (65 FE) TAB at 08:30

## 2023-05-21 RX ADMIN — PROPAFENONE HYDROCHLORIDE 225 MG: 150 TABLET, COATED ORAL at 17:00

## 2023-05-21 RX ADMIN — MAGNESIUM OXIDE TAB 400 MG (241.3 MG ELEMENTAL MG) 400 MG: 400 (241.3 MG) TAB at 08:33

## 2023-05-21 RX ADMIN — MEMANTINE 10 MG: 5 TABLET ORAL at 21:39

## 2023-05-21 RX ADMIN — OLANZAPINE 2.5 MG: 10 INJECTION, POWDER, LYOPHILIZED, FOR SOLUTION INTRAMUSCULAR at 19:43

## 2023-05-21 RX ADMIN — SERTRALINE HYDROCHLORIDE 100 MG: 100 TABLET ORAL at 08:33

## 2023-05-21 RX ADMIN — LOSARTAN POTASSIUM 25 MG: 25 TABLET, FILM COATED ORAL at 08:33

## 2023-05-21 RX ADMIN — WARFARIN SODIUM 5 MG: 5 TABLET ORAL at 17:24

## 2023-05-21 RX ADMIN — MEMANTINE 10 MG: 5 TABLET ORAL at 08:33

## 2023-05-21 NOTE — ASSESSMENT & PLAN NOTE
· Continue metoprolol, propafenone  · Coagulation with warfarin  · INR subtherapeutic, will give warfarin 5 mg today and monitor INR
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
## 2023-05-21 NOTE — ASSESSMENT & PLAN NOTE
Lab Results   Component Value Date    EGFR 31 05/21/2023    EGFR 31 05/20/2023    EGFR 32 12/29/2022    CREATININE 1 89 (H) 05/21/2023    CREATININE 1 88 (H) 05/20/2023    CREATININE 1 82 (H) 12/29/2022   · At baseline, monitor renal function
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
## 2023-05-21 NOTE — PLAN OF CARE
Problem: Potential for Falls  Goal: Patient will remain free of falls  Description: INTERVENTIONS:  - Educate patient/family on patient safety including physical limitations  - Instruct patient to call for assistance with activity   - Consult OT/PT to assist with strengthening/mobility   - Keep Call bell within reach  - Keep bed low and locked with side rails adjusted as appropriate  - Keep care items and personal belongings within reach  - Initiate and maintain comfort rounds  - Make Fall Risk Sign visible to staff  - Offer Toileting every 2 Hours, in advance of need  - Initiate/Maintain bed alarm  - Obtain necessary fall risk management equipment:  - Apply yellow socks and bracelet for high fall risk patients  - Consider moving patient to room near nurses station  Outcome: Progressing     Problem: MOBILITY - ADULT  Goal: Maintain or return to baseline ADL function  Description: INTERVENTIONS:  - Educate patient/family on patient safety including physical limitations  - Instruct patient to call for assistance with activity   - Consult OT/PT to assist with strengthening/mobility   - Keep Call bell within reach  - Keep bed low and locked with side rails adjusted as appropriate  - Keep care items and personal belongings within reach  - Initiate and maintain comfort rounds  - Make Fall Risk Sign visible to staff  - Offer Toileting every 2 Hours, in advance of need  - Initiate/Maintain bed alarm  - Obtain necessary fall risk management equipment:  - Apply yellow socks and bracelet for high fall risk patients  - Consider moving patient to room near nurses station  Outcome: Progressing  Goal: Maintains/Returns to pre admission functional level  Description: INTERVENTIONS:  - Perform BMAT or MOVE assessment daily    - Set and communicate daily mobility goal to care team and patient/family/caregiver  - Collaborate with rehabilitation services on mobility goals if consulted  - Perform Range of Motion  times a day    - Reposition patient every  hours    - Dangle patient  times a day  - Stand patient  times a day  - Ambulate patient  times a day  - Out of bed to chair  times a day   - Out of bed for meals  times a day  - Out of bed for toileting  - Record patient progress and toleration of activity level   Outcome: Progressing     Problem: PAIN - ADULT  Goal: Verbalizes/displays adequate comfort level or baseline comfort level  Description: Interventions:  - Encourage patient to monitor pain and request assistance  - Assess pain using appropriate pain scale  - Administer analgesics based on type and severity of pain and evaluate response  - Implement non-pharmacological measures as appropriate and evaluate response  - Consider cultural and social influences on pain and pain management  - Notify physician/advanced practitioner if interventions unsuccessful or patient reports new pain  Outcome: Progressing     Problem: INFECTION - ADULT  Goal: Absence or prevention of progression during hospitalization  Description: INTERVENTIONS:  - Assess and monitor for signs and symptoms of infection  - Monitor lab/diagnostic results  - Monitor all insertion sites, i e  indwelling lines, tubes, and drains  - Monitor endotracheal if appropriate and nasal secretions for changes in amount and color  - [place] appropriate cooling/warming therapies per order  - Administer medications as ordered  - Instruct and encourage patient and family to use good hand hygiene technique  - Identify and instruct in appropriate isolation precautions for identified infection/condition  Outcome: Progressing     Problem: SAFETY ADULT  Goal: Patient will remain free of falls  Description: INTERVENTIONS:  - Educate patient/family on patient safety including physical limitations  - Instruct patient to call for assistance with activity   - Consult OT/PT to assist with strengthening/mobility   - Keep Call bell within reach  - Keep bed low and locked with side rails adjusted as appropriate  - Keep care items and personal belongings within reach  - Initiate and maintain comfort rounds  - Make Fall Risk Sign visible to staff  - Offer Toileting every 2 Hours, in advance of need  - Initiate/Maintain bed alarm  - Obtain necessary fall risk management equipment:  - Apply yellow socks and bracelet for high fall risk patients  - Consider moving patient to room near nurses station  Outcome: Progressing  Goal: Maintain or return to baseline ADL function  Description: INTERVENTIONS:  - Educate patient/family on patient safety including physical limitations  - Instruct patient to call for assistance with activity   - Consult OT/PT to assist with strengthening/mobility   - Keep Call bell within reach  - Keep bed low and locked with side rails adjusted as appropriate  - Keep care items and personal belongings within reach  - Initiate and maintain comfort rounds  - Make Fall Risk Sign visible to staff  - Offer Toileting every 2 Hours, in advance of need  - Initiate/Maintain bed alarm  - Obtain necessary fall risk management equipment:  - Apply yellow socks and bracelet for high fall risk patients  - Consider moving patient to room near nurses station  Outcome: Progressing  Goal: Maintains/Returns to pre admission functional level  Description: INTERVENTIONS:  - Perform BMAT or MOVE assessment daily    - Set and communicate daily mobility goal to care team and patient/family/caregiver  - Collaborate with rehabilitation services on mobility goals if consulted  - Perform Range of Motion  times a day  - Reposition patient every  hours    - Dangle patient  times a day  - Stand patient  times a day  - Ambulate patient  times a day  - Out of bed to chair  times a day   - Out of bed for meals times a day  - Out of bed for toileting  - Record patient progress and toleration of activity level   Outcome: Progressing     Problem: DISCHARGE PLANNING  Goal: Discharge to home or other facility with appropriate resources  Description: INTERVENTIONS:  - Identify barriers to discharge w/patient and caregiver  - Arrange for needed discharge resources and transportation as appropriate  - Identify discharge learning needs (meds, wound care, etc )  - Arrange for interpretive services to assist at discharge as needed  - Refer to Case Management Department for coordinating discharge planning if the patient needs post-hospital services based on physician/advanced practitioner order or complex needs related to functional status, cognitive ability, or social support system  Outcome: Progressing     Problem: Knowledge Deficit  Goal: Patient/family/caregiver demonstrates understanding of disease process, treatment plan, medications, and discharge instructions  Description: Complete learning assessment and assess knowledge base    Interventions:  - Provide teaching at level of understanding  - Provide teaching via preferred learning methods  Outcome: Progressing     Problem: Prexisting or High Potential for Compromised Skin Integrity  Goal: Skin integrity is maintained or improved  Description: INTERVENTIONS:  - Identify patients at risk for skin breakdown  - Assess and monitor skin integrity  - Assess and monitor nutrition and hydration status  - Monitor labs   - Assess for incontinence   - Turn and reposition patient  - Assist with mobility/ambulation  - Relieve pressure over bony prominences  - Avoid friction and shearing  - Provide appropriate hygiene as needed including keeping skin clean and dry  - Evaluate need for skin moisturizer/barrier cream  - Collaborate with interdisciplinary team   - Patient/family teaching  - Consider wound care consult   Outcome: Progressing     Problem: NEUROSENSORY - ADULT  Goal: Achieves stable or improved neurological status  Description: INTERVENTIONS  - Monitor and report changes in neurological status  - Monitor vital signs such as temperature, blood pressure, glucose, and any other labs ordered - Initiate measures to prevent increased intracranial pressure  - Monitor for seizure activity and implement precautions if appropriate      Outcome: Progressing     Problem: METABOLIC, FLUID AND ELECTROLYTES - ADULT  Goal: Fluid balance maintained  Description: INTERVENTIONS:  - Monitor labs   - Monitor I/O and WT  - Instruct patient on fluid and nutrition as appropriate  - Assess for signs & symptoms of volume excess or deficit  Outcome: Progressing
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
## 2023-05-21 NOTE — NURSING NOTE
"Patient pulled out IV and removed ID and fall risk bracelets  Patient yelling and screaming \"I leaving! I need to get home to make dinner for my wife and kids\"  Several attempts made to redirect and reorient patient without success  Provider aware    " 70

## 2023-05-21 NOTE — CASE MANAGEMENT
Case Management Discharge Planning Note    [facility]  Location [facility] [address] /South 2 Sarah Mendoza* MRN [id number]  : 1935 Date 2023       Current Admission Date: 2023  Current Admission Diagnosis:Frontotemporal dementia [facility])   Patient Active Problem List    Diagnosis Date Noted   • Fall 2022   • Renal cell carcinoma ([facility] ) 2022   • Multiple rib fractures 2022   • Abnormal head CT 2022   • Tracheitis 11/15/2021   • Dementia ([facility] )    • Recurrent falls 2020   • Long term current use of antiarrhythmic medical therapy 2020   • 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 2020   • Coagulation disorder ([facility] ) 2020   • Head injury 2020   • 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, or unspecified chronic kidney disease 2019   • Coronary artery disease involving native coronary artery of native heart without angina pectoris 2019   • Type 2 diabetes mellitus with kidney complication, with long-term current use of insulin ([facility] ) 2019   • Paroxysmal atrial fibrillation ([facility] ) 2018   • Mixed hyperlipidemia 2018   • 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[facility] ) 2018   • CKD (chronic kidney disease), stage IV ([facility] ) 2018   • Essential hypertension 2018   • Solitary kidney 2018   • Benign prostate hyperplasia 10/27/2014   • Elevated prostate specific antigen (PSA) 2013      LOS (days): 0  Geometric Mean LOS (GMLOS) (days):   Days to GMLOS:     OBJECTIVE:  Risk of Unplanned Readmission Score: 25 42         Current admission status: Inpatient   Preferred Pharmacy:   [place] [id number]  [address] [place] [id number]  Phone: [phone] Fax: [phone] [facility] [address] [place] [address] [place] [address]  Phone: [phone] Fax: [phone]    Primary Care Provider: Moriah Friedman MD    Primary Insurance: [facility]  Secondary Insurance:     DISCHARGE DETAILS:        Psych rec 302  302 completed by two physicians  CM faxed [phone] to [facility] at [phone]  CM also faxed to Louie
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
## 2023-05-21 NOTE — PLAN OF CARE
Problem: Potential for Falls  Goal: Patient will remain free of falls  Description: INTERVENTIONS:  - Educate patient/family on patient safety including physical limitations  - Instruct patient to call for assistance with activity   - Consult OT/PT to assist with strengthening/mobility   - Keep Call bell within reach  - Keep bed low and locked with side rails adjusted as appropriate  - Keep care items and personal belongings within reach  - Initiate and maintain comfort rounds  - Make Fall Risk Sign visible to staff  - Offer Toileting every 2 Hours, in advance of need  - Initiate/Maintain bed alarm  - Obtain necessary fall risk management equipment:  - Apply yellow socks and bracelet for high fall risk patients  - Consider moving patient to room near nurses station  Outcome: Progressing     Problem: MOBILITY - ADULT  Goal: Maintain or return to baseline ADL function  Description: INTERVENTIONS:  - Educate patient/family on patient safety including physical limitations  - Instruct patient to call for assistance with activity   - Consult OT/PT to assist with strengthening/mobility   - Keep Call bell within reach  - Keep bed low and locked with side rails adjusted as appropriate  - Keep care items and personal belongings within reach  - Initiate and maintain comfort rounds  - Make Fall Risk Sign visible to staff  - Offer Toileting every 2 Hours, in advance of need  - Initiate/Maintain bed alarm  - Obtain necessary fall risk management equipment:  - Apply yellow socks and bracelet for high fall risk patients  - Consider moving patient to room near nurses station  Outcome: Progressing  Goal: Maintains/Returns to pre admission functional level    Problem: PAIN - ADULT  Goal: Verbalizes/displays adequate comfort level or baseline comfort level  Description: Interventions:  - Encourage patient to monitor pain and request assistance  - Assess pain using appropriate pain scale  - Administer analgesics based on type and severity of pain and evaluate response  - Implement non-pharmacological measures as appropriate and evaluate response  - Consider cultural and social influences on pain and pain management  - Notify physician/advanced practitioner if interventions unsuccessful or patient reports new pain  Outcome: Progressing     Problem: INFECTION - ADULT  Goal: Absence or prevention of progression during hospitalization  Description: INTERVENTIONS:  - Assess and monitor for signs and symptoms of infection  - Monitor lab/diagnostic results  - Monitor all insertion sites, i e  indwelling lines, tubes, and drains  - Monitor endotracheal if appropriate and nasal secretions for changes in amount and color  - [place] appropriate cooling/warming therapies per order  - Administer medications as ordered  - Instruct and encourage patient and family to use good hand hygiene technique  - Identify and instruct in appropriate isolation precautions for identified infection/condition  Outcome: Progressing  Goal: Absence of fever/infection during neutropenic period  Description: INTERVENTIONS:  - Monitor WBC    5/21/2023 0951 by Ritu Douglas RN  Outcome: Completed  5/21/2023 0951 by Ritu Douglas RN  Outcome: Progressing     Problem: SAFETY ADULT  Goal: Patient will remain free of falls  Description: INTERVENTIONS:  - Educate patient/family on patient safety including physical limitations  - Instruct patient to call for assistance with activity   - Consult OT/PT to assist with strengthening/mobility   - Keep Call bell within reach  - Keep bed low and locked with side rails adjusted as appropriate  - Keep care items and personal belongings within reach  - Initiate and maintain comfort rounds  - Make Fall Risk Sign visible to staff  - Offer Toileting every 2 Hours, in advance of need  - Initiate/Maintain bed alarm  - Obtain necessary fall risk management equipment:  - Apply yellow socks and bracelet for high fall risk patients  - Consider moving patient to room near nurses station  Outcome: Progressing  Goal: Maintain or return to baseline ADL function  Description: INTERVENTIONS:  - Educate patient/family on patient safety including physical limitations  - Instruct patient to call for assistance with activity   - Consult OT/PT to assist with strengthening/mobility   - Keep Call bell within reach  - Keep bed low and locked with side rails adjusted as appropriate  - Keep care items and personal belongings within reach  - Initiate and maintain comfort rounds  - Make Fall Risk Sign visible to staff  - Offer Toileting every 2 Hours, in advance of need  - Initiate/Maintain bed alarm  - Obtain necessary fall risk management equipment:  - Apply yellow socks and bracelet for high fall risk patients  - Consider moving patient to room near nurses station  Outcome: Progressing  Goal: Maintains/Returns to pre admission functional level  Description: INTERVENTIONS:  - Perform BMAT or MOVE assessment daily    - Set and communicate daily mobility goal to care team and patient/family/caregiver  - Collaborate with rehabilitation services on mobility goals if consulted  - Perform Range of Motion 3 times a day  - Reposition patient every 2 hours    - Dangle patient 3 times a day  - Stand patient 3 times a day  - Ambulate patient 3 times a day  - Out of bed to chair 3 times a day   - Out of bed for meals 3 times a day  - Out of bed for toileting  - Record patient progress and toleration of activity level   Outcome: Progressing
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
## 2023-05-21 NOTE — NURSING NOTE
Patient not allowing nursing to obtain IV access  Provider in agreement to maintain patient without access at this time

## 2023-05-21 NOTE — ASSESSMENT & PLAN NOTE
Lab Results   Component Value Date    HGBA1C 8 8 (H) 12/07/2022       Recent Labs     05/20/23  2128 05/21/23  0707 05/21/23  1134   POCGLU 227* 125 98       · Continue levemir 25 twice daily and humalog 8 units twice daily  · Monitor Accu-Cheks, sliding scale for coverage

## 2023-05-22 ENCOUNTER — PATIENT OUTREACH (OUTPATIENT)
Dept: INTERNAL MEDICINE CLINIC | Facility: CLINIC | Age: 88
End: 2023-05-22

## 2023-05-22 LAB
ANION GAP SERPL CALCULATED.3IONS-SCNC: 7 MMOL/L (ref 4–13)
BUN SERPL-MCNC: 32 MG/DL (ref 5–25)
CALCIUM SERPL-MCNC: 9.1 MG/DL (ref 8.4–10.2)
CHLORIDE SERPL-SCNC: 104 MMOL/L (ref 96–108)
CO2 SERPL-SCNC: 28 MMOL/L (ref 21–32)
CREAT SERPL-MCNC: 2.37 MG/DL (ref 0.6–1.3)
GFR SERPL CREATININE-BSD FRML MDRD: 23 ML/MIN/1.73SQ M
GLUCOSE SERPL-MCNC: 56 MG/DL (ref 65–140)
GLUCOSE SERPL-MCNC: 68 MG/DL (ref 65–140)
GLUCOSE SERPL-MCNC: 73 MG/DL (ref 65–140)
GLUCOSE SERPL-MCNC: 87 MG/DL (ref 65–140)
GLUCOSE SERPL-MCNC: 90 MG/DL (ref 65–140)
INR PPP: 1.08 (ref 0.84–1.19)
MRSA NOSE QL CULT: NORMAL
POTASSIUM SERPL-SCNC: 3.5 MMOL/L (ref 3.5–5.3)
PROTHROMBIN TIME: 14 SECONDS (ref 11.6–14.5)
SODIUM SERPL-SCNC: 139 MMOL/L (ref 135–147)

## 2023-05-22 RX ORDER — WATER 1000 ML/1000ML
INJECTION, SOLUTION INTRAVENOUS
Status: DISPENSED
Start: 2023-05-22 | End: 2023-05-23

## 2023-05-22 RX ORDER — INSULIN LISPRO 100 [IU]/ML
5 INJECTION, SOLUTION INTRAVENOUS; SUBCUTANEOUS
Status: DISCONTINUED | OUTPATIENT
Start: 2023-05-23 | End: 2023-05-23

## 2023-05-22 RX ORDER — QUETIAPINE FUMARATE 25 MG/1
12.5 TABLET, FILM COATED ORAL
Status: DISCONTINUED | OUTPATIENT
Start: 2023-05-22 | End: 2023-05-24

## 2023-05-22 RX ORDER — WARFARIN SODIUM 2.5 MG/1
2.5 TABLET ORAL
Status: DISCONTINUED | OUTPATIENT
Start: 2023-05-22 | End: 2023-05-22

## 2023-05-22 RX ORDER — INSULIN LISPRO 100 [IU]/ML
6 INJECTION, SOLUTION INTRAVENOUS; SUBCUTANEOUS
Status: DISCONTINUED | OUTPATIENT
Start: 2023-05-22 | End: 2023-05-22

## 2023-05-22 RX ORDER — HEPARIN SODIUM 5000 [USP'U]/ML
5000 INJECTION, SOLUTION INTRAVENOUS; SUBCUTANEOUS EVERY 8 HOURS SCHEDULED
Status: DISCONTINUED | OUTPATIENT
Start: 2023-05-22 | End: 2023-05-24 | Stop reason: HOSPADM

## 2023-05-22 RX ORDER — INSULIN LISPRO 100 [IU]/ML
1-5 INJECTION, SOLUTION INTRAVENOUS; SUBCUTANEOUS
Status: DISCONTINUED | OUTPATIENT
Start: 2023-05-22 | End: 2023-05-24 | Stop reason: HOSPADM

## 2023-05-22 RX ORDER — LORAZEPAM 2 MG/ML
0.5 INJECTION INTRAMUSCULAR ONCE AS NEEDED
Status: COMPLETED | OUTPATIENT
Start: 2023-05-22 | End: 2023-05-22

## 2023-05-22 RX ADMIN — INSULIN DETEMIR 20 UNITS: 100 INJECTION, SOLUTION SUBCUTANEOUS at 08:09

## 2023-05-22 RX ADMIN — PROPAFENONE HYDROCHLORIDE 225 MG: 150 TABLET, COATED ORAL at 15:13

## 2023-05-22 RX ADMIN — OLANZAPINE 2.5 MG: 10 INJECTION, POWDER, LYOPHILIZED, FOR SOLUTION INTRAMUSCULAR at 20:05

## 2023-05-22 RX ADMIN — LORAZEPAM 0.5 MG: 2 INJECTION INTRAMUSCULAR; INTRAVENOUS at 20:33

## 2023-05-22 RX ADMIN — ATORVASTATIN CALCIUM 10 MG: 10 TABLET, FILM COATED ORAL at 08:06

## 2023-05-22 RX ADMIN — FENOFIBRATE 48 MG: 48 TABLET, FILM COATED ORAL at 08:05

## 2023-05-22 RX ADMIN — MEMANTINE 10 MG: 5 TABLET ORAL at 08:05

## 2023-05-22 RX ADMIN — MEMANTINE 10 MG: 5 TABLET ORAL at 21:19

## 2023-05-22 RX ADMIN — Medication 2000 UNITS: at 08:06

## 2023-05-22 RX ADMIN — SERTRALINE HYDROCHLORIDE 100 MG: 100 TABLET ORAL at 08:05

## 2023-05-22 RX ADMIN — MULTIPLE VITAMINS W/ MINERALS TAB 1 TABLET: TAB ORAL at 08:05

## 2023-05-22 RX ADMIN — FERROUS SULFATE TAB 325 MG (65 MG ELEMENTAL FE) 325 MG: 325 (65 FE) TAB at 08:05

## 2023-05-22 RX ADMIN — DONEPEZIL HYDROCHLORIDE 10 MG: 10 TABLET, FILM COATED ORAL at 21:19

## 2023-05-22 RX ADMIN — PROPAFENONE HYDROCHLORIDE 225 MG: 150 TABLET, COATED ORAL at 08:05

## 2023-05-22 RX ADMIN — QUETIAPINE FUMARATE 12.5 MG: 25 TABLET ORAL at 21:18

## 2023-05-22 RX ADMIN — MAGNESIUM OXIDE TAB 400 MG (241.3 MG ELEMENTAL MG) 400 MG: 400 (241.3 MG) TAB at 08:05

## 2023-05-22 RX ADMIN — HEPARIN SODIUM 5000 UNITS: 5000 INJECTION INTRAVENOUS; SUBCUTANEOUS at 21:19

## 2023-05-22 RX ADMIN — METOPROLOL TARTRATE 100 MG: 100 TABLET, FILM COATED ORAL at 21:19

## 2023-05-22 RX ADMIN — PROPAFENONE HYDROCHLORIDE 225 MG: 150 TABLET, COATED ORAL at 21:23

## 2023-05-22 RX ADMIN — HEPARIN SODIUM 5000 UNITS: 5000 INJECTION INTRAVENOUS; SUBCUTANEOUS at 15:13

## 2023-05-22 NOTE — UTILIZATION REVIEW
Continued Stay Review    Date:    5/22/23                          Current Patient Class:  Inpatient  Current Level of Care:   Med surg    HPI:87 y o  male initially admitted on Pt initially admitted as Observation on 5/20  Changed to Inpatient on 5/21  Pt requiring continued stay d/t unsafe for return to current facility, requiring further assistance in arranging care at [facility] [place]  Assessment/Plan:   5/22     Needs IP pschy  Continue current meds/treatment plan  Alert and oriented X2, not to  Hospital   Waiting pschy      Vital Signs:    97 4-62-18     105/67       Pertinent Labs/Diagnostic Results:       Results from last 7 days   Lab Units 05/21/23  0444 05/20/23  1418   WBC Thousand/uL 6 44 6 78   HEMOGLOBIN g/dL 13 7 12 9   HEMATOCRIT % 42 1 39 7   PLATELETS Thousands/uL 276 227   NEUTROS ABS Thousands/µL 3 71 4 23         Results from last 7 days   Lab Units 05/22/23  0513 05/21/23  0444 05/20/23  1418   SODIUM mmol/L 139 138 134*   POTASSIUM mmol/L 3 5 4 0 5 5*   CHLORIDE mmol/L 104 102 101   CO2 mmol/L 28 30 27   ANION GAP mmol/L 7 6 6   BUN mg/dL 32* 26* 25   CREATININE mg/dL 2 37* 1 89* 1 88*   EGFR ml/min/1 73sq m 23 31 31   CALCIUM mg/dL 9 1 9 4 9 3   MAGNESIUM mg/dL  --  2 2  --      Results from last 7 days   Lab Units 05/20/23  1418   AST U/L 24   ALT U/L 9   ALK PHOS U/L 54   TOTAL PROTEIN g/dL 7 3   ALBUMIN g/dL 3 8   TOTAL BILIRUBIN mg/dL 0 49     Results from last 7 days   Lab Units 05/22/23  1114 05/22/23  0755 05/21/23  1615 05/21/23  1134 05/21/23  0707 05/20/23  2128   POC GLUCOSE mg/dl 90 68 169* 98 125 227*     Results from last 7 days   Lab Units 05/22/23  0513 05/21/23  0444 05/20/23  1418   GLUCOSE RANDOM mg/dL 73 130 290*                   Results from last 7 days   Lab Units 05/22/23  0513 05/21/23  0444 05/20/23  1833   PROTIME seconds 14 0 13 5 13 7   INR  1 08 1 03 1 05     Results from last 7 days   Lab Units 05/20/23  1418   TSH 3RD GENERATON uIU/mL 3 212 Results from last 7 days   Lab Units 05/20/23  1513   CLARITY UA  Clear   COLOR UA  Light Yellow   SPEC GRAV UA  1 015   PH UA  7 0   GLUCOSE UA mg/dl 500 (1/2%)*   KETONES UA mg/dl Negative   BLOOD UA  Negative   PROTEIN UA mg/dl 30 (1+)*   NITRITE UA  Negative   BILIRUBIN UA  Negative   UROBILINOGEN UA (BE) mg/dl <2 0   LEUKOCYTES UA  Negative   WBC UA /hpf None Seen   RBC UA /hpf 1-2   BACTERIA UA /hpf Occasional   EPITHELIAL CELLS WET PREP /hpf None Seen               Medications:   Scheduled Medications:  atorvastatin, 10 mg, Oral, Daily  cholecalciferol, 2,000 Units, Oral, Daily  donepezil, 10 mg, Oral, HS  fenofibrate, 48 mg, Oral, Daily  ferrous sulfate, 325 mg, Oral, Daily With Breakfast  heparin (porcine), 5,000 Units, Subcutaneous, Q8H CARLOS  insulin detemir, 20 Units, Subcutaneous, Q12H CARLSO  insulin lispro, 6 Units, Subcutaneous, BID AC  magnesium Oxide, 400 mg, Oral, Daily  memantine, 10 mg, Oral, Q12H CARLOS  metoprolol tartrate, 100 mg, Oral, HS  multivitamin-minerals, 1 tablet, Oral, Daily  OLANZapine, 2 5 mg, Intramuscular, Once  propafenone, 225 mg, Oral, TID  sertraline, 100 mg, Oral, Daily      Continuous IV Infusions:     PRN Meds:  levalbuterol, 0 63 mg, Nebulization, Q6H PRN  OLANZapine, 2 5 mg, Intramuscular, Q6H PRN        Discharge Plan:    D    Network Utilization Review Department  ATTENTION: Please call with any questions or concerns to [phone] and carefully listen to the prompts so that you are directed to the right person  All voicemails are confidential   Samreen Meals all requests for admission clinical reviews, approved or denied determinations and any other requests to dedicated fax number below belonging to the campus where the patient is receiving treatment   List of dedicated fax numbers for the Facilities:  FACILITY NAME UR FAX NUMBER   ADMISSION DENIALS (Administrative/Medical Necessity) [address] [facility] (Maternity/NICU/Pediatrics) [phone]   [facility] [address] [facility] [phone]   [facility] [place] [address] [phone]   [facility] [address] [place] [address] [facility] Va[place] Borjas  [phone] [address] Gino Saenz [facility] 134 815 [facility] [phone]
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
## 2023-05-22 NOTE — ASSESSMENT & PLAN NOTE
· Continue metoprolol, propafenone  · On review of patient's recent cardiology visit, due to easy bruising and falls Coumadin has been discontinued    This likely accounts for the subtherapeutic INR  · Monitor off Coumadin  · Outpatient follow-up with cardiology
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
## 2023-05-22 NOTE — ASSESSMENT & PLAN NOTE
Lab Results   Component Value Date    EGFR 23 05/22/2023    EGFR 31 05/21/2023    EGFR 31 05/20/2023    CREATININE 2 37 (H) 05/22/2023    CREATININE 1 89 (H) 05/21/2023    CREATININE 1 88 (H) 05/20/2023   · Baseline creatinine 1 9-2 3  · PVR to make sure patient is not retaining  · Given that creatinine baseline is at upper limit of normal we will hold losartan today  · Check BMP in 1 week as an outpatient

## 2023-05-22 NOTE — PLAN OF CARE
Problem: Potential for Falls  Goal: Patient will remain free of falls  Description: INTERVENTIONS:  - Educate patient/family on patient safety including physical limitations  - Instruct patient to call for assistance with activity   - Consult OT/PT to assist with strengthening/mobility   - Keep Call bell within reach  - Keep bed low and locked with side rails adjusted as appropriate  - Keep care items and personal belongings within reach  - Initiate and maintain comfort rounds  - Make Fall Risk Sign visible to staff  - Offer Toileting every 2 Hours, in advance of need  - Initiate/Maintain bed alarm  - Obtain necessary fall risk management equipment:  - Apply yellow socks and bracelet for high fall risk patients  - Consider moving patient to room near nurses station  Outcome: Progressing     Problem: MOBILITY - ADULT  Goal: Maintain or return to baseline ADL function  Description: INTERVENTIONS:  - Educate patient/family on patient safety including physical limitations  - Instruct patient to call for assistance with activity   - Consult OT/PT to assist with strengthening/mobility   - Keep Call bell within reach  - Keep bed low and locked with side rails adjusted as appropriate  - Keep care items and personal belongings within reach  - Initiate and maintain comfort rounds  - Make Fall Risk Sign visible to staff  - Offer Toileting every 2 Hours, in advance of need  - Initiate/Maintain bed alarm  - Obtain necessary fall risk management equipment:  - Apply yellow socks and bracelet for high fall risk patients  - Consider moving patient to room near nurses station  Outcome: Progressing  Goal: Maintains/Returns to pre admission functional level  Description: INTERVENTIONS:  - Perform BMAT or MOVE assessment daily    - Set and communicate daily mobility goal to care team and patient/family/caregiver  - Collaborate with rehabilitation services on mobility goals if consulted  - Perform Range of Motion 3 times a day    - Reposition patient every 2 hours    - Dangle patient 3 times a day  - Stand patient 3 times a day  - Ambulate patient 3 times a day  - Out of bed to chair 3 times a day   - Out of bed for meals 3 times a day  - Out of bed for toileting  - Record patient progress and toleration of activity level   Outcome: Progressing     Problem: NEUROSENSORY - ADULT  Goal: Achieves stable or improved neurological status  Description: INTERVENTIONS  - Monitor and report changes in neurological status  - Monitor vital signs such as temperature, blood pressure, glucose, and any other labs ordered   - Initiate measures to prevent increased intracranial pressure  - Monitor for seizure activity and implement precautions if appropriate      Outcome: Progressing     Problem: SAFETY,RESTRAINT: NV/NON-SELF DESTRUCTIVE BEHAVIOR  Goal: Remains free of harm/injury (restraint for non violent/non self-detsructive behavior)  Description: INTERVENTIONS:  - Instruct patient/family regarding restraint use   - Assess and monitor physiologic and psychological status   - Provide interventions and comfort measures to meet assessed patient needs   - Identify and implement measures to help patient regain control  - Assess readiness for release of restraint   Outcome: Progressing  Goal: Returns to optimal restraint-free functioning  Description: INTERVENTIONS:  - Assess the patient's behavior and symptoms that indicate continued need for restraint  - Identify and implement measures to help patient regain control  - Assess readiness for release of restraint   Outcome: Progressing

## 2023-05-22 NOTE — CASE MANAGEMENT
Case Management Assessment & Discharge Planning Note    Patient name [facility]  Location [facility] 68 2 /South 2 Jessica Lerner* MRN [id number]  : 1935 Date 2023       Current Admission Date: 2023  Current Admission Diagnosis:Frontotemporal dementia [facility])   Patient Active Problem List    Diagnosis Date Noted   • Fall 2022   • Renal cell carcinoma ([facility] ) 2022   • Multiple rib fractures 2022   • Abnormal head CT 2022   • Tracheitis 11/15/2021   • Dementia ([facility] )    • Recurrent falls 2020   • Long term current use of antiarrhythmic medical therapy 2020   • 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 2020   • Coagulation disorder ([facility] ) 2020   • Head injury 2020   • 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, or unspecified chronic kidney disease 2019   • Coronary artery disease involving native coronary artery of native heart without angina pectoris 2019   • Type 2 diabetes mellitus with kidney complication, with long-term [facility] 2019   • Paroxysmal atrial fibrillation ([facility] ) 2018   • Mixed hyperlipidemia 2018   • 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[facility] ) 2018   • CKD (chronic kidney disease), stage IV ([facility] ) 2018   • Essential hypertension 2018   • Solitary kidney 2018   • Benign prostate hyperplasia 10/27/2014   • Elevated prostate specific antigen (PSA) 2013      LOS (days): 1  Geometric Mean LOS (GMLOS) (days):   Days to GMLOS:     OBJECTIVE:    Risk of Unplanned Readmission Score: 28 04         Current admission status: Inpatient       Preferred Pharmacy:   [facility] [address] [place] [address]  Via Clem Romano [address] [place] [id number]  Phone: [phone] Fax: [address] [place], [place] - [address] [facility] [address] [facility]  Phone: [phone] Fax: [phone]    Primary Care Provider: Angie Boyce MD    Primary Insurance: [facility] [employer]  Secondary Insurance:     ASSESSMENT:  [id number]   Primary Phone: [phone] (Mobile)               Advance Directives  Does patient have a [address]?: Yes  Does patient have Advance Directives?: Yes  Advance Directives: Guardianship  Primary Contact: Ritchie Hernandez for daily medical updates however Vernon Santiago is Guardian of person/finances         Readmission Root Cause  30 Day Readmission: No    Patient Information  Admitted from[de-identified] Facility ([facility])  Mental Status: Confused, Alert  During Assessment patient was accompanied by: Not accompanied during assessment  Assessment information provided by[de-identified] Other - please comment (DAISY and Jen-Guardian)  Primary Caregiver: Other (Comment)  Caregiver's Name[de-identified] [facility] Staff  Support Systems: Daughter, Family members, Other (Comment) ([facility] Staff)  [place] of Residence: [address] [facility] do you live in?: [address] [place] [address] entry access options   Select all that apply : No steps to enter home  Type of Current Residence: Facility ([address] [facility] [address])  Upon entering residence, is there a bedroom on the main floor (no further steps)?: Yes  Upon entering residence, is there a bathroom on the main floor (no further steps)?: Yes  In the last 12 months, was there a time when you were not able to pay the mortgage or rent on time?: No  In the last 12 months, how many places have you lived?: 2 ([address] [place] [address] Avenue 5/19/23 into [facility] from house of 23 years)  In the last 12 months, was there a time when you did not have a steady place to sleep or slept in a shelter (including now)?: No  Homeless/housing insecurity resource given?: N/A  Living Arrangements: Lives w/ Spouse/significant other  Is patient a ?: No    Activities of Daily Living Prior to Admission  Functional Status: Assistance  Completes ADLs independently?: Yes  Ambulates independently?: Yes  Does patient use assisted devices?: No  Does patient currently own DME?: No  Does patient have a history of Outpatient Therapy (PT/OT)?: No  Does the patient have a history of Short-Term Rehab?: Yes  Does patient have a history of HHC?: Yes  Does patient currently have [facility]?: No         Patient Information Continued  Income Source: Pension/correction  Does patient have prescription coverage?: Yes  Within the past 12 months, you worried that your food would run out before you got the money to buy more : Never true (Pt previously was obtaining food from nephew who lived in home w/ pt however more recently started receiving [employer] (5/12)- Guardian made aware so to cancel subscription for same)  Within the past 12 months, the food you bought just didn't last and you didn't have money to get more : Never true  Food insecurity resource given?: N/A  Does patient receive dialysis treatments?: No  Does patient have a history of substance abuse?: No  Current Status[de-identified] 730 (completed 5/22/23- sent to [facility] with 303 hearing to take place Friday 5/26 if needed (303 paperwork to be sent to [facility] by noon Thursday))  Does patient have a history of Mental Health Diagnosis?: No (Pt has dementia and previously on Aricept and Zoloft)         Means of Transportation  Means of Transport to [facility][de-identified] Other (Comment) (needs medical transport 2' impulsiveness/dementia)  In the past 12 months, has lack of transportation kept you from medical appointments or from getting medications?: No  In the past 12 months, has lack of transportation kept you from meetings, work, or from getting things needed for daily living?: No  Was application for public transport provided?: N/A        DISCHARGE DETAILS:    Discharge planning discussed with[de-identified] Jen Bryant  Freedom of Choice: Yes  Comments - Freedom of Choice: Pt is able to return to [address] should HHA's be obtained- Tiff Guillory working on same w/ knowledge of current 302 status with earliest dc Frid 5/26 vs 5/30 ([facility] unable to accept back on weekends/holidays)  CM contacted family/caregiver?: Yes  Were Treatment Team discharge recommendations reviewed with patient/caregiver?: Yes  Did patient/caregiver verbalize understanding of patient care needs?: Yes       Contacts  Patient Contacts: Brendan Carbone  Relationship to Patient[de-identified] Other (Comment) (Guardian)  Contact Method: Phone  Phone Number: [phone]  Reason/Outcome: Continuity of Care, Emergency Contact, Discharge [address]         Is the patient interested in [address] at discharge?: No    DME Referral Provided  Referral made for DME?: No         Would you like to participate in our [address] service program?  : No - Declined    Treatment Team Recommendation: Inpatient [facility]  Discharge Destination Plan[de-identified] Inpatient [facility] (TBD pending bed availability)  Transport at Discharge : [facility] Ambulance (due to dementia with risk for agressive behaviors/ impulsiveness)                                      Additional Comments: Updated 302 sent to [facility] and to 2 Alex Sibley Referral Liaison with no beds available this day- will continue to follow in a m for admission to network [facility] barring bed available  Updated Dirk  on same being in agreement    Currently Dirk  seeking 24/7 HHA/sitter for pt at [facility] as this is needed prior to pt returning to [facility]

## 2023-05-22 NOTE — ASSESSMENT & PLAN NOTE
Lab Results   Component Value Date    HGBA1C 8 8 (H) 12/07/2022       Recent Labs     05/21/23  0707 05/21/23  1134 05/21/23  1615 05/22/23  0755   POCGLU 125 98 169* 68       · Blood sugars below goal, decrease Levemir to 20 units twice daily, Humalog 6 units 2 times daily before meals  · Monitor Accu-Cheks, sliding scale for coverage

## 2023-05-22 NOTE — CASE MANAGEMENT
Case Management Discharge Planning Note    Patient name Walter Libman  Location [facility] 2 /South 2 Elke Cantor* MRN [id number]  : 1935 Date 2023       Current Admission Date: 2023  Current Admission Diagnosis:Frontotemporal dementia [facility])   Patient Active Problem List    Diagnosis Date Noted   • Fall 2022   • Renal cell carcinoma ([facility] ) 2022   • Multiple rib fractures 2022   • Abnormal head CT 2022   • Tracheitis 11/15/2021   • Dementia ([facility] )    • Recurrent falls 2020   • Long term current use of antiarrhythmic medical therapy 2020   • 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 2020   • Coagulation disorder ([facility] ) 2020   • Head injury 2020   • 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, or unspecified chronic kidney disease 2019   • Coronary artery disease involving native coronary artery of native heart without angina pectoris 2019   • Type 2 diabetes mellitus with kidney complication, with long-term current use of insulin ([facility] ) 2019   • Paroxysmal atrial fibrillation ([facility] ) 2018   • Mixed hyperlipidemia 2018   • 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[facility] ) 2018   • CKD (chronic kidney disease), stage IV ([facility] ) 2018   • Essential hypertension 2018   • Solitary kidney 2018   • Benign prostate hyperplasia 10/27/2014   • Elevated prostate specific antigen (PSA) 2013      LOS (days): 1  Geometric Mean LOS (GMLOS) (days):   Days to GMLOS:     OBJECTIVE:  Risk of Unplanned Readmission Score: 27 99         Current admission status: Inpatient   Preferred Pharmacy:   [place] [address]  [address]  [address] [facility] [address]  Phone: [phone] Fax: [address] [place], [place] - [address] [place] [address]  Phone: [phone] Fax: [phone]    Primary Care Provider: Sybil Valencia MD    Primary Insurance: [facility] [employer]  Secondary Insurance:     DISCHARGE DETAILS:                                                                                                 Additional Comments: [facility] Crisis informed this CM of 302 not being completed properly with new 302 to be completed and sent in  IP Intake & Referral made aware of need for [facility] bed as well as to the latter- will follow up with completion of 302 and bed availability

## 2023-05-23 PROBLEM — N17.9 AKI (ACUTE KIDNEY INJURY) (HCC): Status: ACTIVE | Noted: 2018-03-30

## 2023-05-23 LAB
ANION GAP SERPL CALCULATED.3IONS-SCNC: 8 MMOL/L (ref 4–13)
ATRIAL RATE: 70 BPM
BUN SERPL-MCNC: 35 MG/DL (ref 5–25)
CALCIUM SERPL-MCNC: 9.5 MG/DL (ref 8.4–10.2)
CHLORIDE SERPL-SCNC: 105 MMOL/L (ref 96–108)
CO2 SERPL-SCNC: 26 MMOL/L (ref 21–32)
CREAT SERPL-MCNC: 2.64 MG/DL (ref 0.6–1.3)
GFR SERPL CREATININE-BSD FRML MDRD: 20 ML/MIN/1.73SQ M
GLUCOSE SERPL-MCNC: 197 MG/DL (ref 65–140)
GLUCOSE SERPL-MCNC: 201 MG/DL (ref 65–140)
GLUCOSE SERPL-MCNC: 266 MG/DL (ref 65–140)
GLUCOSE SERPL-MCNC: 59 MG/DL (ref 65–140)
GLUCOSE SERPL-MCNC: 63 MG/DL (ref 65–140)
P AXIS: 75 DEGREES
POTASSIUM SERPL-SCNC: 3.7 MMOL/L (ref 3.5–5.3)
PR INTERVAL: 216 MS
QRS AXIS: 52 DEGREES
QRSD INTERVAL: 96 MS
QT INTERVAL: 408 MS
QTC INTERVAL: 440 MS
SODIUM SERPL-SCNC: 139 MMOL/L (ref 135–147)
T WAVE AXIS: 42 DEGREES
VENTRICULAR RATE: 70 BPM

## 2023-05-23 RX ORDER — DEXTROSE AND SODIUM CHLORIDE 5; .9 G/100ML; G/100ML
75 INJECTION, SOLUTION INTRAVENOUS CONTINUOUS
Status: DISCONTINUED | OUTPATIENT
Start: 2023-05-23 | End: 2023-05-23

## 2023-05-23 RX ORDER — SODIUM CHLORIDE 9 MG/ML
100 INJECTION, SOLUTION INTRAVENOUS CONTINUOUS
Status: DISCONTINUED | OUTPATIENT
Start: 2023-05-23 | End: 2023-05-24

## 2023-05-23 RX ORDER — WATER 1000 ML/1000ML
INJECTION, SOLUTION INTRAVENOUS
Status: COMPLETED
Start: 2023-05-23 | End: 2023-05-24

## 2023-05-23 RX ADMIN — MAGNESIUM OXIDE TAB 400 MG (241.3 MG ELEMENTAL MG) 400 MG: 400 (241.3 MG) TAB at 08:03

## 2023-05-23 RX ADMIN — PROPAFENONE HYDROCHLORIDE 225 MG: 150 TABLET, COATED ORAL at 08:02

## 2023-05-23 RX ADMIN — METOPROLOL TARTRATE 100 MG: 100 TABLET, FILM COATED ORAL at 21:08

## 2023-05-23 RX ADMIN — MEMANTINE 10 MG: 5 TABLET ORAL at 08:03

## 2023-05-23 RX ADMIN — INSULIN LISPRO 1 UNITS: 100 INJECTION, SOLUTION INTRAVENOUS; SUBCUTANEOUS at 16:26

## 2023-05-23 RX ADMIN — ATORVASTATIN CALCIUM 10 MG: 10 TABLET, FILM COATED ORAL at 08:03

## 2023-05-23 RX ADMIN — FENOFIBRATE 48 MG: 48 TABLET, FILM COATED ORAL at 08:02

## 2023-05-23 RX ADMIN — MEMANTINE 10 MG: 5 TABLET ORAL at 21:07

## 2023-05-23 RX ADMIN — OLANZAPINE 2.5 MG: 10 INJECTION, POWDER, LYOPHILIZED, FOR SOLUTION INTRAMUSCULAR at 23:41

## 2023-05-23 RX ADMIN — SERTRALINE HYDROCHLORIDE 100 MG: 100 TABLET ORAL at 08:03

## 2023-05-23 RX ADMIN — Medication 2000 UNITS: at 08:03

## 2023-05-23 RX ADMIN — INSULIN LISPRO 2 UNITS: 100 INJECTION, SOLUTION INTRAVENOUS; SUBCUTANEOUS at 11:38

## 2023-05-23 RX ADMIN — PROPAFENONE HYDROCHLORIDE 225 MG: 150 TABLET, COATED ORAL at 16:26

## 2023-05-23 RX ADMIN — QUETIAPINE FUMARATE 12.5 MG: 25 TABLET ORAL at 16:26

## 2023-05-23 RX ADMIN — DEXTROSE AND SODIUM CHLORIDE 75 ML/HR: 5; .9 INJECTION, SOLUTION INTRAVENOUS at 09:18

## 2023-05-23 RX ADMIN — MULTIPLE VITAMINS W/ MINERALS TAB 1 TABLET: TAB ORAL at 08:03

## 2023-05-23 RX ADMIN — SODIUM CHLORIDE 100 ML/HR: 0.9 INJECTION, SOLUTION INTRAVENOUS at 11:38

## 2023-05-23 RX ADMIN — HEPARIN SODIUM 5000 UNITS: 5000 INJECTION INTRAVENOUS; SUBCUTANEOUS at 04:36

## 2023-05-23 RX ADMIN — DONEPEZIL HYDROCHLORIDE 10 MG: 10 TABLET, FILM COATED ORAL at 21:08

## 2023-05-23 RX ADMIN — PROPAFENONE HYDROCHLORIDE 225 MG: 150 TABLET, COATED ORAL at 21:08

## 2023-05-23 RX ADMIN — FERROUS SULFATE TAB 325 MG (65 MG ELEMENTAL FE) 325 MG: 325 (65 FE) TAB at 08:03
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
## 2023-05-23 NOTE — ASSESSMENT & PLAN NOTE
Lab Results   Component Value Date    HGBA1C 8 8 (H) 12/07/2022       Recent Labs     05/22/23  1625 05/22/23  2107 05/23/23  0708 05/23/23  1131   POCGLU 56* 87 63* 201*       · From poor oral intake patient has had episodes of hypoglycemia  · We will hold off on standing long-acting insulin just use sliding scale
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
## 2023-05-23 NOTE — PLAN OF CARE
Problem: Potential for Falls  Goal: Patient will remain free of falls  Description: INTERVENTIONS:  - Educate patient/family on patient safety including physical limitations  - Instruct patient to call for assistance with activity   - Consult OT/PT to assist with strengthening/mobility   - Keep Call bell within reach  - Keep bed low and locked with side rails adjusted as appropriate  - Keep care items and personal belongings within reach  - Initiate and maintain comfort rounds  - Make Fall Risk Sign visible to staff  - Offer Toileting every 2 Hours, in advance of need  - Initiate/Maintain bed alarm  - Obtain necessary fall risk management equipment:  - Apply yellow socks and bracelet for high fall risk patients  - Consider moving patient to room near nurses station  Outcome: Progressing     Problem: MOBILITY - ADULT  Goal: Maintain or return to baseline ADL function  Description: INTERVENTIONS:  - Educate patient/family on patient safety including physical limitations  - Instruct patient to call for assistance with activity   - Consult OT/PT to assist with strengthening/mobility   - Keep Call bell within reach  - Keep bed low and locked with side rails adjusted as appropriate  - Keep care items and personal belongings within reach  - Initiate and maintain comfort rounds  - Make Fall Risk Sign visible to staff  - Offer Toileting every 2 Hours, in advance of need  - Initiate/Maintain bed alarm  - Obtain necessary fall risk management equipment:  - Apply yellow socks and bracelet for high fall risk patients  - Consider moving patient to room near nurses station  Outcome: Progressing  Goal: Maintains/Returns to pre admission functional level  Description: INTERVENTIONS:  - Perform BMAT or MOVE assessment daily    - Set and communicate daily mobility goal to care team and patient/family/caregiver  - Collaborate with rehabilitation services on mobility goals if consulted  - Perform Range of Motion  times a day    - Reposition patient every  hours    - Dangle patient  times a day  - Stand patient  times a day  - Ambulate patient  times a day  - Out of bed to chair  times a day   - Out of bed for meals  times a day  - Out of bed for toileting  - Record patient progress and toleration of activity level   Outcome: Progressing     Problem: PAIN - ADULT  Goal: Verbalizes/displays adequate comfort level or baseline comfort level  Description: Interventions:  - Encourage patient to monitor pain and request assistance  - Assess pain using appropriate pain scale  - Administer analgesics based on type and severity of pain and evaluate response  - Implement non-pharmacological measures as appropriate and evaluate response  - Consider cultural and social influences on pain and pain management  - Notify physician/advanced practitioner if interventions unsuccessful or patient reports new pain  Outcome: Progressing     Problem: INFECTION - ADULT  Goal: Absence or prevention of progression during hospitalization  Description: INTERVENTIONS:  - Assess and monitor for signs and symptoms of infection  - Monitor lab/diagnostic results  - Monitor all insertion sites, i e  indwelling lines, tubes, and drains  - Monitor endotracheal if appropriate and nasal secretions for changes in amount and color  - [place] appropriate cooling/warming therapies per order  - Administer medications as ordered  - Instruct and encourage patient and family to use good hand hygiene technique  - Identify and instruct in appropriate isolation precautions for identified infection/condition  Outcome: Progressing     Problem: SAFETY ADULT  Goal: Patient will remain free of falls  Description: INTERVENTIONS:  - Educate patient/family on patient safety including physical limitations  - Instruct patient to call for assistance with activity   - Consult OT/PT to assist with strengthening/mobility   - Keep Call bell within reach  - Keep bed low and locked with side rails adjusted as appropriate  - Keep care items and personal belongings within reach  - Initiate and maintain comfort rounds  - Make Fall Risk Sign visible to staff  - Offer Toileting every 2 Hours, in advance of need  - Initiate/Maintain bed alarm  - Obtain necessary fall risk management equipment:  - Apply yellow socks and bracelet for high fall risk patients  - Consider moving patient to room near nurses station  Outcome: Progressing  Goal: Maintain or return to baseline ADL function  Description: INTERVENTIONS:  - Educate patient/family on patient safety including physical limitations  - Instruct patient to call for assistance with activity   - Consult OT/PT to assist with strengthening/mobility   - Keep Call bell within reach  - Keep bed low and locked with side rails adjusted as appropriate  - Keep care items and personal belongings within reach  - Initiate and maintain comfort rounds  - Make Fall Risk Sign visible to staff  - Offer Toileting every 2 Hours, in advance of need  - Initiate/Maintain bed alarm  - Obtain necessary fall risk management equipment:  - Apply yellow socks and bracelet for high fall risk patients  - Consider moving patient to room near nurses station  Outcome: Progressing  Goal: Maintains/Returns to pre admission functional level  Description: INTERVENTIONS:  - Perform BMAT or MOVE assessment daily    - Set and communicate daily mobility goal to care team and patient/family/caregiver  - Collaborate with rehabilitation services on mobility goals if consulted  - Perform Range of Motion  times a day  - Reposition patient every  hours    - Dangle patient  times a day  - Stand patient  times a day  - Ambulate patient  times a day  - Out of bed to chair  times a day   - Out of bed for meals  times a day  - Out of bed for toileting  - Record patient progress and toleration of activity level   Outcome: Progressing     Problem: DISCHARGE PLANNING  Goal: Discharge to home or other facility with appropriate resources  Description: INTERVENTIONS:  - Identify barriers to discharge w/patient and caregiver  - Arrange for needed discharge resources and transportation as appropriate  - Identify discharge learning needs (meds, wound care, etc )  - Arrange for interpretive services to assist at discharge as needed  - Refer to Case Management Department for coordinating discharge planning if the patient needs post-hospital services based on physician/advanced practitioner order or complex needs related to functional status, cognitive ability, or social support system  Outcome: Progressing     Problem: Knowledge Deficit  Goal: Patient/family/caregiver demonstrates understanding of disease process, treatment plan, medications, and discharge instructions  Description: Complete learning assessment and assess knowledge base    Interventions:  - Provide teaching at level of understanding  - Provide teaching via preferred learning methods  Outcome: Progressing     Problem: Prexisting or High Potential for Compromised Skin Integrity  Goal: Skin integrity is maintained or improved  Description: INTERVENTIONS:  - Identify patients at risk for skin breakdown  - Assess and monitor skin integrity  - Assess and monitor nutrition and hydration status  - Monitor labs   - Assess for incontinence   - Turn and reposition patient  - Assist with mobility/ambulation  - Relieve pressure over bony prominences  - Avoid friction and shearing  - Provide appropriate hygiene as needed including keeping skin clean and dry  - Evaluate need for skin moisturizer/barrier cream  - Collaborate with interdisciplinary team   - Patient/family teaching  - Consider wound care consult   Outcome: Progressing     Problem: NEUROSENSORY - ADULT  Goal: Achieves stable or improved neurological status  Description: INTERVENTIONS  - Monitor and report changes in neurological status  - Monitor vital signs such as temperature, blood pressure, glucose, and any other labs ordered - Initiate measures to prevent increased intracranial pressure  - Monitor for seizure activity and implement precautions if appropriate      Outcome: Progressing     Problem: METABOLIC, FLUID AND ELECTROLYTES - ADULT  Goal: Fluid balance maintained  Description: INTERVENTIONS:  - Monitor labs   - Monitor I/O and WT  - Instruct patient on fluid and nutrition as appropriate  - Assess for signs & symptoms of volume excess or deficit  Outcome: Progressing     Problem: SAFETY,RESTRAINT: NV/NON-SELF DESTRUCTIVE BEHAVIOR  Goal: Remains free of harm/injury (restraint for non violent/non self-detsructive behavior)  Description: INTERVENTIONS:  - Instruct patient/family regarding restraint use   - Assess and monitor physiologic and psychological status   - Provide interventions and comfort measures to meet assessed patient needs   - Identify and implement measures to help patient regain control  - Assess readiness for release of restraint   Outcome: Progressing  Goal: Returns to optimal restraint-free functioning  Description: INTERVENTIONS:  - Assess the patient's behavior and symptoms that indicate continued need for restraint  - Identify and implement measures to help patient regain control  - Assess readiness for release of restraint   Outcome: Progressing

## 2023-05-23 NOTE — PLAN OF CARE
Problem: Potential for Falls  Goal: Patient will remain free of falls  Description: INTERVENTIONS:  - Educate patient/family on patient safety including physical limitations  - Instruct patient to call for assistance with activity   - Consult OT/PT to assist with strengthening/mobility   - Keep Call bell within reach  - Keep bed low and locked with side rails adjusted as appropriate  - Keep care items and personal belongings within reach  - Initiate and maintain comfort rounds  - Make Fall Risk Sign visible to staff  - Offer Toileting every 2 Hours, in advance of need  - Initiate/Maintain bed alarm  - Obtain necessary fall risk management equipment:  - Apply yellow socks and bracelet for high fall risk patients  - Consider moving patient to room near nurses station  Outcome: Progressing     Problem: SAFETY ADULT  Goal: Patient will remain free of falls  Description: INTERVENTIONS:  - Educate patient/family on patient safety including physical limitations  - Instruct patient to call for assistance with activity   - Consult OT/PT to assist with strengthening/mobility   - Keep Call bell within reach  - Keep bed low and locked with side rails adjusted as appropriate  - Keep care items and personal belongings within reach  - Initiate and maintain comfort rounds  - Make Fall Risk Sign visible to staff  - Offer Toileting every 2 Hours, in advance of need  - Initiate/Maintain bed alarm  - Obtain necessary fall risk management equipment:  - Apply yellow socks and bracelet for high fall risk patients  - Consider moving patient to room near nurses station  Outcome: Progressing  Goal: Maintain or return to baseline ADL function  Description: INTERVENTIONS:  - Educate patient/family on patient safety including physical limitations  - Instruct patient to call for assistance with activity   - Consult OT/PT to assist with strengthening/mobility   - Keep Call bell within reach  - Keep bed low and locked with side rails adjusted as appropriate  - Keep care items and personal belongings within reach  - Initiate and maintain comfort rounds  - Make Fall Risk Sign visible to staff  - Offer Toileting every 2 Hours, in advance of need  - Initiate/Maintain bed alarm  - Obtain necessary fall risk management equipment:  - Apply yellow socks and bracelet for high fall risk patients  - Consider moving patient to room near nurses station  Outcome: Progressing  Goal: Maintains/Returns to pre admission functional level  Description: INTERVENTIONS:  - Perform BMAT or MOVE assessment daily    - Set and communicate daily mobility goal to care team and patient/family/caregiver  - Collaborate with rehabilitation services on mobility goals if consulted  - Perform Range of Motion 3 times a day  - Reposition patient every 2 hours    - Dangle patient 3 times a day  - Stand patient 3 times a day  - Ambulate patient 3 times a day  - Out of bed to chair 3 times a day   - Out of bed for meals 3 times a day  - Out of bed for toileting  - Record patient progress and toleration of activity level   Outcome: Progressing
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
## 2023-05-24 ENCOUNTER — HOSPITAL ENCOUNTER (INPATIENT)
Facility: HOSPITAL | Age: 88
LOS: 20 days | Discharge: NURSING, REHAB OR LONG-TERM CARE | DRG: 885 | End: 2023-06-13
Attending: STUDENT IN AN ORGANIZED HEALTH CARE EDUCATION/TRAINING PROGRAM | Admitting: PSYCHIATRY & NEUROLOGY
Payer: COMMERCIAL

## 2023-05-24 VITALS
DIASTOLIC BLOOD PRESSURE: 74 MMHG | SYSTOLIC BLOOD PRESSURE: 140 MMHG | BODY MASS INDEX: 25.91 KG/M2 | HEART RATE: 70 BPM | TEMPERATURE: 97.7 F | OXYGEN SATURATION: 94 % | RESPIRATION RATE: 17 BRPM | HEIGHT: 70 IN | WEIGHT: 181 LBS

## 2023-05-24 DIAGNOSIS — I48.91 ATRIAL FIBRILLATION, UNSPECIFIED TYPE (HCC): ICD-10-CM

## 2023-05-24 DIAGNOSIS — Z00.8 MEDICAL CLEARANCE FOR PSYCHIATRIC ADMISSION: ICD-10-CM

## 2023-05-24 DIAGNOSIS — F39 UNSPECIFIED MOOD (AFFECTIVE) DISORDER (HCC): Primary | Chronic | ICD-10-CM

## 2023-05-24 DIAGNOSIS — K59.00 CONSTIPATION, UNSPECIFIED CONSTIPATION TYPE: ICD-10-CM

## 2023-05-24 DIAGNOSIS — E11.40 TYPE 2 DIABETES MELLITUS WITH DIABETIC NEUROPATHY, WITH LONG-TERM CURRENT USE OF INSULIN (HCC): ICD-10-CM

## 2023-05-24 DIAGNOSIS — Z79.4 TYPE 2 DIABETES MELLITUS WITH DIABETIC NEUROPATHY, WITH LONG-TERM CURRENT USE OF INSULIN (HCC): ICD-10-CM

## 2023-05-24 LAB
ANION GAP SERPL CALCULATED.3IONS-SCNC: 5 MMOL/L (ref 4–13)
BUN SERPL-MCNC: 31 MG/DL (ref 5–25)
CALCIUM SERPL-MCNC: 8.5 MG/DL (ref 8.4–10.2)
CHLORIDE SERPL-SCNC: 109 MMOL/L (ref 96–108)
CO2 SERPL-SCNC: 25 MMOL/L (ref 21–32)
CREAT SERPL-MCNC: 2.28 MG/DL (ref 0.6–1.3)
GFR SERPL CREATININE-BSD FRML MDRD: 24 ML/MIN/1.73SQ M
GLUCOSE SERPL-MCNC: 161 MG/DL (ref 65–140)
GLUCOSE SERPL-MCNC: 197 MG/DL (ref 65–140)
GLUCOSE SERPL-MCNC: 214 MG/DL (ref 65–140)
GLUCOSE SERPL-MCNC: 288 MG/DL (ref 65–140)
POTASSIUM SERPL-SCNC: 4 MMOL/L (ref 3.5–5.3)
SODIUM SERPL-SCNC: 139 MMOL/L (ref 135–147)

## 2023-05-24 RX ORDER — SERTRALINE HYDROCHLORIDE 100 MG/1
100 TABLET, FILM COATED ORAL DAILY
Status: DISCONTINUED | OUTPATIENT
Start: 2023-05-25 | End: 2023-06-13 | Stop reason: HOSPADM

## 2023-05-24 RX ORDER — HYDROXYZINE 50 MG/1
50 TABLET, FILM COATED ORAL
Status: DISCONTINUED | OUTPATIENT
Start: 2023-05-24 | End: 2023-06-13 | Stop reason: HOSPADM

## 2023-05-24 RX ORDER — QUETIAPINE FUMARATE 25 MG/1
12.5 TABLET, FILM COATED ORAL 2 TIMES DAILY
Status: DISCONTINUED | OUTPATIENT
Start: 2023-05-25 | End: 2023-05-26

## 2023-05-24 RX ORDER — DONEPEZIL HYDROCHLORIDE 10 MG/1
10 TABLET, FILM COATED ORAL
Status: CANCELLED | OUTPATIENT
Start: 2023-05-24

## 2023-05-24 RX ORDER — ACETAMINOPHEN 325 MG/1
975 TABLET ORAL EVERY 6 HOURS PRN
Status: DISCONTINUED | OUTPATIENT
Start: 2023-05-24 | End: 2023-06-13 | Stop reason: HOSPADM

## 2023-05-24 RX ORDER — HYDROXYZINE HYDROCHLORIDE 25 MG/1
50 TABLET, FILM COATED ORAL
Status: CANCELLED | OUTPATIENT
Start: 2023-05-24

## 2023-05-24 RX ORDER — LORAZEPAM 2 MG/ML
1 INJECTION INTRAMUSCULAR
Status: CANCELLED | OUTPATIENT
Start: 2023-05-24

## 2023-05-24 RX ORDER — ACETAMINOPHEN 325 MG/1
650 TABLET ORAL EVERY 4 HOURS PRN
Status: CANCELLED | OUTPATIENT
Start: 2023-05-24

## 2023-05-24 RX ORDER — ACETAMINOPHEN 325 MG/1
975 TABLET ORAL EVERY 6 HOURS PRN
Status: CANCELLED | OUTPATIENT
Start: 2023-05-24

## 2023-05-24 RX ORDER — ACETAMINOPHEN 325 MG/1
650 TABLET ORAL EVERY 4 HOURS PRN
Status: DISCONTINUED | OUTPATIENT
Start: 2023-05-24 | End: 2023-06-13 | Stop reason: HOSPADM

## 2023-05-24 RX ORDER — LORAZEPAM 2 MG/ML
1 INJECTION INTRAMUSCULAR
Status: DISCONTINUED | OUTPATIENT
Start: 2023-05-24 | End: 2023-06-13 | Stop reason: HOSPADM

## 2023-05-24 RX ORDER — DONEPEZIL HYDROCHLORIDE 10 MG/1
10 TABLET, FILM COATED ORAL
Status: DISCONTINUED | OUTPATIENT
Start: 2023-05-24 | End: 2023-06-13 | Stop reason: HOSPADM

## 2023-05-24 RX ORDER — LORAZEPAM 1 MG/1
1 TABLET ORAL
Status: DISCONTINUED | OUTPATIENT
Start: 2023-05-24 | End: 2023-06-13 | Stop reason: HOSPADM

## 2023-05-24 RX ORDER — QUETIAPINE FUMARATE 25 MG/1
12.5 TABLET, FILM COATED ORAL 2 TIMES DAILY
Status: DISCONTINUED | OUTPATIENT
Start: 2023-05-24 | End: 2023-05-24 | Stop reason: HOSPADM

## 2023-05-24 RX ORDER — HYDROXYZINE HYDROCHLORIDE 25 MG/1
25 TABLET, FILM COATED ORAL
Status: CANCELLED | OUTPATIENT
Start: 2023-05-24

## 2023-05-24 RX ORDER — MEMANTINE HYDROCHLORIDE 10 MG/1
10 TABLET ORAL EVERY 12 HOURS SCHEDULED
Status: DISCONTINUED | OUTPATIENT
Start: 2023-05-24 | End: 2023-06-06

## 2023-05-24 RX ORDER — MEMANTINE HYDROCHLORIDE 5 MG/1
10 TABLET ORAL EVERY 12 HOURS SCHEDULED
Status: CANCELLED | OUTPATIENT
Start: 2023-05-24

## 2023-05-24 RX ORDER — TRAZODONE HYDROCHLORIDE 50 MG/1
50 TABLET ORAL
Status: CANCELLED | OUTPATIENT
Start: 2023-05-24

## 2023-05-24 RX ORDER — TRAZODONE HYDROCHLORIDE 50 MG/1
50 TABLET ORAL
Status: DISCONTINUED | OUTPATIENT
Start: 2023-05-24 | End: 2023-05-27

## 2023-05-24 RX ORDER — LORAZEPAM 1 MG/1
1 TABLET ORAL
Status: CANCELLED | OUTPATIENT
Start: 2023-05-24

## 2023-05-24 RX ORDER — QUETIAPINE FUMARATE 25 MG/1
12.5 TABLET, FILM COATED ORAL 2 TIMES DAILY
Status: CANCELLED | OUTPATIENT
Start: 2023-05-24

## 2023-05-24 RX ORDER — SERTRALINE HYDROCHLORIDE 100 MG/1
100 TABLET, FILM COATED ORAL DAILY
Status: CANCELLED | OUTPATIENT
Start: 2023-05-25

## 2023-05-24 RX ORDER — HYDROXYZINE HYDROCHLORIDE 25 MG/1
25 TABLET, FILM COATED ORAL
Status: DISCONTINUED | OUTPATIENT
Start: 2023-05-24 | End: 2023-06-13 | Stop reason: HOSPADM

## 2023-05-24 RX ADMIN — Medication 2000 UNITS: at 08:55

## 2023-05-24 RX ADMIN — SERTRALINE HYDROCHLORIDE 100 MG: 100 TABLET ORAL at 08:55

## 2023-05-24 RX ADMIN — INSULIN LISPRO 1 UNITS: 100 INJECTION, SOLUTION INTRAVENOUS; SUBCUTANEOUS at 07:54

## 2023-05-24 RX ADMIN — MEMANTINE 10 MG: 10 TABLET ORAL at 21:22

## 2023-05-24 RX ADMIN — FENOFIBRATE 48 MG: 48 TABLET, FILM COATED ORAL at 08:56

## 2023-05-24 RX ADMIN — HEPARIN SODIUM 5000 UNITS: 5000 INJECTION INTRAVENOUS; SUBCUTANEOUS at 14:26

## 2023-05-24 RX ADMIN — FERROUS SULFATE TAB 325 MG (65 MG ELEMENTAL FE) 325 MG: 325 (65 FE) TAB at 08:55

## 2023-05-24 RX ADMIN — DONEPEZIL HYDROCHLORIDE 10 MG: 10 TABLET, FILM COATED ORAL at 21:22

## 2023-05-24 RX ADMIN — INSULIN LISPRO 2 UNITS: 100 INJECTION, SOLUTION INTRAVENOUS; SUBCUTANEOUS at 17:28

## 2023-05-24 RX ADMIN — PROPAFENONE HYDROCHLORIDE 225 MG: 150 TABLET, COATED ORAL at 16:28

## 2023-05-24 RX ADMIN — WATER: 1 INJECTION INTRAMUSCULAR; INTRAVENOUS; SUBCUTANEOUS at 01:12

## 2023-05-24 RX ADMIN — PROPAFENONE HYDROCHLORIDE 225 MG: 150 TABLET, COATED ORAL at 08:56

## 2023-05-24 RX ADMIN — MULTIPLE VITAMINS W/ MINERALS TAB 1 TABLET: TAB ORAL at 08:55

## 2023-05-24 RX ADMIN — MEMANTINE 10 MG: 5 TABLET ORAL at 08:55

## 2023-05-24 RX ADMIN — INSULIN LISPRO 3 UNITS: 100 INJECTION, SOLUTION INTRAVENOUS; SUBCUTANEOUS at 11:59

## 2023-05-24 RX ADMIN — MAGNESIUM OXIDE TAB 400 MG (241.3 MG ELEMENTAL MG) 400 MG: 400 (241.3 MG) TAB at 08:55

## 2023-05-24 RX ADMIN — ATORVASTATIN CALCIUM 10 MG: 10 TABLET, FILM COATED ORAL at 08:55

## 2023-05-24 RX ADMIN — QUETIAPINE FUMARATE 12.5 MG: 25 TABLET ORAL at 17:29

## 2023-05-24 RX ADMIN — OLANZAPINE 2.5 MG: 10 INJECTION, POWDER, LYOPHILIZED, FOR SOLUTION INTRAMUSCULAR at 17:29

## 2023-05-24 RX ADMIN — SODIUM CHLORIDE 100 ML/HR: 0.9 INJECTION, SOLUTION INTRAVENOUS at 05:33

## 2023-05-24 RX ADMIN — OLANZAPINE 2.5 MG: 10 INJECTION, POWDER, LYOPHILIZED, FOR SOLUTION INTRAMUSCULAR at 10:19

## 2023-05-24 RX ADMIN — HEPARIN SODIUM 5000 UNITS: 5000 INJECTION INTRAVENOUS; SUBCUTANEOUS at 05:26

## 2023-05-24 NOTE — CASE MANAGEMENT
Case Management Discharge Planning Note    Patient name Shane Commander  Location [place] [address] 2 /South 2 [facility] CENTER* MRN [id number]  : 1935 Date 2023       Current Admission Date: 2023  Current Admission Diagnosis:Frontotemporal dementia [facility])   Patient Active Problem List    Diagnosis Date Noted   • Fall 2022   • Renal cell carcinoma ([facility] ) 2022   • Multiple rib fractures 2022   • Abnormal head CT 2022   • Tracheitis 11/15/2021   • Dementia ([facility] )    • Recurrent falls 2020   • Long term current use of antiarrhythmic medical therapy 2020   • 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 2020   • Coagulation disorder ([facility] ) 2020   • Head injury 2020   • 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, or unspecified chronic kidney disease 2019   • Coronary artery disease involving native coronary artery of native heart without angina pectoris 2019   • Type 2 diabetes mellitus with kidney complication, with long-term current use of insulin ([facility] ) 2019   • Paroxysmal atrial fibrillation ([facility] ) 2018   • Mixed hyperlipidemia 2018   • 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[facility] ) 2018   • JACQUELINE (acute kidney injury) ([facility] ) 2018   • Essential hypertension 2018   • Solitary kidney 2018   • Benign prostate hyperplasia 10/27/2014   • Elevated prostate specific antigen (PSA) 2013      LOS (days): 3  Geometric Mean LOS (GMLOS) (days): 3 80  Days to GMLOS:0 7     OBJECTIVE:  Risk of Unplanned Readmission Score: 25 91         Current admission status: Inpatient   Preferred Pharmacy:   [facility] [address] [place] [address]  Via Clem Romano [address] [place] [id number]  Phone: [phone] Fax: [address] [place], [place] - [facility] [address] [place] [address] [facility]  Phone: [phone] Fax: [phone] primary Care Provider: Chaya Armstrong MD    Primary Insurance: [facility]  Secondary Insurance:     DISCHARGE DETAILS:                           Contacts  Patient Contacts: Naya Melvin  Relationship to Patient[de-identified] Other (Comment) (Guardian)  Contact Method: Phone  Phone Number: [phone]  Reason/Outcome: Discharge Planning, Other (Comment) (pck up time)                           Discharge Destination Plan[de-identified] Inpatient [facility])  Transport at Discharge : [facility]  Dispatcher Contacted: Yes  Number/Name of Dispatcher: Roundtrip  Transported by [employer] and Unit #): [address]  ETA of Transport (Date): 05/24/23  ETA of Transport (Time): [address] Name, [facility] State : [address]  Receiving Facility/Agency Phone Number: [phone]
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
## 2023-05-24 NOTE — ASSESSMENT & PLAN NOTE
Lab Results   Component Value Date    HGBA1C 8 8 (H) 12/07/2022       Recent Labs     05/23/23  1622 05/23/23  2107 05/24/23  0725 05/24/23  1101   POCGLU 197* 266* 161* 288*       · From poor oral intake patient has had episodes of hypoglycemia on home regimen  · Recommend starting reduced dose insulin Levemir 10 units twice daily when patient's oral intake improves

## 2023-05-24 NOTE — ASSESSMENT & PLAN NOTE
Lab Results   Component Value Date    HGBA1C 8 8 (H) 12/07/2022       Recent Labs     05/23/23  1131 05/23/23  1622 05/23/23  2107 05/24/23  0725   POCGLU 201* 197* 266* 161*       · From poor oral intake patient has had episodes of hypoglycemia  · We will hold off on standing long-acting insulin just use sliding scale

## 2023-05-24 NOTE — PLAN OF CARE
Problem: Potential for Falls  Goal: Patient will remain free of falls  Description: INTERVENTIONS:  - Educate patient/family on patient safety including physical limitations  - Instruct patient to call for assistance with activity   - Consult OT/PT to assist with strengthening/mobility   - Keep Call bell within reach  - Keep bed low and locked with side rails adjusted as appropriate  - Keep care items and personal belongings within reach  - Initiate and maintain comfort rounds  - Make Fall Risk Sign visible to staff  - Offer Toileting every 2 Hours, in advance of need  - Initiate/Maintain bed alarm  - Obtain necessary fall risk management equipment:  - Apply yellow socks and bracelet for high fall risk patients  - Consider moving patient to room near nurses station  Outcome: Progressing     Problem: MOBILITY - ADULT  Goal: Maintain or return to baseline ADL function  Description: INTERVENTIONS:  - Educate patient/family on patient safety including physical limitations  - Instruct patient to call for assistance with activity   - Consult OT/PT to assist with strengthening/mobility   - Keep Call bell within reach  - Keep bed low and locked with side rails adjusted as appropriate  - Keep care items and personal belongings within reach  - Initiate and maintain comfort rounds  - Make Fall Risk Sign visible to staff  - Offer Toileting every 2 Hours, in advance of need  - Initiate/Maintain bed alarm  - Obtain necessary fall risk management equipment:  - Apply yellow socks and bracelet for high fall risk patients  - Consider moving patient to room near nurses station  Outcome: Progressing  Goal: Maintains/Returns to pre admission functional level  Description: INTERVENTIONS:  - Perform BMAT or MOVE assessment daily    - Set and communicate daily mobility goal to care team and patient/family/caregiver  - Collaborate with rehabilitation services on mobility goals if consulted  - Perform Range of Motion  times a day    - Reposition patient every  hours    - Dangle patient  times a day  - Stand patient  times a day  - Ambulate patient  times a day  - Out of bed to chair  times a day   - Out of bed for meals  times a day  - Out of bed for toileting  - Record patient progress and toleration of activity level   Outcome: Progressing     Problem: PAIN - ADULT  Goal: Verbalizes/displays adequate comfort level or baseline comfort level  Description: Interventions:  - Encourage patient to monitor pain and request assistance  - Assess pain using appropriate pain scale  - Administer analgesics based on type and severity of pain and evaluate response  - Implement non-pharmacological measures as appropriate and evaluate response  - Consider cultural and social influences on pain and pain management  - Notify physician/advanced practitioner if interventions unsuccessful or patient reports new pain  Outcome: Progressing     Problem: INFECTION - ADULT  Goal: Absence or prevention of progression during hospitalization  Description: INTERVENTIONS:  - Assess and monitor for signs and symptoms of infection  - Monitor lab/diagnostic results  - Monitor all insertion sites, i e  indwelling lines, tubes, and drains  - Monitor endotracheal if appropriate and nasal secretions for changes in amount and color  - [place] appropriate cooling/warming therapies per order  - Administer medications as ordered  - Instruct and encourage patient and family to use good hand hygiene technique  - Identify and instruct in appropriate isolation precautions for identified infection/condition  Outcome: Progressing     Problem: SAFETY ADULT  Goal: Patient will remain free of falls  Description: INTERVENTIONS:  - Educate patient/family on patient safety including physical limitations  - Instruct patient to call for assistance with activity   - Consult OT/PT to assist with strengthening/mobility   - Keep Call bell within reach  - Keep bed low and locked with side rails adjusted as appropriate  - Keep care items and personal belongings within reach  - Initiate and maintain comfort rounds  - Make Fall Risk Sign visible to staff  - Offer Toileting every 2 Hours, in advance of need  - Initiate/Maintain bed alarm  - Obtain necessary fall risk management equipment:  - Apply yellow socks and bracelet for high fall risk patients  - Consider moving patient to room near nurses station  Outcome: Progressing  Goal: Maintain or return to baseline ADL function  Description: INTERVENTIONS:  - Educate patient/family on patient safety including physical limitations  - Instruct patient to call for assistance with activity   - Consult OT/PT to assist with strengthening/mobility   - Keep Call bell within reach  - Keep bed low and locked with side rails adjusted as appropriate  - Keep care items and personal belongings within reach  - Initiate and maintain comfort rounds  - Make Fall Risk Sign visible to staff  - Offer Toileting every 2 Hours, in advance of need  - Initiate/Maintain bed alarm  - Obtain necessary fall risk management equipment:  - Apply yellow socks and bracelet for high fall risk patients  - Consider moving patient to room near nurses station  Outcome: Progressing  Goal: Maintains/Returns to pre admission functional level  Description: INTERVENTIONS:  - Perform BMAT or MOVE assessment daily    - Set and communicate daily mobility goal to care team and patient/family/caregiver  - Collaborate with rehabilitation services on mobility goals if consulted  - Perform Range of Motion  times a day  - Reposition patient every  hours    - Dangle patient  times a day  - Stand patient  times a day  - Ambulate patient  times a day  - Out of bed to chair  times a day   - Out of bed for meals times a day  - Out of bed for toileting  - Record patient progress and toleration of activity level   Outcome: Progressing     Problem: DISCHARGE PLANNING  Goal: Discharge to home or other facility with appropriate resources  Description: INTERVENTIONS:  - Identify barriers to discharge w/patient and caregiver  - Arrange for needed discharge resources and transportation as appropriate  - Identify discharge learning needs (meds, wound care, etc )  - Arrange for interpretive services to assist at discharge as needed  - Refer to Case Management Department for coordinating discharge planning if the patient needs post-hospital services based on physician/advanced practitioner order or complex needs related to functional status, cognitive ability, or social support system  Outcome: Progressing     Problem: Knowledge Deficit  Goal: Patient/family/caregiver demonstrates understanding of disease process, treatment plan, medications, and discharge instructions  Description: Complete learning assessment and assess knowledge base    Interventions:  - Provide teaching at level of understanding  - Provide teaching via preferred learning methods  Outcome: Progressing     Problem: Prexisting or High Potential for Compromised Skin Integrity  Goal: Skin integrity is maintained or improved  Description: INTERVENTIONS:  - Identify patients at risk for skin breakdown  - Assess and monitor skin integrity  - Assess and monitor nutrition and hydration status  - Monitor labs   - Assess for incontinence   - Turn and reposition patient  - Assist with mobility/ambulation  - Relieve pressure over bony prominences  - Avoid friction and shearing  - Provide appropriate hygiene as needed including keeping skin clean and dry  - Evaluate need for skin moisturizer/barrier cream  - Collaborate with interdisciplinary team   - Patient/family teaching  - Consider wound care consult   Outcome: Progressing     Problem: NEUROSENSORY - ADULT  Goal: Achieves stable or improved neurological status  Description: INTERVENTIONS  - Monitor and report changes in neurological status  - Monitor vital signs such as temperature, blood pressure, glucose, and any other labs ordered - Initiate measures to prevent increased intracranial pressure  - Monitor for seizure activity and implement precautions if appropriate      Outcome: Progressing     Problem: METABOLIC, FLUID AND ELECTROLYTES - ADULT  Goal: Fluid balance maintained  Description: INTERVENTIONS:  - Monitor labs   - Monitor I/O and WT  - Instruct patient on fluid and nutrition as appropriate  - Assess for signs & symptoms of volume excess or deficit  Outcome: Progressing     Problem: SAFETY,RESTRAINT: NV/NON-SELF DESTRUCTIVE BEHAVIOR  Goal: Remains free of harm/injury (restraint for non violent/non self-detsructive behavior)  Description: INTERVENTIONS:  - Instruct patient/family regarding restraint use   - Assess and monitor physiologic and psychological status   - Provide interventions and comfort measures to meet assessed patient needs   - Identify and implement measures to help patient regain control  - Assess readiness for release of restraint   Outcome: Progressing  Goal: Returns to optimal restraint-free functioning  Description: INTERVENTIONS:  - Assess the patient's behavior and symptoms that indicate continued need for restraint  - Identify and implement measures to help patient regain control  - Assess readiness for release of restraint   Outcome: Progressing

## 2023-05-24 NOTE — PROGRESS NOTES
05/24/23 1100   Clinical Encounter Type   Visited With Patient   Routine Visit Introduction   Sacramental Encounters   Communion Given Indicator Yes

## 2023-05-24 NOTE — PLAN OF CARE
Problem: PAIN - ADULT  Goal: Verbalizes/displays adequate comfort level or baseline comfort level  Description: Interventions:  - Encourage patient to monitor pain and request assistance  - Assess pain using appropriate pain scale  - Administer analgesics based on type and severity of pain and evaluate response  - Implement non-pharmacological measures as appropriate and evaluate response  - Consider cultural and social influences on pain and pain management  - Notify physician/advanced practitioner if interventions unsuccessful or patient reports new pain  Outcome: Progressing     Problem: INFECTION - ADULT  Goal: Absence or prevention of progression during hospitalization  Description: INTERVENTIONS:  - Assess and monitor for signs and symptoms of infection  - Monitor lab/diagnostic results  - Monitor all insertion sites, i e  indwelling lines, tubes, and drains  - Monitor endotracheal if appropriate and nasal secretions for changes in amount and color  - [place] appropriate cooling/warming therapies per order  - Administer medications as ordered  - Instruct and encourage patient and family to use good hand hygiene technique  - Identify and instruct in appropriate isolation precautions for identified infection/condition  Outcome: Progressing

## 2023-05-24 NOTE — QUICK NOTE
Reviewed patient's vital signs and labs  Creatinine has returned to baseline with IV fluids  Discontinue IV fluids  Encourage oral hydration      Medically stable for discharge to inpatient psych
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
## 2023-05-24 NOTE — CASE MANAGEMENT
Case Management Discharge Planning Note    Patient name Jose L Buckhead  Location [place] [address] 2 /[place] 2 Amalia Perez* MRN [id number]  : 1935 Date 2023       Current Admission Date: 2023  Current Admission Diagnosis:Frontotemporal dementia [facility])   Patient Active Problem List    Diagnosis Date Noted   • Fall 2022   • Renal cell carcinoma ([facility] ) 2022   • Multiple rib fractures 2022   • Abnormal head CT 2022   • Tracheitis 11/15/2021   • Dementia ([facility] )    • Recurrent falls 2020   • Long term current use of antiarrhythmic medical therapy 2020   • 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 2020   • Coagulation disorder ([facility] ) 2020   • Head injury 2020   • 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, or unspecified chronic kidney disease 2019   • Coronary artery disease involving native coronary artery of native heart without angina pectoris 2019   • Type 2 diabetes mellitus with kidney complication, with long-term current use of insulin [facility] ) 2019   • Paroxysmal atrial fibrillation ([facility] ) 2018   • Mixed hyperlipidemia 2018   • 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[facility] ) 2018   • JACQUELINE (acute kidney injury) ([facility] ) 2018   • Essential hypertension 2018   • Solitary kidney 2018   • Benign prostate hyperplasia 10/27/2014   • Elevated prostate specific antigen (PSA) 2013      LOS (days): 3  Geometric Mean LOS (GMLOS) (days): 3 80  Days to GMLOS:0 8     OBJECTIVE:  Risk of Unplanned Readmission Score: 25 91         Current admission status: Inpatient   Preferred Pharmacy:   [facility] - [address] [place] [address]  [address] [place] [id number]  Phone: [phone] Fax: [phone] [facility] - [address] 210 [facility]  Phone: [phone] Fax: [phone]    Primary Care Provider: Ramírez Lewis MD    Primary Insurance: [facility]  Secondary Insurance:     DISCHARGE DETAILS:     Additional Comments: Pt has been accepted to [address] [place] [address] for  behavioral health  CM called pt's [facility] ([phone]) and spoke with Jennifer Gong who provided Auth # [id number], approved today 5/24 through 5/26 with 5/26 being the next review date  SH can call in to P: [phone] to request additional days for fax request to F: [phone]  SL Intake aware of auth information  [facility] transport requested for 6:00 PM per Intakes request, CM department to follow for confirmation of transportation time
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
## 2023-05-24 NOTE — PLAN OF CARE
Problem: Alteration in Thoughts and Perception  Goal: Treatment Goal: Gain control of psychotic behaviors/thinking, reduce/eliminate presenting symptoms and demonstrate improved reality functioning upon discharge  Outcome: Progressing  Goal: Verbalize thoughts and feelings  Description: Interventions:  - Promote a nonjudgmental and trusting relationship with the patient through active listening and therapeutic communication  - Assess patient's level of functioning, behavior and potential for risk  - Engage patient in 1 on 1 interactions  - Encourage patient to express fears, feelings, frustrations, and discuss symptoms    - [place] patient to reality, help patient recognize reality-based thinking   - Administer medications as ordered and assess for potential side effects  - Provide the patient education related to the signs and symptoms of the illness and desired effects of prescribed medications  Outcome: Progressing  Goal: Refrain from acting on delusional thinking/internal stimuli  Description: Interventions:  - Monitor patient closely, per order   - Utilize least restrictive measures   - Set reasonable limits, give positive feedback for acceptable   - Administer medications as ordered and monitor of potential side effects  Outcome: Progressing  Goal: Agree to be compliant with medication regime, as prescribed and report medication side effects  Description: Interventions:  - Offer appropriate PRN medication and supervise ingestion; conduct AIMS, as needed   Outcome: Progressing  Goal: Attend and participate in unit activities, including therapeutic, recreational, and educational groups  Description: Interventions:  -Encourage Visitation and family involvement in care  Outcome: Progressing  Goal: Recognize dysfunctional thoughts, communicate reality-based thoughts at the time of discharge  Description: Interventions:  - Provide medication and psycho-education to assist patient in compliance and developing insight into his/her illness   Outcome: Progressing  Goal: Complete daily ADLs, including personal hygiene independently, as able  Description: Interventions:  - Observe, teach, and assist patient with ADLS  - Monitor and promote a balance of rest/activity, with adequate nutrition and elimination   Outcome: Progressing     Problem: Ineffective Coping  Goal: Cooperates with admission process  Description: Interventions:   - Complete admission process  Outcome: Progressing  Goal: Identifies ineffective coping skills  Outcome: Progressing  Goal: Identifies healthy coping skills  Outcome: Progressing  Goal: Demonstrates healthy coping skills  Outcome: Progressing  Goal: Participates in unit activities  Description: Interventions:  - Provide therapeutic environment   - Provide required programming   - Redirect inappropriate behaviors   Outcome: Progressing  Goal: Patient/Family participate in treatment and DC plans  Description: Interventions:  - Provide therapeutic environment  Outcome: Progressing  Goal: Patient/Family verbalizes awareness of resources  Outcome: Progressing  Goal: Understands least restrictive measures  Description: Interventions:  - Utilize least restrictive behavior  Outcome: Progressing  Goal: Free from restraint events  Description: - Utilize least restrictive measures   - Provide behavioral interventions   - Redirect inappropriate behaviors   Outcome: Progressing     Problem: Risk for Self Injury/Neglect  Goal: Treatment Goal: Remain safe during length of stay, learn and adopt new coping skills, and be free of self-injurious ideation, impulses and acts at the time of discharge  Outcome: Progressing  Goal: Verbalize thoughts and feelings  Description: Interventions:  - Assess and re-assess patient's lethality and potential for self-injury  - Engage patient in 1:1 interactions, daily, for a minimum of 15 minutes  - Encourage patient to express feelings, fears, frustrations, hopes  - Establish rapport/trust with patient   Outcome: Progressing  Goal: Refrain from harming self  Description: Interventions:  - Monitor patient closely, per order  - Develop a trusting relationship  - Supervise medication ingestion, monitor effects and side effects   Outcome: Progressing  Goal: Attend and participate in unit activities, including therapeutic, recreational, and educational groups  Description: Interventions:  - Provide therapeutic and educational activities daily, encourage attendance and participation, and document same in the medical record  - Obtain collateral information, encourage visitation and family involvement in care   Outcome: Progressing  Goal: Recognize maladaptive responses and adopt new coping mechanisms  Outcome: Progressing  Goal: Complete daily ADLs, including personal hygiene independently, as able  Description: Interventions:  - Observe, teach, and assist patient with ADLS  - Monitor and promote a balance of rest/activity, with adequate nutrition and elimination  Outcome: Progressing     Problem: Depression  Goal: Treatment Goal: Demonstrate behavioral control of depressive symptoms, verbalize feelings of improved mood/affect, and adopt new coping skills prior to discharge  Outcome: Progressing  Goal: Verbalize thoughts and feelings  Description: Interventions:  - Assess and re-assess patient's level of risk   - Engage patient in 1:1 interactions, daily, for a minimum of 15 minutes   - Encourage patient to express feelings, fears, frustrations, hopes   Outcome: Progressing  Goal: Refrain from harming self  Description: Interventions:  - Monitor patient closely, per order   - Supervise medication ingestion, monitor effects and side effects   Outcome: Progressing  Goal: Refrain from isolation  Description: Interventions:  - Develop a trusting relationship   - Encourage socialization   Outcome: Progressing  Goal: Refrain from self-neglect  Outcome: Progressing  Goal: Attend and participate in unit activities, including therapeutic, recreational, and educational groups  Description: Interventions:  - Provide therapeutic and educational activities daily, encourage attendance and participation, and document same in the medical record   Outcome: Progressing  Goal: Complete daily ADLs, including personal hygiene independently, as able  Description: Interventions:  - Observe, teach, and assist patient with ADLS  -  Monitor and promote a balance of rest/activity, with adequate nutrition and elimination   Outcome: Progressing     Problem: Anxiety  Goal: Anxiety is at manageable level  Description: Interventions:  - Assess and monitor patient's anxiety level  - Monitor for signs and symptoms (heart palpitations, chest pain, shortness of breath, headaches, nausea, feeling jumpy, restlessness, irritable, apprehensive)  - Collaborate with interdisciplinary team and initiate plan and interventions as ordered    - [place] patient to unit/surroundings  - Explain treatment plan  - Encourage participation in care  - Encourage verbalization of concerns/fears  - Identify coping mechanisms  - Assist in developing anxiety-reducing skills  - Administer/offer alternative therapies  - Limit or eliminate stimulants  Outcome: Progressing     Problem: Risk for Violence/Aggression Toward Others  Goal: Treatment Goal: Refrain from acts of violence/aggression during length of stay, and demonstrate improved impulse control at the time of discharge  Outcome: Progressing  Goal: Verbalize thoughts and feelings  Description: Interventions:  - Assess and re-assess patient's level of risk, every waking shift  - Engage patient in 1:1 interactions, daily, for a minimum of 15 minutes   - Allow patient to express feelings and frustrations in a safe and non-threatening manner   - Establish rapport/trust with patient   Outcome: Progressing  Goal: Refrain from harming others  Outcome: Progressing  Goal: Refrain from destructive acts on the environment or property  Outcome: Progressing  Goal: Control angry outbursts  Description: Interventions:  - Monitor patient closely, per order  - Ensure early verbal de-escalation  - Monitor prn medication needs  - Set reasonable/therapeutic limits, outline behavioral expectations, and consequences   - Provide a non-threatening milieu, utilizing the least restrictive interventions   Outcome: Progressing  Goal: Attend and participate in unit activities, including therapeutic, recreational, and educational groups  Description: Interventions:  - Provide therapeutic and educational activities daily, encourage attendance and participation, and document same in the medical record   Outcome: Progressing  Goal: Identify appropriate positive anger management techniques  Description: Interventions:  - Offer anger management and coping skills groups   - Staff will provide positive feedback for appropriate anger control  Outcome: Progressing     Problem: Alteration in Orientation  Goal: Treatment Goal: Demonstrate a reduction of confusion and improved orientation to person, place, time and/or situation upon discharge, according to optimum baseline/ability  Outcome: Progressing  Goal: Interact with staff daily  Description: Interventions:  - Assess and re-assess patient's level of orientation  - Engage patient in 1 on 1 interactions, daily, for a minimum of 15 minutes   - Establish rapport/trust with patient   Outcome: Progressing  Goal: Express concerns related to confused thinking related to:  Description: Interventions:  - Encourage patient to express feelings, fears, frustrations, hopes  - Assign consistent caregivers   - Winifred/re-orient patient as needed  - Allow comfort items, as appropriate  - Provide visual cues, signs, etc    Outcome: Progressing  Goal: Allow medical examinations, as recommended  Description: Interventions:  - Provide physical/neurological exams and/or referrals, per provider   Outcome: Progressing  Goal: Cooperate with recommended testing/procedures  Description: Interventions:  - Determine need for ancillary testing  - Observe for mental status changes  - Implement falls/precaution protocol   Outcome: Progressing  Goal: Attend and participate in unit activities, including therapeutic, recreational, and educational groups  Description: Interventions:  - Provide therapeutic and educational activities daily, encourage attendance and participation, and document same in the medical record   - Provide appropriate opportunities for reminiscence   - Provide a consistent daily routine   - Encourage family contact/visitation   Outcome: Progressing  Goal: Complete daily ADLs, including personal hygiene independently, as able  Description: Interventions:  - Observe, teach, and assist patient with ADLS  Outcome: Progressing

## 2023-05-25 PROBLEM — Z00.8 MEDICAL CLEARANCE FOR PSYCHIATRIC ADMISSION: Status: ACTIVE | Noted: 2023-05-25

## 2023-05-25 PROBLEM — N18.4 CHRONIC KIDNEY DISEASE (CKD), STAGE IV (SEVERE) (HCC): Status: ACTIVE | Noted: 2023-05-25

## 2023-05-25 LAB
25(OH)D3 SERPL-MCNC: 38.2 NG/ML (ref 30–100)
ALBUMIN SERPL BCP-MCNC: 3.7 G/DL (ref 3.5–5)
ALP SERPL-CCNC: 50 U/L (ref 34–104)
ALT SERPL W P-5'-P-CCNC: 10 U/L (ref 7–52)
ANION GAP SERPL CALCULATED.3IONS-SCNC: 8 MMOL/L (ref 4–13)
AST SERPL W P-5'-P-CCNC: 16 U/L (ref 13–39)
ATRIAL RATE: 61 BPM
BASOPHILS # BLD AUTO: 0.05 THOUSANDS/ÂΜL (ref 0–0.1)
BASOPHILS NFR BLD AUTO: 1 % (ref 0–1)
BILIRUB SERPL-MCNC: 0.57 MG/DL (ref 0.2–1)
BUN SERPL-MCNC: 25 MG/DL (ref 5–25)
CALCIUM SERPL-MCNC: 9 MG/DL (ref 8.4–10.2)
CHLORIDE SERPL-SCNC: 108 MMOL/L (ref 96–108)
CO2 SERPL-SCNC: 26 MMOL/L (ref 21–32)
CREAT SERPL-MCNC: 2.15 MG/DL (ref 0.6–1.3)
EOSINOPHIL # BLD AUTO: 0.3 THOUSAND/ÂΜL (ref 0–0.61)
EOSINOPHIL NFR BLD AUTO: 5 % (ref 0–6)
ERYTHROCYTE [DISTWIDTH] IN BLOOD BY AUTOMATED COUNT: 12.7 % (ref 11.6–15.1)
EST. AVERAGE GLUCOSE BLD GHB EST-MCNC: 183 MG/DL
FOLATE SERPL-MCNC: 11.8 NG/ML
GFR SERPL CREATININE-BSD FRML MDRD: 26 ML/MIN/1.73SQ M
GLUCOSE P FAST SERPL-MCNC: 179 MG/DL (ref 65–99)
GLUCOSE SERPL-MCNC: 162 MG/DL (ref 65–140)
GLUCOSE SERPL-MCNC: 179 MG/DL (ref 65–140)
GLUCOSE SERPL-MCNC: 189 MG/DL (ref 65–140)
GLUCOSE SERPL-MCNC: 235 MG/DL (ref 65–140)
GLUCOSE SERPL-MCNC: 245 MG/DL (ref 65–140)
HBA1C MFR BLD: 8 %
HCT VFR BLD AUTO: 38.3 % (ref 36.5–49.3)
HGB BLD-MCNC: 12.3 G/DL (ref 12–17)
IMM GRANULOCYTES # BLD AUTO: 0.01 THOUSAND/UL (ref 0–0.2)
IMM GRANULOCYTES NFR BLD AUTO: 0 % (ref 0–2)
LYMPHOCYTES # BLD AUTO: 1.74 THOUSANDS/ÂΜL (ref 0.6–4.47)
LYMPHOCYTES NFR BLD AUTO: 26 % (ref 14–44)
MAGNESIUM SERPL-MCNC: 2.3 MG/DL (ref 1.9–2.7)
MCH RBC QN AUTO: 28.1 PG (ref 26.8–34.3)
MCHC RBC AUTO-ENTMCNC: 32.1 G/DL (ref 31.4–37.4)
MCV RBC AUTO: 87 FL (ref 82–98)
MONOCYTES # BLD AUTO: 0.59 THOUSAND/ÂΜL (ref 0.17–1.22)
MONOCYTES NFR BLD AUTO: 9 % (ref 4–12)
NEUTROPHILS # BLD AUTO: 4.03 THOUSANDS/ÂΜL (ref 1.85–7.62)
NEUTS SEG NFR BLD AUTO: 59 % (ref 43–75)
NRBC BLD AUTO-RTO: 0 /100 WBCS
P AXIS: 89 DEGREES
PHOSPHATE SERPL-MCNC: 3.2 MG/DL (ref 2.3–4.1)
PLATELET # BLD AUTO: 243 THOUSANDS/UL (ref 149–390)
PMV BLD AUTO: 10.4 FL (ref 8.9–12.7)
POTASSIUM SERPL-SCNC: 4.4 MMOL/L (ref 3.5–5.3)
PR INTERVAL: 224 MS
PROT SERPL-MCNC: 6.5 G/DL (ref 6.4–8.4)
QRS AXIS: 57 DEGREES
QRSD INTERVAL: 86 MS
QT INTERVAL: 426 MS
QTC INTERVAL: 428 MS
RBC # BLD AUTO: 4.38 MILLION/UL (ref 3.88–5.62)
SODIUM SERPL-SCNC: 142 MMOL/L (ref 135–147)
T WAVE AXIS: 53 DEGREES
TSH SERPL DL<=0.05 MIU/L-ACNC: 3.06 UIU/ML (ref 0.45–4.5)
VENTRICULAR RATE: 61 BPM
VIT B12 SERPL-MCNC: 331 PG/ML (ref 180–914)
WBC # BLD AUTO: 6.72 THOUSAND/UL (ref 4.31–10.16)

## 2023-05-25 PROCEDURE — 82306 VITAMIN D 25 HYDROXY: CPT

## 2023-05-25 PROCEDURE — 84443 ASSAY THYROID STIM HORMONE: CPT

## 2023-05-25 PROCEDURE — 99223 1ST HOSP IP/OBS HIGH 75: CPT | Performed by: STUDENT IN AN ORGANIZED HEALTH CARE EDUCATION/TRAINING PROGRAM

## 2023-05-25 PROCEDURE — 82948 REAGENT STRIP/BLOOD GLUCOSE: CPT

## 2023-05-25 PROCEDURE — 93005 ELECTROCARDIOGRAM TRACING: CPT

## 2023-05-25 PROCEDURE — 83036 HEMOGLOBIN GLYCOSYLATED A1C: CPT | Performed by: PHYSICIAN ASSISTANT

## 2023-05-25 PROCEDURE — 80053 COMPREHEN METABOLIC PANEL: CPT

## 2023-05-25 PROCEDURE — 82746 ASSAY OF FOLIC ACID SERUM: CPT

## 2023-05-25 PROCEDURE — 99222 1ST HOSP IP/OBS MODERATE 55: CPT | Performed by: PHYSICIAN ASSISTANT

## 2023-05-25 PROCEDURE — 83735 ASSAY OF MAGNESIUM: CPT

## 2023-05-25 PROCEDURE — 82607 VITAMIN B-12: CPT

## 2023-05-25 PROCEDURE — 93010 ELECTROCARDIOGRAM REPORT: CPT | Performed by: INTERNAL MEDICINE

## 2023-05-25 PROCEDURE — 87081 CULTURE SCREEN ONLY: CPT | Performed by: STUDENT IN AN ORGANIZED HEALTH CARE EDUCATION/TRAINING PROGRAM

## 2023-05-25 PROCEDURE — 84100 ASSAY OF PHOSPHORUS: CPT

## 2023-05-25 PROCEDURE — 85025 COMPLETE CBC W/AUTO DIFF WBC: CPT

## 2023-05-25 RX ORDER — PROPAFENONE HYDROCHLORIDE 150 MG/1
225 TABLET, COATED ORAL EVERY 8 HOURS SCHEDULED
Status: DISCONTINUED | OUTPATIENT
Start: 2023-05-25 | End: 2023-06-13 | Stop reason: HOSPADM

## 2023-05-25 RX ORDER — LANOLIN ALCOHOL/MO/W.PET/CERES
400 CREAM (GRAM) TOPICAL DAILY
Status: DISCONTINUED | OUTPATIENT
Start: 2023-05-25 | End: 2023-06-13 | Stop reason: HOSPADM

## 2023-05-25 RX ORDER — INSULIN LISPRO 100 [IU]/ML
1-5 INJECTION, SOLUTION INTRAVENOUS; SUBCUTANEOUS
Status: DISCONTINUED | OUTPATIENT
Start: 2023-05-25 | End: 2023-06-13 | Stop reason: HOSPADM

## 2023-05-25 RX ORDER — METOPROLOL TARTRATE 50 MG/1
50 TABLET, FILM COATED ORAL DAILY
Status: DISCONTINUED | OUTPATIENT
Start: 2023-05-25 | End: 2023-06-13 | Stop reason: HOSPADM

## 2023-05-25 RX ORDER — ATORVASTATIN CALCIUM 10 MG/1
10 TABLET, FILM COATED ORAL
Status: DISCONTINUED | OUTPATIENT
Start: 2023-05-25 | End: 2023-06-13 | Stop reason: HOSPADM

## 2023-05-25 RX ORDER — MELATONIN
2000 DAILY
Status: DISCONTINUED | OUTPATIENT
Start: 2023-05-25 | End: 2023-06-13 | Stop reason: HOSPADM

## 2023-05-25 RX ORDER — FERROUS SULFATE 325(65) MG
325 TABLET ORAL
Status: DISCONTINUED | OUTPATIENT
Start: 2023-05-25 | End: 2023-06-13 | Stop reason: HOSPADM

## 2023-05-25 RX ORDER — METOPROLOL TARTRATE 50 MG/1
100 TABLET, FILM COATED ORAL
Status: DISCONTINUED | OUTPATIENT
Start: 2023-05-25 | End: 2023-06-13 | Stop reason: HOSPADM

## 2023-05-25 RX ORDER — INSULIN LISPRO 100 [IU]/ML
1-6 INJECTION, SOLUTION INTRAVENOUS; SUBCUTANEOUS
Status: DISCONTINUED | OUTPATIENT
Start: 2023-05-25 | End: 2023-06-13 | Stop reason: HOSPADM

## 2023-05-25 RX ADMIN — DONEPEZIL HYDROCHLORIDE 10 MG: 10 TABLET, FILM COATED ORAL at 21:38

## 2023-05-25 RX ADMIN — PROPAFENONE HYDROCHLORIDE 225 MG: 150 TABLET, FILM COATED ORAL at 15:52

## 2023-05-25 RX ADMIN — FERROUS SULFATE TAB 325 MG (65 MG ELEMENTAL FE) 325 MG: 325 (65 FE) TAB at 09:57

## 2023-05-25 RX ADMIN — LORAZEPAM 1 MG: 2 INJECTION INTRAMUSCULAR; INTRAVENOUS at 18:48

## 2023-05-25 RX ADMIN — INSULIN LISPRO 1 UNITS: 100 INJECTION, SOLUTION INTRAVENOUS; SUBCUTANEOUS at 21:40

## 2023-05-25 RX ADMIN — INSULIN LISPRO 3 UNITS: 100 INJECTION, SOLUTION INTRAVENOUS; SUBCUTANEOUS at 12:21

## 2023-05-25 RX ADMIN — METOPROLOL TARTRATE 50 MG: 50 TABLET, FILM COATED ORAL at 09:57

## 2023-05-25 RX ADMIN — INSULIN DETEMIR 10 UNITS: 100 INJECTION, SOLUTION SUBCUTANEOUS at 21:38

## 2023-05-25 RX ADMIN — PROPAFENONE HYDROCHLORIDE 225 MG: 150 TABLET, FILM COATED ORAL at 21:40

## 2023-05-25 RX ADMIN — CHOLECALCIFEROL TAB 25 MCG (1000 UNIT) 2000 UNITS: 25 TAB at 09:57

## 2023-05-25 RX ADMIN — PROPAFENONE HYDROCHLORIDE 225 MG: 150 TABLET, FILM COATED ORAL at 10:24

## 2023-05-25 RX ADMIN — MEMANTINE 10 MG: 10 TABLET ORAL at 21:38

## 2023-05-25 RX ADMIN — INSULIN DETEMIR 10 UNITS: 100 INJECTION, SOLUTION SUBCUTANEOUS at 10:23

## 2023-05-25 RX ADMIN — MAGNESIUM OXIDE TAB 400 MG (241.3 MG ELEMENTAL MG) 400 MG: 400 (241.3 MG) TAB at 09:57

## 2023-05-25 RX ADMIN — SERTRALINE HYDROCHLORIDE 100 MG: 100 TABLET ORAL at 08:33

## 2023-05-25 RX ADMIN — QUETIAPINE FUMARATE 12.5 MG: 25 TABLET ORAL at 08:33

## 2023-05-25 RX ADMIN — METOPROLOL TARTRATE 100 MG: 50 TABLET, FILM COATED ORAL at 21:38

## 2023-05-25 RX ADMIN — ATORVASTATIN CALCIUM 10 MG: 10 TABLET, FILM COATED ORAL at 15:52

## 2023-05-25 RX ADMIN — MEMANTINE 10 MG: 10 TABLET ORAL at 08:33

## 2023-05-25 RX ADMIN — QUETIAPINE FUMARATE 12.5 MG: 25 TABLET ORAL at 17:27

## 2023-05-25 RX ADMIN — INSULIN LISPRO 1 UNITS: 100 INJECTION, SOLUTION INTRAVENOUS; SUBCUTANEOUS at 17:07
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
## 2023-05-25 NOTE — CASE MANAGEMENT
303 petition completed and sent to Sam Granados at [facility] [address] [facility] via fax and secure email  with request for 303 hearing on 5/26  CM spoke with Choco Small, pt's guardian tel# [phone]  Loyda Webb reports she is an  and pt's emergency guardian through Nicole Webb has been pt's appointed guardian since 5/16 due to pt's dtr Naveen Vidal residing in [place] and unable to manage pt's affairs locally  Loyda Webb reports being told that pt can not return to [address] unless pt has a privately paid 1:1 24/7  Loyda Webb reports she is actively working on obtaining 1:1 although concerns regarding pt and family's ability to private pay  Contact at [facility]: [place] in [facility] and [address] [facility], tel# [phone]  Marcos reviewed 303 petition and hearing with Loyda Webb to send guardianship paperwork to MARCOS

## 2023-05-25 NOTE — ASSESSMENT & PLAN NOTE
Lab Results   Component Value Date    HGBA1C 8 8 (H) 12/07/2022       Recent Labs     05/23/23  2107 05/24/23  0725 05/24/23  1101 05/24/23  1557   POCGLU 266* 161* 288* 214*       Blood Sugar Average: Last 72 hrs:  · Poorly controlled as evidenced by last A1c of 8 8 on 12/7/2022  · Home hypoglycemic regimen includes:  · Levemir 25 units twice daily  · NovoLog 8 units twice daily with meals  · Given decreased p o  intake while inpatient, patient has been managed on sliding scale insulin only    · It appears that p o  intake has improved, will add Levemir at a decreased dose of 10 units twice daily and continue correctional coverage with sliding scale insulin limb  · Titrate Levemir and add on Humalog as indicated by blood sugars  · 4 times daily Accu-Cheks  · Diabetic diet  · Hypoglycemia protocol

## 2023-05-25 NOTE — NURSING NOTE
Patient is calm, visible, and pleasant  No agitation noted, patient is medication compliant, and cooperative  Patient denies all psych s/s  Patient is resting comfortably in bed at the moment

## 2023-05-25 NOTE — WOUND OSTOMY CARE
Consult Note - Wound   Sam Sylvester 80 y o  male MRN: [id number]  Unit/Bed#: [facility] 488-68 Encounter: [id number]      History and Present Illness:  80year old male presented to the hospital with behavioral outburst at facility  Patient's history significant for DM, dementia, CAD, COPD  Assessment Findings:   Patient agreeable to assessment  He is independently ambulatory in the nguyen with walker  Occasionally incontinent of urine, wearing a brief  Scattered scabbed abrasions to ankles  Skin generally dry, flaky, loss of subcutaneous tissue with fragile epidermis  Bilateral buttocks and sacrum intact with blanchable erythema to gluteal cleft  No evidence of fungal rash or pressure injury at this time  Bilateral heels intact, pink, blanchable, not boggy  Small scabbed abrasion to left heel  Left heel:    Right heel:      Skin Care Plan:   1-Hydraguard lotion to bilateral buttocks, sacrum, and heels three times daily and as needed with incontinence care  2-While in bed, elevate heels off of bed/chair surface with pillow to offload pressure  3-Offloading air cushion in chair when out of bed  4-Moisturize skin daily with skin nourishing cream     Wound care team will sign-off at this time       Jany MORGAN, RN, [employer]

## 2023-05-25 NOTE — ASSESSMENT & PLAN NOTE
Lab Results   Component Value Date    CREATININE 2 15 (H) 05/25/2023    CREATININE 2 28 (H) 05/24/2023    CREATININE 2 64 (H) 05/23/2023    EGFR 26 05/25/2023    EGFR 24 05/24/2023    EGFR 20 05/23/2023   · Baseline creatinine unclear   Appears to be 1 8 - 2  · Encourage adequate PO hydration  · Avoid nephrotoxins and hypotension
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
## 2023-05-25 NOTE — CASE MANAGEMENT
CM notified Dr Keith Riley 302 petitioner via TT of pt's 18 hearing tomorrow with [place] [address]; provided call in information  Cm to contact Dr Keith Rilye in AM with call in time

## 2023-05-25 NOTE — UTILIZATION REVIEW
NOTIFICATION OF ADMISSION DISCHARGE   This is a Notification of Discharge from [facility]  Please be advised that this patient has been discharge from our facility  Below you will find the admission and discharge date and time including the patient’s disposition  UTILIZATION REVIEW CONTACT:  Emeli Gallegos MA  Utilization   Network Utilization Review Department  Phone: [phone] x carefully listen to the prompts  All voicemails are confidential   Email: [email or web address]     ADMISSION INFORMATION  PRESENTATION DATE: 5/20/2023 11:29 AM  OBERVATION ADMISSION DATE:   INPATIENT ADMISSION DATE: 5/21/23  1:20 PM   DISCHARGE DATE: 5/24/2023  6:29 PM   DISPOSITION:[facility]    IMPORTANT INFORMATION:  Send all requests for admission clinical reviews, approved or denied determinations and any other requests to dedicated fax number below belonging to the campus where the patient is receiving treatment   List of dedicated fax numbers:  [address] DENIALS (Administrative/Medical Necessity) [phone]   [address] (Maternity/NICU/Pediatrics) [phone]   [facility] [phone]   [facility] [phone]   [address] [phone]   [address] [facility] [phone] [facility] [phone]   [address] [place] [address] 119 [phone]   [facility]  [phone]   [address] [phone] [facility] [phone]

## 2023-05-25 NOTE — TREATMENT PLAN
TREATMENT PLAN REVIEW - [address] [facility] [address] 80 y o  1935 male MRN: [id number]    [address] Room / Bed: Selene Suarez 3/OABHU 866-82 Encounter: [id number]          Admit Date/Time:  5/24/2023  6:30 PM    Treatment Team: Attending Provider: Sasha Herring MD; Consulting Physician: Anselmo Strickland PA-C; Patient Care Assistant: Yamilka Bailon; Patient Care Technician: Africa Villela;  Licensed Practical Nurse: Author Harriet LPN; Nurse Manager: Hawk Smith RN; : Viviane Smith MSW; Patient Care Technician: Glenda Connolly; Patient Care Assistant: Governor Lora    Diagnosis: Active Problems:    Essential hypertension    Mixed hyperlipidemia    Type 2 diabetes mellitus with kidney complication, with long-term current use of insulin ([facility] )    Medical clearance for psychiatric admission    Chronic kidney disease (CKD), stage IV (severe) ([facility] )      Patient Strengths/Assets: cooperative, good insight, motivated, negotiates basic needs    Patient Barriers/Limitations: difficulty adapting, impaired cognition, limited motivation, limited support system, patient is on an involuntary commitment, poor reasoning ability, poor self-care    Short Term Goals: decrease in anxiety symptoms, decrease in paranoid thoughts, decrease in self abusive behaviors, decrease in level of agitation, ability to stay safe on the unit, improvement in reality testing, improvement in reasoning ability, improvement in self care, sleep improvement, improvement in appetite, mood stabilization, increase in group attendance, increase in socialization with peers on the unit, acceptance of need for psychiatric treatment    Long Term Goals: improvement in depression, improvement in anxiety, resolution of depressive symptoms, free of suicidal thoughts, resolution of psychotic symptoms, improvement in reasoning ability, improved insight, able to express basic needs, adequate self care, adequate sleep, adequate appetite, adequate oral intake, appropriate interaction with peers, appropriate interaction with family    Progress Towards Goals: starting psychiatric medications as prescribed, improving, less agitated    Recommended Treatment: medication management, patient medication education, group therapy, milieu therapy, continued Behavioral Health psychiatric evaluation/assessment process    Treatment Frequency: daily medication monitoring, group and milieu therapy daily, monitoring through interdisciplinary rounds, monitoring through weekly patient care conferences    Expected Discharge Date:   to be determined    Discharge Plan: placement in nursing home, referrals as indicated, return to previous living arrangement    Treatment Plan Created/Updated By: Dangelo Bagley MD

## 2023-05-25 NOTE — CASE MANAGEMENT
Writer called at [facility]: Kushal Valles [phone]; left a voicemail with brief admission notification  Requested a call back for care coordination and discharge planning  Writer provided primary  Dixie's contact number   to follow up

## 2023-05-25 NOTE — TREATMENT TEAM
Pt attended all groups  Pt sleepy at times and disorganized  Pt visibile on unit  05/25/23 1000   Activity/Group Checklist   Group Other (Comment)  (Greet the day)   Attendance Attended; Other (Comment)  (late)   Attendance Duration (min) 31-45   Interactions Did not interact   Affect/Mood Other (Comment)  (sleepy)   Goals Achieved Identified feelings; Increased hopefulness; Able to listen to others; Able to engage in interactions

## 2023-05-25 NOTE — ASSESSMENT & PLAN NOTE
· Last EKG on 5/22/2023 shows normal sinus rhythm  · Patient follows with cardiologist in the outpatient setting  Per cardiology note from 2/14/2023, warfarin has been discontinued given history of bruising/bleeding and frequent falls  · Continue prehospital metoprolol tartrate 50 mg in the morning and 100 mg at bedtime and propafenone 225mg tid

## 2023-05-25 NOTE — ASSESSMENT & PLAN NOTE
· Vital signs stable  Reviewed admission labs with the exception of vitamin D, B12, folate, and MRSA swab  Patient is medically cleared for admission to the [facility] for treatment of the underlying psychiatric illness  Slim will sign off    Please call with questions or concerns

## 2023-05-25 NOTE — ASSESSMENT & PLAN NOTE
· Continue prehospital atorvastatin 10 mg daily  · Fenofibrate not appropriate for patient given renal function
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
## 2023-05-25 NOTE — DISCHARGE INSTR - OTHER ORDERS
Skin Care Plan:   1-Hydraguard lotion to bilateral buttocks, sacrum, and heels three times daily and as needed with incontinence care  2-While in bed, elevate heels off of bed/chair surface with pillow to offload pressure  3-Offloading air cushion in chair when out of bed  4-Moisturize skin daily with lotion

## 2023-05-25 NOTE — CASE MANAGEMENT
Assessment completed based on chart review   to coordinate with pt's daughter, guardian and [facility] staff for discharge planing  Patient Intake     Age / Race / Sex / Living Arrangement / Marital Status / Children 80, White, male,  and recently moved to [address] with wife on 5/16/23  Education / Income Source / Type of work / [employer] and Pension/  Details unknown   Patient's Telephone Number   [phone]    Emergency Contacts Sandie Fonseca, pt's guardian tel# [phone]    [facility]: [place] or [facility], tel# [phone]  Can patient return home if yes to what address This to be explored with pt's [address] and [facility] staff  Residence prior to admission is   [address] [place] [address]     Discharge transportation? TBD   Health Insurance / Prescription Coverage [employer] [facility] REP  Pt refused to sign IMM text  Preferred Pharmacy  TBD     Admission Status      Status of admission [address] Yes, Sandie Fonseca, pt's guardian tel# [phone]     Patient History   Stressors / Triggers to current admission Pt attempted to elope from memory care unit, became aggressive with staff  [facility] [place] [facility]  Treatment History    Hx of Suicide Attempts unknown   Family history of Mental illness unknown     Support system    Safia Zhang [phone]     ACT / Dilia Cooler /     unknown   PCP  Asa Jack [phone]   Family-friends supports  Safia Wisemarissa [phone], lives in [place]      Releases of Information  None

## 2023-05-25 NOTE — CASE MANAGEMENT
"Writer met with patient in room, while pt was laying in bed  Writer introduced himself and role  Pt replied, \"I do not know what you will do\" and went back to sleep  Writer attempted to give verbal command  Pt did not reply  CM to attempt at later time       "

## 2023-05-25 NOTE — CONSULTS
[facility]  Consult  Name: Bud Denise 80 y o  male I MRN: [id number]  Unit/Bed#: Mikael Hurtado 509-67 I Date of Admission: 5/24/2023   Date of Service: 5/25/2023 I Hospital Day: 1    Inpatient consult for Medical Clearance for [address] patient  Consult performed by: Carla Kim PA-C  Consult ordered by: SHADY Osuna          Assessment/Plan   Medical clearance for psychiatric admission  Assessment & Plan  · Vital signs stable  Reviewed admission labs with the exception of vitamin D, B12, folate, and MRSA swab  Patient is medically cleared for admission to the [facility] for treatment of the underlying psychiatric illness  Slim will sign off  Please call with questions or concerns    Chronic kidney disease (CKD), stage IV (severe) [facility])  Assessment & Plan  Lab Results   Component Value Date    CREATININE 2 15 (H) 05/25/2023    CREATININE 2 28 (H) 05/24/2023    CREATININE 2 64 (H) 05/23/2023    EGFR 26 05/25/2023    EGFR 24 05/24/2023    EGFR 20 05/23/2023   · Baseline creatinine unclear  Appears to be 1 8 - 2  · Encourage adequate PO hydration  · Avoid nephrotoxins and hypotension    Type 2 diabetes mellitus with kidney complication, with long-term current use of insulin [facility])  Assessment & Plan  Lab Results   Component Value Date    HGBA1C 8 8 (H) 12/07/2022       Recent Labs     05/23/23  2107 05/24/23  0725 05/24/23  1101 05/24/23  1557   POCGLU 266* 161* 288* 214*       Blood Sugar Average: Last 72 hrs:  · Poorly controlled as evidenced by last A1c of 8 8 on 12/7/2022  · Home hypoglycemic regimen includes:  · Levemir 25 units twice daily  · NovoLog 8 units twice daily with meals  · Given decreased p o  intake while inpatient, patient has been managed on sliding scale insulin only    · It appears that p o  intake has improved, will add Levemir at a decreased dose of 10 units twice daily and continue correctional coverage with sliding scale insulin limb  · Titrate Levemir and add on Humalog as indicated by blood sugars  · 4 times daily Accu-Cheks  · Diabetic diet  · Hypoglycemia protocol    Mixed hyperlipidemia  Assessment & Plan  · Continue prehospital atorvastatin 10 mg daily  · Fenofibrate not appropriate for patient given renal function    Paroxysmal atrial fibrillation (HCC)  Assessment & Plan  · Last EKG on 5/22/2023 shows normal sinus rhythm  · Patient follows with cardiologist in the outpatient setting  Per cardiology note from 2/14/2023, warfarin has been discontinued given history of bruising/bleeding and frequent falls  · Continue prehospital metoprolol tartrate 50 mg in the morning and 100 mg at bedtime and propafenone 225mg tid  Essential hypertension  Assessment & Plan  · BP stable  · Continue prehospital losartan 25 mg daily, metoprolol 50 mg every morning and 100 mg nightly, propafenone 225 mg 3 times daily  · Continue to monitor vital signs per unit protocol           Counseling / Coordination of Care Time: 1 hour  Greater than 50% of total time spent on patient counseling and coordination of care  Collaboration of Care: Were Recommendations Directly Discussed with Primary Treatment Team? - No     History of Present Illness:    Barbara Sawant is a 80 y o  male who is originally admitted to the psychiatry service due to aggressive behavior  We are consulted for medical clearance for admission to [address] [place] [address] and treatment of underlying psychiatric illness  This patient has a past medical history significant for type 2 diabetes mellitus, hypertension, paroxysmal atrial fibrillation, hyperlipidemia, stage IV CKD, pacemaker in situ  On evaluation, patient only complains of feeling tired  Denies HA, CP, SOB, n/v/d/c, or urinary symptoms  Review of Systems:    Review of Systems   Constitutional: Negative for chills, diaphoresis and fever  HENT: Negative for congestion, rhinorrhea, sinus pressure, sinus pain and sore throat      Eyes: Negative for pain and visual disturbance  Respiratory: Negative for cough, shortness of breath and wheezing  Cardiovascular: Negative for chest pain and palpitations  Gastrointestinal: Negative for abdominal distention, abdominal pain, constipation, diarrhea, nausea and vomiting  Genitourinary: Negative for difficulty urinating, dysuria, frequency, hematuria and urgency  Musculoskeletal: Negative for arthralgias, back pain and myalgias  Skin: Negative for rash  Neurological: Negative for dizziness, weakness, light-headedness and headaches  All other systems reviewed and are negative  Past Medical and Surgical History:     Past Medical History:   Diagnosis Date   • Allergic rhinitis    • Anxiety    • Aspiration of liquid     and food per wife if he is eating too fast or talking when eating   • Asthma     as a child   • Atrial fibrillation ([facility] )    • CAD (coronary artery disease)    • Cancer of kidney ([facility] )    • COPD (chronic obstructive pulmonary disease) (HCC)    • CPAP (continuous positive airway pressure) dependence    • Dementia ([facility] )     Frontal lobe   • Dementia (HCC)    • Diabetes mellitus (HCC)    • Diabetes mellitus, type 2 (HCC)    • DJD (degenerative joint disease)    • Hypercholesterolemia    • Hyperlipidemia    • Hypertension    • Incisional hernia    • Kidney disease    • Melanoma ([facility] )     left leg   • Obesity    • Sleep apnea     wears CPAP   • TIA (transient ischemic attack)        Past Surgical History:   Procedure Laterality Date   • CARDIAC PACEMAKER PLACEMENT     • CHOLECYSTECTOMY     • COLONOSCOPY     • HERNIA REPAIR      Incisional hernia   • NEPHRECTOMY Left 2005   • CT ESOPHAGOGASTRODUODENOSCOPY SUBMUCOSAL INJECTION N/A 9/21/2016    Procedure: ESOPHAGOGASTRODUODENOSCOPY (EGD); Surgeon: Danish Pham MD;  [facility]; Service: Gastroenterology   • CT ESOPHAGOGASTRODUODENOSCOPY SUBMUCOSAL INJECTION N/A 2/7/2018    Procedure: ESOPHAGOGASTRODUODENOSCOPY (EGD);   Surgeon: Nic Smith Oneal Gupta MD;  Location:  GI LAB; Service: Gastroenterology   • [place] ESOPHAGOGASTRODUODENOSCOPY SUBMUCOSAL INJECTION N/A 4/3/2019    Procedure: ESOPHAGOGASTRODUODENOSCOPY (EGD) WITH BOTOX;  Surgeon: Luis Aguilar MD;  Location:  GI LAB; Service: Gastroenterology   • ROTATOR CUFF REPAIR     • TONSILLECTOMY         Meds/Allergies:    all medications and allergies reviewed    Allergies: Allergies   Allergen Reactions   • Ambien [Zolpidem Tartrate] Hallucinations   • Bextra [Valdecoxib] Hives   • Dust Mite Extract Sneezing   • Lyrica [Pregabalin] Confusion   • Mold Extract [Trichophyton] Sneezing   • Pollen Extract Sneezing   • Tree Extract Other (See Comments) and Sneezing     congestion       Social History:     Marital Status: /Civil Union    Substance Use History:   Social History     Substance and Sexual Activity   Alcohol Use No     Social History     Tobacco Use   Smoking Status Never   Smokeless Tobacco Never     Social History     Substance and Sexual Activity   Drug Use No       Family History:    non-contributory    Physical Exam:     Vitals:   Blood Pressure: 139/65 (05/25/23 0728)  Pulse: 67 (05/25/23 0728)  Temperature: 97 6 °F (36 4 °C) (05/25/23 0728)  Temp Source: Temporal (05/25/23 0728)  Respirations: 17 (05/25/23 0728)  Height: 5' 11\" (180 3 cm) (05/24/23 1900)  SpO2: 94 % (05/25/23 0728)    Physical Exam  Constitutional:       General: He is not in acute distress  Appearance: Normal appearance  He is not ill-appearing, toxic-appearing or diaphoretic  HENT:      Head: Normocephalic and atraumatic  Nose: Nose normal  No congestion or rhinorrhea  Mouth/Throat:      Mouth: Mucous membranes are moist    Eyes:      General: No scleral icterus  Extraocular Movements: Extraocular movements intact  Cardiovascular:      Rate and Rhythm: Normal rate and regular rhythm  Heart sounds: Normal heart sounds  No murmur heard    Pulmonary:      Effort: Pulmonary effort is normal  " No respiratory distress  Breath sounds: Normal breath sounds  No wheezing  Abdominal:      General: Abdomen is flat  Bowel sounds are normal  There is no distension  Palpations: Abdomen is soft  Tenderness: There is no abdominal tenderness  Musculoskeletal:      Right lower leg: No edema  Left lower leg: No edema  Skin:     General: Skin is warm and dry  Coloration: Skin is not jaundiced  Neurological:      General: No focal deficit present  Mental Status: He is alert and oriented to person, place, and time  Psychiatric:         Mood and Affect: Mood normal          Additional Data:     Lab Results: I have personally reviewed pertinent reports        Results from last 7 days   Lab Units 05/25/23  0625   EOS PCT % 5   HEMATOCRIT % 38 3   HEMOGLOBIN g/dL 12 3   LYMPHS PCT % 26   MONOS PCT % 9   NEUTROS PCT % 59   PLATELETS Thousands/uL 243   WBC Thousand/uL 6 72     Results from last 7 days   Lab Units 05/25/23  0625   ANION GAP mmol/L 8   ALBUMIN g/dL 3 7   ALK PHOS U/L 50   ALT U/L 10   AST U/L 16   BUN mg/dL 25   CALCIUM mg/dL 9 0   CHLORIDE mmol/L 108   CO2 mmol/L 26   CREATININE mg/dL 2 15*   GLUCOSE RANDOM mg/dL 179*   POTASSIUM mmol/L 4 4   SODIUM mmol/L 142   TOTAL BILIRUBIN mg/dL 0 57     Results from last 7 days   Lab Units 05/22/23  0513   INR  1 08         Lab Results   Component Value Date/Time    HGBA1C 8 8 (H) 12/07/2022 04:01 PM    HGBA1C 7 9 (A) 07/27/2022 02:41 PM    HGBA1C 7 2 (H) 12/20/2021 03:16 PM    HGBA1C 8 3 (H) 09/04/2021 09:58 AM    HGBA1C 8 2 (H) 12/19/2015 09:39 AM    HGBA1C 7 0 (H) 06/13/2015 10:40 AM    HGBA1C 7 1 (H) 05/01/2015 07:41 AM     Results from last 7 days   Lab Units 05/24/23  1557 05/24/23  1101 05/24/23  0725 05/23/23  2107 05/23/23  1622 05/23/23  1131 05/23/23  0708 05/22/23  2107 05/22/23  1625 05/22/23  1114 05/22/23  0755 05/21/23  1615   POC GLUCOSE mg/dl 214* 288* 161* 266* 197* 201* 63* 87 56* 90 68 169*       EKG, Pathology, and Other Studies Reviewed on Admission:   · EKG on 5/22/2023 shows normal sinus rhythm with first-degree AV block heart rate 70, QTc 440    ** Please Note: This note has been constructed using a voice recognition system   **

## 2023-05-25 NOTE — ASSESSMENT & PLAN NOTE
· BP stable     · Continue prehospital losartan 25 mg daily, metoprolol 50 mg every morning and 100 mg nightly, propafenone 225 mg 3 times daily  · Continue to monitor vital signs per unit protocol

## 2023-05-26 PROBLEM — F39 UNSPECIFIED MOOD (AFFECTIVE) DISORDER (HCC): Status: ACTIVE | Noted: 2023-05-26

## 2023-05-26 LAB
GLUCOSE SERPL-MCNC: 114 MG/DL (ref 65–140)
GLUCOSE SERPL-MCNC: 153 MG/DL (ref 65–140)
GLUCOSE SERPL-MCNC: 154 MG/DL (ref 65–140)
GLUCOSE SERPL-MCNC: 176 MG/DL (ref 65–140)
MRSA NOSE QL CULT: NORMAL

## 2023-05-26 PROCEDURE — 82948 REAGENT STRIP/BLOOD GLUCOSE: CPT

## 2023-05-26 PROCEDURE — 97163 PT EVAL HIGH COMPLEX 45 MIN: CPT

## 2023-05-26 PROCEDURE — 99232 SBSQ HOSP IP/OBS MODERATE 35: CPT | Performed by: STUDENT IN AN ORGANIZED HEALTH CARE EDUCATION/TRAINING PROGRAM

## 2023-05-26 RX ORDER — ARIPIPRAZOLE 5 MG/1
5 TABLET ORAL DAILY
Status: DISCONTINUED | OUTPATIENT
Start: 2023-05-26 | End: 2023-06-13 | Stop reason: HOSPADM

## 2023-05-26 RX ORDER — SENNOSIDES 8.6 MG
1 TABLET ORAL
Status: DISCONTINUED | OUTPATIENT
Start: 2023-05-26 | End: 2023-06-13 | Stop reason: HOSPADM

## 2023-05-26 RX ORDER — DOCUSATE SODIUM 100 MG/1
100 CAPSULE, LIQUID FILLED ORAL 2 TIMES DAILY
Status: DISCONTINUED | OUTPATIENT
Start: 2023-05-26 | End: 2023-06-13 | Stop reason: HOSPADM

## 2023-05-26 RX ORDER — POLYETHYLENE GLYCOL 3350 17 G/17G
17 POWDER, FOR SOLUTION ORAL DAILY PRN
Status: DISCONTINUED | OUTPATIENT
Start: 2023-05-26 | End: 2023-06-13 | Stop reason: HOSPADM

## 2023-05-26 RX ADMIN — DOCUSATE SODIUM 100 MG: 100 CAPSULE, LIQUID FILLED ORAL at 17:36

## 2023-05-26 RX ADMIN — INSULIN LISPRO 1 UNITS: 100 INJECTION, SOLUTION INTRAVENOUS; SUBCUTANEOUS at 21:29

## 2023-05-26 RX ADMIN — DONEPEZIL HYDROCHLORIDE 10 MG: 10 TABLET, FILM COATED ORAL at 21:23

## 2023-05-26 RX ADMIN — FERROUS SULFATE TAB 325 MG (65 MG ELEMENTAL FE) 325 MG: 325 (65 FE) TAB at 09:06

## 2023-05-26 RX ADMIN — PROPAFENONE HYDROCHLORIDE 225 MG: 150 TABLET, FILM COATED ORAL at 06:25

## 2023-05-26 RX ADMIN — MAGNESIUM OXIDE TAB 400 MG (241.3 MG ELEMENTAL MG) 400 MG: 400 (241.3 MG) TAB at 09:06

## 2023-05-26 RX ADMIN — INSULIN LISPRO 1 UNITS: 100 INJECTION, SOLUTION INTRAVENOUS; SUBCUTANEOUS at 12:43

## 2023-05-26 RX ADMIN — MEMANTINE 10 MG: 10 TABLET ORAL at 21:24

## 2023-05-26 RX ADMIN — INSULIN DETEMIR 10 UNITS: 100 INJECTION, SOLUTION SUBCUTANEOUS at 21:28

## 2023-05-26 RX ADMIN — INSULIN DETEMIR 10 UNITS: 100 INJECTION, SOLUTION SUBCUTANEOUS at 09:05

## 2023-05-26 RX ADMIN — METOPROLOL TARTRATE 50 MG: 50 TABLET, FILM COATED ORAL at 09:05

## 2023-05-26 RX ADMIN — SERTRALINE HYDROCHLORIDE 100 MG: 100 TABLET ORAL at 09:06

## 2023-05-26 RX ADMIN — PROPAFENONE HYDROCHLORIDE 225 MG: 150 TABLET, FILM COATED ORAL at 14:48

## 2023-05-26 RX ADMIN — INSULIN LISPRO 1 UNITS: 100 INJECTION, SOLUTION INTRAVENOUS; SUBCUTANEOUS at 17:36

## 2023-05-26 RX ADMIN — CHOLECALCIFEROL TAB 25 MCG (1000 UNIT) 2000 UNITS: 25 TAB at 09:06

## 2023-05-26 RX ADMIN — ARIPIPRAZOLE 5 MG: 5 TABLET ORAL at 09:10

## 2023-05-26 RX ADMIN — METOPROLOL TARTRATE 100 MG: 50 TABLET, FILM COATED ORAL at 21:23

## 2023-05-26 RX ADMIN — SENNOSIDES 8.6 MG: 8.6 TABLET, FILM COATED ORAL at 21:24

## 2023-05-26 RX ADMIN — MEMANTINE 10 MG: 10 TABLET ORAL at 09:06

## 2023-05-26 RX ADMIN — ATORVASTATIN CALCIUM 10 MG: 10 TABLET, FILM COATED ORAL at 17:36

## 2023-05-26 RX ADMIN — PROPAFENONE HYDROCHLORIDE 225 MG: 150 TABLET, FILM COATED ORAL at 21:23

## 2023-05-26 NOTE — TREATMENT TEAM
05/26/23 1523   Team Meeting   Meeting Type Tx Team Meeting   Initial Conference Date 05/26/23   Team Members Present   Team Members Present Physician;Nurse;   Physician Team Member Dr Bridgett Horan Team Member [facility] Management Team Member Hedy Pedroza   Patient/Family Present   Patient Present No  (Reviewed with Guardian via phone)   Patient's Family Present No     Treatment plan and goals reviewed with pt's guardian Ulysses Adrian tel# [phone]; guardian in agreement with treatment plan and goals

## 2023-05-26 NOTE — NURSING NOTE
No behavioral issues noted, patient is calm, visible, medication compliant, and cooperative  Patient denies all psych s/s at the moment

## 2023-05-26 NOTE — PLAN OF CARE
Problem: Alteration in Orientation  Goal: Interact with staff daily  Description: Interventions:  - Assess and re-assess patient's level of orientation  - Engage patient in 1 on 1 interactions, daily, for a minimum of 15 minutes   - Establish rapport/trust with patient   Outcome: Progressing  Goal: Allow medical examinations, as recommended  Description: Interventions:  - Provide physical/neurological exams and/or referrals, per provider   Outcome: Progressing

## 2023-05-26 NOTE — NURSING NOTE
"Patient approached the nurses station after dinner  Patient appears confused and restless  Patient states, \"I need to go home  It is not right you have me here against my decision  Call my wife and doctor right now\"  Patient refused to listen  staff on approach  Patient attempted to hit multiple staff members  Patient became verbally aggressive toward staff  Security was called  Patient was taken to his room  Ativan IM was given at [employer]  Patient took injection well tolerated  Patient went back to bed  Bed alarm on for safety  Patient is cooperative      "

## 2023-05-26 NOTE — PHYSICAL THERAPY NOTE
Physical Therapy Evaluation    Patient's Name: Elvi Garay    Admitting Diagnosis  Major depressive disorder, single episode, unspecified [F32 9]  Dementia with behavioral disturbance ([place] [address] ) [F03 918]    Problem List  Patient Active Problem List   Diagnosis    Benign prostate hyperplasia    Elevated prostate specific antigen (PSA)    JACQUELINE (acute kidney injury) ([facility] )    Essential hypertension    Solitary kidney    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HCC)    Paroxysmal atrial fibrillation (HCC)    Mixed hyperlipidemia    Type 2 diabetes mellitus with kidney complication, with long-term current use of insulin ([place] [address] )    Coronary artery disease involving native coronary artery of native heart without angina pectoris    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, or unspecified chronic kidney disease    Head injury    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    Coagulation disorder ([place] [address] )    Long term current use of antiarrhythmic medical therapy    Recurrent falls    Dementia with behavioral disturbance ([facility] )    Tracheitis    Multiple rib fractures    Abnormal head CT    Renal cell carcinoma (HCC)    Fall    Medical clearance for psychiatric admission    Chronic kidney disease (CKD), stage IV (severe) (HCC)    Unspecified mood (affective) disorder (HCC)       Past Medical History  Past Medical History:   Diagnosis Date    Allergic rhinitis     Anxiety     Aspiration of liquid     and food per wife if he is eating too fast or talking when eating    Asthma     as a child    Atrial fibrillation ([facility] )     CAD (coronary artery disease)     Cancer of kidney (HCC)     COPD (chronic obstructive pulmonary disease) ([facility] )     CPAP (continuous positive airway pressure) dependence     Dementia ([facility] )     Frontal lobe    Dementia (HCC)     Diabetes mellitus ([facility] )     Diabetes mellitus, type 2 (HCC)     DJD (degenerative joint disease)     Hypercholesterolemia     Hyperlipidemia     Hypertension     Incisional hernia     Kidney disease     Melanoma ([facility] )     left leg    Obesity     Sleep apnea     wears CPAP    TIA (transient ischemic attack)        Past Surgical History  Past Surgical History:   Procedure Laterality Date    CARDIAC PACEMAKER PLACEMENT      CHOLECYSTECTOMY      COLONOSCOPY      HERNIA REPAIR      Incisional hernia    NEPHRECTOMY Left 2005    UT ESOPHAGOGASTRODUODENOSCOPY SUBMUCOSAL INJECTION N/A 9/21/2016    Procedure: ESOPHAGOGASTRODUODENOSCOPY (EGD); Surgeon: Sherren Bramble, MD;  [facility]; Service: Gastroenterology    UT ESOPHAGOGASTRODUODENOSCOPY SUBMUCOSAL INJECTION N/A 2/7/2018    Procedure: ESOPHAGOGASTRODUODENOSCOPY (EGD); Surgeon: Sherren Bramble, MD;  [facility]; Service: Gastroenterology    UT ESOPHAGOGASTRODUODENOSCOPY SUBMUCOSAL INJECTION N/A 4/3/2019    Procedure: ESOPHAGOGASTRODUODENOSCOPY (EGD) WITH BOTOX;  Surgeon: Sherren Bramble, MD;  [facility]; Service: Gastroenterology    ROTATOR CUFF REPAIR      TONSILLECTOMY         Recent Imaging  No orders to display       Recent Vital Signs  Vitals:    05/25/23 2041 05/26/23 0625 05/26/23 0808 05/26/23 0905   BP: 134/75  109/59 132/75   BP Location: Right arm  Left arm    Pulse: 65 60 98 62   Resp: 16  16    Temp: 98 2 °F (36 8 °C)  97 5 °F (36 4 °C)    TempSrc: Temporal  Temporal    SpO2: 97%  94%    Height:            05/26/23 1050   PT Last Visit   PT Visit Date 05/26/23   Note Type   Note type Evaluation   Pain Assessment   Pain Assessment Tool 0-10   Pain Score No Pain   Restrictions/Precautions   Weight Bearing Precautions Per Order No   Other Precautions Cognitive   Home Living   Type of Home Assisted living   Prior Function   Level of [place] Needs assistance with ADLs; Needs assistance with [address] staff   Receives Help From Personal care attendant   Comments DAISY reporting pt will need 24/7 assistance to return to facility   General   Family/Caregiver Present No   Cognition   Overall Cognitive Status Impaired   Arousal/Participation Cooperative   Orientation Level Oriented to person   Memory Decreased recall of recent events;Decreased recall of precautions;Decreased short term memory;Decreased long term memory   Following Commands Follows one step commands without difficulty   Subjective   Subjective pleasant   RLE Assessment   RLE Assessment   (4/5)   LLE Assessment   LLE Assessment   (4/5)   Coordination   Movements are Fluid and Coordinated 0   Coordination and Movement Description delayed motor planning and some mild unsteadiness with gait   Sensation WFL   Light Touch   RLE Light Touch Grossly intact   LLE Light Touch Grossly intact   Bed Mobility   Supine to Sit 5  Supervision   Additional items Increased time required   Sit to Supine 5  Supervision   Additional items Increased time required   Transfers   Sit to Stand 5  Supervision   Additional items Increased time required   Stand to Sit 5  Supervision   Additional items Increased time required   Ambulation/Elevation   Gait pattern Step through pattern;Decreased toe off;Decreased heel strike; Excessively slow; Short stride; Foward flexed;Decreased foot clearance   Gait Assistance 5  Supervision   Additional items Verbal cues   Assistive Device Rolling walker   Distance 150ft x2 with seated rest breaks   Balance   Static Sitting Fair +   Dynamic Sitting Fair +   Static Standing Fair   Dynamic Standing Fair -   Ambulatory Fair -   Endurance Deficit   Endurance Deficit Yes   Endurance Deficit Description limited to household distance ambulation   Activity Tolerance   Activity Tolerance Patient tolerated treatment well   Medical Staff Made Aware spoke to CM   Nurse Made Aware spoke to RN   Assessment   Prognosis Good   Problem List Decreased strength;Decreased endurance; Impaired balance;Decreased mobility; Decreased coordination;Decreased cognition; Impaired judgement;Decreased safety awareness   Barriers to Discharge Inaccessible home environment;Decreased caregiver support   Goals   Patient Goals to walk more   Recommendation   PT Discharge Recommendation   (LTC vs 24/7 support at [facility])   Equipment Recommended [address]   Turning in Flat Bed Without Bedrails 3   Lying on Back to Sitting on Edge of Flat Bed Without Bedrails 3   Moving Bed to Chair 3   Standing Up From Chair Using Arms 3   Walk in Room 3   Climb 3-5 Stairs With Railing 3   Basic Mobility Inpatient Raw Score 18   Basic Mobility Standardized Score 41 05   Highest Level Of Mobility   JH-HLM Goal 6: Walk 10 steps or more   JH-HLM Achieved 8: Walk 250 feet ot more   End of Consult   Patient Position at End of Consult   (seated in hallway all needs in reach)         ASSESSMENT                                                                                                                     Dinorah Khan is a 80 y o  male admitted to [facility] on 5/24/2023 for Unspecified mood (affective) disorder ([facility] )  Pt  has a past medical history of Allergic rhinitis, Anxiety, Aspiration of liquid, Asthma, Atrial fibrillation ([address] ), CAD (coronary artery disease), Cancer of kidney ([address] ), COPD (chronic obstructive pulmonary disease) ([address] ), CPAP (continuous positive airway pressure) dependence, Dementia ([address] ), Dementia ([address] ), Diabetes mellitus ([address] ), Diabetes mellitus, type 2 ([address] ), DJD (degenerative joint disease), Hypercholesterolemia, Hyperlipidemia, Hypertension, Incisional hernia, Kidney disease, Melanoma ([address] ), Obesity, Sleep apnea, and TIA (transient ischemic attack)    PT was consulted and pt was seen on 5/26/2023 for mobility assessment and d/c planning  Pt presents seated in hallway alert and agreeable to therapy  He demonstrates decreased safety awareness and problem solving when ambulating in hallway needing frequent VC for safety and redirection to task   He is limited with ambulation tolerance due to mild deconditioning and lack of endurance  Impairments limiting pt at this time include impaired balance, decreased endurance, decreased coordination, decreased IADLS, decreased activity tolerance, decreased safety awareness, impaired judgement, decreased cognition, and decreased strength  Pt is currently functioning at a supervision assistance x1 level for bed mobility, supervision assistance x1 level for transfers, supervision assistance x1 level for ambulation with Rolling Walker  The patient's AM-PAC Basic Mobility Inpatient Short Form Raw Score is 18  A Raw score of greater than 16 suggests the patient may benefit from discharge to home  Please also refer to the recommendation of the Physical Therapist for safe discharge planning      Recommendations                                                                                                                DME: Rolling Walker    Discharge Disposition:   LTC vs 24/7 support at [address] PT, DPT

## 2023-05-26 NOTE — TREATMENT TEAM
05/26/23 0841   Team Meeting   Meeting Type Daily Rounds   Initial Conference Date 05/26/23   Team Members Present   Team Members Present Physician;Nurse;;Occupational Therapist   Physician Team Member Dr Kuldip Bragg Team Member MANASA/ Jordin Pierce Management Team Member Penny Zhang Work Team Member Desirae Ortiz   Patient/Family Present   Patient Present No   Patient's Family Present No     Confused, restless, telling everyone he needs to go home; was redirectable then became agitated, swinging at staff, PRN ativan at 1900, then slept

## 2023-05-26 NOTE — NURSING NOTE
Patient is visible in dinning room watching TV  Patient is social and friendly to staff  Patient is currently denying anxiety, depression, SI/HI/AVH at this time  Patient appears confused and disorganized  on approach  Patient is medications and dinner complaint  Patient need reminds to use his walker for safety  Able to make needs known

## 2023-05-26 NOTE — PLAN OF CARE
Problem: Alteration in Thoughts and Perception  Goal: Treatment Goal: Gain control of psychotic behaviors/thinking, reduce/eliminate presenting symptoms and demonstrate improved reality functioning upon discharge  Outcome: Progressing  Goal: Verbalize thoughts and feelings  Description: Interventions:  - Promote a nonjudgmental and trusting relationship with the patient through active listening and therapeutic communication  - Assess patient's level of functioning, behavior and potential for risk  - Engage patient in 1 on 1 interactions  - Encourage patient to express fears, feelings, frustrations, and discuss symptoms    - [place] patient to reality, help patient recognize reality-based thinking   - Administer medications as ordered and assess for potential side effects  - Provide the patient education related to the signs and symptoms of the illness and desired effects of prescribed medications  Outcome: Progressing  Goal: Refrain from acting on delusional thinking/internal stimuli  Description: Interventions:  - Monitor patient closely, per order   - Utilize least restrictive measures   - Set reasonable limits, give positive feedback for acceptable   - Administer medications as ordered and monitor of potential side effects  Outcome: Progressing  Goal: Agree to be compliant with medication regime, as prescribed and report medication side effects  Description: Interventions:  - Offer appropriate PRN medication and supervise ingestion; conduct AIMS, as needed   Outcome: Progressing  Goal: Attend and participate in unit activities, including therapeutic, recreational, and educational groups  Description: Interventions:  -Encourage Visitation and family involvement in care  Outcome: Progressing  Goal: Recognize dysfunctional thoughts, communicate reality-based thoughts at the time of discharge  Description: Interventions:  - Provide medication and psycho-education to assist patient in compliance and developing insight into his/her illness   Outcome: Progressing  Goal: Complete daily ADLs, including personal hygiene independently, as able  Description: Interventions:  - Observe, teach, and assist patient with ADLS  - Monitor and promote a balance of rest/activity, with adequate nutrition and elimination   Outcome: Progressing     Problem: Risk for Self Injury/Neglect  Goal: Verbalize thoughts and feelings  Description: Interventions:  - Assess and re-assess patient's lethality and potential for self-injury  - Engage patient in 1:1 interactions, daily, for a minimum of 15 minutes  - Encourage patient to express feelings, fears, frustrations, hopes  - Establish rapport/trust with patient   Outcome: Progressing  Goal: Refrain from harming self  Description: Interventions:  - Monitor patient closely, per order  - Develop a trusting relationship  - Supervise medication ingestion, monitor effects and side effects   Outcome: Progressing  Goal: Recognize maladaptive responses and adopt new coping mechanisms  Outcome: Progressing  Goal: Complete daily ADLs, including personal hygiene independently, as able  Description: Interventions:  - Observe, teach, and assist patient with ADLS  - Monitor and promote a balance of rest/activity, with adequate nutrition and elimination  Outcome: Progressing     Problem: Depression  Goal: Treatment Goal: Demonstrate behavioral control of depressive symptoms, verbalize feelings of improved mood/affect, and adopt new coping skills prior to discharge  Outcome: Progressing  Goal: Verbalize thoughts and feelings  Description: Interventions:  - Assess and re-assess patient's level of risk   - Engage patient in 1:1 interactions, daily, for a minimum of 15 minutes   - Encourage patient to express feelings, fears, frustrations, hopes   Outcome: Progressing  Goal: Refrain from harming self  Description: Interventions:  - Monitor patient closely, per order   - Supervise medication ingestion, monitor effects and side effects   Outcome: Progressing  Goal: Refrain from isolation  Description: Interventions:  - Develop a trusting relationship   - Encourage socialization   Outcome: Progressing  Goal: Refrain from self-neglect  Outcome: Progressing  Goal: Attend and participate in unit activities, including therapeutic, recreational, and educational groups  Description: Interventions:  - Provide therapeutic and educational activities daily, encourage attendance and participation, and document same in the medical record   Outcome: Progressing  Goal: Complete daily ADLs, including personal hygiene independently, as able  Description: Interventions:  - Observe, teach, and assist patient with ADLS  -  Monitor and promote a balance of rest/activity, with adequate nutrition and elimination   Outcome: Progressing     Problem: Anxiety  Goal: Anxiety is at manageable level  Description: Interventions:  - Assess and monitor patient's anxiety level  - Monitor for signs and symptoms (heart palpitations, chest pain, shortness of breath, headaches, nausea, feeling jumpy, restlessness, irritable, apprehensive)  - Collaborate with interdisciplinary team and initiate plan and interventions as ordered    - [place] patient to unit/surroundings  - Explain treatment plan  - Encourage participation in care  - Encourage verbalization of concerns/fears  - Identify coping mechanisms  - Assist in developing anxiety-reducing skills  - Administer/offer alternative therapies  - Limit or eliminate stimulants  Outcome: Progressing     Problem: Risk for Violence/Aggression Toward Others  Goal: Treatment Goal: Refrain from acts of violence/aggression during length of stay, and demonstrate improved impulse control at the time of discharge  Outcome: Progressing  Goal: Verbalize thoughts and feelings  Description: Interventions:  - Assess and re-assess patient's level of risk, every waking shift  - Engage patient in 1:1 interactions, daily, for a minimum of 15 minutes   - Allow patient to express feelings and frustrations in a safe and non-threatening manner   - Establish rapport/trust with patient   Outcome: Progressing  Goal: Refrain from harming others  Outcome: Progressing  Goal: Refrain from destructive acts on the environment or property  Outcome: Progressing  Goal: Control angry outbursts  Description: Interventions:  - Monitor patient closely, per order  - Ensure early verbal de-escalation  - Monitor prn medication needs  - Set reasonable/therapeutic limits, outline behavioral expectations, and consequences   - Provide a non-threatening milieu, utilizing the least restrictive interventions   Outcome: Progressing  Goal: Attend and participate in unit activities, including therapeutic, recreational, and educational groups  Description: Interventions:  - Provide therapeutic and educational activities daily, encourage attendance and participation, and document same in the medical record   Outcome: Progressing  Goal: Identify appropriate positive anger management techniques  Description: Interventions:  - Offer anger management and coping skills groups   - Staff will provide positive feedback for appropriate anger control  Outcome: Progressing     Problem: Alteration in Orientation  Goal: Treatment Goal: Demonstrate a reduction of confusion and improved orientation to person, place, time and/or situation upon discharge, according to optimum baseline/ability  Outcome: Progressing  Goal: Interact with staff daily  Description: Interventions:  - Assess and re-assess patient's level of orientation  - Engage patient in 1 on 1 interactions, daily, for a minimum of 15 minutes   - Establish rapport/trust with patient   Outcome: Progressing  Goal: Express concerns related to confused thinking related to:  Description: Interventions:  - Encourage patient to express feelings, fears, frustrations, hopes  - Assign consistent caregivers   - [place]/re-orient patient as needed  - Allow comfort items, as appropriate  - Provide visual cues, signs, etc    Outcome: Progressing  Goal: Allow medical examinations, as recommended  Description: Interventions:  - Provide physical/neurological exams and/or referrals, per provider   Outcome: Progressing  Goal: Cooperate with recommended testing/procedures  Description: Interventions:  - Determine need for ancillary testing  - Observe for mental status changes  - Implement falls/precaution protocol   Outcome: Progressing  Goal: Attend and participate in unit activities, including therapeutic, recreational, and educational groups  Description: Interventions:  - Provide therapeutic and educational activities daily, encourage attendance and participation, and document same in the medical record   - Provide appropriate opportunities for reminiscence   - Provide a consistent daily routine   - Encourage family contact/visitation   Outcome: Progressing  Goal: Complete daily ADLs, including personal hygiene independently, as able  Description: Interventions:  - Observe, teach, and assist patient with ADLS  Outcome: Progressing     Problem: Prexisting or High Potential for Compromised Skin Integrity  Goal: Skin integrity is maintained or improved  Description: INTERVENTIONS:  - Identify patients at risk for skin breakdown  - Assess and monitor skin integrity  - Assess and monitor nutrition and hydration status  - Monitor labs   - Assess for incontinence   - Turn and reposition patient  - Assist with mobility/ambulation  - Relieve pressure over bony prominences  - Avoid friction and shearing  - Provide appropriate hygiene as needed including keeping skin clean and dry  - Evaluate need for skin moisturizer/barrier cream  - Collaborate with interdisciplinary team   - Patient/family teaching  - Consider wound care consult   Outcome: Progressing
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
## 2023-05-26 NOTE — CASE MANAGEMENT
Cm spoke with pt's guardian Ary Stallings cell# [phone]; update provided regarding 303 hearing  Reyna Leventhal reports pt's step-dtr Dillon Prabhakar is now going to [facility] till June 15th  Reyna Leventhal reports pt's dtr is no longer able to front the cost of the 1:1 at assisted living  Reyna Leventhal reports pt will then need SNF placement  CM reviewed need for level of care assessment and radius search of starting with 50 miles and expanding search from there  Reyna Leventhal reports need to speak with Dillon Prabhakar regarding potential expanded snf search  Cm to contact Reyna Leventhal once level of care assessment is returned from Aging office

## 2023-05-27 LAB
GLUCOSE SERPL-MCNC: 121 MG/DL (ref 65–140)
GLUCOSE SERPL-MCNC: 152 MG/DL (ref 65–140)
GLUCOSE SERPL-MCNC: 182 MG/DL (ref 65–140)
GLUCOSE SERPL-MCNC: 206 MG/DL (ref 65–140)

## 2023-05-27 PROCEDURE — 99232 SBSQ HOSP IP/OBS MODERATE 35: CPT | Performed by: STUDENT IN AN ORGANIZED HEALTH CARE EDUCATION/TRAINING PROGRAM

## 2023-05-27 PROCEDURE — 82948 REAGENT STRIP/BLOOD GLUCOSE: CPT

## 2023-05-27 RX ORDER — TRAZODONE HYDROCHLORIDE 50 MG/1
75 TABLET ORAL
Status: DISCONTINUED | OUTPATIENT
Start: 2023-05-27 | End: 2023-06-13 | Stop reason: HOSPADM

## 2023-05-27 RX ADMIN — TRAZODONE HYDROCHLORIDE 75 MG: 50 TABLET ORAL at 21:48

## 2023-05-27 RX ADMIN — ATORVASTATIN CALCIUM 10 MG: 10 TABLET, FILM COATED ORAL at 16:10

## 2023-05-27 RX ADMIN — METOPROLOL TARTRATE 50 MG: 50 TABLET, FILM COATED ORAL at 08:26

## 2023-05-27 RX ADMIN — INSULIN DETEMIR 10 UNITS: 100 INJECTION, SOLUTION SUBCUTANEOUS at 08:28

## 2023-05-27 RX ADMIN — ARIPIPRAZOLE 5 MG: 5 TABLET ORAL at 08:26

## 2023-05-27 RX ADMIN — CHOLECALCIFEROL TAB 25 MCG (1000 UNIT) 2000 UNITS: 25 TAB at 08:27

## 2023-05-27 RX ADMIN — INSULIN LISPRO 1 UNITS: 100 INJECTION, SOLUTION INTRAVENOUS; SUBCUTANEOUS at 21:49

## 2023-05-27 RX ADMIN — MEMANTINE 10 MG: 10 TABLET ORAL at 21:47

## 2023-05-27 RX ADMIN — MEMANTINE 10 MG: 10 TABLET ORAL at 08:27

## 2023-05-27 RX ADMIN — PROPAFENONE HYDROCHLORIDE 225 MG: 150 TABLET, FILM COATED ORAL at 16:09

## 2023-05-27 RX ADMIN — PROPAFENONE HYDROCHLORIDE 225 MG: 150 TABLET, FILM COATED ORAL at 21:47

## 2023-05-27 RX ADMIN — DONEPEZIL HYDROCHLORIDE 10 MG: 10 TABLET, FILM COATED ORAL at 21:47

## 2023-05-27 RX ADMIN — METOPROLOL TARTRATE 100 MG: 50 TABLET, FILM COATED ORAL at 21:47

## 2023-05-27 RX ADMIN — DOCUSATE SODIUM 100 MG: 100 CAPSULE, LIQUID FILLED ORAL at 17:19

## 2023-05-27 RX ADMIN — TRAZODONE HYDROCHLORIDE 50 MG: 50 TABLET ORAL at 01:37

## 2023-05-27 RX ADMIN — INSULIN LISPRO 1 UNITS: 100 INJECTION, SOLUTION INTRAVENOUS; SUBCUTANEOUS at 12:16

## 2023-05-27 RX ADMIN — MAGNESIUM OXIDE TAB 400 MG (241.3 MG ELEMENTAL MG) 400 MG: 400 (241.3 MG) TAB at 08:27

## 2023-05-27 RX ADMIN — FERROUS SULFATE TAB 325 MG (65 MG ELEMENTAL FE) 325 MG: 325 (65 FE) TAB at 08:26

## 2023-05-27 RX ADMIN — INSULIN LISPRO 1 UNITS: 100 INJECTION, SOLUTION INTRAVENOUS; SUBCUTANEOUS at 17:19

## 2023-05-27 RX ADMIN — SERTRALINE HYDROCHLORIDE 100 MG: 100 TABLET ORAL at 08:27

## 2023-05-27 RX ADMIN — DOCUSATE SODIUM 100 MG: 100 CAPSULE, LIQUID FILLED ORAL at 08:27

## 2023-05-27 RX ADMIN — INSULIN DETEMIR 10 UNITS: 100 INJECTION, SOLUTION SUBCUTANEOUS at 21:49

## 2023-05-27 RX ADMIN — PROPAFENONE HYDROCHLORIDE 225 MG: 150 TABLET, FILM COATED ORAL at 06:04

## 2023-05-27 RX ADMIN — SENNOSIDES 8.6 MG: 8.6 TABLET, FILM COATED ORAL at 21:48

## 2023-05-27 NOTE — PLAN OF CARE
Problem: Alteration in Thoughts and Perception  Goal: Treatment Goal: Gain control of psychotic behaviors/thinking, reduce/eliminate presenting symptoms and demonstrate improved reality functioning upon discharge  Outcome: Progressing  Goal: Verbalize thoughts and feelings  Description: Interventions:  - Promote a nonjudgmental and trusting relationship with the patient through active listening and therapeutic communication  - Assess patient's level of functioning, behavior and potential for risk  - Engage patient in 1 on 1 interactions  - Encourage patient to express fears, feelings, frustrations, and discuss symptoms    - [place] patient to reality, help patient recognize reality-based thinking   - Administer medications as ordered and assess for potential side effects  - Provide the patient education related to the signs and symptoms of the illness and desired effects of prescribed medications  Outcome: Progressing  Goal: Refrain from acting on delusional thinking/internal stimuli  Description: Interventions:  - Monitor patient closely, per order   - Utilize least restrictive measures   - Set reasonable limits, give positive feedback for acceptable   - Administer medications as ordered and monitor of potential side effects  Outcome: Progressing  Goal: Agree to be compliant with medication regime, as prescribed and report medication side effects  Description: Interventions:  - Offer appropriate PRN medication and supervise ingestion; conduct AIMS, as needed   Outcome: Progressing  Goal: Recognize dysfunctional thoughts, communicate reality-based thoughts at the time of discharge  Description: Interventions:  - Provide medication and psycho-education to assist patient in compliance and developing insight into his/her illness   Outcome: Progressing  Goal: Complete daily ADLs, including personal hygiene independently, as able  Description: Interventions:  - Observe, teach, and assist patient with ADLS  - Monitor and promote a balance of rest/activity, with adequate nutrition and elimination   Outcome: Progressing     Problem: Ineffective Coping  Goal: Identifies ineffective coping skills  Outcome: Progressing  Goal: Identifies healthy coping skills  Outcome: Progressing  Goal: Demonstrates healthy coping skills  Outcome: Progressing  Goal: Participates in unit activities  Description: Interventions:  - Provide therapeutic environment   - Provide required programming   - Redirect inappropriate behaviors   Outcome: Progressing  Goal: Understands least restrictive measures  Description: Interventions:  - Utilize least restrictive behavior  Outcome: Progressing  Goal: Free from restraint events  Description: - Utilize least restrictive measures   - Provide behavioral interventions   - Redirect inappropriate behaviors   Outcome: Progressing     Problem: Risk for Self Injury/Neglect  Goal: Treatment Goal: Remain safe during length of stay, learn and adopt new coping skills, and be free of self-injurious ideation, impulses and acts at the time of discharge  Outcome: Progressing  Goal: Verbalize thoughts and feelings  Description: Interventions:  - Assess and re-assess patient's lethality and potential for self-injury  - Engage patient in 1:1 interactions, daily, for a minimum of 15 minutes  - Encourage patient to express feelings, fears, frustrations, hopes  - Establish rapport/trust with patient   Outcome: Progressing  Goal: Refrain from harming self  Description: Interventions:  - Monitor patient closely, per order  - Develop a trusting relationship  - Supervise medication ingestion, monitor effects and side effects   Outcome: Progressing  Goal: Recognize maladaptive responses and adopt new coping mechanisms  Outcome: Progressing  Goal: Complete daily ADLs, including personal hygiene independently, as able  Description: Interventions:  - Observe, teach, and assist patient with ADLS  - Monitor and promote a balance of rest/activity, with adequate nutrition and elimination  Outcome: Progressing     Problem: Depression  Goal: Treatment Goal: Demonstrate behavioral control of depressive symptoms, verbalize feelings of improved mood/affect, and adopt new coping skills prior to discharge  Outcome: Progressing  Goal: Verbalize thoughts and feelings  Description: Interventions:  - Assess and re-assess patient's level of risk   - Engage patient in 1:1 interactions, daily, for a minimum of 15 minutes   - Encourage patient to express feelings, fears, frustrations, hopes   Outcome: Progressing  Goal: Refrain from harming self  Description: Interventions:  - Monitor patient closely, per order   - Supervise medication ingestion, monitor effects and side effects   Outcome: Progressing  Goal: Refrain from isolation  Description: Interventions:  - Develop a trusting relationship   - Encourage socialization   Outcome: Progressing  Goal: Refrain from self-neglect  Outcome: Progressing  Goal: Complete daily ADLs, including personal hygiene independently, as able  Description: Interventions:  - Observe, teach, and assist patient with ADLS  -  Monitor and promote a balance of rest/activity, with adequate nutrition and elimination   Outcome: Progressing     Problem: Anxiety  Goal: Anxiety is at manageable level  Description: Interventions:  - Assess and monitor patient's anxiety level  - Monitor for signs and symptoms (heart palpitations, chest pain, shortness of breath, headaches, nausea, feeling jumpy, restlessness, irritable, apprehensive)  - Collaborate with interdisciplinary team and initiate plan and interventions as ordered    - [place] patient to unit/surroundings  - Explain treatment plan  - Encourage participation in care  - Encourage verbalization of concerns/fears  - Identify coping mechanisms  - Assist in developing anxiety-reducing skills  - Administer/offer alternative therapies  - Limit or eliminate stimulants  Outcome: Progressing     Problem: Risk for Violence/Aggression Toward Others  Goal: Treatment Goal: Refrain from acts of violence/aggression during length of stay, and demonstrate improved impulse control at the time of discharge  Outcome: Progressing  Goal: Verbalize thoughts and feelings  Description: Interventions:  - Assess and re-assess patient's level of risk, every waking shift  - Engage patient in 1:1 interactions, daily, for a minimum of 15 minutes   - Allow patient to express feelings and frustrations in a safe and non-threatening manner   - Establish rapport/trust with patient   Outcome: Progressing  Goal: Refrain from harming others  Outcome: Progressing  Goal: Refrain from destructive acts on the environment or property  Outcome: Progressing  Goal: Control angry outbursts  Description: Interventions:  - Monitor patient closely, per order  - Ensure early verbal de-escalation  - Monitor prn medication needs  - Set reasonable/therapeutic limits, outline behavioral expectations, and consequences   - Provide a non-threatening milieu, utilizing the least restrictive interventions   Outcome: Progressing  Goal: Identify appropriate positive anger management techniques  Description: Interventions:  - Offer anger management and coping skills groups   - Staff will provide positive feedback for appropriate anger control  Outcome: Progressing     Problem: Alteration in Orientation  Goal: Treatment Goal: Demonstrate a reduction of confusion and improved orientation to person, place, time and/or situation upon discharge, according to optimum baseline/ability  Outcome: Progressing  Goal: Interact with staff daily  Description: Interventions:  - Assess and re-assess patient's level of orientation  - Engage patient in 1 on 1 interactions, daily, for a minimum of 15 minutes   - Establish rapport/trust with patient   Outcome: Progressing  Goal: Express concerns related to confused thinking related to:  Description: Interventions:  - Encourage patient to express feelings, fears, frustrations, hopes  - Assign consistent caregivers   - [place]/re-orient patient as needed  - Allow comfort items, as appropriate  - Provide visual cues, signs, etc    Outcome: Progressing  Goal: Allow medical examinations, as recommended  Description: Interventions:  - Provide physical/neurological exams and/or referrals, per provider   Outcome: Progressing  Goal: Cooperate with recommended testing/procedures  Description: Interventions:  - Determine need for ancillary testing  - Observe for mental status changes  - Implement falls/precaution protocol   Outcome: Progressing  Goal: Complete daily ADLs, including personal hygiene independently, as able  Description: Interventions:  - Observe, teach, and assist patient with ADLS  Outcome: Progressing     Problem: Prexisting or High Potential for Compromised Skin Integrity  Goal: Skin integrity is maintained or improved  Description: INTERVENTIONS:  - Identify patients at risk for skin breakdown  - Assess and monitor skin integrity  - Assess and monitor nutrition and hydration status  - Monitor labs   - Assess for incontinence   - Turn and reposition patient  - Assist with mobility/ambulation  - Relieve pressure over bony prominences  - Avoid friction and shearing  - Provide appropriate hygiene as needed including keeping skin clean and dry  - Evaluate need for skin moisturizer/barrier cream  - Collaborate with interdisciplinary team   - Patient/family teaching  - Consider wound care consult   Outcome: Progressing

## 2023-05-27 NOTE — PROGRESS NOTES
"Progress Note - [id number] [facility] [age] y o  male MRN: [id number]  Unit/Bed#: Paty Cornelius 093-80 Encounter: [id number]    Patient was seen today for continuation of care, records reviewed and patient was discussed with the morning case review team   Per staff, no acute behaviors in the past 24 hours  Received PRN 50mg PO trazodone last night for insomnia which was not effective and he was up for most of the night  Abdias Mistry is often non compliant using walker, he is on bed and chair alarms as fall precaution  Abdias Mistry was seen today for psychiatric follow-up  On assessment today, Abdias Mistry was seen sitting in giselle chair in the [place]guyen  Abdias Mistry appears bright and is cooperative with the interview  Abdias Mistry is alert to self only and remains pleasantly confused  He reports \"I don't remember not sleeping\" and notes his mood is \"fantastic\"  He makes jokes throughout the interview and believes this Ples Cleaves is an old friend despite being reminded of my role on the unit  Abdias Mistry denies acute suicidal/self-harm ideation/intent/plan  Abdias Mistry was behaviorally controlled last evening but was up for much of the night confused  No recent agitation or aggression noted on exam or in report  Abdias Mistry denies HI/AH/VH  No overt delusions or paranoia are verbalized  Abdias Mistry remains adherent to his current psychotropic medication regimen and denies any side effects from medications, as well as none noted on exam     We will continue Abilify 5mg daily for BPSD that was initiated yesterday  Continue Aricept 10mg PO daily at HS, Namenda 10mg PO q 12 hrs, and Zoloft 100mg PO daily  Will increase PRN trazodone to 75mg PO at HS  Vitals:  Vitals:    05/27/23 0739   BP: 150/75   Pulse: 71   Resp: 16   Temp: (!) 97 2 °F (36 2 °C)   SpO2: 97%       Laboratory Results:    I have personally reviewed all pertinent laboratory/tests results    Most Recent Labs:   Lab Results   Component Value Date    ALB 3 7 05/25/2023    ALKPHOS 50 " 05/25/2023    ALT 10 05/25/2023    AST 16 05/25/2023    BUN 25 05/25/2023    CALCIUM 9 0 05/25/2023    CHOLESTEROL 149 09/04/2021     05/25/2023    CO2 26 05/25/2023    CREATININE 2 15 (H) 05/25/2023     05/25/2023    GLUC 179 (H) 05/25/2023    GLUF 179 (H) 05/25/2023    HCT 38 3 05/25/2023    HDL 26 (L) 09/04/2021    HGB 12 3 05/25/2023    HGBA1C 8 0 (H) 05/25/2023    K 4 4 05/25/2023    LDLCALC 57 09/04/2021    NEUTROABS 4 03 05/25/2023    NONHDLC 123 09/04/2021     05/25/2023    RBC 4 38 05/25/2023    RDW 12 7 05/25/2023    SODIUM 142 05/25/2023    TBILI 0 57 05/25/2023    TP 6 5 05/25/2023    TRIG 328 (H) 09/04/2021    [id number] 3 060 05/25/2023    WBC 6 72 05/25/2023       Psychiatric Review of Systems:  Behavior over the last 24 hours:  Some improvement  Sleep: poor  Appetite: fair  Medication side effects: none observed on exam   ROS: no complaints, denies shortness of breath or chest pain and all other systems are negative for acute changes    Mental Status Evaluation:    Appearance:  casually dressed, adequate grooming   Behavior:  pleasant, cooperative, confused   Speech:  normal rate and volume, clear   Mood:  euthymic   Affect:  brighter   Thought Process:  goal directed, poverty of thought   Associations: intact associations   Thought Content:  no overt delusions, poverty of thought   Perceptual Disturbances: denies auditory or visual hallucinations when asked, does not appear responding to internal stimuli   Risk Potential: Suicidal ideation - None  Homicidal ideation - None  Potential for aggression - Yes cognitive function   Sensorium:  oriented to person   Memory:  short term memory impaired, long term memory impaired   Consciousness:  alert and awake   Attention/Concentration: attention span and concentration appear shorter than expected for age   Insight:  impaired   Judgment: impaired   Gait/Station: uses walker   Motor Activity: no abnormal movements     Progress Toward Goals:   Amari Romo is progressing towards goals of inpatient psychiatric treatment by continued medication compliance and is attending therapeutic modalities on the milieu  However, the patient continues to require inpatient psychiatric hospitalization for continued medication management and titration to optimize symptom reduction, improve sleep hygiene, and demonstrate adequate self-care  Risk of Harm to Self:   Nursing Suicide Risk Assessment Last 24 hours: C-SSRS Risk (Since Last Contact)  Calculated C-SSRS Risk Score (Since Last Contact): No Risk Indicated    Risk of Harm to Others:  Nursing Homicide Risk Assessment: Violence Risk to Others: Yes- Within the last 6 months    The following interventions are recommended: behavioral checks every 7 minutes, continued hospitalization on locked unit      Assessment/Plan   Principal Problem:    Unspecified mood (affective) disorder (HCC)  Active Problems:    Essential hypertension    Mixed hyperlipidemia    Type 2 diabetes mellitus with kidney complication, with long-term current use of insulin (HCC)    Dementia with behavioral disturbance (HCC)    Medical clearance for psychiatric admission    Chronic kidney disease (CKD), stage IV (severe) ([facility] [address] )      Recommended Treatment: Treatment plan and medication changes discussed and per the attending physician the plan is: 1  Continue with group therapy, milieu therapy and occupational therapy  2  Behavioral Health checks every 7 minutes  3  Continue frequent safety checks and vitals per unit protocol  4  Continue with SLIM medical management as indicated  5  Continue with current medication regimen  6  Will review labs in the a m  7 Disposition Planning: Discharge planning and efforts remain ongoing    Behavioral Health Medications: all current active meds have been reviewed and planned medication changes: increase PRN trazodone to 75mg PO at HS    Current Facility-Administered Medications   Medication Dose Route Frequency Provider Last Rate   • acetaminophen  650 mg Oral Q4H PRN Sharlon Remedies, CRNP     • acetaminophen  650 mg Oral Q4H PRN Sharlon Remedies, CRNP     • acetaminophen  975 mg Oral Q6H PRN Sharlon Remedies, CRNP     • ARIPiprazole  5 mg Oral Daily Joey Dooley MD     • atorvastatin  10 mg Oral Daily With Dinner Kirti Romero PA-C     • cholecalciferol  2,000 Units Oral Daily Kirti Romero PA-C     • docusate sodium  100 mg Oral BID Thalia Quiñonez, SHADY     • donepezil  10 mg Oral HS Sharlon Remedies, CRNP     • ferrous sulfate  325 mg Oral Daily With Breakfast Kirti Romero PA-C     • hydrOXYzine HCL  25 mg Oral Q6H PRN Max 4/day Sharlon Remedies, CRNP     • hydrOXYzine HCL  50 mg Oral Q6H PRN Max 4/day Sharlon Remedies, CRNP     • insulin detemir  10 Units Subcutaneous Daily With Breakfast Kirti Romero PA-C     • insulin detemir  10 Units Subcutaneous HS Kirti Romero PA-C     • insulin lispro  1-5 Units Subcutaneous HS Kirti Romero PA-C     • insulin lispro  1-6 Units Subcutaneous TID AC Kirti Romero PA-C     • LORazepam  1 mg Intramuscular Q6H PRN Max 3/day Sharlon Remedies, CRNP     • LORazepam  1 mg Oral Q6H PRN Max 3/day Sharlon Remedies, CRNP     • magnesium Oxide  400 mg Oral Daily Kirti Romero PA-C     • memantine  10 mg Oral Q12H [address], CRNP     • metoprolol tartrate  100 mg Oral HS Kirti Romero PA-C     • metoprolol tartrate  50 mg Oral Daily Kirti Romeor PA-C     • polyethylene glycol  17 g Oral Daily PRN SHADY Gerard     • propafenone  225 mg Oral Q8H [address] Kirti Romero PA-C     • senna  1 tablet Oral HS SHADY Gerard     • sertraline  100 mg Oral Daily Sharlon Remedies, CRNP     • traZODone  75 mg Oral HS PRN SHADY Pulido         Risks / Benefits of Treatment:  Risks, benefits, and possible side effects of medications explained to patient   Patient has limited understanding of risks and benefits of treatment at this time and needs ongoing explanation of treatment benefits and treatment plan  Counseling / Coordination of Care:  Patient's progress reviewed with nursing staff  Medications, treatment progress and treatment plan reviewed with patient  Supportive counseling provided to the patient  Total floor/unit time spent today 25 minutes  Greater than 50% of total time was spent with the patient and / or family counseling and / or coordination of care  A description of the counseling / coordination of care: medication education, treatment plan, supportive therapy

## 2023-05-27 NOTE — NURSING NOTE
Patient is visible on the unit and talkative  Patient is pleasantly confused  No aggressive behaviors noted  Patient unable to verbalize any SI/HI/AH/VH/depression/anxiety  Patient is redirected often  Fall precautions in place

## 2023-05-27 NOTE — NURSING NOTE
C/o insomnia and patient bed alarm kept going off and by the time staff get there patient is already out of bed standing  Patient was brought to the hallway on a [place] chair  Trazodone 50 mg given at 0137 was ineffective as patient was up most of the night asking staff of his wife

## 2023-05-28 PROBLEM — F39 UNSPECIFIED MOOD (AFFECTIVE) DISORDER (HCC): Chronic | Status: ACTIVE | Noted: 2023-05-26

## 2023-05-28 LAB
GLUCOSE SERPL-MCNC: 152 MG/DL (ref 65–140)
GLUCOSE SERPL-MCNC: 191 MG/DL (ref 65–140)
GLUCOSE SERPL-MCNC: 85 MG/DL (ref 65–140)

## 2023-05-28 PROCEDURE — 99232 SBSQ HOSP IP/OBS MODERATE 35: CPT | Performed by: STUDENT IN AN ORGANIZED HEALTH CARE EDUCATION/TRAINING PROGRAM

## 2023-05-28 PROCEDURE — 82948 REAGENT STRIP/BLOOD GLUCOSE: CPT

## 2023-05-28 RX ADMIN — ARIPIPRAZOLE 5 MG: 5 TABLET ORAL at 09:21

## 2023-05-28 RX ADMIN — DOCUSATE SODIUM 100 MG: 100 CAPSULE, LIQUID FILLED ORAL at 09:20

## 2023-05-28 RX ADMIN — ATORVASTATIN CALCIUM 10 MG: 10 TABLET, FILM COATED ORAL at 16:47

## 2023-05-28 RX ADMIN — INSULIN LISPRO 1 UNITS: 100 INJECTION, SOLUTION INTRAVENOUS; SUBCUTANEOUS at 16:48

## 2023-05-28 RX ADMIN — PROPAFENONE HYDROCHLORIDE 225 MG: 150 TABLET, FILM COATED ORAL at 21:16

## 2023-05-28 RX ADMIN — FERROUS SULFATE TAB 325 MG (65 MG ELEMENTAL FE) 325 MG: 325 (65 FE) TAB at 09:20

## 2023-05-28 RX ADMIN — DONEPEZIL HYDROCHLORIDE 10 MG: 10 TABLET, FILM COATED ORAL at 21:16

## 2023-05-28 RX ADMIN — MEMANTINE 10 MG: 10 TABLET ORAL at 21:17

## 2023-05-28 RX ADMIN — CHOLECALCIFEROL TAB 25 MCG (1000 UNIT) 2000 UNITS: 25 TAB at 09:19

## 2023-05-28 RX ADMIN — DOCUSATE SODIUM 100 MG: 100 CAPSULE, LIQUID FILLED ORAL at 17:10

## 2023-05-28 RX ADMIN — MAGNESIUM OXIDE TAB 400 MG (241.3 MG ELEMENTAL MG) 400 MG: 400 (241.3 MG) TAB at 09:24

## 2023-05-28 RX ADMIN — METOPROLOL TARTRATE 100 MG: 50 TABLET, FILM COATED ORAL at 21:16

## 2023-05-28 RX ADMIN — INSULIN LISPRO 2 UNITS: 100 INJECTION, SOLUTION INTRAVENOUS; SUBCUTANEOUS at 12:04

## 2023-05-28 RX ADMIN — SENNOSIDES 8.6 MG: 8.6 TABLET, FILM COATED ORAL at 21:17

## 2023-05-28 RX ADMIN — PROPAFENONE HYDROCHLORIDE 225 MG: 150 TABLET, FILM COATED ORAL at 16:47

## 2023-05-28 RX ADMIN — METOPROLOL TARTRATE 50 MG: 50 TABLET, FILM COATED ORAL at 09:19

## 2023-05-28 RX ADMIN — MEMANTINE 10 MG: 10 TABLET ORAL at 09:21

## 2023-05-28 RX ADMIN — INSULIN DETEMIR 10 UNITS: 100 INJECTION, SOLUTION SUBCUTANEOUS at 09:22

## 2023-05-28 RX ADMIN — INSULIN LISPRO 1 UNITS: 100 INJECTION, SOLUTION INTRAVENOUS; SUBCUTANEOUS at 21:18

## 2023-05-28 RX ADMIN — SERTRALINE HYDROCHLORIDE 100 MG: 100 TABLET ORAL at 09:21

## 2023-05-28 RX ADMIN — INSULIN DETEMIR 10 UNITS: 100 INJECTION, SOLUTION SUBCUTANEOUS at 21:18

## 2023-05-28 NOTE — NURSING NOTE
The patient was out of room all shift  He was calm and cooperative with staff/peers  The patient sat by nursing station, and talked with staff  Patient did get agitated at one point with staff, due to sundowning  Patient was redirected with conversation  Patient became more pleasant when talking about being a   The patient denied depression and anxiety  He denied SI/HI/AVH  Patient was med compliant  No acting out behaviors  Safety checks ongoing

## 2023-05-28 NOTE — PROGRESS NOTES
"[facility] 80 [facility]  [facility] MRN: [id number]  Unit/Bed#: Ally Ott 983-28 Encounter: [id number]    Patient was seen today for continuation of care, records reviewed and patient was discussed with the morning case review team   Per staff, no acute behaviors in the past 24 hours  Aury Rubalcava became slightly irritable at nursing station last night but was redirectable  He slept better last night  Aury Rubalcava was seen today for psychiatric follow-up  On assessment today, Aury Rubalcava was seen sitting in the group room away from peers  Aury Rubalcava appears bright and is cooperative with the interview  Aury Rubalcava is alert to self only and remains pleasantly confused  He reports that his mood is \"great\"  He denies any anxiety or depression today  He was seen socializing with peers today  Aury Rubalcava denies acute suicidal/self-harm ideation/intent/plan  Aury Rubalcava was behaviorally controlled last evening and more redirectable  No recent agitation or aggression noted on exam or in report  Aury Rubalcava denies HI/AH/VH  No overt delusions or paranoia are verbalized  Aury Rubalcava remains adherent to his current psychotropic medication regimen and denies any side effects from medications, as well as none noted on exam     Continue Abilify 5mg PO daily, Aricept 10mg PO daily at HS, Namenda 10mg PO q 12 hrs, Zoloft 100mg PO daily, and trazodone 75mg PO at HS  Vitals:  Vitals:    05/28/23 0748   BP: 125/60   Pulse: 60   Resp: 16   Temp: (!) 97 °F (36 1 °C)   SpO2: 94%       Laboratory Results:    I have personally reviewed all pertinent laboratory/tests results    Most Recent Labs:   Lab Results   Component Value Date    ALB 3 7 05/25/2023    ALKPHOS 50 05/25/2023    ALT 10 05/25/2023    AST 16 05/25/2023    BUN 25 05/25/2023    CALCIUM 9 0 05/25/2023    CHOLESTEROL 149 09/04/2021     05/25/2023    CO2 26 05/25/2023    CREATININE 2 15 (H) 05/25/2023     05/25/2023    GLUC 179 (H) 05/25/2023    GLUF 179 (H) " 05/25/2023    HCT 38 3 05/25/2023    HDL 26 (L) 09/04/2021    HGB 12 3 05/25/2023    HGBA1C 8 0 (H) 05/25/2023    K 4 4 05/25/2023    LDLCALC 57 09/04/2021    NEUTROABS 4 03 05/25/2023    [place] 123 09/04/2021     05/25/2023    RBC 4 38 05/25/2023    RDW 12 7 05/25/2023    SODIUM 142 05/25/2023    TBILI 0 57 05/25/2023    TP 6 5 05/25/2023    TRIG 328 (H) 09/04/2021    [id number] 3 060 05/25/2023    WBC 6 72 05/25/2023       Psychiatric Review of Systems:  Behavior over the last 24 hours:  Slowly improving  Sleep: improving  Appetite: fair  Medication side effects: none observed on exam   ROS: no complaints, denies shortness of breath or chest pain and all other systems are negative for acute changes    Mental Status Evaluation:    Appearance:  casually dressed, adequate grooming   Behavior:  pleasant, cooperative, confused, more redirectable   Speech:  normal rate and volume, clear   Mood:  euthymic   Affect:  bright   Thought Process:  goal directed, poverty of thought   Associations: intact associations   Thought Content:  no overt delusions, poverty of thought   Perceptual Disturbances: denies auditory or visual hallucinations when asked, does not appear responding to internal stimuli   Risk Potential: Suicidal ideation - None  Homicidal ideation - None  Potential for aggression - none at present   Sensorium:  oriented to person   Memory:  short term memory impaired, long term memory impaired   Consciousness:  alert and awake   Attention/Concentration: attention span and concentration appear shorter than expected for age   Insight:  impaired   Judgment: impaired   Gait/Station: uses walker   Motor Activity: no abnormal movements     Progress Toward Goals:   Oscar Pimentel is progressing towards goals of inpatient psychiatric treatment by continued medication compliance and is attending therapeutic modalities on the milieu   However, the patient continues to require inpatient psychiatric hospitalization for continued medication management and titration to optimize symptom reduction, improve sleep hygiene, and demonstrate adequate self-care  Risk of Harm to Self:   Nursing Suicide Risk Assessment Last 24 hours: C-SSRS Risk (Since Last Contact)  Calculated C-SSRS Risk Score (Since Last Contact): No Risk Indicated    Risk of Harm to Others:  Nursing Homicide Risk Assessment: Violence Risk to Others: Yes- Within the last 6 months    The following interventions are recommended: behavioral checks every 7 minutes, continued hospitalization on locked unit      Assessment/Plan   Principal Problem:    Unspecified mood (affective) disorder (HCC)  Active Problems:    Essential hypertension    Mixed hyperlipidemia    Type 2 diabetes mellitus with kidney complication, with long-term current use of insulin (HCC)    Dementia with behavioral disturbance (HCC)    Medical clearance for psychiatric admission    Chronic kidney disease (CKD), stage IV (severe) ([facility] [address] )      Recommended Treatment: Treatment plan and medication changes discussed and per the attending physician the plan is: 1  Continue with group therapy, milieu therapy and occupational therapy  2  Behavioral Health checks every 7 minutes  3  Continue frequent safety checks and vitals per unit protocol  4  Continue with SLIM medical management as indicated  5  Continue with current medication regimen  6  Will review labs in the a m  7 Disposition Planning: Discharge planning and efforts remain ongoing    Behavioral Health Medications: all current medications review, continue current medications       Current Facility-Administered Medications   Medication Dose Route Frequency Provider Last Rate   • acetaminophen  650 mg Oral Q4H PRN Renford Check, CRNP     • acetaminophen  650 mg Oral Q4H PRN Renford Check, CRNP     • acetaminophen  975 mg Oral Q6H PRN Renford Check, CRNP     • ARIPiprazole  5 mg Oral Daily Ja Giles MD     • atorvastatin  10 mg Oral Daily With [employer] Daniel Rosales, PA-C     • cholecalciferol  2,000 Units Oral Daily Daniel Rosales, PA-C     • docusate sodium  100 mg Oral BID SHADY Gerard     • donepezil  10 mg Oral HS Sargent Laclacey, CRNP     • ferrous sulfate  325 mg Oral Daily With Breakfast Daniel Rosales, PA-C     • hydrOXYzine HCL  25 mg Oral Q6H PRN Max 4/day Sargent Lacer, CRNP     • hydrOXYzine HCL  50 mg Oral Q6H PRN Max 4/day Sargent Lacer, CRNP     • insulin detemir  10 Units Subcutaneous Daily With Breakfast Daniel Rosales, PA-C     • insulin detemir  10 Units Subcutaneous HS ENRIQUETA Loco-C     • insulin lispro  1-5 Units Subcutaneous HS Daniel Rosales, PA-C     • insulin lispro  1-6 Units Subcutaneous TID AC ENRIQUETA Loco-C     • LORazepam  1 mg Intramuscular Q6H PRN Max 3/day Sargent Laclacey, CRNP     • LORazepam  1 mg Oral Q6H PRN Max 3/day Sargent Laclacey, CRNP     • magnesium Oxide  400 mg Oral Daily Daniel Rosales, PA-C     • memantine  10 mg Oral Q12H [facility], SHADY     • metoprolol tartrate  100 mg Oral HS ENRIQUETA Loco-C     • metoprolol tartrate  50 mg Oral Daily Daniel Rosales, PA-C     • polyethylene glycol  17 g Oral Daily PRN SHADY Gerard     • propafenone  225 mg Oral Q8H [address] BARRY LocoC     • senna  1 tablet Oral HS SHADY Gerard     • sertraline  100 mg Oral Daily SHADY Castrejon     • traZODone  75 mg Oral HS PRN SHADY Pulido         Risks / Benefits of Treatment:  Risks, benefits, and possible side effects of medications explained to patient  Patient has limited understanding of risks and benefits of treatment at this time and needs ongoing explanation of treatment benefits and treatment plan  Counseling / Coordination of Care:  Patient's progress reviewed with nursing staff  Medications, treatment progress and treatment plan reviewed with patient    Supportive counseling provided to the patient  Total floor/unit time spent today 25 minutes  Greater than 50% of total time was spent with the patient and / or family counseling and / or coordination of care  A description of the counseling / coordination of care: medication education, treatment plan, supportive therapy

## 2023-05-28 NOTE — PLAN OF CARE
Problem: Alteration in Thoughts and Perception  Goal: Complete daily ADLs, including personal hygiene independently, as able  Description: Interventions:  - Observe, teach, and assist patient with ADLS  - Monitor and promote a balance of rest/activity, with adequate nutrition and elimination   Outcome: Progressing     Problem: Depression  Goal: Refrain from isolation  Description: Interventions:  - Develop a trusting relationship   - Encourage socialization   Outcome: Progressing     Problem: Anxiety  Goal: Anxiety is at manageable level  Description: Interventions:  - Assess and monitor patient's anxiety level  - Monitor for signs and symptoms (heart palpitations, chest pain, shortness of breath, headaches, nausea, feeling jumpy, restlessness, irritable, apprehensive)  - Collaborate with interdisciplinary team and initiate plan and interventions as ordered    - [place] patient to unit/surroundings  - Explain treatment plan  - Encourage participation in care  - Encourage verbalization of concerns/fears  - Identify coping mechanisms  - Assist in developing anxiety-reducing skills  - Administer/offer alternative therapies  - Limit or eliminate stimulants  Outcome: Progressing     Problem: Risk for Violence/Aggression Toward Others  Goal: Treatment Goal: Refrain from acts of violence/aggression during length of stay, and demonstrate improved impulse control at the time of discharge  Outcome: Progressing     Problem: Alteration in Orientation  Goal: Interact with staff daily  Description: Interventions:  - Assess and re-assess patient's level of orientation  - Engage patient in 1 on 1 interactions, daily, for a minimum of 15 minutes   - Establish rapport/trust with patient   Outcome: Progressing     Problem: Alteration in Orientation  Goal: Cooperate with recommended testing/procedures  Description: Interventions:  - Determine need for ancillary testing  - Observe for mental status changes  - Implement falls/precaution protocol   Outcome: Progressing     Problem: Prexisting or High Potential for Compromised Skin Integrity  Goal: Skin integrity is maintained or improved  Description: INTERVENTIONS:  - Identify patients at risk for skin breakdown  - Assess and monitor skin integrity  - Assess and monitor nutrition and hydration status  - Monitor labs   - Assess for incontinence   - Turn and reposition patient  - Assist with mobility/ambulation  - Relieve pressure over bony prominences  - Avoid friction and shearing  - Provide appropriate hygiene as needed including keeping skin clean and dry  - Evaluate need for skin moisturizer/barrier cream  - Collaborate with interdisciplinary team   - Patient/family teaching  - Consider wound care consult   Outcome: Progressing

## 2023-05-28 NOTE — NURSING NOTE
Patient is visible on the unit and social with peers at times  Patient is pleasantly confused  No aggressive behaviors noted  Patient is unable to verbalize any SI/HI/AH/VH/depression/anxiety  Patient needs frequent redirection  Medication and meal compliant  Fall precautions in place

## 2023-05-29 LAB
ALBUMIN SERPL BCP-MCNC: 4.4 G/DL (ref 3.5–5)
ALP SERPL-CCNC: 61 U/L (ref 34–104)
ALT SERPL W P-5'-P-CCNC: 12 U/L (ref 7–52)
ANION GAP SERPL CALCULATED.3IONS-SCNC: 8 MMOL/L (ref 4–13)
AST SERPL W P-5'-P-CCNC: 15 U/L (ref 13–39)
BILIRUB SERPL-MCNC: 0.65 MG/DL (ref 0.2–1)
BUN SERPL-MCNC: 34 MG/DL (ref 5–25)
CALCIUM SERPL-MCNC: 9.7 MG/DL (ref 8.4–10.2)
CHLORIDE SERPL-SCNC: 102 MMOL/L (ref 96–108)
CO2 SERPL-SCNC: 29 MMOL/L (ref 21–32)
CREAT SERPL-MCNC: 2.29 MG/DL (ref 0.6–1.3)
GFR SERPL CREATININE-BSD FRML MDRD: 24 ML/MIN/1.73SQ M
GLUCOSE SERPL-MCNC: 151 MG/DL (ref 65–140)
GLUCOSE SERPL-MCNC: 158 MG/DL (ref 65–140)
GLUCOSE SERPL-MCNC: 162 MG/DL (ref 65–140)
GLUCOSE SERPL-MCNC: 162 MG/DL (ref 65–140)
GLUCOSE SERPL-MCNC: 89 MG/DL (ref 65–140)
POTASSIUM SERPL-SCNC: 4.2 MMOL/L (ref 3.5–5.3)
PROT SERPL-MCNC: 7.5 G/DL (ref 6.4–8.4)
SODIUM SERPL-SCNC: 139 MMOL/L (ref 135–147)

## 2023-05-29 PROCEDURE — 82948 REAGENT STRIP/BLOOD GLUCOSE: CPT

## 2023-05-29 PROCEDURE — 80053 COMPREHEN METABOLIC PANEL: CPT | Performed by: NURSE PRACTITIONER

## 2023-05-29 PROCEDURE — 99232 SBSQ HOSP IP/OBS MODERATE 35: CPT | Performed by: PHYSICIAN ASSISTANT

## 2023-05-29 RX ADMIN — MEMANTINE 10 MG: 10 TABLET ORAL at 08:40

## 2023-05-29 RX ADMIN — PROPAFENONE HYDROCHLORIDE 225 MG: 150 TABLET, FILM COATED ORAL at 05:45

## 2023-05-29 RX ADMIN — INSULIN LISPRO 1 UNITS: 100 INJECTION, SOLUTION INTRAVENOUS; SUBCUTANEOUS at 16:46

## 2023-05-29 RX ADMIN — ATORVASTATIN CALCIUM 10 MG: 10 TABLET, FILM COATED ORAL at 16:47

## 2023-05-29 RX ADMIN — INSULIN LISPRO 1 UNITS: 100 INJECTION, SOLUTION INTRAVENOUS; SUBCUTANEOUS at 11:54

## 2023-05-29 RX ADMIN — METOPROLOL TARTRATE 50 MG: 50 TABLET, FILM COATED ORAL at 08:41

## 2023-05-29 RX ADMIN — INSULIN LISPRO 1 UNITS: 100 INJECTION, SOLUTION INTRAVENOUS; SUBCUTANEOUS at 21:38

## 2023-05-29 RX ADMIN — FERROUS SULFATE TAB 325 MG (65 MG ELEMENTAL FE) 325 MG: 325 (65 FE) TAB at 08:40

## 2023-05-29 RX ADMIN — MEMANTINE 10 MG: 10 TABLET ORAL at 21:36

## 2023-05-29 RX ADMIN — DOCUSATE SODIUM 100 MG: 100 CAPSULE, LIQUID FILLED ORAL at 16:47

## 2023-05-29 RX ADMIN — INSULIN DETEMIR 10 UNITS: 100 INJECTION, SOLUTION SUBCUTANEOUS at 08:39

## 2023-05-29 RX ADMIN — METOPROLOL TARTRATE 100 MG: 50 TABLET, FILM COATED ORAL at 21:36

## 2023-05-29 RX ADMIN — DONEPEZIL HYDROCHLORIDE 10 MG: 10 TABLET, FILM COATED ORAL at 21:36

## 2023-05-29 RX ADMIN — SENNOSIDES 8.6 MG: 8.6 TABLET, FILM COATED ORAL at 21:36

## 2023-05-29 RX ADMIN — INSULIN DETEMIR 10 UNITS: 100 INJECTION, SOLUTION SUBCUTANEOUS at 21:38

## 2023-05-29 RX ADMIN — PROPAFENONE HYDROCHLORIDE 225 MG: 150 TABLET, FILM COATED ORAL at 16:47

## 2023-05-29 RX ADMIN — MAGNESIUM OXIDE TAB 400 MG (241.3 MG ELEMENTAL MG) 400 MG: 400 (241.3 MG) TAB at 08:41

## 2023-05-29 RX ADMIN — PROPAFENONE HYDROCHLORIDE 225 MG: 150 TABLET, FILM COATED ORAL at 21:36

## 2023-05-29 RX ADMIN — ARIPIPRAZOLE 5 MG: 5 TABLET ORAL at 08:41

## 2023-05-29 RX ADMIN — CHOLECALCIFEROL TAB 25 MCG (1000 UNIT) 2000 UNITS: 25 TAB at 08:40

## 2023-05-29 RX ADMIN — DOCUSATE SODIUM 100 MG: 100 CAPSULE, LIQUID FILLED ORAL at 08:41

## 2023-05-29 RX ADMIN — SERTRALINE HYDROCHLORIDE 100 MG: 100 TABLET ORAL at 08:40

## 2023-05-29 NOTE — NURSING NOTE
Patient is visible on the unit and social with peers at times  Alert but drowsy  Patient is pleasantly confused  No aggressive behaviors present  Patient is unable to verbalize any SI/HI/AH/VH/depression or anxiety  Patient needs frequent redirection  Medication and meal compliant  Cooperative with care  Fall precautions in place

## 2023-05-29 NOTE — PROGRESS NOTES
"Progress Note - [address] [facility] [place],[address] 80 y o  male MRN: [id number]   Unit/Bed#: Tash Vera 603-01 Encounter: [id number]    Behavior over the last 24 hours: unchanged  Abdon Pugh was seen and evaluated today  Per nursing, patient is visible on the unit and social with peers at times  Patient is pleasantly confused  No aggressive behaviors noted  Patient is unable to verbalize any SI/HI/AH/VH/depression/anxiety  Patient needs frequent redirection  Medication and meal compliant  Today patient states his mood is \"Same as always\"  He is pleasantly confused, unsure of the date and believes himself to be 61years old and in [place]  He is oriented only to self  He feels safe in the hospital and does not offer any concerns or complaints at this time  In regard to medication tolerance, denies side effects  Patient denies SI/HI/AH/VH  In regard to sleep and appetite, denies disturbances  No acute events over the past 24H  He is seen socializing with peers today         ROS: no complaints, all other systems are negative    Mental Status Evaluation:    Appearance:  casually dressed, adequate grooming   Behavior:  pleasant, cooperative, confused,   Speech:  normal rate and volume   Mood:  \"same as always\"   Affect:  bright   Thought Process:  goal directed, poverty of thought   Associations: intact associations   Thought Content:  no overt delusions, poverty of thought   Perceptual Disturbances: denies auditory or visual hallucinations when asked, does not appear responding to internal stimuli   Risk Potential: Suicidal ideation - None  Homicidal ideation - None  Potential for aggression - none at present   Sensorium:  oriented to person   Memory:  short term memory impaired, long term memory impaired   Consciousness:  alert and awake   Attention/Concentration: attention span and concentration appear shorter than expected for age   Insight:  impaired   Judgment: impaired   Gait/Station: uses walker   Motor " Activity: no abnormal movements       Vital signs in last 24 hours:    Temp:  [96 7 °F (35 9 °C)-97 6 °F (36 4 °C)] 96 7 °F (35 9 °C)  HR:  [61-82] 82  Resp:  [17-18] 17  BP: (123-148)/(58-82) 144/82    Laboratory results: I have personally reviewed all pertinent laboratory/tests results    Results from the past 24 hours:   Recent Results (from the past 24 hour(s))   Fingerstick Glucose (POCT)    Collection Time: 05/28/23  9:01 PM   Result Value Ref Range    POC Glucose 152 (H) 65 - 140 mg/dl   Fingerstick Glucose (POCT)    Collection Time: 05/29/23  7:30 AM   Result Value Ref Range    POC Glucose 89 65 - 140 mg/dl   Comprehensive metabolic panel    Collection Time: 05/29/23  9:51 AM   Result Value Ref Range    Sodium 139 135 - 147 mmol/L    Potassium 4 2 3 5 - 5 3 mmol/L    Chloride 102 96 - 108 mmol/L    CO2 29 21 - 32 mmol/L    ANION GAP 8 4 - 13 mmol/L    BUN 34 (H) 5 - 25 mg/dL    Creatinine 2 29 (H) 0 60 - 1 30 mg/dL    Glucose 158 (H) 65 - 140 mg/dL    Calcium 9 7 8 4 - 10 2 mg/dL    AST 15 13 - 39 U/L    ALT 12 7 - 52 U/L    Alkaline Phosphatase 61 34 - 104 U/L    Total Protein 7 5 6 4 - 8 4 g/dL    Albumin 4 4 3 5 - 5 0 g/dL    Total Bilirubin 0 65 0 20 - 1 00 mg/dL    eGFR 24 ml/min/1 73sq m   Fingerstick Glucose (POCT)    Collection Time: 05/29/23 11:31 AM   Result Value Ref Range    POC Glucose 162 (H) 65 - 140 mg/dl   Fingerstick Glucose (POCT)    Collection Time: 05/29/23  4:21 PM   Result Value Ref Range    POC Glucose 151 (H) 65 - 140 mg/dl       -Elevated Creatinine  Per chart review, patient is being followed by medical for CKD  Baseline Cr noted to be between 1 9-2 3 per hospitalist note 5/24/23  Cr currently  2 29 and BUN 34  Currently avoiding nephrotoxins and hypotension  Adequate PO hydration encouraged  /82 today  [facility]  Medical ordering labs and following      Progress Toward Goals: progressing    Assessment/Plan   Principal Problem:    Unspecified mood (affective) disorder [facility])  Active Problems:    Essential hypertension    Mixed hyperlipidemia    Type 2 diabetes mellitus with kidney complication, with long-term current use of insulin (HCC)    Dementia with behavioral disturbance (HCC)    Medical clearance for psychiatric admission    Chronic kidney disease (CKD), stage IV (severe) ([facility] [address] )      Recommended Treatment:     Planned medication and treatment changes: All current active medications have been reviewed  Encourage group therapy, milieu therapy and occupational therapy  Behavioral Health checks every 7 minutes  -Per nursing, patient has had two episodes of struggling to swallow at meal time  He is currently on a soft diet, added today  Will consult speech therapy to see if a swallow study can be done  Continue current medications:    Abilify 5mg PO daily  Aricept 10mg PO daily at HS  Namenda 10mg PO q 12 hrs   Zoloft 100mg PO daily   Trazodone 75mg PO at HS       -Dysphagia has been associated with antipsychotic use  If patient's swallow study demonstrates dysfunction with swallowing, results should be considered when make decision regarding continuing the use of Abilify         Current Facility-Administered Medications   Medication Dose Route Frequency Provider Last Rate   • acetaminophen  650 mg Oral Q4H PRN SHADY Whittington     • acetaminophen  650 mg Oral Q4H PRN SHADY Whittington     • acetaminophen  975 mg Oral Q6H PRN SHADY Whittington     • ARIPiprazole  5 mg Oral Daily Day Allison MD     • atorvastatin  10 mg Oral Daily With Dinner Tariq Ferguson PA-C     • cholecalciferol  2,000 Units Oral Daily Tariq Ferguson PA-C     • docusate sodium  100 mg Oral BID SHADY Gerard     • donepezil  10 mg Oral HS SHADY Whittington     • ferrous sulfate  325 mg Oral Daily With Breakfast Tariq Ferguson PA-C     • hydrOXYzine HCL  25 mg Oral Q6H PRN Max 4/day SHADY Whittington     • hydrOXYzine HCL  50 mg Oral Q6H PRN Max 4/day SHADY Osuna     • insulin detemir  10 Units Subcutaneous Daily With Breakfast Carla Kmi PA-C     • insulin detemir  10 Units Subcutaneous HS Carla Kim PA-C     • insulin lispro  1-5 Units Subcutaneous HS Carla Kim PA-C     • insulin lispro  1-6 Units Subcutaneous TID AC Carla Kim PA-C     • LORazepam  1 mg Intramuscular Q6H PRN Max 3/day SHADY Osuna     • LORazepam  1 mg Oral Q6H PRN Max 3/day SHADY Osuna     • magnesium Oxide  400 mg Oral Daily Carla Kim PA-C     • memantine  10 mg Oral Q12H [address] SHADY Osuna     • metoprolol tartrate  100 mg Oral HS Carla Kim PA-C     • metoprolol tartrate  50 mg Oral Daily Carla Kim PA-C     • polyethylene glycol  17 g Oral Daily PRN SHADY Gerard     • propafenone  225 mg Oral Q8H [address] Carla Kim PA-C     • senna  1 tablet Oral HS SHADY Gerard     • sertraline  100 mg Oral Daily SHADY Osuna     • traZODone  75 mg Oral HS PRN SHADY Montalvo       Risks / Benefits of Treatment:    Risks, benefits, and possible side effects of medications explained to patient and patient verbalizes understanding and agreement for treatment  Counseling / Coordination of Care:    Patient's progress discussed with staff in treatment team meeting  Medications, treatment progress and treatment plan reviewed with patient      Ilia Crisostomo PA-C 05/29/23

## 2023-05-29 NOTE — NURSING NOTE
"Patient napping at a table on assessment  Pleasant on assessment, denying all psych s/s  He states \"I'd like to get out of here as soon as I can  \" Safely ambulating with walker  Medication compliant  Encouraged to alert staff to any needs or concerns     "

## 2023-05-29 NOTE — PLAN OF CARE
Problem: Alteration in Thoughts and Perception  Goal: Treatment Goal: Gain control of psychotic behaviors/thinking, reduce/eliminate presenting symptoms and demonstrate improved reality functioning upon discharge  Outcome: Progressing  Goal: Verbalize thoughts and feelings  Description: Interventions:  - Promote a nonjudgmental and trusting relationship with the patient through active listening and therapeutic communication  - Assess patient's level of functioning, behavior and potential for risk  - Engage patient in 1 on 1 interactions  - Encourage patient to express fears, feelings, frustrations, and discuss symptoms    - [place] patient to reality, help patient recognize reality-based thinking   - Administer medications as ordered and assess for potential side effects  - Provide the patient education related to the signs and symptoms of the illness and desired effects of prescribed medications  Outcome: Progressing  Goal: Refrain from acting on delusional thinking/internal stimuli  Description: Interventions:  - Monitor patient closely, per order   - Utilize least restrictive measures   - Set reasonable limits, give positive feedback for acceptable   - Administer medications as ordered and monitor of potential side effects  Outcome: Progressing  Goal: Agree to be compliant with medication regime, as prescribed and report medication side effects  Description: Interventions:  - Offer appropriate PRN medication and supervise ingestion; conduct AIMS, as needed   Outcome: Progressing  Goal: Recognize dysfunctional thoughts, communicate reality-based thoughts at the time of discharge  Description: Interventions:  - Provide medication and psycho-education to assist patient in compliance and developing insight into his/her illness   Outcome: Progressing  Goal: Complete daily ADLs, including personal hygiene independently, as able  Description: Interventions:  - Observe, teach, and assist patient with ADLS  - Monitor and promote a balance of rest/activity, with adequate nutrition and elimination   Outcome: Progressing     Problem: Ineffective Coping  Goal: Identifies ineffective coping skills  Outcome: Progressing  Goal: Identifies healthy coping skills  Outcome: Progressing  Goal: Demonstrates healthy coping skills  Outcome: Progressing  Goal: Understands least restrictive measures  Description: Interventions:  - Utilize least restrictive behavior  Outcome: Progressing  Goal: Free from restraint events  Description: - Utilize least restrictive measures   - Provide behavioral interventions   - Redirect inappropriate behaviors   Outcome: Progressing     Problem: Risk for Self Injury/Neglect  Goal: Treatment Goal: Remain safe during length of stay, learn and adopt new coping skills, and be free of self-injurious ideation, impulses and acts at the time of discharge  Outcome: Progressing  Goal: Verbalize thoughts and feelings  Description: Interventions:  - Assess and re-assess patient's lethality and potential for self-injury  - Engage patient in 1:1 interactions, daily, for a minimum of 15 minutes  - Encourage patient to express feelings, fears, frustrations, hopes  - Establish rapport/trust with patient   Outcome: Progressing  Goal: Refrain from harming self  Description: Interventions:  - Monitor patient closely, per order  - Develop a trusting relationship  - Supervise medication ingestion, monitor effects and side effects   Outcome: Progressing  Goal: Recognize maladaptive responses and adopt new coping mechanisms  Outcome: Progressing  Goal: Complete daily ADLs, including personal hygiene independently, as able  Description: Interventions:  - Observe, teach, and assist patient with ADLS  - Monitor and promote a balance of rest/activity, with adequate nutrition and elimination  Outcome: Progressing     Problem: Depression  Goal: Treatment Goal: Demonstrate behavioral control of depressive symptoms, verbalize feelings of improved mood/affect, and adopt new coping skills prior to discharge  Outcome: Progressing  Goal: Verbalize thoughts and feelings  Description: Interventions:  - Assess and re-assess patient's level of risk   - Engage patient in 1:1 interactions, daily, for a minimum of 15 minutes   - Encourage patient to express feelings, fears, frustrations, hopes   Outcome: Progressing  Goal: Refrain from harming self  Description: Interventions:  - Monitor patient closely, per order   - Supervise medication ingestion, monitor effects and side effects   Outcome: Progressing  Goal: Refrain from isolation  Description: Interventions:  - Develop a trusting relationship   - Encourage socialization   Outcome: Progressing  Goal: Refrain from self-neglect  Outcome: Progressing  Goal: Complete daily ADLs, including personal hygiene independently, as able  Description: Interventions:  - Observe, teach, and assist patient with ADLS  -  Monitor and promote a balance of rest/activity, with adequate nutrition and elimination   Outcome: Progressing     Problem: Anxiety  Goal: Anxiety is at manageable level  Description: Interventions:  - Assess and monitor patient's anxiety level  - Monitor for signs and symptoms (heart palpitations, chest pain, shortness of breath, headaches, nausea, feeling jumpy, restlessness, irritable, apprehensive)  - Collaborate with interdisciplinary team and initiate plan and interventions as ordered    - [place] patient to unit/surroundings  - Explain treatment plan  - Encourage participation in care  - Encourage verbalization of concerns/fears  - Identify coping mechanisms  - Assist in developing anxiety-reducing skills  - Administer/offer alternative therapies  - Limit or eliminate stimulants  Outcome: Progressing     Problem: Risk for Violence/Aggression Toward Others  Goal: Treatment Goal: Refrain from acts of violence/aggression during length of stay, and demonstrate improved impulse control at the time of discharge  Outcome: Progressing  Goal: Verbalize thoughts and feelings  Description: Interventions:  - Assess and re-assess patient's level of risk, every waking shift  - Engage patient in 1:1 interactions, daily, for a minimum of 15 minutes   - Allow patient to express feelings and frustrations in a safe and non-threatening manner   - Establish rapport/trust with patient   Outcome: Progressing  Goal: Refrain from harming others  Outcome: Progressing  Goal: Refrain from destructive acts on the environment or property  Outcome: Progressing  Goal: Control angry outbursts  Description: Interventions:  - Monitor patient closely, per order  - Ensure early verbal de-escalation  - Monitor prn medication needs  - Set reasonable/therapeutic limits, outline behavioral expectations, and consequences   - Provide a non-threatening milieu, utilizing the least restrictive interventions   Outcome: Progressing  Goal: Identify appropriate positive anger management techniques  Description: Interventions:  - Offer anger management and coping skills groups   - Staff will provide positive feedback for appropriate anger control  Outcome: Progressing     Problem: Alteration in Orientation  Goal: Treatment Goal: Demonstrate a reduction of confusion and improved orientation to person, place, time and/or situation upon discharge, according to optimum baseline/ability  Outcome: Progressing  Goal: Interact with staff daily  Description: Interventions:  - Assess and re-assess patient's level of orientation  - Engage patient in 1 on 1 interactions, daily, for a minimum of 15 minutes   - Establish rapport/trust with patient   Outcome: Progressing  Goal: Express concerns related to confused thinking related to:  Description: Interventions:  - Encourage patient to express feelings, fears, frustrations, hopes  - Assign consistent caregivers   - [place]/re-orient patient as needed  - Allow comfort items, as appropriate  - Provide visual cues, signs, etc    Outcome: Progressing  Goal: Allow medical examinations, as recommended  Description: Interventions:  - Provide physical/neurological exams and/or referrals, per provider   Outcome: Progressing  Goal: Cooperate with recommended testing/procedures  Description: Interventions:  - Determine need for ancillary testing  - Observe for mental status changes  - Implement falls/precaution protocol   Outcome: Progressing  Goal: Complete daily ADLs, including personal hygiene independently, as able  Description: Interventions:  - Observe, teach, and assist patient with ADLS  Outcome: Progressing     Problem: Prexisting or High Potential for Compromised Skin Integrity  Goal: Skin integrity is maintained or improved  Description: INTERVENTIONS:  - Identify patients at risk for skin breakdown  - Assess and monitor skin integrity  - Assess and monitor nutrition and hydration status  - Monitor labs   - Assess for incontinence   - Turn and reposition patient  - Assist with mobility/ambulation  - Relieve pressure over bony prominences  - Avoid friction and shearing  - Provide appropriate hygiene as needed including keeping skin clean and dry  - Evaluate need for skin moisturizer/barrier cream  - Collaborate with interdisciplinary team   - Patient/family teaching  - Consider wound care consult   Outcome: Progressing

## 2023-05-30 LAB
DME PARACHUTE DELIVERY DATE EXPECTED: NORMAL
DME PARACHUTE DELIVERY DATE REQUESTED: NORMAL
DME PARACHUTE DELIVERY NOTE: NORMAL
DME PARACHUTE ITEM DESCRIPTION: NORMAL
DME PARACHUTE ORDER STATUS: NORMAL
DME PARACHUTE SUPPLIER NAME: NORMAL
DME PARACHUTE SUPPLIER PHONE: NORMAL
GLUCOSE SERPL-MCNC: 134 MG/DL (ref 65–140)
GLUCOSE SERPL-MCNC: 141 MG/DL (ref 65–140)
GLUCOSE SERPL-MCNC: 170 MG/DL (ref 65–140)
GLUCOSE SERPL-MCNC: 81 MG/DL (ref 65–140)

## 2023-05-30 PROCEDURE — 92610 EVALUATE SWALLOWING FUNCTION: CPT

## 2023-05-30 PROCEDURE — 82948 REAGENT STRIP/BLOOD GLUCOSE: CPT

## 2023-05-30 PROCEDURE — 99232 SBSQ HOSP IP/OBS MODERATE 35: CPT | Performed by: STUDENT IN AN ORGANIZED HEALTH CARE EDUCATION/TRAINING PROGRAM

## 2023-05-30 RX ADMIN — PROPAFENONE HYDROCHLORIDE 225 MG: 150 TABLET, FILM COATED ORAL at 14:53

## 2023-05-30 RX ADMIN — DONEPEZIL HYDROCHLORIDE 10 MG: 10 TABLET, FILM COATED ORAL at 21:38

## 2023-05-30 RX ADMIN — INSULIN LISPRO 1 UNITS: 100 INJECTION, SOLUTION INTRAVENOUS; SUBCUTANEOUS at 17:31

## 2023-05-30 RX ADMIN — CHOLECALCIFEROL TAB 25 MCG (1000 UNIT) 2000 UNITS: 25 TAB at 08:07

## 2023-05-30 RX ADMIN — ARIPIPRAZOLE 5 MG: 5 TABLET ORAL at 08:08

## 2023-05-30 RX ADMIN — MEMANTINE 10 MG: 10 TABLET ORAL at 21:38

## 2023-05-30 RX ADMIN — MAGNESIUM OXIDE TAB 400 MG (241.3 MG ELEMENTAL MG) 400 MG: 400 (241.3 MG) TAB at 08:08

## 2023-05-30 RX ADMIN — DOCUSATE SODIUM 100 MG: 100 CAPSULE, LIQUID FILLED ORAL at 17:30

## 2023-05-30 RX ADMIN — METOPROLOL TARTRATE 50 MG: 50 TABLET, FILM COATED ORAL at 08:08

## 2023-05-30 RX ADMIN — INSULIN DETEMIR 10 UNITS: 100 INJECTION, SOLUTION SUBCUTANEOUS at 08:08

## 2023-05-30 RX ADMIN — ATORVASTATIN CALCIUM 10 MG: 10 TABLET, FILM COATED ORAL at 17:30

## 2023-05-30 RX ADMIN — PROPAFENONE HYDROCHLORIDE 225 MG: 150 TABLET, FILM COATED ORAL at 21:37

## 2023-05-30 RX ADMIN — SENNOSIDES 8.6 MG: 8.6 TABLET, FILM COATED ORAL at 21:38

## 2023-05-30 RX ADMIN — DOCUSATE SODIUM 100 MG: 100 CAPSULE, LIQUID FILLED ORAL at 08:08

## 2023-05-30 RX ADMIN — MEMANTINE 10 MG: 10 TABLET ORAL at 08:07

## 2023-05-30 RX ADMIN — SERTRALINE HYDROCHLORIDE 100 MG: 100 TABLET ORAL at 08:07

## 2023-05-30 RX ADMIN — PROPAFENONE HYDROCHLORIDE 225 MG: 150 TABLET, FILM COATED ORAL at 06:16

## 2023-05-30 RX ADMIN — FERROUS SULFATE TAB 325 MG (65 MG ELEMENTAL FE) 325 MG: 325 (65 FE) TAB at 08:08

## 2023-05-30 RX ADMIN — INSULIN DETEMIR 10 UNITS: 100 INJECTION, SOLUTION SUBCUTANEOUS at 21:38

## 2023-05-30 RX ADMIN — METOPROLOL TARTRATE 100 MG: 50 TABLET, FILM COATED ORAL at 21:38

## 2023-05-30 NOTE — TREATMENT TEAM
05/30/23 0837   Team Meeting   Meeting Type Daily Rounds   Initial Conference Date 05/30/23   Team Members Present   Team Members Present Physician;Nurse;Occupational Therapist;   Physician Team Member Dr Wang Saldivar Team Member Tu Cox [id number] Management Team Member Annie   OT Team Member Figueroa CARNEY   Patient/Family Present   Patient Present No   Patient's Family Present No     Calm, pleasantly confused, no behaviors, sleeping through the night

## 2023-05-30 NOTE — NURSING NOTE
No behavioral issues noted, patient is calm, visible, and social  Patient is medication compliant and cooperative with assessment questions  Patient denies all psych s/s  Will continue to monitor and maintain safety

## 2023-05-30 NOTE — NURSING NOTE
Pt visible in day room, pleasant on approach  Denies all s/s  Denies unmet needs  Medication compliant  Utilizes walker and maintained on bed alarm HS for safety  Will maintain q7min checks

## 2023-05-30 NOTE — NURSING NOTE
Pt visible on unit, seated in giselle chair and pleasant on approach  Pt is able to identify that he is at [facility] in [place]  Pt is unable to identify time and believes that it July 2004  Pt is disoriented to situation  He is cooperative with medications and care  Bed and chair alarms maintained  Safety checks ongoing

## 2023-05-30 NOTE — PLAN OF CARE
Problem: Alteration in Thoughts and Perception  Goal: Treatment Goal: Gain control of psychotic behaviors/thinking, reduce/eliminate presenting symptoms and demonstrate improved reality functioning upon discharge  Outcome: Progressing  Goal: Verbalize thoughts and feelings  Description: Interventions:  - Promote a nonjudgmental and trusting relationship with the patient through active listening and therapeutic communication  - Assess patient's level of functioning, behavior and potential for risk  - Engage patient in 1 on 1 interactions  - Encourage patient to express fears, feelings, frustrations, and discuss symptoms    - [place] patient to reality, help patient recognize reality-based thinking   - Administer medications as ordered and assess for potential side effects  - Provide the patient education related to the signs and symptoms of the illness and desired effects of prescribed medications  Outcome: Progressing  Goal: Refrain from acting on delusional thinking/internal stimuli  Description: Interventions:  - Monitor patient closely, per order   - Utilize least restrictive measures   - Set reasonable limits, give positive feedback for acceptable   - Administer medications as ordered and monitor of potential side effects  Outcome: Progressing  Goal: Agree to be compliant with medication regime, as prescribed and report medication side effects  Description: Interventions:  - Offer appropriate PRN medication and supervise ingestion; conduct AIMS, as needed   Outcome: Progressing  Goal: Recognize dysfunctional thoughts, communicate reality-based thoughts at the time of discharge  Description: Interventions:  - Provide medication and psycho-education to assist patient in compliance and developing insight into his/her illness   Outcome: Progressing  Goal: Complete daily ADLs, including personal hygiene independently, as able  Description: Interventions:  - Observe, teach, and assist patient with ADLS  - Monitor and promote a balance of rest/activity, with adequate nutrition and elimination   Outcome: Progressing     Problem: Ineffective Coping  Goal: Identifies ineffective coping skills  Outcome: Progressing  Goal: Identifies healthy coping skills  Outcome: Progressing  Goal: Demonstrates healthy coping skills  Outcome: Progressing  Goal: Patient/Family participate in treatment and DC plans  Description: Interventions:  - Provide therapeutic environment  Outcome: Progressing  Goal: Patient/Family verbalizes awareness of resources  Outcome: Progressing  Goal: Understands least restrictive measures  Description: Interventions:  - Utilize least restrictive behavior  Outcome: Progressing  Goal: Free from restraint events  Description: - Utilize least restrictive measures   - Provide behavioral interventions   - Redirect inappropriate behaviors   Outcome: Progressing     Problem: Risk for Self Injury/Neglect  Goal: Treatment Goal: Remain safe during length of stay, learn and adopt new coping skills, and be free of self-injurious ideation, impulses and acts at the time of discharge  Outcome: Progressing  Goal: Verbalize thoughts and feelings  Description: Interventions:  - Assess and re-assess patient's lethality and potential for self-injury  - Engage patient in 1:1 interactions, daily, for a minimum of 15 minutes  - Encourage patient to express feelings, fears, frustrations, hopes  - Establish rapport/trust with patient   Outcome: Progressing  Goal: Refrain from harming self  Description: Interventions:  - Monitor patient closely, per order  - Develop a trusting relationship  - Supervise medication ingestion, monitor effects and side effects   Outcome: Progressing  Goal: Recognize maladaptive responses and adopt new coping mechanisms  Outcome: Progressing  Goal: Complete daily ADLs, including personal hygiene independently, as able  Description: Interventions:  - Observe, teach, and assist patient with ADLS  - Monitor and promote a balance of rest/activity, with adequate nutrition and elimination  Outcome: Progressing     Problem: Depression  Goal: Treatment Goal: Demonstrate behavioral control of depressive symptoms, verbalize feelings of improved mood/affect, and adopt new coping skills prior to discharge  Outcome: Progressing  Goal: Verbalize thoughts and feelings  Description: Interventions:  - Assess and re-assess patient's level of risk   - Engage patient in 1:1 interactions, daily, for a minimum of 15 minutes   - Encourage patient to express feelings, fears, frustrations, hopes   Outcome: Progressing  Goal: Refrain from harming self  Description: Interventions:  - Monitor patient closely, per order   - Supervise medication ingestion, monitor effects and side effects   Outcome: Progressing  Goal: Refrain from isolation  Description: Interventions:  - Develop a trusting relationship   - Encourage socialization   Outcome: Progressing  Goal: Refrain from self-neglect  Outcome: Progressing  Goal: Complete daily ADLs, including personal hygiene independently, as able  Description: Interventions:  - Observe, teach, and assist patient with ADLS  -  Monitor and promote a balance of rest/activity, with adequate nutrition and elimination   Outcome: Progressing     Problem: Anxiety  Goal: Anxiety is at manageable level  Description: Interventions:  - Assess and monitor patient's anxiety level  - Monitor for signs and symptoms (heart palpitations, chest pain, shortness of breath, headaches, nausea, feeling jumpy, restlessness, irritable, apprehensive)  - Collaborate with interdisciplinary team and initiate plan and interventions as ordered    - [place] patient to unit/surroundings  - Explain treatment plan  - Encourage participation in care  - Encourage verbalization of concerns/fears  - Identify coping mechanisms  - Assist in developing anxiety-reducing skills  - Administer/offer alternative therapies  - Limit or eliminate stimulants  Outcome: Progressing     Problem: Risk for Violence/Aggression Toward Others  Goal: Treatment Goal: Refrain from acts of violence/aggression during length of stay, and demonstrate improved impulse control at the time of discharge  Outcome: Progressing  Goal: Verbalize thoughts and feelings  Description: Interventions:  - Assess and re-assess patient's level of risk, every waking shift  - Engage patient in 1:1 interactions, daily, for a minimum of 15 minutes   - Allow patient to express feelings and frustrations in a safe and non-threatening manner   - Establish rapport/trust with patient   Outcome: Progressing  Goal: Refrain from harming others  Outcome: Progressing  Goal: Refrain from destructive acts on the environment or property  Outcome: Progressing  Goal: Control angry outbursts  Description: Interventions:  - Monitor patient closely, per order  - Ensure early verbal de-escalation  - Monitor prn medication needs  - Set reasonable/therapeutic limits, outline behavioral expectations, and consequences   - Provide a non-threatening milieu, utilizing the least restrictive interventions   Outcome: Progressing  Goal: Identify appropriate positive anger management techniques  Description: Interventions:  - Offer anger management and coping skills groups   - Staff will provide positive feedback for appropriate anger control  Outcome: Progressing     Problem: Alteration in Orientation  Goal: Treatment Goal: Demonstrate a reduction of confusion and improved orientation to person, place, time and/or situation upon discharge, according to optimum baseline/ability  Outcome: Progressing  Goal: Interact with staff daily  Description: Interventions:  - Assess and re-assess patient's level of orientation  - Engage patient in 1 on 1 interactions, daily, for a minimum of 15 minutes   - Establish rapport/trust with patient   Outcome: Progressing  Goal: Express concerns related to confused thinking related to:  Description: Interventions:  - Encourage patient to express feelings, fears, frustrations, hopes  - Assign consistent caregivers   - [place]/re-orient patient as needed  - Allow comfort items, as appropriate  - Provide visual cues, signs, etc    Outcome: Progressing  Goal: Allow medical examinations, as recommended  Description: Interventions:  - Provide physical/neurological exams and/or referrals, per provider   Outcome: Progressing  Goal: Cooperate with recommended testing/procedures  Description: Interventions:  - Determine need for ancillary testing  - Observe for mental status changes  - Implement falls/precaution protocol   Outcome: Progressing  Goal: Complete daily ADLs, including personal hygiene independently, as able  Description: Interventions:  - Observe, teach, and assist patient with ADLS  Outcome: Progressing     Problem: Prexisting or High Potential for Compromised Skin Integrity  Goal: Skin integrity is maintained or improved  Description: INTERVENTIONS:  - Identify patients at risk for skin breakdown  - Assess and monitor skin integrity  - Assess and monitor nutrition and hydration status  - Monitor labs   - Assess for incontinence   - Turn and reposition patient  - Assist with mobility/ambulation  - Relieve pressure over bony prominences  - Avoid friction and shearing  - Provide appropriate hygiene as needed including keeping skin clean and dry  - Evaluate need for skin moisturizer/barrier cream  - Collaborate with interdisciplinary team   - Patient/family teaching  - Consider wound care consult   Outcome: Progressing

## 2023-05-30 NOTE — PROGRESS NOTES
05/30/23 1330   Activity/Group Checklist   Group Other (Comment)  (Art Therapy)   Attendance Attended   Attendance Duration (min) 31-45   Interactions Interacted appropriately   Affect/Mood Appropriate;Bright   Goals Achieved Identified feelings; Able to listen to others; Able to engage in interactions; Able to self-disclose

## 2023-05-30 NOTE — PROGRESS NOTES
"  Progress Note - [address] 80 y o  male MRN: [id number]  Unit/Bed#: Mikael Hurtado 571-07 Encounter: [id number]    The patient was seen for continuing care and reviewed with treatment team  Patient is alert, awake and oriented x 1  Seen ambulating independently with walker today  No reports of aggression or agitation overnight  He has not required PRN medication for agitation  He has been visible in groups   Able to interact with peers and staff  Sleep- good  Appetite- good  Energy:fair   No suicidal or homicidal ideations  No EPS, denies any side effects of medication     ROS : negative     /77 (BP Location: Right arm)   Pulse 69   Temp 98 2 °F (36 8 °C) (Temporal)   Resp 17   Ht 5' 11\" (1 803 m)   Wt 74 7 kg (164 lb 10 9 oz)   SpO2 96%   BMI 22 97 kg/m²     Current Mental Status Evaluation:  Appearance:  Adequate hygiene and grooming   Behavior:  calm, cooperative and friendly   Mood:  improving   Affect: reactive   Speech: Normal volume and Normal rate   Thought Process:  Poverty of thoughts due to severe cognitive impairment   Thought Content:  Does not verbalize delusional material   Perceptual Disturbances: Denies hallucinations and does not appear to be responding to internal stimuli   Risk Potential: No suicidal or homicidal ideation   Orientation:   Oriented to self, not place or time; fund of information is poorp recall poor   Patient has limited insight, judgment and Impulse control due to severe cognitive Impairment    Recent Results (from the past 72 hour(s))   Fingerstick Glucose (POCT)    Collection Time: 05/27/23  4:27 PM   Result Value Ref Range    POC Glucose 182 (H) 65 - 140 mg/dl   Fingerstick Glucose (POCT)    Collection Time: 05/27/23  8:59 PM   Result Value Ref Range    POC Glucose 206 (H) 65 - 140 mg/dl   Fingerstick Glucose (POCT)    Collection Time: 05/28/23  7:23 AM   Result Value Ref Range    POC Glucose 85 65 - 140 mg/dl   Fingerstick Glucose (POCT)    " Collection Time: 05/28/23 11:54 AM   Result Value Ref Range    POC Glucose 191 (H) 65 - 140 mg/dl   Fingerstick Glucose (POCT)    Collection Time: 05/28/23  9:01 PM   Result Value Ref Range    POC Glucose 152 (H) 65 - 140 mg/dl   Fingerstick Glucose (POCT)    Collection Time: 05/29/23  7:30 AM   Result Value Ref Range    POC Glucose 89 65 - 140 mg/dl   Comprehensive metabolic panel    Collection Time: 05/29/23  9:51 AM   Result Value Ref Range    Sodium 139 135 - 147 mmol/L    Potassium 4 2 3 5 - 5 3 mmol/L    Chloride 102 96 - 108 mmol/L    CO2 29 21 - 32 mmol/L    ANION GAP 8 4 - 13 mmol/L    BUN 34 (H) 5 - 25 mg/dL    Creatinine 2 29 (H) 0 60 - 1 30 mg/dL    Glucose 158 (H) 65 - 140 mg/dL    Calcium 9 7 8 4 - 10 2 mg/dL    AST 15 13 - 39 U/L    ALT 12 7 - 52 U/L    Alkaline Phosphatase 61 34 - 104 U/L    Total Protein 7 5 6 4 - 8 4 g/dL    Albumin 4 4 3 5 - 5 0 g/dL    Total Bilirubin 0 65 0 20 - 1 00 mg/dL    eGFR 24 ml/min/1 73sq m   Fingerstick Glucose (POCT)    Collection Time: 05/29/23 11:31 AM   Result Value Ref Range    POC Glucose 162 (H) 65 - 140 mg/dl   Fingerstick Glucose (POCT)    Collection Time: 05/29/23  4:21 PM   Result Value Ref Range    POC Glucose 151 (H) 65 - 140 mg/dl   Fingerstick Glucose (POCT)    Collection Time: 05/29/23  9:00 PM   Result Value Ref Range    POC Glucose 162 (H) 65 - 140 mg/dl   Fingerstick Glucose (POCT)    Collection Time: 05/30/23  7:08 AM   Result Value Ref Range    POC Glucose 81 65 - 140 mg/dl   Fingerstick Glucose (POCT)    Collection Time: 05/30/23 11:51 AM   Result Value Ref Range    POC Glucose 134 65 - 140 mg/dl       Progress Toward Goals: No agitation, stable    Assessment     Principal Problem:    Unspecified mood (affective) disorder ([facility])  Active Problems:    Essential hypertension    Mixed hyperlipidemia    Type 2 diabetes mellitus with kidney complication, with long-term current use of insulin (HCC)    Dementia with behavioral disturbance [facility]    Medical clearance for psychiatric admission    Chronic kidney disease (CKD), stage IV (severe) ([facility])        Plan :    - Medications;   Psychiatric: continue Abilify 5mg daily   Sertraline 100mg daily   Namenda and donepezil  Delirium Precautions:  Daytime:  1  Discourage daytime napping  2  Maintain consistency and continuity of staff  3  Keep patient in well-lit room near windows  4  Maintain wakefulness with activities (eg, coloring, puzzles)  5  Aromatherapy patches  6   Visible clock and calendar  Nighttime:  - Relaxation tapes or music before sleep  - Avoid disturbances  - Dim light in the room  - Noise reduction        Medical:per SLIM, Monitor CMP    -Therapy: occupational therapy, milieu and group therapy   -Disposition:ongoing planning with guardian and step daughter

## 2023-05-30 NOTE — SPEECH THERAPY NOTE
"  Speech Pathology Bedside Swallow Evaluation:                    SLP RECOMMENDATIONS:         Diet: Regular consistency         Liquids: Thin liquids         Medications: as best tolerated         Strategies: upright, slow rate, small bites/sips        Summary:  Pt seen for bedside swallow evaluation  Per notes, pt reported intermittent difficulty swallowing to staff  Pt is currently on a Surgical soft/thin liquid diet  Pt noted to have had videofluoroscopic swallow study done on 8/6/2020  At that time pharyngeal swallow was within normal limits and a Regular/thin liquid diet was recommended  Esophageal phase of swallow was noted have delayed clearing and corkscrew appearance  Pt participated in EGD on 8/7/20 with the following: \"100 units total of Botox injected the GE junction; administered 4 quadrants  Mild gastric erosion in the antrum and pre-pyloric region status post biopsies to rule out H pylori  Normal 1st and 2nd portion of duodenum  \"  Pt seen sitting upright in cafeteria with breakfast tray of Regular consistency solids/thin liquids  Pt pleasant and cooperative to therapy, although disoriented  Pt denies difficulty chewing/swallowing  Pt does not recall prior VFSS  Pt observed with regular solids with slow, but functional mastication/manipulation of bolus  Pt with slightly reduced bite strength  Pt trialed with thin liquids via single and consecutive cup sips with no overt s/s aspiration  Pt is presenting with minimal s/s suggestive of dysphagia at this time  SLP to follow to ensure diet tolerance  Recommend repeat VFSS if there is concern for aspiration  Continue current diet with general swallow precautions (upright for eating/drinking and slow rate)           Therapy Prognosis: Good   Prognosis considerations: poor carryover   Frequency: 2-4x/week       Vitals:    05/29/23 2043 05/30/23 0616 05/30/23 0757 05/30/23 0953   BP: 153/73  142/77    BP Location: Left arm  Right arm    Pulse: 67 61 69  " Resp: 17  17    Temp: 97 8 °F (36 6 °C)  98 2 °F (36 8 °C)    TempSrc: Temporal  Temporal    SpO2: 97%  96%    Weight:    74 7 kg (164 lb 10 9 oz)   Height:         Lab Results   Component Value Date    HCT 38 3 05/25/2023    HGB 12 3 05/25/2023    MCV 87 05/25/2023     05/25/2023    WBC 6 72 05/25/2023         Goal(s):  Pt will tolerate least restrictive diet w/out s/s aspiration or oral/pharyngeal difficulties  H&P/Admit info/ pertinent provider notes: (PMH noted above)  Yaneli Reed is a 80 y o  male who presents with outburst of assisted living  Patient has significant dementia  Already on high-dose Namenda  He and his wife moved to an assisted living yesterday  Apparently this morning he had a significant outburst and they were worried about safety so they sent him to the emergency room  He has been calm here in the emergency room from what I have witnessed  The assisted living is refusing to take him back  They want him seen by crisis  Crisis feels that he cannot be placed in inpatient geriatric psych  So right now he has nowhere to go  We are bringing him into the hospital   I will have psychiatry see him virtually to see if there is any medication adjustments to be on  For me he is pleasant  Not combative  He is forgetful  He did not even know that he moved to an assisted living yesterday  But he is quite confused with dementia but this is likely his baseline  Gonzalez Venkat Special Studies:  EGD 8/7/20: IMPRESSION:  100 units total of Botox injected the GE junction; administered 4 quadrants  Mild gastric erosion in the antrum and pre-pyloric region status post biopsies to rule out H pylori  Normal 1st and 2nd portion of duodenum  Barium swallow of esophagus:   IMPRESSION:  Disorganized esophageal contractions and tertiary contractions with to and fro dysmotility, suggestive of presbyesophagus  Other motility disorders not excluded    No discrete esophageal mass, reflux or esophageal dilatation  No hernia  Previous VBS:  8/6/20:  Oral Stage: mild   pt w/ adequate bolus retrieval  Slow, decreased oral processing of soft and hard solids  Pt w/ good oral control, slow transfer of foods > liquids  Mild oral residue noted  ** pt continuously talking throughout study with food in his mouth  Pharyngeal Stage: WFL   swallow initiation was timely, pharyngeal constriction was mildly weak at times, suspect related to talking and effort of swallow  Mild pharyngeal residue noted  No penetration or aspiration observed  Barium pill w/ water passed through pharynx w/o difficulty      Esophageal Stage:   briefly assessed; pill stasis noted at LES, also corkscrew appearance of distal esophagus, ? Presbyesophagus  Pt is scheduled for EGD  Assessment Summary:   Mild oropharnygeal dysphagia- pt tend to talk while eating, suspect decreased pharyngeal constriction at times w/ mild pharyngeal residue noted  No penetration or aspiration observed  Patient's goal: none stated     Did the pt report pain? No   If yes, was nursing notified/was it addressed? Reason for consult:  R/o aspiration  Determine safest and least restrictive diet      Precautions:  Aspiration    Food allergies:   Allergies   Allergen Reactions   • Ambien [Zolpidem Tartrate] Hallucinations   • Bextra [Valdecoxib] Hives   • Dust Mite Extract Sneezing   • Lyrica [Pregabalin] Confusion   • Mold Extract [Trichophyton] Sneezing   • Pollen Extract Sneezing   • Tree Extract Other (See Comments) and Sneezing     congestion        Current diet:  Surgical soft/thin    Premorbid diet:  Regular/thin    O2 requirements:  RA   Voice/Speech:  WNL   Follows commands:  Intact    Cognitive status:  Alert, variable orientation        Results d/w:  Pt, nursing,

## 2023-05-30 NOTE — PLAN OF CARE
Pt  Intermittently engaged in 2 of 2 groups today    Problem: Ineffective Coping  Goal: Participates in unit activities  Description: Interventions:  - Provide therapeutic environment   - Provide required programming   - Redirect inappropriate behaviors   Outcome: Progressing

## 2023-05-30 NOTE — PROGRESS NOTES
Pt  Interactive when prompted only  05/30/23 1000   Activity/Group Checklist   Group Community meeting  (topic motivation/self reflection )   Attendance Attended   Attendance Duration (min) 31-45   Interactions Other (Comment)  (Pt displayed poor eye contact yet responded to direct questions about his creativity  Pt  described his creativity as someone who has flown planes )   Affect/Mood Calm  (Pt initially lethargic,)   Goals Achieved Able to engage in interactions

## 2023-05-31 ENCOUNTER — PATIENT OUTREACH (OUTPATIENT)
Dept: INTERNAL MEDICINE CLINIC | Facility: CLINIC | Age: 88
End: 2023-05-31

## 2023-05-31 LAB
ANION GAP SERPL CALCULATED.3IONS-SCNC: 8 MMOL/L (ref 4–13)
BUN SERPL-MCNC: 28 MG/DL (ref 5–25)
CALCIUM SERPL-MCNC: 9.3 MG/DL (ref 8.4–10.2)
CHLORIDE SERPL-SCNC: 102 MMOL/L (ref 96–108)
CO2 SERPL-SCNC: 28 MMOL/L (ref 21–32)
CREAT SERPL-MCNC: 1.84 MG/DL (ref 0.6–1.3)
GFR SERPL CREATININE-BSD FRML MDRD: 32 ML/MIN/1.73SQ M
GLUCOSE P FAST SERPL-MCNC: 104 MG/DL (ref 65–99)
GLUCOSE SERPL-MCNC: 104 MG/DL (ref 65–140)
GLUCOSE SERPL-MCNC: 144 MG/DL (ref 65–140)
GLUCOSE SERPL-MCNC: 156 MG/DL (ref 65–140)
GLUCOSE SERPL-MCNC: 169 MG/DL (ref 65–140)
GLUCOSE SERPL-MCNC: 94 MG/DL (ref 65–140)
POTASSIUM SERPL-SCNC: 4.2 MMOL/L (ref 3.5–5.3)
SODIUM SERPL-SCNC: 138 MMOL/L (ref 135–147)

## 2023-05-31 PROCEDURE — 80048 BASIC METABOLIC PNL TOTAL CA: CPT | Performed by: NURSE PRACTITIONER

## 2023-05-31 PROCEDURE — 99232 SBSQ HOSP IP/OBS MODERATE 35: CPT | Performed by: STUDENT IN AN ORGANIZED HEALTH CARE EDUCATION/TRAINING PROGRAM

## 2023-05-31 PROCEDURE — 82948 REAGENT STRIP/BLOOD GLUCOSE: CPT

## 2023-05-31 PROCEDURE — 92526 ORAL FUNCTION THERAPY: CPT

## 2023-05-31 RX ADMIN — ARIPIPRAZOLE 5 MG: 5 TABLET ORAL at 09:06

## 2023-05-31 RX ADMIN — PROPAFENONE HYDROCHLORIDE 225 MG: 150 TABLET, FILM COATED ORAL at 14:37

## 2023-05-31 RX ADMIN — SERTRALINE HYDROCHLORIDE 100 MG: 100 TABLET ORAL at 09:06

## 2023-05-31 RX ADMIN — DOCUSATE SODIUM 100 MG: 100 CAPSULE, LIQUID FILLED ORAL at 17:34

## 2023-05-31 RX ADMIN — METOPROLOL TARTRATE 100 MG: 50 TABLET, FILM COATED ORAL at 21:35

## 2023-05-31 RX ADMIN — INSULIN DETEMIR 10 UNITS: 100 INJECTION, SOLUTION SUBCUTANEOUS at 09:08

## 2023-05-31 RX ADMIN — INSULIN LISPRO 1 UNITS: 100 INJECTION, SOLUTION INTRAVENOUS; SUBCUTANEOUS at 17:36

## 2023-05-31 RX ADMIN — CHOLECALCIFEROL TAB 25 MCG (1000 UNIT) 2000 UNITS: 25 TAB at 09:06

## 2023-05-31 RX ADMIN — MAGNESIUM OXIDE TAB 400 MG (241.3 MG ELEMENTAL MG) 400 MG: 400 (241.3 MG) TAB at 09:06

## 2023-05-31 RX ADMIN — FERROUS SULFATE TAB 325 MG (65 MG ELEMENTAL FE) 325 MG: 325 (65 FE) TAB at 09:06

## 2023-05-31 RX ADMIN — MEMANTINE 10 MG: 10 TABLET ORAL at 09:06

## 2023-05-31 RX ADMIN — PROPAFENONE HYDROCHLORIDE 225 MG: 150 TABLET, FILM COATED ORAL at 06:17

## 2023-05-31 RX ADMIN — DOCUSATE SODIUM 100 MG: 100 CAPSULE, LIQUID FILLED ORAL at 09:06

## 2023-05-31 RX ADMIN — INSULIN DETEMIR 10 UNITS: 100 INJECTION, SOLUTION SUBCUTANEOUS at 21:30

## 2023-05-31 RX ADMIN — PROPAFENONE HYDROCHLORIDE 225 MG: 150 TABLET, FILM COATED ORAL at 21:34

## 2023-05-31 RX ADMIN — INSULIN LISPRO 1 UNITS: 100 INJECTION, SOLUTION INTRAVENOUS; SUBCUTANEOUS at 21:32

## 2023-05-31 RX ADMIN — MEMANTINE 10 MG: 10 TABLET ORAL at 21:35

## 2023-05-31 RX ADMIN — METOPROLOL TARTRATE 50 MG: 50 TABLET, FILM COATED ORAL at 09:06

## 2023-05-31 RX ADMIN — DONEPEZIL HYDROCHLORIDE 10 MG: 10 TABLET, FILM COATED ORAL at 21:34

## 2023-05-31 RX ADMIN — ATORVASTATIN CALCIUM 10 MG: 10 TABLET, FILM COATED ORAL at 17:34

## 2023-05-31 RX ADMIN — SENNOSIDES 8.6 MG: 8.6 TABLET, FILM COATED ORAL at 21:35

## 2023-05-31 NOTE — NURSING NOTE
Patient is calm, visible, and social  Patient is medication compliant, and cooperative with care  No behavioral issues noted  Will continue to monitor

## 2023-05-31 NOTE — PROGRESS NOTES
Case hand-off received from [address]  Chart reviewed  Patient currently admitted to [facility] due to aggressive behaviors at [facility]  Patient unable to return to [facility]; [facility] working with patient's guardian Danay Muñoz on SNF placement  AAA completing Level of Care Assessment  Will follow for discharge plan

## 2023-05-31 NOTE — CASE MANAGEMENT
CM spoke with pt's Nuzhat Liour tel# [phone]; update provided regarding pt's status  Nilda Stoddard reports family does want pt to remain with his wife although unable to afford the 1:1  Nilda Arlyn reports having left message for [facility] and no return contact

## 2023-05-31 NOTE — PROGRESS NOTES
"  Progress Note - [address] 80 y o  male MRN: [id number]  Unit/Bed#: Reba Burks 099-25 Encounter: [id number]    The patient was seen for continuing care and reviewed with treatment team  Patient remains pleasantly confused, no agitation or aggression noted  He has been pleasant in milieu, socializing with peers  Patient denies auditory or visual hallucinations, No delusions or paranoia elicited  Sleep: good  Appetite: good  Energy: fair  No suicidal or homicidal ideations  No EPS, denies any side effects of medication  ROS: Negative     /66 (BP Location: Right arm)   Pulse 81   Temp (!) 97 3 °F (36 3 °C) (Temporal)   Resp 18   Ht 5' 11\" (1 803 m)   Wt 74 7 kg (164 lb 10 9 oz)   SpO2 93%   BMI 22 97 kg/m²     Current Mental Status Evaluation:  Appearance:  Adequate hygiene and grooming   Behavior:  calm, cooperative and friendly   Mood:  improving   Affect: reactive   Speech: Normal volume and Normal rate   Thought Process:  Poverty of thoughts due to severe cognitive impairment   Thought Content:  Does not verbalize delusional material   Perceptual Disturbances: Denies hallucinations and does not appear to be responding to internal stimuli   Risk Potential: No suicidal or homicidal ideation   Orientation:    Patient is alert, and awake, oriented only to self fund of knowledge     Patient has limited insight, judgement and impulse control due to severe cognitive impairment       Recent Results (from the past 72 hour(s))   Fingerstick Glucose (POCT)    Collection Time: 05/28/23  9:01 PM   Result Value Ref Range    POC Glucose 152 (H) 65 - 140 mg/dl   Fingerstick Glucose (POCT)    Collection Time: 05/29/23  7:30 AM   Result Value Ref Range    POC Glucose 89 65 - 140 mg/dl   Comprehensive metabolic panel    Collection Time: 05/29/23  9:51 AM   Result Value Ref Range    Sodium 139 135 - 147 mmol/L    Potassium 4 2 3 5 - 5 3 mmol/L    Chloride 102 96 - 108 mmol/L    CO2 29 21 - 32 " mmol/L    ANION GAP 8 4 - 13 mmol/L    BUN 34 (H) 5 - 25 mg/dL    Creatinine 2 29 (H) 0 60 - 1 30 mg/dL    Glucose 158 (H) 65 - 140 mg/dL    Calcium 9 7 8 4 - 10 2 mg/dL    AST 15 13 - 39 U/L    ALT 12 7 - 52 U/L    Alkaline Phosphatase 61 34 - 104 U/L    Total Protein 7 5 6 4 - 8 4 g/dL    Albumin 4 4 3 5 - 5 0 g/dL    Total Bilirubin 0 65 0 20 - 1 00 mg/dL    eGFR 24 ml/min/1 73sq m   Fingerstick Glucose (POCT)    Collection Time: 05/29/23 11:31 AM   Result Value Ref Range    POC Glucose 162 (H) 65 - 140 mg/dl   Fingerstick Glucose (POCT)    Collection Time: 05/29/23  4:21 PM   Result Value Ref Range    POC Glucose 151 (H) 65 - 140 mg/dl   Fingerstick Glucose (POCT)    Collection Time: 05/29/23  9:00 PM   Result Value Ref Range    POC Glucose 162 (H) 65 - 140 mg/dl   Fingerstick Glucose (POCT)    Collection Time: 05/30/23  7:08 AM   Result Value Ref Range    POC Glucose 81 65 - 140 mg/dl   Fingerstick Glucose (POCT)    Collection Time: 05/30/23 11:51 AM   Result Value Ref Range    POC Glucose 134 65 - 140 mg/dl   Fingerstick Glucose (POCT)    Collection Time: 05/30/23  4:17 PM   Result Value Ref Range    POC Glucose 170 (H) 65 - 140 mg/dl   Fingerstick Glucose (POCT)    Collection Time: 05/30/23  8:58 PM   Result Value Ref Range    POC Glucose 141 (H) 65 - 140 mg/dl   Basic metabolic panel    Collection Time: 05/31/23  4:59 AM   Result Value Ref Range    Sodium 138 135 - 147 mmol/L    Potassium 4 2 3 5 - 5 3 mmol/L    Chloride 102 96 - 108 mmol/L    CO2 28 21 - 32 mmol/L    ANION GAP 8 4 - 13 mmol/L    BUN 28 (H) 5 - 25 mg/dL    Creatinine 1 84 (H) 0 60 - 1 30 mg/dL    Glucose 104 65 - 140 mg/dL    Glucose, Fasting 104 (H) 65 - 99 mg/dL    Calcium 9 3 8 4 - 10 2 mg/dL    eGFR 32 ml/min/1 73sq m   Fingerstick Glucose (POCT)    Collection Time: 05/31/23  7:11 AM   Result Value Ref Range    POC Glucose 94 65 - 140 mg/dl   Fingerstick Glucose (POCT)    Collection Time: 05/31/23 11:27 AM   Result Value Ref Range    POC Glucose 144 (H) 65 - 140 mg/dl     Progress Toward Goals: Progressing, pt is not agitated or aggressive  Seems at baseline now  Needs placement  No medication changes needed  Assessment     Principal Problem:    Unspecified mood (affective) disorder ([facility])  Active Problems:    Essential hypertension    Mixed hyperlipidemia    Type 2 diabetes mellitus with kidney complication, with long-term current use of insulin ([facility])    Dementia with behavioral disturbance ([facility])    Medical clearance for psychiatric admission    Chronic kidney disease (CKD), stage IV (severe) ([facility])        Plan :    - Medications;   Psychiatric:   Continue Abilify 5mg daily   Sertraline 100mg daily   Continue Namenda and  Donepezil     Medical : per SLIM    -Therapy: occupational therapy, milieu and group therapy  - Legal: 201  - Disposition: to be determined, needs placement

## 2023-05-31 NOTE — PLAN OF CARE
Pt  Not attending groups today    Problem: Ineffective Coping  Goal: Participates in unit activities  Description: Interventions:  - Provide therapeutic environment   - Provide required programming   - Redirect inappropriate behaviors   Outcome: Not Progressing

## 2023-05-31 NOTE — TREATMENT TEAM
05/31/23 0843   Team Meeting   Meeting Type Daily Rounds   Initial Conference Date 05/31/23   Team Members Present   Team Members Present Physician;Nurse;;Occupational Therapist;Other (Discipline and Name)   Physician Team Member Dr Danna Velasquez Team Member [facility] Team Member Calixto Alvarez   OT Team Member Toni Rodriguez   Other (Discipline and Name) Cornell Fleming- pharmacist   Patient/Family Present   Patient Present No   Patient's Family Present No     Pleasantly confused, med compliant, no wandering or attempts to leave unit

## 2023-05-31 NOTE — SPEECH THERAPY NOTE
Speech Language/Pathology    Speech/Language Pathology Progress Note    Patient Name: Froilan Lux  BSLHS'I Date: 5/31/2023                     SLP RECOMMENDATIONS:         Diet: Regular         Liquids: Thin liquids         Medications: as tolerated         Aspiration Precautions: upright, slow rate      Summary:  Pt seen for ongoing dysphagia therapy  Nursing reports that pt had an episode of coughing with food over the weekend, but appears be tolerating diet well since then  Pt eating breakfast in dinning room upon arrival  Pt pleasantly confused  Pt observed with regular solids with slow, but functional mastication/manipulation of bolus  Pt required occasional cues for smaller bite-sizes  Pt observed with thin liquids via single and consecutive cup sips with no overt s/s aspiration  Pt appears to be tolerating diet well at this time and denies any difficulty chewing/swallowing  SLP reviewed general aspiration precautions with pt  Continue current diet  SLP to follow x1 to ensure diet tolerance and then will likely d/c from [address] [place] [address]  Assessment:  Functional mastication of solids  No overt s/s aspiration       Plan/Recommendations:  Regular/thin liquids   General aspiration precautions         Lab Results   Component Value Date    HCT 38 3 05/25/2023    HGB 12 3 05/25/2023    MCV 87 05/25/2023     05/25/2023    WBC 6 72 05/25/2023           Problem List  Principal Problem:    Unspecified mood (affective) disorder (HCC)  Active Problems:    Essential hypertension    Mixed hyperlipidemia    Type 2 diabetes mellitus with kidney complication, with long-term current use of insulin (HCC)    Dementia with behavioral disturbance (HCC)    Medical clearance for psychiatric admission    Chronic kidney disease (CKD), stage IV (severe) ([facility] )       Past Medical History  Past Medical History:   Diagnosis Date   • Allergic rhinitis    • Anxiety    • Aspiration of liquid     and food per wife if he is eating too fast or talking when eating   • Asthma     as a child   • Atrial fibrillation ([facility] )    • CAD (coronary artery disease)    • Cancer of kidney ([facility] )    • COPD (chronic obstructive pulmonary disease) (HCC)    • CPAP (continuous positive airway pressure) dependence    • Dementia (HCC)     Frontal lobe   • Dementia (HCC)    • Diabetes mellitus ([facility] )    • Diabetes mellitus, type 2 (HCC)    • DJD (degenerative joint disease)    • Hypercholesterolemia    • Hyperlipidemia    • Hypertension    • Incisional hernia    • Kidney disease    • Melanoma ([place] [address] )     left leg   • Obesity    • Sleep apnea     wears CPAP   • TIA (transient ischemic attack)         Past Surgical History  Past Surgical History:   Procedure Laterality Date   • CARDIAC PACEMAKER PLACEMENT     • CHOLECYSTECTOMY     • COLONOSCOPY     • HERNIA REPAIR      Incisional hernia   • NEPHRECTOMY Left 2005   • PA ESOPHAGOGASTRODUODENOSCOPY SUBMUCOSAL INJECTION N/A 9/21/2016    Procedure: ESOPHAGOGASTRODUODENOSCOPY (EGD); Surgeon: Vahe Peraza MD;  Location: [facility]; Service: Gastroenterology   • PA ESOPHAGOGASTRODUODENOSCOPY SUBMUCOSAL INJECTION N/A 2/7/2018    Procedure: ESOPHAGOGASTRODUODENOSCOPY (EGD); Surgeon: Vahe Peraza MD;  Location: [facility]; Service: Gastroenterology   • PA ESOPHAGOGASTRODUODENOSCOPY SUBMUCOSAL INJECTION N/A 4/3/2019    Procedure: ESOPHAGOGASTRODUODENOSCOPY (EGD) WITH BOTOX;  Surgeon: Vahe Peraza MD;  Location: [facility];   Service: Gastroenterology   • ROTATOR CUFF REPAIR     • TONSILLECTOMY

## 2023-06-01 LAB
GLUCOSE SERPL-MCNC: 141 MG/DL (ref 65–140)
GLUCOSE SERPL-MCNC: 168 MG/DL (ref 65–140)
GLUCOSE SERPL-MCNC: 171 MG/DL (ref 65–140)
GLUCOSE SERPL-MCNC: 87 MG/DL (ref 65–140)

## 2023-06-01 PROCEDURE — 82948 REAGENT STRIP/BLOOD GLUCOSE: CPT

## 2023-06-01 PROCEDURE — 92526 ORAL FUNCTION THERAPY: CPT

## 2023-06-01 PROCEDURE — 99232 SBSQ HOSP IP/OBS MODERATE 35: CPT | Performed by: STUDENT IN AN ORGANIZED HEALTH CARE EDUCATION/TRAINING PROGRAM

## 2023-06-01 RX ADMIN — ARIPIPRAZOLE 5 MG: 5 TABLET ORAL at 08:40

## 2023-06-01 RX ADMIN — INSULIN DETEMIR 10 UNITS: 100 INJECTION, SOLUTION SUBCUTANEOUS at 21:18

## 2023-06-01 RX ADMIN — SERTRALINE HYDROCHLORIDE 100 MG: 100 TABLET ORAL at 08:40

## 2023-06-01 RX ADMIN — MAGNESIUM OXIDE TAB 400 MG (241.3 MG ELEMENTAL MG) 400 MG: 400 (241.3 MG) TAB at 08:42

## 2023-06-01 RX ADMIN — DOCUSATE SODIUM 100 MG: 100 CAPSULE, LIQUID FILLED ORAL at 17:14

## 2023-06-01 RX ADMIN — MEMANTINE 10 MG: 10 TABLET ORAL at 08:42

## 2023-06-01 RX ADMIN — CHOLECALCIFEROL TAB 25 MCG (1000 UNIT) 2000 UNITS: 25 TAB at 08:42

## 2023-06-01 RX ADMIN — PROPAFENONE HYDROCHLORIDE 225 MG: 150 TABLET, FILM COATED ORAL at 14:36

## 2023-06-01 RX ADMIN — METOPROLOL TARTRATE 50 MG: 50 TABLET, FILM COATED ORAL at 08:41

## 2023-06-01 RX ADMIN — DONEPEZIL HYDROCHLORIDE 10 MG: 10 TABLET, FILM COATED ORAL at 21:15

## 2023-06-01 RX ADMIN — INSULIN DETEMIR 10 UNITS: 100 INJECTION, SOLUTION SUBCUTANEOUS at 08:42

## 2023-06-01 RX ADMIN — ATORVASTATIN CALCIUM 10 MG: 10 TABLET, FILM COATED ORAL at 17:14

## 2023-06-01 RX ADMIN — PROPAFENONE HYDROCHLORIDE 225 MG: 150 TABLET, FILM COATED ORAL at 21:15

## 2023-06-01 RX ADMIN — INSULIN LISPRO 1 UNITS: 100 INJECTION, SOLUTION INTRAVENOUS; SUBCUTANEOUS at 17:12

## 2023-06-01 RX ADMIN — MEMANTINE 10 MG: 10 TABLET ORAL at 21:15

## 2023-06-01 RX ADMIN — PROPAFENONE HYDROCHLORIDE 225 MG: 150 TABLET, FILM COATED ORAL at 05:51

## 2023-06-01 RX ADMIN — FERROUS SULFATE TAB 325 MG (65 MG ELEMENTAL FE) 325 MG: 325 (65 FE) TAB at 08:41

## 2023-06-01 RX ADMIN — SENNOSIDES 8.6 MG: 8.6 TABLET, FILM COATED ORAL at 21:15

## 2023-06-01 RX ADMIN — DOCUSATE SODIUM 100 MG: 100 CAPSULE, LIQUID FILLED ORAL at 08:42

## 2023-06-01 RX ADMIN — METOPROLOL TARTRATE 100 MG: 50 TABLET, FILM COATED ORAL at 21:15

## 2023-06-01 RX ADMIN — INSULIN LISPRO 1 UNITS: 100 INJECTION, SOLUTION INTRAVENOUS; SUBCUTANEOUS at 21:20

## 2023-06-01 NOTE — PLAN OF CARE
Problem: Alteration in Thoughts and Perception  Goal: Treatment Goal: Gain control of psychotic behaviors/thinking, reduce/eliminate presenting symptoms and demonstrate improved reality functioning upon discharge  Outcome: Progressing  Goal: Verbalize thoughts and feelings  Description: Interventions:  - Promote a nonjudgmental and trusting relationship with the patient through active listening and therapeutic communication  - Assess patient's level of functioning, behavior and potential for risk  - Engage patient in 1 on 1 interactions  - Encourage patient to express fears, feelings, frustrations, and discuss symptoms    - [place] patient to reality, help patient recognize reality-based thinking   - Administer medications as ordered and assess for potential side effects  - Provide the patient education related to the signs and symptoms of the illness and desired effects of prescribed medications  Outcome: Progressing  Goal: Refrain from acting on delusional thinking/internal stimuli  Description: Interventions:  - Monitor patient closely, per order   - Utilize least restrictive measures   - Set reasonable limits, give positive feedback for acceptable   - Administer medications as ordered and monitor of potential side effects  Outcome: Progressing  Goal: Agree to be compliant with medication regime, as prescribed and report medication side effects  Description: Interventions:  - Offer appropriate PRN medication and supervise ingestion; conduct AIMS, as needed   Outcome: Progressing  Goal: Recognize dysfunctional thoughts, communicate reality-based thoughts at the time of discharge  Description: Interventions:  - Provide medication and psycho-education to assist patient in compliance and developing insight into his/her illness   Outcome: Progressing  Goal: Complete daily ADLs, including personal hygiene independently, as able  Description: Interventions:  - Observe, teach, and assist patient with ADLS  - Monitor and promote a balance of rest/activity, with adequate nutrition and elimination   Outcome: Progressing     Problem: Ineffective Coping  Goal: Identifies ineffective coping skills  Outcome: Progressing  Goal: Identifies healthy coping skills  Outcome: Progressing  Goal: Demonstrates healthy coping skills  Outcome: Progressing  Goal: Patient/Family participate in treatment and DC plans  Description: Interventions:  - Provide therapeutic environment  Outcome: Progressing  Goal: Patient/Family verbalizes awareness of resources  Outcome: Progressing  Goal: Understands least restrictive measures  Description: Interventions:  - Utilize least restrictive behavior  Outcome: Progressing  Goal: Free from restraint events  Description: - Utilize least restrictive measures   - Provide behavioral interventions   - Redirect inappropriate behaviors   Outcome: Progressing     Problem: Risk for Self Injury/Neglect  Goal: Treatment Goal: Remain safe during length of stay, learn and adopt new coping skills, and be free of self-injurious ideation, impulses and acts at the time of discharge  Outcome: Progressing  Goal: Verbalize thoughts and feelings  Description: Interventions:  - Assess and re-assess patient's lethality and potential for self-injury  - Engage patient in 1:1 interactions, daily, for a minimum of 15 minutes  - Encourage patient to express feelings, fears, frustrations, hopes  - Establish rapport/trust with patient   Outcome: Progressing  Goal: Refrain from harming self  Description: Interventions:  - Monitor patient closely, per order  - Develop a trusting relationship  - Supervise medication ingestion, monitor effects and side effects   Outcome: Progressing  Goal: Recognize maladaptive responses and adopt new coping mechanisms  Outcome: Progressing  Goal: Complete daily ADLs, including personal hygiene independently, as able  Description: Interventions:  - Observe, teach, and assist patient with ADLS  - Monitor and promote a balance of rest/activity, with adequate nutrition and elimination  Outcome: Progressing     Problem: Depression  Goal: Treatment Goal: Demonstrate behavioral control of depressive symptoms, verbalize feelings of improved mood/affect, and adopt new coping skills prior to discharge  Outcome: Progressing  Goal: Verbalize thoughts and feelings  Description: Interventions:  - Assess and re-assess patient's level of risk   - Engage patient in 1:1 interactions, daily, for a minimum of 15 minutes   - Encourage patient to express feelings, fears, frustrations, hopes   Outcome: Progressing  Goal: Refrain from harming self  Description: Interventions:  - Monitor patient closely, per order   - Supervise medication ingestion, monitor effects and side effects   Outcome: Progressing  Goal: Refrain from isolation  Description: Interventions:  - Develop a trusting relationship   - Encourage socialization   Outcome: Progressing  Goal: Refrain from self-neglect  Outcome: Progressing  Goal: Complete daily ADLs, including personal hygiene independently, as able  Description: Interventions:  - Observe, teach, and assist patient with ADLS  -  Monitor and promote a balance of rest/activity, with adequate nutrition and elimination   Outcome: Progressing     Problem: Anxiety  Goal: Anxiety is at manageable level  Description: Interventions:  - Assess and monitor patient's anxiety level  - Monitor for signs and symptoms (heart palpitations, chest pain, shortness of breath, headaches, nausea, feeling jumpy, restlessness, irritable, apprehensive)  - Collaborate with interdisciplinary team and initiate plan and interventions as ordered    - [place] patient to unit/surroundings  - Explain treatment plan  - Encourage participation in care  - Encourage verbalization of concerns/fears  - Identify coping mechanisms  - Assist in developing anxiety-reducing skills  - Administer/offer alternative therapies  - Limit or eliminate stimulants  Outcome: Progressing     Problem: Risk for Violence/Aggression Toward Others  Goal: Treatment Goal: Refrain from acts of violence/aggression during length of stay, and demonstrate improved impulse control at the time of discharge  Outcome: Progressing  Goal: Verbalize thoughts and feelings  Description: Interventions:  - Assess and re-assess patient's level of risk, every waking shift  - Engage patient in 1:1 interactions, daily, for a minimum of 15 minutes   - Allow patient to express feelings and frustrations in a safe and non-threatening manner   - Establish rapport/trust with patient   Outcome: Progressing  Goal: Refrain from harming others  Outcome: Progressing  Goal: Refrain from destructive acts on the environment or property  Outcome: Progressing  Goal: Control angry outbursts  Description: Interventions:  - Monitor patient closely, per order  - Ensure early verbal de-escalation  - Monitor prn medication needs  - Set reasonable/therapeutic limits, outline behavioral expectations, and consequences   - Provide a non-threatening milieu, utilizing the least restrictive interventions   Outcome: Progressing  Goal: Identify appropriate positive anger management techniques  Description: Interventions:  - Offer anger management and coping skills groups   - Staff will provide positive feedback for appropriate anger control  Outcome: Progressing     Problem: Alteration in Orientation  Goal: Treatment Goal: Demonstrate a reduction of confusion and improved orientation to person, place, time and/or situation upon discharge, according to optimum baseline/ability  Outcome: Progressing  Goal: Interact with staff daily  Description: Interventions:  - Assess and re-assess patient's level of orientation  - Engage patient in 1 on 1 interactions, daily, for a minimum of 15 minutes   - Establish rapport/trust with patient   Outcome: Progressing  Goal: Express concerns related to confused thinking related to:  Description: Interventions:  - Encourage patient to express feelings, fears, frustrations, hopes  - Assign consistent caregivers   - [place]/re-orient patient as needed  - Allow comfort items, as appropriate  - Provide visual cues, signs, etc    Outcome: Progressing  Goal: Allow medical examinations, as recommended  Description: Interventions:  - Provide physical/neurological exams and/or referrals, per provider   Outcome: Progressing  Goal: Cooperate with recommended testing/procedures  Description: Interventions:  - Determine need for ancillary testing  - Observe for mental status changes  - Implement falls/precaution protocol   Outcome: Progressing  Goal: Complete daily ADLs, including personal hygiene independently, as able  Description: Interventions:  - Observe, teach, and assist patient with ADLS  Outcome: Progressing     Problem: Prexisting or High Potential for Compromised Skin Integrity  Goal: Skin integrity is maintained or improved  Description: INTERVENTIONS:  - Identify patients at risk for skin breakdown  - Assess and monitor skin integrity  - Assess and monitor nutrition and hydration status  - Monitor labs   - Assess for incontinence   - Turn and reposition patient  - Assist with mobility/ambulation  - Relieve pressure over bony prominences  - Avoid friction and shearing  - Provide appropriate hygiene as needed including keeping skin clean and dry  - Evaluate need for skin moisturizer/barrier cream  - Collaborate with interdisciplinary team   - Patient/family teaching  - Consider wound care consult   Outcome: Progressing

## 2023-06-01 NOTE — PROGRESS NOTES
06/01/23 1000 06/01/23 1330   Activity/Group Checklist   Group Admission/Discharge Life Skills  (wellness game)   Attendance Did not attend Attended   Attendance Duration (min)  --  16-30   Interactions  --  Disorganized interaction  (dozed off then needed staff assistance to find a bathroom )   Affect/Mood  --  Calm   Goals Achieved  --  Able to listen to others

## 2023-06-01 NOTE — PROGRESS NOTES
"  Progress Note - [address] 80 y o  male MRN: [id number]  Unit/Bed#: [facility] 702-39 Encounter: [id number]    The patient was seen for continuing care and reviewed with treatment team  Visible in milieu, calm and Pleasantly cooperative  Poverty of thought due to severe cognitive impairment  No LASHAUN, no AVH  Believes president  is Mitul Moraes, thinks he is 35years old  Only oriented to self  Sleep- good  Appetite- good  Energy:good   No suicidal or homicidal ideations  No EPS, denies any side effects of medication  Tolerating medications well  No aggression or agitation       /79 (BP Location: Left arm)   Pulse 68   Temp (!) 97 1 °F (36 2 °C) (Temporal)   Resp 16   Ht 5' 11\" (1 803 m)   Wt 74 7 kg (164 lb 10 9 oz)   SpO2 96%   BMI 22 97 kg/m²     Current Mental Status Evaluation:  Appearance:  Adequate hygiene and grooming   Behavior:  calm, cooperative and friendly   Mood:  euthymic   Affect: mood-congruent   Speech: Normal volume and Normal rate   Thought Process:  Poverty of thoughts, due to severe cognitive impairment   Thought Content:  Does not verbalize delusional material   Perceptual Disturbances: Denies hallucinations and does not appear to be responding to internal stimuli   Risk Potential: No suicidal or homicidal ideation   Orientation:   Oriented to self only, Poor fund of knowledge, recall 0/3,       Patient lacks insight Into his illness, limited Judgment and impulse control due to severe cognitive impairment      Recent Results (from the past 72 hour(s))   Fingerstick Glucose (POCT)    Collection Time: 05/30/23  4:17 PM   Result Value Ref Range    POC Glucose 170 (H) 65 - 140 mg/dl   Fingerstick Glucose (POCT)    Collection Time: 05/30/23  8:58 PM   Result Value Ref Range    POC Glucose 141 (H) 65 - 140 mg/dl   Basic metabolic panel    Collection Time: 05/31/23  4:59 AM   Result Value Ref Range    Sodium 138 135 - 147 mmol/L    Potassium 4 2 3 5 - 5 3 mmol/L    Chloride 102 " 96 - 108 mmol/L    CO2 28 21 - 32 mmol/L    ANION GAP 8 4 - 13 mmol/L    BUN 28 (H) 5 - 25 mg/dL    Creatinine 1 84 (H) 0 60 - 1 30 mg/dL    Glucose 104 65 - 140 mg/dL    Glucose, Fasting 104 (H) 65 - 99 mg/dL    Calcium 9 3 8 4 - 10 2 mg/dL    eGFR 32 ml/min/1 73sq m   Fingerstick Glucose (POCT)    Collection Time: 05/31/23  7:11 AM   Result Value Ref Range    POC Glucose 94 65 - 140 mg/dl   Fingerstick Glucose (POCT)    Collection Time: 05/31/23 11:27 AM   Result Value Ref Range    POC Glucose 144 (H) 65 - 140 mg/dl   Fingerstick Glucose (POCT)    Collection Time: 05/31/23  4:06 PM   Result Value Ref Range    POC Glucose 156 (H) 65 - 140 mg/dl   Fingerstick Glucose (POCT)    Collection Time: 05/31/23  9:07 PM   Result Value Ref Range    POC Glucose 169 (H) 65 - 140 mg/dl   Fingerstick Glucose (POCT)    Collection Time: 06/01/23  7:02 AM   Result Value Ref Range    POC Glucose 87 65 - 140 mg/dl   Fingerstick Glucose (POCT)    Collection Time: 06/01/23 11:00 AM   Result Value Ref Range    POC Glucose 141 (H) 65 - 140 mg/dl   Fingerstick Glucose (POCT)    Collection Time: 06/01/23  4:14 PM   Result Value Ref Range    POC Glucose 168 (H) 65 - 140 mg/dl   Fingerstick Glucose (POCT)    Collection Time: 06/01/23  9:09 PM   Result Value Ref Range    POC Glucose 171 (H) 65 - 140 mg/dl   Fingerstick Glucose (POCT)    Collection Time: 06/02/23  7:07 AM   Result Value Ref Range    POC Glucose 97 65 - 140 mg/dl   Fingerstick Glucose (POCT)    Collection Time: 06/02/23 11:33 AM   Result Value Ref Range    POC Glucose 181 (H) 65 - 140 mg/dl     Progress Toward Goals: Progressing, needs placement  Pt needs to speak with wife daily  Seems at baseline but needs placement       Assessment     Principal Problem:    Unspecified mood (affective) disorder ([facility])  Active Problems:    Essential hypertension    Mixed hyperlipidemia    Type 2 diabetes mellitus with kidney complication, with long-term current use of insulin (HCC)    Dementia with behavioral disturbance (HCC)    Medical clearance for psychiatric admission    Chronic kidney disease (CKD), stage IV (severe) ([facility])        Plan :    - Medications;   Psychiatric:Continue Abilify 5mg daily   Sertraline 100mg daily   Continue Namenda and  Donepezil      Delirium Precautions:  Daytime:  1  Discourage daytime napping  2  Maintain consistency and continuity of staff  3  Keep patient in well-lit room near windows  4  Maintain wakefulness with activities (eg, coloring, puzzles)  5  Aromatherapy patches  6   Visible clock and calendar  Nighttime:  - Relaxation tapes or music before sleep  - Avoid disturbances  - Dim light in the room  - Noise reduction     Medical: per SLIM    -Therapy: occupational therapy, milieu and group therapy  - Legal: [id number]   -Disposition:Needs placement

## 2023-06-01 NOTE — NURSING NOTE
Patient is medication compliant, and cooperative with care  No behavioral issues noted, patient is calm, visible, and social  No further issues noted  Will continue to monitor

## 2023-06-01 NOTE — PLAN OF CARE
Problem: Alteration in Thoughts and Perception  Goal: Treatment Goal: Gain control of psychotic behaviors/thinking, reduce/eliminate presenting symptoms and demonstrate improved reality functioning upon discharge  Outcome: Progressing  Goal: Verbalize thoughts and feelings  Description: Interventions:  - Promote a nonjudgmental and trusting relationship with the patient through active listening and therapeutic communication  - Assess patient's level of functioning, behavior and potential for risk  - Engage patient in 1 on 1 interactions  - Encourage patient to express fears, feelings, frustrations, and discuss symptoms    - [place] patient to reality, help patient recognize reality-based thinking   - Administer medications as ordered and assess for potential side effects  - Provide the patient education related to the signs and symptoms of the illness and desired effects of prescribed medications  Outcome: Progressing  Goal: Refrain from acting on delusional thinking/internal stimuli  Description: Interventions:  - Monitor patient closely, per order   - Utilize least restrictive measures   - Set reasonable limits, give positive feedback for acceptable   - Administer medications as ordered and monitor of potential side effects  Outcome: Progressing  Goal: Agree to be compliant with medication regime, as prescribed and report medication side effects  Description: Interventions:  - Offer appropriate PRN medication and supervise ingestion; conduct AIMS, as needed   Outcome: Progressing  Goal: Recognize dysfunctional thoughts, communicate reality-based thoughts at the time of discharge  Description: Interventions:  - Provide medication and psycho-education to assist patient in compliance and developing insight into his/her illness   Outcome: Progressing  Goal: Complete daily ADLs, including personal hygiene independently, as able  Description: Interventions:  - Observe, teach, and assist patient with ADLS  - Monitor and promote a balance of rest/activity, with adequate nutrition and elimination   Outcome: Progressing     Problem: Ineffective Coping  Goal: Identifies ineffective coping skills  Outcome: Progressing  Goal: Identifies healthy coping skills  Outcome: Progressing  Goal: Demonstrates healthy coping skills  Outcome: Progressing  Goal: Participates in unit activities  Description: Interventions:  - Provide therapeutic environment   - Provide required programming   - Redirect inappropriate behaviors   Outcome: Progressing  Goal: Patient/Family participate in treatment and DC plans  Description: Interventions:  - Provide therapeutic environment  Outcome: Progressing  Goal: Patient/Family verbalizes awareness of resources  Outcome: Progressing  Goal: Understands least restrictive measures  Description: Interventions:  - Utilize least restrictive behavior  Outcome: Progressing  Goal: Free from restraint events  Description: - Utilize least restrictive measures   - Provide behavioral interventions   - Redirect inappropriate behaviors   Outcome: Progressing     Problem: Risk for Self Injury/Neglect  Goal: Treatment Goal: Remain safe during length of stay, learn and adopt new coping skills, and be free of self-injurious ideation, impulses and acts at the time of discharge  Outcome: Progressing  Goal: Verbalize thoughts and feelings  Description: Interventions:  - Assess and re-assess patient's lethality and potential for self-injury  - Engage patient in 1:1 interactions, daily, for a minimum of 15 minutes  - Encourage patient to express feelings, fears, frustrations, hopes  - Establish rapport/trust with patient   Outcome: Progressing  Goal: Refrain from harming self  Description: Interventions:  - Monitor patient closely, per order  - Develop a trusting relationship  - Supervise medication ingestion, monitor effects and side effects   Outcome: Progressing  Goal: Recognize maladaptive responses and adopt new coping mechanisms  Outcome: Progressing  Goal: Complete daily ADLs, including personal hygiene independently, as able  Description: Interventions:  - Observe, teach, and assist patient with ADLS  - Monitor and promote a balance of rest/activity, with adequate nutrition and elimination  Outcome: Progressing     Problem: Depression  Goal: Treatment Goal: Demonstrate behavioral control of depressive symptoms, verbalize feelings of improved mood/affect, and adopt new coping skills prior to discharge  Outcome: Progressing  Goal: Verbalize thoughts and feelings  Description: Interventions:  - Assess and re-assess patient's level of risk   - Engage patient in 1:1 interactions, daily, for a minimum of 15 minutes   - Encourage patient to express feelings, fears, frustrations, hopes   Outcome: Progressing  Goal: Refrain from harming self  Description: Interventions:  - Monitor patient closely, per order   - Supervise medication ingestion, monitor effects and side effects   Outcome: Progressing  Goal: Refrain from isolation  Description: Interventions:  - Develop a trusting relationship   - Encourage socialization   Outcome: Progressing  Goal: Refrain from self-neglect  Outcome: Progressing  Goal: Complete daily ADLs, including personal hygiene independently, as able  Description: Interventions:  - Observe, teach, and assist patient with ADLS  -  Monitor and promote a balance of rest/activity, with adequate nutrition and elimination   Outcome: Progressing     Problem: Anxiety  Goal: Anxiety is at manageable level  Description: Interventions:  - Assess and monitor patient's anxiety level  - Monitor for signs and symptoms (heart palpitations, chest pain, shortness of breath, headaches, nausea, feeling jumpy, restlessness, irritable, apprehensive)  - Collaborate with interdisciplinary team and initiate plan and interventions as ordered    - [place] patient to unit/surroundings  - Explain treatment plan  - Encourage participation in care  - Encourage verbalization of concerns/fears  - Identify coping mechanisms  - Assist in developing anxiety-reducing skills  - Administer/offer alternative therapies  - Limit or eliminate stimulants  Outcome: Progressing     Problem: Risk for Violence/Aggression Toward Others  Goal: Treatment Goal: Refrain from acts of violence/aggression during length of stay, and demonstrate improved impulse control at the time of discharge  Outcome: Progressing  Goal: Verbalize thoughts and feelings  Description: Interventions:  - Assess and re-assess patient's level of risk, every waking shift  - Engage patient in 1:1 interactions, daily, for a minimum of 15 minutes   - Allow patient to express feelings and frustrations in a safe and non-threatening manner   - Establish rapport/trust with patient   Outcome: Progressing  Goal: Refrain from harming others  Outcome: Progressing  Goal: Refrain from destructive acts on the environment or property  Outcome: Progressing  Goal: Control angry outbursts  Description: Interventions:  - Monitor patient closely, per order  - Ensure early verbal de-escalation  - Monitor prn medication needs  - Set reasonable/therapeutic limits, outline behavioral expectations, and consequences   - Provide a non-threatening milieu, utilizing the least restrictive interventions   Outcome: Progressing  Goal: Identify appropriate positive anger management techniques  Description: Interventions:  - Offer anger management and coping skills groups   - Staff will provide positive feedback for appropriate anger control  Outcome: Progressing     Problem: Alteration in Orientation  Goal: Treatment Goal: Demonstrate a reduction of confusion and improved orientation to person, place, time and/or situation upon discharge, according to optimum baseline/ability  Outcome: Progressing  Goal: Interact with staff daily  Description: Interventions:  - Assess and re-assess patient's level of orientation  - Engage patient in 1 on 1 interactions, daily, for a minimum of 15 minutes   - Establish rapport/trust with patient   Outcome: Progressing  Goal: Express concerns related to confused thinking related to:  Description: Interventions:  - Encourage patient to express feelings, fears, frustrations, hopes  - Assign consistent caregivers   - [place]/re-orient patient as needed  - Allow comfort items, as appropriate  - Provide visual cues, signs, etc    Outcome: Progressing  Goal: Allow medical examinations, as recommended  Description: Interventions:  - Provide physical/neurological exams and/or referrals, per provider   Outcome: Progressing  Goal: Cooperate with recommended testing/procedures  Description: Interventions:  - Determine need for ancillary testing  - Observe for mental status changes  - Implement falls/precaution protocol   Outcome: Progressing  Goal: Complete daily ADLs, including personal hygiene independently, as able  Description: Interventions:  - Observe, teach, and assist patient with ADLS  Outcome: Progressing     Problem: Prexisting or High Potential for Compromised Skin Integrity  Goal: Skin integrity is maintained or improved  Description: INTERVENTIONS:  - Identify patients at risk for skin breakdown  - Assess and monitor skin integrity  - Assess and monitor nutrition and hydration status  - Monitor labs   - Assess for incontinence   - Turn and reposition patient  - Assist with mobility/ambulation  - Relieve pressure over bony prominences  - Avoid friction and shearing  - Provide appropriate hygiene as needed including keeping skin clean and dry  - Evaluate need for skin moisturizer/barrier cream  - Collaborate with interdisciplinary team   - Patient/family teaching  - Consider wound care consult   Outcome: Progressing

## 2023-06-01 NOTE — CMS CERTIFICATION NOTE
Certification: Based upon physical, mental and social evaluations, I certify that inpatient psychiatric services are medically necessary for this patient for a duration of 15 midnights for the treatment of  Mood disorder/ dementia with behavioral disturbances

## 2023-06-01 NOTE — NURSING NOTE
Pt is visible in unit, social and bright on approach  Pt is oriented only to self  Pt is compliant with medications and care  No reports of SI/HI or AH/VH  Safety checks ongoing

## 2023-06-01 NOTE — SPEECH THERAPY NOTE
Speech Language/Pathology    Speech/Language Pathology Progress Note    Patient Name: Bud Denise  [id number]'K Date: 6/1/2023                     SLP RECOMMENDATIONS:         Diet: regular consistency         Liquids: Thin liquids         Medications: as tolerated         Aspiration Precautions: upright for eating/drinking      Summary:  Pt seen for ongoing dysphagia therapy  Pt observed with portion of AM meal of Regular/thin liquids  Pt with functional mastication/manipulation of bolus with regular solids  Pt observed with thin liquids via cup and straw sips with no overt s/s aspiration  Pt denies difficulty chewing/swallowing  Pt presents with no s/s suggestive of dysphagia at this time  Recommend a Regular/thin liquid diet  SLP to signoff at this time  Please re-consult with any new concerns  Assessment:  Pt presents with functional mastication/manipulation of regular solids  No overt s/s aspiration with thin liquids       Plan/Recommendations:  Regular/thin liquids   D/c from  services         Lab Results   Component Value Date    HCT 38 3 05/25/2023    HGB 12 3 05/25/2023    MCV 87 05/25/2023     05/25/2023    WBC 6 72 05/25/2023           Problem List  Principal Problem:    Unspecified mood (affective) disorder (HCC)  Active Problems:    Essential hypertension    Mixed hyperlipidemia    Type 2 diabetes mellitus with kidney complication, with long-term current use of insulin (HCC)    Dementia with behavioral disturbance (HCC)    Medical clearance for psychiatric admission    Chronic kidney disease (CKD), stage IV (severe) ([facility] )       Past Medical History  Past Medical History:   Diagnosis Date   • Allergic rhinitis    • Anxiety    • Aspiration of liquid     and food per wife if he is eating too fast or talking when eating   • Asthma     as a child   • Atrial fibrillation ([facility] )    • CAD (coronary artery disease)    • Cancer of kidney ([facility] )    • COPD (chronic obstructive pulmonary disease) ([facility] )    • CPAP (continuous positive airway pressure) dependence    • Dementia (HCC)     Frontal lobe   • Dementia (HCC)    • Diabetes mellitus ([facility] )    • Diabetes mellitus, type 2 ([facility] )    • DJD (degenerative joint disease)    • Hypercholesterolemia    • Hyperlipidemia    • Hypertension    • Incisional hernia    • Kidney disease    • Melanoma ([facility] )     left leg   • Obesity    • Sleep apnea     wears CPAP   • TIA (transient ischemic attack)         Past Surgical History  Past Surgical History:   Procedure Laterality Date   • CARDIAC PACEMAKER PLACEMENT     • CHOLECYSTECTOMY     • COLONOSCOPY     • HERNIA REPAIR      Incisional hernia   • NEPHRECTOMY Left 2005   • [place] ESOPHAGOGASTRODUODENOSCOPY SUBMUCOSAL INJECTION N/A 9/21/2016    Procedure: ESOPHAGOGASTRODUODENOSCOPY (EGD); Surgeon: Delaney Mcdowell MD;  Location: [facility]; Service: Gastroenterology   • [place] ESOPHAGOGASTRODUODENOSCOPY SUBMUCOSAL INJECTION N/A 2/7/2018    Procedure: ESOPHAGOGASTRODUODENOSCOPY (EGD); Surgeon: Delaney Mcdowell MD;  Location: [facility]; Service: Gastroenterology   • AK ESOPHAGOGASTRODUODENOSCOPY SUBMUCOSAL INJECTION N/A 4/3/2019    Procedure: ESOPHAGOGASTRODUODENOSCOPY (EGD) WITH BOTOX;  Surgeon: Delaney Mcdowell MD;  Location: [facility];   Service: Gastroenterology   • ROTATOR CUFF REPAIR     • TONSILLECTOMY

## 2023-06-01 NOTE — PROGRESS NOTES
Progress Note - Behavioral Health   Oscar Ribeiro 80 y o  male MRN: [id number]  Unit/Bed#: [facility] 436-36 Encounter: [id number]  Code Status: Level 1 - Full Code    Assessment/Plan   Principal Problem:    Unspecified mood (affective) disorder ([facility] )  Active Problems:    Essential hypertension    Mixed hyperlipidemia    Type 2 diabetes mellitus with kidney complication, with long-term current use of insulin (HCC)    Dementia with behavioral disturbance (HCC)    Medical clearance for psychiatric admission    Chronic kidney disease (CKD), stage IV (severe) ([facility] )    Recommended Treatment:     Treatment plan, treatment progress and medication changes were reviewed with Nursing Staff, Pharmacy Service and Case Management in Treatment Team:  1  Continue with group therapy, milieu therapy and occupational therapy   2  Behavioral Health checks every 7 minutes   3  Continue frequent safety checks and vitals per unit protocol  4  Continue with SLIM medical management as indicated  5  Continue with current medication regimen - Abilify 5mg PO Daily, Namenda 10mg PO BID, Zoloft 100mg PO Daily  6  Disposition Planning: Discharge planning and efforts remain ongoing - awaiting placement    Subjective:    Patient was seen today for continuation of care, records reviewed and patient was discussed with the morning case review team     Amari Romo was seen today for psychiatric follow-up  On assessment today, Amari Romo was was found sitting in the dayroom  He is pleasant on approach, seems to try and touch this writers arms and hands (in a nice manner) but was redirected  He is pleasantly confused, disoriented to place, he knows his own name  Amari Romo reports adequate daytime energy and denies any difficulties with initiating or staying asleep  Oral appetite and hydration is adequate  Amarijohn Romo denies acute suicidal/self-harm ideation/intent/plan upon direct inquiry at this time   Amari Romo is able to contract for safety while on the unit and would feel comfortable seeking staff support should suicidal symptoms or urges appear or worsen  Yomi Johnson remains behaviorally appropriate, no agitation or aggression noted on exam or in report  Yomi Johnson also denies HI/AH/VH, and does not appear overtly manic  Patient does not verbalize any experiences that can be categorized as paranoid, persecutory, bizarre, or somatic delusions  Yomi Johnson remains adherent to his current psychotropic medication regimen and denies any side effects from medications, as well as none noted on exam     Review of Systems:  Behavior over the last 24 hours: Slowly improving  Sleep: sleeping okay throughout the night  Appetite: adequate  Medication side effects: none reported  ROS:no complaints, all other systems are negative    Objective:    Vitals:  Vitals:    06/01/23 0724   BP: 124/79   Pulse: 68   Resp: 16   Temp: (!) 97 1 °F (36 2 °C)   SpO2: 96%     Laboratory Results:    I have personally reviewed all pertinent laboratory/tests results    Most Recent Labs:   Lab Results   Component Value Date    ALB 4 4 05/29/2023    ALKPHOS 61 05/29/2023    ALT 12 05/29/2023    AST 15 05/29/2023    BUN 28 (H) 05/31/2023    CALCIUM 9 3 05/31/2023    CHOLESTEROL 149 09/04/2021     05/31/2023    CO2 28 05/31/2023    CREATININE 1 84 (H) 05/31/2023     05/25/2023    GLUC 104 05/31/2023    GLUF 104 (H) 05/31/2023    HCT 38 3 05/25/2023    HDL 26 (L) 09/04/2021    HGB 12 3 05/25/2023    HGBA1C 8 0 (H) 05/25/2023    K 4 2 05/31/2023    LDLCALC 57 09/04/2021    NEUTROABS 4 03 05/25/2023    NONHDLC 123 09/04/2021     05/25/2023    RBC 4 38 05/25/2023    RDW 12 7 05/25/2023    SODIUM 138 05/31/2023    TBILI 0 65 05/29/2023    TP 7 5 05/29/2023    TRIG 328 (H) 09/04/2021    [id number] 3 060 05/25/2023    WBC 6 72 05/25/2023       Mental Status Evaluation:  Appearance:  age appropriate, casually dressed, dressed appropriately, adequate grooming   Behavior:  pleasant, cooperative, calm, good eye contact Speech:  normal rate, normal volume, normal pitch   Mood:  improved, euthymic   Affect:  appropriate, reactive   Thought Process:  concrete, poverty of thought   Associations: concrete associations   Thought Content:  no overt delusions   Perceptual Disturbances: no auditory hallucinations, no visual hallucinations, denies when asked, does not appear responding to internal stimuli   Risk Potential: Suicidal ideation - None at present, contracts for safety on the unit, would talk to staff if not feeling safe on the unit  Homicidal ideation - None at present  Potential for aggression - Not at present   Sensorium:  oriented to person   Memory:  remote memory impaired   Consciousness:  alert and awake   Attention/Concentration: decreased concentration and decreased attention span   Insight:  limited   Judgment: limited   Gait/Station: uses walker   Motor Activity: no abnormal movements     Progress Toward Goals:   Oscar Pimentel is progressing towards goals of inpatient psychiatric treatment by continued medication compliance and is attending therapeutic modalities on the milieu  However, the patient continues to require inpatient psychiatric hospitalization for continued medication management and titration to optimize symptom reduction, improve sleep hygiene, and demonstrate adequate self-care  Suicide/Homicide Risk Assessment:  Risk of Harm to Self:   Nursing Suicide Risk Assessment Last 24 hours: C-SSRS Risk (Since Last Contact)  Calculated C-SSRS Risk Score (Since Last Contact): No Risk Indicated    Risk of Harm to Others:  Nursing Homicide Risk Assessment: Violence Risk to Others: Yes- Within the last 6 months    Behavioral Health Medications: all current active meds have been reviewed and continue current psychiatric medications    Current Facility-Administered Medications   Medication Dose Route Frequency Provider Last Rate   • acetaminophen  650 mg Oral Q4H PRN SHADY Allen     • acetaminophen  650 mg Oral Q4H PRN Pratibha Romero, SHADY     • acetaminophen  975 mg Oral Q6H PRN [facility], SHADY     • ARIPiprazole  5 mg Oral Daily Ru Pena MD     • atorvastatin  10 mg Oral Daily With Dinner Renee Larsen PA-C     • cholecalciferol  2,000 Units Oral Daily Renee Larsen PA-C     • docusate sodium  100 mg Oral BID SHADY Gerard     • donepezil  10 mg Oral HS [facility], CRNP     • ferrous sulfate  325 mg Oral Daily With Breakfast Renee Larsen PA-C     • hydrOXYzine HCL  25 mg Oral Q6H PRN Max 4/day SHADY King     • hydrOXYzine HCL  50 mg Oral Q6H PRN Max 4/day Pratibha Romero, SHADY     • insulin detemir  10 Units Subcutaneous Daily With Breakfast Renee Larsen PA-C     • insulin detemir  10 Units Subcutaneous HS Renee Larsen PA-C     • insulin lispro  1-5 Units Subcutaneous HS Renee Larsen PA-C     • insulin lispro  1-6 Units Subcutaneous TID AC Renee Larsen PA-C     • LORazepam  1 mg Intramuscular Q6H PRN Max 3/day SHADY King     • LORazepam  1 mg Oral Q6H PRN Max 3/day SHADY King     • magnesium Oxide  400 mg Oral Daily Renee Larsen PA-C     • memantine  10 mg Oral Q12H [facility], SHADY     • metoprolol tartrate  100 mg Oral HS Renee Larsen PA-C     • metoprolol tartrate  50 mg Oral Daily Renee Larsen PA-C     • polyethylene glycol  17 g Oral Daily PRN SHADY Gerard     • propafenone  225 mg Oral Q8H [facility] Renee Larsen PA-C     • senna  1 tablet Oral HS SHADY Gerard     • sertraline  100 mg Oral Daily SHADY King     • traZODone  75 mg Oral HS PRN SHADY Pulido       Risks / Benefits of Treatment:  Risks, benefits, and possible side effects of medications explained to patient  Patient has limited understanding of risks and benefits of treatment at this time, but agrees to take medications as prescribed      Counseling / Coordination of Care:  Total floor/unit time spent today 25 minutes  Greater than 50% of total time was spent with the patient and / or family counseling and / or coordination of care  A description of the counseling / coordination of care:   Patient's progress discussed with staff in treatment team meeting  Medications, treatment progress and treatment plan reviewed with patient  Educated on importance of medication and treatment compliance  Reassurance and supportive therapy provided  Encouraged participation in milieu and group therapy on the unit      SHADY Jarrell 06/01/23

## 2023-06-01 NOTE — NURSING NOTE
Pt is visible in dayroom, bright and pleasant on approach  Pt is oriented to self and place only  He reports no SI/HI or AH/VH  Pt is compliant with medications and care  Safety checks ongoing

## 2023-06-01 NOTE — TREATMENT TEAM
06/01/23 0835   Team Meeting   Meeting Type Daily Rounds   Initial Conference Date 06/01/23   Team Members Present   Team Members Present Physician;Nurse;Occupational Therapist;   Physician Team Member Dr Aggie Estrella Team Member Judy Jay Management Team Member Annie   OT Team Member Carlyle CARNEY   Patient/Family Present   Patient Present No   Patient's Family Present No     Pleasant, smiling, confused, only oriented to self, med compliant, no agitation, no aggression, eating well

## 2023-06-02 LAB
GLUCOSE SERPL-MCNC: 137 MG/DL (ref 65–140)
GLUCOSE SERPL-MCNC: 181 MG/DL (ref 65–140)
GLUCOSE SERPL-MCNC: 184 MG/DL (ref 65–140)
GLUCOSE SERPL-MCNC: 97 MG/DL (ref 65–140)

## 2023-06-02 PROCEDURE — 99232 SBSQ HOSP IP/OBS MODERATE 35: CPT | Performed by: STUDENT IN AN ORGANIZED HEALTH CARE EDUCATION/TRAINING PROGRAM

## 2023-06-02 PROCEDURE — 82948 REAGENT STRIP/BLOOD GLUCOSE: CPT

## 2023-06-02 RX ADMIN — METOPROLOL TARTRATE 100 MG: 50 TABLET, FILM COATED ORAL at 21:35

## 2023-06-02 RX ADMIN — DOCUSATE SODIUM 100 MG: 100 CAPSULE, LIQUID FILLED ORAL at 08:49

## 2023-06-02 RX ADMIN — ARIPIPRAZOLE 5 MG: 5 TABLET ORAL at 08:49

## 2023-06-02 RX ADMIN — PROPAFENONE HYDROCHLORIDE 225 MG: 150 TABLET, FILM COATED ORAL at 14:37

## 2023-06-02 RX ADMIN — MEMANTINE 10 MG: 10 TABLET ORAL at 21:36

## 2023-06-02 RX ADMIN — METOPROLOL TARTRATE 50 MG: 50 TABLET, FILM COATED ORAL at 08:48

## 2023-06-02 RX ADMIN — INSULIN DETEMIR 10 UNITS: 100 INJECTION, SOLUTION SUBCUTANEOUS at 21:36

## 2023-06-02 RX ADMIN — INSULIN LISPRO 1 UNITS: 100 INJECTION, SOLUTION INTRAVENOUS; SUBCUTANEOUS at 12:14

## 2023-06-02 RX ADMIN — DOCUSATE SODIUM 100 MG: 100 CAPSULE, LIQUID FILLED ORAL at 17:02

## 2023-06-02 RX ADMIN — FERROUS SULFATE TAB 325 MG (65 MG ELEMENTAL FE) 325 MG: 325 (65 FE) TAB at 08:48

## 2023-06-02 RX ADMIN — INSULIN LISPRO 1 UNITS: 100 INJECTION, SOLUTION INTRAVENOUS; SUBCUTANEOUS at 21:36

## 2023-06-02 RX ADMIN — INSULIN DETEMIR 10 UNITS: 100 INJECTION, SOLUTION SUBCUTANEOUS at 08:48

## 2023-06-02 RX ADMIN — PROPAFENONE HYDROCHLORIDE 225 MG: 150 TABLET, FILM COATED ORAL at 21:36

## 2023-06-02 RX ADMIN — SENNOSIDES 8.6 MG: 8.6 TABLET, FILM COATED ORAL at 21:36

## 2023-06-02 RX ADMIN — SERTRALINE HYDROCHLORIDE 100 MG: 100 TABLET ORAL at 08:49

## 2023-06-02 RX ADMIN — ATORVASTATIN CALCIUM 10 MG: 10 TABLET, FILM COATED ORAL at 17:02

## 2023-06-02 RX ADMIN — PROPAFENONE HYDROCHLORIDE 225 MG: 150 TABLET, FILM COATED ORAL at 06:14

## 2023-06-02 RX ADMIN — MAGNESIUM OXIDE TAB 400 MG (241.3 MG ELEMENTAL MG) 400 MG: 400 (241.3 MG) TAB at 08:49

## 2023-06-02 RX ADMIN — CHOLECALCIFEROL TAB 25 MCG (1000 UNIT) 2000 UNITS: 25 TAB at 08:49

## 2023-06-02 RX ADMIN — MEMANTINE 10 MG: 10 TABLET ORAL at 08:49

## 2023-06-02 RX ADMIN — DONEPEZIL HYDROCHLORIDE 10 MG: 10 TABLET, FILM COATED ORAL at 21:36

## 2023-06-02 NOTE — PROGRESS NOTES
Pt pleasantly confused     06/02/23 1000 06/02/23 1330   Activity/Group Checklist   Group [employer] meeting Life Skills   Attendance Attended Did not attend   Attendance Duration (min) 31-45  --    Interactions Disorganized interaction  --    Affect/Mood Calm  --    Goals Achieved Able to listen to others  --

## 2023-06-02 NOTE — TREATMENT TEAM
Pt attended [facility] Recovery: Focus group  Pt attended group but noted he was sleepy today and lack of energy  06/02/23 1100   Activity/Group Checklist   Group Other (Comment)  ( Recovery: Focus)   Attendance Attended   Attendance Duration (min) 31-45   Interactions Disorganized interaction   Affect/Mood Other (Comment)  (sleepy)   Goals Achieved Identified feelings; Identified relapse prevention strategies; Discussed coping strategies; Able to listen to others; Able to engage in interactions

## 2023-06-02 NOTE — PLAN OF CARE
Problem: Alteration in Thoughts and Perception  Goal: Treatment Goal: Gain control of psychotic behaviors/thinking, reduce/eliminate presenting symptoms and demonstrate improved reality functioning upon discharge  Outcome: Progressing  Goal: Agree to be compliant with medication regime, as prescribed and report medication side effects  Description: Interventions:  - Offer appropriate PRN medication and supervise ingestion; conduct AIMS, as needed   Outcome: Progressing  Goal: Attend and participate in unit activities, including therapeutic, recreational, and educational groups  Description: Interventions:  -Encourage Visitation and family involvement in care  Outcome: Progressing  Goal: Complete daily ADLs, including personal hygiene independently, as able  Description: Interventions:  - Observe, teach, and assist patient with ADLS  - Monitor and promote a balance of rest/activity, with adequate nutrition and elimination   Outcome: Progressing     Problem: Ineffective Coping  Goal: Participates in unit activities  Description: Interventions:  - Provide therapeutic environment   - Provide required programming   - Redirect inappropriate behaviors   Outcome: Progressing  Goal: Free from restraint events  Description: - Utilize least restrictive measures   - Provide behavioral interventions   - Redirect inappropriate behaviors   Outcome: Progressing     Problem: Risk for Self Injury/Neglect  Goal: Treatment Goal: Remain safe during length of stay, learn and adopt new coping skills, and be free of self-injurious ideation, impulses and acts at the time of discharge  Outcome: Progressing  Goal: Refrain from harming self  Description: Interventions:  - Monitor patient closely, per order  - Develop a trusting relationship  - Supervise medication ingestion, monitor effects and side effects   Outcome: Progressing  Goal: Attend and participate in unit activities, including therapeutic, recreational, and educational groups  Description: Interventions:  - Provide therapeutic and educational activities daily, encourage attendance and participation, and document same in the medical record  - Obtain collateral information, encourage visitation and family involvement in care   Outcome: Progressing  Goal: Recognize maladaptive responses and adopt new coping mechanisms  Outcome: Progressing  Goal: Complete daily ADLs, including personal hygiene independently, as able  Description: Interventions:  - Observe, teach, and assist patient with ADLS  - Monitor and promote a balance of rest/activity, with adequate nutrition and elimination  Outcome: Progressing     Problem: Depression  Goal: Treatment Goal: Demonstrate behavioral control of depressive symptoms, verbalize feelings of improved mood/affect, and adopt new coping skills prior to discharge  Outcome: Progressing  Goal: Refrain from harming self  Description: Interventions:  - Monitor patient closely, per order   - Supervise medication ingestion, monitor effects and side effects   Outcome: Progressing  Goal: Refrain from isolation  Description: Interventions:  - Develop a trusting relationship   - Encourage socialization   Outcome: Progressing  Goal: Refrain from self-neglect  Outcome: Progressing  Goal: Attend and participate in unit activities, including therapeutic, recreational, and educational groups  Description: Interventions:  - Provide therapeutic and educational activities daily, encourage attendance and participation, and document same in the medical record   Outcome: Progressing  Goal: Complete daily ADLs, including personal hygiene independently, as able  Description: Interventions:  - Observe, teach, and assist patient with ADLS  -  Monitor and promote a balance of rest/activity, with adequate nutrition and elimination   Outcome: Progressing     Problem: Anxiety  Goal: Anxiety is at manageable level  Description: Interventions:  - Assess and monitor patient's anxiety level    - Monitor for signs and symptoms (heart palpitations, chest pain, shortness of breath, headaches, nausea, feeling jumpy, restlessness, irritable, apprehensive)  - Collaborate with interdisciplinary team and initiate plan and interventions as ordered    - [place] patient to unit/surroundings  - Explain treatment plan  - Encourage participation in care  - Encourage verbalization of concerns/fears  - Identify coping mechanisms  - Assist in developing anxiety-reducing skills  - Administer/offer alternative therapies  - Limit or eliminate stimulants  Outcome: Progressing     Problem: Risk for Violence/Aggression Toward Others  Goal: Treatment Goal: Refrain from acts of violence/aggression during length of stay, and demonstrate improved impulse control at the time of discharge  Outcome: Progressing  Goal: Verbalize thoughts and feelings  Description: Interventions:  - Assess and re-assess patient's level of risk, every waking shift  - Engage patient in 1:1 interactions, daily, for a minimum of 15 minutes   - Allow patient to express feelings and frustrations in a safe and non-threatening manner   - Establish rapport/trust with patient   Outcome: Progressing  Goal: Refrain from harming others  Outcome: Progressing  Goal: Refrain from destructive acts on the environment or property  Outcome: Progressing  Goal: Control angry outbursts  Description: Interventions:  - Monitor patient closely, per order  - Ensure early verbal de-escalation  - Monitor prn medication needs  - Set reasonable/therapeutic limits, outline behavioral expectations, and consequences   - Provide a non-threatening milieu, utilizing the least restrictive interventions   Outcome: Progressing  Goal: Attend and participate in unit activities, including therapeutic, recreational, and educational groups  Description: Interventions:  - Provide therapeutic and educational activities daily, encourage attendance and participation, and document same in the medical record   Outcome: Progressing  Goal: Identify appropriate positive anger management techniques  Description: Interventions:  - Offer anger management and coping skills groups   - Staff will provide positive feedback for appropriate anger control  Outcome: Progressing     Problem: Alteration in Orientation  Goal: Treatment Goal: Demonstrate a reduction of confusion and improved orientation to person, place, time and/or situation upon discharge, according to optimum baseline/ability  Outcome: Progressing  Goal: Interact with staff daily  Description: Interventions:  - Assess and re-assess patient's level of orientation  - Engage patient in 1 on 1 interactions, daily, for a minimum of 15 minutes   - Establish rapport/trust with patient   Outcome: Progressing  Goal: Express concerns related to confused thinking related to:  Description: Interventions:  - Encourage patient to express feelings, fears, frustrations, hopes  - Assign consistent caregivers   - [place]/re-orient patient as needed  - Allow comfort items, as appropriate  - Provide visual cues, signs, etc    Outcome: Progressing  Goal: Allow medical examinations, as recommended  Description: Interventions:  - Provide physical/neurological exams and/or referrals, per provider   Outcome: Progressing  Goal: Cooperate with recommended testing/procedures  Description: Interventions:  - Determine need for ancillary testing  - Observe for mental status changes  - Implement falls/precaution protocol   Outcome: Progressing  Goal: Attend and participate in unit activities, including therapeutic, recreational, and educational groups  Description: Interventions:  - Provide therapeutic and educational activities daily, encourage attendance and participation, and document same in the medical record   - Provide appropriate opportunities for reminiscence   - Provide a consistent daily routine   - Encourage family contact/visitation   Outcome: Progressing  Goal: Complete daily ADLs, including personal hygiene independently, as able  Description: Interventions:  - Observe, teach, and assist patient with ADLS  Outcome: Progressing     Problem: Prexisting or High Potential for Compromised Skin Integrity  Goal: Skin integrity is maintained or improved  Description: INTERVENTIONS:  - Identify patients at risk for skin breakdown  - Assess and monitor skin integrity  - Assess and monitor nutrition and hydration status  - Monitor labs   - Assess for incontinence   - Turn and reposition patient  - Assist with mobility/ambulation  - Relieve pressure over bony prominences  - Avoid friction and shearing  - Provide appropriate hygiene as needed including keeping skin clean and dry  - Evaluate need for skin moisturizer/barrier cream  - Collaborate with interdisciplinary team   - Patient/family teaching  - Consider wound care consult   Outcome: Progressing     Problem: DISCHARGE PLANNING - CARE MANAGEMENT  Goal: Discharge to post-acute care or home with appropriate resources  Description: INTERVENTIONS:  - Conduct assessment to determine patient/family and health care team treatment goals, and need for post-acute services based on payer coverage, community resources, and patient preferences, and barriers to discharge  - Address psychosocial, clinical, and financial barriers to discharge as identified in assessment in conjunction with the patient/family and health care team  - Arrange appropriate level of post-acute services according to patient’s   needs and preference and payer coverage in collaboration with the physician and health care team  - Communicate with and update the patient/family, physician, and health care team regarding progress on the discharge plan  - Arrange appropriate transportation to post-acute venues  Outcome: Progressing

## 2023-06-02 NOTE — CASE MANAGEMENT
CM rec'd call from Lucien aRyo, admissions/marketing at [facility]; tel# [phone], fax# [phone]  Reports she rec'd call from pt's guardian Reyna Leventhal requesting referral be sent to [facility]  CM confirmed with Reyna Leventhal request for [facility]  CM faxed clinical to Lucien Rayo at [facility]

## 2023-06-02 NOTE — NURSING NOTE
No behavioral issues noted, patient is calm, visible, and social  Patient is medication compliant, and cooperative with care  No further distress noted  Will continue to monitor

## 2023-06-02 NOTE — CASE MANAGEMENT
Cm informed by pt's guardian Kwasi Arnold that pt has two biological daughters and requesting that pt's dtr Kylie Albarran be permitted to have pt's medical information  Kwasi Arnold reports she is actviely looking into assisted living facilities to move pt and pt's wife into that would not be as expensive as [facility] and that can accommodate pt on a secure unit  [address] [facility] [address] and [facility]  Kwasi Arnold to reach out to [facility]

## 2023-06-03 LAB
CHOLEST SERPL-MCNC: 120 MG/DL
GLUCOSE SERPL-MCNC: 137 MG/DL (ref 65–140)
GLUCOSE SERPL-MCNC: 174 MG/DL (ref 65–140)
GLUCOSE SERPL-MCNC: 175 MG/DL (ref 65–140)
GLUCOSE SERPL-MCNC: 97 MG/DL (ref 65–140)
HDLC SERPL-MCNC: 25 MG/DL
LDLC SERPL CALC-MCNC: 60 MG/DL (ref 0–100)
NONHDLC SERPL-MCNC: 95 MG/DL
TRIGL SERPL-MCNC: 173 MG/DL

## 2023-06-03 PROCEDURE — 82948 REAGENT STRIP/BLOOD GLUCOSE: CPT

## 2023-06-03 PROCEDURE — 80061 LIPID PANEL: CPT

## 2023-06-03 PROCEDURE — 99232 SBSQ HOSP IP/OBS MODERATE 35: CPT | Performed by: STUDENT IN AN ORGANIZED HEALTH CARE EDUCATION/TRAINING PROGRAM

## 2023-06-03 RX ADMIN — DONEPEZIL HYDROCHLORIDE 10 MG: 10 TABLET, FILM COATED ORAL at 21:21

## 2023-06-03 RX ADMIN — INSULIN DETEMIR 10 UNITS: 100 INJECTION, SOLUTION SUBCUTANEOUS at 08:50

## 2023-06-03 RX ADMIN — INSULIN DETEMIR 10 UNITS: 100 INJECTION, SOLUTION SUBCUTANEOUS at 21:19

## 2023-06-03 RX ADMIN — MAGNESIUM OXIDE TAB 400 MG (241.3 MG ELEMENTAL MG) 400 MG: 400 (241.3 MG) TAB at 08:50

## 2023-06-03 RX ADMIN — SENNOSIDES 8.6 MG: 8.6 TABLET, FILM COATED ORAL at 21:21

## 2023-06-03 RX ADMIN — CHOLECALCIFEROL TAB 25 MCG (1000 UNIT) 2000 UNITS: 25 TAB at 08:46

## 2023-06-03 RX ADMIN — DOCUSATE SODIUM 100 MG: 100 CAPSULE, LIQUID FILLED ORAL at 17:24

## 2023-06-03 RX ADMIN — METOPROLOL TARTRATE 100 MG: 50 TABLET, FILM COATED ORAL at 21:21

## 2023-06-03 RX ADMIN — INSULIN LISPRO 1 UNITS: 100 INJECTION, SOLUTION INTRAVENOUS; SUBCUTANEOUS at 17:22

## 2023-06-03 RX ADMIN — FERROUS SULFATE TAB 325 MG (65 MG ELEMENTAL FE) 325 MG: 325 (65 FE) TAB at 08:46

## 2023-06-03 RX ADMIN — PROPAFENONE HYDROCHLORIDE 225 MG: 150 TABLET, FILM COATED ORAL at 21:20

## 2023-06-03 RX ADMIN — PROPAFENONE HYDROCHLORIDE 225 MG: 150 TABLET, FILM COATED ORAL at 05:40

## 2023-06-03 RX ADMIN — DOCUSATE SODIUM 100 MG: 100 CAPSULE, LIQUID FILLED ORAL at 08:50

## 2023-06-03 RX ADMIN — ATORVASTATIN CALCIUM 10 MG: 10 TABLET, FILM COATED ORAL at 17:24

## 2023-06-03 RX ADMIN — MEMANTINE 10 MG: 10 TABLET ORAL at 21:21

## 2023-06-03 RX ADMIN — SERTRALINE HYDROCHLORIDE 100 MG: 100 TABLET ORAL at 08:47

## 2023-06-03 RX ADMIN — ARIPIPRAZOLE 5 MG: 5 TABLET ORAL at 08:46

## 2023-06-03 RX ADMIN — METOPROLOL TARTRATE 50 MG: 50 TABLET, FILM COATED ORAL at 08:47

## 2023-06-03 RX ADMIN — PROPAFENONE HYDROCHLORIDE 225 MG: 150 TABLET, FILM COATED ORAL at 15:00

## 2023-06-03 RX ADMIN — INSULIN LISPRO 1 UNITS: 100 INJECTION, SOLUTION INTRAVENOUS; SUBCUTANEOUS at 21:19

## 2023-06-03 RX ADMIN — MEMANTINE 10 MG: 10 TABLET ORAL at 08:47

## 2023-06-03 NOTE — NURSING NOTE
Pt calm and cooperative, visible on unit  Good intake at meals  Attended group activity  Pleasant and cheerful during interactions  Cooperative with prompts  Medication adherent

## 2023-06-03 NOTE — NURSING NOTE
Pt is calm and pleasant brightens on approach  Pt is visible in milieu and social with peers and staff  Pt is meal and med compliant  Will continue to monitor

## 2023-06-03 NOTE — PLAN OF CARE
Problem: Alteration in Thoughts and Perception  Goal: Treatment Goal: Gain control of psychotic behaviors/thinking, reduce/eliminate presenting symptoms and demonstrate improved reality functioning upon discharge  Outcome: Progressing  Goal: Verbalize thoughts and feelings  Description: Interventions:  - Promote a nonjudgmental and trusting relationship with the patient through active listening and therapeutic communication  - Assess patient's level of functioning, behavior and potential for risk  - Engage patient in 1 on 1 interactions  - Encourage patient to express fears, feelings, frustrations, and discuss symptoms    - [place] patient to reality, help patient recognize reality-based thinking   - Administer medications as ordered and assess for potential side effects  - Provide the patient education related to the signs and symptoms of the illness and desired effects of prescribed medications  Outcome: Progressing  Goal: Refrain from acting on delusional thinking/internal stimuli  Description: Interventions:  - Monitor patient closely, per order   - Utilize least restrictive measures   - Set reasonable limits, give positive feedback for acceptable   - Administer medications as ordered and monitor of potential side effects  Outcome: Progressing  Goal: Agree to be compliant with medication regime, as prescribed and report medication side effects  Description: Interventions:  - Offer appropriate PRN medication and supervise ingestion; conduct AIMS, as needed   Outcome: Progressing  Goal: Attend and participate in unit activities, including therapeutic, recreational, and educational groups  Description: Interventions:  -Encourage Visitation and family involvement in care  Outcome: Progressing  Goal: Recognize dysfunctional thoughts, communicate reality-based thoughts at the time of discharge  Description: Interventions:  - Provide medication and psycho-education to assist patient in compliance and developing insight into his/her illness   Outcome: Progressing  Goal: Complete daily ADLs, including personal hygiene independently, as able  Description: Interventions:  - Observe, teach, and assist patient with ADLS  - Monitor and promote a balance of rest/activity, with adequate nutrition and elimination   Outcome: Progressing     Problem: Risk for Self Injury/Neglect  Goal: Treatment Goal: Remain safe during length of stay, learn and adopt new coping skills, and be free of self-injurious ideation, impulses and acts at the time of discharge  Outcome: Progressing  Goal: Verbalize thoughts and feelings  Description: Interventions:  - Assess and re-assess patient's lethality and potential for self-injury  - Engage patient in 1:1 interactions, daily, for a minimum of 15 minutes  - Encourage patient to express feelings, fears, frustrations, hopes  - Establish rapport/trust with patient   Outcome: Progressing  Goal: Refrain from harming self  Description: Interventions:  - Monitor patient closely, per order  - Develop a trusting relationship  - Supervise medication ingestion, monitor effects and side effects   Outcome: Progressing  Goal: Attend and participate in unit activities, including therapeutic, recreational, and educational groups  Description: Interventions:  - Provide therapeutic and educational activities daily, encourage attendance and participation, and document same in the medical record  - Obtain collateral information, encourage visitation and family involvement in care   Outcome: Progressing  Goal: Recognize maladaptive responses and adopt new coping mechanisms  Outcome: Progressing  Goal: Complete daily ADLs, including personal hygiene independently, as able  Description: Interventions:  - Observe, teach, and assist patient with ADLS  - Monitor and promote a balance of rest/activity, with adequate nutrition and elimination  Outcome: Progressing     Problem: Depression  Goal: Treatment Goal: Demonstrate behavioral control of depressive symptoms, verbalize feelings of improved mood/affect, and adopt new coping skills prior to discharge  Outcome: Progressing  Goal: Verbalize thoughts and feelings  Description: Interventions:  - Assess and re-assess patient's level of risk   - Engage patient in 1:1 interactions, daily, for a minimum of 15 minutes   - Encourage patient to express feelings, fears, frustrations, hopes   Outcome: Progressing  Goal: Refrain from harming self  Description: Interventions:  - Monitor patient closely, per order   - Supervise medication ingestion, monitor effects and side effects   Outcome: Progressing  Goal: Refrain from isolation  Description: Interventions:  - Develop a trusting relationship   - Encourage socialization   Outcome: Progressing  Goal: Refrain from self-neglect  Outcome: Progressing  Goal: Attend and participate in unit activities, including therapeutic, recreational, and educational groups  Description: Interventions:  - Provide therapeutic and educational activities daily, encourage attendance and participation, and document same in the medical record   Outcome: Progressing  Goal: Complete daily ADLs, including personal hygiene independently, as able  Description: Interventions:  - Observe, teach, and assist patient with ADLS  -  Monitor and promote a balance of rest/activity, with adequate nutrition and elimination   Outcome: Progressing     Problem: Anxiety  Goal: Anxiety is at manageable level  Description: Interventions:  - Assess and monitor patient's anxiety level  - Monitor for signs and symptoms (heart palpitations, chest pain, shortness of breath, headaches, nausea, feeling jumpy, restlessness, irritable, apprehensive)  - Collaborate with interdisciplinary team and initiate plan and interventions as ordered    - [place] patient to unit/surroundings  - Explain treatment plan  - Encourage participation in care  - Encourage verbalization of concerns/fears  - Identify coping mechanisms  - Assist in developing anxiety-reducing skills  - Administer/offer alternative therapies  - Limit or eliminate stimulants  Outcome: Progressing     Problem: Risk for Violence/Aggression Toward Others  Goal: Treatment Goal: Refrain from acts of violence/aggression during length of stay, and demonstrate improved impulse control at the time of discharge  Outcome: Progressing  Goal: Verbalize thoughts and feelings  Description: Interventions:  - Assess and re-assess patient's level of risk, every waking shift  - Engage patient in 1:1 interactions, daily, for a minimum of 15 minutes   - Allow patient to express feelings and frustrations in a safe and non-threatening manner   - Establish rapport/trust with patient   Outcome: Progressing  Goal: Refrain from harming others  Outcome: Progressing  Goal: Refrain from destructive acts on the environment or property  Outcome: Progressing  Goal: Control angry outbursts  Description: Interventions:  - Monitor patient closely, per order  - Ensure early verbal de-escalation  - Monitor prn medication needs  - Set reasonable/therapeutic limits, outline behavioral expectations, and consequences   - Provide a non-threatening milieu, utilizing the least restrictive interventions   Outcome: Progressing  Goal: Attend and participate in unit activities, including therapeutic, recreational, and educational groups  Description: Interventions:  - Provide therapeutic and educational activities daily, encourage attendance and participation, and document same in the medical record   Outcome: Progressing  Goal: Identify appropriate positive anger management techniques  Description: Interventions:  - Offer anger management and coping skills groups   - Staff will provide positive feedback for appropriate anger control  Outcome: Progressing     Problem: Alteration in Orientation  Goal: Treatment Goal: Demonstrate a reduction of confusion and improved orientation to person, place, time and/or situation upon discharge, according to optimum baseline/ability  Outcome: Progressing  Goal: Interact with staff daily  Description: Interventions:  - Assess and re-assess patient's level of orientation  - Engage patient in 1 on 1 interactions, daily, for a minimum of 15 minutes   - Establish rapport/trust with patient   Outcome: Progressing  Goal: Express concerns related to confused thinking related to:  Description: Interventions:  - Encourage patient to express feelings, fears, frustrations, hopes  - Assign consistent caregivers   - [place]/re-orient patient as needed  - Allow comfort items, as appropriate  - Provide visual cues, signs, etc    Outcome: Progressing  Goal: Allow medical examinations, as recommended  Description: Interventions:  - Provide physical/neurological exams and/or referrals, per provider   Outcome: Progressing  Goal: Cooperate with recommended testing/procedures  Description: Interventions:  - Determine need for ancillary testing  - Observe for mental status changes  - Implement falls/precaution protocol   Outcome: Progressing  Goal: Attend and participate in unit activities, including therapeutic, recreational, and educational groups  Description: Interventions:  - Provide therapeutic and educational activities daily, encourage attendance and participation, and document same in the medical record   - Provide appropriate opportunities for reminiscence   - Provide a consistent daily routine   - Encourage family contact/visitation   Outcome: Progressing  Goal: Complete daily ADLs, including personal hygiene independently, as able  Description: Interventions:  - Observe, teach, and assist patient with ADLS  Outcome: Progressing     Problem: Prexisting or High Potential for Compromised Skin Integrity  Goal: Skin integrity is maintained or improved  Description: INTERVENTIONS:  - Identify patients at risk for skin breakdown  - Assess and monitor skin integrity  - Assess and monitor nutrition and hydration status  - Monitor labs   - Assess for incontinence   - Turn and reposition patient  - Assist with mobility/ambulation  - Relieve pressure over bony prominences  - Avoid friction and shearing  - Provide appropriate hygiene as needed including keeping skin clean and dry  - Evaluate need for skin moisturizer/barrier cream  - Collaborate with interdisciplinary team   - Patient/family teaching  - Consider wound care consult   Outcome: Progressing

## 2023-06-03 NOTE — NURSING NOTE
The patient has been withdrawn to room this evening  The patient is calm and cooperative on approach  He denies depression and anxiety  Denies SI/HI/AVH  The patient has been med compliant  No acting out behaviors  Safety checks ongoing

## 2023-06-03 NOTE — PROGRESS NOTES
"Progress Note - [address] [facility],[address] 80 y o  male MRN: [id number]   Unit/Bed#: Nilda Patel 603-01 Encounter: [id number]    Behavior over the last 24 hours: unchanged  Wanda Sylvester is an 80 y o  male diagnosed with unspecified mood (affective) disorder, dementia with behavioral disturbance, CKD, T2DM seen today in follow-up  Per staff, no behavioral issues reported overnight and is pleasantly confused  On approach in dinning room with another female peer  Generally bright, smiling and pleasant  Remains confused, does not remember speaking with his wife or context of conversation  States he has trouble \"remembering things  \" Otherwise denies any issues or complaints  No SI/HI/AVH       Sleep: normal  Appetite: normal  Medication side effects: No   ROS: no complaints, all other systems are negative    Mental Status Evaluation:    Appearance:  age appropriate, casually dressed, wearing glasses, appears stated age   Behavior:  cooperative, calm   Speech:  normal rate, normal volume, normal pitch   Mood:  euthymic   Affect:  appropriate   Thought Process:  Poverty of thought   Associations: concrete associations   Thought Content:  poverty of thought   Perceptual Disturbances: none   Risk Potential: Suicidal ideation - None at present  Homicidal ideation - None at present  Potential for aggression - No   Sensorium:  oriented to person and place   Memory:  recent memory impaired, remote memory impaired   Consciousness:  alert and awake   Attention/Concentration: attention span and concentration are age appropriate   Insight:  limited   Judgment: limited   Gait/Station: normal gait/station   Motor Activity: no abnormal movements     Vital signs in last 24 hours:    Temp:  [97 2 °F (36 2 °C)-98 1 °F (36 7 °C)] 97 2 °F (36 2 °C)  HR:  [58-81] 58  Resp:  [18] 18  BP: (111-150)/(59-80) 124/65    Laboratory results: I have personally reviewed all pertinent laboratory/tests results    Results from the past 24 hours:   Recent " Results (from the past 24 hour(s))   Fingerstick Glucose (POCT)    Collection Time: 06/02/23 11:33 AM   Result Value Ref Range    POC Glucose 181 (H) 65 - 140 mg/dl   Fingerstick Glucose (POCT)    Collection Time: 06/02/23  4:02 PM   Result Value Ref Range    POC Glucose 137 65 - 140 mg/dl   Fingerstick Glucose (POCT)    Collection Time: 06/02/23  9:02 PM   Result Value Ref Range    POC Glucose 184 (H) 65 - 140 mg/dl   Lipid panel    Collection Time: 06/03/23  5:40 AM   Result Value Ref Range    Cholesterol 120 See Comment mg/dL    Triglycerides 173 (H) See Comment mg/dL    HDL, Direct 25 (L) >=40 mg/dL    LDL Calculated 60 0 - 100 mg/dL    Non-HDL-Chol (CHOL-HDL) 95 mg/dl   Fingerstick Glucose (POCT)    Collection Time: 06/03/23  7:23 AM   Result Value Ref Range    POC Glucose 97 65 - 140 mg/dl     Most Recent Labs:   Lab Results   Component Value Date    ALB 4 4 05/29/2023    ALKPHOS 61 05/29/2023    ALT 12 05/29/2023    AST 15 05/29/2023    BUN 28 (H) 05/31/2023    CALCIUM 9 3 05/31/2023    CHOLESTEROL 120 06/03/2023     05/31/2023    CO2 28 05/31/2023    CREATININE 1 84 (H) 05/31/2023     05/25/2023    GLUC 104 05/31/2023    HCT 38 3 05/25/2023    HDL 25 (L) 06/03/2023    HGB 12 3 05/25/2023    HGBA1C 8 0 (H) 05/25/2023    K 4 2 05/31/2023    LDLCALC 60 06/03/2023    NEUTROABS 4 03 05/25/2023    NONHDLC 95 06/03/2023     05/25/2023    RBC 4 38 05/25/2023    RDW 12 7 05/25/2023    SODIUM 138 05/31/2023    TBILI 0 65 05/29/2023    TP 7 5 05/29/2023    TRIG 173 (H) 06/03/2023    [id number] 3 060 05/25/2023    WBC 6 72 05/25/2023       Progress Toward Goals: progressing    Assessment/Plan   Principal Problem:    Unspecified mood (affective) disorder (HCC)  Active Problems:    Essential hypertension    Mixed hyperlipidemia    Type 2 diabetes mellitus with kidney complication, with long-term current use of insulin (HCC)    Dementia with behavioral disturbance (HCC)    Medical clearance for psychiatric admission    Chronic kidney disease (CKD), stage IV (severe) ([facility] [address] )      Recommended Treatment:     Planned medication and treatment changes:     All current active medications have been reviewed  Encourage group therapy, milieu therapy and occupational therapy  Behavioral Health checks every 7 minutes  Continue current medications:    Current Facility-Administered Medications   Medication Dose Route Frequency Provider Last Rate   • acetaminophen  650 mg Oral Q4H PRN Jackie Box, CRNP     • acetaminophen  650 mg Oral Q4H PRN Jackie Box, CRNP     • acetaminophen  975 mg Oral Q6H PRN Jackie Andres, MICAHNP     • ARIPiprazole  5 mg Oral Daily Sasha Herring MD     • atorvastatin  10 mg Oral Daily With Dinner Scot Brine, PA-C     • cholecalciferol  2,000 Units Oral Daily Scot Brine, PA-C     • docusate sodium  100 mg Oral BID SHADY Gerard     • donepezil  10 mg Oral HS SHADY Jenkins     • ferrous sulfate  325 mg Oral Daily With Breakfast Scot Em, PA-C     • hydrOXYzine HCL  25 mg Oral Q6H PRN Max 4/day SHADY Jenkins     • hydrOXYzine HCL  50 mg Oral Q6H PRN Max 4/day MICAH JenkinsNP     • insulin detemir  10 Units Subcutaneous Daily With Breakfast Scot Brine, PA-C     • insulin detemir  10 Units Subcutaneous HS Scot Brine, PA-C     • insulin lispro  1-5 Units Subcutaneous HS Scot Brine, PA-C     • insulin lispro  1-6 Units Subcutaneous TID AC Scot Brine, PA-C     • LORazepam  1 mg Intramuscular Q6H PRN Max 3/day SHADY Jenkins     • LORazepam  1 mg Oral Q6H PRN Max 3/day SHADY Jenkins     • magnesium Oxide  400 mg Oral Daily Scot Brine, PA-C     • memantine  10 mg Oral Q12H [address] Jackie Andres, CRNP     • metoprolol tartrate  100 mg Oral HS Scot Brine, PA-C     • metoprolol tartrate  50 mg Oral Daily Scot Brine, PAJtC     • polyethylene glycol  17 g Oral Daily PRN Thalia Gerow-Smith, CRNP     • propafenone  225 mg Oral Q8H [address] Clari Espana PA-C     • senna  1 tablet Oral HS SHADY Gerard     • sertraline  100 mg Oral Daily SHADY Gonsalez     • traZODone  75 mg Oral HS PRN SHADY Pulido       Risks / Benefits of Treatment:    Risks, benefits, and possible side effects of medications explained to patient and patient verbalizes understanding and agreement for treatment  Counseling / Coordination of Care: Total floor / unit time spent today 20 minutes  Greater than 50% of total time was spent with the patient and / or family counseling and / or coordination of care  A description of counseling / coordination of care:  Patient's progress discussed with staff in treatment team meeting  Medications, treatment progress and treatment plan reviewed with patient      Radha Dee PA-C 06/03/23

## 2023-06-03 NOTE — NURSING NOTE
Pt observed coughing during lunch, pt reported feeling food stuck in throat  Pt continued coughing to relieve symptoms and vomited small amount of food  No distress noted following  Diet downgraded to dysphagia 3  Speech eval ordered to assess pt swallowing

## 2023-06-04 LAB
GLUCOSE SERPL-MCNC: 102 MG/DL (ref 65–140)
GLUCOSE SERPL-MCNC: 127 MG/DL (ref 65–140)
GLUCOSE SERPL-MCNC: 203 MG/DL (ref 65–140)
GLUCOSE SERPL-MCNC: 70 MG/DL (ref 65–140)

## 2023-06-04 PROCEDURE — 99232 SBSQ HOSP IP/OBS MODERATE 35: CPT | Performed by: STUDENT IN AN ORGANIZED HEALTH CARE EDUCATION/TRAINING PROGRAM

## 2023-06-04 PROCEDURE — 82948 REAGENT STRIP/BLOOD GLUCOSE: CPT

## 2023-06-04 RX ADMIN — DOCUSATE SODIUM 100 MG: 100 CAPSULE, LIQUID FILLED ORAL at 08:41

## 2023-06-04 RX ADMIN — ATORVASTATIN CALCIUM 10 MG: 10 TABLET, FILM COATED ORAL at 17:09

## 2023-06-04 RX ADMIN — INSULIN DETEMIR 10 UNITS: 100 INJECTION, SOLUTION SUBCUTANEOUS at 08:43

## 2023-06-04 RX ADMIN — MEMANTINE 10 MG: 10 TABLET ORAL at 08:42

## 2023-06-04 RX ADMIN — PROPAFENONE HYDROCHLORIDE 225 MG: 150 TABLET, FILM COATED ORAL at 14:23

## 2023-06-04 RX ADMIN — SENNOSIDES 8.6 MG: 8.6 TABLET, FILM COATED ORAL at 21:40

## 2023-06-04 RX ADMIN — INSULIN LISPRO 3 UNITS: 100 INJECTION, SOLUTION INTRAVENOUS; SUBCUTANEOUS at 21:35

## 2023-06-04 RX ADMIN — DONEPEZIL HYDROCHLORIDE 10 MG: 10 TABLET, FILM COATED ORAL at 21:39

## 2023-06-04 RX ADMIN — MAGNESIUM OXIDE TAB 400 MG (241.3 MG ELEMENTAL MG) 400 MG: 400 (241.3 MG) TAB at 08:41

## 2023-06-04 RX ADMIN — CHOLECALCIFEROL TAB 25 MCG (1000 UNIT) 2000 UNITS: 25 TAB at 08:41

## 2023-06-04 RX ADMIN — METOPROLOL TARTRATE 100 MG: 50 TABLET, FILM COATED ORAL at 21:39

## 2023-06-04 RX ADMIN — INSULIN DETEMIR 10 UNITS: 100 INJECTION, SOLUTION SUBCUTANEOUS at 21:35

## 2023-06-04 RX ADMIN — ARIPIPRAZOLE 5 MG: 5 TABLET ORAL at 08:41

## 2023-06-04 RX ADMIN — INSULIN LISPRO 2 UNITS: 100 INJECTION, SOLUTION INTRAVENOUS; SUBCUTANEOUS at 17:10

## 2023-06-04 RX ADMIN — PROPAFENONE HYDROCHLORIDE 225 MG: 150 TABLET, FILM COATED ORAL at 05:38

## 2023-06-04 RX ADMIN — DOCUSATE SODIUM 100 MG: 100 CAPSULE, LIQUID FILLED ORAL at 17:09

## 2023-06-04 RX ADMIN — SERTRALINE HYDROCHLORIDE 100 MG: 100 TABLET ORAL at 08:41

## 2023-06-04 RX ADMIN — FERROUS SULFATE TAB 325 MG (65 MG ELEMENTAL FE) 325 MG: 325 (65 FE) TAB at 08:42

## 2023-06-04 RX ADMIN — METOPROLOL TARTRATE 50 MG: 50 TABLET, FILM COATED ORAL at 08:41

## 2023-06-04 RX ADMIN — PROPAFENONE HYDROCHLORIDE 225 MG: 150 TABLET, FILM COATED ORAL at 21:35

## 2023-06-04 RX ADMIN — MEMANTINE 10 MG: 10 TABLET ORAL at 21:40

## 2023-06-04 NOTE — PLAN OF CARE
Problem: Alteration in Thoughts and Perception  Goal: Treatment Goal: Gain control of psychotic behaviors/thinking, reduce/eliminate presenting symptoms and demonstrate improved reality functioning upon discharge  Outcome: Progressing  Goal: Verbalize thoughts and feelings  Description: Interventions:  - Promote a nonjudgmental and trusting relationship with the patient through active listening and therapeutic communication  - Assess patient's level of functioning, behavior and potential for risk  - Engage patient in 1 on 1 interactions  - Encourage patient to express fears, feelings, frustrations, and discuss symptoms    - [place] patient to reality, help patient recognize reality-based thinking   - Administer medications as ordered and assess for potential side effects  - Provide the patient education related to the signs and symptoms of the illness and desired effects of prescribed medications  Outcome: Progressing  Goal: Refrain from acting on delusional thinking/internal stimuli  Description: Interventions:  - Monitor patient closely, per order   - Utilize least restrictive measures   - Set reasonable limits, give positive feedback for acceptable   - Administer medications as ordered and monitor of potential side effects  Outcome: Progressing  Goal: Agree to be compliant with medication regime, as prescribed and report medication side effects  Description: Interventions:  - Offer appropriate PRN medication and supervise ingestion; conduct AIMS, as needed   Outcome: Progressing  Goal: Attend and participate in unit activities, including therapeutic, recreational, and educational groups  Description: Interventions:  -Encourage Visitation and family involvement in care  Outcome: Progressing  Goal: Recognize dysfunctional thoughts, communicate reality-based thoughts at the time of discharge  Description: Interventions:  - Provide medication and psycho-education to assist patient in compliance and developing insight into his/her illness   Outcome: Progressing  Goal: Complete daily ADLs, including personal hygiene independently, as able  Description: Interventions:  - Observe, teach, and assist patient with ADLS  - Monitor and promote a balance of rest/activity, with adequate nutrition and elimination   Outcome: Progressing     Problem: Risk for Self Injury/Neglect  Goal: Treatment Goal: Remain safe during length of stay, learn and adopt new coping skills, and be free of self-injurious ideation, impulses and acts at the time of discharge  Outcome: Progressing  Goal: Verbalize thoughts and feelings  Description: Interventions:  - Assess and re-assess patient's lethality and potential for self-injury  - Engage patient in 1:1 interactions, daily, for a minimum of 15 minutes  - Encourage patient to express feelings, fears, frustrations, hopes  - Establish rapport/trust with patient   Outcome: Progressing  Goal: Refrain from harming self  Description: Interventions:  - Monitor patient closely, per order  - Develop a trusting relationship  - Supervise medication ingestion, monitor effects and side effects   Outcome: Progressing  Goal: Attend and participate in unit activities, including therapeutic, recreational, and educational groups  Description: Interventions:  - Provide therapeutic and educational activities daily, encourage attendance and participation, and document same in the medical record  - Obtain collateral information, encourage visitation and family involvement in care   Outcome: Progressing  Goal: Recognize maladaptive responses and adopt new coping mechanisms  Outcome: Progressing  Goal: Complete daily ADLs, including personal hygiene independently, as able  Description: Interventions:  - Observe, teach, and assist patient with ADLS  - Monitor and promote a balance of rest/activity, with adequate nutrition and elimination  Outcome: Progressing     Problem: Depression  Goal: Treatment Goal: Demonstrate behavioral control of depressive symptoms, verbalize feelings of improved mood/affect, and adopt new coping skills prior to discharge  Outcome: Progressing  Goal: Verbalize thoughts and feelings  Description: Interventions:  - Assess and re-assess patient's level of risk   - Engage patient in 1:1 interactions, daily, for a minimum of 15 minutes   - Encourage patient to express feelings, fears, frustrations, hopes   Outcome: Progressing  Goal: Refrain from harming self  Description: Interventions:  - Monitor patient closely, per order   - Supervise medication ingestion, monitor effects and side effects   Outcome: Progressing  Goal: Refrain from isolation  Description: Interventions:  - Develop a trusting relationship   - Encourage socialization   Outcome: Progressing  Goal: Refrain from self-neglect  Outcome: Progressing  Goal: Attend and participate in unit activities, including therapeutic, recreational, and educational groups  Description: Interventions:  - Provide therapeutic and educational activities daily, encourage attendance and participation, and document same in the medical record   Outcome: Progressing  Goal: Complete daily ADLs, including personal hygiene independently, as able  Description: Interventions:  - Observe, teach, and assist patient with ADLS  -  Monitor and promote a balance of rest/activity, with adequate nutrition and elimination   Outcome: Progressing     Problem: Risk for Violence/Aggression Toward Others  Goal: Treatment Goal: Refrain from acts of violence/aggression during length of stay, and demonstrate improved impulse control at the time of discharge  Outcome: Progressing  Goal: Verbalize thoughts and feelings  Description: Interventions:  - Assess and re-assess patient's level of risk, every waking shift  - Engage patient in 1:1 interactions, daily, for a minimum of 15 minutes   - Allow patient to express feelings and frustrations in a safe and non-threatening manner   - Establish rapport/trust with patient   Outcome: Progressing  Goal: Refrain from harming others  Outcome: Progressing  Goal: Refrain from destructive acts on the environment or property  Outcome: Progressing  Goal: Control angry outbursts  Description: Interventions:  - Monitor patient closely, per order  - Ensure early verbal de-escalation  - Monitor prn medication needs  - Set reasonable/therapeutic limits, outline behavioral expectations, and consequences   - Provide a non-threatening milieu, utilizing the least restrictive interventions   Outcome: Progressing  Goal: Attend and participate in unit activities, including therapeutic, recreational, and educational groups  Description: Interventions:  - Provide therapeutic and educational activities daily, encourage attendance and participation, and document same in the medical record   Outcome: Progressing  Goal: Identify appropriate positive anger management techniques  Description: Interventions:  - Offer anger management and coping skills groups   - Staff will provide positive feedback for appropriate anger control  Outcome: Progressing     Problem: Alteration in Orientation  Goal: Treatment Goal: Demonstrate a reduction of confusion and improved orientation to person, place, time and/or situation upon discharge, according to optimum baseline/ability  Outcome: Progressing  Goal: Interact with staff daily  Description: Interventions:  - Assess and re-assess patient's level of orientation  - Engage patient in 1 on 1 interactions, daily, for a minimum of 15 minutes   - Establish rapport/trust with patient   Outcome: Progressing  Goal: Express concerns related to confused thinking related to:  Description: Interventions:  - Encourage patient to express feelings, fears, frustrations, hopes  - Assign consistent caregivers   - [place]/re-orient patient as needed  - Allow comfort items, as appropriate  - Provide visual cues, signs, etc    Outcome: Progressing  Goal: Allow medical examinations, as recommended  Description: Interventions:  - Provide physical/neurological exams and/or referrals, per provider   Outcome: Progressing  Goal: Cooperate with recommended testing/procedures  Description: Interventions:  - Determine need for ancillary testing  - Observe for mental status changes  - Implement falls/precaution protocol   Outcome: Progressing  Goal: Attend and participate in unit activities, including therapeutic, recreational, and educational groups  Description: Interventions:  - Provide therapeutic and educational activities daily, encourage attendance and participation, and document same in the medical record   - Provide appropriate opportunities for reminiscence   - Provide a consistent daily routine   - Encourage family contact/visitation   Outcome: Progressing  Goal: Complete daily ADLs, including personal hygiene independently, as able  Description: Interventions:  - Observe, teach, and assist patient with ADLS  Outcome: Progressing

## 2023-06-04 NOTE — PROGRESS NOTES
"Progress Note - [address] [facility],[address] 80 y o  male MRN: [id number]   Unit/Bed#: Gennaro Brice 603-01 Encounter: [id number]    Behavior over the last 24 hours: tessa Stephenson is an 80 y o  male diagnosed with unspecified mood (affective) disorder, dementia with behavioral disturbance, CKD, T2DM seen today in follow-up  Per staff, patient remains bright and pleasant  Did downgrade diet due to some episodes of coughing while eating yesterday  Speech eval was ordered  He is seen sleeping in group room  He is awoken and is bright - states he had a good lunch  I asked him what he had for breakfast (since it was not lunchtime) and he could not remember  No SI/HI/AVH       Sleep: normal  Appetite: normal  Medication side effects: No   ROS: no complaints, all other systems are negative    Mental Status Evaluation:    Appearance:  age appropriate, casually dressed, wearing glasses, appears stated age   Behavior:  cooperative, calm   Speech:  normal rate, normal volume, normal pitch   Mood:  \"good\"   Affect:  appropriate, bright, smiling   Thought Process:  Poverty of thought   Associations: Unable to assess   Thought Content:  No overt delusions   Perceptual Disturbances: none   Risk Potential: Suicidal ideation - None at present  Homicidal ideation - None at present  Potential for aggression - No   Sensorium:  oriented to person and place   Memory:  recent memory impaired, remote memory impaired   Consciousness:  alert and awake   Attention/Concentration: attention span and concentration are age appropriate   Insight:  limited   Judgment: limited   Gait/Station: normal gait/station   Motor Activity: no abnormal movements     Vital signs in last 24 hours:    Temp:  [97 8 °F (36 6 °C)-97 9 °F (36 6 °C)] 97 9 °F (36 6 °C)  HR:  [61-84] 74  Resp:  [16-18] 16  BP: (126-169)/(57-77) 126/57    Laboratory results: I have personally reviewed all pertinent laboratory/tests results    Results from the past 24 hours: " Recent Results (from the past 24 hour(s))   Fingerstick Glucose (POCT)    Collection Time: 06/03/23 11:38 AM   Result Value Ref Range    POC Glucose 137 65 - 140 mg/dl   Fingerstick Glucose (POCT)    Collection Time: 06/03/23  4:11 PM   Result Value Ref Range    POC Glucose 175 (H) 65 - 140 mg/dl   Fingerstick Glucose (POCT)    Collection Time: 06/03/23  8:53 PM   Result Value Ref Range    POC Glucose 174 (H) 65 - 140 mg/dl   Fingerstick Glucose (POCT)    Collection Time: 06/04/23  7:19 AM   Result Value Ref Range    POC Glucose 102 65 - 140 mg/dl     Most Recent Labs:   Lab Results   Component Value Date    ALB 4 4 05/29/2023    ALKPHOS 61 05/29/2023    ALT 12 05/29/2023    AST 15 05/29/2023    BUN 28 (H) 05/31/2023    CALCIUM 9 3 05/31/2023    CHOLESTEROL 120 06/03/2023     05/31/2023    CO2 28 05/31/2023    CREATININE 1 84 (H) 05/31/2023     05/25/2023    GLUC 104 05/31/2023    HCT 38 3 05/25/2023    HDL 25 (L) 06/03/2023    HGB 12 3 05/25/2023    HGBA1C 8 0 (H) 05/25/2023    K 4 2 05/31/2023    LDLCALC 60 06/03/2023    NEUTROABS 4 03 05/25/2023    NONHDLC 95 06/03/2023     05/25/2023    RBC 4 38 05/25/2023    RDW 12 7 05/25/2023    SODIUM 138 05/31/2023    TBILI 0 65 05/29/2023    TP 7 5 05/29/2023    TRIG 173 (H) 06/03/2023    [id number] 3 060 05/25/2023    WBC 6 72 05/25/2023       Progress Toward Goals: progressing    Assessment/Plan   Principal Problem:    Unspecified mood (affective) disorder (HCC)  Active Problems:    Essential hypertension    Mixed hyperlipidemia    Type 2 diabetes mellitus with kidney complication, with long-term current use of insulin (HCC)    Dementia with behavioral disturbance (HCC)    Medical clearance for psychiatric admission    Chronic kidney disease (CKD), stage IV (severe) ([facility] )      Recommended Treatment:     Planned medication and treatment changes:     All current active medications have been reviewed  Encourage group therapy, milieu therapy and occupational therapy  Behavioral Health checks every 7 minutes  Continue current medications:    Current Facility-Administered Medications   Medication Dose Route Frequency Provider Last Rate   • acetaminophen  650 mg Oral Q4H PRN Genell Pert, CRNP     • acetaminophen  650 mg Oral Q4H PRN Genell Pert, CRNP     • acetaminophen  975 mg Oral Q6H PRN Gene Pert, CRNP     • ARIPiprazole  5 mg Oral Daily Mounika Torres MD     • atorvastatin  10 mg Oral Daily With Dinner Clari Pair, ESTEVAN     • cholecalciferol  2,000 Units Oral Daily Clari Pair, ESTEVAN     • docusate sodium  100 mg Oral BID Thalia Quiñonez, CRNP     • donepezil  10 mg Oral HS Genell Pert, CRNP     • ferrous sulfate  325 mg Oral Daily With Breakfast Clari Pair, ESTEVAN     • hydrOXYzine HCL  25 mg Oral Q6H PRN Max 4/day Genell Pert, CRNP     • hydrOXYzine HCL  50 mg Oral Q6H PRN Max 4/day Gene Pert, CRNP     • insulin detemir  10 Units Subcutaneous Daily With Breakfast Clari Pair, ESTEVAN     • insulin detemir  10 Units Subcutaneous HS Clari Pair, ESTEVAN     • insulin lispro  1-5 Units Subcutaneous HS Clari Pair, PA-C     • insulin lispro  1-6 Units Subcutaneous TID AC Clari Espana, ESTEVAN     • LORazepam  1 mg Intramuscular Q6H PRN Max 3/day Gene Pert, CRNP     • LORazepam  1 mg Oral Q6H PRN Max 3/day Gene Pert, CRNP     • magnesium Oxide  400 mg Oral Daily Clari Pair, ESTEVAN     • memantine  10 mg Oral Q12H [facility], CRNP     • metoprolol tartrate  100 mg Oral HS Clari Pair, ESTEVAN     • metoprolol tartrate  50 mg Oral Daily Clari Pair, ESTEVAN     • polyethylene glycol  17 g Oral Daily PRN SHADY Gerard     • propafenone  225 mg Oral Q8H [facility] Clari Pair, ESTEVAN     • senna  1 tablet Oral HS Thalia Quiñonez, CRNP     • sertraline  100 mg Oral Daily Gene Pert, CRNP     • traZODone  75 mg Oral HS PRN Thermal Steffany SHADY Gaviria       Risks / Benefits of Treatment:    Risks, benefits, and possible side effects of medications explained to patient and patient verbalizes understanding and agreement for treatment  Counseling / Coordination of Care: Total floor / unit time spent today 20 minutes  Greater than 50% of total time was spent with the patient and / or family counseling and / or coordination of care  A description of counseling / coordination of care:  Patient's progress discussed with staff in treatment team meeting  Medications, treatment progress and treatment plan reviewed with patient      Ulisses Nicholson PA-C 06/04/23

## 2023-06-04 NOTE — NURSING NOTE
Pt calm and cooperative, visible on unit  Good intake at meals  Napping in dayroom at times  Pleasantly confused during interactions  Pt spoke to daughter on phone  Cooperative with care and prompts  Pt denies having any symptoms currently

## 2023-06-04 NOTE — PLAN OF CARE
Problem: Alteration in Thoughts and Perception  Goal: Verbalize thoughts and feelings  Description: Interventions:  - Promote a nonjudgmental and trusting relationship with the patient through active listening and therapeutic communication  - Assess patient's level of functioning, behavior and potential for risk  - Engage patient in 1 on 1 interactions  - Encourage patient to express fears, feelings, frustrations, and discuss symptoms    - [place] patient to reality, help patient recognize reality-based thinking   - Administer medications as ordered and assess for potential side effects  - Provide the patient education related to the signs and symptoms of the illness and desired effects of prescribed medications  Outcome: Progressing  Goal: Refrain from acting on delusional thinking/internal stimuli  Description: Interventions:  - Monitor patient closely, per order   - Utilize least restrictive measures   - Set reasonable limits, give positive feedback for acceptable   - Administer medications as ordered and monitor of potential side effects  Outcome: Progressing  Goal: Agree to be compliant with medication regime, as prescribed and report medication side effects  Description: Interventions:  - Offer appropriate PRN medication and supervise ingestion; conduct AIMS, as needed   Outcome: Progressing

## 2023-06-04 NOTE — NURSING NOTE
Pt is calm and pleasant brightens on approach  Pt is visible in milieu and social with staff and peers  Pt is cooperative with care and med compliant  Denies all symptoms  VSS

## 2023-06-05 ENCOUNTER — APPOINTMENT (INPATIENT)
Dept: RADIOLOGY | Facility: HOSPITAL | Age: 88
DRG: 885 | End: 2023-06-05
Payer: COMMERCIAL

## 2023-06-05 LAB
GLUCOSE SERPL-MCNC: 146 MG/DL (ref 65–140)
GLUCOSE SERPL-MCNC: 191 MG/DL (ref 65–140)
GLUCOSE SERPL-MCNC: 231 MG/DL (ref 65–140)
GLUCOSE SERPL-MCNC: 78 MG/DL (ref 65–140)

## 2023-06-05 PROCEDURE — 82948 REAGENT STRIP/BLOOD GLUCOSE: CPT

## 2023-06-05 PROCEDURE — 74230 X-RAY XM SWLNG FUNCJ C+: CPT

## 2023-06-05 PROCEDURE — 92610 EVALUATE SWALLOWING FUNCTION: CPT

## 2023-06-05 PROCEDURE — 99232 SBSQ HOSP IP/OBS MODERATE 35: CPT | Performed by: STUDENT IN AN ORGANIZED HEALTH CARE EDUCATION/TRAINING PROGRAM

## 2023-06-05 PROCEDURE — 92611 MOTION FLUOROSCOPY/SWALLOW: CPT

## 2023-06-05 RX ADMIN — DONEPEZIL HYDROCHLORIDE 10 MG: 10 TABLET, FILM COATED ORAL at 21:27

## 2023-06-05 RX ADMIN — METOPROLOL TARTRATE 50 MG: 50 TABLET, FILM COATED ORAL at 08:48

## 2023-06-05 RX ADMIN — DOCUSATE SODIUM 100 MG: 100 CAPSULE, LIQUID FILLED ORAL at 08:48

## 2023-06-05 RX ADMIN — INSULIN DETEMIR 10 UNITS: 100 INJECTION, SOLUTION SUBCUTANEOUS at 21:29

## 2023-06-05 RX ADMIN — MEMANTINE 10 MG: 10 TABLET ORAL at 21:27

## 2023-06-05 RX ADMIN — SERTRALINE HYDROCHLORIDE 100 MG: 100 TABLET ORAL at 08:50

## 2023-06-05 RX ADMIN — INSULIN DETEMIR 10 UNITS: 100 INJECTION, SOLUTION SUBCUTANEOUS at 08:51

## 2023-06-05 RX ADMIN — PROPAFENONE HYDROCHLORIDE 225 MG: 150 TABLET, FILM COATED ORAL at 21:27

## 2023-06-05 RX ADMIN — FERROUS SULFATE TAB 325 MG (65 MG ELEMENTAL FE) 325 MG: 325 (65 FE) TAB at 08:51

## 2023-06-05 RX ADMIN — DOCUSATE SODIUM 100 MG: 100 CAPSULE, LIQUID FILLED ORAL at 17:34

## 2023-06-05 RX ADMIN — CHOLECALCIFEROL TAB 25 MCG (1000 UNIT) 2000 UNITS: 25 TAB at 08:48

## 2023-06-05 RX ADMIN — ARIPIPRAZOLE 5 MG: 5 TABLET ORAL at 08:48

## 2023-06-05 RX ADMIN — INSULIN LISPRO 2 UNITS: 100 INJECTION, SOLUTION INTRAVENOUS; SUBCUTANEOUS at 17:36

## 2023-06-05 RX ADMIN — PROPAFENONE HYDROCHLORIDE 225 MG: 150 TABLET, FILM COATED ORAL at 06:10

## 2023-06-05 RX ADMIN — SENNOSIDES 8.6 MG: 8.6 TABLET, FILM COATED ORAL at 21:27

## 2023-06-05 RX ADMIN — ATORVASTATIN CALCIUM 10 MG: 10 TABLET, FILM COATED ORAL at 17:34

## 2023-06-05 RX ADMIN — INSULIN LISPRO 2 UNITS: 100 INJECTION, SOLUTION INTRAVENOUS; SUBCUTANEOUS at 21:29

## 2023-06-05 RX ADMIN — MEMANTINE 10 MG: 10 TABLET ORAL at 08:48

## 2023-06-05 RX ADMIN — METOPROLOL TARTRATE 100 MG: 50 TABLET, FILM COATED ORAL at 21:27

## 2023-06-05 RX ADMIN — MAGNESIUM OXIDE TAB 400 MG (241.3 MG ELEMENTAL MG) 400 MG: 400 (241.3 MG) TAB at 08:48

## 2023-06-05 RX ADMIN — PROPAFENONE HYDROCHLORIDE 225 MG: 150 TABLET, FILM COATED ORAL at 15:11

## 2023-06-05 NOTE — PLAN OF CARE
Pt  [place] to follow structured group discussions and engaged in two morning groups with minimal need for prompting  More alert and social today    Problem: Risk for Self Injury/Neglect  Goal: Attend and participate in unit activities, including therapeutic, recreational, and educational groups  Description: Interventions:  - Provide therapeutic and educational activities daily, encourage attendance and participation, and document same in the medical record  - Obtain collateral information, encourage visitation and family involvement in care   Outcome: Progressing

## 2023-06-05 NOTE — TREATMENT TEAM
06/05/23 0840   Team Meeting   Meeting Type Daily Rounds   Initial Conference Date 06/05/23   Team Members Present   Team Members Present Physician;Nurse;Occupational Therapist;;   Physician Team Member Dr Jian Gaspar 1060 Team Member [facility] Team Member Penny 116 Work Team Member Parth   OT Team Member Clarissa CARNEY   Patient/Family Present   Patient Present No   Patient's Family Present No     Pleasantly confused, med compliant, cooperative, choked on food, diet downgraded to dysphagia 3, VBS to be done today

## 2023-06-05 NOTE — PROGRESS NOTES
"  Progress Note - [address] 80 y o  male MRN: [id number]  Unit/Bed#: Nilda Patel 311-01 Encounter: [id number]    The patient was seen for continuing care and reviewed with treatment team   He remains pleasantly confused  Visible in milieu  1 episode of choking reported this weekend, speech eval done  Patient believes he is cassidy and not [facility]  Sleep- good  Appetite- good  Energy:good   No suicidal or homicidal ideations  No EPS, denies any side effects of medication  Tolerating medications well  No aggression or agitation  /70 (BP Location: Left arm)   Pulse 76   Temp 99 8 °F (37 7 °C) (Temporal)   Resp 18   Ht 5' 11\" (1 803 m)   Wt 74 7 kg (164 lb 10 9 oz)   SpO2 96%   BMI 22 97 kg/m²     Current Mental Status Evaluation:  Appearance:  Adequate hygiene and grooming, visible in milieu  Behavior:  calm, cooperative and friendly   Mood:  euthymic   Affect: mood-congruent   Speech: Normal volume and Normal rate   Thought Process:  Poverty of thoughts, due to severe cognitive impairment   Thought Content:  Does not verbalize delusional material   Perceptual Disturbances: Denies hallucinations and does not appear to be responding to internal stimuli   Risk Potential: No suicidal or homicidal ideation   Orientation:   Oriented to self only, poor fund of knowledge, recall 0/3, Poor attention span       Patient lacks insight Into his illness, limited judgment and impulse control due to severe cognitive impairment      Recent Results (from the past 72 hour(s))   Fingerstick Glucose (POCT)    Collection Time: 06/02/23  9:02 PM   Result Value Ref Range    POC Glucose 184 (H) 65 - 140 mg/dl   Lipid panel    Collection Time: 06/03/23  5:40 AM   Result Value Ref Range    Cholesterol 120 See Comment mg/dL    Triglycerides 173 (H) See Comment mg/dL    HDL, Direct 25 (L) >=40 mg/dL    LDL Calculated 60 0 - 100 mg/dL    Non-HDL-Chol (CHOL-HDL) 95 mg/dl   Fingerstick Glucose (POCT)    " Collection Time: 06/03/23  7:23 AM   Result Value Ref Range    POC Glucose 97 65 - 140 mg/dl   Fingerstick Glucose (POCT)    Collection Time: 06/03/23 11:38 AM   Result Value Ref Range    POC Glucose 137 65 - 140 mg/dl   Fingerstick Glucose (POCT)    Collection Time: 06/03/23  4:11 PM   Result Value Ref Range    POC Glucose 175 (H) 65 - 140 mg/dl   Fingerstick Glucose (POCT)    Collection Time: 06/03/23  8:53 PM   Result Value Ref Range    POC Glucose 174 (H) 65 - 140 mg/dl   Fingerstick Glucose (POCT)    Collection Time: 06/04/23  7:19 AM   Result Value Ref Range    POC Glucose 102 65 - 140 mg/dl   Fingerstick Glucose (POCT)    Collection Time: 06/04/23 11:34 AM   Result Value Ref Range    POC Glucose 127 65 - 140 mg/dl   Fingerstick Glucose (POCT)    Collection Time: 06/04/23  4:14 PM   Result Value Ref Range    POC Glucose 203 (H) 65 - 140 mg/dl   Fingerstick Glucose (POCT)    Collection Time: 06/04/23  9:05 PM   Result Value Ref Range    POC Glucose 70 65 - 140 mg/dl   Fingerstick Glucose (POCT)    Collection Time: 06/05/23  7:32 AM   Result Value Ref Range    POC Glucose 78 65 - 140 mg/dl   Fingerstick Glucose (POCT)    Collection Time: 06/05/23 11:46 AM   Result Value Ref Range    POC Glucose 146 (H) 65 - 140 mg/dl     Progress Toward Goals:  Pt needs placement,  No medication changes needed    Assessment     Principal Problem:    Unspecified mood (affective) disorder ([facility])  Active Problems:    Essential hypertension    Mixed hyperlipidemia    Type 2 diabetes mellitus with kidney complication, with long-term current use of insulin ([facility])    Dementia with behavioral disturbance ([facility])    Medical clearance for psychiatric admission    Chronic kidney disease (CKD), stage IV (severe) ([facility])        Plan :    - Medications;   Psychiatric:Continue Abilify 5mg daily   Sertraline 100mg daily   Continue Namenda and  Donepezil      Delirium Precautions:  Daytime:  1  Discourage daytime napping  2   Maintain consistency and continuity of staff  3  Keep patient in well-lit room near windows  4  Maintain wakefulness with activities (eg, coloring, puzzles)  5  Aromatherapy patches  6   Visible clock and calendar  Nighttime:  - Relaxation tapes or music before sleep  - Avoid disturbances  - Dim light in the room  - Noise reduction     Medical: per SLIM    -Therapy: occupational therapy, milieu and group therapy  - Legal: 303   -Disposition:Needs placement

## 2023-06-05 NOTE — PROGRESS NOTES
Pt  Mami Magana to appropriately engaged in structured groups  Pt  receptive to social interactions and focused on his next desire meal  Minimal need for redirection this morning  Pt took on role of group leaders assistant in terms of encouraging his peers with looking on th bright side of life  06/05/23 1000 06/05/23 1100   Activity/Group Checklist   Group Community meeting Wellness  (progressive muscle relaxation )   Attendance Attended Attended   Attendance Duration (min) 31-45 16-30   Interactions Interacted appropriately  (Pt  spontaneously added to morning group conversation  Affect bright and able to make multiple posititve statementsIntermittently superficial yet more alert and social today ) Interacted appropriately  (minimal prompting needed for technique )   Affect/Mood Appropriate;Bright;Calm;Normal range Appropriate   Goals Achieved Able to engage in interactions; Discussed coping strategies; Identified feelings; Able to reflect/comment on own behavior Able to engage in interactions; Able to listen to others;Discussed coping strategies

## 2023-06-05 NOTE — PLAN OF CARE
Problem: Alteration in Thoughts and Perception  Goal: Treatment Goal: Gain control of psychotic behaviors/thinking, reduce/eliminate presenting symptoms and demonstrate improved reality functioning upon discharge  Outcome: Progressing  Goal: Verbalize thoughts and feelings  Description: Interventions:  - Promote a nonjudgmental and trusting relationship with the patient through active listening and therapeutic communication  - Assess patient's level of functioning, behavior and potential for risk  - Engage patient in 1 on 1 interactions  - Encourage patient to express fears, feelings, frustrations, and discuss symptoms    - [place] patient to reality, help patient recognize reality-based thinking   - Administer medications as ordered and assess for potential side effects  - Provide the patient education related to the signs and symptoms of the illness and desired effects of prescribed medications  Outcome: Progressing  Goal: Refrain from acting on delusional thinking/internal stimuli  Description: Interventions:  - Monitor patient closely, per order   - Utilize least restrictive measures   - Set reasonable limits, give positive feedback for acceptable   - Administer medications as ordered and monitor of potential side effects  Outcome: Progressing  Goal: Agree to be compliant with medication regime, as prescribed and report medication side effects  Description: Interventions:  - Offer appropriate PRN medication and supervise ingestion; conduct AIMS, as needed   Outcome: Progressing  Goal: Attend and participate in unit activities, including therapeutic, recreational, and educational groups  Description: Interventions:  -Encourage Visitation and family involvement in care  Outcome: Progressing  Goal: Recognize dysfunctional thoughts, communicate reality-based thoughts at the time of discharge  Description: Interventions:  - Provide medication and psycho-education to assist patient in compliance and developing insight into his/her illness   Outcome: Progressing  Goal: Complete daily ADLs, including personal hygiene independently, as able  Description: Interventions:  - Observe, teach, and assist patient with ADLS  - Monitor and promote a balance of rest/activity, with adequate nutrition and elimination   Outcome: Progressing     Problem: Risk for Self Injury/Neglect  Goal: Treatment Goal: Remain safe during length of stay, learn and adopt new coping skills, and be free of self-injurious ideation, impulses and acts at the time of discharge  Outcome: Progressing  Goal: Verbalize thoughts and feelings  Description: Interventions:  - Assess and re-assess patient's lethality and potential for self-injury  - Engage patient in 1:1 interactions, daily, for a minimum of 15 minutes  - Encourage patient to express feelings, fears, frustrations, hopes  - Establish rapport/trust with patient   Outcome: Progressing  Goal: Refrain from harming self  Description: Interventions:  - Monitor patient closely, per order  - Develop a trusting relationship  - Supervise medication ingestion, monitor effects and side effects   Outcome: Progressing  Goal: Attend and participate in unit activities, including therapeutic, recreational, and educational groups  Description: Interventions:  - Provide therapeutic and educational activities daily, encourage attendance and participation, and document same in the medical record  - Obtain collateral information, encourage visitation and family involvement in care   Outcome: Progressing  Goal: Recognize maladaptive responses and adopt new coping mechanisms  Outcome: Progressing  Goal: Complete daily ADLs, including personal hygiene independently, as able  Description: Interventions:  - Observe, teach, and assist patient with ADLS  - Monitor and promote a balance of rest/activity, with adequate nutrition and elimination  Outcome: Progressing     Problem: Depression  Goal: Treatment Goal: Demonstrate behavioral control of depressive symptoms, verbalize feelings of improved mood/affect, and adopt new coping skills prior to discharge  Outcome: Progressing  Goal: Verbalize thoughts and feelings  Description: Interventions:  - Assess and re-assess patient's level of risk   - Engage patient in 1:1 interactions, daily, for a minimum of 15 minutes   - Encourage patient to express feelings, fears, frustrations, hopes   Outcome: Progressing  Goal: Refrain from harming self  Description: Interventions:  - Monitor patient closely, per order   - Supervise medication ingestion, monitor effects and side effects   Outcome: Progressing  Goal: Refrain from isolation  Description: Interventions:  - Develop a trusting relationship   - Encourage socialization   Outcome: Progressing  Goal: Refrain from self-neglect  Outcome: Progressing  Goal: Attend and participate in unit activities, including therapeutic, recreational, and educational groups  Description: Interventions:  - Provide therapeutic and educational activities daily, encourage attendance and participation, and document same in the medical record   Outcome: Progressing  Goal: Complete daily ADLs, including personal hygiene independently, as able  Description: Interventions:  - Observe, teach, and assist patient with ADLS  -  Monitor and promote a balance of rest/activity, with adequate nutrition and elimination   Outcome: Progressing

## 2023-06-05 NOTE — SPEECH THERAPY NOTE
"  Speech Pathology Bedside Swallow Evaluation:                    SLP RECOMMENDATIONS:         Diet: Level 3 Dental soft; pending results of VFSS this AM        Liquids: Thin         Medications: as best tolerated         Strategies: upright, slow rate        Summary:    Pt seen for bedside swallow evaluation; pt known to SLP services  Pt had VFSS on 8/6/20; see below for results  At that time a Regular/thin diet was recommended  Pt re-consulted after have a coughing episode with lunch on Saturday  Staff reports that pt endorsed sensation of food \"being stuck\" during coughing episode  At that time pt's diet was downgraded from Regular/thin to Level 3 dental soft/thin  Pt seen sitting upright in bed  When asked about coughing episode pt reported \"I think I ate too fast and it got stuck  \" Pt observed with a few patrice sips of thin liquid with no overt s/s aspiration  Given that this is the pt's second coughing/choking episode while on unit, recommend VFSS to further evaluate swallowing  Discussed with patient who is in agreement with completing study  Will plan to complete this AM    Continue current diet pending VFSS results  Therapy Prognosis: Fair  Prognosis considerations: poor carryover   Frequency: 2-4x/week       Vitals:    06/04/23 0745 06/04/23 1407 06/04/23 1458 06/04/23 2018   BP: 126/57  137/72 134/73   BP Location: Left arm  Left arm Right arm   Pulse: 74 68 80 77   Resp: 16  16 16   Temp: 97 9 °F (36 6 °C)  97 5 °F (36 4 °C) 97 8 °F (36 6 °C)   TempSrc: Temporal  Temporal Temporal   SpO2: 92%  94% 93%   Weight:       Height:         Lab Results   Component Value Date    HCT 38 3 05/25/2023    HGB 12 3 05/25/2023    MCV 87 05/25/2023     05/25/2023    WBC 6 72 05/25/2023         Consider consult w/:  Nutrition    Goal(s):  Pt will tolerate least restrictive diet w/out s/s aspiration or oral/pharyngeal difficulties       H&P/Admit info/ pertinent provider notes: (PMH noted " above)    Shawna Rodriguez a 80 y  o  male who presents with outburst of assisted living   Patient has significant dementia   Already on high-dose [place] Axe and his wife moved to an assisted living yesterday   Apparently this morning he had a significant outburst and they were worried about safety so they sent him to the emergency room  [facility] has been calm here in the emergency room from what I have witnessed  [facility] is refusing to take him back  Izabela Ramp want him seen by crisis   Crisis feels that he cannot be placed in inpatient geriatric psych   So right now he has nowhere to go   We are bringing him into the hospital  Howes Daniel will have psychiatry see him virtually to see if there is any medication adjustments to be on   For me he is pleasant   Not combative  [facility] is forgetful  [facility] did not even know that he moved to an assisted living yesterday   But he is quite confused with dementia but this is likely his baseline  Kandi Martino Special Studies:  EGD 8/7/20: IMPRESSION:  100 units total of Botox injected the GE junction; administered 4 quadrants  Mild gastric erosion in the antrum and pre-pyloric region status post biopsies to rule out H pylori  Normal 1st and 2nd portion of duodenum      Barium swallow of esophagus:   IMPRESSION:  Disorganized esophageal contractions and tertiary contractions with to and fro dysmotility, suggestive of presbyesophagus   Other motility disorders not excluded   No discrete esophageal mass, reflux or esophageal dilatation   No hernia  Previous VBS:  8/6/20:  Oral Stage: mild   pt w/ adequate bolus retrieval  Slow, decreased oral processing of soft and hard solids  Pt w/ good oral control, slow transfer of foods > liquids  Mild oral residue noted  ** pt continuously talking throughout study with food in his mouth      Pharyngeal Stage: WFL   swallow initiation was timely, pharyngeal constriction was mildly weak at times, suspect related to talking and effort of swallow  Mild pharyngeal residue noted  No penetration or aspiration observed  Barium pill w/ water passed through pharynx w/o difficulty      Esophageal Stage:   briefly assessed; pill stasis noted at LES, also corkscrew appearance of distal esophagus, ? Presbyesophagus  Pt is scheduled for EGD      Assessment Summary:   Mild oropharnygeal dysphagia- pt tend to talk while eating, suspect decreased pharyngeal constriction at times w/ mild pharyngeal residue noted  No penetration or aspiration observed      Patient's goal: None stated     Did the pt report pain? No   If yes, was nursing notified/was it addressed? Reason for consult:  R/o aspiration  Determine safest and least restrictive diet  Failed nursing dysphagia assessment      Precautions:  Aspiration      Food allergies:   Allergies   Allergen Reactions   • Ambien [Zolpidem Tartrate] Hallucinations   • Bextra [Valdecoxib] Hives   • Dust Mite Extract Sneezing   • Lyrica [Pregabalin] Confusion   • Mold Extract [Trichophyton] Sneezing   • Pollen Extract Sneezing   • Tree Extract Other (See Comments) and Sneezing     congestion        Current diet:  Level 3 dental soft/thin    Premorbid diet:  Regular/thin    O2 requirements:  RA   Voice/Speech:  WNL   Follows commands:  Intact    Cognitive status:  Alert, disoriented          Results d/w:  Pt, nursing, physician,

## 2023-06-05 NOTE — SPEECH THERAPY NOTE
Speech Pathology Videofluoroscopic Swallow Study (VFSS/VBSS/MBSS)                    SLP RECOMMENDATIONS:         Diet: Level 3 Dental soft         Liquids: Thin liquids; no straws         Medications: as best tolerated; whole in puree or 1 at a time with thin via cup         Aspiration Precautions: upright, slow rate, small bites/sips, alt solids/liquids        Patient Name: Juan Francisco Joyce    Today's Date: 6/5/2023        Assessment Summary:    Pt presents with mild oropharyngeal dysphagia most notably characterized by prolonged mastication, mild oral residue, intermittently delayed swallow initiation to the pyriforms, diminished pharyngeal stripping wave and mild to moderate pharyngeal residue  Collectively this resulted in aspiration of thin liquids via consecutive straw sips  Pt had very small throat clear in response to aspiration  Throat clear was not effective at clearing aspirated material from the airway  Pt had no aspiration when taking large sips of thin liquid via cup or when taking barium pill with cup sips of thin  Pt noted to have mild to moderate pharyngeal residue that increased as viscosity increased  Esophageal clearance was noted to be prolonged  Pill did not clear from thoracic esophagus during study despite thin liquid washes  Pt does have h/o presbyesophagus  Swallow was initiated with head of bolus at the pyriforms with thin liquids, at the posterior aspect of epiglottis with nectar liquids, and at the valleculae with pudding  Recommend continuing Level 3 Dental soft diet/thin liquids with NO straws  Encourage aspiration precautions and thorough oral care  Please see below for full analysis of swallow components  Note: Images are available for review in PACS as desired  General Information;  Pt is a 80 y o  male with a PMH remarkable for presbyesophagus, COPD, asthma, and Dementia      Current concerns for dysphagia include staff reporting pt has had frequent coughing episodes during meals  A VFSS was recommended to assess oropharyngeal stage swallowing skills at this time  Pt was viewed sitting upright in the lateral position  Trials administered were consistent with MBSImP Validated Protocol: 5-mL thin liquid x2, 20-mL cup sip thin, 40-mL sequential swallow thin, 5-mL nectar thick, 20-mL cup sip nectar thick, 40-mL sequential swallow nectar thick, 5-mL Honey thick, 5-mL pudding, and ½ cookie coated with 3-mL pudding  Unable to complete AP view 2/2 pt positioning, esophageal sweep completed in lateral   Pt was also given thin liquids by straw, a barium tablet with thin liquid, and soft bread  A robust esophageal sweep test (REST protocol) was utilized in conjunction with MBSImp protocol and included scanning the esophagus after a solid, multiple large volume sips of liquids, and a pill  Oral stage:  Pt presented with mild oral stage dysphagia  Lip closure: intact with no labial escape   Bolus control with liquids: impaired with decreased control posteriorly resulting in pre-spill to pyriforms   Mastication: impaired with prolonged/slow mastication   Anterior-Posterior Transit: impaired with decreased lingual initiation   Oral residue: mild with location on palate   Initiation of pharyngeal swallow: Initiated when bolus head at pyriforms      Pharyngeal stage:  Pt presented with mild pharyngeal dysphagia       Velopharyngeal Closure: intact with no bolus between soft palate and pharyngeal wall   Laryngeal Elevation:impaired with partial superior movement of thyroid cartilage and approximation of epiglottic petiole and arytenoids   Anterior Hyoid Excursion: impaired with complete anterior movement   Epiglottic Retroflexion: intact with complete retroflexion   Laryngeal Vestibular Closure: impaired with partial closure with narrow column contrast in laryngeal vestibule   Pharyngeal Stripping Wave:impaired with diminished stripping   Pharyngoesophageal segment Opening: complete extension and duration with no obstruction of bolus flow into the esophagus   Base of Tongue Retraction: impaired with trace column of contrast noted between the base of tongue and pharyngeal wall   Pharyngeal residue: impaired with collection of residue remaining in valleculae and pyriforms  Strategies and Efficacy: rate control and use of cup sips (vs straw) were effective at minimizing risk of aspiration with thin liquids  Aspiration Response and Efficacy:  Pt with very small throat clear in response to aspiration  Esophageal stage: A sweep of esophagus was completed after administration of the barium tablet, large sips of thin liquid, and soft bread  Esophageal clearance was noted to be prolonged  Pill did not clear from thoracic esophagus during study despite thin liquid washes  Pt does have h/o presbyesophagus  Penetration/Aspiration   8 point Penetration/Aspiration Scale score (Guero et al , 1996): Thin liquids 7-material enters the airway, passes below the vocal folds and is not ejected from the trachea despite effort     Nectar liquids 1-material does not enter the airway     Honey liquids 1-material does not enter the airway         Recommendations:   Recommended Diet:  soft/level 3 diet and thin liquids   Recommended Form of Medications: whole with liquid and whole with puree   Aspiration precautions and compensatory swallowing strategies: upright posture, only feed when fully alert, slow rate of feeding, no straws and alternating bites and sips  SLP Dysphagia therapy recommended: SLP to follow briefly to review swallow precautions     Results Reviewed with: patient and RN   Pt/Family Education: initiated  Pt and caregivers would benefit from continued education      Patient Stated Goal:      Problem List  Principal Problem:    Unspecified mood (affective) disorder (HCC)  Active Problems:    Essential hypertension    Mixed hyperlipidemia    Type 2 diabetes mellitus with kidney complication, with long-term current use of insulin (HCC)    Dementia with behavioral disturbance (HCC)    Medical clearance for psychiatric admission    Chronic kidney disease (CKD), stage IV (severe) (HCC)      Past Medical History  Past Medical History:   Diagnosis Date   • Allergic rhinitis    • Anxiety    • Aspiration of liquid     and food per wife if he is eating too fast or talking when eating   • Asthma     as a child   • Atrial fibrillation ([place] [address] )    • CAD (coronary artery disease)    • Cancer of kidney ([place] [address] )    • COPD (chronic obstructive pulmonary disease) ([facility])    • CPAP (continuous positive airway pressure) dependence    • Dementia ([place] [address] )     Frontal lobe   • Dementia (HCC)    • Diabetes mellitus ([place] [address] )    • Diabetes mellitus, type 2 ([facility])    • DJD (degenerative joint disease)    • Hypercholesterolemia    • Hyperlipidemia    • Hypertension    • Incisional hernia    • Kidney disease    • Melanoma ([place] [address] )     left leg   • Obesity    • Sleep apnea     wears CPAP   • TIA (transient ischemic attack)        Past Surgical History  Past Surgical History:   Procedure Laterality Date   • CARDIAC PACEMAKER PLACEMENT     • CHOLECYSTECTOMY     • COLONOSCOPY     • HERNIA REPAIR      Incisional hernia   • NEPHRECTOMY Left 2005   • SD ESOPHAGOGASTRODUODENOSCOPY SUBMUCOSAL INJECTION N/A 9/21/2016    Procedure: ESOPHAGOGASTRODUODENOSCOPY (EGD); Surgeon: Russ Pugh MD;  Location: [facility]; Service: Gastroenterology   • SD ESOPHAGOGASTRODUODENOSCOPY SUBMUCOSAL INJECTION N/A 2/7/2018    Procedure: ESOPHAGOGASTRODUODENOSCOPY (EGD); Surgeon: Russ Pugh MD;  Location: [facility]; Service: Gastroenterology   • SD ESOPHAGOGASTRODUODENOSCOPY SUBMUCOSAL INJECTION N/A 4/3/2019    Procedure: ESOPHAGOGASTRODUODENOSCOPY (EGD) WITH BOTOX;  Surgeon: Russ Pugh MD;  Location: [facility];   Service: Gastroenterology   • ROTATOR CUFF REPAIR     • TONSILLECTOMY

## 2023-06-05 NOTE — NURSING NOTE
Pt calm and cooperative, visible on unit  Napping in dayroom at times  Cooperative with care and prompts  Good intake at meals  No symptoms reported during shift  Medication adherent

## 2023-06-06 ENCOUNTER — PATIENT OUTREACH (OUTPATIENT)
Dept: INTERNAL MEDICINE CLINIC | Facility: CLINIC | Age: 88
End: 2023-06-06

## 2023-06-06 LAB
GLUCOSE SERPL-MCNC: 120 MG/DL (ref 65–140)
GLUCOSE SERPL-MCNC: 130 MG/DL (ref 65–140)
GLUCOSE SERPL-MCNC: 236 MG/DL (ref 65–140)
GLUCOSE SERPL-MCNC: 79 MG/DL (ref 65–140)

## 2023-06-06 PROCEDURE — 99232 SBSQ HOSP IP/OBS MODERATE 35: CPT | Performed by: STUDENT IN AN ORGANIZED HEALTH CARE EDUCATION/TRAINING PROGRAM

## 2023-06-06 PROCEDURE — 82948 REAGENT STRIP/BLOOD GLUCOSE: CPT

## 2023-06-06 PROCEDURE — 92526 ORAL FUNCTION THERAPY: CPT

## 2023-06-06 RX ORDER — MEMANTINE HYDROCHLORIDE 10 MG/1
5 TABLET ORAL EVERY 12 HOURS SCHEDULED
Status: DISCONTINUED | OUTPATIENT
Start: 2023-06-06 | End: 2023-06-08

## 2023-06-06 RX ADMIN — INSULIN LISPRO 2 UNITS: 100 INJECTION, SOLUTION INTRAVENOUS; SUBCUTANEOUS at 21:23

## 2023-06-06 RX ADMIN — SENNOSIDES 8.6 MG: 8.6 TABLET, FILM COATED ORAL at 21:20

## 2023-06-06 RX ADMIN — INSULIN DETEMIR 10 UNITS: 100 INJECTION, SOLUTION SUBCUTANEOUS at 08:25

## 2023-06-06 RX ADMIN — DONEPEZIL HYDROCHLORIDE 10 MG: 10 TABLET, FILM COATED ORAL at 21:20

## 2023-06-06 RX ADMIN — DOCUSATE SODIUM 100 MG: 100 CAPSULE, LIQUID FILLED ORAL at 08:24

## 2023-06-06 RX ADMIN — MEMANTINE 10 MG: 10 TABLET ORAL at 08:24

## 2023-06-06 RX ADMIN — PROPAFENONE HYDROCHLORIDE 225 MG: 150 TABLET, FILM COATED ORAL at 06:01

## 2023-06-06 RX ADMIN — PROPAFENONE HYDROCHLORIDE 225 MG: 150 TABLET, FILM COATED ORAL at 14:28

## 2023-06-06 RX ADMIN — FERROUS SULFATE TAB 325 MG (65 MG ELEMENTAL FE) 325 MG: 325 (65 FE) TAB at 08:24

## 2023-06-06 RX ADMIN — MEMANTINE 5 MG: 10 TABLET ORAL at 21:20

## 2023-06-06 RX ADMIN — ATORVASTATIN CALCIUM 10 MG: 10 TABLET, FILM COATED ORAL at 15:34

## 2023-06-06 RX ADMIN — INSULIN DETEMIR 10 UNITS: 100 INJECTION, SOLUTION SUBCUTANEOUS at 21:22

## 2023-06-06 RX ADMIN — PROPAFENONE HYDROCHLORIDE 225 MG: 150 TABLET, FILM COATED ORAL at 21:20

## 2023-06-06 RX ADMIN — SERTRALINE HYDROCHLORIDE 100 MG: 100 TABLET ORAL at 08:24

## 2023-06-06 RX ADMIN — ARIPIPRAZOLE 5 MG: 5 TABLET ORAL at 08:24

## 2023-06-06 RX ADMIN — MAGNESIUM OXIDE TAB 400 MG (241.3 MG ELEMENTAL MG) 400 MG: 400 (241.3 MG) TAB at 08:24

## 2023-06-06 RX ADMIN — METOPROLOL TARTRATE 100 MG: 50 TABLET, FILM COATED ORAL at 21:20

## 2023-06-06 RX ADMIN — DOCUSATE SODIUM 100 MG: 100 CAPSULE, LIQUID FILLED ORAL at 17:27

## 2023-06-06 RX ADMIN — CHOLECALCIFEROL TAB 25 MCG (1000 UNIT) 2000 UNITS: 25 TAB at 08:24

## 2023-06-06 NOTE — PLAN OF CARE
Problem: Alteration in Thoughts and Perception  Goal: Treatment Goal: Gain control of psychotic behaviors/thinking, reduce/eliminate presenting symptoms and demonstrate improved reality functioning upon discharge  Outcome: Progressing  Goal: Verbalize thoughts and feelings  Description: Interventions:  - Promote a nonjudgmental and trusting relationship with the patient through active listening and therapeutic communication  - Assess patient's level of functioning, behavior and potential for risk  - Engage patient in 1 on 1 interactions  - Encourage patient to express fears, feelings, frustrations, and discuss symptoms    - [place] patient to reality, help patient recognize reality-based thinking   - Administer medications as ordered and assess for potential side effects  - Provide the patient education related to the signs and symptoms of the illness and desired effects of prescribed medications  Outcome: Progressing  Goal: Refrain from acting on delusional thinking/internal stimuli  Description: Interventions:  - Monitor patient closely, per order   - Utilize least restrictive measures   - Set reasonable limits, give positive feedback for acceptable   - Administer medications as ordered and monitor of potential side effects  Outcome: Progressing  Goal: Agree to be compliant with medication regime, as prescribed and report medication side effects  Description: Interventions:  - Offer appropriate PRN medication and supervise ingestion; conduct AIMS, as needed   Outcome: Progressing  Goal: Attend and participate in unit activities, including therapeutic, recreational, and educational groups  Description: Interventions:  -Encourage Visitation and family involvement in care  Outcome: Progressing  Goal: Recognize dysfunctional thoughts, communicate reality-based thoughts at the time of discharge  Description: Interventions:  - Provide medication and psycho-education to assist patient in compliance and developing insight into his/her illness   Outcome: Progressing  Goal: Complete daily ADLs, including personal hygiene independently, as able  Description: Interventions:  - Observe, teach, and assist patient with ADLS  - Monitor and promote a balance of rest/activity, with adequate nutrition and elimination   Outcome: Progressing     Problem: Ineffective Coping  Goal: Identifies ineffective coping skills  Outcome: Progressing  Goal: Identifies healthy coping skills  Outcome: Progressing  Goal: Demonstrates healthy coping skills  Outcome: Progressing  Goal: Participates in unit activities  Description: Interventions:  - Provide therapeutic environment   - Provide required programming   - Redirect inappropriate behaviors   Outcome: Progressing  Goal: Patient/Family participate in treatment and DC plans  Description: Interventions:  - Provide therapeutic environment  Outcome: Progressing  Goal: Patient/Family verbalizes awareness of resources  Outcome: Progressing  Goal: Understands least restrictive measures  Description: Interventions:  - Utilize least restrictive behavior  Outcome: Progressing  Goal: Free from restraint events  Description: - Utilize least restrictive measures   - Provide behavioral interventions   - Redirect inappropriate behaviors   Outcome: Progressing     Problem: Risk for Self Injury/Neglect  Goal: Treatment Goal: Remain safe during length of stay, learn and adopt new coping skills, and be free of self-injurious ideation, impulses and acts at the time of discharge  Outcome: Progressing  Goal: Verbalize thoughts and feelings  Description: Interventions:  - Assess and re-assess patient's lethality and potential for self-injury  - Engage patient in 1:1 interactions, daily, for a minimum of 15 minutes  - Encourage patient to express feelings, fears, frustrations, hopes  - Establish rapport/trust with patient   Outcome: Progressing  Goal: Refrain from harming self  Description: Interventions:  - Monitor patient closely, per order  - Develop a trusting relationship  - Supervise medication ingestion, monitor effects and side effects   Outcome: Progressing  Goal: Attend and participate in unit activities, including therapeutic, recreational, and educational groups  Description: Interventions:  - Provide therapeutic and educational activities daily, encourage attendance and participation, and document same in the medical record  - Obtain collateral information, encourage visitation and family involvement in care   Outcome: Progressing  Goal: Recognize maladaptive responses and adopt new coping mechanisms  Outcome: Progressing  Goal: Complete daily ADLs, including personal hygiene independently, as able  Description: Interventions:  - Observe, teach, and assist patient with ADLS  - Monitor and promote a balance of rest/activity, with adequate nutrition and elimination  Outcome: Progressing     Problem: Depression  Goal: Treatment Goal: Demonstrate behavioral control of depressive symptoms, verbalize feelings of improved mood/affect, and adopt new coping skills prior to discharge  Outcome: Progressing  Goal: Verbalize thoughts and feelings  Description: Interventions:  - Assess and re-assess patient's level of risk   - Engage patient in 1:1 interactions, daily, for a minimum of 15 minutes   - Encourage patient to express feelings, fears, frustrations, hopes   Outcome: Progressing  Goal: Refrain from harming self  Description: Interventions:  - Monitor patient closely, per order   - Supervise medication ingestion, monitor effects and side effects   Outcome: Progressing  Goal: Refrain from isolation  Description: Interventions:  - Develop a trusting relationship   - Encourage socialization   Outcome: Progressing  Goal: Refrain from self-neglect  Outcome: Progressing  Goal: Attend and participate in unit activities, including therapeutic, recreational, and educational groups  Description: Interventions:  - Provide therapeutic and educational activities daily, encourage attendance and participation, and document same in the medical record   Outcome: Progressing  Goal: Complete daily ADLs, including personal hygiene independently, as able  Description: Interventions:  - Observe, teach, and assist patient with ADLS  -  Monitor and promote a balance of rest/activity, with adequate nutrition and elimination   Outcome: Progressing     Problem: Anxiety  Goal: Anxiety is at manageable level  Description: Interventions:  - Assess and monitor patient's anxiety level  - Monitor for signs and symptoms (heart palpitations, chest pain, shortness of breath, headaches, nausea, feeling jumpy, restlessness, irritable, apprehensive)  - Collaborate with interdisciplinary team and initiate plan and interventions as ordered    - [place] patient to unit/surroundings  - Explain treatment plan  - Encourage participation in care  - Encourage verbalization of concerns/fears  - Identify coping mechanisms  - Assist in developing anxiety-reducing skills  - Administer/offer alternative therapies  - Limit or eliminate stimulants  Outcome: Progressing     Problem: Risk for Violence/Aggression Toward Others  Goal: Treatment Goal: Refrain from acts of violence/aggression during length of stay, and demonstrate improved impulse control at the time of discharge  Outcome: Progressing  Goal: Verbalize thoughts and feelings  Description: Interventions:  - Assess and re-assess patient's level of risk, every waking shift  - Engage patient in 1:1 interactions, daily, for a minimum of 15 minutes   - Allow patient to express feelings and frustrations in a safe and non-threatening manner   - Establish rapport/trust with patient   Outcome: Progressing  Goal: Refrain from harming others  Outcome: Progressing  Goal: Refrain from destructive acts on the environment or property  Outcome: Progressing  Goal: Control angry outbursts  Description: Interventions:  - Monitor patient closely, per order  - Ensure early verbal de-escalation  - Monitor prn medication needs  - Set reasonable/therapeutic limits, outline behavioral expectations, and consequences   - Provide a non-threatening milieu, utilizing the least restrictive interventions   Outcome: Progressing  Goal: Attend and participate in unit activities, including therapeutic, recreational, and educational groups  Description: Interventions:  - Provide therapeutic and educational activities daily, encourage attendance and participation, and document same in the medical record   Outcome: Progressing  Goal: Identify appropriate positive anger management techniques  Description: Interventions:  - Offer anger management and coping skills groups   - Staff will provide positive feedback for appropriate anger control  Outcome: Progressing     Problem: Alteration in Orientation  Goal: Treatment Goal: Demonstrate a reduction of confusion and improved orientation to person, place, time and/or situation upon discharge, according to optimum baseline/ability  Outcome: Progressing  Goal: Interact with staff daily  Description: Interventions:  - Assess and re-assess patient's level of orientation  - Engage patient in 1 on 1 interactions, daily, for a minimum of 15 minutes   - Establish rapport/trust with patient   Outcome: Progressing  Goal: Express concerns related to confused thinking related to:  Description: Interventions:  - Encourage patient to express feelings, fears, frustrations, hopes  - Assign consistent caregivers   - [place]/re-orient patient as needed  - Allow comfort items, as appropriate  - Provide visual cues, signs, etc    Outcome: Progressing  Goal: Allow medical examinations, as recommended  Description: Interventions:  - Provide physical/neurological exams and/or referrals, per provider   Outcome: Progressing  Goal: Cooperate with recommended testing/procedures  Description: Interventions:  - Determine need for ancillary testing  - Observe for mental status changes  - Implement falls/precaution protocol   Outcome: Progressing  Goal: Attend and participate in unit activities, including therapeutic, recreational, and educational groups  Description: Interventions:  - Provide therapeutic and educational activities daily, encourage attendance and participation, and document same in the medical record   - Provide appropriate opportunities for reminiscence   - Provide a consistent daily routine   - Encourage family contact/visitation   Outcome: Progressing  Goal: Complete daily ADLs, including personal hygiene independently, as able  Description: Interventions:  - Observe, teach, and assist patient with ADLS  Outcome: Progressing     Problem: Prexisting or High Potential for Compromised Skin Integrity  Goal: Skin integrity is maintained or improved  Description: INTERVENTIONS:  - Identify patients at risk for skin breakdown  - Assess and monitor skin integrity  - Assess and monitor nutrition and hydration status  - Monitor labs   - Assess for incontinence   - Turn and reposition patient  - Assist with mobility/ambulation  - Relieve pressure over bony prominences  - Avoid friction and shearing  - Provide appropriate hygiene as needed including keeping skin clean and dry  - Evaluate need for skin moisturizer/barrier cream  - Collaborate with interdisciplinary team   - Patient/family teaching  - Consider wound care consult   Outcome: Progressing     Problem: DISCHARGE PLANNING - CARE MANAGEMENT  Goal: Discharge to post-acute care or home with appropriate resources  Description: INTERVENTIONS:  - Conduct assessment to determine patient/family and health care team treatment goals, and need for post-acute services based on payer coverage, community resources, and patient preferences, and barriers to discharge  - Address psychosocial, clinical, and financial barriers to discharge as identified in assessment in conjunction with the patient/family and health care team  - Arrange appropriate level of post-acute services according to patient’s   needs and preference and payer coverage in collaboration with the physician and health care team  - Communicate with and update the patient/family, physician, and health care team regarding progress on the discharge plan  - Arrange appropriate transportation to post-acute venues  Outcome: Progressing

## 2023-06-06 NOTE — PROGRESS NOTES
06/06/23 0857   Team Meeting   Initial Conference Date 06/06/23   Team Members Present   Team Members Present Physician;Nurse;;; Occupational Therapist   Physician Team Member Jian Gaspar 1060 Team Member [facility] Management Team Member [id number] Work Team Member [place]   OT Team Member Denzel Murphy   Patient/Family Present   Patient Present No   Patient's Family Present No     Patient is doing well  Stable  Pleasant on approach, slightly confused  Alert and attentive while in groups yesterday  Discharge is pending

## 2023-06-06 NOTE — PROGRESS NOTES
"  Progress Note - [address] 80 y o  male MRN: [id number]  Unit/Bed#: Josefine Boeck 969-86 Encounter: [id number]    The patient was seen for continuing care and reviewed with treatment team   Patient has been visible in milieu  He is calm and cooperative, pleasantly confused  Interacts well with peers and staff, out in milieu watching TV  Sleep- good  Appetite- good, diet changed to dysphagia 3/ thin liquid  Energy:good   No suicidal or homicidal ideations  No EPS, denies any side effects of medication  Tolerating medications well  No aggression or agitation noted  ROS : negative    /61 (BP Location: Right arm)   Pulse 62   Temp 97 8 °F (36 6 °C) (Temporal)   Resp 18   Ht 5' 11\" (1 803 m)   Wt 77 5 kg (170 lb 13 7 oz)   SpO2 94%   BMI 23 83 kg/m²     Current Mental Status Evaluation:  Appearance:  Adequate hygiene and grooming, visible in milieu  Behavior:  calm, cooperative and friendly   Mood:  euthymic   Affect: mood-congruent   Speech: Normal volume and Normal rate   Thought Process:  Poverty of thoughts, due to severe cognitive impairment   Thought Content:  Does not verbalize delusional material   Perceptual Disturbances: Denies hallucinations and does not appear to be responding to internal stimuli   Risk Potential: No suicidal or homicidal ideation   Orientation:   Oriented to self only, poor fund of knowledge, recall 0/3, Poor attention span      Patient lacks insight Into his illness, limited judgment and impulse control due to severe cognitive impairment      Recent Results (from the past 72 hour(s))   Fingerstick Glucose (POCT)    Collection Time: 06/03/23  4:11 PM   Result Value Ref Range    POC Glucose 175 (H) 65 - 140 mg/dl   Fingerstick Glucose (POCT)    Collection Time: 06/03/23  8:53 PM   Result Value Ref Range    POC Glucose 174 (H) 65 - 140 mg/dl   Fingerstick Glucose (POCT)    Collection Time: 06/04/23  7:19 AM   Result Value Ref Range    POC Glucose 102 65 " - 140 mg/dl   Fingerstick Glucose (POCT)    Collection Time: 06/04/23 11:34 AM   Result Value Ref Range    POC Glucose 127 65 - 140 mg/dl   Fingerstick Glucose (POCT)    Collection Time: 06/04/23  4:14 PM   Result Value Ref Range    POC Glucose 203 (H) 65 - 140 mg/dl   Fingerstick Glucose (POCT)    Collection Time: 06/04/23  9:05 PM   Result Value Ref Range    POC Glucose 70 65 - 140 mg/dl   Fingerstick Glucose (POCT)    Collection Time: 06/05/23  7:32 AM   Result Value Ref Range    POC Glucose 78 65 - 140 mg/dl   Fingerstick Glucose (POCT)    Collection Time: 06/05/23 11:46 AM   Result Value Ref Range    POC Glucose 146 (H) 65 - 140 mg/dl   Fingerstick Glucose (POCT)    Collection Time: 06/05/23  4:20 PM   Result Value Ref Range    POC Glucose 191 (H) 65 - 140 mg/dl   Fingerstick Glucose (POCT)    Collection Time: 06/05/23  9:17 PM   Result Value Ref Range    POC Glucose 231 (H) 65 - 140 mg/dl   Fingerstick Glucose (POCT)    Collection Time: 06/06/23  7:03 AM   Result Value Ref Range    POC Glucose 79 65 - 140 mg/dl   Fingerstick Glucose (POCT)    Collection Time: 06/06/23 11:36 AM   Result Value Ref Range    POC Glucose 130 65 - 140 mg/dl     Progress Toward Goals:  Pt needs placement,  No medication changes needed    Assessment     Principal Problem:    Unspecified mood (affective) disorder ([facility])  Active Problems:    Essential hypertension    Mixed hyperlipidemia    Type 2 diabetes mellitus with kidney complication, with long-term current use of insulin ([facility])    Dementia with behavioral disturbance ([facility])    Medical clearance for psychiatric admission    Chronic kidney disease (CKD), stage IV (severe) ([facility])        Plan :    - Medications;   Psychiatric:  Continue Abilify 5mg daily   Continue Sertraline 100mg daily   Reduce dose of  Namenda 5mg BID < given the low GFR, could worsen cognition or aggression if accumulates  Plan to discontinue- risk outweigh benefits at this time     Continue   Donepezil 10mg daily Delirium Precautions:  Daytime:  1  Discourage daytime napping  2  Maintain consistency and continuity of staff  3  Keep patient in well-lit room near windows  4  Maintain wakefulness with activities (eg, coloring, puzzles)  5  Aromatherapy patches  6   Visible clock and calendar  Nighttime:  - Relaxation tapes or music before sleep  - Avoid disturbances  - Dim light in the room  - Noise reduction     Medical: per SLIM    -Therapy: occupational therapy, milieu and group therapy  - Legal: 303   -Disposition:Needs placement, daily communication of pt with wife

## 2023-06-06 NOTE — TREATMENT TEAM
Pt attended duration of group and calm  06/06/23 1100   Activity/Group Checklist   Group Other (Comment)  ( Recovery: principles of recovery and poetry)   Attendance Attended   Attendance Duration (min) 31-45   Interactions Other (Comment)  (pleasantly confused)   Affect/Mood Calm;Bright   Goals Achieved Identified feelings; Identified relapse prevention strategies; Able to listen to others; Able to engage in interactions; Discussed coping strategies; Discussed self-esteem issues

## 2023-06-06 NOTE — SPEECH THERAPY NOTE
Speech Language/Pathology    Speech/Language Pathology Progress Note    Patient Name: Gucci ALEJANDRA'S Date: 6/6/2023                     SLP RECOMMENDATIONS:         Diet: Level 3 Dental soft         Liquids: Thin liquids; NO straws         Medications: as best tolerated; 1 at a time with thin liquids via cup or in puree         Aspiration Precautions: upright, slow rate        Summary:  Pt seen for ongoing dysphagia therapy  SLP reviewed results of yesterday's VFSS with pt  Pt asked appropriate questions and demonstrated understanding of recommendations, however suspect variable carryover  Pt will required repetition and reminders for carryover of aspiration precautions  Pt observed with portion of AM meal tray  Pt required occasional cues for small bites/sips during meal  Pt with no overt s/s aspiration with thin liquids  Pt demonstrated slow, but functional mastication of soft solids  Recommend continuing current diet of Level 3 Dental soft/thin liquids  Encourage aspiration precautions  Avoid straws  Assessment:  No overt s/s aspiration with thin liquids via cup sips  Slow, but functional mastication       Plan/Recommendations:  Level 3 dental soft/thin liquids - no straws  Aspiration precautions   SLP to follow         Lab Results   Component Value Date    HCT 38 3 05/25/2023    HGB 12 3 05/25/2023    MCV 87 05/25/2023     05/25/2023    WBC 6 72 05/25/2023           Problem List  Principal Problem:    Unspecified mood (affective) disorder (HCC)  Active Problems:    Essential hypertension    Mixed hyperlipidemia    Type 2 diabetes mellitus with kidney complication, with long-term current use of insulin (HCC)    Dementia with behavioral disturbance (HCC)    Medical clearance for psychiatric admission    Chronic kidney disease (CKD), stage IV (severe) ([facility] [address] )       Past Medical History  Past Medical History:   Diagnosis Date   • Allergic rhinitis    • Anxiety    • Aspiration of liquid     and food per wife if he is eating too fast or talking when eating   • Asthma     as a child   • Atrial fibrillation ([place] [address] )    • CAD (coronary artery disease)    • Cancer of kidney ([place] [address] )    • COPD (chronic obstructive pulmonary disease) (HCC)    • CPAP (continuous positive airway pressure) dependence    • Dementia (HCC)     Frontal lobe   • Dementia (HCC)    • Diabetes mellitus ([place] [address] )    • Diabetes mellitus, type 2 (HCC)    • DJD (degenerative joint disease)    • Hypercholesterolemia    • Hyperlipidemia    • Hypertension    • Incisional hernia    • Kidney disease    • Melanoma ([place] [address] )     left leg   • Obesity    • Sleep apnea     wears CPAP   • TIA (transient ischemic attack)         Past Surgical History  Past Surgical History:   Procedure Laterality Date   • CARDIAC PACEMAKER PLACEMENT     • CHOLECYSTECTOMY     • COLONOSCOPY     • HERNIA REPAIR      Incisional hernia   • NEPHRECTOMY Left 2005   • MD ESOPHAGOGASTRODUODENOSCOPY SUBMUCOSAL INJECTION N/A 9/21/2016    Procedure: ESOPHAGOGASTRODUODENOSCOPY (EGD); Surgeon: Russ Pugh MD;  Location: [facility]; Service: Gastroenterology   • MD ESOPHAGOGASTRODUODENOSCOPY SUBMUCOSAL INJECTION N/A 2/7/2018    Procedure: ESOPHAGOGASTRODUODENOSCOPY (EGD); Surgeon: Russ Pugh MD;  Location: [facility]; Service: Gastroenterology   • MD ESOPHAGOGASTRODUODENOSCOPY SUBMUCOSAL INJECTION N/A 4/3/2019    Procedure: ESOPHAGOGASTRODUODENOSCOPY (EGD) WITH BOTOX;  Surgeon: Russ Pugh MD;  Location: [facility];   Service: Gastroenterology   • ROTATOR CUFF REPAIR     • TONSILLECTOMY

## 2023-06-06 NOTE — NURSING NOTE
Pt visible in dayroom  Pleasantly confused  Medication compliant  Denies all  Diet changed to dysphagia 3/ thin liquids and pt not allowed straws per Speech therapy  Daytime napping has been discouraged to comply with delirium precautions per Dr Kirt Rodriguez progress note   Will continue to monitor

## 2023-06-06 NOTE — PLAN OF CARE
Pt engaged in structured groups when prompted by staff  Pt  inquired about the weather by looking out a window  Pt  thought that a nearby white rooftop was snow on a building  Pt  not fully aware of month/date or appropriate seasonal weather    Problem: Ineffective Coping  Goal: Participates in unit activities  Description: Interventions:  - Provide therapeutic environment   - Provide required programming   - Redirect inappropriate behaviors   Outcome: Progressing

## 2023-06-06 NOTE — CASE MANAGEMENT
Call made to [facility], admissions/marketing at [facility]; tel# [phone]; message reports she is out of office until 6/11  Call made to [facility] tel# [phone], requested to speak with Cassie or Cal Guzman; informed Mirza Blanton is out of building  CM left message for Cal Guzman to return call

## 2023-06-06 NOTE — NURSING NOTE
"Pt visible, social and pleasantly confused  Upon approach, pt reports \"I thought I was going home today\" \"should I just sit here and wait? \"  Pt redirectable at this time  Maintained on fall precautions, ambulates with walker  Bed alarm HS  Medication compliant  Will maintain q7min checks    "

## 2023-06-06 NOTE — PROGRESS NOTES
Chart reviewed  [facility]  IPCM working with patient's Tarangoantonio Montiel on possible discharge plan to [facility]  Will follow

## 2023-06-07 LAB
GLUCOSE SERPL-MCNC: 140 MG/DL (ref 65–140)
GLUCOSE SERPL-MCNC: 150 MG/DL (ref 65–140)
GLUCOSE SERPL-MCNC: 214 MG/DL (ref 65–140)
GLUCOSE SERPL-MCNC: 94 MG/DL (ref 65–140)

## 2023-06-07 PROCEDURE — 82948 REAGENT STRIP/BLOOD GLUCOSE: CPT

## 2023-06-07 PROCEDURE — 99232 SBSQ HOSP IP/OBS MODERATE 35: CPT | Performed by: STUDENT IN AN ORGANIZED HEALTH CARE EDUCATION/TRAINING PROGRAM

## 2023-06-07 PROCEDURE — 92526 ORAL FUNCTION THERAPY: CPT

## 2023-06-07 RX ORDER — MEMANTINE HYDROCHLORIDE 10 MG/1
5 TABLET ORAL DAILY
Status: CANCELLED | OUTPATIENT
Start: 2023-06-08

## 2023-06-07 RX ADMIN — SENNOSIDES 8.6 MG: 8.6 TABLET, FILM COATED ORAL at 21:25

## 2023-06-07 RX ADMIN — ARIPIPRAZOLE 5 MG: 5 TABLET ORAL at 08:30

## 2023-06-07 RX ADMIN — SERTRALINE HYDROCHLORIDE 100 MG: 100 TABLET ORAL at 08:31

## 2023-06-07 RX ADMIN — MEMANTINE 5 MG: 10 TABLET ORAL at 21:24

## 2023-06-07 RX ADMIN — INSULIN LISPRO 2 UNITS: 100 INJECTION, SOLUTION INTRAVENOUS; SUBCUTANEOUS at 21:26

## 2023-06-07 RX ADMIN — MEMANTINE 5 MG: 10 TABLET ORAL at 08:30

## 2023-06-07 RX ADMIN — DOCUSATE SODIUM 100 MG: 100 CAPSULE, LIQUID FILLED ORAL at 17:00

## 2023-06-07 RX ADMIN — PROPAFENONE HYDROCHLORIDE 225 MG: 150 TABLET, FILM COATED ORAL at 21:25

## 2023-06-07 RX ADMIN — PROPAFENONE HYDROCHLORIDE 225 MG: 150 TABLET, FILM COATED ORAL at 14:45

## 2023-06-07 RX ADMIN — CHOLECALCIFEROL TAB 25 MCG (1000 UNIT) 2000 UNITS: 25 TAB at 08:31

## 2023-06-07 RX ADMIN — INSULIN DETEMIR 10 UNITS: 100 INJECTION, SOLUTION SUBCUTANEOUS at 08:31

## 2023-06-07 RX ADMIN — INSULIN DETEMIR 10 UNITS: 100 INJECTION, SOLUTION SUBCUTANEOUS at 21:25

## 2023-06-07 RX ADMIN — FERROUS SULFATE TAB 325 MG (65 MG ELEMENTAL FE) 325 MG: 325 (65 FE) TAB at 08:30

## 2023-06-07 RX ADMIN — INSULIN LISPRO 1 UNITS: 100 INJECTION, SOLUTION INTRAVENOUS; SUBCUTANEOUS at 16:59

## 2023-06-07 RX ADMIN — DONEPEZIL HYDROCHLORIDE 10 MG: 10 TABLET, FILM COATED ORAL at 21:24

## 2023-06-07 RX ADMIN — PROPAFENONE HYDROCHLORIDE 225 MG: 150 TABLET, FILM COATED ORAL at 05:51

## 2023-06-07 RX ADMIN — METOPROLOL TARTRATE 100 MG: 50 TABLET, FILM COATED ORAL at 21:24

## 2023-06-07 RX ADMIN — DOCUSATE SODIUM 100 MG: 100 CAPSULE, LIQUID FILLED ORAL at 08:31

## 2023-06-07 RX ADMIN — ATORVASTATIN CALCIUM 10 MG: 10 TABLET, FILM COATED ORAL at 17:00

## 2023-06-07 RX ADMIN — MAGNESIUM OXIDE TAB 400 MG (241.3 MG ELEMENTAL MG) 400 MG: 400 (241.3 MG) TAB at 08:30

## 2023-06-07 NOTE — NURSING NOTE
"Pt visible in day room, pleasantly confused  States \"thanks for being my helper\" to staff  Ate 100% of snack  Will maintain q7min checks     "

## 2023-06-07 NOTE — PLAN OF CARE
Problem: Depression  Goal: Treatment Goal: Demonstrate behavioral control of depressive symptoms, verbalize feelings of improved mood/affect, and adopt new coping skills prior to discharge  Outcome: Progressing  Goal: Verbalize thoughts and feelings  Description: Interventions:  - Assess and re-assess patient's level of risk   - Engage patient in 1:1 interactions, daily, for a minimum of 15 minutes   - Encourage patient to express feelings, fears, frustrations, hopes   Outcome: Progressing  Goal: Refrain from harming self  Description: Interventions:  - Monitor patient closely, per order   - Supervise medication ingestion, monitor effects and side effects   Outcome: Progressing  Goal: Refrain from isolation  Description: Interventions:  - Develop a trusting relationship   - Encourage socialization   Outcome: Progressing  Goal: Refrain from self-neglect  Outcome: Progressing  Goal: Attend and participate in unit activities, including therapeutic, recreational, and educational groups  Description: Interventions:  - Provide therapeutic and educational activities daily, encourage attendance and participation, and document same in the medical record   Outcome: Progressing  Goal: Complete daily ADLs, including personal hygiene independently, as able  Description: Interventions:  - Observe, teach, and assist patient with ADLS  -  Monitor and promote a balance of rest/activity, with adequate nutrition and elimination   Outcome: Progressing     Problem: Anxiety  Goal: Anxiety is at manageable level  Description: Interventions:  - Assess and monitor patient's anxiety level  - Monitor for signs and symptoms (heart palpitations, chest pain, shortness of breath, headaches, nausea, feeling jumpy, restlessness, irritable, apprehensive)  - Collaborate with interdisciplinary team and initiate plan and interventions as ordered    - [place] patient to unit/surroundings  - Explain treatment plan  - Encourage participation in care  - Encourage verbalization of concerns/fears  - Identify coping mechanisms  - Assist in developing anxiety-reducing skills  - Administer/offer alternative therapies  - Limit or eliminate stimulants  Outcome: Progressing

## 2023-06-07 NOTE — PROGRESS NOTES
"[facility] 80 [facility]  [facility] MRN: [id number]  Unit/Bed#: Jet Acosta 210-64 Encounter: [id number]    Assessment/Plan   Principal Problem:    Unspecified mood (affective) disorder ([facility] [address] )  Active Problems:    Essential hypertension    Mixed hyperlipidemia    Type 2 diabetes mellitus with kidney complication, with long-term current use of insulin (HCC)    Dementia with behavioral disturbance (HCC)    Medical clearance for psychiatric admission    Chronic kidney disease (CKD), stage IV (severe) ([facility])      • Continue medications as noted below  Continue Abilify 5mg daily, Sertraline 100mg daily, Donepezil 10mg daily    Reduce dose Namenda 5mg QD < given poor renal function  • Continue to promote patient participation in therapeutic milieu  • Continue occupational group therapy  • Continue medical management by primary team   • Continue daily assisted calls to wife  • Speech Pathology Level 3 Dental soft/thin liquids - no straws  Aspiration precautions, SLP to follow x1  Discharge disposition:  Delirium Precautions  Daytime:  1  Discourage daytime napping  2  Maintain consistency and continuity of staff  3  Keep patient in well-lit room near windows  4  Maintain wakefulness with activities (eg, coloring, puzzles)  5  Aromatherapy patches  6  Visible clock and calendar  Nighttime:  - Relaxation tapes or music before sleep  - Avoid disturbances  - Dim light in the room  - Noise reduction    Subjective: The patient was evaluated this morning for continuity of care and no acute distress noted throughout the evaluation  Over the past 24 hours staff noted the patient was pleasantly confused and helpful around the unit  Today on exam the patient was pleasantly confused and is only oriented to self  He reports his mood as \"fine  \" He reports good sleep, appetite, energy, and interests in activities  He denies thoughts of anxiety, guilt, hopelessness, SI/HI/AVH   He denies any psychiatric or medical " history, and unsure of why he is in the hospital  Pt believes it is the year 2000, the month is August, and cannot recall the day of the week  He did correctly state the correct current President  He reports feeling safe and has no current concerns       Psychiatric Review of Systems:  Behavior over the last 24 hours:  unchanged  Sleep: normal  Appetite: normal  Medication side effects: No   ROS: no complaints, all other systems are negative    Current psych meds  Abilify 5mg daily, Sertraline 100mg daily, Donepezil 10mg daily    Current Medications:  Current Facility-Administered Medications   Medication Dose Route Frequency Provider Last Rate   • acetaminophen  650 mg Oral Q4H PRN SHADY Chaudhry     • acetaminophen  650 mg Oral Q4H PRN SHADY Chaudhry     • acetaminophen  975 mg Oral Q6H PRN SHADY Chaudhry     • ARIPiprazole  5 mg Oral Daily Nataliia Weiss MD     • atorvastatin  10 mg Oral Daily With Dinner Mickey Madison PA-C     • cholecalciferol  2,000 Units Oral Daily Mickey Madison PA-C     • docusate sodium  100 mg Oral BID SHADY Gerard     • donepezil  10 mg Oral HS SHADY Chaudhry     • ferrous sulfate  325 mg Oral Daily With Breakfast Mickey Madison PA-C     • hydrOXYzine HCL  25 mg Oral Q6H PRN Max 4/day SHADY Chaudhry     • hydrOXYzine HCL  50 mg Oral Q6H PRN Max 4/day SHADY Chaudhry     • insulin detemir  10 Units Subcutaneous Daily With Breakfast Mickey Madison PA-C     • insulin detemir  10 Units Subcutaneous HS Mickey Madison PA-C     • insulin lispro  1-5 Units Subcutaneous HS Mickey Madison PA-C     • insulin lispro  1-6 Units Subcutaneous TID AC Mickey Madison PA-C     • LORazepam  1 mg Intramuscular Q6H PRN Max 3/day SHADY Chaudhry     • LORazepam  1 mg Oral Q6H PRN Max 3/day SHADY Chaudhry     • magnesium Oxide  400 mg Oral Daily Mickey Madison PA-C     • memantine  5 mg Oral Q12H [address] Yamileth Freire MD     • metoprolol tartrate  100 mg Oral HS Michael Velazquez PA-C     • metoprolol tartrate  50 mg Oral Daily Michael Velazquez PA-C     • polyethylene glycol  17 g Oral Daily PRN SHADY Gerard     • propafenone  225 mg Oral Q8H [address] Mcihael Velazquez PA-C     • senna  1 tablet Oral HS SHADY Gerard     • sertraline  100 mg Oral Daily SHADY Damon     • traZODone  75 mg Oral HS PRN SHADY Kumar         Behavioral Health Medications:   current meds:   Current Facility-Administered Medications   Medication Dose Route Frequency   • acetaminophen (TYLENOL) tablet 650 mg  650 mg Oral Q4H PRN   • acetaminophen (TYLENOL) tablet 650 mg  650 mg Oral Q4H PRN   • acetaminophen (TYLENOL) tablet 975 mg  975 mg Oral Q6H PRN   • ARIPiprazole (ABILIFY) tablet 5 mg  5 mg Oral Daily   • atorvastatin (LIPITOR) tablet 10 mg  10 mg Oral Daily With Dinner   • cholecalciferol (VITAMIN D3) tablet 2,000 Units  2,000 Units Oral Daily   • docusate sodium (COLACE) capsule 100 mg  100 mg Oral BID   • donepezil (ARICEPT) tablet 10 mg  10 mg Oral HS   • ferrous sulfate tablet 325 mg  325 mg Oral Daily With Breakfast   • hydrOXYzine HCL (ATARAX) tablet 25 mg  25 mg Oral Q6H PRN Max 4/day   • hydrOXYzine HCL (ATARAX) tablet 50 mg  50 mg Oral Q6H PRN Max 4/day   • insulin detemir (LEVEMIR) subcutaneous injection 10 Units  10 Units Subcutaneous Daily With Breakfast   • insulin detemir (LEVEMIR) subcutaneous injection 10 Units  10 Units Subcutaneous HS   • insulin lispro (HumaLOG) 100 units/mL subcutaneous injection 1-5 Units  1-5 Units Subcutaneous HS   • insulin lispro (HumaLOG) 100 units/mL subcutaneous injection 1-6 Units  1-6 Units Subcutaneous TID AC   • LORazepam (ATIVAN) injection 1 mg  1 mg Intramuscular Q6H PRN Max 3/day   • LORazepam (ATIVAN) tablet 1 mg  1 mg Oral Q6H PRN Max 3/day   • magnesium Oxide (MAG-OX) tablet 400 mg  400 mg Oral Daily   • memantine (NAMENDA) tablet 5 mg  5 mg Oral Q12H CARLOS   • metoprolol tartrate (LOPRESSOR) tablet 100 mg  100 mg Oral HS   • metoprolol tartrate (LOPRESSOR) tablet 50 mg  50 mg Oral Daily   • polyethylene glycol (MIRALAX) packet 17 g  17 g Oral Daily PRN   • propafenone (RYTHMOL) tablet 225 mg  225 mg Oral Q8H [address]   • senna (SENOKOT) tablet 8 6 mg  1 tablet Oral HS   • sertraline (ZOLOFT) tablet 100 mg  100 mg Oral Daily   • traZODone (DESYREL) tablet 75 mg  75 mg Oral HS PRN     Vital signs in last 24 hours:  Temp:  [97 5 °F (36 4 °C)-97 8 °F (36 6 °C)] 97 8 °F (36 6 °C)  HR:  [60-81] 60  Resp:  [18] 18  BP: ()/(53-98) 98/53    Laboratory results:    I have personally reviewed all pertinent laboratory/tests results    Most Recent Labs:   Lab Results   Component Value Date    ALB 4 4 05/29/2023    ALKPHOS 61 05/29/2023    ALT 12 05/29/2023    AST 15 05/29/2023    BUN 28 (H) 05/31/2023    CALCIUM 9 3 05/31/2023    CHOLESTEROL 120 06/03/2023     05/31/2023    CO2 28 05/31/2023    CREATININE 1 84 (H) 05/31/2023     05/25/2023    GLUC 104 05/31/2023    GLUF 104 (H) 05/31/2023    HCT 38 3 05/25/2023    HDL 25 (L) 06/03/2023    HGB 12 3 05/25/2023    HGBA1C 8 0 (H) 05/25/2023    K 4 2 05/31/2023    LDLCALC 60 06/03/2023    NEUTROABS 4 03 05/25/2023    NONHDLC 95 06/03/2023     05/25/2023    RBC 4 38 05/25/2023    RDW 12 7 05/25/2023    SODIUM 138 05/31/2023    TBILI 0 65 05/29/2023    TP 7 5 05/29/2023    TRIG 173 (H) 06/03/2023    TTE4HKHOIRPO 3 060 05/25/2023    WBC 6 72 05/25/2023       Psychiatric Review of Systems:  Behavior over the last 24 hours:  unchanged  Sleep: normal  Appetite: normal  Medication side effects: No   ROS: no complaints, all other systems are negative    Mental Status Evaluation:    Appearance:  age appropriate, dressed appropriately, adequate grooming   Behavior:  normal, pleasant, cooperative, calm   Speech:  normal rate and volume, fluent, clear, coherent   Mood:  normal   Affect:  normal range and intensity, appropriate   Thought Process:  organized, logical, coherent, goal directed, linear, normal rate of thoughts, normal abstract reasoning   Associations: intact associations   Thought Content:  normal, no overt delusions   Perceptual Disturbances: no auditory hallucinations, no visual hallucinations, does not appear responding to internal stimuli   Risk Potential: Suicidal ideation - None  Homicidal ideation - None  Potential for aggression - No   Sensorium:  oriented to person   Memory:  recent memory severely impaired   Consciousness:  alert and awake   Attention/Concentration: attention span and concentration are age appropriate   Insight:  impaired due to Dementia with behavioral disturbance   Judgment: impaired due to Dementia with behavioral disturbance   Gait/Station: uses walker   Motor Activity: no abnormal movements, uses walker  Progress Toward Goals: At cognitive baseline  Awaiting placement  Recommended Treatment:   See above for assessment and plan  Risks, benefits and possible side effects of Medications:   Risks, benefits, and possible side effects of medications explained to patient and patient verbalizes understanding  This note has been constructed using a voice recognition system  There may be translation, syntax, or grammatical errors  If you have any questions, please contact the dictating provider

## 2023-06-07 NOTE — CASE MANAGEMENT
Cm spoke with Mag Dao at [facility]; reviewed pt's current status and medications  Mag Dao reports need to review for final determination  Mag Dao to send CM DME form to be completed by physician  PAOLA informed Mag Dao of pt's [phone] and pt must d/c by 6/12  PAOLA spoke with pt's guardian [place], reviewed call with Mag Dao  Brain [place] reports she also spoke with Mag Dao and is to receive call today with determination  CM also informed Brain [place] that pt must d/c by 6/12

## 2023-06-07 NOTE — NURSING NOTE
Pt visible in dayroom and throughout milieu  Patient brightens on approach and is social with others  Patient is med compliant and cooperative  No pain reported  No SI/HI/AH/VH reported  Patient is pleasantly confused  Breakfast and lunch insulin held d/t parameters not being met   Continual rounding will continue

## 2023-06-07 NOTE — SPEECH THERAPY NOTE
Speech Language/Pathology    Speech/Language Pathology Progress Note    Patient Name: Selvin LAND Date: 6/7/2023                     SLP RECOMMENDATIONS:         Diet: Level 3 Dental soft        Liquids: Thin liquids; no straws         Medications: one at a time with thin via cup or whole in puree         Aspiration Precautions: upright, slow rate        Summary:  Pt seen for ongoing dysphagia therapy  Nursing reports pt has had no recent observed difficulty with swallowing since diet modifications  Pt observed with portion of AM meal of Level 3 dental soft/thin liquids via cup sips  Pt with slow, but functional mastication/manipulation of bolus  No overt s/s aspiration observed with thin liquids via cup sips  SLP reviewed aspiration precautions with pt  Recommend continuing current diet  SLP to follow x1 to ensure continued diet tolerance given h/o choking/coughing on the unit  Assessment:  No overt s/s aspiration with thin liquids via cup sips  Slow, but functional mastication of soft solids       Plan/Recommendations:  Level 3 Dental soft/thin liquids - no straws   Aspiration precautions   SLP to follow x1         Lab Results   Component Value Date    HCT 38 3 05/25/2023    HGB 12 3 05/25/2023    MCV 87 05/25/2023     05/25/2023    WBC 6 72 05/25/2023           Problem List  Principal Problem:    Unspecified mood (affective) disorder (HCC)  Active Problems:    Essential hypertension    Mixed hyperlipidemia    Type 2 diabetes mellitus with kidney complication, with long-term current use of insulin (HCC)    Dementia with behavioral disturbance (HCC)    Medical clearance for psychiatric admission    Chronic kidney disease (CKD), stage IV (severe) ([facility] [address] )       Past Medical History  Past Medical History:   Diagnosis Date   • Allergic rhinitis    • Anxiety    • Aspiration of liquid     and food per wife if he is eating too fast or talking when eating   • Asthma     as a child   • Atrial fibrillation ([place] [address] )    • CAD (coronary artery disease)    • Cancer of kidney (HCC)    • COPD (chronic obstructive pulmonary disease) (HCC)    • CPAP (continuous positive airway pressure) dependence    • Dementia (HCC)     Frontal lobe   • Dementia (HCC)    • Diabetes mellitus ([place] [address] )    • Diabetes mellitus, type 2 (HCC)    • DJD (degenerative joint disease)    • Hypercholesterolemia    • Hyperlipidemia    • Hypertension    • Incisional hernia    • Kidney disease    • Melanoma ([place] [address] )     left leg   • Obesity    • Sleep apnea     wears CPAP   • TIA (transient ischemic attack)         Past Surgical History  Past Surgical History:   Procedure Laterality Date   • CARDIAC PACEMAKER PLACEMENT     • CHOLECYSTECTOMY     • COLONOSCOPY     • HERNIA REPAIR      Incisional hernia   • NEPHRECTOMY Left 2005   • DC ESOPHAGOGASTRODUODENOSCOPY SUBMUCOSAL INJECTION N/A 9/21/2016    Procedure: ESOPHAGOGASTRODUODENOSCOPY (EGD); Surgeon: Bobby Strong MD;  Location: [facility]; Service: Gastroenterology   • DC ESOPHAGOGASTRODUODENOSCOPY SUBMUCOSAL INJECTION N/A 2/7/2018    Procedure: ESOPHAGOGASTRODUODENOSCOPY (EGD); Surgeon: Bobby Strong MD;  [facility]; Service: Gastroenterology   • DC ESOPHAGOGASTRODUODENOSCOPY SUBMUCOSAL INJECTION N/A 4/3/2019    Procedure: ESOPHAGOGASTRODUODENOSCOPY (EGD) WITH BOTOX;  Surgeon: Bobby Strong MD;  Location: [facility];   Service: Gastroenterology   • ROTATOR CUFF REPAIR     • TONSILLECTOMY

## 2023-06-07 NOTE — PLAN OF CARE
Problem: Alteration in Thoughts and Perception  Goal: Treatment Goal: Gain control of psychotic behaviors/thinking, reduce/eliminate presenting symptoms and demonstrate improved reality functioning upon discharge  Outcome: Progressing  Goal: Verbalize thoughts and feelings  Description: Interventions:  - Promote a nonjudgmental and trusting relationship with the patient through active listening and therapeutic communication  - Assess patient's level of functioning, behavior and potential for risk  - Engage patient in 1 on 1 interactions  - Encourage patient to express fears, feelings, frustrations, and discuss symptoms    - [place] patient to reality, help patient recognize reality-based thinking   - Administer medications as ordered and assess for potential side effects  - Provide the patient education related to the signs and symptoms of the illness and desired effects of prescribed medications  Outcome: Progressing  Goal: Refrain from acting on delusional thinking/internal stimuli  Description: Interventions:  - Monitor patient closely, per order   - Utilize least restrictive measures   - Set reasonable limits, give positive feedback for acceptable   - Administer medications as ordered and monitor of potential side effects  Outcome: Progressing  Goal: Agree to be compliant with medication regime, as prescribed and report medication side effects  Description: Interventions:  - Offer appropriate PRN medication and supervise ingestion; conduct AIMS, as needed   Outcome: Progressing  Goal: Attend and participate in unit activities, including therapeutic, recreational, and educational groups  Description: Interventions:  -Encourage Visitation and family involvement in care  Outcome: Progressing  Goal: Recognize dysfunctional thoughts, communicate reality-based thoughts at the time of discharge  Description: Interventions:  - Provide medication and psycho-education to assist patient in compliance and developing insight into his/her illness   Outcome: Progressing  Goal: Complete daily ADLs, including personal hygiene independently, as able  Description: Interventions:  - Observe, teach, and assist patient with ADLS  - Monitor and promote a balance of rest/activity, with adequate nutrition and elimination   Outcome: Progressing     Problem: Ineffective Coping  Goal: Identifies ineffective coping skills  Outcome: Progressing  Goal: Identifies healthy coping skills  Outcome: Progressing  Goal: Demonstrates healthy coping skills  Outcome: Progressing  Goal: Participates in unit activities  Description: Interventions:  - Provide therapeutic environment   - Provide required programming   - Redirect inappropriate behaviors   Outcome: Progressing  Goal: Patient/Family participate in treatment and DC plans  Description: Interventions:  - Provide therapeutic environment  Outcome: Progressing  Goal: Patient/Family verbalizes awareness of resources  Outcome: Progressing  Goal: Understands least restrictive measures  Description: Interventions:  - Utilize least restrictive behavior  Outcome: Progressing  Goal: Free from restraint events  Description: - Utilize least restrictive measures   - Provide behavioral interventions   - Redirect inappropriate behaviors   Outcome: Progressing     Problem: Risk for Self Injury/Neglect  Goal: Treatment Goal: Remain safe during length of stay, learn and adopt new coping skills, and be free of self-injurious ideation, impulses and acts at the time of discharge  Outcome: Progressing  Goal: Verbalize thoughts and feelings  Description: Interventions:  - Assess and re-assess patient's lethality and potential for self-injury  - Engage patient in 1:1 interactions, daily, for a minimum of 15 minutes  - Encourage patient to express feelings, fears, frustrations, hopes  - Establish rapport/trust with patient   Outcome: Progressing  Goal: Refrain from harming self  Description: Interventions:  - Monitor patient closely, per order  - Develop a trusting relationship  - Supervise medication ingestion, monitor effects and side effects   Outcome: Progressing  Goal: Attend and participate in unit activities, including therapeutic, recreational, and educational groups  Description: Interventions:  - Provide therapeutic and educational activities daily, encourage attendance and participation, and document same in the medical record  - Obtain collateral information, encourage visitation and family involvement in care   Outcome: Progressing  Goal: Recognize maladaptive responses and adopt new coping mechanisms  Outcome: Progressing  Goal: Complete daily ADLs, including personal hygiene independently, as able  Description: Interventions:  - Observe, teach, and assist patient with ADLS  - Monitor and promote a balance of rest/activity, with adequate nutrition and elimination  Outcome: Progressing     Problem: Depression  Goal: Treatment Goal: Demonstrate behavioral control of depressive symptoms, verbalize feelings of improved mood/affect, and adopt new coping skills prior to discharge  Outcome: Progressing  Goal: Verbalize thoughts and feelings  Description: Interventions:  - Assess and re-assess patient's level of risk   - Engage patient in 1:1 interactions, daily, for a minimum of 15 minutes   - Encourage patient to express feelings, fears, frustrations, hopes   Outcome: Progressing  Goal: Refrain from harming self  Description: Interventions:  - Monitor patient closely, per order   - Supervise medication ingestion, monitor effects and side effects   Outcome: Progressing  Goal: Refrain from isolation  Description: Interventions:  - Develop a trusting relationship   - Encourage socialization   Outcome: Progressing  Goal: Refrain from self-neglect  Outcome: Progressing  Goal: Attend and participate in unit activities, including therapeutic, recreational, and educational groups  Description: Interventions:  - Provide therapeutic and educational activities daily, encourage attendance and participation, and document same in the medical record   Outcome: Progressing  Goal: Complete daily ADLs, including personal hygiene independently, as able  Description: Interventions:  - Observe, teach, and assist patient with ADLS  -  Monitor and promote a balance of rest/activity, with adequate nutrition and elimination   Outcome: Progressing     Problem: Anxiety  Goal: Anxiety is at manageable level  Description: Interventions:  - Assess and monitor patient's anxiety level  - Monitor for signs and symptoms (heart palpitations, chest pain, shortness of breath, headaches, nausea, feeling jumpy, restlessness, irritable, apprehensive)  - Collaborate with interdisciplinary team and initiate plan and interventions as ordered    - [place] patient to unit/surroundings  - Explain treatment plan  - Encourage participation in care  - Encourage verbalization of concerns/fears  - Identify coping mechanisms  - Assist in developing anxiety-reducing skills  - Administer/offer alternative therapies  - Limit or eliminate stimulants  Outcome: Progressing     Problem: Risk for Violence/Aggression Toward Others  Goal: Treatment Goal: Refrain from acts of violence/aggression during length of stay, and demonstrate improved impulse control at the time of discharge  Outcome: Progressing  Goal: Verbalize thoughts and feelings  Description: Interventions:  - Assess and re-assess patient's level of risk, every waking shift  - Engage patient in 1:1 interactions, daily, for a minimum of 15 minutes   - Allow patient to express feelings and frustrations in a safe and non-threatening manner   - Establish rapport/trust with patient   Outcome: Progressing  Goal: Refrain from harming others  Outcome: Progressing  Goal: Refrain from destructive acts on the environment or property  Outcome: Progressing  Goal: Control angry outbursts  Description: Interventions:  - Monitor patient closely, per order  - Ensure early verbal de-escalation  - Monitor prn medication needs  - Set reasonable/therapeutic limits, outline behavioral expectations, and consequences   - Provide a non-threatening milieu, utilizing the least restrictive interventions   Outcome: Progressing  Goal: Attend and participate in unit activities, including therapeutic, recreational, and educational groups  Description: Interventions:  - Provide therapeutic and educational activities daily, encourage attendance and participation, and document same in the medical record   Outcome: Progressing  Goal: Identify appropriate positive anger management techniques  Description: Interventions:  - Offer anger management and coping skills groups   - Staff will provide positive feedback for appropriate anger control  Outcome: Progressing     Problem: Alteration in Orientation  Goal: Treatment Goal: Demonstrate a reduction of confusion and improved orientation to person, place, time and/or situation upon discharge, according to optimum baseline/ability  Outcome: Progressing  Goal: Interact with staff daily  Description: Interventions:  - Assess and re-assess patient's level of orientation  - Engage patient in 1 on 1 interactions, daily, for a minimum of 15 minutes   - Establish rapport/trust with patient   Outcome: Progressing  Goal: Express concerns related to confused thinking related to:  Description: Interventions:  - Encourage patient to express feelings, fears, frustrations, hopes  - Assign consistent caregivers   - [place]/re-orient patient as needed  - Allow comfort items, as appropriate  - Provide visual cues, signs, etc    Outcome: Progressing  Goal: Allow medical examinations, as recommended  Description: Interventions:  - Provide physical/neurological exams and/or referrals, per provider   Outcome: Progressing  Goal: Cooperate with recommended testing/procedures  Description: Interventions:  - Determine need for ancillary testing  - Observe for mental status changes  - Implement falls/precaution protocol   Outcome: Progressing  Goal: Attend and participate in unit activities, including therapeutic, recreational, and educational groups  Description: Interventions:  - Provide therapeutic and educational activities daily, encourage attendance and participation, and document same in the medical record   - Provide appropriate opportunities for reminiscence   - Provide a consistent daily routine   - Encourage family contact/visitation   Outcome: Progressing  Goal: Complete daily ADLs, including personal hygiene independently, as able  Description: Interventions:  - Observe, teach, and assist patient with ADLS  Outcome: Progressing     Problem: Prexisting or High Potential for Compromised Skin Integrity  Goal: Skin integrity is maintained or improved  Description: INTERVENTIONS:  - Identify patients at risk for skin breakdown  - Assess and monitor skin integrity  - Assess and monitor nutrition and hydration status  - Monitor labs   - Assess for incontinence   - Turn and reposition patient  - Assist with mobility/ambulation  - Relieve pressure over bony prominences  - Avoid friction and shearing  - Provide appropriate hygiene as needed including keeping skin clean and dry  - Evaluate need for skin moisturizer/barrier cream  - Collaborate with interdisciplinary team   - Patient/family teaching  - Consider wound care consult   Outcome: Progressing     Problem: DISCHARGE PLANNING - CARE MANAGEMENT  Goal: Discharge to post-acute care or home with appropriate resources  Description: INTERVENTIONS:  - Conduct assessment to determine patient/family and health care team treatment goals, and need for post-acute services based on payer coverage, community resources, and patient preferences, and barriers to discharge  - Address psychosocial, clinical, and financial barriers to discharge as identified in assessment in conjunction with the patient/family and health care team  - Arrange appropriate level of post-acute services according to patient’s   needs and preference and payer coverage in collaboration with the physician and health care team  - Communicate with and update the patient/family, physician, and health care team regarding progress on the discharge plan  - Arrange appropriate transportation to post-acute venues  Outcome: Progressing

## 2023-06-07 NOTE — TREATMENT TEAM
"Pt attended group  Pt pleasant, alert and able to follow focus of group topic  Pt noted if you do not take medication you \"will go down hill\" (and made hand gesture in relation to statement)  06/07/23 1100   Activity/Group Checklist   Group Other (Comment)  (STPS: Pro's and con's of change)   Attendance Attended   Attendance Duration (min) 31-45   Interactions Interacted appropriately   Affect/Mood Calm; Appropriate   Goals Achieved Identified feelings; Identified relapse prevention strategies; Able to listen to others; Able to engage in interactions; Able to reflect/comment on own behavior;Able to manage/cope with feelings; Able to self-disclose;Verbalized increased hopefulness; Able to recieve feedback       "

## 2023-06-07 NOTE — TREATMENT TEAM
06/07/23 0841   Team Meeting   Meeting Type Daily Rounds   Initial Conference Date 06/07/23   Team Members Present   Team Members Present Physician;Nurse;Occupational Therapist;;   Physician Team Member Jian Gaspar 1060 Team Member [facility] Management Team Member Penny [address] Work Team Member [place]   OT Team Member Francisco CARNEY   Patient/Family Present   Patient Present No   Patient's Family Present No     Add Dr Marilou Santos and Dr Anton Gomez, isolative to self, pleasantly confused, alert but confused during group, remains for duration of group, staff to assist pt with daily calls to wife

## 2023-06-07 NOTE — NURSING NOTE
Patient denies anxiety, depression, SI, HI, AH and VH  Patient is pleasant upon approach  Medication and meal compliant  Patient is visible on the milieu and in the dining room socializing with peers  Patient denies any needs at this time  Safety checks ongoing

## 2023-06-07 NOTE — PROGRESS NOTES
Pt with pleasantly confused demeanor and engaged in the exersises as instructed in guided music seated group    06/07/23 1000   Activity/Group Checklist   Group Exercise  (musical seated exercises )   Attendance Attended   Attendance Duration (min) 16-30   Interactions Disorganized interaction  (pt  social when brought to group in progress and engaged in seated exercises as instructed )   Affect/Mood Calm;Bright   Goals Achieved Able to engage in interactions; Able to listen to others;Discussed coping strategies

## 2023-06-08 ENCOUNTER — TELEPHONE (OUTPATIENT)
Dept: OBGYN CLINIC | Facility: MEDICAL CENTER | Age: 88
End: 2023-06-08

## 2023-06-08 LAB
GLUCOSE SERPL-MCNC: 131 MG/DL (ref 65–140)
GLUCOSE SERPL-MCNC: 133 MG/DL (ref 65–140)
GLUCOSE SERPL-MCNC: 177 MG/DL (ref 65–140)
GLUCOSE SERPL-MCNC: 91 MG/DL (ref 65–140)

## 2023-06-08 PROCEDURE — 99232 SBSQ HOSP IP/OBS MODERATE 35: CPT | Performed by: STUDENT IN AN ORGANIZED HEALTH CARE EDUCATION/TRAINING PROGRAM

## 2023-06-08 PROCEDURE — 82948 REAGENT STRIP/BLOOD GLUCOSE: CPT

## 2023-06-08 RX ORDER — MEMANTINE HYDROCHLORIDE 10 MG/1
5 TABLET ORAL DAILY
Status: DISCONTINUED | OUTPATIENT
Start: 2023-06-09 | End: 2023-06-09

## 2023-06-08 RX ADMIN — MAGNESIUM OXIDE TAB 400 MG (241.3 MG ELEMENTAL MG) 400 MG: 400 (241.3 MG) TAB at 08:56

## 2023-06-08 RX ADMIN — METOPROLOL TARTRATE 50 MG: 50 TABLET, FILM COATED ORAL at 08:56

## 2023-06-08 RX ADMIN — INSULIN LISPRO 1 UNITS: 100 INJECTION, SOLUTION INTRAVENOUS; SUBCUTANEOUS at 17:10

## 2023-06-08 RX ADMIN — METOPROLOL TARTRATE 100 MG: 50 TABLET, FILM COATED ORAL at 21:36

## 2023-06-08 RX ADMIN — ATORVASTATIN CALCIUM 10 MG: 10 TABLET, FILM COATED ORAL at 17:11

## 2023-06-08 RX ADMIN — PROPAFENONE HYDROCHLORIDE 225 MG: 150 TABLET, FILM COATED ORAL at 14:28

## 2023-06-08 RX ADMIN — SERTRALINE HYDROCHLORIDE 100 MG: 100 TABLET ORAL at 08:59

## 2023-06-08 RX ADMIN — PROPAFENONE HYDROCHLORIDE 225 MG: 150 TABLET, FILM COATED ORAL at 06:01

## 2023-06-08 RX ADMIN — CHOLECALCIFEROL TAB 25 MCG (1000 UNIT) 2000 UNITS: 25 TAB at 08:56

## 2023-06-08 RX ADMIN — SENNOSIDES 8.6 MG: 8.6 TABLET, FILM COATED ORAL at 21:36

## 2023-06-08 RX ADMIN — DOCUSATE SODIUM 100 MG: 100 CAPSULE, LIQUID FILLED ORAL at 17:11

## 2023-06-08 RX ADMIN — MEMANTINE 5 MG: 10 TABLET ORAL at 08:58

## 2023-06-08 RX ADMIN — FERROUS SULFATE TAB 325 MG (65 MG ELEMENTAL FE) 325 MG: 325 (65 FE) TAB at 08:56

## 2023-06-08 RX ADMIN — INSULIN DETEMIR 10 UNITS: 100 INJECTION, SOLUTION SUBCUTANEOUS at 09:01

## 2023-06-08 RX ADMIN — PROPAFENONE HYDROCHLORIDE 225 MG: 150 TABLET, FILM COATED ORAL at 21:35

## 2023-06-08 RX ADMIN — DONEPEZIL HYDROCHLORIDE 10 MG: 10 TABLET, FILM COATED ORAL at 21:36

## 2023-06-08 RX ADMIN — ARIPIPRAZOLE 5 MG: 5 TABLET ORAL at 08:56

## 2023-06-08 RX ADMIN — INSULIN DETEMIR 10 UNITS: 100 INJECTION, SOLUTION SUBCUTANEOUS at 21:36

## 2023-06-08 RX ADMIN — DOCUSATE SODIUM 100 MG: 100 CAPSULE, LIQUID FILLED ORAL at 08:58

## 2023-06-08 NOTE — CASE MANAGEMENT
304 petition completed and faxed to Shawna Cespedes, [facility] [facility] with request for 304 hearing on 6/13  Call made to [facility] x2 requesting medical examination form to be faxed  PAOLA spoke with Cassie at [facility], requested medical examination form to be faxed to CM  Cassie requested additional information to be sent to her regarding pt's behaviors on unit  Cassie to fax medical form to PAOLA  CM faxed clinical to [facility] Jt MARCELINO at [facility]

## 2023-06-08 NOTE — TREATMENT TEAM
Pt sleepy during group and impulsively went across the room to get a snack (without a walker)  Nursing intervened  06/08/23 1330   Activity/Group Checklist   Group Other (Comment)  (Coping skills and MH recovery)   Attendance Attended   Attendance Duration (min) 31-45   Interactions Disorganized interaction   Affect/Mood Other (Comment)  (sleepy)   Goals Achieved Discussed coping strategies; Able to listen to others

## 2023-06-08 NOTE — PROGRESS NOTES
"    Progress Note - [address] 80 y o  male MRN: [id number]  Unit/Bed#: [facility] 420-12 Encounter: [id number]    Assessment/Plan   Principal Problem:    Unspecified mood (affective) disorder ([address] )  Active Problems:    Essential hypertension    Mixed hyperlipidemia    Type 2 diabetes mellitus with kidney complication, with long-term current use of insulin (HCC)    Dementia with behavioral disturbance ([facility])    Medical clearance for psychiatric admission    Chronic kidney disease (CKD), stage IV (severe) ([facility])    Plan:  - Medications;   Psychiatric:  Continue Abilify 5mg daily   Sertraline 100mg daily    Namenda 5mg daily , plan to d/c due to low renal function   Continue  Donepezil 10mg daily      Delirium Precautions:  Daytime:  1  Discourage daytime napping  2  Maintain consistency and continuity of staff  3  Keep patient in well-lit room near windows  4  Maintain wakefulness with activities (eg, coloring, puzzles)  5  Aromatherapy patches  6  Visible clock and calendar  Nighttime:  - Relaxation tapes or music before sleep  - Avoid disturbances  - Dim light in the room  - Noise reduction      Medical: per SLIM     -Therapy: occupational therapy, milieu and group therapy  - Legal:  303    -Disposition:Needs placement      Subjective: The patient was evaluated this morning for continuity of care and no acute distress noted throughout the evaluation  Over the past 24 hours staff noted the patient continues to be calm, cooperative, and pleasantly confused  Visible in mileu and participating in group  Today on exam the patient reports mood as \"fine\" and reports no issues  He denies thoughts of anxiety, guilt, hopelessness, SI/HI/AVH  He continues to be pleasantly confused and cooperative on the unit      Psychiatric Review of Systems:  Behavior over the last 24 hours:  unchanged  Sleep: normal  Appetite: normal  Medication side effects: No   ROS: no complaints, all other systems are " negative       Current Medications:  Current Facility-Administered Medications   Medication Dose Route Frequency Provider Last Rate   • acetaminophen  650 mg Oral Q4H PRN SHADY Van     • acetaminophen  650 mg Oral Q4H PRN SHADY Van     • acetaminophen  975 mg Oral Q6H PRN MICAH VanNP     • ARIPiprazole  5 mg Oral Daily Elizabeth Parker MD     • atorvastatin  10 mg Oral Daily With Dinner Sandee Szymanski PA-C     • cholecalciferol  2,000 Units Oral Daily Sandee Szymanski PA-C     • docusate sodium  100 mg Oral BID SHADY Gerard     • donepezil  10 mg Oral HS MICAH VanNP     • ferrous sulfate  325 mg Oral Daily With Breakfast Sandee Szymanski PA-C     • hydrOXYzine HCL  25 mg Oral Q6H PRN Max 4/day MICAH VanNP     • hydrOXYzine HCL  50 mg Oral Q6H PRN Max 4/day MICAH VanNP     • insulin detemir  10 Units Subcutaneous Daily With Breakfast Sandee Szymanski PA-C     • insulin detemir  10 Units Subcutaneous HS Sandee Szymanski PA-C     • insulin lispro  1-5 Units Subcutaneous HS Sandee Szymanski PA-C     • insulin lispro  1-6 Units Subcutaneous TID AC Sandee Szymanski PA-C     • LORazepam  1 mg Intramuscular Q6H PRN Max 3/day SHADY Van     • LORazepam  1 mg Oral Q6H PRN Max 3/day SHADY Van     • magnesium Oxide  400 mg Oral Daily Sandee Szymanski PA-C     • memantine  5 mg Oral Q12H [facility] Elizabeth Parker MD     • metoprolol tartrate  100 mg Oral HS Sandee Szymanski PA-C     • metoprolol tartrate  50 mg Oral Daily Sandee Szymanski PA-C     • polyethylene glycol  17 g Oral Daily PRN SHADY Gerard     • propafenone  225 mg Oral Q8H [facility] Sandee Szymanski PA-C     • senna  1 tablet Oral HS SHADY Gerard     • sertraline  100 mg Oral Daily SHADY Van     • traZODone  75 mg Oral HS PRN SHADY Elkins         Behavioral Health Medications: all current active meds have been reviewed  Vital signs in last 24 hours:  Temp:  [97 3 °F (36 3 °C)-97 9 °F (36 6 °C)] 97 9 °F (36 6 °C)  HR:  [64-80] 71  Resp:  [16-18] 16  BP: (114-159)/(66-78) 114/66    Laboratory results:  I have personally reviewed all pertinent laboratory/tests results  POC Glucose 91      Mental Status Evaluation:    Appearance:  age appropriate, dressed appropriately, uses walker   Behavior:  normal, pleasant, calm   Speech:  normal rate, normal volume, normal pitch, fluent, clear, coherent   Mood:  normal   Affect:  normal range and intensity   Thought Process:  organized, logical, coherent, goal directed, linear, normal abstract reasoning   Associations: intact associations   Thought Content:  no overt delusions, poverty of thought secondary to severe cognitive impairment   Perceptual Disturbances: no auditory hallucinations, no visual hallucinations, does not appear responding to internal stimuli   Risk Potential: Suicidal ideation - None  Homicidal ideation - None  Potential for aggression - No   Sensorium:  oriented to person, disoriented to place, time/date, day of week and year   Memory:  recent memory severely impaired   Consciousness:  alert and awake   Attention/Concentration: attention span and concentration are age appropriate   Insight:  impaired due to dementia with behavioral disturbances   Judgment: impaired due to dementia with behavioral disturbances   Gait/Station: uses walker   Motor Activity: no abnormal movements         Progress Toward Goals: At cognitive baseline  Awaiting placement  Recommended Treatment:   See above for assessment and plan  Risks, benefits and possible side effects of Medications:   Risks, benefits, and possible side effects of medications explained to patient and patient verbalizes understanding

## 2023-06-08 NOTE — TREATMENT TEAM
06/08/23 0831   Team Meeting   Meeting Type Daily Rounds   Initial Conference Date 06/08/23   Team Members Present   Team Members Present Nurse;Physician;Occupational Therapist;;   Physician Team Member Dr Jian Gaspar 1060 Team Member Judy Coates 76 Management Team Member Penny 116 Work Team Member Valerie Rosa   OT Team Member Evelia Aase T   Patient/Family Present   Patient Present No   Patient's Family Present No     Pleasant, making jokes, cooperative, med and meal compliant, sleeping well, attending groups with participation

## 2023-06-08 NOTE — PLAN OF CARE
Problem: Alteration in Thoughts and Perception  Goal: Treatment Goal: Gain control of psychotic behaviors/thinking, reduce/eliminate presenting symptoms and demonstrate improved reality functioning upon discharge  Outcome: Progressing  Goal: Verbalize thoughts and feelings  Description: Interventions:  - Promote a nonjudgmental and trusting relationship with the patient through active listening and therapeutic communication  - Assess patient's level of functioning, behavior and potential for risk  - Engage patient in 1 on 1 interactions  - Encourage patient to express fears, feelings, frustrations, and discuss symptoms    - [place] patient to reality, help patient recognize reality-based thinking   - Administer medications as ordered and assess for potential side effects  - Provide the patient education related to the signs and symptoms of the illness and desired effects of prescribed medications  Outcome: Progressing  Goal: Refrain from acting on delusional thinking/internal stimuli  Description: Interventions:  - Monitor patient closely, per order   - Utilize least restrictive measures   - Set reasonable limits, give positive feedback for acceptable   - Administer medications as ordered and monitor of potential side effects  Outcome: Progressing  Goal: Agree to be compliant with medication regime, as prescribed and report medication side effects  Description: Interventions:  - Offer appropriate PRN medication and supervise ingestion; conduct AIMS, as needed   Outcome: Progressing  Goal: Attend and participate in unit activities, including therapeutic, recreational, and educational groups  Description: Interventions:  -Encourage Visitation and family involvement in care  Outcome: Progressing  Goal: Recognize dysfunctional thoughts, communicate reality-based thoughts at the time of discharge  Description: Interventions:  - Provide medication and psycho-education to assist patient in compliance and developing insight into his/her illness   Outcome: Progressing  Goal: Complete daily ADLs, including personal hygiene independently, as able  Description: Interventions:  - Observe, teach, and assist patient with ADLS  - Monitor and promote a balance of rest/activity, with adequate nutrition and elimination   Outcome: Progressing     Problem: Risk for Self Injury/Neglect  Goal: Verbalize thoughts and feelings  Description: Interventions:  - Assess and re-assess patient's lethality and potential for self-injury  - Engage patient in 1:1 interactions, daily, for a minimum of 15 minutes  - Encourage patient to express feelings, fears, frustrations, hopes  - Establish rapport/trust with patient   Outcome: Progressing  Goal: Refrain from harming self  Description: Interventions:  - Monitor patient closely, per order  - Develop a trusting relationship  - Supervise medication ingestion, monitor effects and side effects   Outcome: Progressing  Goal: Recognize maladaptive responses and adopt new coping mechanisms  Outcome: Progressing  Goal: Complete daily ADLs, including personal hygiene independently, as able  Description: Interventions:  - Observe, teach, and assist patient with ADLS  - Monitor and promote a balance of rest/activity, with adequate nutrition and elimination  Outcome: Progressing     Problem: Depression  Goal: Treatment Goal: Demonstrate behavioral control of depressive symptoms, verbalize feelings of improved mood/affect, and adopt new coping skills prior to discharge  Outcome: Progressing  Goal: Verbalize thoughts and feelings  Description: Interventions:  - Assess and re-assess patient's level of risk   - Engage patient in 1:1 interactions, daily, for a minimum of 15 minutes   - Encourage patient to express feelings, fears, frustrations, hopes   Outcome: Progressing  Goal: Refrain from harming self  Description: Interventions:  - Monitor patient closely, per order   - Supervise medication ingestion, monitor effects and side effects   Outcome: Progressing  Goal: Refrain from isolation  Description: Interventions:  - Develop a trusting relationship   - Encourage socialization   Outcome: Progressing  Goal: Refrain from self-neglect  Outcome: Progressing  Goal: Attend and participate in unit activities, including therapeutic, recreational, and educational groups  Description: Interventions:  - Provide therapeutic and educational activities daily, encourage attendance and participation, and document same in the medical record   Outcome: Progressing  Goal: Complete daily ADLs, including personal hygiene independently, as able  Description: Interventions:  - Observe, teach, and assist patient with ADLS  -  Monitor and promote a balance of rest/activity, with adequate nutrition and elimination   Outcome: Progressing     Problem: Anxiety  Goal: Anxiety is at manageable level  Description: Interventions:  - Assess and monitor patient's anxiety level  - Monitor for signs and symptoms (heart palpitations, chest pain, shortness of breath, headaches, nausea, feeling jumpy, restlessness, irritable, apprehensive)  - Collaborate with interdisciplinary team and initiate plan and interventions as ordered    - [place] patient to unit/surroundings  - Explain treatment plan  - Encourage participation in care  - Encourage verbalization of concerns/fears  - Identify coping mechanisms  - Assist in developing anxiety-reducing skills  - Administer/offer alternative therapies  - Limit or eliminate stimulants  Outcome: Progressing     Problem: Risk for Violence/Aggression Toward Others  Goal: Treatment Goal: Refrain from acts of violence/aggression during length of stay, and demonstrate improved impulse control at the time of discharge  Outcome: Progressing  Goal: Verbalize thoughts and feelings  Description: Interventions:  - Assess and re-assess patient's level of risk, every waking shift  - Engage patient in 1:1 interactions, daily, for a minimum of 15 minutes   - Allow patient to express feelings and frustrations in a safe and non-threatening manner   - Establish rapport/trust with patient   Outcome: Progressing  Goal: Refrain from harming others  Outcome: Progressing  Goal: Refrain from destructive acts on the environment or property  Outcome: Progressing  Goal: Control angry outbursts  Description: Interventions:  - Monitor patient closely, per order  - Ensure early verbal de-escalation  - Monitor prn medication needs  - Set reasonable/therapeutic limits, outline behavioral expectations, and consequences   - Provide a non-threatening milieu, utilizing the least restrictive interventions   Outcome: Progressing  Goal: Attend and participate in unit activities, including therapeutic, recreational, and educational groups  Description: Interventions:  - Provide therapeutic and educational activities daily, encourage attendance and participation, and document same in the medical record   Outcome: Progressing  Goal: Identify appropriate positive anger management techniques  Description: Interventions:  - Offer anger management and coping skills groups   - Staff will provide positive feedback for appropriate anger control  Outcome: Progressing     Problem: Alteration in Orientation  Goal: Treatment Goal: Demonstrate a reduction of confusion and improved orientation to person, place, time and/or situation upon discharge, according to optimum baseline/ability  Outcome: Progressing  Goal: Interact with staff daily  Description: Interventions:  - Assess and re-assess patient's level of orientation  - Engage patient in 1 on 1 interactions, daily, for a minimum of 15 minutes   - Establish rapport/trust with patient   Outcome: Progressing  Goal: Express concerns related to confused thinking related to:  Description: Interventions:  - Encourage patient to express feelings, fears, frustrations, hopes  - Assign consistent caregivers   - [place]/re-orient patient as needed  - Allow comfort items, as appropriate  - Provide visual cues, signs, etc    Outcome: Progressing  Goal: Allow medical examinations, as recommended  Description: Interventions:  - Provide physical/neurological exams and/or referrals, per provider   Outcome: Progressing  Goal: Cooperate with recommended testing/procedures  Description: Interventions:  - Determine need for ancillary testing  - Observe for mental status changes  - Implement falls/precaution protocol   Outcome: Progressing  Goal: Attend and participate in unit activities, including therapeutic, recreational, and educational groups  Description: Interventions:  - Provide therapeutic and educational activities daily, encourage attendance and participation, and document same in the medical record   - Provide appropriate opportunities for reminiscence   - Provide a consistent daily routine   - Encourage family contact/visitation   Outcome: Progressing  Goal: Complete daily ADLs, including personal hygiene independently, as able  Description: Interventions:  - Observe, teach, and assist patient with ADLS  Outcome: Progressing     Problem: Prexisting or High Potential for Compromised Skin Integrity  Goal: Skin integrity is maintained or improved  Description: INTERVENTIONS:  - Identify patients at risk for skin breakdown  - Assess and monitor skin integrity  - Assess and monitor nutrition and hydration status  - Monitor labs   - Assess for incontinence   - Turn and reposition patient  - Assist with mobility/ambulation  - Relieve pressure over bony prominences  - Avoid friction and shearing  - Provide appropriate hygiene as needed including keeping skin clean and dry  - Evaluate need for skin moisturizer/barrier cream  - Collaborate with interdisciplinary team   - Patient/family teaching  - Consider wound care consult   Outcome: Progressing

## 2023-06-08 NOTE — NURSING NOTE
"Pt alert and visible on the unit  Pleasantly confused  Observed sitting in chair with eyes closed  Encouraged to lie in bed and he stated, \"I can't mother will get upset if I am late for dinner\"  Denies depression and anxiety  Appetite 100% for breakfast and 75% lunch  Will continue to monitor and maintain q 7 min checks     "

## 2023-06-08 NOTE — TELEPHONE ENCOUNTER
Caller: Natalie Kang at [phone]     Doctor Unknown    Reason for call:      Natalie Kang checking on medical release request, could not identify dr seen, seen no request in Media    Call back#: n/a

## 2023-06-08 NOTE — PROGRESS NOTES
06/08/23 1000   Activity/Group Checklist   Group [facility]  (topic friends and social interactions )   Attendance Attended   Attendance Duration (min) 16-30   Interactions Interacted appropriately  (pt  appropriately jovial and social with staff and peers  Pt focused on staff being a friend to him when needed )   Affect/Mood Bright  (upbeat within his confusion  )   Goals Achieved Able to engage in interactions; Able to listen to others;Discussed coping strategies

## 2023-06-08 NOTE — NURSING NOTE
Patient is visible in dinning room and walking around the [facility] throughout the shift  Patient is social and friendly to staff and peers  Patient is medications and meal complaint  Patient is currently denying anxiety, depression, SI/HI/AVH at this time  Patient is talkative and confused in conversation  Patient appears brighten on approach  Able to make needs known

## 2023-06-09 LAB
GLUCOSE SERPL-MCNC: 112 MG/DL (ref 65–140)
GLUCOSE SERPL-MCNC: 113 MG/DL (ref 65–140)
GLUCOSE SERPL-MCNC: 185 MG/DL (ref 65–140)
GLUCOSE SERPL-MCNC: 195 MG/DL (ref 65–140)

## 2023-06-09 PROCEDURE — 92526 ORAL FUNCTION THERAPY: CPT

## 2023-06-09 PROCEDURE — 82948 REAGENT STRIP/BLOOD GLUCOSE: CPT

## 2023-06-09 PROCEDURE — 99232 SBSQ HOSP IP/OBS MODERATE 35: CPT | Performed by: STUDENT IN AN ORGANIZED HEALTH CARE EDUCATION/TRAINING PROGRAM

## 2023-06-09 RX ADMIN — ATORVASTATIN CALCIUM 10 MG: 10 TABLET, FILM COATED ORAL at 17:13

## 2023-06-09 RX ADMIN — DOCUSATE SODIUM 100 MG: 100 CAPSULE, LIQUID FILLED ORAL at 17:13

## 2023-06-09 RX ADMIN — DOCUSATE SODIUM 100 MG: 100 CAPSULE, LIQUID FILLED ORAL at 09:08

## 2023-06-09 RX ADMIN — INSULIN DETEMIR 10 UNITS: 100 INJECTION, SOLUTION SUBCUTANEOUS at 21:53

## 2023-06-09 RX ADMIN — PROPAFENONE HYDROCHLORIDE 225 MG: 150 TABLET, FILM COATED ORAL at 21:47

## 2023-06-09 RX ADMIN — INSULIN LISPRO 1 UNITS: 100 INJECTION, SOLUTION INTRAVENOUS; SUBCUTANEOUS at 21:54

## 2023-06-09 RX ADMIN — SERTRALINE HYDROCHLORIDE 100 MG: 100 TABLET ORAL at 09:08

## 2023-06-09 RX ADMIN — MEMANTINE 5 MG: 10 TABLET ORAL at 09:07

## 2023-06-09 RX ADMIN — SENNOSIDES 8.6 MG: 8.6 TABLET, FILM COATED ORAL at 21:47

## 2023-06-09 RX ADMIN — INSULIN LISPRO 2 UNITS: 100 INJECTION, SOLUTION INTRAVENOUS; SUBCUTANEOUS at 16:40

## 2023-06-09 RX ADMIN — INSULIN DETEMIR 10 UNITS: 100 INJECTION, SOLUTION SUBCUTANEOUS at 09:10

## 2023-06-09 RX ADMIN — FERROUS SULFATE TAB 325 MG (65 MG ELEMENTAL FE) 325 MG: 325 (65 FE) TAB at 09:07

## 2023-06-09 RX ADMIN — PROPAFENONE HYDROCHLORIDE 225 MG: 150 TABLET, FILM COATED ORAL at 05:52

## 2023-06-09 RX ADMIN — CHOLECALCIFEROL TAB 25 MCG (1000 UNIT) 2000 UNITS: 25 TAB at 09:08

## 2023-06-09 RX ADMIN — DONEPEZIL HYDROCHLORIDE 10 MG: 10 TABLET, FILM COATED ORAL at 21:47

## 2023-06-09 RX ADMIN — PROPAFENONE HYDROCHLORIDE 225 MG: 150 TABLET, FILM COATED ORAL at 14:42

## 2023-06-09 RX ADMIN — ARIPIPRAZOLE 5 MG: 5 TABLET ORAL at 09:07

## 2023-06-09 RX ADMIN — METOPROLOL TARTRATE 50 MG: 50 TABLET, FILM COATED ORAL at 09:08

## 2023-06-09 RX ADMIN — METOPROLOL TARTRATE 100 MG: 50 TABLET, FILM COATED ORAL at 21:46

## 2023-06-09 RX ADMIN — MAGNESIUM OXIDE TAB 400 MG (241.3 MG ELEMENTAL MG) 400 MG: 400 (241.3 MG) TAB at 09:08

## 2023-06-09 NOTE — TREATMENT TEAM
06/09/23 0830   Team Meeting   Meeting Type Daily Rounds   Initial Conference Date 06/09/23   Team Members Present   Team Members Present Physician;Nurse;Occupational Therapist;;; Other (Discipline and Name)   Physician Team Member Jian Gaspar 1060 Team Member [facility] Management Team Member Penny 116 Work Team Member [place]   OT Team Member Jessie Jama   Other (Discipline and Name) [facility]- pharmacy   Patient/Family Present   Patient Present No   Patient's Family Present No     No physical aggressive behavior, slept, attended group, med and meal compliant, cooperative with care

## 2023-06-09 NOTE — CASE MANAGEMENT
Call made to [facility], spoke with Tyler Toledo in admissions  Tyler Toledo reports need for pt's Rx benefit info  Tyler Toledo reports pt is accepted for admission although was informed by pt's guardian that guardianship  and renato can not sign pt into facility till [facility]  CM faxed Rx benefit info to Tyler Toledo  Call made to pt's guardian [facility], tel# [phone]; informed that guardianship of person has  on  and unable to go to court till [facility]  to have new guardianship appointed  CM informed Tyler Toledo and [facility] of need for pt to d/c no later then [facility] reports court would be in the morning and she would be able to sign in pt to [facility] Tuesday afternoon  CM to confirm with Destin Woo on Monday

## 2023-06-09 NOTE — PLAN OF CARE
Problem: Alteration in Thoughts and Perception  Goal: Treatment Goal: Gain control of psychotic behaviors/thinking, reduce/eliminate presenting symptoms and demonstrate improved reality functioning upon discharge  Outcome: Progressing  Goal: Verbalize thoughts and feelings  Description: Interventions:  - Promote a nonjudgmental and trusting relationship with the patient through active listening and therapeutic communication  - Assess patient's level of functioning, behavior and potential for risk  - Engage patient in 1 on 1 interactions  - Encourage patient to express fears, feelings, frustrations, and discuss symptoms    - [place] patient to reality, help patient recognize reality-based thinking   - Administer medications as ordered and assess for potential side effects  - Provide the patient education related to the signs and symptoms of the illness and desired effects of prescribed medications  Outcome: Progressing  Goal: Refrain from acting on delusional thinking/internal stimuli  Description: Interventions:  - Monitor patient closely, per order   - Utilize least restrictive measures   - Set reasonable limits, give positive feedback for acceptable   - Administer medications as ordered and monitor of potential side effects  Outcome: Progressing  Goal: Agree to be compliant with medication regime, as prescribed and report medication side effects  Description: Interventions:  - Offer appropriate PRN medication and supervise ingestion; conduct AIMS, as needed   Outcome: Progressing  Goal: Attend and participate in unit activities, including therapeutic, recreational, and educational groups  Description: Interventions:  -Encourage Visitation and family involvement in care  Outcome: Progressing  Goal: Recognize dysfunctional thoughts, communicate reality-based thoughts at the time of discharge  Description: Interventions:  - Provide medication and psycho-education to assist patient in compliance and developing insight into his/her illness   Outcome: Progressing  Goal: Complete daily ADLs, including personal hygiene independently, as able  Description: Interventions:  - Observe, teach, and assist patient with ADLS  - Monitor and promote a balance of rest/activity, with adequate nutrition and elimination   Outcome: Progressing     Problem: Ineffective Coping  Goal: Identifies healthy coping skills  Outcome: Progressing  Goal: Participates in unit activities  Description: Interventions:  - Provide therapeutic environment   - Provide required programming   - Redirect inappropriate behaviors   Outcome: Progressing  Goal: Understands least restrictive measures  Description: Interventions:  - Utilize least restrictive behavior  Outcome: Progressing  Goal: Free from restraint events  Description: - Utilize least restrictive measures   - Provide behavioral interventions   - Redirect inappropriate behaviors   Outcome: Progressing     Problem: Risk for Self Injury/Neglect  Goal: Refrain from harming self  Description: Interventions:  - Monitor patient closely, per order  - Develop a trusting relationship  - Supervise medication ingestion, monitor effects and side effects   Outcome: Progressing     Problem: Anxiety  Goal: Anxiety is at manageable level  Description: Interventions:  - Assess and monitor patient's anxiety level  - Monitor for signs and symptoms (heart palpitations, chest pain, shortness of breath, headaches, nausea, feeling jumpy, restlessness, irritable, apprehensive)  - Collaborate with interdisciplinary team and initiate plan and interventions as ordered    - [place] patient to unit/surroundings  - Explain treatment plan  - Encourage participation in care  - Encourage verbalization of concerns/fears  - Identify coping mechanisms  - Assist in developing anxiety-reducing skills  - Administer/offer alternative therapies  - Limit or eliminate stimulants  Outcome: Progressing     Problem: Alteration in Orientation  Goal: Complete daily ADLs, including personal hygiene independently, as able  Description: Interventions:  - Observe, teach, and assist patient with ADLS  Outcome: Progressing     Problem: Prexisting or High Potential for Compromised Skin Integrity  Goal: Skin integrity is maintained or improved  Description: INTERVENTIONS:  - Identify patients at risk for skin breakdown  - Assess and monitor skin integrity  - Assess and monitor nutrition and hydration status  - Monitor labs   - Assess for incontinence   - Turn and reposition patient  - Assist with mobility/ambulation  - Relieve pressure over bony prominences  - Avoid friction and shearing  - Provide appropriate hygiene as needed including keeping skin clean and dry  - Evaluate need for skin moisturizer/barrier cream  - Collaborate with interdisciplinary team   - Patient/family teaching  - Consider wound care consult   Outcome: Progressing

## 2023-06-09 NOTE — NURSING NOTE
"Pt alert and visible on the unit  Reported feeling \"great\"  Pleasant on approach  Interacts well with peers  Medication compliant  Appetite 100% for meals  Will continue to monitor and maintain q 7 min checks     "

## 2023-06-09 NOTE — CASE MANAGEMENT
Medical evaluation form completed and faxed to [facility] admissions along with med list

## 2023-06-09 NOTE — PROGRESS NOTES
"Progress Note - [address] 80 y o  male MRN: [id number]  Unit/Bed#: [facility] 361-59 Encounter: [id number]    Assessment/Plan   Principal Problem:    Unspecified mood (affective) disorder ([facility] [address] )  Active Problems:    Essential hypertension    Mixed hyperlipidemia    Type 2 diabetes mellitus with kidney complication, with long-term current use of insulin (HCC)    Dementia with behavioral disturbance ([facility])    Medical clearance for psychiatric admission    Chronic kidney disease (CKD), stage IV (severe) ([facility])      - Medications;   Psychiatric:  • Continue Abilify 5mg daily   • Sertaline 100mg daily  Donepezil 10mg daily   Discontinue Namenda 5mg daily d/t poor renal function       Delirium Precautions:  Daytime:  1  Discourage daytime napping  2  Maintain consistency and continuity of staff  3  Keep patient in well-lit room near windows  4  Maintain wakefulness with activities (eg, coloring, puzzles)  5  Aromatherapy patches  6  Visible clock and calendar  Nighttime:  - Relaxation tapes or music before sleep  - Avoid disturbances  - Dim light in the room  - Noise reduction      Medical: per SLIM     -Therapy: occupational therapy, milieu and group therapy  -Educated pt on reporting patient conflict to nursing staff  - Legal:  [phone]    -Disposition:Needs placement    Subjective: The patient was evaluated this morning for continuity of care and no acute distress noted throughout the evaluation  Over the past 24 hours staff noted the patient continues to be calm, cooperative, and pleasantly confused  However last night and this morning, they report him being verbally abusive to his roommate with no known trigger  Visible in mileu and participating in group  Today on exam the patient reports mood as \"fine\" and reports no issues  He denies thoughts of anxiety, guilt, hopelessness, SI/HI/AVH  He does not recall becoming angry or verbally abusing his roommate   We are unsure whether he occupied " individual or shared housing at [facility]  Will attempt to room him with another patient and monitor behavior  Patient was educated on reporting patient conflict to staff      Psychiatric Review of Systems:  Behavior over the last 24 hours:  unchanged  Sleep: normal  Appetite: normal  Medication side effects: No   ROS: no complaints, all other systems are negative    Current Medications:  Current Facility-Administered Medications   Medication Dose Route Frequency Provider Last Rate   • acetaminophen  650 mg Oral Q4H PRN SHADY Garcia     • acetaminophen  650 mg Oral Q4H PRN SHADY Garcia     • acetaminophen  975 mg Oral Q6H PRN SHADY Garcia     • ARIPiprazole  5 mg Oral Daily Светлана Sibley MD     • atorvastatin  10 mg Oral Daily With Dinner Walter Frey PA-C     • cholecalciferol  2,000 Units Oral Daily Walter Frey PA-C     • docusate sodium  100 mg Oral BID SHADY Gerard     • donepezil  10 mg Oral HS SHADY Garcia     • ferrous sulfate  325 mg Oral Daily With Breakfast Walter Frey PA-C     • hydrOXYzine HCL  25 mg Oral Q6H PRN Max 4/day SHADY Garcia     • hydrOXYzine HCL  50 mg Oral Q6H PRN Max 4/day SHADY Garcia     • insulin detemir  10 Units Subcutaneous Daily With Breakfast Walter Frey PA-C     • insulin detemir  10 Units Subcutaneous HS Walter Frey PA-C     • insulin lispro  1-5 Units Subcutaneous HS Walter Frey PA-C     • insulin lispro  1-6 Units Subcutaneous TID AC Walter Frey PA-C     • LORazepam  1 mg Intramuscular Q6H PRN Max 3/day SHADY aGrcia     • LORazepam  1 mg Oral Q6H PRN Max 3/day SHADY Garcia     • magnesium Oxide  400 mg Oral Daily Walter Frey PA-C     • memantine  5 mg Oral Daily Светлана Sibley MD     • metoprolol tartrate  100 mg Oral HS Walter Frey PA-C     • metoprolol tartrate  50 mg Oral Daily Walter Frey PA-C     • polyethylene glycol  17 g Oral Daily PRN SHADY Gerard     • propafenone  225 mg Oral Q8H [address] Avelina Crum PA-C     • senna  1 tablet Oral HS SHADY Gerard     • sertraline  100 mg Oral Daily SHADY Farnsworth     • traZODone  75 mg Oral HS PRN SHADY Cavanaugh         Behavioral Health Medications: all current active meds have been reviewed  Vital signs in last 24 hours:  Temp:  [97 2 °F (36 2 °C)-98 7 °F (37 1 °C)] 97 2 °F (36 2 °C)  HR:  [62-86] 69  Resp:  [16-18] 17  BP: (111-141)/(58-64) 141/61    Laboratory results:  I have personally reviewed all pertinent laboratory/tests results  Mental Status Evaluation:    Appearance:  age appropriate, casually dressed, adequate grooming, uses walker  Behavior:  normal, pleasant, cooperative, calm   Speech:  normal rate and volume, fluent, clear, coherent   Mood:  fine   Affect:  normal range and intensity, appropriate, mood-congruent   Thought Process:  organized, logical, coherent, goal directed, linear, normal abstract reasoning   Associations: intact associations   Thought Content:  normal, poverty of thought, secondary to severe cognitive impairment   Perceptual Disturbances: none   Risk Potential: Suicidal ideation - None  Homicidal ideation - None  Potential for aggression - No   Sensorium:  oriented to person, disoriented to place, time/date, situation, day of week, month of year and year   Memory:  Recent memory severely impaired  Consciousness:  alert and awake   Attention/Concentration: attention span and concentration are age appropriate   Insight:  impaired due to severe cognitive impairment   Judgment: impaired due to severe cognitive impairment   Gait/Station: uses walker   Motor Activity: no abnormal movements         Progress Toward Goals: At cognitive baseline  Awaiting placement  Recommended Treatment:   See above for assessment and plan       Risks, benefits and possible side effects of Medications:   Risks, benefits, and possible side effects of medications explained to patient and patient verbalizes understanding  This note has been constructed using a voice recognition system  There may be translation, syntax, or grammatical errors  If you have any questions, please contact the dictating provider

## 2023-06-09 NOTE — PLAN OF CARE
Pt  Appropriately interactive with peers in structured groups attending and particiapting in 3 of 3 sessions    Problem: Ineffective Coping  Goal: Participates in unit activities  Description: Interventions:  - Provide therapeutic environment   - Provide required programming   - Redirect inappropriate behaviors   Outcome: Progressing

## 2023-06-09 NOTE — PROGRESS NOTES
06/09/23 1030 06/09/23 1330   Activity/Group Checklist   Group [facility] meeting Life Skills  (trivial pursuit game)   Attendance Attended Attended   Attendance Duration (min) 31-45 31-45   Interactions Disorganized interaction  (pleasantly confused and was able to contribute to some trivia questions ) Interacted appropriately  (pt  able to recall most of the game answers and was spontanoeusly interactive with his peers  Pt  with  upbeat and at times joivial demeanor )   Affect/Mood Bright;Calm; Appropriate Bright; Appropriate   Goals Achieved Able to engage in interactions; Able to listen to others Able to engage in interactions; Able to listen to others

## 2023-06-09 NOTE — SPEECH THERAPY NOTE
Speech Language/Pathology    Speech/Language Pathology Progress Note    Patient Name: Bud HORTON Date: 6/9/2023                     SLP RECOMMENDATIONS:         Diet: Level 3 Dental soft         Liquids: Thin liquids - no straws         Medications: one at at time with cups sips of thin or whole in puree         Aspiration Precautions: upright, slow rate, small bites/sips         Summary:  Pt seen for ongoing dysphagia therapy  Pt with slow, but functional mastication/manipulation of bolus with soft solids  No overt s/s aspiration observed when taking thin liquids via cup sips  SLP reviewed aspiration precautions with pt  Pt appears to be tolerating current diet well  Recommend continuing Level 3 dental soft/thin liquids (no straws) and encouraging aspiration precautions and good oral care  Will d/c from [address] [place] [address] Dr services at this time  Please re-consults with any new concerns  Assessment:  Functional mastication of soft solids  No overt s/s aspiration with thin liquids via cup sips  Plan/Recommendations:  Level 3 Dental soft/thin liquids - no straws   Will d/c from [address] [place] [address] Dr services           Lab Results   Component Value Date    HCT 38 3 05/25/2023    HGB 12 3 05/25/2023    MCV 87 05/25/2023     05/25/2023    WBC 6 72 05/25/2023           Problem List  Principal Problem:    Unspecified mood (affective) disorder (HCC)  Active Problems:    Essential hypertension    Mixed hyperlipidemia    Type 2 diabetes mellitus with kidney complication, with long-term current use of insulin (HCC)    Dementia with behavioral disturbance (HCC)    Medical clearance for psychiatric admission    Chronic [facility] disease (CKD), stage IV ([facility]) [facility] )       Past Medical History  Past Medical History:   Diagnosis Date   • Allergic rhinitis    • Anxiety    • Aspiration of liquid     and food per wife if he is eating too fast or talking when eating   • Asthma     as a child   • Atrial fibrillation ([facility] [address] )    • CAD (coronary artery disease)    • Cancer of kidney ([facility] [address] )    • COPD (chronic obstructive pulmonary disease) (HCC)    • CPAP (continuous positive airway pressure) dependence    • Dementia (HCC)     Frontal lobe   • Dementia (HCC)    • Diabetes mellitus (HCC)    • Diabetes mellitus, type 2 (HCC)    • DJD (degenerative joint disease)    • Hypercholesterolemia    • Hyperlipidemia    • Hypertension    • Incisional hernia    • Kidney disease    • Melanoma ([facility] [address] )     left leg   • Obesity    • Sleep apnea     wears CPAP   • TIA (transient ischemic attack)         Past Surgical History  Past Surgical History:   Procedure Laterality Date   • CARDIAC PACEMAKER PLACEMENT     • CHOLECYSTECTOMY     • COLONOSCOPY     • HERNIA REPAIR      Incisional hernia   • NEPHRECTOMY Left 2005   • [place] ESOPHAGOGASTRODUODENOSCOPY SUBMUCOSAL INJECTION N/A 9/21/2016    Procedure: ESOPHAGOGASTRODUODENOSCOPY (EGD); Surgeon: Meir Contreras MD;  Location: [facility]; Service: Gastroenterology   • [place] ESOPHAGOGASTRODUODENOSCOPY SUBMUCOSAL INJECTION N/A 2/7/2018    Procedure: ESOPHAGOGASTRODUODENOSCOPY (EGD); Surgeon: Meir Contreras MD;  Location: [facility]; Service: Gastroenterology   • [place] ESOPHAGOGASTRODUODENOSCOPY SUBMUCOSAL INJECTION N/A 4/3/2019    Procedure: ESOPHAGOGASTRODUODENOSCOPY (EGD) WITH BOTOX;  Surgeon: Meir Contreras MD;  Location: [facility];   Service: Gastroenterology   • ROTATOR CUFF REPAIR     • TONSILLECTOMY

## 2023-06-09 NOTE — TREATMENT TEAM
"Pt sleepy during group stating it was his \"nap time\"  06/09/23 1100   Activity/Group Checklist   Group Other (Comment)  ([facility]: Tips and awareness)   Attendance Attended   Attendance Duration (min) 31-45   Interactions Other (Comment)  (sleepy)   Affect/Mood Appropriate;Calm   Goals Achieved Identified feelings; Able to listen to others; Able to engage in interactions; Able to reflect/comment on own behavior;Able to manage/cope with feelings;Verbalized increased hopefulness; Able to self-disclose; Able to recieve feedback        "

## 2023-06-10 LAB
GLUCOSE SERPL-MCNC: 122 MG/DL (ref 65–140)
GLUCOSE SERPL-MCNC: 150 MG/DL (ref 65–140)
GLUCOSE SERPL-MCNC: 170 MG/DL (ref 65–140)
GLUCOSE SERPL-MCNC: 96 MG/DL (ref 65–140)

## 2023-06-10 PROCEDURE — 82948 REAGENT STRIP/BLOOD GLUCOSE: CPT

## 2023-06-10 PROCEDURE — 99232 SBSQ HOSP IP/OBS MODERATE 35: CPT | Performed by: PSYCHIATRY & NEUROLOGY

## 2023-06-10 RX ADMIN — DOCUSATE SODIUM 100 MG: 100 CAPSULE, LIQUID FILLED ORAL at 08:33

## 2023-06-10 RX ADMIN — CHOLECALCIFEROL TAB 25 MCG (1000 UNIT) 2000 UNITS: 25 TAB at 08:33

## 2023-06-10 RX ADMIN — MAGNESIUM OXIDE TAB 400 MG (241.3 MG ELEMENTAL MG) 400 MG: 400 (241.3 MG) TAB at 08:32

## 2023-06-10 RX ADMIN — INSULIN DETEMIR 10 UNITS: 100 INJECTION, SOLUTION SUBCUTANEOUS at 21:27

## 2023-06-10 RX ADMIN — ARIPIPRAZOLE 5 MG: 5 TABLET ORAL at 08:32

## 2023-06-10 RX ADMIN — ATORVASTATIN CALCIUM 10 MG: 10 TABLET, FILM COATED ORAL at 17:08

## 2023-06-10 RX ADMIN — METOPROLOL TARTRATE 50 MG: 50 TABLET, FILM COATED ORAL at 08:30

## 2023-06-10 RX ADMIN — DONEPEZIL HYDROCHLORIDE 10 MG: 10 TABLET, FILM COATED ORAL at 21:28

## 2023-06-10 RX ADMIN — SERTRALINE HYDROCHLORIDE 100 MG: 100 TABLET ORAL at 08:33

## 2023-06-10 RX ADMIN — SENNOSIDES 8.6 MG: 8.6 TABLET, FILM COATED ORAL at 21:29

## 2023-06-10 RX ADMIN — PROPAFENONE HYDROCHLORIDE 225 MG: 150 TABLET, FILM COATED ORAL at 06:39

## 2023-06-10 RX ADMIN — PROPAFENONE HYDROCHLORIDE 225 MG: 150 TABLET, FILM COATED ORAL at 21:28

## 2023-06-10 RX ADMIN — METOPROLOL TARTRATE 100 MG: 50 TABLET, FILM COATED ORAL at 21:28

## 2023-06-10 RX ADMIN — INSULIN LISPRO 1 UNITS: 100 INJECTION, SOLUTION INTRAVENOUS; SUBCUTANEOUS at 21:29

## 2023-06-10 RX ADMIN — INSULIN DETEMIR 10 UNITS: 100 INJECTION, SOLUTION SUBCUTANEOUS at 08:30

## 2023-06-10 RX ADMIN — PROPAFENONE HYDROCHLORIDE 225 MG: 150 TABLET, FILM COATED ORAL at 14:29

## 2023-06-10 RX ADMIN — DOCUSATE SODIUM 100 MG: 100 CAPSULE, LIQUID FILLED ORAL at 17:09

## 2023-06-10 RX ADMIN — FERROUS SULFATE TAB 325 MG (65 MG ELEMENTAL FE) 325 MG: 325 (65 FE) TAB at 08:33

## 2023-06-10 NOTE — NURSING NOTE
Patient was calm and pleasant on approach, visible and social at day room tonight  Med and meals compliant, he remained moderately confused and denied any s/s during the shift

## 2023-06-10 NOTE — PLAN OF CARE
Problem: Alteration in Thoughts and Perception  Goal: Treatment Goal: Gain control of psychotic behaviors/thinking, reduce/eliminate presenting symptoms and demonstrate improved reality functioning upon discharge  Outcome: Progressing  Goal: Verbalize thoughts and feelings  Description: Interventions:  - Promote a nonjudgmental and trusting relationship with the patient through active listening and therapeutic communication  - Assess patient's level of functioning, behavior and potential for risk  - Engage patient in 1 on 1 interactions  - Encourage patient to express fears, feelings, frustrations, and discuss symptoms    - [place] patient to reality, help patient recognize reality-based thinking   - Administer medications as ordered and assess for potential side effects  - Provide the patient education related to the signs and symptoms of the illness and desired effects of prescribed medications  Outcome: Progressing  Goal: Refrain from acting on delusional thinking/internal stimuli  Description: Interventions:  - Monitor patient closely, per order   - Utilize least restrictive measures   - Set reasonable limits, give positive feedback for acceptable   - Administer medications as ordered and monitor of potential side effects  Outcome: Progressing  Goal: Agree to be compliant with medication regime, as prescribed and report medication side effects  Description: Interventions:  - Offer appropriate PRN medication and supervise ingestion; conduct AIMS, as needed   Outcome: Progressing  Goal: Attend and participate in unit activities, including therapeutic, recreational, and educational groups  Description: Interventions:  -Encourage Visitation and family involvement in care  Outcome: Progressing  Goal: Recognize dysfunctional thoughts, communicate reality-based thoughts at the time of discharge  Description: Interventions:  - Provide medication and psycho-education to assist patient in compliance and developing insight into his/her illness   Outcome: Progressing  Goal: Complete daily ADLs, including personal hygiene independently, as able  Description: Interventions:  - Observe, teach, and assist patient with ADLS  - Monitor and promote a balance of rest/activity, with adequate nutrition and elimination   Outcome: Progressing     Problem: Risk for Self Injury/Neglect  Goal: Treatment Goal: Remain safe during length of stay, learn and adopt new coping skills, and be free of self-injurious ideation, impulses and acts at the time of discharge  Outcome: Progressing  Goal: Verbalize thoughts and feelings  Description: Interventions:  - Assess and re-assess patient's lethality and potential for self-injury  - Engage patient in 1:1 interactions, daily, for a minimum of 15 minutes  - Encourage patient to express feelings, fears, frustrations, hopes  - Establish rapport/trust with patient   Outcome: Progressing  Goal: Refrain from harming self  Description: Interventions:  - Monitor patient closely, per order  - Develop a trusting relationship  - Supervise medication ingestion, monitor effects and side effects   Outcome: Progressing  Goal: Attend and participate in unit activities, including therapeutic, recreational, and educational groups  Description: Interventions:  - Provide therapeutic and educational activities daily, encourage attendance and participation, and document same in the medical record  - Obtain collateral information, encourage visitation and family involvement in care   Outcome: Progressing  Goal: Recognize maladaptive responses and adopt new coping mechanisms  Outcome: Progressing  Goal: Complete daily ADLs, including personal hygiene independently, as able  Description: Interventions:  - Observe, teach, and assist patient with ADLS  - Monitor and promote a balance of rest/activity, with adequate nutrition and elimination  Outcome: Progressing     Problem: Depression  Goal: Treatment Goal: Demonstrate behavioral control of depressive symptoms, verbalize feelings of improved mood/affect, and adopt new coping skills prior to discharge  Outcome: Progressing  Goal: Verbalize thoughts and feelings  Description: Interventions:  - Assess and re-assess patient's level of risk   - Engage patient in 1:1 interactions, daily, for a minimum of 15 minutes   - Encourage patient to express feelings, fears, frustrations, hopes   Outcome: Progressing  Goal: Refrain from harming self  Description: Interventions:  - Monitor patient closely, per order   - Supervise medication ingestion, monitor effects and side effects   Outcome: Progressing  Goal: Refrain from isolation  Description: Interventions:  - Develop a trusting relationship   - Encourage socialization   Outcome: Progressing  Goal: Refrain from self-neglect  Outcome: Progressing  Goal: Attend and participate in unit activities, including therapeutic, recreational, and educational groups  Description: Interventions:  - Provide therapeutic and educational activities daily, encourage attendance and participation, and document same in the medical record   Outcome: Progressing  Goal: Complete daily ADLs, including personal hygiene independently, as able  Description: Interventions:  - Observe, teach, and assist patient with ADLS  -  Monitor and promote a balance of rest/activity, with adequate nutrition and elimination   Outcome: Progressing     Problem: Anxiety  Goal: Anxiety is at manageable level  Description: Interventions:  - Assess and monitor patient's anxiety level  - Monitor for signs and symptoms (heart palpitations, chest pain, shortness of breath, headaches, nausea, feeling jumpy, restlessness, irritable, apprehensive)  - Collaborate with interdisciplinary team and initiate plan and interventions as ordered    - [place] patient to unit/surroundings  - Explain treatment plan  - Encourage participation in care  - Encourage verbalization of concerns/fears  - Identify coping mechanisms  - Assist in developing anxiety-reducing skills  - Administer/offer alternative therapies  - Limit or eliminate stimulants  Outcome: Progressing     Problem: Risk for Violence/Aggression Toward Others  Goal: Treatment Goal: Refrain from acts of violence/aggression during length of stay, and demonstrate improved impulse control at the time of discharge  Outcome: Progressing  Goal: Verbalize thoughts and feelings  Description: Interventions:  - Assess and re-assess patient's level of risk, every waking shift  - Engage patient in 1:1 interactions, daily, for a minimum of 15 minutes   - Allow patient to express feelings and frustrations in a safe and non-threatening manner   - Establish rapport/trust with patient   Outcome: Progressing  Goal: Refrain from harming others  Outcome: Progressing  Goal: Refrain from destructive acts on the environment or property  Outcome: Progressing  Goal: Control angry outbursts  Description: Interventions:  - Monitor patient closely, per order  - Ensure early verbal de-escalation  - Monitor prn medication needs  - Set reasonable/therapeutic limits, outline behavioral expectations, and consequences   - Provide a non-threatening milieu, utilizing the least restrictive interventions   Outcome: Progressing  Goal: Attend and participate in unit activities, including therapeutic, recreational, and educational groups  Description: Interventions:  - Provide therapeutic and educational activities daily, encourage attendance and participation, and document same in the medical record   Outcome: Progressing  Goal: Identify appropriate positive anger management techniques  Description: Interventions:  - Offer anger management and coping skills groups   - Staff will provide positive feedback for appropriate anger control  Outcome: Progressing     Problem: Alteration in Orientation  Goal: Treatment Goal: Demonstrate a reduction of confusion and improved orientation to person, place, time and/or situation upon discharge, according to optimum baseline/ability  Outcome: Progressing  Goal: Interact with staff daily  Description: Interventions:  - Assess and re-assess patient's level of orientation  - Engage patient in 1 on 1 interactions, daily, for a minimum of 15 minutes   - Establish rapport/trust with patient   Outcome: Progressing  Goal: Express concerns related to confused thinking related to:  Description: Interventions:  - Encourage patient to express feelings, fears, frustrations, hopes  - Assign consistent caregivers   - [place]/re-orient patient as needed  - Allow comfort items, as appropriate  - Provide visual cues, signs, etc    Outcome: Progressing  Goal: Allow medical examinations, as recommended  Description: Interventions:  - Provide physical/neurological exams and/or referrals, per provider   Outcome: Progressing  Goal: Cooperate with recommended testing/procedures  Description: Interventions:  - Determine need for ancillary testing  - Observe for mental status changes  - Implement falls/precaution protocol   Outcome: Progressing  Goal: Attend and participate in unit activities, including therapeutic, recreational, and educational groups  Description: Interventions:  - Provide therapeutic and educational activities daily, encourage attendance and participation, and document same in the medical record   - Provide appropriate opportunities for reminiscence   - Provide a consistent daily routine   - Encourage family contact/visitation   Outcome: Progressing  Goal: Complete daily ADLs, including personal hygiene independently, as able  Description: Interventions:  - Observe, teach, and assist patient with ADLS  Outcome: Progressing

## 2023-06-10 NOTE — NURSING NOTE
Pt visible on unit, pleasantly confused  Pt responds well to prompts  Pt stated he wanted to get shoes and leave, able to respond to redirections  Ambulating in hallways with walker during shift  Pleasant and cooperative during interactions  No symptoms reported during shift

## 2023-06-10 NOTE — PROGRESS NOTES
Progress Note - Behavioral Health   Brenna Azul 80 y o  male MRN: @MRN   Unit/Bed#[facility] 732-20 Encounter: [id number]      Report from staff regarding this patient received and discussed, and records reviewed prior to seeing this patient  Behavior over the last 24 hours: improving  The patient was visible on the unit, participated in unit activities, did not report any concerns was not in distress  Adherent to unit rules, reported good appetite and normal sleep  Patient remains calm, cooperative with milieu and doing better today  Sleep: improved  Appetite: fair  Medication side effects: No       Mental Status Evaluation:    Appearance:  wearing hospital clothes   Mood:  improved, anxious   Affect: appropriate    Speech:  decreased rate, slow   Thought Content:  normal   Perceptual Disturbances: no auditory hallucinations, no visual hallucinations, denies auditory hallucinations when asked, does not appear responding to internal stimuli   Risk Potential: Suicidal ideation - None, contracts for safety on the unit   Insight:  improved   Motor Activity: no abnormal movements       Laboratory results:  I have personally reviewed all pertinent laboratory results  Progress Toward Goals: improving    Assessment/Plan   Principal Problem:    Unspecified mood (affective) disorder (HCC)  Active Problems:    Essential hypertension    Mixed hyperlipidemia    Type 2 diabetes mellitus with kidney complication, with long-term current use of insulin (HCC)    Dementia with behavioral disturbance (HCC)    Medical clearance for psychiatric admission    Chronic kidney disease (CKD), stage IV (severe) ([facility] [address] )    Recommended Treatment: We will not make medication adjustments today, brief supportive therapy was provided to the patient  Planned medication and treatment changes: All current active medications have been reviewed    Continue treatment with group therapy, milieu therapy, occupational therapy and medication "management  ** Please Note: This note has been constructed using a voice recognition system  **      BMP: No results for input(s): \"BUN\", \"CL\", \"CO2\", \"K\", \"NA\" in the last 72 hours      Invalid input(s): \"CREA\", \"GLU\"  Vitals:    06/10/23 1537   BP: 129/74   Pulse: 79   Resp: 18   Temp: 97 5 °F (36 4 °C)   SpO2: 94%        Medication Administration - last 24 hours from 06/09/2023 1556 to 06/10/2023 1556       Date/Time Order Dose Route Action Action by     06/09/2023 2147 EDT donepezil (ARICEPT) tablet 10 mg 10 mg Oral Given Stanford Reich RN     06/10/2023 9664 EDT sertraline (ZOLOFT) tablet 100 mg 100 mg Oral Given Jose Lopez RN     06/10/2023 0830 EDT insulin detemir (LEVEMIR) subcutaneous injection 10 Units 10 Units Subcutaneous Given Jose Lopez RN     06/09/2023 2153 EDT insulin detemir (LEVEMIR) subcutaneous injection 10 Units 10 Units Subcutaneous Given Stanford Reich RN     06/10/2023 1223 EDT insulin lispro (HumaLOG) 100 units/mL subcutaneous injection 1-6 Units -- Subcutaneous Not Given Jose Lopez RN     06/10/2023 3775 EDT insulin lispro (HumaLOG) 100 units/mL subcutaneous injection 1-6 Units -- Subcutaneous Not Given Jose Lopez RN     06/09/2023 1640 EDT insulin lispro (HumaLOG) 100 units/mL subcutaneous injection 1-6 Units 2 Units Subcutaneous Given Stanford Reich RN     06/09/2023 2154 EDT insulin lispro (HumaLOG) 100 units/mL subcutaneous injection 1-5 Units 1 Units Subcutaneous Given Stanford Reich RN     06/09/2023 1713 EDT atorvastatin (LIPITOR) tablet 10 mg 10 mg Oral Given Stanford Reich RN     06/09/2023 2146 EDT metoprolol tartrate (LOPRESSOR) tablet 100 mg 100 mg Oral Given Stanford Reich RN     06/10/2023 0830 EDT metoprolol tartrate (LOPRESSOR) tablet 50 mg 50 mg Oral Given Jose Lopez, JAMES     06/10/2023 1429 EDT propafenone (RYTHMOL) tablet 225 mg 225 mg Oral Given Jose Lopez, JAMES     06/10/2023 7267 EDT propafenone (RYTHMOL) tablet 225 " mg 225 mg Oral Given Robert Srivastava RN     06/09/2023 2147 EDT propafenone (RYTHMOL) tablet 225 mg 225 mg Oral Given Keri Luciano RN     06/10/2023 1853 EDT cholecalciferol (VITAMIN D3) tablet 2,000 Units 2,000 Units Oral Given Marleny Wetzel RN     06/10/2023 5604 EDT ferrous sulfate tablet 325 mg 325 mg Oral Given Marleny Wetzel RN     06/10/2023 3783 EDT magnesium Oxide (MAG-OX) tablet 400 mg 400 mg Oral Given Marleny Wetzel RN     06/10/2023 2631 EDT ARIPiprazole (ABILIFY) tablet 5 mg 5 mg Oral Given Marleny Wetzel RN     06/10/2023 6914 EDT docusate sodium (COLACE) capsule 100 mg 100 mg Oral Given Marlney Wetzel RN     06/09/2023 1713 EDT docusate sodium (COLACE) capsule 100 mg 100 mg Oral Given Keri Luciano RN     06/09/2023 2147 EDT senna (SENOKOT) tablet 8 6 mg 8 6 mg Oral Given Keri Luciano RN

## 2023-06-11 LAB
ANION GAP SERPL CALCULATED.3IONS-SCNC: 8 MMOL/L (ref 4–13)
BUN SERPL-MCNC: 30 MG/DL (ref 5–25)
CALCIUM SERPL-MCNC: 9.2 MG/DL (ref 8.4–10.2)
CHLORIDE SERPL-SCNC: 104 MMOL/L (ref 96–108)
CO2 SERPL-SCNC: 28 MMOL/L (ref 21–32)
CREAT SERPL-MCNC: 1.79 MG/DL (ref 0.6–1.3)
GFR SERPL CREATININE-BSD FRML MDRD: 33 ML/MIN/1.73SQ M
GLUCOSE P FAST SERPL-MCNC: 84 MG/DL (ref 65–99)
GLUCOSE SERPL-MCNC: 117 MG/DL (ref 65–140)
GLUCOSE SERPL-MCNC: 167 MG/DL (ref 65–140)
GLUCOSE SERPL-MCNC: 176 MG/DL (ref 65–140)
GLUCOSE SERPL-MCNC: 84 MG/DL (ref 65–140)
GLUCOSE SERPL-MCNC: 91 MG/DL (ref 65–140)
POTASSIUM SERPL-SCNC: 3.8 MMOL/L (ref 3.5–5.3)
SODIUM SERPL-SCNC: 140 MMOL/L (ref 135–147)

## 2023-06-11 PROCEDURE — 80048 BASIC METABOLIC PNL TOTAL CA: CPT | Performed by: PSYCHIATRY & NEUROLOGY

## 2023-06-11 PROCEDURE — 82948 REAGENT STRIP/BLOOD GLUCOSE: CPT

## 2023-06-11 PROCEDURE — 99232 SBSQ HOSP IP/OBS MODERATE 35: CPT | Performed by: PSYCHIATRY & NEUROLOGY

## 2023-06-11 RX ADMIN — METOPROLOL TARTRATE 100 MG: 50 TABLET, FILM COATED ORAL at 21:17

## 2023-06-11 RX ADMIN — PROPAFENONE HYDROCHLORIDE 225 MG: 150 TABLET, FILM COATED ORAL at 06:06

## 2023-06-11 RX ADMIN — DONEPEZIL HYDROCHLORIDE 10 MG: 10 TABLET, FILM COATED ORAL at 21:17

## 2023-06-11 RX ADMIN — DOCUSATE SODIUM 100 MG: 100 CAPSULE, LIQUID FILLED ORAL at 17:00

## 2023-06-11 RX ADMIN — PROPAFENONE HYDROCHLORIDE 225 MG: 150 TABLET, FILM COATED ORAL at 14:59

## 2023-06-11 RX ADMIN — SERTRALINE HYDROCHLORIDE 100 MG: 100 TABLET ORAL at 09:03

## 2023-06-11 RX ADMIN — INSULIN DETEMIR 10 UNITS: 100 INJECTION, SOLUTION SUBCUTANEOUS at 08:56

## 2023-06-11 RX ADMIN — METOPROLOL TARTRATE 50 MG: 50 TABLET, FILM COATED ORAL at 09:00

## 2023-06-11 RX ADMIN — CHOLECALCIFEROL TAB 25 MCG (1000 UNIT) 2000 UNITS: 25 TAB at 09:03

## 2023-06-11 RX ADMIN — ATORVASTATIN CALCIUM 10 MG: 10 TABLET, FILM COATED ORAL at 16:56

## 2023-06-11 RX ADMIN — FERROUS SULFATE TAB 325 MG (65 MG ELEMENTAL FE) 325 MG: 325 (65 FE) TAB at 08:59

## 2023-06-11 RX ADMIN — DOCUSATE SODIUM 100 MG: 100 CAPSULE, LIQUID FILLED ORAL at 09:03

## 2023-06-11 RX ADMIN — INSULIN LISPRO 1 UNITS: 100 INJECTION, SOLUTION INTRAVENOUS; SUBCUTANEOUS at 21:18

## 2023-06-11 RX ADMIN — SENNOSIDES 8.6 MG: 8.6 TABLET, FILM COATED ORAL at 21:17

## 2023-06-11 RX ADMIN — ARIPIPRAZOLE 5 MG: 5 TABLET ORAL at 08:59

## 2023-06-11 RX ADMIN — PROPAFENONE HYDROCHLORIDE 225 MG: 150 TABLET, FILM COATED ORAL at 21:17

## 2023-06-11 RX ADMIN — INSULIN LISPRO 1 UNITS: 100 INJECTION, SOLUTION INTRAVENOUS; SUBCUTANEOUS at 16:55

## 2023-06-11 RX ADMIN — INSULIN DETEMIR 10 UNITS: 100 INJECTION, SOLUTION SUBCUTANEOUS at 21:17

## 2023-06-11 RX ADMIN — MAGNESIUM OXIDE TAB 400 MG (241.3 MG ELEMENTAL MG) 400 MG: 400 (241.3 MG) TAB at 09:03

## 2023-06-11 NOTE — NURSING NOTE
Pt calm and cooperative, visible on unit  Pleasantly confused during interactions  Ambulates steadily with walker  Medication and insulin adherent  No symptoms reported during shift

## 2023-06-11 NOTE — PLAN OF CARE
Problem: Alteration in Thoughts and Perception  Goal: Treatment Goal: Gain control of psychotic behaviors/thinking, reduce/eliminate presenting symptoms and demonstrate improved reality functioning upon discharge  Outcome: Progressing  Goal: Verbalize thoughts and feelings  Description: Interventions:  - Promote a nonjudgmental and trusting relationship with the patient through active listening and therapeutic communication  - Assess patient's level of functioning, behavior and potential for risk  - Engage patient in 1 on 1 interactions  - Encourage patient to express fears, feelings, frustrations, and discuss symptoms    - [place] patient to reality, help patient recognize reality-based thinking   - Administer medications as ordered and assess for potential side effects  - Provide the patient education related to the signs and symptoms of the illness and desired effects of prescribed medications  Outcome: Progressing  Goal: Refrain from acting on delusional thinking/internal stimuli  Description: Interventions:  - Monitor patient closely, per order   - Utilize least restrictive measures   - Set reasonable limits, give positive feedback for acceptable   - Administer medications as ordered and monitor of potential side effects  Outcome: Progressing  Goal: Agree to be compliant with medication regime, as prescribed and report medication side effects  Description: Interventions:  - Offer appropriate PRN medication and supervise ingestion; conduct AIMS, as needed   Outcome: Progressing  Goal: Attend and participate in unit activities, including therapeutic, recreational, and educational groups  Description: Interventions:  -Encourage Visitation and family involvement in care  Outcome: Progressing  Goal: Recognize dysfunctional thoughts, communicate reality-based thoughts at the time of discharge  Description: Interventions:  - Provide medication and psycho-education to assist patient in compliance and developing insight into his/her illness   Outcome: Progressing  Goal: Complete daily ADLs, including personal hygiene independently, as able  Description: Interventions:  - Observe, teach, and assist patient with ADLS  - Monitor and promote a balance of rest/activity, with adequate nutrition and elimination   Outcome: Progressing     Problem: Risk for Self Injury/Neglect  Goal: Treatment Goal: Remain safe during length of stay, learn and adopt new coping skills, and be free of self-injurious ideation, impulses and acts at the time of discharge  Outcome: Progressing  Goal: Verbalize thoughts and feelings  Description: Interventions:  - Assess and re-assess patient's lethality and potential for self-injury  - Engage patient in 1:1 interactions, daily, for a minimum of 15 minutes  - Encourage patient to express feelings, fears, frustrations, hopes  - Establish rapport/trust with patient   Outcome: Progressing  Goal: Refrain from harming self  Description: Interventions:  - Monitor patient closely, per order  - Develop a trusting relationship  - Supervise medication ingestion, monitor effects and side effects   Outcome: Progressing  Goal: Attend and participate in unit activities, including therapeutic, recreational, and educational groups  Description: Interventions:  - Provide therapeutic and educational activities daily, encourage attendance and participation, and document same in the medical record  - Obtain collateral information, encourage visitation and family involvement in care   Outcome: Progressing  Goal: Recognize maladaptive responses and adopt new coping mechanisms  Outcome: Progressing  Goal: Complete daily ADLs, including personal hygiene independently, as able  Description: Interventions:  - Observe, teach, and assist patient with ADLS  - Monitor and promote a balance of rest/activity, with adequate nutrition and elimination  Outcome: Progressing     Problem: Depression  Goal: Treatment Goal: Demonstrate behavioral control of depressive symptoms, verbalize feelings of improved mood/affect, and adopt new coping skills prior to discharge  Outcome: Progressing  Goal: Verbalize thoughts and feelings  Description: Interventions:  - Assess and re-assess patient's level of risk   - Engage patient in 1:1 interactions, daily, for a minimum of 15 minutes   - Encourage patient to express feelings, fears, frustrations, hopes   Outcome: Progressing  Goal: Refrain from harming self  Description: Interventions:  - Monitor patient closely, per order   - Supervise medication ingestion, monitor effects and side effects   Outcome: Progressing  Goal: Refrain from isolation  Description: Interventions:  - Develop a trusting relationship   - Encourage socialization   Outcome: Progressing  Goal: Refrain from self-neglect  Outcome: Progressing  Goal: Attend and participate in unit activities, including therapeutic, recreational, and educational groups  Description: Interventions:  - Provide therapeutic and educational activities daily, encourage attendance and participation, and document same in the medical record   Outcome: Progressing  Goal: Complete daily ADLs, including personal hygiene independently, as able  Description: Interventions:  - Observe, teach, and assist patient with ADLS  -  Monitor and promote a balance of rest/activity, with adequate nutrition and elimination   Outcome: Progressing     Problem: Anxiety  Goal: Anxiety is at manageable level  Description: Interventions:  - Assess and monitor patient's anxiety level  - Monitor for signs and symptoms (heart palpitations, chest pain, shortness of breath, headaches, nausea, feeling jumpy, restlessness, irritable, apprehensive)  - Collaborate with interdisciplinary team and initiate plan and interventions as ordered    - [place] patient to unit/surroundings  - Explain treatment plan  - Encourage participation in care  - Encourage verbalization of concerns/fears  - Identify coping mechanisms  - Assist in developing anxiety-reducing skills  - Administer/offer alternative therapies  - Limit or eliminate stimulants  Outcome: Progressing     Problem: Risk for Violence/Aggression Toward Others  Goal: Treatment Goal: Refrain from acts of violence/aggression during length of stay, and demonstrate improved impulse control at the time of discharge  Outcome: Progressing  Goal: Verbalize thoughts and feelings  Description: Interventions:  - Assess and re-assess patient's level of risk, every waking shift  - Engage patient in 1:1 interactions, daily, for a minimum of 15 minutes   - Allow patient to express feelings and frustrations in a safe and non-threatening manner   - Establish rapport/trust with patient   Outcome: Progressing  Goal: Refrain from harming others  Outcome: Progressing  Goal: Refrain from destructive acts on the environment or property  Outcome: Progressing  Goal: Control angry outbursts  Description: Interventions:  - Monitor patient closely, per order  - Ensure early verbal de-escalation  - Monitor prn medication needs  - Set reasonable/therapeutic limits, outline behavioral expectations, and consequences   - Provide a non-threatening milieu, utilizing the least restrictive interventions   Outcome: Progressing  Goal: Attend and participate in unit activities, including therapeutic, recreational, and educational groups  Description: Interventions:  - Provide therapeutic and educational activities daily, encourage attendance and participation, and document same in the medical record   Outcome: Progressing  Goal: Identify appropriate positive anger management techniques  Description: Interventions:  - Offer anger management and coping skills groups   - Staff will provide positive feedback for appropriate anger control  Outcome: Progressing     Problem: Alteration in Orientation  Goal: Treatment Goal: Demonstrate a reduction of confusion and improved orientation to person, place, time and/or situation upon discharge, according to optimum baseline/ability  Outcome: Progressing  Goal: Interact with staff daily  Description: Interventions:  - Assess and re-assess patient's level of orientation  - Engage patient in 1 on 1 interactions, daily, for a minimum of 15 minutes   - Establish rapport/trust with patient   Outcome: Progressing  Goal: Express concerns related to confused thinking related to:  Description: Interventions:  - Encourage patient to express feelings, fears, frustrations, hopes  - Assign consistent caregivers   - [place]/re-orient patient as needed  - Allow comfort items, as appropriate  - Provide visual cues, signs, etc    Outcome: Progressing  Goal: Allow medical examinations, as recommended  Description: Interventions:  - Provide physical/neurological exams and/or referrals, per provider   Outcome: Progressing  Goal: Cooperate with recommended testing/procedures  Description: Interventions:  - Determine need for ancillary testing  - Observe for mental status changes  - Implement falls/precaution protocol   Outcome: Progressing  Goal: Attend and participate in unit activities, including therapeutic, recreational, and educational groups  Description: Interventions:  - Provide therapeutic and educational activities daily, encourage attendance and participation, and document same in the medical record   - Provide appropriate opportunities for reminiscence   - Provide a consistent daily routine   - Encourage family contact/visitation   Outcome: Progressing  Goal: Complete daily ADLs, including personal hygiene independently, as able  Description: Interventions:  - Observe, teach, and assist patient with ADLS  Outcome: Progressing

## 2023-06-12 LAB
DME PARACHUTE DELIVERY DATE REQUESTED: NORMAL
DME PARACHUTE ITEM DESCRIPTION: NORMAL
DME PARACHUTE ORDER STATUS: NORMAL
DME PARACHUTE SUPPLIER NAME: NORMAL
DME PARACHUTE SUPPLIER PHONE: NORMAL
GLUCOSE SERPL-MCNC: 118 MG/DL (ref 65–140)
GLUCOSE SERPL-MCNC: 150 MG/DL (ref 65–140)
GLUCOSE SERPL-MCNC: 155 MG/DL (ref 65–140)
GLUCOSE SERPL-MCNC: 91 MG/DL (ref 65–140)

## 2023-06-12 PROCEDURE — 99232 SBSQ HOSP IP/OBS MODERATE 35: CPT | Performed by: STUDENT IN AN ORGANIZED HEALTH CARE EDUCATION/TRAINING PROGRAM

## 2023-06-12 PROCEDURE — 82948 REAGENT STRIP/BLOOD GLUCOSE: CPT

## 2023-06-12 RX ADMIN — CHOLECALCIFEROL TAB 25 MCG (1000 UNIT) 2000 UNITS: 25 TAB at 08:29

## 2023-06-12 RX ADMIN — INSULIN DETEMIR 10 UNITS: 100 INJECTION, SOLUTION SUBCUTANEOUS at 08:30

## 2023-06-12 RX ADMIN — DOCUSATE SODIUM 100 MG: 100 CAPSULE, LIQUID FILLED ORAL at 08:29

## 2023-06-12 RX ADMIN — FERROUS SULFATE TAB 325 MG (65 MG ELEMENTAL FE) 325 MG: 325 (65 FE) TAB at 08:30

## 2023-06-12 RX ADMIN — SERTRALINE HYDROCHLORIDE 100 MG: 100 TABLET ORAL at 08:29

## 2023-06-12 RX ADMIN — PROPAFENONE HYDROCHLORIDE 225 MG: 150 TABLET, FILM COATED ORAL at 05:56

## 2023-06-12 RX ADMIN — MAGNESIUM OXIDE TAB 400 MG (241.3 MG ELEMENTAL MG) 400 MG: 400 (241.3 MG) TAB at 08:29

## 2023-06-12 RX ADMIN — METOPROLOL TARTRATE 100 MG: 50 TABLET, FILM COATED ORAL at 21:23

## 2023-06-12 RX ADMIN — INSULIN DETEMIR 10 UNITS: 100 INJECTION, SOLUTION SUBCUTANEOUS at 21:24

## 2023-06-12 RX ADMIN — DONEPEZIL HYDROCHLORIDE 10 MG: 10 TABLET, FILM COATED ORAL at 21:23

## 2023-06-12 RX ADMIN — ARIPIPRAZOLE 5 MG: 5 TABLET ORAL at 08:28

## 2023-06-12 RX ADMIN — PROPAFENONE HYDROCHLORIDE 225 MG: 150 TABLET, FILM COATED ORAL at 21:24

## 2023-06-12 RX ADMIN — SENNOSIDES 8.6 MG: 8.6 TABLET, FILM COATED ORAL at 21:24

## 2023-06-12 RX ADMIN — ATORVASTATIN CALCIUM 10 MG: 10 TABLET, FILM COATED ORAL at 17:34

## 2023-06-12 RX ADMIN — INSULIN LISPRO 1 UNITS: 100 INJECTION, SOLUTION INTRAVENOUS; SUBCUTANEOUS at 21:25

## 2023-06-12 RX ADMIN — DOCUSATE SODIUM 100 MG: 100 CAPSULE, LIQUID FILLED ORAL at 17:34

## 2023-06-12 RX ADMIN — PROPAFENONE HYDROCHLORIDE 225 MG: 150 TABLET, FILM COATED ORAL at 14:34

## 2023-06-12 RX ADMIN — INSULIN LISPRO 1 UNITS: 100 INJECTION, SOLUTION INTRAVENOUS; SUBCUTANEOUS at 17:34

## 2023-06-12 NOTE — PLAN OF CARE
Problem: Alteration in Thoughts and Perception  Goal: Treatment Goal: Gain control of psychotic behaviors/thinking, reduce/eliminate presenting symptoms and demonstrate improved reality functioning upon discharge  Outcome: Progressing  Goal: Verbalize thoughts and feelings  Description: Interventions:  - Promote a nonjudgmental and trusting relationship with the patient through active listening and therapeutic communication  - Assess patient's level of functioning, behavior and potential for risk  - Engage patient in 1 on 1 interactions  - Encourage patient to express fears, feelings, frustrations, and discuss symptoms    - [place] patient to reality, help patient recognize reality-based thinking   - Administer medications as ordered and assess for potential side effects  - Provide the patient education related to the signs and symptoms of the illness and desired effects of prescribed medications  Outcome: Progressing  Goal: Refrain from acting on delusional thinking/internal stimuli  Description: Interventions:  - Monitor patient closely, per order   - Utilize least restrictive measures   - Set reasonable limits, give positive feedback for acceptable   - Administer medications as ordered and monitor of potential side effects  Outcome: Progressing  Goal: Agree to be compliant with medication regime, as prescribed and report medication side effects  Description: Interventions:  - Offer appropriate PRN medication and supervise ingestion; conduct AIMS, as needed   Outcome: Progressing  Goal: Attend and participate in unit activities, including therapeutic, recreational, and educational groups  Description: Interventions:  -Encourage Visitation and family involvement in care  Outcome: Progressing  Goal: Recognize dysfunctional thoughts, communicate reality-based thoughts at the time of discharge  Description: Interventions:  - Provide medication and psycho-education to assist patient in compliance and developing insight into his/her illness   Outcome: Progressing  Goal: Complete daily ADLs, including personal hygiene independently, as able  Description: Interventions:  - Observe, teach, and assist patient with ADLS  - Monitor and promote a balance of rest/activity, with adequate nutrition and elimination   Outcome: Progressing     Problem: Risk for Self Injury/Neglect  Goal: Treatment Goal: Remain safe during length of stay, learn and adopt new coping skills, and be free of self-injurious ideation, impulses and acts at the time of discharge  Outcome: Progressing  Goal: Verbalize thoughts and feelings  Description: Interventions:  - Assess and re-assess patient's lethality and potential for self-injury  - Engage patient in 1:1 interactions, daily, for a minimum of 15 minutes  - Encourage patient to express feelings, fears, frustrations, hopes  - Establish rapport/trust with patient   Outcome: Progressing  Goal: Refrain from harming self  Description: Interventions:  - Monitor patient closely, per order  - Develop a trusting relationship  - Supervise medication ingestion, monitor effects and side effects   Outcome: Progressing  Goal: Attend and participate in unit activities, including therapeutic, recreational, and educational groups  Description: Interventions:  - Provide therapeutic and educational activities daily, encourage attendance and participation, and document same in the medical record  - Obtain collateral information, encourage visitation and family involvement in care   Outcome: Progressing  Goal: Recognize maladaptive responses and adopt new coping mechanisms  Outcome: Progressing  Goal: Complete daily ADLs, including personal hygiene independently, as able  Description: Interventions:  - Observe, teach, and assist patient with ADLS  - Monitor and promote a balance of rest/activity, with adequate nutrition and elimination  Outcome: Progressing     Problem: Depression  Goal: Treatment Goal: Demonstrate behavioral control of depressive symptoms, verbalize feelings of improved mood/affect, and adopt new coping skills prior to discharge  Outcome: Progressing  Goal: Verbalize thoughts and feelings  Description: Interventions:  - Assess and re-assess patient's level of risk   - Engage patient in 1:1 interactions, daily, for a minimum of 15 minutes   - Encourage patient to express feelings, fears, frustrations, hopes   Outcome: Progressing  Goal: Refrain from harming self  Description: Interventions:  - Monitor patient closely, per order   - Supervise medication ingestion, monitor effects and side effects   Outcome: Progressing  Goal: Refrain from isolation  Description: Interventions:  - Develop a trusting relationship   - Encourage socialization   Outcome: Progressing  Goal: Refrain from self-neglect  Outcome: Progressing  Goal: Attend and participate in unit activities, including therapeutic, recreational, and educational groups  Description: Interventions:  - Provide therapeutic and educational activities daily, encourage attendance and participation, and document same in the medical record   Outcome: Progressing  Goal: Complete daily ADLs, including personal hygiene independently, as able  Description: Interventions:  - Observe, teach, and assist patient with ADLS  -  Monitor and promote a balance of rest/activity, with adequate nutrition and elimination   Outcome: Progressing     Problem: Anxiety  Goal: Anxiety is at manageable level  Description: Interventions:  - Assess and monitor patient's anxiety level  - Monitor for signs and symptoms (heart palpitations, chest pain, shortness of breath, headaches, nausea, feeling jumpy, restlessness, irritable, apprehensive)  - Collaborate with interdisciplinary team and initiate plan and interventions as ordered    - [place] patient to unit/surroundings  - Explain treatment plan  - Encourage participation in care  - Encourage verbalization of concerns/fears  - Identify coping mechanisms  - Assist in developing anxiety-reducing skills  - Administer/offer alternative therapies  - Limit or eliminate stimulants  Outcome: Progressing     Problem: Risk for Violence/Aggression Toward Others  Goal: Treatment Goal: Refrain from acts of violence/aggression during length of stay, and demonstrate improved impulse control at the time of discharge  Outcome: Progressing  Goal: Verbalize thoughts and feelings  Description: Interventions:  - Assess and re-assess patient's level of risk, every waking shift  - Engage patient in 1:1 interactions, daily, for a minimum of 15 minutes   - Allow patient to express feelings and frustrations in a safe and non-threatening manner   - Establish rapport/trust with patient   Outcome: Progressing  Goal: Refrain from harming others  Outcome: Progressing  Goal: Refrain from destructive acts on the environment or property  Outcome: Progressing  Goal: Control angry outbursts  Description: Interventions:  - Monitor patient closely, per order  - Ensure early verbal de-escalation  - Monitor prn medication needs  - Set reasonable/therapeutic limits, outline behavioral expectations, and consequences   - Provide a non-threatening milieu, utilizing the least restrictive interventions   Outcome: Progressing  Goal: Attend and participate in unit activities, including therapeutic, recreational, and educational groups  Description: Interventions:  - Provide therapeutic and educational activities daily, encourage attendance and participation, and document same in the medical record   Outcome: Progressing  Goal: Identify appropriate positive anger management techniques  Description: Interventions:  - Offer anger management and coping skills groups   - Staff will provide positive feedback for appropriate anger control  Outcome: Progressing     Problem: Alteration in Orientation  Goal: Treatment Goal: Demonstrate a reduction of confusion and improved orientation to person, place, time and/or situation upon discharge, according to optimum baseline/ability  Outcome: Progressing  Goal: Interact with staff daily  Description: Interventions:  - Assess and re-assess patient's level of orientation  - Engage patient in 1 on 1 interactions, daily, for a minimum of 15 minutes   - Establish rapport/trust with patient   Outcome: Progressing  Goal: Express concerns related to confused thinking related to:  Description: Interventions:  - Encourage patient to express feelings, fears, frustrations, hopes  - Assign consistent caregivers   - [place]/re-orient patient as needed  - Allow comfort items, as appropriate  - Provide visual cues, signs, etc    Outcome: Progressing  Goal: Allow medical examinations, as recommended  Description: Interventions:  - Provide physical/neurological exams and/or referrals, per provider   Outcome: Progressing  Goal: Cooperate with recommended testing/procedures  Description: Interventions:  - Determine need for ancillary testing  - Observe for mental status changes  - Implement falls/precaution protocol   Outcome: Progressing  Goal: Attend and participate in unit activities, including therapeutic, recreational, and educational groups  Description: Interventions:  - Provide therapeutic and educational activities daily, encourage attendance and participation, and document same in the medical record   - Provide appropriate opportunities for reminiscence   - Provide a consistent daily routine   - Encourage family contact/visitation   Outcome: Progressing  Goal: Complete daily ADLs, including personal hygiene independently, as able  Description: Interventions:  - Observe, teach, and assist patient with ADLS  Outcome: Progressing

## 2023-06-12 NOTE — NURSING NOTE
Pleasant and cooperative, visible on unit  Cooperative with prompts  Social with peers and staff  No symptoms reported during shift, no complaints voiced by pt  Ambulating with walker during shift  Medication adherent

## 2023-06-12 NOTE — CASE MANAGEMENT
Return call made to pt's dtr Ruddy Macedo, tel# [phone]; request made for face time video call with pt  PAOLA alarcon requesting Fiona's email for CM to send video call request  CM also dorian notifying of pt's d/c tomorrow at 1pm and that call may be arranged prior to pt's d/c  CM provided Fiona with CM's email address

## 2023-06-12 NOTE — PROGRESS NOTES
"  Progress Note - [address] 80 y [address]  male MRN: [id number]  Unit/Bed#: Lázaro Taylor 013-60 Encounter: [id number]    The patient was seen for continuing care and reviewed with treatment team  Patient is calm and cooperative  No episodes of aggression or agitation reported  He is pleasantly confused, ambulating with a walker  No complaints  Sleep- good  Appetite- good  Energy:ok   No suicidal or homicidal ideations  No EPS, denies any side effects of medication    ROS: negative    /59 (BP Location: Right arm)   Pulse 64   Temp (!) 97 1 °F (36 2 °C) (Temporal)   Resp 16   Ht 5' 11\" (1 803 m)   Wt 77 5 kg (170 lb 13 7 oz)   SpO2 94%   BMI 23 83 kg/m²     Current Mental Status Evaluation:  Appearance:  Adequate hygiene and grooming   Behavior:  calm, cooperative and friendly   Mood:  euthymic   Affect: mood-congruent   Speech: Normal volume and Normal rate   Thought Process:  Poverty of thoughts due to severe cognitive impiarment   Thought Content:  Does not verbalize delusional material   Perceptual Disturbances: Denies hallucinations and does not appear to be responding to internal stimuli   Risk Potential: No suicidal or homicidal ideation   Orientation:   Patient is alert, awake and oriented x1, poor fund of knowledge    Patient has limited insight, judgment and Impulse control are limited due to severe cognitive impairment      Recent Results (from the past 168 hour(s))   Fingerstick Glucose (POCT)    Collection Time: 06/05/23  4:20 PM   Result Value Ref Range    POC Glucose 191 (H) 65 - 140 mg/dl   Fingerstick Glucose (POCT)    Collection Time: 06/05/23  9:17 PM   Result Value Ref Range    POC Glucose 231 (H) 65 - 140 mg/dl   Fingerstick Glucose (POCT)    Collection Time: 06/06/23  7:03 AM   Result Value Ref Range    POC Glucose 79 65 - 140 mg/dl   Fingerstick Glucose (POCT)    Collection Time: 06/06/23 11:36 AM   Result Value Ref Range    POC Glucose 130 65 - 140 mg/dl   Fingerstick " Glucose (POCT)    Collection Time: 06/06/23  4:18 PM   Result Value Ref Range    POC Glucose 120 65 - 140 mg/dl   Fingerstick Glucose (POCT)    Collection Time: 06/06/23  9:04 PM   Result Value Ref Range    POC Glucose 236 (H) 65 - 140 mg/dl   Fingerstick Glucose (POCT)    Collection Time: 06/07/23  7:10 AM   Result Value Ref Range    POC Glucose 94 65 - 140 mg/dl   Fingerstick Glucose (POCT)    Collection Time: 06/07/23 11:37 AM   Result Value Ref Range    POC Glucose 140 65 - 140 mg/dl   Fingerstick Glucose (POCT)    Collection Time: 06/07/23  4:29 PM   Result Value Ref Range    POC Glucose 150 (H) 65 - 140 mg/dl   Fingerstick Glucose (POCT)    Collection Time: 06/07/23  9:02 PM   Result Value Ref Range    POC Glucose 214 (H) 65 - 140 mg/dl   Fingerstick Glucose (POCT)    Collection Time: 06/08/23  7:03 AM   Result Value Ref Range    POC Glucose 91 65 - 140 mg/dl   Fingerstick Glucose (POCT)    Collection Time: 06/08/23 11:30 AM   Result Value Ref Range    POC Glucose 133 65 - 140 mg/dl   Fingerstick Glucose (POCT)    Collection Time: 06/08/23  4:32 PM   Result Value Ref Range    POC Glucose 177 (H) 65 - 140 mg/dl   Fingerstick Glucose (POCT)    Collection Time: 06/08/23  9:07 PM   Result Value Ref Range    POC Glucose 131 65 - 140 mg/dl   Fingerstick Glucose (POCT)    Collection Time: 06/09/23  7:07 AM   Result Value Ref Range    POC Glucose 112 65 - 140 mg/dl   Fingerstick Glucose (POCT)    Collection Time: 06/09/23 11:29 AM   Result Value Ref Range    POC Glucose 113 65 - 140 mg/dl   Fingerstick Glucose (POCT)    Collection Time: 06/09/23  4:24 PM   Result Value Ref Range    POC Glucose 195 (H) 65 - 140 mg/dl   Fingerstick Glucose (POCT)    Collection Time: 06/09/23  9:09 PM   Result Value Ref Range    POC Glucose 185 (H) 65 - 140 mg/dl   Fingerstick Glucose (POCT)    Collection Time: 06/10/23  7:32 AM   Result Value Ref Range    POC Glucose 96 65 - 140 mg/dl   Fingerstick Glucose (POCT)    Collection Time: 06/10/23 11:43 AM   Result Value Ref Range    POC Glucose 122 65 - 140 mg/dl   Fingerstick Glucose (POCT)    Collection Time: 06/10/23  4:04 PM   Result Value Ref Range    POC Glucose 150 (H) 65 - 140 mg/dl   Fingerstick Glucose (POCT)    Collection Time: 06/10/23  8:59 PM   Result Value Ref Range    POC Glucose 170 (H) 65 - 140 mg/dl   Basic metabolic panel    Collection Time: 06/11/23  5:07 AM   Result Value Ref Range    Sodium 140 135 - 147 mmol/L    Potassium 3 8 3 5 - 5 3 mmol/L    Chloride 104 96 - 108 mmol/L    CO2 28 21 - 32 mmol/L    ANION GAP 8 4 - 13 mmol/L    BUN 30 (H) 5 - 25 mg/dL    Creatinine 1 79 (H) 0 60 - 1 30 mg/dL    Glucose 84 65 - 140 mg/dL    Glucose, Fasting 84 65 - 99 mg/dL    Calcium 9 2 8 4 - 10 2 mg/dL    eGFR 33 ml/min/1 73sq m   Fingerstick Glucose (POCT)    Collection Time: 06/11/23  7:14 AM   Result Value Ref Range    POC Glucose 91 65 - 140 mg/dl   Fingerstick Glucose (POCT)    Collection Time: 06/11/23 11:24 AM   Result Value Ref Range    POC Glucose 117 65 - 140 mg/dl   Fingerstick Glucose (POCT)    Collection Time: 06/11/23  4:05 PM   Result Value Ref Range    POC Glucose 176 (H) 65 - 140 mg/dl   Fingerstick Glucose (POCT)    Collection Time: 06/11/23  8:43 PM   Result Value Ref Range    POC Glucose 167 (H) 65 - 140 mg/dl   Fingerstick Glucose (POCT)    Collection Time: 06/12/23  6:59 AM   Result Value Ref Range    POC Glucose 91 65 - 140 mg/dl   Fingerstick Glucose (POCT)    Collection Time: 06/12/23 11:45 AM   Result Value Ref Range    POC Glucose 118 65 - 140 mg/dl   Quoc Crowell    Collection Time: 06/12/23 11:48 AM   Result Value Ref Range    Supplier Name [facility]/[address]     Supplier Phone Number [phone]     Order Status Completed     Delivery Note      Delivery Request Date 06/12/2023     Item Description Quoc Derian Crowell        Progress Toward Goals: Progressing , stable, awaiting placement    Assessment     Principal Problem: Unspecified mood (affective) disorder ([facility])  Active Problems:    Essential hypertension    Mixed hyperlipidemia    Type 2 diabetes mellitus with kidney complication, with long-term current use of insulin ([facility])    Dementia with behavioral disturbance ([facility])    Medical clearance for psychiatric admission    Chronic kidney disease (CKD), stage IV (severe) ([facility])        Current Facility-Administered Medications   Medication Dose Route Frequency Provider Last Rate   • acetaminophen  650 mg Oral Q4H PRN SHADY Dodd     • acetaminophen  650 mg Oral Q4H PRN SHADY Dodd     • acetaminophen  975 mg Oral Q6H PRN SHADY Dodd     • ARIPiprazole  5 mg Oral Daily Kwaku Ann MD     • atorvastatin  10 mg Oral Daily With Dinner Melissa Nobles PA-C     • cholecalciferol  2,000 Units Oral Daily Melissa Nobles PA-C     • docusate sodium  100 mg Oral BID SHADY Gerard     • donepezil  10 mg Oral HS SHADY Dodd     • ferrous sulfate  325 mg Oral Daily With Breakfast Melissa Nobles PA-C     • hydrOXYzine HCL  25 mg Oral Q6H PRN Max 4/day SHADY Dodd     • hydrOXYzine HCL  50 mg Oral Q6H PRN Max 4/day SHADY Dodd     • insulin detemir  10 Units Subcutaneous Daily With Breakfast Melissa Nobles PA-C     • insulin detemir  10 Units Subcutaneous HS Melissa Nobles PA-C     • insulin lispro  1-5 Units Subcutaneous HS Melissa Nobles PA-C     • insulin lispro  1-6 Units Subcutaneous TID AC Melissa Nobles PA-C     • LORazepam  1 mg Intramuscular Q6H PRN Max 3/day SHADY Dodd     • LORazepam  1 mg Oral Q6H PRN Max 3/day SHADY Dodd     • magnesium Oxide  400 mg Oral Daily Melissa Nobles PA-C     • metoprolol tartrate  100 mg Oral HS Melissa Nobles PA-C     • metoprolol tartrate  50 mg Oral Daily Melissa Nobles PA-C     • polyethylene glycol  17 g Oral Daily PRN SHADY Gerard     • propafenone  225 mg Oral Q8H [facility] Anselmo Strickland PA-C     • senna  1 tablet Oral HS SHADY Gerard     • sertraline  100 mg Oral Daily Jackie Box, CRNP     • traZODone  75 mg Oral HS PRN SHADY Mantilla         Plan :    - Medications;   Psychiatric:Continue sertraline 100mg daily                        Continue  Abilify 5mg daily    Medical: per SLIM    -Therapy: occupational therapy, milieu and group therapy  - Legal: 201   -Disposition: awaiting placement

## 2023-06-12 NOTE — TREATMENT TEAM
06/12/23 0837   Team Meeting   Meeting Type Daily Rounds   Initial Conference Date 06/12/23   Team Members Present   Team Members Present Physician;Nurse;;   Physician Team Member Dr Frankey Neighbor Team Member Alberto No, [address] Management Team Member Sammi Beaulieu   OT Team Member eric Rivera, no symptoms reported, anticipate d/c tomorrow pending [facility] acceptance

## 2023-06-12 NOTE — CASE MANAGEMENT
Call made to [facility], informed they do not have r/w to provide pt  CM ordered r/w through [facility] program with [facility] TEAGUE   CM requested to take r/w from [facility] consignment; awaiting approval

## 2023-06-12 NOTE — NURSING NOTE
The patient has been in day room most the evening  He is calm and cooperative with staff/peers  The patient denies depression and anxiety  Denies SI/HI/AVH  He has been med compliant  Pleasantly confused  No acting out behaviors  Safety checks ongoing

## 2023-06-12 NOTE — CASE MANAGEMENT
PAOLA spoke with Jason Sgeura at [facility], reviewed information provided by guardian for plan to cont guardian of person tomorrow morning and Naveen Thacker would be able to sign pt into treatment at [facility] tomorrow after court  Jason Segura in agreement with accepting pt tomorrow; requesting 1300  time for pt  Jason Segura reports plans to order pt's meds from their pharmacy  BLS transport requested through [address] for transport 6/13 at 1300; awaiting transport confirmation  CM notified Naveen Thacker, pt's guardian of plans for d/c tomorrow at 1300

## 2023-06-12 NOTE — PROGRESS NOTES
Progress Note - [address] 80 y [address]  male MRN: @MRN   Unit/Bed#Sumit Gonzalez 207-33 Encounter: [id number]      Report from staff regarding this patient received and discussed, and records reviewed prior to seeing this patient  Behavior over the last 24 hours: unchanged  The patient spent most of the time in his room, resting  He was pleasantly confused without agitation  There was no change in his renal function test, which was not worsening  Patient remains anxious and appropriate today  Sleep: slept better  Appetite: fair  Medication side effects: No       Mental Status Evaluation:    Appearance:  dressed in hospital attire   Mood:  anxious   Affect: constricted    Speech:  decreased rate, slow   Thought Content:  poverty of thought   Perceptual Disturbances: no auditory hallucinations, no visual hallucinations, denies auditory hallucinations when asked, does not appear responding to internal stimuli   Risk Potential: Suicidal ideation - None, contracts for safety on the unit   Insight:  impaired   Motor Activity: no abnormal movements       Laboratory results:  I have personally reviewed all pertinent laboratory results  Progress Toward Goals: no significant progress    Assessment/Plan   Principal Problem:    Unspecified mood (affective) disorder (H[facility])  Active Problems:    Essential hypertension    Mixed hyperlipidemia    Type 2 diabetes mellitus with kidney complication, with long-term current use of insulin ([facility])    Dementia with behavioral disturbance (H[facility])    Medical clearance for psychiatric admission    Chronic kidney disease (CKD), stage IV (severe) ([facility] [address] )    Recommended Treatment: We will not make medication adjustments today the patient behavior is under better control  Planned medication and treatment changes: All current active medications have been reviewed  Continue treatment with group therapy, milieu therapy, occupational therapy and medication management        ** Please Note: This note has been constructed using a voice recognition system   **      BMP:   Recent Labs     06/11/23  0507   BUN 30*      CO2 28   K 3 8     Vitals:    06/11/23 2001   BP: 116/60   Pulse: 69   Resp: 16   Temp: 97 7 °F (36 5 °C)   SpO2: 95%        Medication Administration - last 24 hours from 06/10/2023 2224 to 06/11/2023 2224       Date/Time Order Dose Route Action Action by     06/11/2023 2117 EDT donepezil (ARICEPT) tablet 10 mg 10 mg Oral Given Bertha Peak, RN     06/11/2023 6955 EDT sertraline (ZOLOFT) tablet 100 mg 100 mg Oral Given Stan Avila, JAMES     06/11/2023 0856 EDT insulin detemir (LEVEMIR) subcutaneous injection 10 Units 10 Units Subcutaneous Given Stan Avila, JAMES     06/11/2023 2117 EDT insulin detemir (LEVEMIR) subcutaneous injection 10 Units 10 Units Subcutaneous Given Bertha Peak, RN     06/11/2023 1655 EDT insulin lispro (HumaLOG) 100 units/mL subcutaneous injection 1-6 Units 1 Units Subcutaneous Given Stan Avila RN     06/11/2023 1202 EDT insulin lispro (HumaLOG) 100 units/mL subcutaneous injection 1-6 Units -- Subcutaneous Not Given Stan Avila, JAMES     06/11/2023 0904 EDT insulin lispro (HumaLOG) 100 units/mL subcutaneous injection 1-6 Units -- Subcutaneous Not Given Stan Avila RN     06/11/2023 2118 EDT insulin lispro (HumaLOG) 100 units/mL subcutaneous injection 1-5 Units 1 Units Subcutaneous Given Bertha Peak, RN     06/11/2023 1656 EDT atorvastatin (LIPITOR) tablet 10 mg 10 mg Oral Given Stan Avila RN     06/11/2023 2117 EDT metoprolol tartrate (LOPRESSOR) tablet 100 mg 100 mg Oral Given Bertha Peak, RN     06/11/2023 0900 EDT metoprolol tartrate (LOPRESSOR) tablet 50 mg 50 mg Oral Given Stan Avila RN     06/11/2023 2117 EDT propafenone (RYTHMOL) tablet 225 mg 225 mg Oral Given Bertha Peak, RN     06/11/2023 1459 EDT propafenone (RYTHMOL) tablet 225 mg 225 mg Oral Given Stan Avila RN     06/11/2023 0606 EDT propafenone (RYTHMOL) tablet 225 mg 225 mg Oral Given Lysle Mention, RN     06/11/2023 0442 EDT cholecalciferol (VITAMIN D3) tablet 2,000 Units 2,000 Units Oral Given Primus Edward, RN     06/11/2023 2953 EDT ferrous sulfate tablet 325 mg 325 mg Oral Given Primus Edward, RN     06/11/2023 3878 EDT magnesium Oxide (MAG-OX) tablet 400 mg 400 mg Oral Given Primus Edward, RN     06/11/2023 0859 EDT ARIPiprazole (ABILIFY) tablet 5 mg 5 mg Oral Given Primus Edward, RN     06/11/2023 1700 EDT docusate sodium (COLACE) capsule 100 mg 100 mg Oral Given Primus Edward, RN     06/11/2023 4837 EDT docusate sodium (COLACE) capsule 100 mg 100 mg Oral Given Primus Edward, RN     06/11/2023 2117 EDT senna (SENOKOT) tablet 8 6 mg 8 6 mg Oral Given Jaiden Martinez RN

## 2023-06-12 NOTE — NURSING NOTE
Patient went to bathroom multiple times throughout the night, that interrupted sleep  Continue to monitor

## 2023-06-12 NOTE — NURSING NOTE
Pt visible in day room, pleasantly confused  Brightens on approach  Denies all s/s  Ambulating with walker  Bed alarm maintained HS  Will maintain q7min checks

## 2023-06-13 ENCOUNTER — PATIENT OUTREACH (OUTPATIENT)
Dept: INTERNAL MEDICINE CLINIC | Facility: CLINIC | Age: 88
End: 2023-06-13

## 2023-06-13 VITALS
HEIGHT: 71 IN | TEMPERATURE: 98.1 F | DIASTOLIC BLOOD PRESSURE: 69 MMHG | HEART RATE: 76 BPM | RESPIRATION RATE: 18 BRPM | OXYGEN SATURATION: 95 % | BODY MASS INDEX: 24.41 KG/M2 | WEIGHT: 174.38 LBS | SYSTOLIC BLOOD PRESSURE: 129 MMHG

## 2023-06-13 PROBLEM — K59.00 CONSTIPATION: Status: ACTIVE | Noted: 2023-06-13

## 2023-06-13 LAB
GLUCOSE SERPL-MCNC: 145 MG/DL (ref 65–140)
GLUCOSE SERPL-MCNC: 85 MG/DL (ref 65–140)

## 2023-06-13 PROCEDURE — 99239 HOSP IP/OBS DSCHRG MGMT >30: CPT | Performed by: STUDENT IN AN ORGANIZED HEALTH CARE EDUCATION/TRAINING PROGRAM

## 2023-06-13 PROCEDURE — 82948 REAGENT STRIP/BLOOD GLUCOSE: CPT

## 2023-06-13 RX ORDER — LANOLIN ALCOHOL/MO/W.PET/CERES
400 CREAM (GRAM) TOPICAL DAILY
Qty: 30 TABLET | Refills: 0
Start: 2023-06-14

## 2023-06-13 RX ORDER — SENNOSIDES 8.6 MG
8.6 TABLET ORAL
Qty: 30 TABLET | Refills: 0
Start: 2023-06-13

## 2023-06-13 RX ORDER — DOCUSATE SODIUM 100 MG/1
100 CAPSULE, LIQUID FILLED ORAL 2 TIMES DAILY
Qty: 60 CAPSULE | Refills: 0 | Status: SHIPPED | OUTPATIENT
Start: 2023-06-13

## 2023-06-13 RX ORDER — ARIPIPRAZOLE 5 MG/1
5 TABLET ORAL DAILY
Refills: 0
Start: 2023-06-14

## 2023-06-13 RX ORDER — METOPROLOL TARTRATE 100 MG/1
TABLET ORAL
Qty: 90 TABLET | Refills: 0
Start: 2023-06-13

## 2023-06-13 RX ADMIN — METOPROLOL TARTRATE 50 MG: 50 TABLET, FILM COATED ORAL at 08:47

## 2023-06-13 RX ADMIN — INSULIN DETEMIR 10 UNITS: 100 INJECTION, SOLUTION SUBCUTANEOUS at 10:02

## 2023-06-13 RX ADMIN — ARIPIPRAZOLE 5 MG: 5 TABLET ORAL at 08:48

## 2023-06-13 RX ADMIN — CHOLECALCIFEROL TAB 25 MCG (1000 UNIT) 2000 UNITS: 25 TAB at 08:47

## 2023-06-13 RX ADMIN — SERTRALINE HYDROCHLORIDE 100 MG: 100 TABLET ORAL at 08:47

## 2023-06-13 RX ADMIN — PROPAFENONE HYDROCHLORIDE 225 MG: 150 TABLET, FILM COATED ORAL at 06:18

## 2023-06-13 RX ADMIN — FERROUS SULFATE TAB 325 MG (65 MG ELEMENTAL FE) 325 MG: 325 (65 FE) TAB at 08:47

## 2023-06-13 RX ADMIN — MAGNESIUM OXIDE TAB 400 MG (241.3 MG ELEMENTAL MG) 400 MG: 400 (241.3 MG) TAB at 08:48

## 2023-06-13 RX ADMIN — DOCUSATE SODIUM 100 MG: 100 CAPSULE, LIQUID FILLED ORAL at 08:48

## 2023-06-13 NOTE — CASE MANAGEMENT
304 hearing held with [facility] [address]; petition approved for 90 days inpt treatment  Dr Pilar Giordano testified; pt did not testify due to confused mental status

## 2023-06-13 NOTE — PLAN OF CARE
Problem: DISCHARGE PLANNING - CARE MANAGEMENT  Goal: Discharge to post-acute care or home with appropriate resources  Description: INTERVENTIONS:  - Conduct assessment to determine patient/family and health care team treatment goals, and need for post-acute services based on payer coverage, community resources, and patient preferences, and barriers to discharge  - Address psychosocial, clinical, and financial barriers to discharge as identified in assessment in conjunction with the patient/family and health care team  - Arrange appropriate level of post-acute services according to patient’s   needs and preference and payer coverage in collaboration with the physician and health care team  - Communicate with and update the patient/family, physician, and health care team regarding progress on the discharge plan  - Arrange appropriate transportation to post-acute venues  Outcome: Adequate for Discharge   Discharge to [facility] memory care unit; BLS transport via [employer]

## 2023-06-13 NOTE — PLAN OF CARE
Problem: Alteration in Thoughts and Perception  Goal: Treatment Goal: Gain control of psychotic behaviors/thinking, reduce/eliminate presenting symptoms and demonstrate improved reality functioning upon discharge  Outcome: Completed  Goal: Verbalize thoughts and feelings  Description: Interventions:  - Promote a nonjudgmental and trusting relationship with the patient through active listening and therapeutic communication  - Assess patient's level of functioning, behavior and potential for risk  - Engage patient in 1 on 1 interactions  - Encourage patient to express fears, feelings, frustrations, and discuss symptoms    - [place] patient to reality, help patient recognize reality-based thinking   - Administer medications as ordered and assess for potential side effects  - Provide the patient education related to the signs and symptoms of the illness and desired effects of prescribed medications  Outcome: Completed  Goal: Refrain from acting on delusional thinking/internal stimuli  Description: Interventions:  - Monitor patient closely, per order   - Utilize least restrictive measures   - Set reasonable limits, give positive feedback for acceptable   - Administer medications as ordered and monitor of potential side effects  Outcome: Completed  Goal: Agree to be compliant with medication regime, as prescribed and report medication side effects  Description: Interventions:  - Offer appropriate PRN medication and supervise ingestion; conduct AIMS, as needed   Outcome: Completed  Goal: Attend and participate in unit activities, including therapeutic, recreational, and educational groups  Description: Interventions:  -Encourage Visitation and family involvement in care  Outcome: Completed  Goal: Recognize dysfunctional thoughts, communicate reality-based thoughts at the time of discharge  Description: Interventions:  - Provide medication and psycho-education to assist patient in compliance and developing insight into his/her illness   Outcome: Completed  Goal: Complete daily ADLs, including personal hygiene independently, as able  Description: Interventions:  - Observe, teach, and assist patient with ADLS  - Monitor and promote a balance of rest/activity, with adequate nutrition and elimination   Outcome: Completed     Problem: Alteration in Thoughts and Perception  Goal: Verbalize thoughts and feelings  Description: Interventions:  - Promote a nonjudgmental and trusting relationship with the patient through active listening and therapeutic communication  - Assess patient's level of functioning, behavior and potential for risk  - Engage patient in 1 on 1 interactions  - Encourage patient to express fears, feelings, frustrations, and discuss symptoms    - [place] patient to reality, help patient recognize reality-based thinking   - Administer medications as ordered and assess for potential side effects  - Provide the patient education related to the signs and symptoms of the illness and desired effects of prescribed medications  Outcome: Completed     Problem: Ineffective Coping  Goal: Identifies ineffective coping skills  Outcome: Completed  Goal: Identifies healthy coping skills  Outcome: Completed  Goal: Demonstrates healthy coping skills  Outcome: Completed  Goal: Participates in unit activities  Description: Interventions:  - Provide therapeutic environment   - Provide required programming   - Redirect inappropriate behaviors   Outcome: Completed  Goal: Patient/Family participate in treatment and DC plans  Description: Interventions:  - Provide therapeutic environment  Outcome: Completed  Goal: Patient/Family verbalizes awareness of resources  Outcome: Completed  Goal: Understands least restrictive measures  Description: Interventions:  - Utilize least restrictive behavior  Outcome: Completed  Goal: Free from restraint events  Description: - Utilize least restrictive measures   - Provide behavioral interventions   - Redirect inappropriate behaviors   Outcome: Completed     Problem: Risk for Self Injury/Neglect  Goal: Treatment Goal: Remain safe during length of stay, learn and adopt new coping skills, and be free of self-injurious ideation, impulses and acts at the time of discharge  Outcome: Completed  Goal: Verbalize thoughts and feelings  Description: Interventions:  - Assess and re-assess patient's lethality and potential for self-injury  - Engage patient in 1:1 interactions, daily, for a minimum of 15 minutes  - Encourage patient to express feelings, fears, frustrations, hopes  - Establish rapport/trust with patient   Outcome: Completed  Goal: Refrain from harming self  Description: Interventions:  - Monitor patient closely, per order  - Develop a trusting relationship  - Supervise medication ingestion, monitor effects and side effects   Outcome: Completed  Goal: Attend and participate in unit activities, including therapeutic, recreational, and educational groups  Description: Interventions:  - Provide therapeutic and educational activities daily, encourage attendance and participation, and document same in the medical record  - Obtain collateral information, encourage visitation and family involvement in care   Outcome: Completed  Goal: Recognize maladaptive responses and adopt new coping mechanisms  Outcome: Completed  Goal: Complete daily ADLs, including personal hygiene independently, as able  Description: Interventions:  - Observe, teach, and assist patient with ADLS  - Monitor and promote a balance of rest/activity, with adequate nutrition and elimination  Outcome: Completed     Problem: Depression  Goal: Treatment Goal: Demonstrate behavioral control of depressive symptoms, verbalize feelings of improved mood/affect, and adopt new coping skills prior to discharge  Outcome: Completed  Goal: Verbalize thoughts and feelings  Description: Interventions:  - Assess and re-assess patient's level of risk   - Engage patient in 1:1 interactions, daily, for a minimum of 15 minutes   - Encourage patient to express feelings, fears, frustrations, hopes   Outcome: Completed  Goal: Refrain from harming self  Description: Interventions:  - Monitor patient closely, per order   - Supervise medication ingestion, monitor effects and side effects   Outcome: Completed  Goal: Refrain from isolation  Description: Interventions:  - Develop a trusting relationship   - Encourage socialization   Outcome: Completed  Goal: Refrain from self-neglect  Outcome: Completed  Goal: Attend and participate in unit activities, including therapeutic, recreational, and educational groups  Description: Interventions:  - Provide therapeutic and educational activities daily, encourage attendance and participation, and document same in the medical record   Outcome: Completed  Goal: Complete daily ADLs, including personal hygiene independently, as able  Description: Interventions:  - Observe, teach, and assist patient with ADLS  -  Monitor and promote a balance of rest/activity, with adequate nutrition and elimination   Outcome: Completed     Problem: Risk for Violence/Aggression Toward Others  Goal: Treatment Goal: Refrain from acts of violence/aggression during length of stay, and demonstrate improved impulse control at the time of discharge  Outcome: Completed  Goal: Verbalize thoughts and feelings  Description: Interventions:  - Assess and re-assess patient's level of risk, every waking shift  - Engage patient in 1:1 interactions, daily, for a minimum of 15 minutes   - Allow patient to express feelings and frustrations in a safe and non-threatening manner   - Establish rapport/trust with patient   Outcome: Completed  Goal: Refrain from harming others  Outcome: Completed  Goal: Refrain from destructive acts on the environment or property  Outcome: Completed  Goal: Control angry outbursts  Description: Interventions:  - Monitor patient closely, per order  - Ensure early verbal de-escalation  - Monitor prn medication needs  - Set reasonable/therapeutic limits, outline behavioral expectations, and consequences   - Provide a non-threatening milieu, utilizing the least restrictive interventions   Outcome: Completed  Goal: Attend and participate in unit activities, including therapeutic, recreational, and educational groups  Description: Interventions:  - Provide therapeutic and educational activities daily, encourage attendance and participation, and document same in the medical record   Outcome: Completed  Goal: Identify appropriate positive anger management techniques  Description: Interventions:  - Offer anger management and coping skills groups   - Staff will provide positive feedback for appropriate anger control  Outcome: Completed     Problem: Anxiety  Goal: Anxiety is at manageable level  Description: Interventions:  - Assess and monitor patient's anxiety level  - Monitor for signs and symptoms (heart palpitations, chest pain, shortness of breath, headaches, nausea, feeling jumpy, restlessness, irritable, apprehensive)  - Collaborate with interdisciplinary team and initiate plan and interventions as ordered    - [place] patient to unit/surroundings  - Explain treatment plan  - Encourage participation in care  - Encourage verbalization of concerns/fears  - Identify coping mechanisms  - Assist in developing anxiety-reducing skills  - Administer/offer alternative therapies  - Limit or eliminate stimulants  Outcome: Completed     Problem: Alteration in Orientation  Goal: Treatment Goal: Demonstrate a reduction of confusion and improved orientation to person, place, time and/or situation upon discharge, according to optimum baseline/ability  Outcome: Completed  Goal: Interact with staff daily  Description: Interventions:  - Assess and re-assess patient's level of orientation  - Engage patient in 1 on 1 interactions, daily, for a minimum of 15 minutes   - Establish rapport/trust with patient   Outcome: Completed  Goal: Express concerns related to confused thinking related to:  Description: Interventions:  - Encourage patient to express feelings, fears, frustrations, hopes  - Assign consistent caregivers   - [place]/re-orient patient as needed  - Allow comfort items, as appropriate  - Provide visual cues, signs, etc    Outcome: Completed  Goal: Allow medical examinations, as recommended  Description: Interventions:  - Provide physical/neurological exams and/or referrals, per provider   Outcome: Completed  Goal: Cooperate with recommended testing/procedures  Description: Interventions:  - Determine need for ancillary testing  - Observe for mental status changes  - Implement falls/precaution protocol   Outcome: Completed  Goal: Attend and participate in unit activities, including therapeutic, recreational, and educational groups  Description: Interventions:  - Provide therapeutic and educational activities daily, encourage attendance and participation, and document same in the medical record   - Provide appropriate opportunities for reminiscence   - Provide a consistent daily routine   - Encourage family contact/visitation   Outcome: Completed  Goal: Complete daily ADLs, including personal hygiene independently, as able  Description: Interventions:  - Observe, teach, and assist patient with ADLS  Outcome: Completed     Problem: Prexisting or High Potential for Compromised Skin Integrity  Goal: Skin integrity is maintained or improved  Description: INTERVENTIONS:  - Identify patients at risk for skin breakdown  - Assess and monitor skin integrity  - Assess and monitor nutrition and hydration status  - Monitor labs   - Assess for incontinence   - Turn and reposition patient  - Assist with mobility/ambulation  - Relieve pressure over bony prominences  - Avoid friction and shearing  - Provide appropriate hygiene as needed including keeping skin clean and dry  - Evaluate need for skin moisturizer/barrier cream  - Collaborate with interdisciplinary team   - Patient/family teaching  - Consider wound care consult   Outcome: Completed     Problem: DISCHARGE PLANNING - CARE MANAGEMENT  Goal: Discharge to post-acute care or home with appropriate resources  Description: INTERVENTIONS:  - Conduct assessment to determine patient/family and health care team treatment goals, and need for post-acute services based on payer coverage, community resources, and patient preferences, and barriers to discharge  - Address psychosocial, clinical, and financial barriers to discharge as identified in assessment in conjunction with the patient/family and health care team  - Arrange appropriate level of post-acute services according to patient’s   needs and preference and payer coverage in collaboration with the physician and health care team  - Communicate with and update the patient/family, physician, and health care team regarding progress on the discharge plan  - Arrange appropriate transportation to post-acute venues  Outcome: Completed

## 2023-06-13 NOTE — PROGRESS NOTES
ADT notification received indicating patient discharged from [address] [place] [address] to [facility] for long-term care  Patient's guardian Mckayla Gallo and dtr Florencia Smith continue to be involved  Will close case at this time
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
## 2023-06-13 NOTE — BH TRANSITION RECORD
Contact Information: If you have any questions, concerns, pended studies, tests and/or procedures, or emergencies regarding your inpatient behavioral health visit  Please contact [facility] older adult behavioral health unit 6T [phone] and ask to speak to a , nurse or physician  A contact is available 24 hours/ 7 days a week at this number  Summary of Procedures Performed During your Stay:  Below is a list of major procedures performed during your hospital stay and a summary of results:  - Cardiac Procedures/Studies: EKG  Sinus rhythm with 1st degree A-V block  Otherwise normal ECG  When compared with ECG of 16-DEC-2022 19:14,  Sinus rhythm has replaced Electronic atrial pacemaker  Confirmed by Sanjana Pederson ([id number]) on 5/23/2023 5:22:30 AM    Pending Studies (From admission, onward)    None        Please follow up on the above pending studies with your PCP and/or referring provider

## 2023-06-13 NOTE — NURSING NOTE
Patient is for discharge to facility today  AVS reviewed with  the patient and he needs reinforcement  Patient is in good mood, joking often and uses RW for ambulation  No aggressive behavior noted  Belongings returned to the patient

## 2023-06-17 ENCOUNTER — APPOINTMENT (EMERGENCY)
Dept: RADIOLOGY | Facility: HOSPITAL | Age: 88
End: 2023-06-17
Payer: COMMERCIAL

## 2023-06-17 ENCOUNTER — HOSPITAL ENCOUNTER (EMERGENCY)
Facility: HOSPITAL | Age: 88
Discharge: HOME | End: 2023-06-18
Attending: EMERGENCY MEDICINE
Payer: COMMERCIAL

## 2023-06-17 VITALS
SYSTOLIC BLOOD PRESSURE: 140 MMHG | TEMPERATURE: 98.1 F | WEIGHT: 182 LBS | HEART RATE: 64 BPM | RESPIRATION RATE: 18 BRPM | OXYGEN SATURATION: 94 % | DIASTOLIC BLOOD PRESSURE: 75 MMHG | BODY MASS INDEX: 25.38 KG/M2

## 2023-06-17 DIAGNOSIS — F03.911 AGITATION DUE TO DEMENTIA (HCC): Primary | ICD-10-CM

## 2023-06-17 PROCEDURE — 70450 CT HEAD/BRAIN W/O DYE: CPT

## 2023-06-17 PROCEDURE — G1004 CDSM NDSC: HCPCS

## 2023-06-17 PROCEDURE — 99285 EMERGENCY DEPT VISIT HI MDM: CPT
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
## 2023-06-18 NOTE — ED CARE HANDOFF
Emergency Department Sign Out Note        Sign out and transfer of care from Dr Lowry Cushing  See Separate Emergency Department note  The patient, Reynaldo Claude, was evaluated by the previous provider for agitation noted at nursing facility  Workup Completed:  CT head was negative for any acute intracranial process  Patient was not able to provide urine sample  ED Course / Workup Pending (followup): Remained alert, calm and cooperative while here in the ED with no recurrent periods of agitation  He is to follow-up with his primary care doctor for further evaluation and management  ED Course as of 06/17/23 2318   Sat Jun 17, 2023 2122 SO: hx of frontotemporal dementia, coming in for agitation, CT head, urine? If possible, if CT head ok can be dc'd  Procedures  MDM        Disposition  Final diagnoses:   Agitation due to dementia [facility])     Time reflects when diagnosis was documented in both MDM as applicable and the Disposition within this note     Time User Action Codes Description Comment    6/17/2023  9:27 PM Mary Morales Add [[id number]] Agitation due to dementia [facility])       ED Disposition     ED Disposition   Discharge    Condition   Stable    Date/Time   Sat Jun 17, 2023 11:08 PM    Comment   Reynaldo Claude discharge to home/self care  Follow-up Information     Follow up With Specialties Details Why Contact Info    Berta Gamino MD Internal Medicine Schedule an appointment as soon as possible for a visit   [address]  [phone]          Patient's Medications   Discharge Prescriptions    No medications on file     No discharge procedures on file         ED Provider  Electronically Signed by     Leigh Ann Ochoa MD  06/17/23 8161

## 2023-06-18 NOTE — DISCHARGE INSTRUCTIONS
You have been evaluated in the Emergency Department today for agitation  Your evaluation, including imaging, suggests that your symptoms are due to a nonemergent cause  Please follow up with your primary care physician within two days  Return to the Emergency Department if you experience worsening symptoms or any other concerning symptoms  Thank you for choosing us for your care
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
## 2023-07-11 ENCOUNTER — APPOINTMENT (EMERGENCY)
Dept: RADIOLOGY | Facility: HOSPITAL | Age: 88
DRG: 206 | End: 2023-07-11
Payer: COMMERCIAL

## 2023-07-11 ENCOUNTER — HOSPITAL ENCOUNTER (EMERGENCY)
Facility: HOSPITAL | Age: 88
Discharge: HOME | DRG: 206 | End: 2023-07-11
Attending: EMERGENCY MEDICINE
Payer: COMMERCIAL

## 2023-07-11 VITALS
DIASTOLIC BLOOD PRESSURE: 70 MMHG | RESPIRATION RATE: 18 BRPM | HEART RATE: 75 BPM | TEMPERATURE: 99.5 F | SYSTOLIC BLOOD PRESSURE: 144 MMHG | OXYGEN SATURATION: 95 %

## 2023-07-11 DIAGNOSIS — R53.1 GENERALIZED WEAKNESS: Primary | ICD-10-CM

## 2023-07-11 DIAGNOSIS — F03.90 DEMENTIA (HCC): ICD-10-CM

## 2023-07-11 LAB
ALBUMIN SERPL BCP-MCNC: 3.1 G/DL (ref 3.5–5)
ALP SERPL-CCNC: 95 U/L (ref 46–116)
ALT SERPL W P-5'-P-CCNC: 30 U/L (ref 12–78)
ANION GAP SERPL CALCULATED.3IONS-SCNC: 6 MMOL/L
AST SERPL W P-5'-P-CCNC: 16 U/L (ref 5–45)
BASE EX.OXY STD BLDV CALC-SCNC: 40.4 % (ref 60–80)
BASE EXCESS BLDV CALC-SCNC: 3.8 MMOL/L
BASOPHILS # BLD AUTO: 0.02 THOUSANDS/ÂΜL (ref 0–0.1)
BASOPHILS NFR BLD AUTO: 0 % (ref 0–1)
BILIRUB SERPL-MCNC: 0.37 MG/DL (ref 0.2–1)
BUN SERPL-MCNC: 17 MG/DL (ref 5–25)
CALCIUM ALBUM COR SERPL-MCNC: 9.8 MG/DL (ref 8.3–10.1)
CALCIUM SERPL-MCNC: 9.1 MG/DL (ref 8.3–10.1)
CHLORIDE SERPL-SCNC: 104 MMOL/L (ref 96–108)
CO2 SERPL-SCNC: 28 MMOL/L (ref 21–32)
CREAT SERPL-MCNC: 1.85 MG/DL (ref 0.6–1.3)
EOSINOPHIL # BLD AUTO: 0.07 THOUSAND/ÂΜL (ref 0–0.61)
EOSINOPHIL NFR BLD AUTO: 1 % (ref 0–6)
ERYTHROCYTE [DISTWIDTH] IN BLOOD BY AUTOMATED COUNT: 14.3 % (ref 11.6–15.1)
GFR SERPL CREATININE-BSD FRML MDRD: 31 ML/MIN/1.73SQ M
GLUCOSE SERPL-MCNC: 219 MG/DL (ref 65–140)
HCO3 BLDV-SCNC: 30.3 MMOL/L (ref 24–30)
HCT VFR BLD AUTO: 37.1 % (ref 36.5–49.3)
HGB BLD-MCNC: 12.4 G/DL (ref 12–17)
IMM GRANULOCYTES # BLD AUTO: 0.01 THOUSAND/UL (ref 0–0.2)
IMM GRANULOCYTES NFR BLD AUTO: 0 % (ref 0–2)
LYMPHOCYTES # BLD AUTO: 1.14 THOUSANDS/ÂΜL (ref 0.6–4.47)
LYMPHOCYTES NFR BLD AUTO: 16 % (ref 14–44)
MCH RBC QN AUTO: 29.3 PG (ref 26.8–34.3)
MCHC RBC AUTO-ENTMCNC: 33.4 G/DL (ref 31.4–37.4)
MCV RBC AUTO: 88 FL (ref 82–98)
MONOCYTES # BLD AUTO: 0.94 THOUSAND/ÂΜL (ref 0.17–1.22)
MONOCYTES NFR BLD AUTO: 13 % (ref 4–12)
NEUTROPHILS # BLD AUTO: 5.15 THOUSANDS/ÂΜL (ref 1.85–7.62)
NEUTS SEG NFR BLD AUTO: 70 % (ref 43–75)
NRBC BLD AUTO-RTO: 0 /100 WBCS
O2 CT BLDV-SCNC: 7.3 ML/DL
PCO2 BLDV: 54.1 MM HG (ref 42–50)
PH BLDV: 7.37 [PH] (ref 7.3–7.4)
PLATELET # BLD AUTO: 157 THOUSANDS/UL (ref 149–390)
PMV BLD AUTO: 10.3 FL (ref 8.9–12.7)
PO2 BLDV: 22.1 MM HG (ref 35–45)
POTASSIUM SERPL-SCNC: 4.2 MMOL/L (ref 3.5–5.3)
PROT SERPL-MCNC: 6.7 G/DL (ref 6.4–8.4)
RBC # BLD AUTO: 4.23 MILLION/UL (ref 3.88–5.62)
SODIUM SERPL-SCNC: 138 MMOL/L (ref 135–147)
WBC # BLD AUTO: 7.33 THOUSAND/UL (ref 4.31–10.16)

## 2023-07-11 PROCEDURE — 93005 ELECTROCARDIOGRAM TRACING: CPT

## 2023-07-11 PROCEDURE — G1004 CDSM NDSC: HCPCS

## 2023-07-11 PROCEDURE — 72125 CT NECK SPINE W/O DYE: CPT

## 2023-07-11 PROCEDURE — 99284 EMERGENCY DEPT VISIT MOD MDM: CPT | Performed by: EMERGENCY MEDICINE

## 2023-07-11 PROCEDURE — 70450 CT HEAD/BRAIN W/O DYE: CPT

## 2023-07-11 PROCEDURE — 80053 COMPREHEN METABOLIC PANEL: CPT

## 2023-07-11 PROCEDURE — 99285 EMERGENCY DEPT VISIT HI MDM: CPT

## 2023-07-11 PROCEDURE — 82805 BLOOD GASES W/O2 SATURATION: CPT

## 2023-07-11 PROCEDURE — 85025 COMPLETE CBC W/AUTO DIFF WBC: CPT

## 2023-07-11 PROCEDURE — 36415 COLL VENOUS BLD VENIPUNCTURE: CPT

## 2023-07-11 NOTE — ED PROVIDER NOTES
Chief Complaint   Patient presents with   • Weakness - Generalized     Pt comes from [facility] with complaints of generalized weakness and lethargy. Pt denies any other symptoms. Wife reports pt has been unsteady on his feet     History of Present Illness and Review of Systems   This is a 80 y.o. male with PMH significant for frontotemporal dementia, atrial fibrillation, CKD, CAD, MELQUIADES, COPD, hyperlipidemia, hypertension, chronic nursing home resident coming in today with complaint of generalized weakness. He presents with his daughter who provides a primary history. She reports that he has been more somnolent for the last several days. She also reports that he had a questionable fall several weeks ago with a possible head strike. Denies any vomiting or diarrhea. He ambulates with a walker at baseline, has had more difficulty ambulating recently however still to tolerate this. Otherwise denies any fevers chills, increased work of breathing, seizure-like activity, unresponsiveness. Also denies any dysuria, urinary frequency, urinary incontinence. Remainder of history is limited secondary to the patient's dementia status. Past Medical, Past Surgical History:    has a past medical history of Allergic rhinitis, Anxiety, Aspiration of liquid, Asthma, Atrial fibrillation ([address]), CAD (coronary artery disease), Cancer of kidney ([address]), COPD (chronic obstructive pulmonary disease) ([address]), CPAP (continuous positive airway pressure) dependence, Dementia ([address]), Dementia ([address]), Diabetes mellitus ([address]), Diabetes mellitus, type 2 ([address]), DJD (degenerative joint disease), Hypercholesterolemia, Hyperlipidemia, Hypertension, Incisional hernia, Kidney disease, Melanoma ([address]), Obesity, Sleep apnea, and TIA (transient ischemic attack). has a past surgical history that includes Cardiac pacemaker placement; Nephrectomy (Left, 2005); pr esophagogastroduodenoscopy submucosal injection (N/A, 9/21/2016); Tonsillectomy;  Cholecystectomy; pr esophagogastroduodenoscopy submucosal injection (N/A, 2/7/2018); Colonoscopy; Rotator cuff repair; pr esophagogastroduodenoscopy submucosal injection (N/A, 4/3/2019); and Hernia repair. Allergies:      Allergies   Allergen Reactions   • Ambien [Zolpidem Tartrate] Hallucinations   • Bextra [Valdecoxib] Hives   • Dust Mite Extract Sneezing   • Lyrica [Pregabalin] Confusion   • Mold Extract [Trichophyton] Sneezing   • Pollen Extract Sneezing   • Tree Extract Other (See Comments) and Sneezing     congestion       Social and Family History:     Social History     Substance and Sexual Activity   Alcohol Use No     Social History     Tobacco Use   Smoking Status Never   Smokeless Tobacco Never     Social History     Substance and Sexual Activity   Drug Use No       Physical Examination     Vitals:    07/11/23 1851   BP: 144/70   BP Location: Left arm   Pulse: 75   Resp: 18   Temp: 99.5 °F (37.5 °C)   TempSrc: Oral   SpO2: 95%       Physical Exam: Patient is chronically ill-appearing, pleasantly demented, head is atraumatic, negative C-spine tenderness, heart sounds are regular without appreciable murmurs, lungs clear to auscultation, he has no abdominal tenderness, he does have evidence of sternotomy scar, no other surgical scars, he has 2+ pulses in all distal extremities, no evidence of peripheral edema, cranial nerves are grossly intact, no focal deficits, behaviorally appropriate, no evidence of abrasions, erythematous rashes or otherwise    Risk Stratification Tools                Orders Placed This Encounter   Procedures   • CT head without contrast   • CT cervical spine without contrast   • CBC and differential   • Comprehensive metabolic panel   • Blood gas, venous   • Insert peripheral IV   • ECG 12 lead   • ECG 12 lead       Labs:     Labs Reviewed   CBC AND DIFFERENTIAL - Abnormal       Result Value Ref Range Status    WBC 7.33  4.31 - 10.16 Thousand/uL Final    RBC 4.23  3.88 - 5.62 Million/uL Final Hemoglobin 12.4  12.0 - 17.0 g/dL Final    Hematocrit 37.1  36.5 - 49.3 % Final    MCV 88  82 - 98 fL Final    MCH 29.3  26.8 - 34.3 pg Final    MCHC 33.4  31.4 - 37.4 g/dL Final    RDW 14.3  11.6 - 15.1 % Final    MPV 10.3  8.9 - 12.7 fL Final    Platelets 676  178 - 390 Thousands/uL Final    nRBC 0  /100 WBCs Final    Neutrophils Relative 70  43 - 75 % Final    Immat GRANS % 0  0 - 2 % Final    Lymphocytes Relative 16  14 - 44 % Final    Monocytes Relative 13 (*) 4 - 12 % Final    Eosinophils Relative 1  0 - 6 % Final    Basophils Relative 0  0 - 1 % Final    Neutrophils Absolute 5.15  1.85 - 7.62 Thousands/µL Final    Immature Grans Absolute 0.01  0.00 - 0.20 Thousand/uL Final    Lymphocytes Absolute 1.14  0.60 - 4.47 Thousands/µL Final    Monocytes Absolute 0.94  0.17 - 1.22 Thousand/µL Final    Eosinophils Absolute 0.07  0.00 - 0.61 Thousand/µL Final    Basophils Absolute 0.02  0.00 - 0.10 Thousands/µL Final   COMPREHENSIVE METABOLIC PANEL - Abnormal    Sodium 138  135 - 147 mmol/L Final    Potassium 4.2  3.5 - 5.3 mmol/L Final    Chloride 104  96 - 108 mmol/L Final    CO2 28  21 - 32 mmol/L Final    ANION GAP 6  mmol/L Final    BUN 17  5 - 25 mg/dL Final    Creatinine 1.85 (*) 0.60 - 1.30 mg/dL Final    Comment: Standardized to IDMS reference method    Glucose 219 (*) 65 - 140 mg/dL Final    Comment: If the patient is fasting, the ADA then defines impaired fasting glucose as > 100 mg/dL and diabetes as > or equal to 123 mg/dL. Specimen collection should occur prior to Sulfasalazine administration due to the potential for falsely depressed results. Specimen collection should occur prior to Sulfapyridine administration due to the potential for falsely elevated results.     Calcium 9.1  8.3 - 10.1 mg/dL Final    Corrected Calcium 9.8  8.3 - 10.1 mg/dL Final    AST 16  5 - 45 U/L Final    Comment: Specimen collection should occur prior to Sulfasalazine administration due to the potential for falsely depressed results. ALT 30  12 - 78 U/L Final    Comment: Specimen collection should occur prior to Sulfasalazine and/or Sulfapyridine administration due to the potential for falsely depressed results. Alkaline Phosphatase 95  46 - 116 U/L Final    Total Protein 6.7  6.4 - 8.4 g/dL Final    Albumin 3.1 (*) 3.5 - 5.0 g/dL Final    Total Bilirubin 0.37  0.20 - 1.00 mg/dL Final    Comment: Use of this assay is not recommended for patients undergoing treatment with eltrombopag due to the potential for falsely elevated results. eGFR 31  ml/min/1.73sq m Final    Narrative:     [place] guidelines for Chronic Kidney Disease (CKD):   •  Stage 1 with normal or high GFR (GFR > 90 mL/min/1.73 square meters)  •  Stage 2 Mild CKD (GFR = 60-89 mL/min/1.73 square meters)  •  Stage 3A Moderate CKD (GFR = 45-59 mL/min/1.73 square meters)  •  Stage 3B Moderate CKD (GFR = 30-44 mL/min/1.73 square meters)  •  Stage 4 Severe CKD (GFR = 15-29 mL/min/1.73 square meters)  •  Stage 5 End Stage CKD (GFR <15 mL/min/1.73 square meters)  Note: GFR calculation is accurate only with a steady state creatinine   BLOOD GAS, VENOUS - Abnormal    pH, Louis 7.366  7.300 - 7.400 Final    pCO2, Louis 54.1 (*) 42.0 - 50.0 mm Hg Final    pO2, Louis 22.1 (*) 35.0 - 45.0 mm Hg Final    HCO3, Louis 30.3 (*) 24 - 30 mmol/L Final    Base Excess, Louis 3.8  mmol/L Final    O2 Content, Louis 7.3  ml/dL Final    O2 HGB, VENOUS 40.4 (*) 60.0 - 80.0 % Final       Imaging:     CT head without contrast   Final Result by Maikel Segovia MD (07/11 2057)      1. No acute intracranial abnormality. 2.  Chronic microangiopathic ischemic changes. Workstation performed: [id number]         CT cervical spine without contrast   Final Result by Maikel Segovia MD (07/11 2101)      No cervical spine fracture or traumatic malalignment.                   Workstation performed: [id number]                Procedures   Procedures      MDM:   Medical Decision Dionne Lawson is a 80 y.o. who presents with complaints of generalized weakness    Vital signs are unremarkable, afebrile, physical exam shows patient appears at his baseline health status    Ddx: Overall concerning for acute head injury versus acidosis versus metabolic abnormality versus uremia versus otherwise. Given his nursing home status and dementia history, further work-up is warranted. Plan: Workup will include CT head, CBC, CMP, VBG, EKG. Will monitor closely and reassess. Reassessment/Disposition: Work-up overall unremarkable for any acute abnormality. The patient did appear at his baseline health status. Participated in shared decision-making with the patient's wife. Ultimately agreed to discharge home with recommendation of close follow-up. Advised if any worsening or changes mental status should return immediately for reevaluation. She expressed understanding and had no further questions or concerns. Amount and/or Complexity of Data Reviewed  Labs: ordered. Decision-making details documented in ED Course. Radiology: ordered. Decision-making details documented in ED Course. ED Course as of 07/12/23 0001 Tue Jul 11, 2023 2023 pH, Louis: 7.366   2023 pCO2, Louis(!): 54.1   2039 Labs unremarkable so far   2039 Awaiting CT head read. 2102 CT head without contrast   2102 CT cervical spine without contrast   2102 No acute abnormality. Patient is well-appearing, presents baseline health status. Discussed with his wife shared decision making you feel safe to send home. Advised on outpatient follow-up and return precautions. Final Dispo   Final Diagnosis:  1. Generalized weakness    2.  Dementia [facility])      Time reflects when diagnosis was documented in both MDM as applicable and the Disposition within this note     Time User Action Codes Description Comment    7/11/2023  9:03 PM Tamara Warren Add [R53.1] Generalized weakness     7/11/2023  9:03 PM Becka Friedman, [address] [facility] [F03.90] Dementia [facility])       ED Disposition     ED Disposition   Discharge    Condition   Stable    Date/Time   Tue Jul 11, 2023  9:03 PM    Comment   Vivian Santillan discharge to home/self care. Follow-up Information     Follow up With Specialties Details Why Contact Info Additional Information    Tyshawn Edwards MD Internal Medicine Call   [address] [facility] [address]  [address] [facility] Emergency Department Emergency Medicine  If symptoms worsen [address]  [id number]  [facility] Emergency Department, [address], [id number] [phone]        Medications - No data to display    All details of the evaluation and treatment plan were made clear and additionally all questions and concerns were addressed while under my care. Portions of the record may have been created with voice recognition software. Occasional wrong word or "sound a like" substitutions may have occurred due to the inherent limitations of voice recognition software. Read the chart carefully and recognize, using context, where substitutions have occurred. The attending physician physically available and evaluated the above patient alongside myself.         Alejandro Marroquin MD  07/12/23 0001

## 2023-07-11 NOTE — ED ATTENDING ATTESTATION
7/11/2023  IJimmy DO, saw and evaluated the patient. I have discussed the patient with the resident/non-physician practitioner and agree with the resident's/non-physician practitioner's findings, Plan of Care, and MDM as documented in the resident's/non-physician practitioner's note, except where noted. All available labs and Radiology studies were reviewed. I was present for key portions of any procedure(s) performed by the resident/non-physician practitioner and I was immediately available to provide assistance. At this point I agree with the current assessment done in the Emergency Department. I have conducted an independent evaluation of this patient a history and physical is as follows:    Patient is an 22-year-old male with a history of mild dementia, hypertension, paroxysmal atrial fibrillation, accompanied by his wife. She says they moved into [facility] about 3 months ago, and the patient is been in his usual state of health, normally slightly confused as far as recent events since time due to his dementia, ambulating with a walker but in the last 2 days he has been seeming more tired and sleepy. She has not noticed any slurred speech, facial droop or focal weakness. He did fall 3 to 4 weeks ago, unsure of a head strike. Patient was seen in the [facility] apartment on June 17 for episode of agitation, head CT was unremarkable, discharged back to the facility. Patient himself has no headache, no chest pain, no shortness of breath, no fever, no chills, no dysuria, no hematuria, no pain anywhere, says he feels okay.     General:  Patient is well-appearing  Head:  Atraumatic  Eyes:  Conjunctiva pink, Extraocular muscle intact, no periorbital ecchymosis, PERRL  ENT:  Mucous membranes are moist, no dental malocclusion, no craniofacial instability, no Villanueva signs  Neck:  No midline tenderness or step-offs or deformities  Cardiac:  S1-S2, without murmurs, no chest wall tenderness  Lungs:  Clear to auscultation bilaterally  Abdomen:  Soft, nontender, normal bowel sounds, no CVA tenderness, no tympany, no rigidity, no guarding  Extremities:  No bony tenderness to the bilateral bilateral humeral heads, humerus, elbows, radius, ulna, hands, hips, femurs, knees, tibia, fibula, feet. No pain with passive range of motion at the bilateral shoulders, elbows, wrists, hips, knees, or ankles. back: No midline thoracic, lumbar, sacral tenderness, deformities, or step-offs. Neurologic:  Awake, fluent speech, oriented to person, place, strength is 5 out of 5 in the bilateral arms and legs, cranial nerves II through XII are intact, no facial droop. Normal sensation throughout the whole body.   No pronator drift or deficit on finger-nose testing  Skin:  Pink warm and dry  Psychiatric:  Alert, pleasant, cooperative      ED Course  ED Course as of 07/11/23 2209 Tue Jul 11, 2023 2006 ECG interpreted by me, sinus rhythm, rate of 62, first-degree block, no acute ischemic or infarctive changes, no change from May 25, 2023     Labs Reviewed   CBC AND DIFFERENTIAL - Abnormal       Result Value Ref Range Status    WBC 7.33  4.31 - 10.16 Thousand/uL Final    RBC 4.23  3.88 - 5.62 Million/uL Final    Hemoglobin 12.4  12.0 - 17.0 g/dL Final    Hematocrit 37.1  36.5 - 49.3 % Final    MCV 88  82 - 98 fL Final    MCH 29.3  26.8 - 34.3 pg Final    MCHC 33.4  31.4 - 37.4 g/dL Final    RDW 14.3  11.6 - 15.1 % Final    MPV 10.3  8.9 - 12.7 fL Final    Platelets 454  900 - 390 Thousands/uL Final    nRBC 0  /100 WBCs Final    Neutrophils Relative 70  43 - 75 % Final    Immat GRANS % 0  0 - 2 % Final    Lymphocytes Relative 16  14 - 44 % Final    Monocytes Relative 13 (*) 4 - 12 % Final    Eosinophils Relative 1  0 - 6 % Final    Basophils Relative 0  0 - 1 % Final    Neutrophils Absolute 5.15  1.85 - 7.62 Thousands/µL Final    Immature Grans Absolute 0.01  0.00 - 0.20 Thousand/uL Final    Lymphocytes Absolute 1. 14  0.60 - 4.47 Thousands/µL Final    Monocytes Absolute 0.94  0.17 - 1.22 Thousand/µL Final    Eosinophils Absolute 0.07  0.00 - 0.61 Thousand/µL Final    Basophils Absolute 0.02  0.00 - 0.10 Thousands/µL Final   COMPREHENSIVE METABOLIC PANEL - Abnormal    Sodium 138  135 - 147 mmol/L Final    Potassium 4.2  3.5 - 5.3 mmol/L Final    Chloride 104  96 - 108 mmol/L Final    CO2 28  21 - 32 mmol/L Final    ANION GAP 6  mmol/L Final    BUN 17  5 - 25 mg/dL Final    Creatinine 1.85 (*) 0.60 - 1.30 mg/dL Final    Comment: Standardized to IDMS reference method    Glucose 219 (*) 65 - 140 mg/dL Final    Comment: If the patient is fasting, the ADA then defines impaired fasting glucose as > 100 mg/dL and diabetes as > or equal to 123 mg/dL. Specimen collection should occur prior to Sulfasalazine administration due to the potential for falsely depressed results. Specimen collection should occur prior to Sulfapyridine administration due to the potential for falsely elevated results. Calcium 9.1  8.3 - 10.1 mg/dL Final    Corrected Calcium 9.8  8.3 - 10.1 mg/dL Final    AST 16  5 - 45 U/L Final    Comment: Specimen collection should occur prior to Sulfasalazine administration due to the potential for falsely depressed results. ALT 30  12 - 78 U/L Final    Comment: Specimen collection should occur prior to Sulfasalazine and/or Sulfapyridine administration due to the potential for falsely depressed results. Alkaline Phosphatase 95  46 - 116 U/L Final    Total Protein 6.7  6.4 - 8.4 g/dL Final    Albumin 3.1 (*) 3.5 - 5.0 g/dL Final    Total Bilirubin 0.37  0.20 - 1.00 mg/dL Final    Comment: Use of this assay is not recommended for patients undergoing treatment with eltrombopag due to the potential for falsely elevated results.     eGFR 31  ml/min/1.73sq m Final    Narrative:     [place] guidelines for Chronic Kidney Disease (CKD):   •  Stage 1 with normal or high GFR (GFR > 90 mL/min/1.73 square meters)  •  Stage 2 Mild CKD (GFR = 60-89 mL/min/1.73 square meters)  •  Stage 3A Moderate CKD (GFR = 45-59 mL/min/1.73 square meters)  •  Stage 3B Moderate CKD (GFR = 30-44 mL/min/1.73 square meters)  •  Stage 4 Severe CKD (GFR = 15-29 mL/min/1.73 square meters)  •  Stage 5 End Stage CKD (GFR <15 mL/min/1.73 square meters)  Note: GFR calculation is accurate only with a steady state creatinine   BLOOD GAS, VENOUS - Abnormal    pH, Louis 7.366  7.300 - 7.400 Final    pCO2, Louis 54.1 (*) 42.0 - 50.0 mm Hg Final    pO2, Louis 22.1 (*) 35.0 - 45.0 mm Hg Final    HCO3, Louis 30.3 (*) 24 - 30 mmol/L Final    Base Excess, Louis 3.8  mmol/L Final    O2 Content, Louis 7.3  ml/dL Final    O2 HGB, VENOUS 40.4 (*) 60.0 - 80.0 % Final       CT head without contrast   Final Result      1. No acute intracranial abnormality. 2.  Chronic microangiopathic ischemic changes. Workstation performed: [id number]         CT cervical spine without contrast   Final Result      No cervical spine fracture or traumatic malalignment. Workstation performed: [id number]           On reassessment there is no change in the above findings. At this point the cause the patient symptoms is unclear but unlikely to represent an acute emergency. Work-up shows no acute pathology. May be an exacerbation of his underlying dementia. Wife feels comfortable taking the patient home. While the cause of the patient's complaints is most likely benign, it is possible that this is the early presentation of a more serious condition. This diagnostic uncertainty was discussed with the wife, as was the importance of follow up care, as well as the need to return to immediately return to the closest emergency department for the signs/symptoms in the discharge instruction sheets, or they were otherwise concerned about their medical condition.  The wife stated they were aware of this diagnostic uncertainty, understood the importance of follow up and were comfortable being discharged. Supportive care, importance of follow-up and return precautions were discussed with wife, who expressed understanding. DIAGNOSIS:  Chronic dementia, chronic renal insufficiency, acute increased tiredness    MEDICAL DECISION MAKING CODING    The differential diagnosis before testing included (but is not limited to) exacerbation of underlying dementia, hyponatremia, hypoglycemia, significant metabolic abnormality, CO2 narcosis and acute intracranial hemorrhage which is a medical condition that poses a threat to life/function. Patient presents with acute new problem with:  Threat to life or bodily function      Chronic conditions affecting care: As per HPI    COLLECTION AND INTERPRETATION OF DATA  Additional history obtained from: Wife  I reviewed prior external notes, including June 17, 2023 head CT as noted above    I ordered each unique test  Tests reviewed personally by me:  ECG: See my ED course  Labs: See above  Imaging: I independently reviewed the head CT and found no acute pathology. RISK  All of the patient's current prescription medications should be continued.     Treatment:  Consideration of admission: Discussed with wife, she feels comfortable taking the patient home    Surgery  -I considered surgery may be necessary prior to completion of the work up but afterwards there is no indication for immediate surgery    Social Determinants of Health:  Presentation to ED outside of business hours or on night shift          Critical Care Time  Procedures

## 2023-07-12 ENCOUNTER — HOSPITAL ENCOUNTER (INPATIENT)
Facility: HOSPITAL | Age: 88
LOS: 1 days | Discharge: NURSING, REHAB OR LONG-TERM CARE | DRG: 206 | End: 2023-07-13
Attending: EMERGENCY MEDICINE | Admitting: INTERNAL MEDICINE
Payer: COMMERCIAL

## 2023-07-12 ENCOUNTER — APPOINTMENT (EMERGENCY)
Dept: RADIOLOGY | Facility: HOSPITAL | Age: 88
DRG: 206 | End: 2023-07-12
Payer: COMMERCIAL

## 2023-07-12 DIAGNOSIS — R93.5 ABNORMAL CT OF THE ABDOMEN: ICD-10-CM

## 2023-07-12 DIAGNOSIS — R26.2 AMBULATORY DYSFUNCTION: Primary | ICD-10-CM

## 2023-07-12 DIAGNOSIS — R53.83 FATIGUE: ICD-10-CM

## 2023-07-12 DIAGNOSIS — R53.1 WEAKNESS: ICD-10-CM

## 2023-07-12 LAB
2HR DELTA HS TROPONIN: 0 NG/L
4HR DELTA HS TROPONIN: 1 NG/L
ALBUMIN SERPL BCP-MCNC: 3.1 G/DL (ref 3.5–5)
ALP SERPL-CCNC: 91 U/L (ref 46–116)
ALT SERPL W P-5'-P-CCNC: 28 U/L (ref 12–78)
ANION GAP SERPL CALCULATED.3IONS-SCNC: 4 MMOL/L
AST SERPL W P-5'-P-CCNC: 10 U/L (ref 5–45)
ATRIAL RATE: 61 BPM
ATRIAL RATE: 62 BPM
BACTERIA UR QL AUTO: ABNORMAL /HPF
BASOPHILS # BLD AUTO: 0.02 THOUSANDS/ÂΜL (ref 0–0.1)
BASOPHILS NFR BLD AUTO: 0 % (ref 0–1)
BILIRUB SERPL-MCNC: 0.57 MG/DL (ref 0.2–1)
BILIRUB UR QL STRIP: NEGATIVE
BUN SERPL-MCNC: 18 MG/DL (ref 5–25)
CALCIUM ALBUM COR SERPL-MCNC: 9.7 MG/DL (ref 8.3–10.1)
CALCIUM SERPL-MCNC: 9 MG/DL (ref 8.3–10.1)
CARDIAC TROPONIN I PNL SERPL HS: 7 NG/L
CARDIAC TROPONIN I PNL SERPL HS: 7 NG/L
CARDIAC TROPONIN I PNL SERPL HS: 8 NG/L
CHLORIDE SERPL-SCNC: 103 MMOL/L (ref 96–108)
CLARITY UR: CLEAR
CO2 SERPL-SCNC: 28 MMOL/L (ref 21–32)
COLOR UR: ABNORMAL
CREAT SERPL-MCNC: 1.79 MG/DL (ref 0.6–1.3)
EOSINOPHIL # BLD AUTO: 0.02 THOUSAND/ÂΜL (ref 0–0.61)
EOSINOPHIL NFR BLD AUTO: 0 % (ref 0–6)
ERYTHROCYTE [DISTWIDTH] IN BLOOD BY AUTOMATED COUNT: 14.1 % (ref 11.6–15.1)
GFR SERPL CREATININE-BSD FRML MDRD: 33 ML/MIN/1.73SQ M
GLUCOSE SERPL-MCNC: 122 MG/DL (ref 65–140)
GLUCOSE SERPL-MCNC: 189 MG/DL (ref 65–140)
GLUCOSE UR STRIP-MCNC: ABNORMAL MG/DL
HCT VFR BLD AUTO: 37.9 % (ref 36.5–49.3)
HGB BLD-MCNC: 12.7 G/DL (ref 12–17)
HGB UR QL STRIP.AUTO: ABNORMAL
IMM GRANULOCYTES # BLD AUTO: 0.03 THOUSAND/UL (ref 0–0.2)
IMM GRANULOCYTES NFR BLD AUTO: 0 % (ref 0–2)
KETONES UR STRIP-MCNC: NEGATIVE MG/DL
LEUKOCYTE ESTERASE UR QL STRIP: NEGATIVE
LIPASE SERPL-CCNC: 148 U/L (ref 73–393)
LYMPHOCYTES # BLD AUTO: 1.51 THOUSANDS/ÂΜL (ref 0.6–4.47)
LYMPHOCYTES NFR BLD AUTO: 19 % (ref 14–44)
MCH RBC QN AUTO: 29.3 PG (ref 26.8–34.3)
MCHC RBC AUTO-ENTMCNC: 33.5 G/DL (ref 31.4–37.4)
MCV RBC AUTO: 88 FL (ref 82–98)
MONOCYTES # BLD AUTO: 1.04 THOUSAND/ÂΜL (ref 0.17–1.22)
MONOCYTES NFR BLD AUTO: 13 % (ref 4–12)
MUCOUS THREADS UR QL AUTO: ABNORMAL
NEUTROPHILS # BLD AUTO: 5.27 THOUSANDS/ÂΜL (ref 1.85–7.62)
NEUTS SEG NFR BLD AUTO: 68 % (ref 43–75)
NITRITE UR QL STRIP: NEGATIVE
NON-SQ EPI CELLS URNS QL MICRO: ABNORMAL /HPF
NRBC BLD AUTO-RTO: 0 /100 WBCS
P AXIS: 36 DEGREES
P AXIS: 39 DEGREES
PH UR STRIP.AUTO: 6 [PH]
PLATELET # BLD AUTO: 152 THOUSANDS/UL (ref 149–390)
PMV BLD AUTO: 10.4 FL (ref 8.9–12.7)
POTASSIUM SERPL-SCNC: 4 MMOL/L (ref 3.5–5.3)
PR INTERVAL: 206 MS
PR INTERVAL: 212 MS
PROT SERPL-MCNC: 7 G/DL (ref 6.4–8.4)
PROT UR STRIP-MCNC: ABNORMAL MG/DL
QRS AXIS: 48 DEGREES
QRS AXIS: 59 DEGREES
QRSD INTERVAL: 94 MS
QRSD INTERVAL: 98 MS
QT INTERVAL: 402 MS
QT INTERVAL: 410 MS
QTC INTERVAL: 408 MS
QTC INTERVAL: 412 MS
RBC # BLD AUTO: 4.33 MILLION/UL (ref 3.88–5.62)
RBC #/AREA URNS AUTO: ABNORMAL /HPF
SODIUM SERPL-SCNC: 135 MMOL/L (ref 135–147)
SP GR UR STRIP.AUTO: 1.01 (ref 1–1.03)
T WAVE AXIS: 40 DEGREES
T WAVE AXIS: 55 DEGREES
UROBILINOGEN UR STRIP-ACNC: <2 MG/DL
VENTRICULAR RATE: 61 BPM
VENTRICULAR RATE: 62 BPM
WBC # BLD AUTO: 7.89 THOUSAND/UL (ref 4.31–10.16)
WBC #/AREA URNS AUTO: ABNORMAL /HPF

## 2023-07-12 PROCEDURE — 82948 REAGENT STRIP/BLOOD GLUCOSE: CPT

## 2023-07-12 PROCEDURE — 81001 URINALYSIS AUTO W/SCOPE: CPT

## 2023-07-12 PROCEDURE — G1004 CDSM NDSC: HCPCS

## 2023-07-12 PROCEDURE — 85025 COMPLETE CBC W/AUTO DIFF WBC: CPT

## 2023-07-12 PROCEDURE — 84484 ASSAY OF TROPONIN QUANT: CPT | Performed by: STUDENT IN AN ORGANIZED HEALTH CARE EDUCATION/TRAINING PROGRAM

## 2023-07-12 PROCEDURE — 36415 COLL VENOUS BLD VENIPUNCTURE: CPT

## 2023-07-12 PROCEDURE — 84484 ASSAY OF TROPONIN QUANT: CPT

## 2023-07-12 PROCEDURE — 99285 EMERGENCY DEPT VISIT HI MDM: CPT | Performed by: EMERGENCY MEDICINE

## 2023-07-12 PROCEDURE — 99285 EMERGENCY DEPT VISIT HI MDM: CPT

## 2023-07-12 PROCEDURE — 80053 COMPREHEN METABOLIC PANEL: CPT

## 2023-07-12 PROCEDURE — 93010 ELECTROCARDIOGRAM REPORT: CPT | Performed by: INTERNAL MEDICINE

## 2023-07-12 PROCEDURE — 99223 1ST HOSP IP/OBS HIGH 75: CPT | Performed by: INTERNAL MEDICINE

## 2023-07-12 PROCEDURE — 96360 HYDRATION IV INFUSION INIT: CPT

## 2023-07-12 PROCEDURE — 96361 HYDRATE IV INFUSION ADD-ON: CPT

## 2023-07-12 PROCEDURE — 74177 CT ABD & PELVIS W/CONTRAST: CPT

## 2023-07-12 PROCEDURE — 83690 ASSAY OF LIPASE: CPT

## 2023-07-12 PROCEDURE — 93005 ELECTROCARDIOGRAM TRACING: CPT

## 2023-07-12 RX ORDER — ARIPIPRAZOLE 5 MG/1
5 TABLET ORAL DAILY
Status: DISCONTINUED | OUTPATIENT
Start: 2023-07-13 | End: 2023-07-13 | Stop reason: HOSPADM

## 2023-07-12 RX ORDER — ATORVASTATIN CALCIUM 10 MG/1
10 TABLET, FILM COATED ORAL DAILY
Status: DISCONTINUED | OUTPATIENT
Start: 2023-07-13 | End: 2023-07-13 | Stop reason: HOSPADM

## 2023-07-12 RX ORDER — METOPROLOL TARTRATE 50 MG/1
50 TABLET, FILM COATED ORAL EVERY 12 HOURS SCHEDULED
Status: DISCONTINUED | OUTPATIENT
Start: 2023-07-12 | End: 2023-07-13 | Stop reason: HOSPADM

## 2023-07-12 RX ORDER — DONEPEZIL HYDROCHLORIDE 10 MG/1
10 TABLET, FILM COATED ORAL
Status: DISCONTINUED | OUTPATIENT
Start: 2023-07-12 | End: 2023-07-13 | Stop reason: HOSPADM

## 2023-07-12 RX ORDER — INSULIN LISPRO 100 [IU]/ML
1-6 INJECTION, SOLUTION INTRAVENOUS; SUBCUTANEOUS
Status: DISCONTINUED | OUTPATIENT
Start: 2023-07-12 | End: 2023-07-13 | Stop reason: HOSPADM

## 2023-07-12 RX ORDER — SERTRALINE HYDROCHLORIDE 100 MG/1
100 TABLET, FILM COATED ORAL DAILY
Status: DISCONTINUED | OUTPATIENT
Start: 2023-07-13 | End: 2023-07-13 | Stop reason: HOSPADM

## 2023-07-12 RX ORDER — ENOXAPARIN SODIUM 100 MG/ML
40 INJECTION SUBCUTANEOUS DAILY
Status: DISCONTINUED | OUTPATIENT
Start: 2023-07-13 | End: 2023-07-13 | Stop reason: HOSPADM

## 2023-07-12 RX ORDER — FERROUS SULFATE 325(65) MG
325 TABLET ORAL
Status: DISCONTINUED | OUTPATIENT
Start: 2023-07-13 | End: 2023-07-13 | Stop reason: HOSPADM

## 2023-07-12 RX ORDER — MELATONIN
2000 DAILY
Status: DISCONTINUED | OUTPATIENT
Start: 2023-07-13 | End: 2023-07-13 | Stop reason: HOSPADM

## 2023-07-12 RX ORDER — PROPAFENONE HYDROCHLORIDE 150 MG/1
225 TABLET, COATED ORAL 3 TIMES DAILY
Status: DISCONTINUED | OUTPATIENT
Start: 2023-07-12 | End: 2023-07-13 | Stop reason: HOSPADM

## 2023-07-12 RX ORDER — SENNOSIDES 8.6 MG
8.6 TABLET ORAL
Status: DISCONTINUED | OUTPATIENT
Start: 2023-07-12 | End: 2023-07-13 | Stop reason: HOSPADM

## 2023-07-12 RX ORDER — DOCUSATE SODIUM 100 MG/1
100 CAPSULE, LIQUID FILLED ORAL 2 TIMES DAILY
Status: DISCONTINUED | OUTPATIENT
Start: 2023-07-12 | End: 2023-07-13 | Stop reason: HOSPADM

## 2023-07-12 RX ADMIN — IOHEXOL 85 ML: 350 INJECTION, SOLUTION INTRAVENOUS at 16:04

## 2023-07-12 RX ADMIN — SODIUM CHLORIDE 1000 ML: 0.9 INJECTION, SOLUTION INTRAVENOUS at 13:59

## 2023-07-12 NOTE — ED ATTENDING ATTESTATION
7/12/2023  Sj WESTON, DO, saw and evaluated the patient. I have discussed the patient with the resident/non-physician practitioner and agree with the resident's/non-physician practitioner's findings, Plan of Care, and MDM as documented in the resident's/non-physician practitioner's note, except where noted. All available labs and Radiology studies were reviewed. I was present for key portions of any procedure(s) performed by the resident/non-physician practitioner and I was immediately available to provide assistance. At this point I agree with the current assessment done in the Emergency Department. I have conducted an independent evaluation of this patient a history and physical is as follows:    General:  Patient is well-appearing  Head:  Atraumatic  Eyes:  Conjunctiva pink, Extraocular muscle intact, PERRL  ENT:  Mucous membranes are moist  Neck:  Supple  Cardiac:  S1-S2, without murmurs  Lungs:  Clear to auscultation bilaterally  Abdomen:  Soft, slight diffuse abdominal tenderness,, normal bowel sounds, no CVA tenderness, no tympany, no rigidity, no guarding  Extremities:  Normal range of motion  Neurologic:  Awake, fluent speech, normal comprehension. Oriented to person and place cranial nerves 2-12 are intact, strength is 5/5 in the bilateral upper & lower extremities, no slurred speech, no facial droop, no deficit on finger-to-nose testing, no pronator drift. Sensation to light touch is equal and symmetric throughout the whole body  Skin:  Pink warm and dry, no rash  Psychiatric:  Alert, pleasant, cooperative        ED Course  ED Course as of 07/12/23 1356   Wed Jul 12, 2023   1353 ECG interpreted by me, sinus rhythm, rate of 61, first-degree block, no acute ischemic or infarctive changes, no change from July 11, 2023     CT abdomen pelvis with contrast   Final Result      1. Age-indeterminate, possibly acute fracture left anterolateral 11th rib.       2. No acute intra-abdominal abnormality. 3. Small and large bowel diverticulosis without evidence of diverticulitis. 4. Numerous right renal lesions most of which are likely cysts although there are indeterminate lesions redemonstrated. Again surveillance ultrasound and/or more definitive characterization with nonemergent CT renal mass protocol may be helpful to    exclude neoplasm, assuming the pacemaker precludes MRI evaluation. Otherwise MRI would be the modality of choice. 5. Prostatomegaly. 6. Stable distal esophageal thickening without focal mass. 7. Mild splenomegaly. Additional incidental findings as above. The study was marked in [facility] for immediate notification. Workstation performed: [id number]             Labs Reviewed   COMPREHENSIVE METABOLIC PANEL - Abnormal       Result Value Ref Range Status    Sodium 135  135 - 147 mmol/L Final    Potassium 4.0  3.5 - 5.3 mmol/L Final    Chloride 103  96 - 108 mmol/L Final    CO2 28  21 - 32 mmol/L Final    ANION GAP 4  mmol/L Final    BUN 18  5 - 25 mg/dL Final    Creatinine 1.79 (*) 0.60 - 1.30 mg/dL Final    Comment: Standardized to IDMS reference method    Glucose 189 (*) 65 - 140 mg/dL Final    Comment: If the patient is fasting, the ADA then defines impaired fasting glucose as > 100 mg/dL and diabetes as > or equal to 123 mg/dL. Specimen collection should occur prior to Sulfasalazine administration due to the potential for falsely depressed results. Specimen collection should occur prior to Sulfapyridine administration due to the potential for falsely elevated results. Calcium 9.0  8.3 - 10.1 mg/dL Final    Corrected Calcium 9.7  8.3 - 10.1 mg/dL Final    AST 10  5 - 45 U/L Final    Comment: Specimen collection should occur prior to Sulfasalazine administration due to the potential for falsely depressed results.      ALT 28  12 - 78 U/L Final    Comment: Specimen collection should occur prior to Sulfasalazine and/or Sulfapyridine administration due to the potential for falsely depressed results. Alkaline Phosphatase 91  46 - 116 U/L Final    Total Protein 7.0  6.4 - 8.4 g/dL Final    Albumin 3.1 (*) 3.5 - 5.0 g/dL Final    Total Bilirubin 0.57  0.20 - 1.00 mg/dL Final    Comment: Use of this assay is not recommended for patients undergoing treatment with eltrombopag due to the potential for falsely elevated results.     eGFR 33  ml/min/1.73sq m Final    Narrative:     [employer] Kid[employer] guidelines for Chronic Kidney Disease (CKD):   •  Stage 1 with normal or high GFR (GFR > 90 mL/min/1.73 square meters)  •  Stage 2 Mild CKD (GFR = 60-89 mL/min/1.73 square meters)  •  Stage 3A Moderate CKD (GFR = 45-59 mL/min/1.73 square meters)  •  Stage 3B Moderate CKD (GFR = 30-44 mL/min/1.73 square meters)  •  Stage 4 Severe CKD (GFR = 15-29 mL/min/1.73 square meters)  •  Stage 5 End Stage CKD (GFR <15 mL/min/1.73 square meters)  Note: GFR calculation is accurate only with a steady state creatinine   CBC AND DIFFERENTIAL - Abnormal    WBC 7.89  4.31 - 10.16 Thousand/uL Final    RBC 4.33  3.88 - 5.62 Million/uL Final    Hemoglobin 12.7  12.0 - 17.0 g/dL Final    Hematocrit 37.9  36.5 - 49.3 % Final    MCV 88  82 - 98 fL Final    MCH 29.3  26.8 - 34.3 pg Final    MCHC 33.5  31.4 - 37.4 g/dL Final    RDW 14.1  11.6 - 15.1 % Final    MPV 10.4  8.9 - 12.7 fL Final    Platelets 771  067 - 390 Thousands/uL Final    nRBC 0  /100 WBCs Final    Neutrophils Relative 68  43 - 75 % Final    Immat GRANS % 0  0 - 2 % Final    Lymphocytes Relative 19  14 - 44 % Final    Monocytes Relative 13 (*) 4 - 12 % Final    Eosinophils Relative 0  0 - 6 % Final    Basophils Relative 0  0 - 1 % Final    Neutrophils Absolute 5.27  1.85 - 7.62 Thousands/µL Final    Immature Grans Absolute 0.03  0.00 - 0.20 Thousand/uL Final    Lymphocytes Absolute 1.51  0.60 - 4.47 Thousands/µL Final    Monocytes Absolute 1.04  0.17 - 1.22 Thousand/µL Final    Eosinophils Absolute 0. 02  0.00 - 0.61 Thousand/µL Final    Basophils Absolute 0.02  0.00 - 0.10 Thousands/µL Final   UA W REFLEX TO MICROSCOPIC WITH REFLEX TO CULTURE - Abnormal    Color, UA Light Yellow   Final    Clarity, UA Clear   Final    Specific Gravity, UA 1.014  1.003 - 1.030 Final    pH, UA 6.0  4.5, 5.0, 5.5, 6.0, 6.5, 7.0, 7.5, 8.0 Final    Leukocytes, UA Negative  Negative Final    Nitrite, UA Negative  Negative Final    Protein, UA 70 (1+) (*) Negative mg/dl Final    Glucose, UA Trace (*) Negative mg/dl Final    Ketones, UA Negative  Negative mg/dl Final    Urobilinogen, UA <2.0  <2.0 mg/dl mg/dl Final    Bilirubin, UA Negative  Negative Final    Occult Blood, UA Trace (*) Negative Final   URINE MICROSCOPIC - Abnormal    RBC, UA 1-2  None Seen, 1-2 /hpf Final    WBC, UA 1-2  None Seen, 1-2 /hpf Final    Epithelial Cells Occasional  None Seen, Occasional /hpf Final    Bacteria, UA None Seen  None Seen, Occasional /hpf Final    MUCUS THREADS Occasional (*) None Seen Final   LIPASE - Normal    Lipase 148  73 - 393 u/L Final   HS TROPONIN I 0HR - Normal    hs TnI 0hr 7  "Refer to ACS Flowchart"- see link ng/L Final    Comment:                                              Initial (time 0) result  If >=50 ng/L, Myocardial injury suggested ;  Type of myocardial injury and treatment strategy  to be determined. If 5-49 ng/L, a delta result at 2 hours and or 4 hours will be needed to further evaluate. If <4 ng/L, and chest pain has been >3 hours since onset, patient may qualify for discharge based on the HEART score in the ED. If <5 ng/L and <3hours since onset of chest pain, a delta result at 2 hours will be needed to further evaluate. HS Troponin 99th Percentile URL of a Health Population=12 ng/L with a 95% Confidence Interval of 8-18 ng/L. Second Troponin (time 2 hours)  If calculated delta >= 20 ng/L,  Myocardial injury suggested ; Type of myocardial injury and treatment strategy to be determined.   If 5-49 ng/L and the calculated delta is 5-19 ng/L, consult medical service for evaluation. Continue evaluation for ischemia on ecg and other possible etiology and repeat hs troponin at 4 hours. If delta is <5 ng/L at 2 hours, consider discharge based on risk stratification via the HEART score (if in ED), or DESTINEE risk score in IP/Observation. HS Troponin 99th Percentile URL of a Health Population=12 ng/L with a 95% Confidence Interval of 8-18 ng/L.   HS TROPONIN I 2HR - Normal    hs TnI 2hr 7  "Refer to ACS Flowchart"- see link ng/L Final    Comment:                                              Initial (time 0) result  If >=50 ng/L, Myocardial injury suggested ;  Type of myocardial injury and treatment strategy  to be determined. If 5-49 ng/L, a delta result at 2 hours and or 4 hours will be needed to further evaluate. If <4 ng/L, and chest pain has been >3 hours since onset, patient may qualify for discharge based on the HEART score in the ED. If <5 ng/L and <3hours since onset of chest pain, a delta result at 2 hours will be needed to further evaluate. HS Troponin 99th Percentile URL of a Health Population=12 ng/L with a 95% Confidence Interval of 8-18 ng/L. Second Troponin (time 2 hours)  If calculated delta >= 20 ng/L,  Myocardial injury suggested ; Type of myocardial injury and treatment strategy to be determined. If 5-49 ng/L and the calculated delta is 5-19 ng/L, consult medical service for evaluation. Continue evaluation for ischemia on ecg and other possible etiology and repeat hs troponin at 4 hours. If delta is <5 ng/L at 2 hours, consider discharge based on risk stratification via the HEART score (if in ED), or DESTINEE risk score in IP/Observation. HS Troponin 99th Percentile URL of a Health Population=12 ng/L with a 95% Confidence Interval of 8-18 ng/L.     Delta 2hr hsTnI 0  <20 ng/L Final   POCT GLUCOSE - Normal    POC Glucose 122  65 - 140 mg/dl Final   HS TROPONIN I 4HR   PLATELET COUNT       MEDICAL DECISION MAKING CODING    The differential diagnosis before testing included (but is not limited to) acute anemia, acute metabolic abnormality, acute generalized weakness, exacerbation of chronic dementia, acute coronary syndrome and acute dysrhythmia which is a medical condition that poses a threat to life/function. Patient presents with acute new problem with:  Threat to life or bodily function      Chronic conditions affecting care:  As per HPI    COLLECTION AND INTERPRETATION OF DATA  Additional history obtained from: Wife  I reviewed prior external notes, including laboratory studies, imaging and ECG from July 11, 2023    I ordered each unique test  Tests reviewed personally by me:  ECG: See my ED course  Labs: See above  Imaging: I independently reviewed the abdomen and pelvis CT and found no acute intra-abdominal pathology    Discussion with other providers: Admitting medicine physician    RISK    Treatment:  Patient admitted    Surgery  -I considered surgery may be necessary prior to completion of the work up but afterwards there is no indication for immediate surgery    Critical Care Time  Procedures

## 2023-07-12 NOTE — ASSESSMENT & PLAN NOTE
Lab Results   Component Value Date    HGBA1C 8.0 (H) 05/25/2023       Recent Labs     07/12/23  1841   POCGLU 122       Blood Sugar Average: Last 72 hrs:  (P) 122   Home hypoglycemic regimen includes:  • Levemir 10 units twice daily    • Will start Levemir 5 units BID with sliding scale   • 4 times daily Accu-Cheks  • Diabetic diet  • Hypoglycemia protocol

## 2023-07-12 NOTE — DISCHARGE INSTRUCTIONS
Your workup here was not concerning for anything dangerous. Therefore there is no need for you to stay at the hospital for further testing. We feel safe to send you home. You should follow up with your PCP to assess for resolution of your symptoms and to determine if there is any further evaluation that needs to be performed. Return to the emergency department if you have any symptoms of worsening lethargy or inability to eat or drink    Thank you for choosing [address] [place] [address] for your care!

## 2023-07-12 NOTE — ASSESSMENT & PLAN NOTE
Patient follows with cardiologist in the outpatient setting. Per cardiology note from 2/14/2023, warfarin has been discontinued given history of bruising/bleeding and frequent falls, will consider restarting    • Continue metoprolol tartrate 50 mg BID and propafenone 225mg tid.

## 2023-07-12 NOTE — ASSESSMENT & PLAN NOTE
Known hx of dementia with baseline alert and oriented x1-2; memantine unknown frequency because facility medication list not here     Plan:  • Continue donepezil 10 mg at bedtime and Zoloft 100 mg daily;   • Would recommend revaluation donepezil utility in Mr. Clarke's case.   • He is also on abilify 5 mg

## 2023-07-12 NOTE — H&P
INTERNAL MEDICINE HISTORY AND PHYSICAL  ED 18 SOD Team B     NAME: Vivian Santillan  AGE: 80 y.o. SEX: male  : 1935   MRN: [id number]  ENCOUNTER: [id number]    DATE: 2023  TIME: 7:24 PM    Primary Care Physician: Tyshawn Edwards MD  Admitting Provider: Christian Ha MD    Chief complaint: difficulty walking     History of Present Illness     Vivian Santillan is a 80 y.o. male who presents from [facility] with several week hx of ambulatory dysfunction. Of note, the patient was discharged yesterday after a presentation for generalized weakness. The patient has a decade hx of dementia and is a poor historian. The patients daughter in law Александр Carpio was reached via telephone to provide additional hx. Per his daughter in law, the patient has recently been experiencing progressive lethargy and ambulatory dysfunction at [facility]. Earlier today, he was noted by the [facility] staff to have rhinorrhea, hoarse voice quality & stumbling and he was brought in by staff with concerns for a URI. The patient was not noted to have any rhinorrhea during the encounter. In the ED patient was noted to be confused and complaining of abdominal pain. Patient had presented to the ED yesterday for confusion status post fall CT head and CT C-spine were negative and patient was discharged. Today patient continues to have difficulties walking and labs were obtained. CBC CMP were unremarkable. Vitals were all within normal limits. CT abdomen and pelvis was obtained that showed possible subacute fracture of the right 11th rib and no acute intra-abdominal pathology. Patient was admitted to [place] for ambulatory dysfunction. Review of Systems   Review of Systems   Constitutional: Negative for chills and fever. AAOx2 (person, place)  GCS 14   HENT: Negative for ear pain, rhinorrhea, sore throat and voice change. Eyes: Negative for photophobia, pain and visual disturbance.         PERRL B/L   Respiratory: Negative for cough, choking and shortness of breath. Cardiovascular: Negative for chest pain, palpitations and leg swelling. Gastrointestinal: Negative for abdominal distention, abdominal pain, nausea and vomiting. Genitourinary: Negative for dysuria and hematuria. Musculoskeletal: Negative for arthralgias and back pain. Skin: Negative for color change and rash. Neurological: Positive for weakness. Negative for seizures and syncope. 4/5 B/L UEs & LEs, CN XII intact   All other systems reviewed and are negative. Past Medical History     Past Medical History:   Diagnosis Date   • Allergic rhinitis    • Anxiety    • Aspiration of liquid     and food per wife if he is eating too fast or talking when eating   • Asthma     as a child   • Atrial fibrillation ([address])    • CAD (coronary artery disease)    • Cancer of kidney ([address])    • COPD (chronic obstructive pulmonary disease) (HCC)    • CPAP (continuous positive airway pressure) dependence    • Dementia ([address])     Frontal lobe   • Dementia (HCC)    • Diabetes mellitus ([address])    • Diabetes mellitus, type 2 (HCC)    • DJD (degenerative joint disease)    • Hypercholesterolemia    • Hyperlipidemia    • Hypertension    • Incisional hernia    • Kidney disease    • Melanoma ([address])     left leg   • Obesity    • Sleep apnea     wears CPAP   • TIA (transient ischemic attack)        Past Surgical History     Past Surgical History:   Procedure Laterality Date   • CARDIAC PACEMAKER PLACEMENT     • CHOLECYSTECTOMY     • COLONOSCOPY     • HERNIA REPAIR      Incisional hernia   • NEPHRECTOMY Left 2005   • VA ESOPHAGOGASTRODUODENOSCOPY SUBMUCOSAL INJECTION N/A 9/21/2016    Procedure: ESOPHAGOGASTRODUODENOSCOPY (EGD); Surgeon: Radha Oleary MD;  Location: [facility]; Service: Gastroenterology   • VA ESOPHAGOGASTRODUODENOSCOPY SUBMUCOSAL INJECTION N/A 2/7/2018    Procedure: ESOPHAGOGASTRODUODENOSCOPY (EGD); Surgeon: Radha Oleary MD;  Location: [facility];   Service: Gastroenterology   • VA ESOPHAGOGASTRODUODENOSCOPY SUBMUCOSAL INJECTION N/A 4/3/2019    Procedure: ESOPHAGOGASTRODUODENOSCOPY (EGD) WITH BOTOX;  Surgeon: Froilan Bradshaw MD;  Location: [facility]; Service: Gastroenterology   • ROTATOR CUFF REPAIR     • TONSILLECTOMY         Social History     Social History     Substance and Sexual Activity   Alcohol Use No     Social History     Substance and Sexual Activity   Drug Use No     Social History     Tobacco Use   Smoking Status Never   Smokeless Tobacco Never       Family History     Family History   Problem Relation Age of Onset   • Brain cancer Father    • Lung cancer Father    • Diabetes Family    • Heart disease Family    • Hypertension Family    • Cancer Family         renal cell carcinoma   • Stroke Family    • Colon cancer Son        Medications Prior to Admission     Prior to Admission medications    Medication Sig Start Date End Date Taking?  Authorizing Provider   ARIPiprazole (ABILIFY) 5 mg tablet Take 1 tablet (5 mg total) by mouth daily Do not start before June 14, 2023. 6/14/23   Harris Francois MD   atorvastatin (LIPITOR) 10 mg tablet Take 1 tablet (10 mg total) by mouth daily 2/8/23   Gurinder Loera MD   cholecalciferol (VITAMIN D3) 1,000 units tablet Take 2,000 Units by mouth daily    Historical Provider, MD   docusate sodium (COLACE) 100 mg capsule Take 1 capsule (100 mg total) by mouth 2 (two) times a day 6/13/23   Harris Francois MD   donepezil (ARICEPT) 10 mg tablet Take 1 tablet (10 mg total) by mouth daily at bedtime 10/14/22   Gurinder Loera MD   ferrous sulfate 325 (65 Fe) mg tablet Take 325 mg by mouth as needed     Historical Provider, MD   insulin detemir (LEVEMIR) 100 units/mL subcutaneous injection Inject 10 Units under the skin 2 (two) times a day 6/13/23   Harris Francois MD   Insulin Pen Needle (Novofine Pen Needle) 32G X 6 MM MISC Use 4 (four) times a day 9/29/22   Gurinder Loera MD   Insulin Syringe-Needle U-100 (BD Insulin Syringe U/F) 30G X 1/2" 0.5 ML MISC Use 4 (four) times a day 8/17/22   Faustino Valencia MD   Insulin Syringe-Needle U-100 (INSULIN SYRINGE .5CC/30GX5/16") 30G X 5/16" 0.5 ML MISC 4 (four) times a day 8/18/22   Historical Provider, MD   magnesium Oxide (MAG-OX) 400 mg TABS Take 1 tablet (400 mg total) by mouth daily Do not start before June 14, 2023. 6/14/23   Sara Mcmillan MD   metoprolol tartrate (LOPRESSOR) 100 mg tablet 50mg in AM  and 100mg at bedtime 6/13/23   Sara Mcmillan MD   propafenone (RYTHMOL) 225 mg tablet Take 1 tablet (225 mg total) by mouth 3 (three) times a day 9/14/22   Faustino Valencia MD   senna (SENOKOT) 8.6 mg Take 1 tablet (8.6 mg total) by mouth daily at bedtime 6/13/23   Sara Mcmillan MD   sertraline (ZOLOFT) 100 mg tablet Take 1 tablet (100 mg total) by mouth daily 11/7/22   Faustino Valencia MD       Allergies     Allergies   Allergen Reactions   • Ambien [Zolpidem Tartrate] Hallucinations   • Bextra [Valdecoxib] Hives   • Dust Mite Extract Sneezing   • Lyrica [Pregabalin] Confusion   • Mold Extract [Trichophyton] Sneezing   • Pollen Extract Sneezing   • Tree Extract Other (See Comments) and Sneezing     congestion       Objective     Vitals:    07/12/23 1430 07/12/23 1530 07/12/23 1630 07/12/23 1800   BP: 146/69 161/73 170/75 (!) 146/109   BP Location:       Pulse: 58 58 60 58   Resp: 18 16 20 20   Temp:       TempSrc:       SpO2: 99% 98% 96% 97%     There is no height or weight on file to calculate BMI. Intake/Output Summary (Last 24 hours) at 7/12/2023 1924  Last data filed at 7/12/2023 1532  Gross per 24 hour   Intake 1000 ml   Output --   Net 1000 ml     Invasive Devices     Peripheral Intravenous Line  Duration           Peripheral IV 07/12/23 Left Antecubital <1 day                Physical Exam  GENERAL: Appears well-developed and well-nourished. Appears in no acute distress   HEENT: Normocephalic and atraumatic. No scleral icterus. PERRLA. EOMI B/L. No oropharyngeal edema.  MM moist.   NECK: Neck supple with no lymphadenopathy. Trachea midline. No JVD. CARDIOVASCULAR: S1 and S2 are present. Regular rate and rhythm. No murmurs, rubs, or gallops. RESPIRATORY: CTA B/L, no rales, rhonci or wheezes. Normal respiratory expansion. ABDOMINAL: Bowel sounds present in all 4 quadrants, non-tender, soft, non-distended. No organomegaly, rebound, or guarding. EXTREMITIES: 2+ DP and PT pulses bilaterally; no cyanosis, clubbing, edema. ROM intact. ROD x4   MUSCULOSKELETAL: No joint tenderness, deformity or swelling, full range of motion without pain. NEUROLOGIC: Patient is alert and oriented to person, place, and time. No sensory or motor deficits. CN 2-12 intact. Plantars downgoing bilaterally. Speech fluent. SKIN: Skin is warm and dry. No skin lesions are present. No rashes. PSYCHIATRIC: Normal mood and affect     Lab Results: I have personally reviewed pertinent reports. Imaging: I have personally reviewed pertinent films in PACS  CT abdomen pelvis with contrast    Result Date: 7/12/2023  Narrative: CT ABDOMEN AND PELVIS WITH IV CONTRAST INDICATION:  Epigastric abdominal pain. COMPARISON: CT abdomen pelvis 12/29/2022, renal ultrasound 8/9/2021 TECHNIQUE:  CT examination of the abdomen and pelvis was performed. Multiplanar 2D reformatted images were created from the source data. This examination, like all CT scans performed in the [facility], was performed utilizing techniques to minimize radiation dose exposure, including the use of iterative reconstruction and automated exposure control. Radiation dose length product (DLP) for this visit:  554.1 mGy-cm IV Contrast:  85 mL of iohexol (OMNIPAQUE) Enteric Contrast:  Enteric contrast was not administered. FINDINGS: ABDOMEN LOWER CHEST: Small pericardial effusion. Atherosclerotic changes thoracic aorta and coronary arteries. Bibasilar dependent changes and/or scarring. Stable mild distal esophageal thickening, without discrete mass.  LIVER/BILIARY TREE:  Unremarkable. GALLBLADDER:  Gallbladder is surgically absent. SPLEEN: Mildly enlarged measuring 13.8 cm in length. PANCREAS:  Unremarkable. ADRENAL GLANDS: The right adrenal gland is unremarkable. The left adrenal gland is not confidently visualized. KIDNEYS/URETERS: Status post left nephrectomy. Multiple right renal cystic lesions are identified many of which may represent hemorrhagic/proteinaceous cysts although are technically indeterminate on this exam.  No hydronephrosis. STOMACH AND BOWEL: Evaluation of the stomach is limited by underdistention. No focal pathology seen within limitations. Small bowel is normal caliber. Distal small bowel diverticulosis. Tiny descending duodenal diverticulum also noted. Scattered colonic diverticulosis without evidence of diverticulitis. Redundant sigmoid colon. APPENDIX:  No findings to suggest appendicitis. ABDOMINOPELVIC CAVITY: Minuscule pelvic free fluid. No significant ascites. No pneumoperitoneum. No lymphadenopathy. VESSELS:  Unremarkable for patient's age. PELVIS REPRODUCTIVE ORGANS: Moderate prostate enlargement. URINARY BLADDER: Mild bladder wall thickening may be related to underdistention and/or element of chronic bladder outlet obstruction. No perivesical inflammatory changes. ABDOMINAL WALL/INGUINAL REGIONS: Footprint of previous right inguinal herniorrhaphy. OSSEOUS STRUCTURES: There is an age-indeterminate, possibly acute fracture of the left anterolateral 11th rib. There is some edematous changes of the overlying chest wall which could represent contusion suggesting this may be an acute or subacute injury. There are multiple old right-sided rib fractures. Multilevel degenerative changes of the spine. Multilevel lower lumbar laminectomy defects. Degenerative changes of the hips and pubic symphysis. Impression: 1. Age-indeterminate, possibly acute fracture left anterolateral 11th rib. 2. No acute intra-abdominal abnormality.  3. Small and large bowel diverticulosis without evidence of diverticulitis. 4. Numerous right renal lesions most of which are likely cysts although there are indeterminate lesions redemonstrated. Again surveillance ultrasound and/or more definitive characterization with nonemergent CT renal mass protocol may be helpful to exclude neoplasm, assuming the pacemaker precludes MRI evaluation. Otherwise MRI would be the modality of choice. 5. Prostatomegaly. 6. Stable distal esophageal thickening without focal mass. 7. Mild splenomegaly. Additional incidental findings as above. The study was marked in [facility] for immediate notification. Workstation performed: [id number]     CT cervical spine without contrast    Result Date: 7/11/2023  Narrative: CT CERVICAL SPINE - WITHOUT CONTRAST INDICATION:   Status post fall. COMPARISON: CT cervical spine 5/5/2023 TECHNIQUE:  CT examination of the cervical spine was performed without intravenous contrast.  Contiguous axial images were obtained. Multiplanar 2D reformatted images were created from the source data. Radiation dose length product (DLP) for this visit:  439.26 mGy-cm . This examination, like all CT scans performed in the [facility], was performed utilizing techniques to minimize radiation dose exposure, including the use of iterative  reconstruction and automated exposure control. IMAGE QUALITY:  Diagnostic. FINDINGS: ALIGNMENT:  Normal alignment of the cervical spine. No subluxation. VERTEBRAE:  No fracture. Diffuse osteopenia. DEGENERATIVE CHANGES:  Moderate multilevel cervical degenerative changes are noted. Multilevel facet arthrosis. Central disc protrusion at C2-3 with mild central canal narrowing. Diffuse disc osteophyte complexes at the C5-6 and C6-7 levels with again mild to moderate central canal narrowing. PREVERTEBRAL AND PARASPINAL SOFT TISSUES: Unremarkable THORACIC INLET: Mild thickening of the visualized esophagus.      Impression: No cervical spine fracture or traumatic malalignment. Workstation performed: [id number]     CT head without contrast    Result Date: 7/11/2023  Narrative: CT BRAIN - WITHOUT CONTRAST INDICATION:   Delirium ams. COMPARISON: CT head 6/17/2023 and additional prior studies. TECHNIQUE:  CT examination of the brain was performed. Multiplanar 2D reformatted images were created from the source data. Radiation dose length product (DLP) for this visit:  930.66 mGy-cm . This examination, like all CT scans performed in the [facility], was performed utilizing techniques to minimize radiation dose exposure, including the use of iterative  reconstruction and automated exposure control. IMAGE QUALITY:  Diagnostic. FINDINGS: PARENCHYMA: Decreased attenuation is noted in periventricular and subcortical white matter demonstrating an appearance that is statistically most likely to represent moderate microangiopathic change. 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 Stable left basal ganglia calcifications. No CT signs of acute infarction. No intracranial mass, mass effect or midline shift. No acute parenchymal hemorrhage. VENTRICLES AND EXTRA-AXIAL SPACES:  Normal for the patient's age. VISUALIZED ORBITS: Normal visualized orbits. PARANASAL SINUSES: Anterior ethmoidal and right maxillary sinus mucosal thickening. .  The remainder of the visualized paranasal sinus cavities and mastoid air cells are clear CALVARIUM AND EXTRACRANIAL SOFT TISSUES:  Normal.     Impression: 1. No acute intracranial abnormality. 2.  Chronic microangiopathic ischemic changes. Workstation performed: [id number]     CT head without contrast    Result Date: 6/17/2023  Narrative: CT BRAIN - WITHOUT CONTRAST INDICATION:   Mental status change, unknown cause change in mental status. COMPARISON: CT 5/5/2023, 4/9/2022 TECHNIQUE:  CT examination of the brain was performed.   Multiplanar 2D reformatted images were created from the source data. Radiation dose length product (DLP) for this visit:  891 mGy-cm . This examination, like all CT scans performed in the [facility], was performed utilizing techniques to minimize radiation dose exposure, including the use of iterative reconstruction and automated exposure control. IMAGE QUALITY:  Diagnostic. FINDINGS: PARENCHYMA: Decreased attenuation is noted in periventricular and subcortical white matter demonstrating an appearance that is statistically most likely to represent mild microangiopathic change. No CT signs of acute infarction. No intracranial mass, mass effect or midline shift. No acute parenchymal hemorrhage. VENTRICLES AND EXTRA-AXIAL SPACES:  Normal for the patient's age. VISUALIZED ORBITS: Normal visualized orbits. PARANASAL SINUSES: Right maxillary mucosal thickening and hyperostosis CALVARIUM AND EXTRACRANIAL SOFT TISSUES:  Normal.     Impression: No acute intracranial abnormality. Workstation performed: [id number]     Urinalysis:   Results from last 7 days   Lab Units 07/12/23  1531   COLOR UA  Light Yellow   CLARITY UA  Clear   SPEC GRAV UA  1.014   PH UA  6.0   LEUKOCYTES UA  Negative   NITRITE UA  Negative   GLUCOSE UA mg/dl Trace*   KETONES UA mg/dl Negative   BILIRUBIN UA  Negative   BLOOD UA  Trace*        Urine Micro:   Results from last 7 days   Lab Units 07/12/23  1531   RBC UA /hpf 1-2   WBC UA /hpf 1-2   EPITHELIAL CELLS WET PREP /hpf Occasional   BACTERIA UA /hpf None Seen        EKG, Pathology, and Other Studies: I have personally reviewed pertinent reports.       Medications given in Emergency Department     Medication Administration - last 24 hours from 07/11/2023 1924 to 07/12/2023 1924       Date/Time Order Dose Route Action Action by     07/12/2023 1532 EDT sodium chloride 0.9 % bolus 1,000 mL 0 mL Intravenous Stopped Lucio Caruso RN     07/12/2023 1359 EDT sodium chloride 0.9 % bolus 1,000 mL 1,000 mL Intravenous [place] [id number] [facility] Faith RN     07/12/2023 1604 EDT iohexol (OMNIPAQUE) 350 MG/ML injection (SINGLE-DOSE) 85 mL 85 mL Intravenous Given Giorgio Kid [facility]     07/12/2023 1908 EDT docusate sodium (COLACE) capsule 100 mg 0 mg Oral Hold Jose Feldman RN     07/12/2023 1844 EDT insulin lispro (HumaLOG) 100 units/mL subcutaneous injection 1-6 Units 0 Units Subcutaneous Hold Jose Feldman RN          Assessment and Plan     Fall  Assessment & Plan  Patient is s/p fall, unwitnessed. Has had hx of multiple falls in the past   CT Chest Abd was obtained and showed   There is an age-indeterminate, possibly acute fracture of the left anterolateral 11th rib. There is some edematous changes of the overlying chest wall which could represent contusion suggesting this may be an acute or subacute injury. This was seen on previous imaging with known rib fx from previous fall  Previous CT Head and CT Spine negative     - Will manage conservatively with lidocaine patch for pain   - PT/OT consulted   - CM consulted     Type 2 diabetes mellitus with kidney complication, with long-term current use of insulin [facility])  Assessment & Plan  Lab Results   Component Value Date    HGBA1C 8.0 (H) 05/25/2023       Recent Labs     07/12/23  1841   POCGLU 122       Blood Sugar Average: Last 72 hrs:  (P) 122   Home hypoglycemic regimen includes:  • Levemir 10 units twice daily    • Will start Levemir 5 units BID with sliding scale   • 4 times daily Accu-Cheks  • Diabetic diet  • Hypoglycemia protocol    Paroxysmal atrial fibrillation [facility])  Assessment & Plan  Patient follows with cardiologist in the outpatient setting. Per cardiology note from 2/14/2023, warfarin has been discontinued given history of bruising/bleeding and frequent falls    • Continue metoprolol tartrate 50 mg BID and propafenone 225mg tid.     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[address])  Assessment & Plan  Known hx of dementia with baseline alert and oriented x1-2     • Continue donepezil 10 mg at bedtime, memantine 10 mg twice daily Zoloft 100 mg daily  • Seroquel 12.5 mg added twice daily    Essential hypertension  Assessment & Plan  BP stable. • Continue prehospital losartan 25 mg daily, metoprolol 50 mg BID, propafenone 225 mg 3 times daily  • Continue to monitor vital signs per unit protocol          Code Status: Level 1 - Full Code  VTE Pharmacologic Prophylaxis: Enoxaparin (Lovenox)   VTE Mechanical Prophylaxis: sequential compression device  Admission Status: OBSERVATION    Admission Time  I spent 30 minutes admitting the patient. This involved direct patient contact where I performed a full history and physical, reviewing previous records, and reviewing laboratory and other diagnostic studies.     Magdalena Malik MD   Internal Medicine  PGY-2

## 2023-07-12 NOTE — ASSESSMENT & PLAN NOTE
BP stable.  Has been elevated, losartan 25 mg qd was not restarted but was evidently part of prehospital medications  • Metoprolol tartrate 50 mg BID, propafenone 225 mg 3 times daily  • Continue to monitor vital signs per unit protocol  • Restart losartan 25 mg qd

## 2023-07-12 NOTE — ASSESSMENT & PLAN NOTE
Patient is s/p fall, unwitnessed. Has had hx of multiple falls in the past   CT Chest Abd was obtained and showed   There is an age-indeterminate, possibly acute fracture of the left anterolateral 11th rib. There is some edematous changes of the overlying chest wall which could represent contusion suggesting this may be an acute or subacute injury.   This was seen on previous imaging with known rib fx from previous fall  Previous CT Head and CT Spine negative     - PT/OT recommendation: Return to [facility] w/ assist at CLOF + PT/ OT evals on return vs rehab if facility unable to accomodate.  -Case management consulted   - Magnesium is normal at 2.0 and phosphorus pending

## 2023-07-12 NOTE — ED PROVIDER NOTES
History  Chief Complaint   Patient presents with   • Weakness - Generalized     Per wife, patient has been getting progressively more weak for the past few weeks to months, seen yesterday for the same, wife states today it seemed harder for the patient to ambulate with his walker, patient reports fatigue     77-year-old male with a past medical history of paroxysmal A-fib, permanent pacemaker placement, chronic kidney disease, asthma, B12 deficiency, diabetes, dementia, presents emergency department with his wife over concern of decreased energy and fatigue. Patient was evaluated on 2023 for generalized weakness. At that time, the patient had a thorough work-up that included basic labs, VBG, CT head. CAT scan was overall unremarkable, blood work was unremarkable, PCO2 on VBG was 54 however pH was 7.366. The patient and wife deny any falls since the last encounter. He ambulates at home with a walker. He just darted complaining of abdominal pain when he arrived at the emergency department. Prior to Admission Medications   Prescriptions Last Dose Informant Patient Reported? Taking? ARIPiprazole (ABILIFY) 5 mg tablet   No No   Sig: Take 1 tablet (5 mg total) by mouth daily Do not start before 2023.    Insulin Pen Needle (Novofine Pen Needle) 32G X 6 MM MISC  Spouse/Significant Other No No   Sig: Use 4 (four) times a day   Insulin Syringe-Needle U-100 (BD Insulin Syringe U/F) 30G X 1/2" 0.5 ML MISC  Spouse/Significant Other No No   Sig: Use 4 (four) times a day   Insulin Syringe-Needle U-100 (INSULIN SYRINGE .5CC/30GX5/16") 30G X 5/16" 0.5 ML MISC  Spouse/Significant Other Yes No   Si (four) times a day   atorvastatin (LIPITOR) 10 mg tablet  Spouse/Significant Other No No   Sig: Take 1 tablet (10 mg total) by mouth daily   cholecalciferol (VITAMIN D3) 1,000 units tablet  Spouse/Significant Other Yes No   Sig: Take 2,000 Units by mouth daily   docusate sodium (COLACE) 100 mg capsule   No No   Sig: Take 1 capsule (100 mg total) by mouth 2 (two) times a day   donepezil (ARICEPT) 10 mg tablet  Spouse/Significant Other No No   Sig: Take 1 tablet (10 mg total) by mouth daily at bedtime   ferrous sulfate 325 (65 Fe) mg tablet  Spouse/Significant Other Yes No   Sig: Take 325 mg by mouth as needed    insulin detemir (LEVEMIR) 100 units/mL subcutaneous injection   No No   Sig: Inject 10 Units under the skin 2 (two) times a day   magnesium Oxide (MAG-OX) 400 mg TABS   No No   Sig: Take 1 tablet (400 mg total) by mouth daily Do not start before 2023.    metoprolol tartrate (LOPRESSOR) 100 mg tablet   No No   Simg in AM  and 100mg at bedtime   propafenone (RYTHMOL) 225 mg tablet  Spouse/Significant Other No No   Sig: Take 1 tablet (225 mg total) by mouth 3 (three) times a day   senna (SENOKOT) 8.6 mg   No No   Sig: Take 1 tablet (8.6 mg total) by mouth daily at bedtime   sertraline (ZOLOFT) 100 mg tablet  Spouse/Significant Other No No   Sig: Take 1 tablet (100 mg total) by mouth daily      Facility-Administered Medications: None       Past Medical History:   Diagnosis Date   • Allergic rhinitis    • Anxiety    • Aspiration of liquid     and food per wife if he is eating too fast or talking when eating   • Asthma     as a child   • Atrial fibrillation ([address])    • CAD (coronary artery disease)    • Cancer of kidney ([address])    • COPD (chronic obstructive pulmonary disease) (HCC)    • CPAP (continuous positive airway pressure) dependence    • Dementia (HCC)     Frontal lobe   • Dementia (HCC)    • Diabetes mellitus (HCC)    • Diabetes mellitus, type 2 (HCC)    • DJD (degenerative joint disease)    • Hypercholesterolemia    • Hyperlipidemia    • Hypertension    • Incisional hernia    • Kidney disease    • Melanoma (HCC)     left leg   • Obesity    • Sleep apnea     wears CPAP   • TIA (transient ischemic attack)        Past Surgical History:   Procedure Laterality Date   • CARDIAC PACEMAKER PLACEMENT     • CHOLECYSTECTOMY     • COLONOSCOPY     • HERNIA REPAIR      Incisional hernia   • NEPHRECTOMY Left 2005   • DC ESOPHAGOGASTRODUODENOSCOPY SUBMUCOSAL INJECTION N/A 9/21/2016    Procedure: ESOPHAGOGASTRODUODENOSCOPY (EGD); Surgeon: Bryan Valle MD;  Location: [facility]; Service: Gastroenterology   • DC ESOPHAGOGASTRODUODENOSCOPY SUBMUCOSAL INJECTION N/A 2/7/2018    Procedure: ESOPHAGOGASTRODUODENOSCOPY (EGD); Surgeon: Bryan Valle MD;  Location: [facility]; Service: Gastroenterology   • DC ESOPHAGOGASTRODUODENOSCOPY SUBMUCOSAL INJECTION N/A 4/3/2019    Procedure: ESOPHAGOGASTRODUODENOSCOPY (EGD) WITH BOTOX;  Surgeon: Bryan Valle MD;  Location: [facility]; Service: Gastroenterology   • ROTATOR CUFF REPAIR     • TONSILLECTOMY         Family History   Problem Relation Age of Onset   • Brain cancer Father    • Lung cancer Father    • Diabetes Family    • Heart disease Family    • Hypertension Family    • Cancer Family         renal cell carcinoma   • Stroke Family    • Colon cancer Son      I have reviewed and agree with the history as documented. E-Cigarette/Vaping   • E-Cigarette Use Never User      E-Cigarette/Vaping Substances   • Nicotine No    • THC No    • CBD No    • Flavoring No    • Other No    • Unknown No      Social History     Tobacco Use   • Smoking status: Never   • Smokeless tobacco: Never   Vaping Use   • Vaping Use: Never used   Substance Use Topics   • Alcohol use: No   • Drug use: No        Review of Systems   Constitutional: Negative for chills and fever. Respiratory: Negative for cough, chest tightness and shortness of breath. Cardiovascular: Negative for chest pain. Gastrointestinal: Positive for abdominal pain. Negative for nausea and vomiting. Genitourinary: Negative for dysuria, flank pain and hematuria. Neurological: Positive for weakness. All other systems reviewed and are negative.       Physical Exam  ED Triage Vitals [07/12/23 1330]   Temperature Pulse Respirations Blood Pressure SpO2   (!) 97.2 °F (36.2 °C) 70 18 144/71 100 %      Temp Source Heart Rate Source Patient Position - Orthostatic VS BP Location FiO2 (%)   Oral Monitor Sitting Right arm --      Pain Score       No Pain             Orthostatic Vital Signs  Vitals:    07/12/23 1330 07/12/23 1430 07/12/23 1530 07/12/23 1630   BP: 144/71 146/69 161/73 170/75   Pulse: 70 58 58 60   Patient Position - Orthostatic VS: Sitting          Physical Exam  Vitals and nursing note reviewed. Constitutional:       General: He is not in acute distress. Appearance: He is well-developed. HENT:      Head: Normocephalic and atraumatic. Right Ear: External ear normal.      Left Ear: External ear normal.      Nose: No congestion. Mouth/Throat:      Mouth: Mucous membranes are moist.      Pharynx: No oropharyngeal exudate. Eyes:      Conjunctiva/sclera: Conjunctivae normal.   Cardiovascular:      Rate and Rhythm: Normal rate and regular rhythm. Heart sounds: No murmur heard. Pulmonary:      Effort: Pulmonary effort is normal. No respiratory distress. Breath sounds: Normal breath sounds. Abdominal:      Palpations: Abdomen is soft. Tenderness: There is generalized abdominal tenderness. Musculoskeletal:         General: No swelling. Cervical back: Neck supple. Skin:     General: Skin is warm and dry. Capillary Refill: Capillary refill takes less than 2 seconds. Neurological:      General: No focal deficit present. Mental Status: He is alert and oriented to person, place, and time. Cranial Nerves: No cranial nerve deficit. Sensory: No sensory deficit. Comments: Patient alert and oriented to person, place, time, event. No focal neurological deficits detected on physical exam.  Cranial nerves II through XII grossly intact. Bilateral upper and lower motor strength 5 out of 5. No dysmetria.    Psychiatric:         Mood and Affect: Mood normal.         ED Medications  Medications   sodium chloride 0.9 % bolus 1,000 mL (0 mL Intravenous Stopped 7/12/23 1532)   iohexol (OMNIPAQUE) 350 MG/ML injection (SINGLE-DOSE) 85 mL (85 mL Intravenous Given 7/12/23 1604)       Diagnostic Studies  Results Reviewed     Procedure Component Value Units Date/Time    Urine Microscopic [[id number]]  (Abnormal) Collected: 07/12/23 1531    Lab Status: Final result Specimen: Urine, Clean Catch Updated: 07/12/23 1733     RBC, UA 1-2 /hpf      WBC, UA 1-2 /hpf      Epithelial Cells Occasional /hpf      Bacteria, UA None Seen /hpf      MUCUS THREADS Occasional    UA w Reflex to Microscopic w Reflex to Culture [[id number]]  (Abnormal) Collected: 07/12/23 1531    Lab Status: Final result Specimen: Urine, Clean Catch Updated: 07/12/23 1624     Color, UA Light Yellow     Clarity, UA Clear     Specific Gravity, UA 1.014     pH, UA 6.0     Leukocytes, UA Negative     Nitrite, UA Negative     Protein, UA 70 (1+) mg/dl      Glucose, UA Trace mg/dl      Ketones, UA Negative mg/dl      Urobilinogen, UA <2.0 mg/dl      Bilirubin, UA Negative     Occult Blood, UA Trace    HS Troponin I 2hr [[id number]]  (Normal) Collected: 07/12/23 1532    Lab Status: Final result Specimen: Blood from Arm, Left Updated: 07/12/23 1619     hs TnI 2hr 7 ng/L      Delta 2hr hsTnI 0 ng/L     HS Troponin I 4hr [[id number]]     Lab Status: No result Specimen: Blood     Comprehensive metabolic panel [[id number]]  (Abnormal) Collected: 07/12/23 1359    Lab Status: Final result Specimen: Blood from Arm, Left Updated: 07/12/23 1515     Sodium 135 mmol/L      Potassium 4.0 mmol/L      Chloride 103 mmol/L      CO2 28 mmol/L      ANION GAP 4 mmol/L      BUN 18 mg/dL      Creatinine 1.79 mg/dL      Glucose 189 mg/dL      Calcium 9.0 mg/dL      Corrected Calcium 9.7 mg/dL      AST 10 U/L      ALT 28 U/L      Alkaline Phosphatase 91 U/L      Total Protein 7.0 g/dL      Albumin 3.1 g/dL      Total Bilirubin 0.57 mg/dL      eGFR 33 ml/min/1.73sq m     Narrative:      National Kid[employer] Disease Foundation guidelines for Chronic Kidney Disease (CKD):   •  Stage 1 with normal or high GFR (GFR > 90 mL/min/1.73 square meters)  •  Stage 2 Mild CKD (GFR = 60-89 mL/min/1.73 square meters)  •  Stage 3A Moderate CKD (GFR = 45-59 mL/min/1.73 square meters)  •  Stage 3B Moderate CKD (GFR = 30-44 mL/min/1.73 square meters)  •  Stage 4 Severe CKD (GFR = 15-29 mL/min/1.73 square meters)  •  Stage 5 End Stage CKD (GFR <15 mL/min/1.73 square meters)  Note: GFR calculation is accurate only with a steady state creatinine    HS Troponin 0hr (reflex protocol) [[id number]]  (Normal) Collected: 07/12/23 1359    Lab Status: Final result Specimen: Blood from Arm, Left Updated: 07/12/23 1453     hs TnI 0hr 7 ng/L     Lipase [[id number]]  (Normal) Collected: 07/12/23 1359    Lab Status: Final result Specimen: Blood from Arm, Left Updated: 07/12/23 1442     Lipase 148 u/L     CBC and differential [[id number]]  (Abnormal) Collected: 07/12/23 1359    Lab Status: Final result Specimen: Blood from Arm, Left Updated: 07/12/23 1409     WBC 7.89 Thousand/uL      RBC 4.33 Million/uL      Hemoglobin 12.7 g/dL      Hematocrit 37.9 %      MCV 88 fL      MCH 29.3 pg      MCHC 33.5 g/dL      RDW 14.1 %      MPV 10.4 fL      Platelets 719 Thousands/uL      nRBC 0 /100 WBCs      Neutrophils Relative 68 %      Immat GRANS % 0 %      Lymphocytes Relative 19 %      Monocytes Relative 13 %      Eosinophils Relative 0 %      Basophils Relative 0 %      Neutrophils Absolute 5.27 Thousands/µL      Immature Grans Absolute 0.03 Thousand/uL      Lymphocytes Absolute 1.51 Thousands/µL      Monocytes Absolute 1.04 Thousand/µL      Eosinophils Absolute 0.02 Thousand/µL      Basophils Absolute 0.02 Thousands/µL                  CT abdomen pelvis with contrast   Final Result by Yun Pace DO (07/12 7699)      1. Age-indeterminate, possibly acute fracture left anterolateral 11th rib.       2. No acute intra-abdominal abnormality. 3. Small and large bowel diverticulosis without evidence of diverticulitis. 4. Numerous right renal lesions most of which are likely cysts although there are indeterminate lesions redemonstrated. Again surveillance ultrasound and/or more definitive characterization with nonemergent CT renal mass protocol may be helpful to    exclude neoplasm, assuming the pacemaker precludes MRI evaluation. Otherwise MRI would be the modality of choice. 5. Prostatomegaly. 6. Stable distal esophageal thickening without focal mass. 7. Mild splenomegaly. Additional incidental findings as above. The study was marked in [facility] for immediate notification. Workstation performed: [id number]               Procedures  ECG 12 Lead Documentation Only    Date/Time: 7/12/2023 2:36 PM    Performed by: Siddhartha Arce DO  Authorized by: Siddhartha Arce DO    Indications / Diagnosis:  Weakness  ECG reviewed by me, the ED Provider: yes    Patient location:  ED  Previous ECG:     Previous ECG:  Unavailable    Comparison to cardiac monitor: Yes    Interpretation:     Interpretation: normal    Rate:     ECG rate:  61    ECG rate assessment: normal    Rhythm:     Rhythm: sinus rhythm    Ectopy:     Ectopy: none    QRS:     QRS axis:  Normal    QRS intervals:  Normal  Conduction:     Conduction: abnormal      Abnormal conduction: 1st degree    ST segments:     ST segments:  Normal  T waves:     T waves: normal            ED Course  ED Course as of 07/12/23 1752   Wed Jul 12, 2023   1722 CT abdomen pelvis with contrast  IMPRESSION:     1. Age-indeterminate, possibly acute fracture left anterolateral 11th rib.     2. No acute intra-abdominal abnormality.     3. Small and large bowel diverticulosis without evidence of diverticulitis.     4. Numerous right renal lesions most of which are likely cysts although there are indeterminate lesions redemonstrated.  Again surveillance ultrasound and/or more definitive characterization with nonemergent CT renal mass protocol may be helpful to   exclude neoplasm, assuming the pacemaker precludes MRI evaluation. Otherwise MRI would be the modality of choice.     5. Prostatomegaly.     6. Stable distal esophageal thickening without focal mass.     7. Mild splenomegaly.     Additional incidental findings as above.     The study was marked in [employer] for immediate notification.     Workstation performed: [id number]                HEART Risk Score    Flowsheet Row Most Recent Value   Heart Score Risk Calculator    History 0 Filed at: 07/12/2023 1732   ECG 0 Filed at: 07/12/2023 1732   Age 2 Filed at: 07/12/2023 1732   Risk Factors 2 Filed at: 07/12/2023 1732   Troponin 0 Filed at: 07/12/2023 1732   HEART Score 4 Filed at: 07/12/2023 1732                      SBIRT 22yo+    Flowsheet Row Most Recent Value   Initial Alcohol Screen: US AUDIT-C     1. How often do you have a drink containing alcohol? 0 Filed at: 07/12/2023 1331   2. How many drinks containing alcohol do you have on a typical day you are drinking? 0 Filed at: 07/12/2023 1331   3b. FEMALE Any Age, or MALE 65+: How often do you have 4 or more drinks on one occassion? 0 Filed at: 07/12/2023 1331   Audit-C Score 0 Filed at: 07/12/2023 1331   CLARK: How many times in the past year have you. .. Used an illegal drug or used a prescription medication for non-medical reasons? Never Filed at: 07/12/2023 1331                Medical Decision Making  80-year-old male with past medical history of dementia presents emergency department with increased weakness and abdominal pain    DDx: Cardiac dysrhythmia, electrolyte disturbance, vitamin deficiency, dehydration, small bowel obstruction    Plan: CMP, CBC, lipase, UA, IV fluids, EKG, troponin    Conversation with patient's wife, wife would feel comfortable having the patient admitted to the hospital for further evaluation given repeat visit to emergency department. Work-up performed in the emergency department was concerning for elevated creatinine however this is within patient's baseline. CBC, CMP, UA, troponins overall unremarkable. Heart score documented of 4. Patient will be admitted to the hospital for ambulatory dysfunction. Amount and/or Complexity of Data Reviewed  Labs: ordered. Radiology: ordered. Decision-making details documented in ED Course. Risk  Prescription drug management. Decision regarding hospitalization. Disposition  Final diagnoses:   Ambulatory dysfunction   Weakness   Fatigue   Abnormal CT of the abdomen     Time reflects when diagnosis was documented in both MDM as applicable and the Disposition within this note     Time User Action Codes Description Comment    7/12/2023  5:22 PM Madison Hector Add [R26.2] Ambulatory dysfunction     7/12/2023  5:25 PM Madison Hector Add [R53.1] Weakness     7/12/2023  5:25 PM Madison Hector Add [R53.83] Fatigue     7/12/2023  5:33 PM Rush Profit Add [R93.5] Abnormal CT of the abdomen       ED Disposition     ED Disposition   Admit    Condition   Stable    Date/Time   Wed Jul 12, 2023  5:50 PM    Comment   Case was discussed with Dr. Declan Jennings and the patient's admission status was agreed to be Admission Status: observation status to the service of Dr. Vishnu Pearson . Follow-up Information    None         Patient's Medications   Discharge Prescriptions    No medications on file     No discharge procedures on file. PDMP Review       Value Time User    PDMP Reviewed  Yes 6/13/2023 12:11 PM Roxann Daniel MD           ED Provider  Attending physically available and evaluated She Vera. I managed the patient along with the ED Attending.     Electronically Signed by         Annamaria Davis DO  07/12/23 7014

## 2023-07-13 VITALS
HEART RATE: 67 BPM | DIASTOLIC BLOOD PRESSURE: 69 MMHG | RESPIRATION RATE: 18 BRPM | SYSTOLIC BLOOD PRESSURE: 121 MMHG | OXYGEN SATURATION: 96 % | TEMPERATURE: 98.2 F

## 2023-07-13 LAB
ANION GAP SERPL CALCULATED.3IONS-SCNC: 2 MMOL/L
BASOPHILS # BLD AUTO: 0.02 THOUSANDS/ÂΜL (ref 0–0.1)
BASOPHILS NFR BLD AUTO: 0 % (ref 0–1)
BUN SERPL-MCNC: 18 MG/DL (ref 5–25)
CALCIUM SERPL-MCNC: 9.2 MG/DL (ref 8.3–10.1)
CHLORIDE SERPL-SCNC: 106 MMOL/L (ref 96–108)
CO2 SERPL-SCNC: 29 MMOL/L (ref 21–32)
CREAT SERPL-MCNC: 1.5 MG/DL (ref 0.6–1.3)
EOSINOPHIL # BLD AUTO: 0.08 THOUSAND/ÂΜL (ref 0–0.61)
EOSINOPHIL NFR BLD AUTO: 2 % (ref 0–6)
ERYTHROCYTE [DISTWIDTH] IN BLOOD BY AUTOMATED COUNT: 14.2 % (ref 11.6–15.1)
GFR SERPL CREATININE-BSD FRML MDRD: 40 ML/MIN/1.73SQ M
GLUCOSE SERPL-MCNC: 114 MG/DL (ref 65–140)
GLUCOSE SERPL-MCNC: 120 MG/DL (ref 65–140)
GLUCOSE SERPL-MCNC: 132 MG/DL (ref 65–140)
GLUCOSE SERPL-MCNC: 140 MG/DL (ref 65–140)
GLUCOSE SERPL-MCNC: 167 MG/DL (ref 65–140)
HCT VFR BLD AUTO: 38.1 % (ref 36.5–49.3)
HGB BLD-MCNC: 12.7 G/DL (ref 12–17)
IMM GRANULOCYTES # BLD AUTO: 0.01 THOUSAND/UL (ref 0–0.2)
IMM GRANULOCYTES NFR BLD AUTO: 0 % (ref 0–2)
LYMPHOCYTES # BLD AUTO: 1.37 THOUSANDS/ÂΜL (ref 0.6–4.47)
LYMPHOCYTES NFR BLD AUTO: 25 % (ref 14–44)
MAGNESIUM SERPL-MCNC: 2 MG/DL (ref 1.6–2.6)
MCH RBC QN AUTO: 29.1 PG (ref 26.8–34.3)
MCHC RBC AUTO-ENTMCNC: 33.3 G/DL (ref 31.4–37.4)
MCV RBC AUTO: 87 FL (ref 82–98)
MONOCYTES # BLD AUTO: 0.66 THOUSAND/ÂΜL (ref 0.17–1.22)
MONOCYTES NFR BLD AUTO: 12 % (ref 4–12)
NEUTROPHILS # BLD AUTO: 3.33 THOUSANDS/ÂΜL (ref 1.85–7.62)
NEUTS SEG NFR BLD AUTO: 61 % (ref 43–75)
NRBC BLD AUTO-RTO: 0 /100 WBCS
PLATELET # BLD AUTO: 154 THOUSANDS/UL (ref 149–390)
PMV BLD AUTO: 10.5 FL (ref 8.9–12.7)
POTASSIUM SERPL-SCNC: 3.6 MMOL/L (ref 3.5–5.3)
RBC # BLD AUTO: 4.36 MILLION/UL (ref 3.88–5.62)
SODIUM SERPL-SCNC: 137 MMOL/L (ref 135–147)
WBC # BLD AUTO: 5.47 THOUSAND/UL (ref 4.31–10.16)

## 2023-07-13 PROCEDURE — NC001 PR NO CHARGE: Performed by: INTERNAL MEDICINE

## 2023-07-13 PROCEDURE — 80048 BASIC METABOLIC PNL TOTAL CA: CPT | Performed by: STUDENT IN AN ORGANIZED HEALTH CARE EDUCATION/TRAINING PROGRAM

## 2023-07-13 PROCEDURE — 85025 COMPLETE CBC W/AUTO DIFF WBC: CPT | Performed by: STUDENT IN AN ORGANIZED HEALTH CARE EDUCATION/TRAINING PROGRAM

## 2023-07-13 PROCEDURE — 99238 HOSP IP/OBS DSCHRG MGMT 30/<: CPT | Performed by: INTERNAL MEDICINE

## 2023-07-13 PROCEDURE — 97163 PT EVAL HIGH COMPLEX 45 MIN: CPT

## 2023-07-13 PROCEDURE — 82948 REAGENT STRIP/BLOOD GLUCOSE: CPT

## 2023-07-13 PROCEDURE — 83735 ASSAY OF MAGNESIUM: CPT

## 2023-07-13 PROCEDURE — 97167 OT EVAL HIGH COMPLEX 60 MIN: CPT

## 2023-07-13 RX ORDER — LOSARTAN POTASSIUM 25 MG/1
25 TABLET ORAL DAILY
Status: DISCONTINUED | OUTPATIENT
Start: 2023-07-13 | End: 2023-07-13 | Stop reason: HOSPADM

## 2023-07-13 RX ORDER — POTASSIUM CHLORIDE 20 MEQ/1
40 TABLET, EXTENDED RELEASE ORAL ONCE
Status: COMPLETED | OUTPATIENT
Start: 2023-07-13 | End: 2023-07-13

## 2023-07-13 RX ADMIN — ENOXAPARIN SODIUM 40 MG: 40 INJECTION SUBCUTANEOUS at 09:27

## 2023-07-13 RX ADMIN — INSULIN DETEMIR 5 UNITS: 100 INJECTION, SOLUTION SUBCUTANEOUS at 00:04

## 2023-07-13 RX ADMIN — SERTRALINE 100 MG: 100 TABLET, FILM COATED ORAL at 09:28

## 2023-07-13 RX ADMIN — ATORVASTATIN CALCIUM 10 MG: 10 TABLET, FILM COATED ORAL at 09:28

## 2023-07-13 RX ADMIN — LOSARTAN POTASSIUM 25 MG: 25 TABLET, FILM COATED ORAL at 12:40

## 2023-07-13 RX ADMIN — Medication 2000 UNITS: at 09:27

## 2023-07-13 RX ADMIN — DONEPEZIL HYDROCHLORIDE 10 MG: 10 TABLET ORAL at 00:05

## 2023-07-13 RX ADMIN — PROPAFENONE HYDROCHLORIDE 225 MG: 150 TABLET, FILM COATED ORAL at 09:31

## 2023-07-13 RX ADMIN — POTASSIUM CHLORIDE 40 MEQ: 1500 TABLET, EXTENDED RELEASE ORAL at 09:28

## 2023-07-13 RX ADMIN — DOCUSATE SODIUM 100 MG: 100 CAPSULE, LIQUID FILLED ORAL at 09:27

## 2023-07-13 RX ADMIN — INSULIN DETEMIR 5 UNITS: 100 INJECTION, SOLUTION SUBCUTANEOUS at 09:26

## 2023-07-13 RX ADMIN — METOPROLOL TARTRATE 50 MG: 50 TABLET ORAL at 09:27

## 2023-07-13 RX ADMIN — FERROUS SULFATE TAB 325 MG (65 MG ELEMENTAL FE) 325 MG: 325 (65 FE) TAB at 09:27

## 2023-07-13 RX ADMIN — PROPAFENONE HYDROCHLORIDE 225 MG: 150 TABLET, FILM COATED ORAL at 00:04

## 2023-07-13 RX ADMIN — METOPROLOL TARTRATE 50 MG: 50 TABLET ORAL at 00:05

## 2023-07-13 RX ADMIN — SENNOSIDES 8.6 MG: 8.6 TABLET, FILM COATED ORAL at 00:06

## 2023-07-13 RX ADMIN — ARIPIPRAZOLE 5 MG: 5 TABLET ORAL at 09:31

## 2023-07-13 NOTE — ASSESSMENT & PLAN NOTE
Lab Results   Component Value Date    EGFR 40 07/13/2023    EGFR 33 07/12/2023    EGFR 31 07/11/2023    CREATININE 1.50 (H) 07/13/2023    CREATININE 1.79 (H) 07/12/2023    CREATININE 1.85 (H) 07/11/2023       Creatinine appears to be at baseline according to records, it has been as high was 2 in 2021 and higher in the past.     Plan:  - Continue to monitor electrolytes  - He has a condom-catheter in place

## 2023-07-13 NOTE — OCCUPATIONAL THERAPY NOTE
Occupational Therapy Evaluation     Patient Name: Fermin Mark  Today's Date: 7/13/2023  Problem List  Active Problems:    Essential hypertension    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[address])    Paroxysmal atrial fibrillation (HCC)    Type 2 diabetes mellitus with kidney complication, with long-term current use of insulin (HCC)    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, or unspecified chronic kidney disease    Fall    Past Medical History  Past Medical History:   Diagnosis Date    Allergic rhinitis     Anxiety     Aspiration of liquid     and food per wife if he is eating too fast or talking when eating    Asthma     as a child    Atrial fibrillation ([address])     CAD (coronary artery disease)     Cancer of kidney (HCC)     COPD (chronic obstructive pulmonary disease) (HCC)     CPAP (continuous positive airway pressure) dependence     Dementia ([address])     Frontal lobe    Dementia (HCC)     Diabetes mellitus ([address])     Diabetes mellitus, type 2 (HCC)     DJD (degenerative joint disease)     Hypercholesterolemia     Hyperlipidemia     Hypertension     Incisional hernia     Kidney disease     Melanoma ([address])     left leg    Obesity     Sleep apnea     wears CPAP    TIA (transient ischemic attack)      Past Surgical History  Past Surgical History:   Procedure Laterality Date    CARDIAC PACEMAKER PLACEMENT      CHOLECYSTECTOMY      COLONOSCOPY      HERNIA REPAIR      Incisional hernia    NEPHRECTOMY Left 2005    PA ESOPHAGOGASTRODUODENOSCOPY SUBMUCOSAL INJECTION N/A 9/21/2016    Procedure: ESOPHAGOGASTRODUODENOSCOPY (EGD); Surgeon: Chely Velasquez MD;  Location: [facility]; Service: Gastroenterology    PA ESOPHAGOGASTRODUODENOSCOPY SUBMUCOSAL INJECTION N/A 2/7/2018    Procedure: ESOPHAGOGASTRODUODENOSCOPY (EGD); Surgeon: Chely Velasquez MD;  Location: [facility];   Service: Gastroenterology    PA ESOPHAGOGASTRODUODENOSCOPY SUBMUCOSAL INJECTION N/A 4/3/2019    Procedure: ESOPHAGOGASTRODUODENOSCOPY (EGD) WITH BOTOX;  Surgeon: Radha Oleary MD;  Location:  [facility]; Service: Gastroenterology    ROTATOR CUFF REPAIR      TONSILLECTOMY             07/13/23 1158   OT Last Visit   OT Visit Date 07/13/23   Note Type   Note type Evaluation   Pain Assessment   Pain Assessment Tool 0-10   Pain Score No Pain   Restrictions/Precautions   Weight Bearing Precautions Per Order No   Other Precautions Chair Alarm; Bed Alarm;Cognitive; Fall Risk;Multiple lines   Home Living   Type of Home Other (Comment)  ([facility])   Home Layout One level   [place]   Prior Function   Level of [place] Needs assistance with ADLs; Independent with functional mobility   Lives With Facility staff   Comments pt is a poor historian. Baseline dementia   Lifestyle   Autonomy per EMR, MOD IND with RW   Reciprocal Relationships support from facility   Service to Others retired   Intrinsic Gratification TV   ADL   Eating Assistance 5  Supervision/Setup   Grooming Assistance 5  Supervision/Setup   [address] 4  [address] 4  [address] 3  Moderate [address]  4  Minimal Assistance   Bed Mobility   Supine to Sit 4  Minimal assistance   Additional items Increased time required   Sit to Supine 4  Minimal assistance   Additional items Increased time required;LE management   Transfers   Sit to Stand 4  Minimal assistance   Additional items Assist x 1; Increased time required   Stand to Sit 4  Minimal assistance   Additional items Assist x 1; Increased time required   Additional Comments RW   Functional Mobility   Functional Mobility 4  Minimal assistance   Additional Comments short household distances   Additional items Rolling walker   Balance   Static Sitting Fair +   Dynamic Sitting Fair   Static Standing Poor +   Dynamic Standing Poor +   Activity Tolerance Activity Tolerance Patient tolerated treatment well   Medical Staff Made Aware PT   Nurse Made Aware Yes   RUE Assessment   RUE Assessment WFL   LUE Assessment   LUE Assessment WFL   Hand Function   Gross Motor Coordination Functional   Fine Motor Coordination Functional   Cognition   Overall Cognitive Status Impaired   Arousal/Participation Alert; Cooperative   Attention Attends with cues to redirect   Orientation Level Oriented to person;Disoriented to place; Disoriented to time;Disoriented to situation   Memory Decreased short term memory;Decreased recall of recent events;Decreased recall of precautions   Following Commands Follows one step commands with increased time or repetition   Comments pleasant and cooperative   Assessment   Limitation Decreased ADL status; Decreased Safe judgement during ADL;Decreased cognition;Decreased endurance;Decreased self-care trans;Decreased high-level ADLs   Prognosis Good   Assessment Pt is a 80 y.o. male who was admitted to [address] [place] [address] on 7/12/2023 with ambulatory dysfunction, possible URI. Of note, pt presented to ED 7/11 s/p fall; CT head and c-spine were negative and pt was discharged. Pt  has a past medical history of Allergic rhinitis, Anxiety, Aspiration of liquid, Asthma, Atrial fibrillation ([address]), CAD (coronary artery disease), Cancer of kidney ([address]), COPD (chronic obstructive pulmonary disease) ([address]), CPAP (continuous positive airway pressure) dependence, Dementia ([address]), Dementia ([address]), Diabetes mellitus ([address]), Diabetes mellitus, type 2 ([address]), DJD (degenerative joint disease), Hypercholesterolemia, Hyperlipidemia, Hypertension, Incisional hernia, Kidney disease, Melanoma ([address]), Obesity, Sleep apnea, and TIA (transient ischemic attack). Pt is a poor historian - per EMR pt ambulated MOD IND with RW at baseline. Pt lives [facility] memory care unit (moved there ~2 months ago). Currently pt requires MIN A for overall ADLS and for functional mobility/transfers.  Pt currently presents with impairments in the following categories -difficulty performing ADLS, difficulty performing IADLS  and limited insight into deficits activity tolerance, endurance, standing balance/tolerance, sitting balance/tolerance, memory, insight, safety  and attention . These impairments, as well as pt's fatigue, pain and risk for falls  limit pt's ability to safely engage in all baseline areas of occupation, includinggrooming, bathing, dressing, toileting, functional mobility/transfers, community mobility and leisure activities  From OT standpoint, recommend [address] upon D/C if facility is able to manage pt at current assist levels - if facility is unable to, would recommend rehab. OT will continue to follow to address the below stated goals. Goals   Patient Goals to go back home   LTG Time Frame 10-14   Long Term Goal #1 see goals below   Plan   Treatment Interventions ADL retraining;Functional transfer training; Endurance training;UE strengthening/ROM; Cognitive reorientation;Patient/family training;Equipment evaluation/education; Compensatory technique education; Energy conservation; Activityengagement   Goal Expiration Date 07/27/23   OT Frequency 2-3x/wk   Recommendation   OT Discharge Recommendation Return to facility with rehabilitation services  (if facility is able to provide assist at pt's current level vs. rehab if not)   AM-PAC Daily Activity Inpatient   Lower Body Dressing 2   Bathing 3   Toileting 3   Upper Body Dressing 3   Grooming 3   Eating 3   Daily Activity Raw Score 17   Daily Activity Standardized Score (Calc for Raw Score >=11) 37.26   AM-PAC Applied Cognition Inpatient   Following a Speech/Presentation 2   Understanding Ordinary Conversation 3   Taking Medications 1   Remembering Where Things Are Placed or Put Away 2   Remembering List of 4-5 Errands 1   Taking Care of Complicated Tasks 1   Applied Cognition Raw Score 10   Applied Cognition Standardized Score 24.98          The patient's raw score on the AM-PAC Daily Activity Inpatient Short Form is 17. A raw score of less than 19 suggests the patient may benefit from discharge to post-acute rehabilitation services. Please refer to the recommendation of the Occupational Therapist for safe discharge planning.           GOALS     Pt will improve activity tolerance to G for min 30 min txment sessions     Pt will complete UB ADLs with SUP and use of adaptive device and DME as needed     Pt will complete LB ADLs with SUP w/ use of AE and DME as needed     Pt will complete toileting w/ SUP w/ G hygiene/thoroughness using DME as needed     Pt will improve functional transfers to SUP on/off all surfaces using DME as needed w/ G safety     Pt will improve functional mobility during ADL/IADL/leisure tasks to SUP using DME as needed w/ G safety         Mariah Crisostomo

## 2023-07-13 NOTE — CASE MANAGEMENT
Case Management Assessment & Discharge Planning Note    Patient name She Vera  Location 2 215/2 845-34 MRN [id number]  : 1935 Date 2023       Current Admission Date: 2023  Current Admission Diagnosis:Essential hypertension   Patient Active Problem List    Diagnosis Date Noted   • Constipation 2023   • Unspecified mood (affective) disorder ([address]) 2023   • Medical clearance for psychiatric admission 2023   • Chronic kidney disease (CKD), stage IV (severe) ([address]) 2023   • Fall 2022   • Renal cell carcinoma ([address]) 2022   • Multiple rib fractures 2022   • Abnormal head CT 2022   • Tracheitis 11/15/2021   • Dementia with behavioral disturbance ([address])    • Recurrent falls 2020   • Long term current use of antiarrhythmic medical therapy 2020   • 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 2020   • Coagulation disorder ([address]) 2020   • Head injury 2020   • 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, or unspecified chronic kidney disease 2019   • Coronary artery disease involving native coronary artery of native heart without angina pectoris 2019   • Type 2 diabetes mellitus with kidney complication, with long-term current use of insulin ([address]) 2019   • Paroxysmal atrial fibrillation ([address]) 2018   • Mixed hyperlipidemia 2018   • 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[address]) 2018   • JACQUELINE (acute kidney injury) ([address]) 2018   • Essential hypertension 2018   • Solitary kidney 2018   • B12 deficiency 10/13/2016   • Allergic rhinitis 2016   • Neuropathy of right foot 2015   • Piriformis syndrome 2014   • Benign prostate hyperplasia 10/27/2014   • Chronic lumbar radiculopathy 2014   • Sacral insufficiency fracture 2014   • Asthma 2014   • Hearing loss 2014   • GERD (gastroesophageal reflux disease) 2014   • Hiatal hernia 05/07/2014   • Seasonal allergies 05/07/2014   • Sleep apnea 09/12/2013   • Nephrosclerosis 09/12/2013   • Sacroiliitis ([address]) 08/27/2013   • Elevated prostate specific antigen (PSA) 06/18/2013   • 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   • Sacral disorder 05/07/2013      LOS (days): 0  Geometric Mean LOS (GMLOS) (days):   Days to GMLOS:     OBJECTIVE:  PATIENT READMITTED TO HOSPITAL            Current admission status: Inpatient  Referral Reason: Other    Preferred Pharmacy:   [address] [place], [place] [facility]  [address] [address]  Phone: [phone] Fax: [address] [address] [place]  Phone: [phone] Fax: [phone]    Primary Care Provider: Yolis Hernandez MD    Primary Insurance: [facility]  Secondary Insurance:     ASSESSMENT:  [address] [place] [address]   Primary Phone: [phone] (Mobile)                Readmission Root Cause  30 Day Readmission: No    Patient Information  Admitted from[de-identified] Facility  Mental Status: Confused  During Assessment patient was accompanied by: Not accompanied during assessment  Assessment information provided by[de-identified] Daughter  Primary Caregiver:  Other (Comment)  Caregiver's Name[de-identified] Rosa Christian  Caregiver's Relationship to Patient[de-identified] Other (Specify) (staff)  Support Systems: Other (Comment), Daughter  [place] of Residence: [place]  Type of Current Residence: Other (Comment) (Assisted Living, [address] [place] [address])  In the last 12 months, was there a time when you were not able to pay the mortgage or rent on time?: No  In the last 12 months, how many places have you lived?: 2  In the last 12 months, was there a time when you did not have a steady place to sleep or slept in a shelter (including now)?: No  Homeless/housing insecurity resource given?: No  Living Arrangements: Other (Comment)    Activities of Daily Living Prior to Admission  Functional Status: Assistance  Completes ADLs independently?: No  Level of ADL dependence: Assistance  Ambulates independently?: Yes  Does patient use assisted devices?: Yes  Assisted Devices (DME) used: Glenn Ames  Does patient currently own DME?: Yes  What DME does the patient currently own?: Glenn Ames  Does patient have a history of Outpatient Therapy (PT/OT)?: No  Does the patient have a history of Short-Term Rehab?: No  Does patient have a history of HHC?: No  Does patient currently have [address]?: No    Patient Information Continued  Income Source: Pension/half-way  Does patient have prescription coverage?: Yes  Within the past 12 months, you worried that your food would run out before you got the money to buy more.: Never true  Within the past 12 months, the food you bought just didn't last and you didn't have money to get more.: Never true  Food insecurity resource given?: No  Does patient receive dialysis treatments?: No  Does patient have a history of substance abuse?: No  Does patient have a history of Mental Health Diagnosis?: No    Means of Transportation  Means of Transport to Appts[de-identified] Family transport  In the past 12 months, has lack of transportation kept you from medical appointments or from getting medications?: No  In the past 12 months, has lack of transportation kept you from meetings, work, or from getting things needed for daily living?: No  Was application for public transport provided?: No    DISCHARGE DETAILS:    Discharge planning discussed with[de-identified] Negro Ruelas  [place] of Choice: Yes     CM contacted family/caregiver?: Yes  Were Treatment Team discharge recommendations reviewed with patient/caregiver?: Yes  Did patient/caregiver verbalize understanding of patient care needs?: Yes  Were patient/caregiver advised of the risks associated with not following Treatment Team discharge recommendations?: Yes    Contacts  Patient Contacts: Jennifer Ax- daughter  Relationship to Patient[de-identified] Family  Contact Method: Phone  Reason/Outcome: Emergency Contact, Discharge Planning, Referral, Continuity of [address]         Is the patient interested in [facility] at discharge?: Yes  [facility] requested[de-identified] Physical Therapy, Occupational [address] Name[de-identified] Other  HHA External Referral Reason (only applicable if external HHA name selected): Scheduling access issues  [facility] Provider[de-identified] PCP  Home Health Services Needed[de-identified] Gait/ADL Training, Evaluate Functional Status and Safety, Strengthening/Theraputic Exercises to Improve Function  Homebound Criteria Met[de-identified] Requires the Assistance of Another Person for Safe Ambulation or to Leave the Home, Uses an Assist Device (i.e. cane, walker, etc)  Supporting Clincal Findings[de-identified] Cognitive Deficit Requiring the Assistance of Others, Limited Endurance, Fatigues Easliy in Short Distances    Other Referral/Resources/Interventions Provided:  Interventions: Other (Specify), Transportation  Referral Comments: Family agreeable to pt returning to [facility] with an ambulatory referral for PT/OT. CM reached out to [facility] from [facility] to update her on d/c plans and confirm pt can return with ambulatory referral for PT/OT and they will arrange the services. CM requested BLS transport back to [facility] as family states they are unable to provide the transportation.     Treatment Team Recommendation: Facility Return  Discharge Destination Plan[de-identified] Facility Return  Transport at Discharge : BLS Ambulance

## 2023-07-13 NOTE — UTILIZATION REVIEW
Initial Clinical Review    Observation on 07/12 @ 1750 upgraded to Inpatient on 07/13 @ 1400. Admission: Date/Time/Statement:   Admission Orders (From admission, onward)     Ordered        07/13/23 1400  Inpatient Admission  Once            07/12/23 1750  Place in Observation  Once                      Orders Placed This Encounter   Procedures   • Inpatient Admission     Standing Status:   Standing     Number of Occurrences:   1     Order Specific Question:   Level of Care     Answer:   Med Surg [16]     Order Specific Question:   Estimated length of stay     Answer:   More than 2 Midnights     Order Specific Question:   Certification     Answer:   I certify that inpatient services are medically necessary for this patient for a duration of greater than two midnights. See H&P and MD Progress Notes for additional information about the patient's course of treatment. ED Arrival Information     Expected   -    Arrival   7/12/2023 13:22    Acuity   Urgent            Means of arrival   Ambulance    Escorted by   [facility]    Service   [facility]    Admission type   Emergency            Arrival complaint   generalized weakness           Chief Complaint   Patient presents with   • Weakness - Generalized     Per wife, patient has been getting progressively more weak for the past few weeks to months, seen yesterday for the same, wife states today it seemed harder for the patient to ambulate with his walker, patient reports fatigue       Initial Presentation: 80 y.o. male with PMH of paroxysmal A-fib, s/p PPm placement, CKD, asthma, DM, dementia [place] [facility] [place]/ weakness, fatigue and ambulatory dysfunction. Pt was noticed by [facility] [place] w/ rhinorrhea, hoarse voice quality & stumbling. He was sent to the ED ro concern for URI. Pt was in the ED yesterday confusion status post fall CT head and CT C-spine were negative and patient was discharged.    In the ED, pt confused, c/o abdominal pain. Noted to have difficulty walking. CBC CMP were unremarkable. Vitals were all within normal limits. CT abdomen and pelvis was obtained that showed possible subacute fracture of the right 11th rib and no acute intra-abdominal pathology. On exam, aaox2, abd soft, nd, nt, bowel sounds present. Given 1L IVF bolus. Admit as observation level of care for fall. Plan: Will manage conservatively with lidocaine patch for pain. PT/OT. Consulted. CM consulted     07/13 Obs to IP  IM Notes: Pt remained clinically stable throughout the course. Magnesium is normal at 2.0 and phosphorus pending.  PT OT evaluated patient and recommended return to [facility] with assist at [facility] PT OT evals on return.       ED Triage Vitals [07/12/23 1330]   Temperature Pulse Respirations Blood Pressure SpO2   (!) 97.2 °F (36.2 °C) 70 18 144/71 100 %      Temp Source Heart Rate Source Patient Position - Orthostatic VS BP Location FiO2 (%)   Oral Monitor Sitting Right arm --      Pain Score       No Pain          Wt Readings from Last 1 Encounters:   06/17/23 82.6 kg (182 lb)     Additional Vital Signs:   Date/Time Temp Pulse Resp BP MAP (mmHg) SpO2 O2 Device Patient Position - Orthostatic VS   07/13/23 15:50:31 98.2 °F (36.8 °C) 67 -- 121/69 86 96 % -- --   07/13/23 12:40:25 -- 60 -- 134/73 93 96 % -- --   07/13/23 0933 -- -- -- -- -- -- None (Room air) --   07/13/23 07:20:07 98 °F (36.7 °C) 64 -- 161/82 108 96 % -- --   07/12/23 2341 -- -- -- -- -- 97 % None (Room air) --   07/12/23 23:38:02 98 °F (36.7 °C) 62 18 156/76 103 97 % -- Lying   07/12/23 22:50:45 98.5 °F (36.9 °C) 60 -- 161/77 105 95 % -- --   07/12/23 2000 -- 60 20 163/75 108 96 % None (Room air) --   07/12/23 1800 -- 58 20 146/109 Abnormal  125 97 % None (Room air) --   07/12/23 1630 -- 60 20 170/75 108 96 % None (Room air) --   07/12/23 1530 -- 58 16 161/73 105 98 % None (Room air) --   07/12/23 1430 -- 58 18 146/69 99 99 % None (Room air) -- Pertinent Labs/Diagnostic Test Results:   CT abdomen pelvis with contrast   Final Result by Marlen Dexter DO (07/12 0518)      1. Age-indeterminate, possibly acute fracture left anterolateral 11th rib. 2. No acute intra-abdominal abnormality. 3. Small and large bowel diverticulosis without evidence of diverticulitis. 4. Numerous right renal lesions most of which are likely cysts although there are indeterminate lesions redemonstrated. Again surveillance ultrasound and/or more definitive characterization with nonemergent CT renal mass protocol may be helpful to    exclude neoplasm, assuming the pacemaker precludes MRI evaluation. Otherwise MRI would be the modality of choice. 5. Prostatomegaly. 6. Stable distal esophageal thickening without focal mass. 7. Mild splenomegaly. Additional incidental findings as above. The study was marked in [facility] for immediate notification.       Workstation performed: [id number]         CT renal protocol    (Results Pending)     07/12 EKG result: Sinus rhythm with 1st degree A-V block  RSR' or QR pattern in V1 suggests right ventricular conduction delay      Results from last 7 days   Lab Units 07/13/23  0542 07/12/23  1359 07/11/23 2004   WBC Thousand/uL 5.47 7.89 7.33   HEMOGLOBIN g/dL 12.7 12.7 12.4   HEMATOCRIT % 38.1 37.9 37.1   PLATELETS Thousands/uL 154 152 157   NEUTROS ABS Thousands/µL 3.33 5.27 5.15         Results from last 7 days   Lab Units 07/13/23 0542 07/12/23  1359 07/11/23 2004   SODIUM mmol/L 137 135 138   POTASSIUM mmol/L 3.6 4.0 4.2   CHLORIDE mmol/L 106 103 104   CO2 mmol/L 29 28 28   ANION GAP mmol/L 2 4 6   BUN mg/dL 18 18 17   CREATININE mg/dL 1.50* 1.79* 1.85*   EGFR ml/min/1.73sq m 40 33 31   CALCIUM mg/dL 9.2 9.0 9.1   MAGNESIUM mg/dL 2.0  --   --      Results from last 7 days   Lab Units 07/12/23  1359 07/11/23 2004   AST U/L 10 16   ALT U/L 28 30   ALK PHOS U/L 91 95   TOTAL PROTEIN g/dL 7.0 6.7 ALBUMIN g/dL 3.1* 3.1*   TOTAL BILIRUBIN mg/dL 0.57 0.37     Results from last 7 days   Lab Units 07/13/23  1551 07/13/23  1132 07/13/23  0558 07/12/23  2356 07/12/23  1841   POC GLUCOSE mg/dl 167* 140 120 132 122     Results from last 7 days   Lab Units 07/13/23  0542 07/12/23  1359 07/11/23 2004   GLUCOSE RANDOM mg/dL 114 189* 219*             No results found for: "BETA-HYDROXYBUTYRATE"       Results from last 7 days   Lab Units 07/11/23 2004   PH SEJAL  7.366   PCO2 SEJAL mm Hg 54.1*   PO2 SEJAL mm Hg 22.1*   HCO3 SEJAL mmol/L 30.3*   BASE EXC SEJAL mmol/L 3.8   O2 CONTENT SEJAL ml/dL 7.3   O2 HGB, VENOUS % 40.4*             Results from last 7 days   Lab Units 07/12/23  1855 07/12/23  1532 07/12/23  1359   HS TNI 0HR ng/L  --   --  7   HS TNI 2HR ng/L  --  7  --    HSTNI D2 ng/L  --  0  --    HS TNI 4HR ng/L 8  --   --    HSTNI D4 ng/L 1  --   --          Results from last 7 days   Lab Units 07/12/23  1359   LIPASE u/L 148                 Results from last 7 days   Lab Units 07/12/23  1531   CLARITY UA  Clear   COLOR UA  Light Yellow   SPEC GRAV UA  1.014   PH UA  6.0   GLUCOSE UA mg/dl Trace*   KETONES UA mg/dl Negative   BLOOD UA  Trace*   PROTEIN UA mg/dl 70 (1+)*   NITRITE UA  Negative   BILIRUBIN UA  Negative   UROBILINOGEN UA (BE) mg/dl <2.0   LEUKOCYTES UA  Negative   WBC UA /hpf 1-2   RBC UA /hpf 1-2   BACTERIA UA /hpf None Seen   EPITHELIAL CELLS WET PREP /hpf Occasional   MUCUS THREADS  Occasional*         ED Treatment:   Medication Administration from 07/12/2023 1322 to 07/12/2023 2243       Date/Time Order Dose Route Action     07/12/2023 1532 EDT sodium chloride 0.9 % bolus 1,000 mL 0 mL Intravenous Stopped     07/12/2023 1359 EDT sodium chloride 0.9 % bolus 1,000 mL 1,000 mL Intravenous New Bag     07/12/2023 1604 EDT iohexol (OMNIPAQUE) 350 MG/ML injection (SINGLE-DOSE) 85 mL 85 mL Intravenous Given        Past Medical History:   Diagnosis Date   • Allergic rhinitis    • Anxiety    • Aspiration of liquid and food per wife if he is eating too fast or talking when eating   • Asthma     as a child   • Atrial fibrillation ([address])    • CAD (coronary artery disease)    • Cancer of kidney ([address])    • COPD (chronic obstructive pulmonary disease) (HCC)    • CPAP (continuous positive airway pressure) dependence    • Dementia (HCC)     Frontal lobe   • Dementia (HCC)    • Diabetes mellitus (HCC)    • Diabetes mellitus, type 2 (HCC)    • DJD (degenerative joint disease)    • Hypercholesterolemia    • Hyperlipidemia    • Hypertension    • Incisional hernia    • Kidney disease    • Melanoma (HCC)     left leg   • Obesity    • Sleep apnea     wears CPAP   • TIA (transient ischemic attack)      Present on Admission:  • Essential hypertension  • 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 male  Admission Orders:  SCD  PT/OT  Measure BP  I/O    Scheduled Medications:  ARIPiprazole, 5 mg, Oral, Daily  atorvastatin, 10 mg, Oral, Daily  cholecalciferol, 2,000 Units, Oral, Daily  docusate sodium, 100 mg, Oral, BID  donepezil, 10 mg, Oral, HS  enoxaparin, 40 mg, Subcutaneous, Daily  ferrous sulfate, 325 mg, Oral, Daily With Breakfast  insulin detemir, 5 Units, Subcutaneous, Q12H CARLOS  insulin lispro, 1-6 Units, Subcutaneous, TID AC  losartan, 25 mg, Oral, Daily  metoprolol tartrate, 50 mg, Oral, Q12H CARLOS  propafenone, 225 mg, Oral, TID  senna, 8.6 mg, Oral, HS  sertraline, 100 mg, Oral, Daily      Continuous IV Infusions: none     PRN Meds:       IP CONSULT TO CASE MANAGEMENT    Network Utilization Review Department  ATTENTION: Please call with any questions or concerns to [phone] and carefully listen to the prompts so that you are directed to the right person. All voicemails are confidential.  Dory Baptiste all requests for admission clinical reviews, approved or denied determinations and any other requests to dedicated fax number below belonging to the campus where the patient is receiving treatment.  List of dedicated fax numbers for the Facilities:  [place] DENIALS (Administrative/Medical Necessity) [phone] [address] [facility] (Maternity/NICU/Pediatrics) [phone]   [address] [place] [address] [phone]   [facility] [address] [facility] [phone]   [address] [place] [address] [id number] [place] [address] [phone]   [address] [address] [phone]

## 2023-07-13 NOTE — PLAN OF CARE
Problem: PHYSICAL THERAPY ADULT  Goal: Performs mobility at highest level of function for planned discharge setting. See evaluation for individualized goals. Description:    Equipment Recommended: Smita Brown       See flowsheet documentation for full assessment, interventions and recommendations. Note: Prognosis: Good  Problem List: Decreased endurance, Impaired balance, Decreased mobility, Decreased cognition, Impaired judgement, Decreased safety awareness      Pt is 80 y.o. male seen for PT evaluation s/p admit to [address] [place] [address] on 7/12/2023. Pt presenting from [address] [place] [address] for weakness ambulatory dysfunction; near falls; fatigue and abdominal pain. Pt was evaluated in ED for same on 7/11/23. Pt   has a past medical history of Allergic rhinitis, Anxiety, Aspiration of liquid, Asthma, Atrial fibrillation ([address]), CAD (coronary artery disease), Cancer of kidney ([address]), COPD (chronic obstructive pulmonary disease) ([address]), CPAP (continuous positive airway pressure) dependence, Dementia ([address]), Dementia ([address]), Diabetes mellitus ([address]), Diabetes mellitus, type 2 ([address]), DJD (degenerative joint disease), Hypercholesterolemia, Hyperlipidemia, Hypertension, Incisional hernia, Kidney disease, Melanoma ([address]), Obesity, Sleep apnea, and TIA (transient ischemic attack). Comorbidities affecting pt's physical performance at time of assessment listed above  Personal factors affecting pt at time of IE include:  advanced age, cognition;  past experience, inability to perform IADLs, inability to perform ADLs, inability to ambulate household distances w/o AD; near falls; progressive weakness, limited insight into impairments + limited safety awareness.    Due to acute medical issues, ongoing medical workup for primary dx; confusion; fall risk, increased reliance on more restrictive AD compared to baseline;  decreased activity tolerance compared to baseline, increased assistance needed from caregiver at current time, continuous monitoring, trending labs; multiple recent ED visits;  lines, incontinence;  decline in overall functional mobility status; health management issues; note unstable clinical picture (high complexity). At baseline pt resides in [address] [place] [address] (recently moved there ~2 months ago) and was ambualtory w/ RW PTA; household distances. Pt is a poor historian w/ hx of dementia and presents on IE A+O to person only. Currently pt  is requiring [place] A for bed skills; [place] for functional transfers and [place] for ambulation w/ RW 60'x1 w/ 2 rotational turns- distances self limited by pt as pt lunch delivered. Catalina Guerrero Pt presents  currently w/ overall mobility deficits 2* to: decreased LE strength/AROM; limited flexibility;  generalized weakness/ deconditioning; decreased endurance; decreased activity tolerance; decreased coordination; impaired balance; gait deviations; decreased safety awareness; SOB/STALLWORTH; fatigue; impaired safety and judgement; limited insight into current deficits; bed/ chair alarms; multiple lines; Pt currently at risk for falls. (Please find additional objective findings from PT assessment regarding body systems outlined above.) Pt will continue to benefit from skilled PT interventions to address stated impairments; to maximize functional potential; for ongoing pt/ family training; and DME needs. PT is currently recommending return to [facility] w/ assist at [facility] + PT/ OT ana cristina on return vs rehab if facility unable to accomodate. Pt/ family agreeable to plan and goals as stated on evaluation. PT Discharge Recommendation: Return to facility with rehabilitation services (if facility able to provide care at [address] [facility] vs rehab if facility unable)    See flowsheet documentation for full assessment.

## 2023-07-13 NOTE — PROGRESS NOTES
Called access line      Zahra Brown  08/29/21 2054 INTERNAL MEDICINE RESIDENCY PROGRESS NOTE     Name: Ariana Lawson   Age & Sex: 80 y.o. male   MRN: [id number]  Unit/Bed#: CW2 215-01   Encounter: [id number]  Team: SOD Team B     PATIENT INFORMATION     Name: Ariaan Lawson   Age & Sex: 80 y.o. male   MRN: [id number]  Hospital Stay Days: 0    ASSESSMENT/PLAN     Active Problems:    Essential hypertension    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[address])    Paroxysmal atrial fibrillation ([address])    Type 2 diabetes mellitus with kidney complication, with long-term current use of insulin ([address])    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, or unspecified chronic kidney disease    Fall      Fall  Assessment & Plan  Patient is s/p fall, unwitnessed. Has had hx of multiple falls in the past   CT Chest Abd was obtained and showed   There is an age-indeterminate, possibly acute fracture of the left anterolateral 11th rib. There is some edematous changes of the overlying chest wall which could represent contusion suggesting this may be an acute or subacute injury.   This was seen on previous imaging with known rib fx from previous fall  Previous CT Head and CT Spine negative     - PT recommendatio: Return to [facility] w/ assist at CLOF + PT/ OT evals on return vs rehab if facility unable to accomodate.  -Case management consulted   - Magnesium is normal at 2.0 and phosphorus pending    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, or unspecified chronic kidney disease  Assessment & Plan  Lab Results   Component Value Date    EGFR 40 07/13/2023    EGFR 33 07/12/2023    EGFR 31 07/11/2023    CREATININE 1.50 (H) 07/13/2023    CREATININE 1.79 (H) 07/12/2023    CREATININE 1.85 (H) 07/11/2023       Creatinine appears to be at baseline according to records, it has been as high was 2 in 2021 and higher in the past.     Plan:  - Continue to monitor electrolytes  - He has a condom-catheter in place     Type 2 diabetes mellitus with kidney complication, with long-term current use of insulin [facility])  Assessment & Plan  Lab Results   Component Value Date    HGBA1C 8.0 (H) 05/25/2023       Recent Labs     07/12/23  1841   POCGLU 122       Blood Sugar Average: Last 72 hrs:  (P) 122   Home hypoglycemic regimen includes:  • Levemir 10 units twice daily    • Will start Levemir 5 units BID with sliding scale   • 4 times daily Accu-Cheks  • Diabetic diet  • Hypoglycemia protocol    Paroxysmal atrial fibrillation [facility])  Assessment & Plan  Patient follows with cardiologist in the outpatient setting. Per cardiology note from 2/14/2023, warfarin has been discontinued given history of bruising/bleeding and frequent falls, will consider restarting    • Continue metoprolol tartrate 50 mg BID and propafenone 225mg tid. 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[address])  Assessment & Plan  Known hx of dementia with baseline alert and oriented x1-2; memantine unknown frequency because facility medication list not here     Plan:  • Continue donepezil 10 mg at bedtime and Zoloft 100 mg daily;   • Would recommend revaluation donepezil utility in Mr. Clarke's case. • He is also on abilify 5 mg      Essential hypertension  Assessment & Plan  BP stable. Has been elevated, losartan 25 mg qd was not restarted but was evidently part of prehospital medications  • Metoprolol tartrate 50 mg BID, propafenone 225 mg 3 times daily  • Continue to monitor vital signs per unit protocol  • Restart losartan 25 mg qd      Disposition: Observation transitioned to inpatient, awaiting placement    SUBJECTIVE     Patient seen and examined. No acute events overnight. He is a poor historian as noted in H&P. He states that he has no acute concerns at this time. He is feeling well and can't remember exactly why he was brought in, just knows that he came in with his wife. He states that he walks by himself and that he lives at home. He scored 19-20/30 on MMSE.  No other questions or concerns at this time. Review of Systems   Constitutional: Negative for chills and fever. HENT: Positive for congestion. Negative for rhinorrhea, sneezing and sore throat. Eyes: Negative for discharge, redness and visual disturbance. Respiratory: Negative for cough, chest tightness and shortness of breath. Cardiovascular: Negative for chest pain, palpitations and leg swelling. Gastrointestinal: Negative for abdominal distention, abdominal pain, constipation, diarrhea, nausea and vomiting. Genitourinary: Negative for difficulty urinating and dysuria. Musculoskeletal: Negative for arthralgias. Neurological: Negative for dizziness, weakness, light-headedness, numbness and headaches. Psychiatric/Behavioral: Negative for agitation. OBJECTIVE     Vitals:    23 2250 23 2338 23 2341 23 0720   BP: 161/77 156/76  161/82   BP Location:       Pulse: 60 62  64   Resp:  18     Temp: 98.5 °F (36.9 °C) 98 °F (36.7 °C)  98 °F (36.7 °C)   TempSrc:  Oral     SpO2: 95% 97% 97% 96%      Temperature:   Temp (24hrs), Av.9 °F (36.6 °C), Min:97.2 °F (36.2 °C), Max:98.5 °F (36.9 °C)    Temperature: 98 °F (36.7 °C)  Intake & Output:  I/O        0701  / 0700 / 0701   0700  0701   0700    P. O.   270    IV Piggyback  1000     Total Intake  1000 270    Urine  550     Total Output  550     Net  +450 +270           Unmeasured Urine Occurrence  1 x         Weights: There is no height or weight on file to calculate BMI. Weight (last 2 days)     None        Physical Exam  Constitutional:       General: He is awake. Appearance: Normal appearance. He is not toxic-appearing or diaphoretic. HENT:      Head: Normocephalic. Nose: Nose normal. No rhinorrhea. Mouth/Throat:      Lips: Pink. Mouth: Mucous membranes are moist.      Pharynx: Oropharynx is clear. Uvula midline. Eyes:      Extraocular Movements:      Right eye: Normal extraocular motion. Left eye: Normal extraocular motion. Conjunctiva/sclera: Conjunctivae normal.   Cardiovascular:      Rate and Rhythm: Normal rate and regular rhythm. Pulses: Normal pulses. Heart sounds: S1 normal and S2 normal. Heart sounds are distant. Pulmonary:      Effort: Pulmonary effort is normal. No accessory muscle usage, respiratory distress or retractions. Breath sounds: Normal breath sounds and air entry. No decreased breath sounds or wheezing. Abdominal:      General: Abdomen is flat. Bowel sounds are normal.      Palpations: Abdomen is soft. Tenderness: There is no abdominal tenderness. Genitourinary:     Comments: Condom catheter in place  Musculoskeletal:      Right lower leg: No edema. Left lower leg: No edema. Comments: Bedside assessment to sit up and then stand up with assistance to walker demonstrated poor strength and concern for high risk of falls   Neurological:      Mental Status: He is alert. GCS: GCS eye subscore is 4. GCS verbal subscore is 5. GCS motor subscore is 6. Cranial Nerves: Cranial nerves 2-12 are intact. Sensory: Sensation is intact. Comments: Oriented x2- self and place  MMSE 19/20  Right extremities are 4+/5 and left extremities are 5/5  Intention tremor of the right arm on finger to nose, otherwise normal heel to shin\     Psychiatric:         Attention and Perception: Perception normal. He is attentive. Mood and Affect: Mood and affect normal. Mood is not anxious or depressed. Speech: Speech normal. He is communicative. Behavior: Behavior is not agitated or aggressive. Behavior is cooperative. Thought Content: Thought content normal. Thought content is not paranoid or delusional.         Cognition and Memory: Cognition is impaired. He exhibits impaired recent memory. LABORATORY DATA     Labs: I have personally reviewed pertinent reports.   Results from last 7 days   Lab Units 07/13/23  9725 07/12/23  1359 07/11/23 2004   WBC Thousand/uL 5.47 7.89 7.33   HEMOGLOBIN g/dL 12.7 12.7 12.4   HEMATOCRIT % 38.1 37.9 37.1   PLATELETS Thousands/uL 154 152 157   NEUTROS PCT % 61 68 70   MONOS PCT % 12 13* 13*   EOS PCT % 2 0 1      Results from last 7 days   Lab Units 07/13/23  0542 07/12/23  1359 07/11/23 2004   POTASSIUM mmol/L 3.6 4.0 4.2   CHLORIDE mmol/L 106 103 104   CO2 mmol/L 29 28 28   BUN mg/dL 18 18 17   CREATININE mg/dL 1.50* 1.79* 1.85*   CALCIUM mg/dL 9.2 9.0 9.1   ALK PHOS U/L  --  91 95   ALT U/L  --  28 30   AST U/L  --  10 16                            IMAGING & DIAGNOSTIC TESTING     Radiology Results: I have personally reviewed pertinent reports. CT abdomen pelvis with contrast    Result Date: 7/12/2023  Impression: 1. Age-indeterminate, possibly acute fracture left anterolateral 11th rib. 2. No acute intra-abdominal abnormality. 3. Small and large bowel diverticulosis without evidence of diverticulitis. 4. Numerous right renal lesions most of which are likely cysts although there are indeterminate lesions redemonstrated. Again surveillance ultrasound and/or more definitive characterization with nonemergent CT renal mass protocol may be helpful to exclude neoplasm, assuming the pacemaker precludes MRI evaluation. Otherwise MRI would be the modality of choice. 5. Prostatomegaly. 6. Stable distal esophageal thickening without focal mass. 7. Mild splenomegaly. Additional incidental findings as above. The study was marked in [facility] for immediate notification. Workstation performed: [id number]     Other Diagnostic Testing: I have personally reviewed pertinent reports.     ACTIVE MEDICATIONS     Current Facility-Administered Medications   Medication Dose Route Frequency   • ARIPiprazole (ABILIFY) tablet 5 mg  5 mg Oral Daily   • atorvastatin (LIPITOR) tablet 10 mg  10 mg Oral Daily   • cholecalciferol (VITAMIN D3) tablet 2,000 Units  2,000 Units Oral Daily   • docusate sodium (COLACE) capsule 100 mg  100 mg Oral BID   • donepezil (ARICEPT) tablet 10 mg  10 mg Oral HS   • enoxaparin (LOVENOX) subcutaneous injection 40 mg  40 mg Subcutaneous Daily   • ferrous sulfate tablet 325 mg  325 mg Oral Daily With Breakfast   • insulin detemir (LEVEMIR) subcutaneous injection 5 Units  5 Units Subcutaneous Q12H [facility]   • insulin lispro (HumaLOG) 100 units/mL subcutaneous injection 1-6 Units  1-6 Units Subcutaneous TID AC   • metoprolol tartrate (LOPRESSOR) tablet 50 mg  50 mg Oral Q12H CARLOS   • propafenone (RYTHMOL) tablet 225 mg  225 mg Oral TID   • senna (SENOKOT) tablet 8.6 mg  8.6 mg Oral HS   • sertraline (ZOLOFT) tablet 100 mg  100 mg Oral Daily       VTE Pharmacologic Prophylaxis: Enoxaparin (Lovenox)  VTE Mechanical Prophylaxis: sequential compression device and foot pump applied  ==  [address]  MS-4

## 2023-07-13 NOTE — PLAN OF CARE
Problem: OCCUPATIONAL THERAPY ADULT  Goal: Performs self-care activities at highest level of function for planned discharge setting. See evaluation for individualized goals. Description: Treatment Interventions: ADL retraining, Functional transfer training, Endurance training, UE strengthening/ROM, Cognitive reorientation, Patient/family training, Equipment evaluation/education, Compensatory technique education, Energy conservation, Activityengagement          See flowsheet documentation for full assessment, interventions and recommendations. Note: Limitation: Decreased ADL status, Decreased Safe judgement during ADL, Decreased cognition, Decreased endurance, Decreased self-care trans, Decreased high-level ADLs  Prognosis: Good  Assessment: Pt is a 80 y.o. male who was admitted to [address] [place] [address] on 7/12/2023 with ambulatory dysfunction, possible URI. Of note, pt presented to ED 7/11 s/p fall; CT head and c-spine were negative and pt was discharged. Pt  has a past medical history of Allergic rhinitis, Anxiety, Aspiration of liquid, Asthma, Atrial fibrillation ([address]), CAD (coronary artery disease), Cancer of kidney ([address]), COPD (chronic obstructive pulmonary disease) ([address]), CPAP (continuous positive airway pressure) dependence, Dementia ([address]), Dementia ([address]), Diabetes mellitus ([address]), Diabetes mellitus, type 2 ([address]), DJD (degenerative joint disease), Hypercholesterolemia, Hyperlipidemia, Hypertension, Incisional hernia, Kidney disease, Melanoma ([address]), Obesity, Sleep apnea, and TIA (transient ischemic attack). Pt is a poor historian - per EMR pt ambulated MOD IND with RW at baseline. Pt lives [facility] memory care unit (moved there ~2 months ago). Currently pt requires MIN A for overall ADLS and for functional mobility/transfers.  Pt currently presents with impairments in the following categories -difficulty performing ADLS, difficulty performing IADLS  and limited insight into deficits activity tolerance, endurance, standing balance/tolerance, sitting balance/tolerance, memory, insight, safety  and attention . These impairments, as well as pt's fatigue, pain and risk for falls  limit pt's ability to safely engage in all baseline areas of occupation, includinggrooming, bathing, dressing, toileting, functional mobility/transfers, community mobility and leisure activities  From OT standpoint, recommend [address] upon D/C if facility is able to manage pt at current assist levels - if facility is unable to, would recommend rehab. OT will continue to follow to address the below stated goals.      OT Discharge Recommendation: Return to facility with rehabilitation services (if facility is able to provide assist at pt's current level vs. rehab if not)

## 2023-07-13 NOTE — INCIDENTAL FINDINGS
CT chest/abdomen/pelvis 7/12/23: Numerous right renal lesions most of which are likely cysts although there are indeterminate lesions redemonstrated. Again surveillance ultrasound and/or more definitive characterization with nonemergent CT renal mass protocol may be helpful to exclude neoplasm, assuming the pacemaker precludes MRI evaluation. Otherwise MRI would be the modality of choice. CT imaging results reviewed with patient's stepdaughter. Discussed what was recommended per radiology. Will order outpatient CT renal protocol to be completed.

## 2023-07-13 NOTE — DISCHARGE SUMMARY
INTERNAL MEDICINE RESIDENCY DISCHARGE SUMMARY     Patrice Sarmiento   80 y.o. male  MRN: [id number]  Room[facility]Bed: [facility] 215/[facility] 215-01 [facility]   Encounter: [id number]    Principal Problem:    Fall  Active Problems:    Essential hypertension    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[address])    Paroxysmal atrial fibrillation ([address])    Type 2 diabetes mellitus with kidney complication, with long-term current use of insulin (HCC)    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, or unspecified chronic kidney disease      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, or unspecified chronic kidney disease  Assessment & Plan  Lab Results   Component Value Date    EGFR 40 07/13/2023    EGFR 33 07/12/2023    EGFR 31 07/11/2023    CREATININE 1.50 (H) 07/13/2023    CREATININE 1.79 (H) 07/12/2023    CREATININE 1.85 (H) 07/11/2023       Creatinine appears to be at baseline according to records, it has been as high was 2 in 2021 and higher in the past.     Plan:  - Continue to monitor electrolytes  - He has a condom-catheter in place     Type 2 diabetes mellitus with kidney complication, with long-term current use of insulin [facility])  Assessment & Plan  Lab Results   Component Value Date    HGBA1C 8.0 (H) 05/25/2023       Recent Labs     07/12/23  1841   POCGLU 122       Blood Sugar Average: Last 72 hrs:  (P) 122   Home hypoglycemic regimen includes:  • Levemir 10 units twice daily    • Will start Levemir 5 units BID with sliding scale   • 4 times daily Accu-Cheks  • Diabetic diet  • Hypoglycemia protocol    Paroxysmal atrial fibrillation [facility])  Assessment & Plan  Patient follows with cardiologist in the outpatient setting.   Per cardiology note from 2/14/2023, warfarin has been discontinued given history of bruising/bleeding and frequent falls, will consider restarting    • Continue metoprolol tartrate 50 mg BID and propafenone 225mg tid. 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[address])  Assessment & Plan  Known hx of dementia with baseline alert and oriented x1-2; memantine unknown frequency because facility medication list not here     Plan:  • Continue donepezil 10 mg at bedtime and Zoloft 100 mg daily;   • Would recommend revaluation donepezil utility in Mr. Clarke's case. • He is also on abilify 5 mg      Essential hypertension  Assessment & Plan  BP stable. Has been elevated, losartan 25 mg qd was not restarted but was evidently part of prehospital medications  • Metoprolol tartrate 50 mg BID, propafenone 225 mg 3 times daily  • Continue to monitor vital signs per unit protocol  • Restart losartan 25 mg qd    * Fall  Assessment & Plan  Patient is s/p fall, unwitnessed. Has had hx of multiple falls in the past   CT Chest Abd was obtained and showed   There is an age-indeterminate, possibly acute fracture of the left anterolateral 11th rib. There is some edematous changes of the overlying chest wall which could represent contusion suggesting this may be an acute or subacute injury. This was seen on previous imaging with known rib fx from previous fall  Previous CT Head and CT Spine negative     - PT/OT recommendation: Return to [facility] w/ assist at CLOF + PT/ OT evals on return vs rehab if facility unable to accomodate.  -Case management consulted   - Magnesium is normal at 2.0 and phosphorus pending        [facility]     Patient is an 80-year-old male who presented on 35 20 43 from [facility] for several weeks of ambulatory dysfunction. Of note patient was discharged on 711 after he presented for generalized weakness. Status post fall CT head and CT C-spine were negative at that time and patient was discharged back to his facility. The patient has a history of dementia and is a poor historian.   Per her daughter-in-law patient is mentating at his baseline currently and was recently moved to the memory unit at [facility]. In the ED, patient was noted to be confused and complaining of abdominal pain. Work-up in the ED showed CBC and CMP which were unremarkable. Vital signs within normal limits. CT abdomen pelvis showed possible subacute fracture of the right 11th rib and no acute intra abdominal pathology, Numerous right renal lesions most of which are likely cysts although there are indeterminate lesions redemonstrated. Again surveillance ultrasound and/or more definitive characterization with nonemergent CT renal mass protocol may be helpful to exclude neoplasm, assuming the pacemaker precludes MRI evaluation. Otherwise MRI would be the modality of choice. .  Patient was admitted to [facility] for ambulatory dysfunction. He remained clinically stable throughout the course. PT OT evaluated patient and recommended return to [facility] with assist at [facility]+ PT OT [facility] on return. Treatment plan discussed with patient's daughter in law earlier in the morning who is agreeable for return back to soccer and manner as patient has had multiple recent facility moves and currently resides there with his wife. The results of his CT abdomen was reviewed with his daughter and recommendation for nonemergent renal protocol CT, which was agreed upon to be completed as an outpatient.   Upon discharge patient was alert and oriented x2 (which is his baseline per daughter-in-law)      DISCHARGE INFORMATION     PCP at Discharge: Zachariah Crum MD      Admitting Provider: Shantell Paz MD  Admission Date: 7/12/2023    Discharge Provider: Shantell Paz MD  Discharge Date: 07/13/23    Discharge Disposition: Home/Self Care  Discharge Condition: stable  Discharge with Lines: no    Discharge Diet: regular diet  Activity Restrictions: none  Test Results Pending at Discharge: n/a    Discharge Diagnoses:  Principal Problem:    Fall  Active Problems:    Essential hypertension    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[address])    Paroxysmal atrial fibrillation ([address])    Type 2 diabetes mellitus with kidney complication, with long-term current use of insulin (HCC)    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, or unspecified chronic kidney disease  Resolved Problems:    * No resolved hospital problems. *      Consulting Providers:      Diagnostic & Therapeutic Procedures Performed:  CT abdomen pelvis with contrast    Result Date: 7/12/2023  Impression: 1. Age-indeterminate, possibly acute fracture left anterolateral 11th rib. 2. No acute intra-abdominal abnormality. 3. Small and large bowel diverticulosis without evidence of diverticulitis. 4. Numerous right renal lesions most of which are likely cysts although there are indeterminate lesions redemonstrated. Again surveillance ultrasound and/or more definitive characterization with nonemergent CT renal mass protocol may be helpful to exclude neoplasm, assuming the pacemaker precludes MRI evaluation. Otherwise MRI would be the modality of choice. 5. Prostatomegaly. 6. Stable distal esophageal thickening without focal mass. 7. Mild splenomegaly. Additional incidental findings as above. The study was marked in [facility] for immediate notification. Workstation performed: [id number]       Code Status: Level 1 - Full Code  Advance Directive & Living Will: Not Received  Power of :    POLST:      Medications:  Current Discharge Medication List        Current Discharge Medication List        Current Discharge Medication List      CONTINUE these medications which have NOT CHANGED    Details   ARIPiprazole (ABILIFY) 5 mg tablet Take 1 tablet (5 mg total) by mouth daily Do not start before June 14, 2023.   Refills: 0    Associated Diagnoses: Unspecified mood (affective) disorder (HCC)      atorvastatin (LIPITOR) 10 mg tablet Take 1 tablet (10 mg total) by mouth daily  Qty: 90 tablet, Refills: 1    Associated Diagnoses: Hyperlipidemia, unspecified hyperlipidemia type cholecalciferol (VITAMIN D3) 1,000 units tablet Take 2,000 Units by mouth daily      docusate sodium (COLACE) 100 mg capsule Take 1 capsule (100 mg total) by mouth 2 (two) times a day  Qty: 60 capsule, Refills: 0    Associated Diagnoses: Constipation, unspecified constipation type      donepezil (ARICEPT) 10 mg tablet Take 1 tablet (10 mg total) by mouth daily at bedtime  Qty: 90 tablet, Refills: 3    Associated Diagnoses: Dementia without behavioral disturbance ([facility])      ferrous sulfate 325 (65 Fe) mg tablet Take 325 mg by mouth as needed       insulin detemir (LEVEMIR) 100 units/mL subcutaneous injection Inject 10 Units under the skin 2 (two) times a day  Qty: 20 mL, Refills: 0    Associated Diagnoses: Type 2 diabetes mellitus with diabetic neuropathy, with long-term current use of insulin ([facility])      Insulin Pen Needle (Novofine Pen Needle) 32G X 6 MM MISC Use 4 (four) times a day  Qty: 200 each, Refills: 5    Associated Diagnoses: Type 2 diabetes mellitus with other specified complication, with long-term current use of insulin ([facility])      Insulin Syringe-Needle U-100 (BD Insulin Syringe U/F) 30G X 1/2" 0.5 ML MISC Use 4 (four) times a day  Qty: 400 each, Refills: 1    Comments: USED 4 TIMES DAILY - DX E11.9  Associated Diagnoses: Type 2 diabetes mellitus with diabetic neuropathy, with long-term current use of insulin ([facility])      Insulin Syringe-Needle U-100 (INSULIN SYRINGE .5CC/30GX5/16") 30G X 5/16" 0.5 ML MISC 4 (four) times a day      magnesium Oxide (MAG-OX) 400 mg TABS Take 1 tablet (400 mg total) by mouth daily Do not start before June 14, 2023.   Qty: 30 tablet, Refills: 0    Associated Diagnoses: Medical clearance for psychiatric admission      metoprolol tartrate (LOPRESSOR) 100 mg tablet 50mg in AM  and 100mg at bedtime  Qty: 90 tablet, Refills: 0    Associated Diagnoses: Atrial fibrillation, unspecified type ([facility])      propafenone (RYTHMOL) 225 mg tablet Take 1 tablet (225 mg total) by mouth 3 (three) times a day  Qty: 90 tablet, Refills: 3    Associated Diagnoses: Paroxysmal atrial fibrillation (HCC)      senna (SENOKOT) 8.6 mg Take 1 tablet (8.6 mg total) by mouth daily at bedtime  Qty: 30 tablet, Refills: 0    Associated Diagnoses: Constipation, unspecified constipation type      sertraline (ZOLOFT) 100 mg tablet Take 1 tablet (100 mg total) by mouth daily  Qty: 90 tablet, Refills: 1    Associated Diagnoses: Mild episode of recurrent major depressive disorder (HCC)             Allergies: Allergies   Allergen Reactions   • Ambien [Zolpidem Tartrate] Hallucinations   • Bextra [Valdecoxib] Hives   • Dust Mite Extract Sneezing   • Lyrica [Pregabalin] Confusion   • Mold Extract [Trichophyton] Sneezing   • Pollen Extract Sneezing   • Tree Extract Other (See Comments) and Sneezing     congestion       FOLLOW-UP     PCP Outpatient Follow-up:  Yes, follow-up with PCP in 7 to 10 days. At that time consider stopping donepezil. Renal protocol CT ordered to be completed as an outpatient. Consulting Providers Follow-up:  none    Active Issues Requiring Follow-up:   Follow-up results of CT renal protocol. Discharge Statement:   I spent 1 hour minutes discharging the patient. This time was spent on the day of discharge. I had direct contact with the patient on the day of discharge. Additional documentation is required if more than 30 minutes were spent on discharge. Portions of the record may have been created with voice recognition software. Occasional wrong word or "sound a like" substitutions may have occurred due to the inherent limitations of voice recognition software.   Read the chart carefully and recognize, using context, where substitutions have occurred.    ==  Meg Ovalles, 04 [facility]  Internal Medicine Resident PGY-2

## 2023-07-13 NOTE — PHYSICAL THERAPY NOTE
PHYSICAL THERAPY EVALUATION  NAME:  Rian Singh  DATE: 07/13/23    AGE:   80 y.o. Mrn:   [id number]  ADMIT DX:  Fatigue [R53.83]  Weakness [R53.1]  Abnormal CT of the abdomen [R93.5]  Generalized weakness [R53.1]  Ambulatory dysfunction [R26.2]    Past Medical History:   Diagnosis Date    Allergic rhinitis     Anxiety     Aspiration of liquid     and food per wife if he is eating too fast or talking when eating    Asthma     as a child    Atrial fibrillation ([address])     CAD (coronary artery disease)     Cancer of kidney (HCC)     COPD (chronic obstructive pulmonary disease) (HCC)     CPAP (continuous positive airway pressure) dependence     Dementia ([address])     Frontal lobe    Dementia (HCC)     Diabetes mellitus ([address])     Diabetes mellitus, type 2 ([address])     DJD (degenerative joint disease)     Hypercholesterolemia     Hyperlipidemia     Hypertension     Incisional hernia     Kidney disease     Melanoma ([address])     left leg    Obesity     Sleep apnea     wears CPAP    TIA (transient ischemic attack)      Past Surgical History:   Procedure Laterality Date    CARDIAC PACEMAKER PLACEMENT      CHOLECYSTECTOMY      COLONOSCOPY      HERNIA REPAIR      Incisional hernia    NEPHRECTOMY Left 2005    NV ESOPHAGOGASTRODUODENOSCOPY SUBMUCOSAL INJECTION N/A 9/21/2016    Procedure: ESOPHAGOGASTRODUODENOSCOPY (EGD); Surgeon: Bryan Valle MD;  Location: [facility]; Service: Gastroenterology    NV ESOPHAGOGASTRODUODENOSCOPY SUBMUCOSAL INJECTION N/A 2/7/2018    Procedure: ESOPHAGOGASTRODUODENOSCOPY (EGD); Surgeon: Bryan Valle MD;  Location: [facility]; Service: Gastroenterology    NV ESOPHAGOGASTRODUODENOSCOPY SUBMUCOSAL INJECTION N/A 4/3/2019    Procedure: ESOPHAGOGASTRODUODENOSCOPY (EGD) WITH BOTOX;  Surgeon: Bryan Valle MD;  Location: [facility];   Service: Gastroenterology    ROTATOR CUFF REPAIR      TONSILLECTOMY         Length Of Stay: 0  PHYSICAL THERAPY EVALUATION :    07/13/23 1215   PT Last Visit   PT Visit Date 07/13/23 Note Type   Note type Evaluation   Pain Assessment   Pain Assessment Tool 0-10   Pain Score No Pain   Restrictions/Precautions   Weight Bearing Precautions Per Order No   Other Precautions Fall Risk;Multiple lines; Bed Alarm; Chair Alarm;Cognitive; Impulsive   Home Living   Type of Home Other (Comment)  ([facility] care unit.)   Home Layout One level   [place]   Prior Function   Level of [place] Needs assistance with ADLs; Independent with functional mobility; Needs assistance with [address] staff   Receives Help From Other (Comment)   Falls in the last 6 months   (unknown- pt is a poor/ unreliable historian)   Vocational Retired   Comments At baseline pt resides in [address] [place] [address] (recently moved there ~2 months ago) and was ambualtory w/ RW PTA; household distances. Pt is a poor historian w/ hx of dementia and presents on IE A+O to person only. Cognition   Overall Cognitive Status Impaired   Arousal/Participation Cooperative   Orientation Level Oriented to person;Disoriented to place; Disoriented to time;Disoriented to situation   Memory Decreased long term memory;Decreased recall of biographical information;Decreased short term memory;Decreased recall of recent events;Decreased recall of precautions   Following Commands Follows one step commands with increased time or repetition   Comments pt is overal pleasantly confused- agreeable to walk and participate in PT eval   Subjective   Subjective "I feel older than I really am"   RUE Assessment   RUE Assessment WFL   LUE Assessment   LUE Assessment WFL   RLE Assessment   RLE Assessment WFL   LLE Assessment   LLE Assessment WFL   Coordination   Movements are Fluid and Coordinated 1   Sensation WFL   Finger to Nose & Finger to Finger  Intact   Light Touch   RLE Light Touch Grossly intact   LLE Light Touch Grossly intact   Sharp/Dull   RLE Sharp/Dull Grossly intact   LLE Sharp/Dull Grossly intact   Proprioception   RLE Proprioception Grossly intact   LLE Proprioception Grossly Intact   Bed Mobility   Supine to Sit 4  Minimal assistance   Additional items Increased time required;Verbal cues   Sit to Supine 4  Minimal assistance   Additional items Increased time required;Verbal cues;LE management   Additional Comments pt placed w/ bed in chair position for lunch post ambualtion and PT session w/ alarm intact and all need in reach   Transfers   Sit to Stand 4  Minimal assistance   Stand to Sit 4  Minimal assistance   Toilet transfer 4  Minimal assistance   Ambulation/Elevation   Gait pattern Improper Weight shift;Narrow HIEU;Shuffling; Foward flexed; Excessively slow   Gait Assistance 4  Minimal assist   Additional items Assist x 1;Verbal cues; Tactile cues   Assistive Device Rolling walker   Distance 60'x1   Stair Management Assistance Not tested   Balance   Static Sitting Fair +   Dynamic Sitting Fair -   Static Standing Poor +   Dynamic Standing Poor +   Ambulatory Poor +  (rw)   Endurance Deficit   Endurance Deficit No   Activity Tolerance   Activity Tolerance Patient tolerated treatment well   Medical Staff Made Aware RN   Nurse Made Aware yes- cleared for session   Assessment Pt is 80 y.o. male seen for PT evaluation s/p admit to [address] [place] [address] on 7/12/2023. Pt presenting from [address] [place] [address] Unit for weakness ambulatory dysfunction; near falls; fatigue and abdominal pain. Pt was evaluated in ED for same on 7/11/23.  Pt   has a past medical history of Allergic rhinitis, Anxiety, Aspiration of liquid, Asthma, Atrial fibrillation ([address]), CAD (coronary artery disease), Cancer of kidney ([address]), COPD (chronic obstructive pulmonary disease) ([address]), CPAP (continuous positive airway pressure) dependence, Dementia ([address]), Dementia ([address]), Diabetes mellitus ([address]), Diabetes mellitus, type 2 ([address]), DJD (degenerative joint disease), Hypercholesterolemia, Hyperlipidemia, Hypertension, Incisional hernia, Kidney disease, Melanoma ([address]), Obesity, Sleep apnea, and TIA (transient ischemic attack). Comorbidities affecting pt's physical performance at time of assessment listed above  Personal factors affecting pt at time of IE include:  advanced age, cognition;  past experience, inability to perform IADLs, inability to perform ADLs, inability to ambulate household distances w/o AD; near falls; progressive weakness, limited insight into impairments + limited safety awareness. Due to acute medical issues, ongoing medical workup for primary dx; confusion; fall risk, increased reliance on more restrictive AD compared to baseline;  decreased activity tolerance compared to baseline, increased assistance needed from caregiver at current time, continuous monitoring, trending labs; multiple recent ED visits;  lines, incontinence;  decline in overall functional mobility status; health management issues; note unstable clinical picture (high complexity). At baseline pt resides in [address] [place] [address] (recently moved there ~2 months ago) and was ambualtory w/ RW PTA; household distances. Pt is a poor historian w/ hx of dementia and presents on IE A+O to person only. Currently pt  is requiring [place] A for bed skills; [place] for functional transfers and [place] for ambulation w/ RW 60'x1 w/ 2 rotational turns- distances self limited by pt as pt lunch delivered. Pt presents  currently w/ overall mobility deficits 2* to: decreased LE strength/AROM; limited flexibility;  generalized weakness/ deconditioning; decreased endurance; decreased activity tolerance; decreased coordination; impaired balance; gait deviations; decreased safety awareness; SOB/STALLWORTH; fatigue; impaired safety and judgement; limited insight into current deficits; bed/ chair alarms; multiple lines; Pt currently at risk for falls.   (Please find additional objective findings from PT assessment regarding body systems outlined above.) Pt will continue to benefit from skilled PT interventions to address stated impairments; to maximize functional potential; for ongoing pt/ family training; and DME needs. PT is currently recommending return to [facility] w/ assist at CLO + PT/ OT ana cristina on return vs rehab if facility unable to accomodate. Pt/ family agreeable to plan and goals as stated on evaluation. Prognosis Good   Problem List Decreased endurance; Impaired balance;Decreased mobility; Decreased cognition; Impaired judgement;Decreased safety awareness   Goals   Patient Goals to go back to his home   STG Expiration Date 07/27/23   Short Term Goal #1 In 14days pt will complete: 1) Bed mobility skills with S to facilitate safe return to previous living environment and decrease burden on caregivers. 2) Functional transfers with S  to facilitate safe return to previous living environment  3) Ambulation with RW S 150'x2' without LOB and stable vitals for safe ambulation in home/ community environment. 4) Improve balance by 1 grade in order to decrease fall risk. 6) Improve LE strength grades by 1 to increase independence w/ all functional mobility, transfers and gait. 7) PT for ongoing pt and family education; DME needs and D/C planning to promote highest level of function in least restrictive environment.    Recommendation   PT Discharge Recommendation Return to facility with rehabilitation services  (if facility able to provide care at [address] vs rehab if facility unable)   Equipment Recommended [address] walker   AM-PAC Basic Mobility Inpatient   Turning in Flat Bed Without Bedrails 3   Lying on Back to Sitting on Edge of Flat Bed Without Bedrails 3   Moving Bed to Chair 3   Standing Up From Chair Using Arms 3   Walk in Room 3   Climb 3-5 Stairs With Railing 2   Basic Mobility Inpatient Raw Score 17   Basic Mobility Standardized Score 39.67   Highest Level Of Mobility   JH-HLM Goal 5: Stand one or more mins   JH-HLM Achieved 7: Walk 25 feet or more

## 2023-07-13 NOTE — PHYSICAL THERAPY NOTE
Pt is 80 y.o. male seen for PT evaluation s/p admit to [facility] on 7/12/2023. Pt presenting from [address] [place] [address] Unit for weakness ambulatory dysfunction; near falls; fatigue and abdominal pain. Pt was evaluated in ED for same on 7/11/23. Pt   has a past medical history of Allergic rhinitis, Anxiety, Aspiration of liquid, Asthma, Atrial fibrillation ([address]), CAD (coronary artery disease), Cancer of kidney ([address]), COPD (chronic obstructive pulmonary disease) ([address]), CPAP (continuous positive airway pressure) dependence, Dementia ([address]), Dementia ([address]), Diabetes mellitus ([address]), Diabetes mellitus, type 2 ([address]), DJD (degenerative joint disease), Hypercholesterolemia, Hyperlipidemia, Hypertension, Incisional hernia, Kidney disease, Melanoma ([address]), Obesity, Sleep apnea, and TIA (transient ischemic attack). Comorbidities affecting pt's physical performance at time of assessment listed above  Personal factors affecting pt at time of IE include:  advanced age, cognition;  past experience, inability to perform IADLs, inability to perform ADLs, inability to ambulate household distances w/o AD; near falls; progressive weakness, limited insight into impairments + limited safety awareness. Due to acute medical issues, ongoing medical workup for primary dx; confusion; fall risk, increased reliance on more restrictive AD compared to baseline;  decreased activity tolerance compared to baseline, increased assistance needed from caregiver at current time, continuous monitoring, trending labs; multiple recent ED visits;  lines, incontinence;  decline in overall functional mobility status; health management issues; note unstable clinical picture (high complexity). At baseline pt resides in [address] [place] [address] (recently moved there ~2 months ago) and was ambualtory w/ RW PTA; household distances. Pt is a poor historian w/ hx of dementia and presents on IE A+O to person only.   Currently pt  is requiring Sabra A for bed skills; Sabra for functional transfers and Sabra for ambulation w/ RW 60'x1 w/ 2 rotational turns- distances self limited by pt as pt lunch delivered. Farzad Kirby Pt presents  currently w/ overall mobility deficits 2* to: decreased LE strength/AROM; limited flexibility;  generalized weakness/ deconditioning; decreased endurance; decreased activity tolerance; decreased coordination; impaired balance; gait deviations; decreased safety awareness; SOB/STALLWORTH; fatigue; impaired safety and judgement; limited insight into current deficits; bed/ chair alarms; multiple lines; Pt currently at risk for falls. (Please find additional objective findings from PT assessment regarding body systems outlined above.) Pt will continue to benefit from skilled PT interventions to address stated impairments; to maximize functional potential; for ongoing pt/ family training; and DME needs. PT is currently recommending return to [facility] w/ assist at [facility] + PT/ OT ana cristina on return vs rehab if facility unable to accomodate. Pt/ family agreeable to plan and goals as stated on evaluation. In 14days pt will complete: 1) Bed mobility skills with S to facilitate safe return to previous living environment and decrease burden on caregivers. 2) Functional transfers with S  to facilitate safe return to previous living environment  3) Ambulation with RW S 150'x2' without LOB and stable vitals for safe ambulation in home/ community environment. 4) Improve balance by 1 grade in order to decrease fall risk. 6) Improve LE strength grades by 1 to increase independence w/ all functional mobility, transfers and gait. 7) PT for ongoing pt and family education; DME needs and D/C planning to promote highest level of function in least restrictive environment.

## 2023-07-13 NOTE — PLAN OF CARE
Problem: PAIN - ADULT  Goal: Verbalizes/displays adequate comfort level or baseline comfort level  Description: Interventions:  - Encourage patient to monitor pain and request assistance  - Assess pain using appropriate pain scale  - Administer analgesics based on type and severity of pain and evaluate response  - Implement non-pharmacological measures as appropriate and evaluate response  - Consider cultural and social influences on pain and pain management  - Notify physician/advanced practitioner if interventions unsuccessful or patient reports new pain  Outcome: Progressing     Problem: MUSCULOSKELETAL - ADULT  Goal: Maintain or return mobility to safest level of function  Description: INTERVENTIONS:  - Assess patient's ability to carry out ADLs; assess patient's baseline for ADL function and identify physical deficits which impact ability to perform ADLs (bathing, care of mouth/teeth, toileting, grooming, dressing, etc.)  - Assess/evaluate cause of self-care deficits   - Assess range of motion  - Assess patient's mobility  - Assess patient's need for assistive devices and provide as appropriate  - Encourage maximum independence but intervene and supervise when necessary  - Involve family in performance of ADLs  - Assess for home care needs following discharge   - Consider OT consult to assist with ADL evaluation and planning for discharge  - Provide patient education as appropriate  Outcome: Progressing

## 2023-07-13 NOTE — QUICK NOTE
Called to give a non urgent medical update to patient's daughter, Raquel Maza, all questions were answered. Family expresses understanding of current medical treatment plan. Patient's daughter states that he recently moved to [facility] for his dementia last month.      Marianna , DO PGY-2

## 2023-07-13 NOTE — DISCHARGE SUMMARY
INTERNAL MEDICINE RESIDENCY DISCHARGE SUMMARY     Shant Joseph   80 y.o. male  MRN: [id number]  Room/Bed: [facility] 215/[facility] 215-01 [facility]   Encounter: [id number]    Active Problems:    Essential hypertension    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[address])    Paroxysmal atrial fibrillation ([address])    Type 2 diabetes mellitus with kidney complication, with long-term current use of insulin ([facility])    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, or unspecified chronic kidney disease    Fall      Fall  Assessment & Plan  Patient is s/p fall, unwitnessed. Has had hx of multiple falls in the past   CT Chest Abd was obtained and showed   There is an age-indeterminate, possibly acute fracture of the left anterolateral 11th rib. There is some edematous changes of the overlying chest wall which could represent contusion suggesting this may be an acute or subacute injury.   This was seen on previous imaging with known rib fx from previous fall  Previous CT Head and CT Spine negative     - PT/OT recommendation: Return to [facility] w/ assist at [facility] + PT/ OT evals on return vs rehab if facility unable to accomodate.  -Case management consulted   - Magnesium is normal at 2.0 and phosphorus pending    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, or unspecified chronic kidney disease  Assessment & Plan  Lab Results   Component Value Date    EGFR 40 07/13/2023    EGFR 33 07/12/2023    EGFR 31 07/11/2023    CREATININE 1.50 (H) 07/13/2023    CREATININE 1.79 (H) 07/12/2023    CREATININE 1.85 (H) 07/11/2023       Creatinine appears to be at baseline according to records, it has been as high was 2 in 2021 and higher in the past.     Plan:  - Continue to monitor electrolytes  - He has a condom-catheter in place     Type 2 diabetes mellitus with kidney complication, with long-term current use of insulin ([address])  Assessment & Plan  Lab Results   Component Value Date    HGBA1C 8.0 (H) 05/25/2023       Recent Labs     07/12/23  1841   POCGLU 122       Blood Sugar Average: Last 72 hrs:  (P) 122   Home hypoglycemic regimen includes:  • Levemir 10 units twice daily    • Will start Levemir 5 units BID with sliding scale   • 4 times daily Accu-Cheks  • Diabetic diet  • Hypoglycemia protocol    Paroxysmal atrial fibrillation [facility])  Assessment & Plan  Patient follows with cardiologist in the outpatient setting. Per cardiology note from 2/14/2023, warfarin has been discontinued given history of bruising/bleeding and frequent falls, will consider restarting    • Continue metoprolol tartrate 50 mg BID and propafenone 225mg tid. 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[address])  Assessment & Plan  Known hx of dementia with baseline alert and oriented x1-2; memantine unknown frequency because facility medication list not here     Plan:  • Continue donepezil 10 mg at bedtime and Zoloft 100 mg daily;   • Would recommend revaluation donepezil utility in Mr. Clarke's case. • He is also on abilify 5 mg      Essential hypertension  Assessment & Plan  BP stable. 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 Of note, the patient was discharged 7/11 after a presentation for generalized weakness. Patient had presented to the ED 7/11 for confusion status post fall CT head and CT C-spine were negative and patient was discharged. The patient has a decade hx of dementia and is a poor historian.  Per his daughter in law, the patient has recently been experiencing progressive lethargy and ambulatory dysfunction at [facility].     In the ED patient was noted to be confused and complaining of abdominal pain. The patient continued to have difficulties walking and labs were obtained. CBC and CMP were unremarkable. Vitals were all within normal limits. CT abdomen and pelvis was obtained that showed possible subacute fracture of the right 11th rib and no acute intra-abdominal pathology. Patient was admitted to [facility] for ambulatory dysfunction. He remained clinically stable throughout the course. The primary concern was that he was not physically rehabilitated and that he would require skilled nursing care given physical decompensation. PT/OT recommended that he return to [facility] w/assit at [facility] + PT/OT evals on return or rehab if [facility] to accomodate. We recommend upon discharge that medications such as donepezil and memantine are reassessed for efficacy given his progression of dementia. DISCHARGE INFORMATION     PCP at Discharge: Carol Thomas MD    Admitting Provider: Carol Thomas MD  Admission Date: 7/12/2023    Discharge Provider: Carol Thomas MD  Discharge Date: ***    Discharge Disposition: Home/Self Care  Discharge Condition: {condition:50288}  Discharge with Lines: {Discharged with active lines:540315320}    Discharge Diet: {diet:46868}  Activity Restrictions: {plan; activity restriction:80655}  Test Results Pending at Discharge: ***    Discharge Diagnoses: Active Problems:    Essential hypertension    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HCC)    Paroxysmal atrial fibrillation (HCC)    Type 2 diabetes mellitus with kidney complication, with long-term current use of insulin (HCC)    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, or unspecified chronic kidney disease    Fall  Resolved Problems:    * No resolved hospital problems.  *      Consulting Providers:      Diagnostic & Therapeutic Procedures Performed:  CT abdomen pelvis with contrast    Result Date: 7/12/2023  Impression: 1. Age-indeterminate, possibly acute fracture left anterolateral 11th rib. 2. No acute intra-abdominal abnormality. 3. Small and large bowel diverticulosis without evidence of diverticulitis. 4. Numerous right renal lesions most of which are likely cysts although there are indeterminate lesions redemonstrated. Again surveillance ultrasound and/or more definitive characterization with nonemergent CT renal mass protocol may be helpful to exclude neoplasm, assuming the pacemaker precludes MRI evaluation. Otherwise MRI would be the modality of choice. 5. Prostatomegaly. 6. Stable distal esophageal thickening without focal mass. 7. Mild splenomegaly. Additional incidental findings as above. The study was marked in [facility] for immediate notification. Workstation performed: [id number]       Code Status: Level 1 - Full Code  Advance Directive & Living Will: Not Received  Power of :    POLST:      Medications:  Current Discharge Medication List        Current Discharge Medication List        Current Discharge Medication List      CONTINUE these medications which have NOT CHANGED    Details   ARIPiprazole (ABILIFY) 5 mg tablet Take 1 tablet (5 mg total) by mouth daily Do not start before June 14, 2023.   Refills: 0    Associated Diagnoses: Unspecified mood (affective) disorder (HCC)      atorvastatin (LIPITOR) 10 mg tablet Take 1 tablet (10 mg total) by mouth daily  Qty: 90 tablet, Refills: 1    Associated Diagnoses: Hyperlipidemia, unspecified hyperlipidemia type      cholecalciferol (VITAMIN D3) 1,000 units tablet Take 2,000 Units by mouth daily      docusate sodium (COLACE) 100 mg capsule Take 1 capsule (100 mg total) by mouth 2 (two) times a day  Qty: 60 capsule, Refills: 0    Associated Diagnoses: Constipation, unspecified constipation type      donepezil (ARICEPT) 10 mg tablet Take 1 tablet (10 mg total) by mouth daily at bedtime  Qty: 90 tablet, Refills: 3    Associated Diagnoses: Dementia without behavioral disturbance (HCC)      ferrous sulfate 325 (65 Fe) mg tablet Take 325 mg by mouth as needed       insulin detemir (LEVEMIR) 100 units/mL subcutaneous injection Inject 10 Units under the skin 2 (two) times a day  Qty: 20 mL, Refills: 0    Associated Diagnoses: Type 2 diabetes mellitus with diabetic neuropathy, with long-term current use of insulin ([facility])      Insulin Pen Needle (Novofine Pen Needle) 32G X 6 MM MISC Use 4 (four) times a day  Qty: 200 each, Refills: 5    Associated Diagnoses: Type 2 diabetes mellitus with other specified complication, with long-term current use of insulin ([facility])      Insulin Syringe-Needle U-100 (BD Insulin Syringe U/F) 30G X 1/2" 0.5 ML MISC Use 4 (four) times a day  Qty: 400 each, Refills: 1    Comments: USED 4 TIMES DAILY - DX E11.9  Associated Diagnoses: Type 2 diabetes mellitus with diabetic neuropathy, with long-term current use of insulin ([facility])      Insulin Syringe-Needle U-100 (INSULIN SYRINGE .5CC/30GX5/16") 30G X 5/16" 0.5 ML MISC 4 (four) times a day      magnesium Oxide (MAG-OX) 400 mg TABS Take 1 tablet (400 mg total) by mouth daily Do not start before June 14, 2023.   Qty: 30 tablet, Refills: 0    Associated Diagnoses: Medical clearance for psychiatric admission      metoprolol tartrate (LOPRESSOR) 100 mg tablet 50mg in AM  and 100mg at bedtime  Qty: 90 tablet, Refills: 0    Associated Diagnoses: Atrial fibrillation, unspecified type ([facility])      propafenone (RYTHMOL) 225 mg tablet Take 1 tablet (225 mg total) by mouth 3 (three) times a day  Qty: 90 tablet, Refills: 3    Associated Diagnoses: Paroxysmal atrial fibrillation (HCC)      senna (SENOKOT) 8.6 mg Take 1 tablet (8.6 mg total) by mouth daily at bedtime  Qty: 30 tablet, Refills: 0    Associated Diagnoses: Constipation, unspecified constipation type      sertraline (ZOLOFT) 100 mg tablet Take 1 tablet (100 mg total) by mouth daily  Qty: 90 tablet, Refills: 1    Associated Diagnoses: Mild episode of recurrent major depressive disorder ([facility])             Allergies: Allergies   Allergen Reactions   • Ambien [Zolpidem Tartrate] Hallucinations   • Bextra [Valdecoxib] Hives   • Dust Mite Extract Sneezing   • Lyrica [Pregabalin] Confusion   • Mold Extract [Trichophyton] Sneezing   • Pollen Extract Sneezing   • Tree Extract Other (See Comments) and Sneezing     congestion       FOLLOW-UP     PCP Outpatient Follow-up:  { IP OUTPATIENT FOLLOW BU:364151595}    Consulting Providers Follow-up:  { IP CONSULTING PHYSICIAN FOLLOW UP AIY/FW:034268813}     Active Issues Requiring Follow-up:   { IP ACTIVE ISSUES FOLLOW UP NXS/KZ:228916988}    Discharge Statement:   I spent {Time; 15 min - 1 hour:02703} minutes discharging the patient. This time was spent on the day of discharge. I had direct contact with the patient on the day of discharge.  Additional documentation is required if more than 30 minutes were spent on discharge.      ==  [address]  MS-4

## 2023-07-14 NOTE — UTILIZATION REVIEW
NOTIFICATION OF INPATIENT ADMISSION   AUTHORIZATION REQUEST   SERVICING FACILITY:   [address] [place] [address]  Address: [address] [place] [address] Place [address]  Tax ID: [id number]  NPI: [id number] ATTENDING PROVIDER:  Attending Name and NPI#: Bertram Lamb Md [[id number]]  Address: [address] [place] [address] [address] [address]  Phone: [phone]   ADMISSION INFORMATION:  Place of Service: [address] [place] [address] Code: 21  Inpatient Admission Date/Time: 7/13/23  2:01 PM  Discharge Date/Time: 7/13/2023  6:00 PM  Admitting Diagnosis Code/Description:  Fatigue [R53.83]  Weakness [R53.1]  Abnormal CT of the abdomen [R93.5]  Generalized weakness [R53.1]  Ambulatory dysfunction [R26.2]     UTILIZATION REVIEW CONTACT:  Shen Thomason Gladys, Utilization   Network Utilization Review Department  Phone: [phone]  Fax: [phone]  Email: [email or web address]  Contact for approvals/pending authorizations, clinical reviews, and discharge. PHYSICIAN ADVISORY SERVICES:  Medical Necessity Denial & Stwq-zu-Shqg Review  Phone: [phone]  Fax: [phone]  Email: [email or web address]

## 2023-07-20 NOTE — UTILIZATION REVIEW
NOTIFICATION OF ADMISSION DISCHARGE   This is a Notification of Discharge from [facility]. Please be advised that this patient has been discharge from our facility. Below you will find the admission and discharge date and time including the patient’s disposition. UTILIZATION REVIEW CONTACT:  Jonathan Reza  Utilization   Network Utilization Review Department  Phone: [phone] x carefully listen to the prompts. All voicemails are confidential.  Email: [email or web address]     ADMISSION INFORMATION  PRESENTATION DATE: 7/12/2023  1:22 PM  OBERVATION ADMISSION DATE:   INPATIENT ADMISSION DATE: 7/13/23  2:01 PM   DISCHARGE DATE: 7/13/2023  6:00 PM   DISPOSITION:Discharged/Transferred to Long Term Care/Personal Care Home/Assisted Living    IMPORTANT INFORMATION:  Send all requests for admission clinical reviews, approved or denied determinations and any other requests to dedicated fax number below belonging to the campus where the patient is receiving treatment.  List of dedicated fax numbers:  [place] DENIALS (Administrative/Medical Necessity) [phone] [address] [facility] (Maternity/NICU/Pediatrics) [phone]   [facility] [phone]   [facility] [phone] [address] [phone]   [address] [place] [address] [phone]   [facility] [phone]   [address] [place], [place] [address]e [address] [facility] [phone]   [address] [place] [address] [phone] [address] [facility] [phone] [address] [facility] [address] [facility] [phone]

## 2023-08-11 ENCOUNTER — HOSPITAL ENCOUNTER (EMERGENCY)
Facility: HOSPITAL | Age: 88
Discharge: HOME | End: 2023-08-11
Attending: EMERGENCY MEDICINE
Payer: COMMERCIAL

## 2023-08-11 VITALS
SYSTOLIC BLOOD PRESSURE: 131 MMHG | HEART RATE: 62 BPM | OXYGEN SATURATION: 95 % | DIASTOLIC BLOOD PRESSURE: 74 MMHG | TEMPERATURE: 98.5 F | RESPIRATION RATE: 18 BRPM

## 2023-08-11 DIAGNOSIS — F03.90 DEMENTIA (HCC): Primary | ICD-10-CM

## 2023-08-11 DIAGNOSIS — Z13.9 ENCOUNTER FOR MEDICAL SCREENING EXAMINATION: ICD-10-CM

## 2023-08-11 LAB
ATRIAL RATE: 61 BPM
ATRIAL RATE: 63 BPM
ATRIAL RATE: 66 BPM
P AXIS: 113 DEGREES
P AXIS: 40 DEGREES
P AXIS: 51 DEGREES
PR INTERVAL: 236 MS
PR INTERVAL: 236 MS
PR INTERVAL: 242 MS
QRS AXIS: 47 DEGREES
QRS AXIS: 50 DEGREES
QRS AXIS: 51 DEGREES
QRSD INTERVAL: 100 MS
QRSD INTERVAL: 88 MS
QRSD INTERVAL: 94 MS
QT INTERVAL: 398 MS
QT INTERVAL: 410 MS
QT INTERVAL: 412 MS
QTC INTERVAL: 414 MS
QTC INTERVAL: 417 MS
QTC INTERVAL: 419 MS
T WAVE AXIS: 34 DEGREES
T WAVE AXIS: 37 DEGREES
T WAVE AXIS: 42 DEGREES
VENTRICULAR RATE: 61 BPM
VENTRICULAR RATE: 63 BPM
VENTRICULAR RATE: 66 BPM

## 2023-08-11 PROCEDURE — 93010 ELECTROCARDIOGRAM REPORT: CPT | Performed by: INTERNAL MEDICINE

## 2023-08-11 PROCEDURE — 93005 ELECTROCARDIOGRAM TRACING: CPT

## 2023-08-11 PROCEDURE — 99285 EMERGENCY DEPT VISIT HI MDM: CPT

## 2023-08-11 PROCEDURE — 99285 EMERGENCY DEPT VISIT HI MDM: CPT | Performed by: EMERGENCY MEDICINE

## 2023-08-11 NOTE — ED NOTES
Patient discharged. A call was made to [facility] to let them know patient was coming back. No one answered call, so voice message was left.      Antonio Mckeon RN  08/11/23 8060

## 2023-08-11 NOTE — ED ATTENDING ATTESTATION
8/11/2023  I, Renay Feldman MD, saw and evaluated the patient. I have discussed the patient with the resident/non-physician practitioner and agree with the resident's/non-physician practitioner's findings, Plan of Care, and MDM as documented in the resident's/non-physician practitioner's note, except where noted. All available labs and Radiology studies were reviewed. I was present for key portions of any procedure(s) performed by the resident/non-physician practitioner and I was immediately available to provide assistance. At this point I agree with the current assessment done in the Emergency Department. I have conducted an independent evaluation of this patient a history and physical is as follows:    15-year-old male with frontotemporal dementia presents to the emergency department for evaluation after reported aggressive behavior at nursing facility. Patient currently without any complaints. No headache, fever, chills, chest pain, abdominal pain, nausea or vomiting. He is able to ambulate with steady gait. History is slightly limited given dementia. On exam, patient was comfortably in bed in no acute distress, head is normocephalic atraumatic pupils equal round and reactive to light, is regular rate and rhythm without murmurs, lungs clear to auscultation bilaterally, no abdominal tenderness or distention. No focal neurologic deficits. Patient at baseline mental status. MDM:  Dementia: Patient currently at baseline mental status, no focal neurologic deficits, patient asymptomatic with reassuring physical examination, do not believe any further workup is necessary at this time. His agitation at nursing facility likely secondary to his baseline dementia. Plan to discharge home.     ED Course         Critical Care Time  Procedures
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
## 2023-08-11 NOTE — DISCHARGE INSTRUCTIONS
Follow-up with the primary care doctor within 1 week. Return to the emergency room if symptoms worsen or if you have any other concerns.

## 2023-08-28 ENCOUNTER — OFFICE VISIT (OUTPATIENT)
Dept: CARDIOLOGY CLINIC | Facility: CLINIC | Age: 88
End: 2023-08-28
Payer: COMMERCIAL

## 2023-08-28 VITALS
HEIGHT: 71 IN | BODY MASS INDEX: 23.8 KG/M2 | SYSTOLIC BLOOD PRESSURE: 118 MMHG | HEART RATE: 69 BPM | DIASTOLIC BLOOD PRESSURE: 64 MMHG | OXYGEN SATURATION: 96 % | WEIGHT: 170 LBS

## 2023-08-28 DIAGNOSIS — I25.10 CORONARY ARTERY DISEASE INVOLVING NATIVE CORONARY ARTERY OF NATIVE HEART WITHOUT ANGINA PECTORIS: Primary | ICD-10-CM

## 2023-08-28 DIAGNOSIS — E78.2 MIXED HYPERLIPIDEMIA: ICD-10-CM

## 2023-08-28 DIAGNOSIS — I48.0 PAROXYSMAL ATRIAL FIBRILLATION (HCC): ICD-10-CM

## 2023-08-28 DIAGNOSIS — I10 ESSENTIAL HYPERTENSION: ICD-10-CM

## 2023-08-28 PROCEDURE — 99213 OFFICE O/P EST LOW 20 MIN: CPT | Performed by: INTERNAL MEDICINE

## 2023-08-28 RX ORDER — LORAZEPAM 2 MG/ML
SOLUTION, CONCENTRATE ORAL
COMMUNITY
Start: 2023-06-20

## 2023-08-28 RX ORDER — BLOOD-GLUCOSE METER
EACH MISCELLANEOUS
COMMUNITY
Start: 2023-06-15

## 2023-08-28 RX ORDER — INSULIN ASPART 100 [IU]/ML
INJECTION, SOLUTION INTRAVENOUS; SUBCUTANEOUS
COMMUNITY
Start: 2023-07-20

## 2023-08-28 RX ORDER — MEMANTINE HYDROCHLORIDE 5 MG/1
5 TABLET ORAL DAILY
COMMUNITY
Start: 2023-08-16

## 2023-08-28 RX ORDER — BLOOD SUGAR DIAGNOSTIC
STRIP MISCELLANEOUS
COMMUNITY
Start: 2023-08-19

## 2023-08-28 NOTE — ASSESSMENT & PLAN NOTE
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 This has been stable.

## 2023-08-28 NOTE — PROGRESS NOTES
Assessment/Plan:    Coronary artery disease involving native coronary artery of native heart without angina pectoris  Coronary artery disease, stable. No symptoms of angina or signs of heart failure. 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 This has been stable. Essential hypertension  Hypertension, stable. Mixed hyperlipidemia  Hyperlipidemia, stable. Diagnoses and all orders for this visit:    Coronary artery disease involving native coronary artery of native heart without angina pectoris    Paroxysmal atrial fibrillation (HCC)    Essential hypertension    Mixed hyperlipidemia    Other orders  -     memantine (NAMENDA) 5 mg tablet; Take 5 mg by mouth daily  -     NovoLOG FlexPen 100 units/mL injection pen; INJECT PER SLIDING SCALE FOUR TIMES PER DAY BEFORE MEALS AND AT BEDTIME UP TO 23 UNITS DAILY  -     OneTouch Verio test strip; TEST BG FOUR TIMES PER DAY BEFORE MEALS AND AT BEDTIME  -     LORazepam Intensol 2 MG/ML concentrated solution; GIVE 0.25ML(S) PO/SL EVERY 4 HOURS AS NEEDED  -     Blood Glucose Monitoring Suppl (OneTouch Verio Flex System) w/Device KIT; Use as directed          Subjective: Feels well. Patient ID: Gee Espino is a 80 y.o. male. The patient presented to this office for the purpose of cardiac follow-up. He is known to have a history of coronary artery disease with paroxysmal atrial fibrillation and bradycardia tacky syndrome status post permanent pacemaker. Patient also has a history of hypertension and hyperlipidemia. The patient is feeling well from the cardiac standpoint denying any symptoms of chest pain, shortness of breath, palpitation, dizziness or lightheadedness. He has no leg edema.       The following portions of the patient's history were reviewed and updated as appropriate: allergies, current medications, past family history, past medical history, past social history, past surgical history and problem list.    Review of Systems   Respiratory: Negative for apnea, cough, chest tightness, shortness of breath and wheezing. Cardiovascular: Negative for chest pain, palpitations and leg swelling. Gastrointestinal: Negative for abdominal pain. Neurological: Negative for dizziness and light-headedness. Psychiatric/Behavioral: Negative. Objective: Stable cardiac wise. /64 (BP Location: Left arm, Patient Position: Sitting, Cuff Size: Standard)   Pulse 69   Ht 5' 11" (1.803 m)   Wt 77.1 kg (170 lb)   SpO2 96%   BMI 23.71 kg/m²          Physical Exam  Vitals reviewed. Constitutional:       General: He is not in acute distress. Appearance: He is well-developed. He is not diaphoretic. HENT:      Head: Normocephalic. Eyes:      Pupils: Pupils are equal, round, and reactive to light. Neck:      Thyroid: No thyromegaly. Vascular: No JVD. Cardiovascular:      Rate and Rhythm: Normal rate and regular rhythm. Heart sounds: S1 normal and S2 normal. No murmur heard. No friction rub. No gallop. Pulmonary:      Effort: Pulmonary effort is normal. No respiratory distress. Breath sounds: Normal breath sounds. No wheezing or rales. Chest:      Chest wall: No tenderness. Abdominal:      Palpations: Abdomen is soft. Musculoskeletal:         General: No tenderness or deformity. Normal range of motion. Cervical back: Normal range of motion. Right lower leg: No edema. Left lower leg: No edema. Skin:     General: Skin is warm and dry. Neurological:      Mental Status: He is alert and oriented to person, place, and time.    Psychiatric:         Mood and Affect: Mood normal.         Behavior: Behavior normal.

## 2023-08-28 NOTE — LETTER
August 28, 2023     Adiel Campbell, [address] [facility]    Patient: Aime Hartley   YOB: 1935   Date of Visit: 8/28/2023       Dear Dr. Wilberto Miller:    Thank you for referring Anabel Galvez to me for evaluation. Below are my notes for this consultation. If you have questions, please do not hesitate to call me. I look forward to following your patient along with you. Sincerely,        Gracie Feldman MD        CC: No Recipients    Gracie Feldman MD  8/28/2023  2:37 PM  Sign when Signing Visit  Assessment/Plan:    Coronary artery disease involving native coronary artery of native heart without angina pectoris  Coronary artery disease, stable. No symptoms of angina or signs of heart failure. 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 This has been stable. Essential hypertension  Hypertension, stable. Mixed hyperlipidemia  Hyperlipidemia, stable. Diagnoses and all orders for this visit:    Coronary artery disease involving native coronary artery of native heart without angina pectoris    Paroxysmal atrial fibrillation (HCC)    Essential hypertension    Mixed hyperlipidemia    Other orders  -     memantine (NAMENDA) 5 mg tablet; Take 5 mg by mouth daily  -     NovoLOG FlexPen 100 units/mL injection pen; INJECT PER SLIDING SCALE FOUR TIMES PER DAY BEFORE MEALS AND AT BEDTIME UP TO 23 UNITS DAILY  -     OneTouch Verio test strip; TEST BG FOUR TIMES PER DAY BEFORE MEALS AND AT BEDTIME  -     LORazepam Intensol 2 MG/ML concentrated solution; GIVE 0.25ML(S) PO/SL EVERY 4 HOURS AS NEEDED  -     Blood Glucose Monitoring Suppl (OneTouch Verio Flex System) w/Device KIT; Use as directed         Subjective: Feels well. Patient ID: Aime Hartley is a 80 y.o. male. The patient presented to this office for the purpose of cardiac follow-up.   He is known to have a history of coronary artery disease with paroxysmal atrial fibrillation and bradycardia tacky syndrome status post permanent pacemaker. Patient also has a history of hypertension and hyperlipidemia. The patient is feeling well from the cardiac standpoint denying any symptoms of chest pain, shortness of breath, palpitation, dizziness or lightheadedness. He has no leg edema. The following portions of the patient's history were reviewed and updated as appropriate: allergies, current medications, past family history, past medical history, past social history, past surgical history and problem list.    Review of Systems   Respiratory: Negative for apnea, cough, chest tightness, shortness of breath and wheezing. Cardiovascular: Negative for chest pain, palpitations and leg swelling. Gastrointestinal: Negative for abdominal pain. Neurological: Negative for dizziness and light-headedness. Psychiatric/Behavioral: Negative. Objective: Stable cardiac wise. /64 (BP Location: Left arm, Patient Position: Sitting, Cuff Size: Standard)   Pulse 69   Ht 5' 11" (1.803 m)   Wt 77.1 kg (170 lb)   SpO2 96%   BMI 23.71 kg/m²         Physical Exam  Vitals reviewed. Constitutional:       General: He is not in acute distress. Appearance: He is well-developed. He is not diaphoretic. HENT:      Head: Normocephalic. Eyes:      Pupils: Pupils are equal, round, and reactive to light. Neck:      Thyroid: No thyromegaly. Vascular: No JVD. Cardiovascular:      Rate and Rhythm: Normal rate and regular rhythm. Heart sounds: S1 normal and S2 normal. No murmur heard. No friction rub. No gallop. Pulmonary:      Effort: Pulmonary effort is normal. No respiratory distress. Breath sounds: Normal breath sounds. No wheezing or rales. Chest:      Chest wall: No tenderness. Abdominal:      Palpations: Abdomen is soft. Musculoskeletal:         General: No tenderness or deformity.  Normal range of motion. Cervical back: Normal range of motion. Right lower leg: No edema. Left lower leg: No edema. Skin:     General: Skin is warm and dry. Neurological:      Mental Status: He is alert and oriented to person, place, and time.    Psychiatric:         Mood and Affect: Mood normal.         Behavior: Behavior normal.

## 2023-09-13 ENCOUNTER — HOSPITAL ENCOUNTER (INPATIENT)
Facility: HOSPITAL | Age: 88
LOS: 6 days | Discharge: NURSING, REHAB OR LONG-TERM CARE | DRG: 522 | End: 2023-09-19
Attending: EMERGENCY MEDICINE | Admitting: SURGERY
Payer: COMMERCIAL

## 2023-09-13 ENCOUNTER — APPOINTMENT (EMERGENCY)
Dept: RADIOLOGY | Facility: HOSPITAL | Age: 88
DRG: 522 | End: 2023-09-13
Payer: COMMERCIAL

## 2023-09-13 DIAGNOSIS — T83.9XXA PROBLEM WITH FOLEY CATHETER, INITIAL ENCOUNTER (HCC): ICD-10-CM
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
## 2023-09-13 DIAGNOSIS — S72.001A CLOSED FRACTURE OF NECK OF RIGHT FEMUR, INITIAL ENCOUNTER (HCC): ICD-10-CM

## 2023-09-13 PROBLEM — I49.5 TACHY-BRADY SYNDROME (HCC): Status: ACTIVE | Noted: 2023-09-13

## 2023-09-13 PROBLEM — S72.91XA FEMUR FRACTURE, RIGHT (HCC): Status: ACTIVE | Noted: 2023-09-13

## 2023-09-13 LAB
ABO GROUP BLD: NORMAL
ANION GAP SERPL CALCULATED.3IONS-SCNC: 6 MMOL/L
ANION GAP SERPL CALCULATED.3IONS-SCNC: 9 MMOL/L
APTT PPP: 25 SECONDS (ref 23–37)
BASOPHILS # BLD AUTO: 0.02 THOUSANDS/ÂΜL (ref 0–0.1)
BASOPHILS # BLD AUTO: 0.05 THOUSANDS/ÂΜL (ref 0–0.1)
BASOPHILS NFR BLD AUTO: 0 % (ref 0–1)
BASOPHILS NFR BLD AUTO: 0 % (ref 0–1)
BLD GP AB SCN SERPL QL: NEGATIVE
BUN SERPL-MCNC: 20 MG/DL (ref 5–25)
BUN SERPL-MCNC: 20 MG/DL (ref 5–25)
CALCIUM SERPL-MCNC: 9 MG/DL (ref 8.4–10.2)
CALCIUM SERPL-MCNC: 9.2 MG/DL (ref 8.4–10.2)
CHLORIDE SERPL-SCNC: 101 MMOL/L (ref 96–108)
CHLORIDE SERPL-SCNC: 102 MMOL/L (ref 96–108)
CO2 SERPL-SCNC: 28 MMOL/L (ref 21–32)
CO2 SERPL-SCNC: 29 MMOL/L (ref 21–32)
CREAT SERPL-MCNC: 1.57 MG/DL (ref 0.6–1.3)
CREAT SERPL-MCNC: 1.62 MG/DL (ref 0.6–1.3)
EOSINOPHIL # BLD AUTO: 0.08 THOUSAND/ÂΜL (ref 0–0.61)
EOSINOPHIL # BLD AUTO: 0.19 THOUSAND/ÂΜL (ref 0–0.61)
EOSINOPHIL NFR BLD AUTO: 1 % (ref 0–6)
EOSINOPHIL NFR BLD AUTO: 1 % (ref 0–6)
ERYTHROCYTE [DISTWIDTH] IN BLOOD BY AUTOMATED COUNT: 12.8 % (ref 11.6–15.1)
ERYTHROCYTE [DISTWIDTH] IN BLOOD BY AUTOMATED COUNT: 12.8 % (ref 11.6–15.1)
GFR SERPL CREATININE-BSD FRML MDRD: 37 ML/MIN/1.73SQ M
GFR SERPL CREATININE-BSD FRML MDRD: 38 ML/MIN/1.73SQ M
GLUCOSE SERPL-MCNC: 196 MG/DL (ref 65–140)
GLUCOSE SERPL-MCNC: 197 MG/DL (ref 65–140)
GLUCOSE SERPL-MCNC: 200 MG/DL (ref 65–140)
GLUCOSE SERPL-MCNC: 200 MG/DL (ref 65–140)
GLUCOSE SERPL-MCNC: 204 MG/DL (ref 65–140)
HCT VFR BLD AUTO: 37.9 % (ref 36.5–49.3)
HCT VFR BLD AUTO: 42.1 % (ref 36.5–49.3)
HGB BLD-MCNC: 13.4 G/DL (ref 12–17)
HGB BLD-MCNC: 14 G/DL (ref 12–17)
IMM GRANULOCYTES # BLD AUTO: 0.04 THOUSAND/UL (ref 0–0.2)
IMM GRANULOCYTES # BLD AUTO: 0.21 THOUSAND/UL (ref 0–0.2)
IMM GRANULOCYTES NFR BLD AUTO: 0 % (ref 0–2)
IMM GRANULOCYTES NFR BLD AUTO: 2 % (ref 0–2)
INR PPP: 0.99 (ref 0.84–1.19)
LYMPHOCYTES # BLD AUTO: 1.2 THOUSANDS/ÂΜL (ref 0.6–4.47)
LYMPHOCYTES # BLD AUTO: 1.3 THOUSANDS/ÂΜL (ref 0.6–4.47)
LYMPHOCYTES NFR BLD AUTO: 11 % (ref 14–44)
LYMPHOCYTES NFR BLD AUTO: 9 % (ref 14–44)
MAGNESIUM SERPL-MCNC: 1.9 MG/DL (ref 1.9–2.7)
MCH RBC QN AUTO: 29.4 PG (ref 26.8–34.3)
MCH RBC QN AUTO: 29.8 PG (ref 26.8–34.3)
MCHC RBC AUTO-ENTMCNC: 33.3 G/DL (ref 31.4–37.4)
MCHC RBC AUTO-ENTMCNC: 35.4 G/DL (ref 31.4–37.4)
MCV RBC AUTO: 84 FL (ref 82–98)
MCV RBC AUTO: 88 FL (ref 82–98)
MONOCYTES # BLD AUTO: 0.54 THOUSAND/ÂΜL (ref 0.17–1.22)
MONOCYTES # BLD AUTO: 0.96 THOUSAND/ÂΜL (ref 0.17–1.22)
MONOCYTES NFR BLD AUTO: 5 % (ref 4–12)
MONOCYTES NFR BLD AUTO: 7 % (ref 4–12)
NEUTROPHILS # BLD AUTO: 11.34 THOUSANDS/ÂΜL (ref 1.85–7.62)
NEUTROPHILS # BLD AUTO: 8.74 THOUSANDS/ÂΜL (ref 1.85–7.62)
NEUTS SEG NFR BLD AUTO: 81 % (ref 43–75)
NEUTS SEG NFR BLD AUTO: 83 % (ref 43–75)
NRBC BLD AUTO-RTO: 0 /100 WBCS
NRBC BLD AUTO-RTO: 0 /100 WBCS
PHOSPHATE SERPL-MCNC: 2.9 MG/DL (ref 2.3–4.1)
PLATELET # BLD AUTO: 154 THOUSANDS/UL (ref 149–390)
PLATELET # BLD AUTO: 167 THOUSANDS/UL (ref 149–390)
PMV BLD AUTO: 10.4 FL (ref 8.9–12.7)
PMV BLD AUTO: 10.5 FL (ref 8.9–12.7)
POTASSIUM SERPL-SCNC: 4 MMOL/L (ref 3.5–5.3)
POTASSIUM SERPL-SCNC: 4.1 MMOL/L (ref 3.5–5.3)
PROTHROMBIN TIME: 13.3 SECONDS (ref 11.6–14.5)
RBC # BLD AUTO: 4.5 MILLION/UL (ref 3.88–5.62)
RBC # BLD AUTO: 4.76 MILLION/UL (ref 3.88–5.62)
RH BLD: POSITIVE
SODIUM SERPL-SCNC: 136 MMOL/L (ref 135–147)
SODIUM SERPL-SCNC: 139 MMOL/L (ref 135–147)
SPECIMEN EXPIRATION DATE: NORMAL
WBC # BLD AUTO: 10.62 THOUSAND/UL (ref 4.31–10.16)
WBC # BLD AUTO: 14.05 THOUSAND/UL (ref 4.31–10.16)

## 2023-09-13 PROCEDURE — 86900 BLOOD TYPING SEROLOGIC ABO: CPT | Performed by: ORTHOPAEDIC SURGERY

## 2023-09-13 PROCEDURE — 80048 BASIC METABOLIC PNL TOTAL CA: CPT

## 2023-09-13 PROCEDURE — 85025 COMPLETE CBC W/AUTO DIFF WBC: CPT | Performed by: EMERGENCY MEDICINE

## 2023-09-13 PROCEDURE — 99223 1ST HOSP IP/OBS HIGH 75: CPT | Performed by: ORTHOPAEDIC SURGERY

## 2023-09-13 PROCEDURE — 99222 1ST HOSP IP/OBS MODERATE 55: CPT | Performed by: SURGERY

## 2023-09-13 PROCEDURE — 92610 EVALUATE SWALLOWING FUNCTION: CPT

## 2023-09-13 PROCEDURE — 85730 THROMBOPLASTIN TIME PARTIAL: CPT | Performed by: ORTHOPAEDIC SURGERY

## 2023-09-13 PROCEDURE — 86850 RBC ANTIBODY SCREEN: CPT | Performed by: ORTHOPAEDIC SURGERY

## 2023-09-13 PROCEDURE — 99223 1ST HOSP IP/OBS HIGH 75: CPT | Performed by: INTERNAL MEDICINE

## 2023-09-13 PROCEDURE — 72170 X-RAY EXAM OF PELVIS: CPT

## 2023-09-13 PROCEDURE — 36415 COLL VENOUS BLD VENIPUNCTURE: CPT | Performed by: EMERGENCY MEDICINE

## 2023-09-13 PROCEDURE — 80048 BASIC METABOLIC PNL TOTAL CA: CPT | Performed by: EMERGENCY MEDICINE

## 2023-09-13 PROCEDURE — 82948 REAGENT STRIP/BLOOD GLUCOSE: CPT

## 2023-09-13 PROCEDURE — 73552 X-RAY EXAM OF FEMUR 2/>: CPT

## 2023-09-13 PROCEDURE — 99285 EMERGENCY DEPT VISIT HI MDM: CPT | Performed by: EMERGENCY MEDICINE

## 2023-09-13 PROCEDURE — 85025 COMPLETE CBC W/AUTO DIFF WBC: CPT

## 2023-09-13 PROCEDURE — 70450 CT HEAD/BRAIN W/O DYE: CPT

## 2023-09-13 PROCEDURE — 99285 EMERGENCY DEPT VISIT HI MDM: CPT

## 2023-09-13 PROCEDURE — G1004 CDSM NDSC: HCPCS

## 2023-09-13 PROCEDURE — 93005 ELECTROCARDIOGRAM TRACING: CPT

## 2023-09-13 PROCEDURE — 83735 ASSAY OF MAGNESIUM: CPT | Performed by: EMERGENCY MEDICINE

## 2023-09-13 PROCEDURE — 86901 BLOOD TYPING SEROLOGIC RH(D): CPT | Performed by: ORTHOPAEDIC SURGERY

## 2023-09-13 PROCEDURE — 84100 ASSAY OF PHOSPHORUS: CPT | Performed by: EMERGENCY MEDICINE

## 2023-09-13 PROCEDURE — 99245 OFF/OP CONSLTJ NEW/EST HI 55: CPT | Performed by: INTERNAL MEDICINE

## 2023-09-13 PROCEDURE — 85610 PROTHROMBIN TIME: CPT | Performed by: ORTHOPAEDIC SURGERY

## 2023-09-13 RX ORDER — OXYCODONE HYDROCHLORIDE 5 MG/1
5 TABLET ORAL EVERY 4 HOURS PRN
Status: DISCONTINUED | OUTPATIENT
Start: 2023-09-13 | End: 2023-09-19 | Stop reason: HOSPADM

## 2023-09-13 RX ORDER — SENNOSIDES 8.6 MG
1 TABLET ORAL
Status: DISCONTINUED | OUTPATIENT
Start: 2023-09-13 | End: 2023-09-19 | Stop reason: HOSPADM

## 2023-09-13 RX ORDER — SODIUM CHLORIDE, SODIUM LACTATE, POTASSIUM CHLORIDE, CALCIUM CHLORIDE 600; 310; 30; 20 MG/100ML; MG/100ML; MG/100ML; MG/100ML
4 INJECTION, SOLUTION INTRAVENOUS CONTINUOUS
Status: DISCONTINUED | OUTPATIENT
Start: 2023-09-13 | End: 2023-09-13

## 2023-09-13 RX ORDER — HYDROMORPHONE HCL IN WATER/PF 6 MG/30 ML
0.2 PATIENT CONTROLLED ANALGESIA SYRINGE INTRAVENOUS EVERY 4 HOURS PRN
Status: DISCONTINUED | OUTPATIENT
Start: 2023-09-13 | End: 2023-09-19 | Stop reason: HOSPADM

## 2023-09-13 RX ORDER — ENOXAPARIN SODIUM 100 MG/ML
30 INJECTION SUBCUTANEOUS EVERY 12 HOURS
Status: DISCONTINUED | OUTPATIENT
Start: 2023-09-13 | End: 2023-09-14

## 2023-09-13 RX ORDER — POLYETHYLENE GLYCOL 3350 17 G/17G
17 POWDER, FOR SOLUTION ORAL DAILY
Status: DISCONTINUED | OUTPATIENT
Start: 2023-09-13 | End: 2023-09-19 | Stop reason: HOSPADM

## 2023-09-13 RX ORDER — SODIUM CHLORIDE, SODIUM LACTATE, POTASSIUM CHLORIDE, CALCIUM CHLORIDE 600; 310; 30; 20 MG/100ML; MG/100ML; MG/100ML; MG/100ML
75 INJECTION, SOLUTION INTRAVENOUS CONTINUOUS
Status: DISCONTINUED | OUTPATIENT
Start: 2023-09-14 | End: 2023-09-13

## 2023-09-13 RX ORDER — PANTOPRAZOLE SODIUM 40 MG/1
40 TABLET, DELAYED RELEASE ORAL
Status: DISCONTINUED | OUTPATIENT
Start: 2023-09-14 | End: 2023-09-19 | Stop reason: HOSPADM

## 2023-09-13 RX ORDER — ACETAMINOPHEN 325 MG/1
975 TABLET ORAL EVERY 8 HOURS SCHEDULED
Status: DISCONTINUED | OUTPATIENT
Start: 2023-09-13 | End: 2023-09-19 | Stop reason: HOSPADM

## 2023-09-13 RX ORDER — SODIUM CHLORIDE, SODIUM LACTATE, POTASSIUM CHLORIDE, CALCIUM CHLORIDE 600; 310; 30; 20 MG/100ML; MG/100ML; MG/100ML; MG/100ML
4 INJECTION, SOLUTION INTRAVENOUS CONTINUOUS
Status: DISCONTINUED | OUTPATIENT
Start: 2023-09-13 | End: 2023-09-19 | Stop reason: HOSPADM

## 2023-09-13 RX ORDER — INSULIN LISPRO 100 [IU]/ML
1-6 INJECTION, SOLUTION INTRAVENOUS; SUBCUTANEOUS EVERY 6 HOURS SCHEDULED
Status: DISCONTINUED | OUTPATIENT
Start: 2023-09-13 | End: 2023-09-19 | Stop reason: HOSPADM

## 2023-09-13 RX ORDER — ARIPIPRAZOLE 5 MG/1
5 TABLET ORAL DAILY
Status: DISCONTINUED | OUTPATIENT
Start: 2023-09-13 | End: 2023-09-19 | Stop reason: HOSPADM

## 2023-09-13 RX ORDER — DOCUSATE SODIUM 100 MG/1
100 CAPSULE, LIQUID FILLED ORAL 2 TIMES DAILY
Status: DISCONTINUED | OUTPATIENT
Start: 2023-09-13 | End: 2023-09-19 | Stop reason: HOSPADM

## 2023-09-13 RX ORDER — TRANEXAMIC ACID 10 MG/ML
1000 INJECTION, SOLUTION INTRAVENOUS ONCE
Status: COMPLETED | OUTPATIENT
Start: 2023-09-13 | End: 2023-09-13

## 2023-09-13 RX ORDER — METOPROLOL TARTRATE 50 MG/1
50 TABLET, FILM COATED ORAL EVERY 12 HOURS SCHEDULED
Status: DISCONTINUED | OUTPATIENT
Start: 2023-09-13 | End: 2023-09-19 | Stop reason: HOSPADM

## 2023-09-13 RX ORDER — DONEPEZIL HYDROCHLORIDE 10 MG/1
10 TABLET, FILM COATED ORAL
Status: DISCONTINUED | OUTPATIENT
Start: 2023-09-13 | End: 2023-09-19 | Stop reason: HOSPADM

## 2023-09-13 RX ADMIN — TRANEXAMIC ACID 1000 MG: 10 INJECTION, SOLUTION INTRAVENOUS at 07:41

## 2023-09-13 RX ADMIN — HYDROMORPHONE HYDROCHLORIDE 0.2 MG: 0.2 INJECTION, SOLUTION INTRAMUSCULAR; INTRAVENOUS; SUBCUTANEOUS at 10:16

## 2023-09-13 RX ADMIN — DONEPEZIL HYDROCHLORIDE 10 MG: 10 TABLET ORAL at 22:32

## 2023-09-13 RX ADMIN — ACETAMINOPHEN 975 MG: 325 TABLET, FILM COATED ORAL at 14:22

## 2023-09-13 RX ADMIN — DOCUSATE SODIUM 100 MG: 100 CAPSULE, LIQUID FILLED ORAL at 08:23

## 2023-09-13 RX ADMIN — ARIPIPRAZOLE 5 MG: 5 TABLET ORAL at 08:23

## 2023-09-13 RX ADMIN — SODIUM CHLORIDE, SODIUM LACTATE, POTASSIUM CHLORIDE, AND CALCIUM CHLORIDE 4 ML/KG/HR: .6; .31; .03; .02 INJECTION, SOLUTION INTRAVENOUS at 20:20

## 2023-09-13 RX ADMIN — METOPROLOL TARTRATE 50 MG: 50 TABLET, FILM COATED ORAL at 08:23

## 2023-09-13 RX ADMIN — ENOXAPARIN SODIUM 30 MG: 30 INJECTION SUBCUTANEOUS at 06:20

## 2023-09-13 RX ADMIN — INSULIN LISPRO 2 UNITS: 100 INJECTION, SOLUTION INTRAVENOUS; SUBCUTANEOUS at 13:14

## 2023-09-13 RX ADMIN — SODIUM CHLORIDE, SODIUM LACTATE, POTASSIUM CHLORIDE, AND CALCIUM CHLORIDE 4 ML/KG/HR: .6; .31; .03; .02 INJECTION, SOLUTION INTRAVENOUS at 14:12

## 2023-09-13 RX ADMIN — OXYCODONE HYDROCHLORIDE 5 MG: 5 TABLET ORAL at 07:41

## 2023-09-13 RX ADMIN — ACETAMINOPHEN 975 MG: 325 TABLET, FILM COATED ORAL at 05:26

## 2023-09-13 RX ADMIN — TRANEXAMIC ACID 770 MG: 100 INJECTION, SOLUTION INTRAVENOUS at 05:26

## 2023-09-13 RX ADMIN — INSULIN LISPRO 2 UNITS: 100 INJECTION, SOLUTION INTRAVENOUS; SUBCUTANEOUS at 06:19

## 2023-09-13 RX ADMIN — INSULIN LISPRO 2 UNITS: 100 INJECTION, SOLUTION INTRAVENOUS; SUBCUTANEOUS at 20:18

## 2023-09-13 RX ADMIN — SODIUM CHLORIDE, SODIUM LACTATE, POTASSIUM CHLORIDE, AND CALCIUM CHLORIDE 1000 ML: .6; .31; .03; .02 INJECTION, SOLUTION INTRAVENOUS at 14:13

## 2023-09-13 RX ADMIN — POLYETHYLENE GLYCOL 3350 17 G: 17 POWDER, FOR SOLUTION ORAL at 13:57

## 2023-09-13 RX ADMIN — METOPROLOL TARTRATE 50 MG: 50 TABLET, FILM COATED ORAL at 22:35

## 2023-09-13 RX ADMIN — ACETAMINOPHEN 975 MG: 325 TABLET, FILM COATED ORAL at 22:32

## 2023-09-13 NOTE — PLAN OF CARE
Problem: Potential for Falls  Goal: Patient will remain free of falls  Description: INTERVENTIONS:  - Educate patient/family on patient safety including physical limitations  - Instruct patient to call for assistance with activity   - Consult OT/PT to assist with strengthening/mobility   - Keep Call bell within reach  - Keep bed low and locked with side rails adjusted as appropriate  - Keep care items and personal belongings within reach  - Initiate and maintain comfort rounds  - Make Fall Risk Sign visible to staff  - Offer Toileting every 2 Hours, in advance of need  - Initiate/Maintain bed alarm  - Apply yellow socks and bracelet for high fall risk patients  - Consider moving patient to room near nurses station  Outcome: Progressing     Problem: MOBILITY - ADULT  Goal: Maintain or return to baseline ADL function  Description: INTERVENTIONS:  -  Assess patient's ability to carry out ADLs; assess patient's baseline for ADL function and identify physical deficits which impact ability to perform ADLs (bathing, care of mouth/teeth, toileting, grooming, dressing, etc.)  - Assess/evaluate cause of self-care deficits   - Assess range of motion  - Assess patient's mobility; develop plan if impaired  - Assess patient's need for assistive devices and provide as appropriate  - Encourage maximum independence but intervene and supervise when necessary  - Involve family in performance of ADLs  - Assess for home care needs following discharge   - Consider OT consult to assist with ADL evaluation and planning for discharge  - Provide patient education as appropriate  Outcome: Progressing  Goal: Maintains/Returns to pre admission functional level  Description: INTERVENTIONS:  - Perform BMAT or MOVE assessment daily.   - Set and communicate daily mobility goal to care team and patient/family/caregiver. - Collaborate with rehabilitation services on mobility goals if consulted. - Reposition patient every 2 hours.   - Out of bed for toileting  - Record patient progress and toleration of activity level   Outcome: Progressing

## 2023-09-13 NOTE — H&P
[address] [place] [address]  H&P  Name: Sammy Bowen 80 y.o. 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 Blood Sugar Average: Last 72 hrs:     Sliding scale insulin    * Femur fracture, right ([address])  Assessment & Plan  · 9/13 xray pelvis: right femur fracture   · Pain control  · Ortho consult           Trauma Alert: Other ED eval, no alert   Model of Arrival: Ambulance    Trauma Team: Attending Patito Giordano  Consultants:     Orthopedics: routine consult; Epic consult order placed; History of Present Illness     Chief Complaint: right hip pain  Mechanism:Fall     HPI:    Sammy Bowen is a 80 y.o. male with PMH of dementia, CAD, afib with tachy amanda (s/p pacemaker), HTN, and HLD who presents with right femur fracture. Patient unable to provide history but per EMS he was found down in his room next to his bed. Review of Systems   Unable to perform ROS: Dementia     12-point, complete review of systems was reviewed and negative except as stated above.      Historical Information     Past Medical History:   Diagnosis Date   • Allergic rhinitis    • Anxiety    • Aspiration of liquid     and food per wife if he is eating too fast or talking when eating   • Asthma     as a child   • Atrial fibrillation ([address])    • CAD (coronary artery disease)    • Cancer of kidney ([address])    • COPD (chronic obstructive pulmonary disease) ([address])    • CPAP (continuous positive airway pressure) dependence    • Dementia (HCC)     Frontal lobe   • Dementia (HCC)    • Diabetes mellitus ([address])    • Diabetes mellitus, type 2 ([address])    • DJD (degenerative joint disease)    • Hypercholesterolemia    • Hyperlipidemia    • Hypertension    • Incisional hernia    • Kidney disease    • Melanoma ([address])     left leg   • Obesity    • Sleep apnea     wears CPAP   • TIA (transient ischemic attack)      Past Surgical History:   Procedure Laterality Date   • CARDIAC PACEMAKER PLACEMENT     • CHOLECYSTECTOMY     • COLONOSCOPY     • HERNIA REPAIR      Incisional hernia   • NEPHRECTOMY Left 2005   • [place] ESOPHAGOGASTRODUODENOSCOPY SUBMUCOSAL INJECTION N/A 9/21/2016    Procedure: ESOPHAGOGASTRODUODENOSCOPY (EGD); Surgeon: Rodolfo Chau MD;  Location: [facility]; Service: Gastroenterology   • [place] ESOPHAGOGASTRODUODENOSCOPY SUBMUCOSAL INJECTION N/A 2/7/2018    Procedure: ESOPHAGOGASTRODUODENOSCOPY (EGD); Surgeon: Rodolfo Chau MD;  Location: [facility]; Service: Gastroenterology   • [place] ESOPHAGOGASTRODUODENOSCOPY SUBMUCOSAL INJECTION N/A 4/3/2019    Procedure: ESOPHAGOGASTRODUODENOSCOPY (EGD) WITH BOTOX;  Surgeon: Rodolfo Chau MD;  Location: [facility];   Service: Gastroenterology   • ROTATOR CUFF REPAIR     • TONSILLECTOMY          Social History     Tobacco Use   • Smoking status: Never   • Smokeless tobacco: Never   Vaping Use   • Vaping Use: Never used   Substance Use Topics   • Alcohol use: No   • Drug use: No     Immunization History   Administered Date(s) Administered   • COVID-19 PFIZER VACCINE 0.3 ML IM 01/22/2021, 02/11/2021, 11/13/2021   • INFLUENZA 10/30/2014, 10/05/2015, 10/20/2016, 10/13/2017, 10/10/2019, 09/09/2020, 09/14/2022   • Influenza, high dose seasonal 0.7 mL 09/16/2021, 09/14/2022   • Pneumococcal Conjugate 13-Valent 10/12/2015   • Tdap 04/09/2022   • Zoster 03/01/2016   • Zoster Vaccine Recombinant 02/26/2021   • influenza, trivalent, adjuvanted 09/09/2020     Last Tetanus: n/a  Family History: Non-contributory    1. Before the illness or injury that brought you to the Emergency, did you need someone to help you on a regular basis? 1=Yes   2. Since the illness or injury that brought you to the Emergency, have you needed more help than usual to take care of yourself? 1=Yes   3. Have you been hospitalized for one or more nights during the past 6 months (excluding a stay in the Emergency Department)? 1=Yes   4. In general, do you see well? 0=Yes   5. In general, do you have serious problems with your memory? 1=Yes   6. Do you take more than three different medications everyday? 1=Yes   TOTAL   4     Did you order a geriatric consult if the score was 2 or greater?: yes     Meds/Allergies   all current active meds have been reviewed     Allergies   Allergen Reactions   • Ambien [Zolpidem Tartrate] Hallucinations   • Bextra [Valdecoxib] Hives   • Dust Mite Extract Sneezing   • Lyrica [Pregabalin] Confusion   • Mold Extract [Trichophyton] Sneezing   • Pollen Extract Sneezing   • Tree Extract Other (See Comments) and Sneezing     congestion       Objective   Initial Vitals:   Temperature: 97.5 °F (36.4 °C) (09/13/23 0202)  Pulse: 72 (09/13/23 0202)  Respirations: 16 (09/13/23 0202)  Blood Pressure: 160/79 (09/13/23 0202)    Primary Survey:   Airway:        Status: patent;                  Breathing:                      Right breath sounds: normal       Left breath sounds: normal  Circulation:        Rhythm: regular       Rate: regular   Right Pulses Left Pulses    R radial: 2+  R femoral: 2+       L radial: 2+  L femoral: 2+         Disability:        GCS: Eye: 4; Verbal: 5 Motor: 6 Total: 15       Right Pupil:       Left Pupil:     R Motor Strength L Motor Strength               Exposure:       Completed: Yes      Secondary Survey:  Physical Exam  Constitutional:       General: He is not in acute distress. Appearance: He is not toxic-appearing. HENT:      Head: Normocephalic and atraumatic. Right Ear: External ear normal.      Left Ear: External ear normal.      Nose: Nose normal.      Mouth/Throat:      Mouth: Mucous membranes are moist.   Eyes:      Pupils: Pupils are equal, round, and reactive to light. Cardiovascular:      Rate and Rhythm: Normal rate. Pulses: Normal pulses. Pulmonary:      Effort: Pulmonary effort is normal. No respiratory distress. Abdominal:      General: Abdomen is flat. Musculoskeletal:         General: No swelling or tenderness. Cervical back: Normal range of motion. No tenderness. Comments: Right leg everted, shortened    Skin:     General: Skin is warm. Neurological:      General: No focal deficit present. Mental Status: He is alert and oriented to person, place, and time. Psychiatric:         Mood and Affect: Mood normal.         Invasive Devices     Peripheral Intravenous Line  Duration           Peripheral IV 09/13/23 Distal;Right;Ventral (anterior) Forearm <1 day          Drain  Duration           Urethral Catheter Latex 16 Fr. <1 day              Lab Results: I have personally reviewed all pertinent laboratory/test results 09/13/23 and in the preceding 24 hours. Recent Labs     09/13/23  0521   WBC 10.62*   HGB 14.0   HCT 42.1      SODIUM 139   K 4.0      CO2 29   BUN 20   CREATININE 1.57*   GLUC 200*   MG 1.9   PHOS 2.9       Imaging Results: I have personally reviewed pertinent images saved in PACS. CT scan findings (and other pertinent positive findings on images) were discussed with radiology.  My interpretation of the images/reports are as follows:  Chest Xray(s): N/A   FAST exam(s): N/A   CT Scan(s): positive for acute findings: see assessment   Additional Xray(s): positive for acute findings: see assessment     Other Studies:     Code Status: Level 1 - Full Code  Advance Directive and Living Will:      Power of : Yes  POLST:    I have spent 20 minutes with Patient  today in which greater than 50% of this time was spent in counseling/coordination of care regarding Diagnostic results.

## 2023-09-13 NOTE — CONSULTS
Orthopedics   Pam Lloyd 80 y.o. male MRN: [id number]  Unit/Bed#: ED 03      Chief Complaint:   right hip pain    HPI:   80 y.o.male ambulates with walker status post unwitnessed fall complaining of right hip pain and inability to bear weight. Denies headstrike and LOC. Does not take blood thinners. Pain is moderate in character, Located right hip, acute in onset, constant in duration, severe in intensity. Exacerbating factors movement, remitting factors rest. Denies radiation, numbness/tingling, or open wounds. No other complaints at this time. PMH significant for dementia, DM, Afib, CAD, and tachy-amanda syndrome s/p pacemaker. Patient and daughter in-law both deny history of right hip pain associated with ambulation.     Review Of Systems:   · Skin: Normal  · Neuro: See HPI  · Musculoskeletal: See HPI  · 14 point review of systems negative except as stated above     Past Medical History:   Past Medical History:   Diagnosis Date   • Allergic rhinitis    • Anxiety    • Aspiration of liquid     and food per wife if he is eating too fast or talking when eating   • Asthma     as a child   • Atrial fibrillation ([address])    • CAD (coronary artery disease)    • Cancer of kidney ([address])    • COPD (chronic obstructive pulmonary disease) (HCC)    • CPAP (continuous positive airway pressure) dependence    • Dementia ([address])     Frontal lobe   • Dementia ([address])    • Diabetes mellitus ([address])    • Diabetes mellitus, type 2 (HCC)    • DJD (degenerative joint disease)    • Hypercholesterolemia    • Hyperlipidemia    • Hypertension    • Incisional hernia    • Kidney disease    • Melanoma (HCC)     left leg   • Obesity    • Sleep apnea     wears CPAP   • TIA (transient ischemic attack)        Past Surgical History:   Past Surgical History:   Procedure Laterality Date   • CARDIAC PACEMAKER PLACEMENT     • CHOLECYSTECTOMY     • COLONOSCOPY     • HERNIA REPAIR      Incisional hernia   • NEPHRECTOMY Left 2005   • [place] ESOPHAGOGASTRODUODENOSCOPY SUBMUCOSAL INJECTION N/A 9/21/2016    Procedure: ESOPHAGOGASTRODUODENOSCOPY (EGD); Surgeon: Mina Copeland MD;  Location: [facility]; Service: Gastroenterology   • [place] ESOPHAGOGASTRODUODENOSCOPY SUBMUCOSAL INJECTION N/A 2/7/2018    Procedure: ESOPHAGOGASTRODUODENOSCOPY (EGD); Surgeon: Mina Copeland MD;  [facility]; Service: Gastroenterology   • [place] ESOPHAGOGASTRODUODENOSCOPY SUBMUCOSAL INJECTION N/A 4/3/2019    Procedure: ESOPHAGOGASTRODUODENOSCOPY (EGD) WITH BOTOX;  Surgeon: Mina Copeland MD;  Location[facility];   Service: Gastroenterology   • ROTATOR CUFF REPAIR     • TONSILLECTOMY         Family History:  Family history reviewed and non-contributory  Family History   Problem Relation Age of Onset   • Brain cancer Father    • Lung cancer Father    • Diabetes Family    • Heart disease Family    • Hypertension Family    • Cancer Family         renal cell carcinoma   • Stroke Family    • Colon cancer Son        Social History:  Social History     Socioeconomic History   • Marital status: /Civil Union     Spouse name: Not on file   • Number of children: Not on file   • Years of education: Not on file   • Highest education level: Not on file   Occupational History   • Not on file   Tobacco Use   • Smoking status: Never   • Smokeless tobacco: Never   Vaping Use   • Vaping Use: Never used   Substance and Sexual Activity   • Alcohol use: No   • Drug use: No   • Sexual activity: Not Currently   Other Topics Concern   • Not on file   Social History Narrative    Travel outside [place]: No      Social Determinants of Health     Financial Resource Strain: Low Risk  (5/17/2023)    Overall Financial Resource Strain (CARDIA)    • Difficulty of Paying Living Expenses: Not hard at all   Food Insecurity: No Food Insecurity (7/13/2023)    Hunger Vital Sign    • Worried About Running Out of Food in the Last Year: Never true    • Ran Out of Food in the Last Year: Never true   Transportation Needs: No Transportation Needs (7/13/2023)    PRAPARE - Transportation    • Lack of Transportation (Medical): No    • Lack of Transportation (Non-Medical): No   Physical Activity: Not on file   Stress: Not on file   Social Connections: Not on file   Intimate Partner Violence: Not on file   Housing Stability: Low Risk  (7/13/2023)    Housing Stability Vital Sign    • Unable to Pay for Housing in the Last Year: No    • Number of Places Lived in the Last Year: 2    • Unstable Housing in the Last Year: No       Allergies:    Allergies   Allergen Reactions   • Ambien [Zolpidem Tartrate] Hallucinations   • Bextra [Valdecoxib] Hives   • Dust Mite Extract Sneezing   • Lyrica [Pregabalin] Confusion   • Mold Extract [Trichophyton] Sneezing   • Pollen Extract Sneezing   • Tree Extract Other (See Comments) and Sneezing     congestion           Labs:  0   Lab Value Date/Time    HCT 42.1 09/13/2023 0521    HCT 38.1 07/13/2023 0542    HCT 37.9 07/12/2023 1359    HCT 42.9 12/19/2015 0939    HCT 39.0 07/28/2015 0901    HCT 38.3 09/20/2014 1119    HGB 14.0 09/13/2023 0521    HGB 12.7 07/13/2023 0542    HGB 12.7 07/12/2023 1359    HGB 14.7 12/19/2015 0939    HGB 13.3 07/28/2015 0901    HGB 12.6 09/20/2014 1119    INR 0.99 09/13/2023 0559    INR 2.61 (H) 12/03/2015 1155    WBC 10.62 (H) 09/13/2023 0521    WBC 5.47 07/13/2023 0542    WBC 7.89 07/12/2023 1359    WBC 6.54 12/19/2015 0939    WBC 5.31 07/28/2015 0901    WBC 5.83 09/20/2014 1119    CRP <3.0 02/11/2017 1108       Meds:    Current Facility-Administered Medications:   •  acetaminophen (TYLENOL) tablet 975 mg, 975 mg, Oral, Q8H [address] MD, 975 mg at 09/13/23 7974  •  ARIPiprazole (ABILIFY) tablet 5 mg, 5 mg, Oral, Daily, Lin Tomlinson MD  •  docusate sodium (COLACE) capsule 100 mg, 100 mg, Oral, BID, Lin Tomlinson MD  •  donepezil (ARICEPT) tablet 10 mg, 10 mg, Oral, HS, Lin Tomlinson MD  •  enoxaparin (LOVENOX) subcutaneous injection 30 mg, 30 mg, Subcutaneous, Q12H, Tish Erendira Shay MD, 30 mg at 09/13/23 0620  •  HYDROmorphone HCl (DILAUDID) injection 0.2 mg, 0.2 mg, Intravenous, Q4H PRN, Gabino Coffey MD  •  insulin lispro (HumaLOG) 100 units/mL subcutaneous injection 1-6 Units, 1-6 Units, Subcutaneous, Q6H [address], 2 Units at 09/13/23 4431 **AND** Fingerstick Glucose (POCT), , , Q6H, Gabino Coffey MD  •  metoprolol tartrate (LOPRESSOR) tablet 50 mg, 50 mg, Oral, Q12H [address], Gabino Coffey MD  •  oxyCODONE (ROXICODONE) IR tablet 5 mg, 5 mg, Oral, Q4H PRN, Gabino Coffey MD, 5 mg at 09/13/23 0741  •  oxyCODONE (ROXICODONE) split tablet 2.5 mg, 2.5 mg, Oral, Q4H PRN, Gabino Coffey MD  •  polyethylene glycol [facility]) packet 17 g, 17 g, Oral, Daily, Gabino Coffey MD    Current Outpatient Medications:   •  ARIPiprazole (ABILIFY) 5 mg tablet, Take 1 tablet (5 mg total) by mouth daily Do not start before June 14, 2023. (Patient taking differently: Take 5 mg by mouth daily Taking 10 mg by mouth daily.  @ 8:00), Disp: , Rfl: 0  •  atorvastatin (LIPITOR) 10 mg tablet, Take 1 tablet (10 mg total) by mouth daily, Disp: 90 tablet, Rfl: 1  •  Blood Glucose Monitoring Suppl (OneTouch Verio Flex System) w/Device KIT, Use as directed, Disp: , Rfl:   •  cholecalciferol (VITAMIN D3) 1,000 units tablet, Take 2,000 Units by mouth daily, Disp: , Rfl:   •  docusate sodium (COLACE) 100 mg capsule, Take 1 capsule (100 mg total) by mouth 2 (two) times a day, Disp: 60 capsule, Rfl: 0  •  donepezil (ARICEPT) 10 mg tablet, Take 1 tablet (10 mg total) by mouth daily at bedtime, Disp: 90 tablet, Rfl: 3  •  ferrous sulfate 325 (65 Fe) mg tablet, Take 325 mg by mouth as needed , Disp: , Rfl:   •  insulin detemir (LEVEMIR) 100 units/mL subcutaneous injection, Inject 10 Units under the skin 2 (two) times a day, Disp: 20 mL, Rfl: 0  •  Insulin Pen Needle (Novofine Pen Needle) 32G X 6 MM MISC, Use 4 (four) times a day, Disp: 200 each, Rfl: 5  •  Insulin Syringe-Needle U-100 (BD Insulin Syringe U/F) 30G X 1/2" 0.5 ML MISC, Use 4 (four) times a day, Disp: 400 each, Rfl: 1  •  Insulin Syringe-Needle U-100 (INSULIN SYRINGE .5CC/30GX5/16") 30G X 5/16" 0.5 ML MISC, 4 (four) times a day, Disp: , Rfl:   •  LORazepam Intensol 2 MG/ML concentrated solution, GIVE 0.25ML(S) PO/SL EVERY 4 HOURS AS NEEDED, Disp: , Rfl:   •  magnesium Oxide (MAG-OX) 400 mg TABS, Take 1 tablet (400 mg total) by mouth daily Do not start before June 14, 2023., Disp: 30 tablet, Rfl: 0  •  memantine (NAMENDA) 5 mg tablet, Take 5 mg by mouth daily, Disp: , Rfl:   •  metoprolol tartrate (LOPRESSOR) 100 mg tablet, 50mg in AM  and 100mg at bedtime, Disp: 90 tablet, Rfl: 0  •  NovoLOG FlexPen 100 units/mL injection pen, INJECT PER SLIDING SCALE FOUR TIMES PER DAY BEFORE MEALS AND AT BEDTIME UP TO 23 UNITS DAILY, Disp: , Rfl:   •  OneTouch Verio test strip, TEST BG FOUR TIMES PER DAY BEFORE MEALS AND AT BEDTIME, Disp: , Rfl:   •  propafenone (RYTHMOL) 225 mg tablet, Take 1 tablet (225 mg total) by mouth 3 (three) times a day, Disp: 90 tablet, Rfl: 3  •  senna (SENOKOT) 8.6 mg, Take 1 tablet (8.6 mg total) by mouth daily at bedtime, Disp: 30 tablet, Rfl: 0  •  sertraline (ZOLOFT) 100 mg tablet, Take 1 tablet (100 mg total) by mouth daily, Disp: 90 tablet, Rfl: 1    Blood Culture:   No results found for: "BLOODCX"    Wound Culture:   No results found for: "WOUNDCULT"    Ins and Outs:  I/O last 24 hours:   In: -   Out: 200 [Urine:200]          Physical Exam:   BP (!) 177/73 (BP Location: Left arm)   Pulse 71   Temp 97.5 °F (36.4 °C) (Tympanic)   Resp 16   SpO2 98%   Gen: No acute distress, resting comfortably in bed  HEENT: Eyes clear, moist mucus membranes, hearing intact  Respiratory: No audible wheezing or stridor  Cardiovascular: Well Perfused peripherally, 2+ distal pulse  Abdomen: nondistended, no peritoneal signs  Musculoskeletal: right lower extremity  · Skin intact  · Tender to palpation over hip  · ROM not assessed 2/2 known fracture  · Sensation intact T/LUIS/SA/DP/SP nerve distributions  · Positive ankle dorsi/plantar flexion, EHL/FHL  · 2+ DP/ PT pulse  · Musculature is soft and compressible, no pain with passive stretch  · Leg length equal    Radiology:   I personally reviewed the films.   X-rays AP/Lateral and AP pelvis views right hip shows femoral neck fracture    _*_*_*_*_*_*_*_*_*_*_*_*_*_*_*_*_*_*_*_*_*_*_*_*_*_*_*_*_*_*_*_*_*_*_*_*_*_*_*_*_*    Assessment:  88 y.o.male status post unwitnessed fall with right femoral neck fracture    Plan:   · Non weight bearing right lower extremity  · Analgesics for pain  · Will obtain consent over the phone from legal guardian   · Pre op labs   · NPO  · Domínguez Catheter   · Preop clearance per trauma   · To OR for hemiarthroplasty   · Dispo: Ortho will follow    Claudetta Gong, MD

## 2023-09-13 NOTE — ED PROVIDER NOTES
History  Chief Complaint   Patient presents with   • Fall     Out of bed. Complaining of right hip pain. -LOC, -thinners      Patient is an 51-year-old male presenting from [facility] for a fall. Patient has a history of dementia and was found down in his room next to his bed. It was an unwitnessed fall and unknown duration. Patient does not remember the incident, but thinks he hit his head. His main complaint is his right hip pain. States that he has pain when he touches it or moves his leg. Patient denies headache, lightheadedness, fever, chills, diaphoresis, chest pain, palpitations, neck/back pain, abdominal pain, numbness, tingling, or weakness. Prior to Admission Medications   Prescriptions Last Dose Informant Patient Reported? Taking? ARIPiprazole (ABILIFY) 5 mg tablet  Outside Facility (Specify) No No   Sig: Take 1 tablet (5 mg total) by mouth daily Do not start before 2023. Patient taking differently: Take 5 mg by mouth daily Taking 10 mg by mouth daily.  @ 8:00   Blood Glucose Monitoring Suppl (OneTouch Verio Flex System) w/Device KIT  Outside Facility (Specify) Yes No   Sig: Use as directed   Insulin Pen Needle (Novofine Pen Needle) 32G X 6 MM MISC  Outside Facility (Specify) No No   Sig: Use 4 (four) times a day   Insulin Syringe-Needle U-100 (BD Insulin Syringe U/F) 30G X 1/2" 0.5 ML MISC  Outside Facility (Specify) No No   Sig: Use 4 (four) times a day   Insulin Syringe-Needle U-100 (INSULIN SYRINGE .5CC/30GX5/16") 30G X 5/16" 0.5 ML MISC  Outside Facility (Specify) Yes No   Si (four) times a day   LORazepam Intensol 2 MG/ML concentrated solution  Outside Facility (Specify) Yes No   Sig: GIVE 0.25ML(S) PO/SL EVERY 4 HOURS AS NEEDED   NovoLOG FlexPen 100 units/mL injection pen  Outside Facility (Specify) Yes No   Sig: INJECT PER SLIDING SCALE FOUR TIMES PER DAY BEFORE MEALS AND AT BEDTIME UP TO 23 UNITS DAILY   OneTouch Verio test strip  Outside Facility (Specify) Yes No   Sig: TEST BG FOUR TIMES PER DAY BEFORE MEALS AND AT BEDTIME   atorvastatin (LIPITOR) 10 mg tablet  Outside Facility (Specify) No No   Sig: Take 1 tablet (10 mg total) by mouth daily   cholecalciferol (VITAMIN D3) 1,000 units tablet  Outside Facility (Specify) Yes No   Sig: Take 2,000 Units by mouth daily   docusate sodium (COLACE) 100 mg capsule  Outside Facility (Specify) No No   Sig: Take 1 capsule (100 mg total) by mouth 2 (two) times a day   donepezil (ARICEPT) 10 mg tablet  Outside Facility (Specify) No No   Sig: Take 1 tablet (10 mg total) by mouth daily at bedtime   ferrous sulfate 325 (65 Fe) mg tablet  Outside Facility (Specify) Yes No   Sig: Take 325 mg by mouth as needed    insulin detemir (LEVEMIR) 100 units/mL subcutaneous injection  Outside Facility (Specify) No No   Sig: Inject 10 Units under the skin 2 (two) times a day   magnesium Oxide (MAG-OX) 400 mg TABS  Outside Facility (Specify) No No   Sig: Take 1 tablet (400 mg total) by mouth daily Do not start before 2023.    memantine (NAMENDA) 5 mg tablet  Outside Facility (Specify) Yes No   Sig: Take 5 mg by mouth daily   metoprolol tartrate (LOPRESSOR) 100 mg tablet  Outside Facility (Specify) No No   Simg in AM  and 100mg at bedtime   propafenone (RYTHMOL) 225 mg tablet  Outside Facility (Specify) No No   Sig: Take 1 tablet (225 mg total) by mouth 3 (three) times a day   senna (SENOKOT) 8.6 mg  Outside Facility (Specify) No No   Sig: Take 1 tablet (8.6 mg total) by mouth daily at bedtime   sertraline (ZOLOFT) 100 mg tablet  Outside Facility (Specify) No No   Sig: Take 1 tablet (100 mg total) by mouth daily      Facility-Administered Medications: None       Past Medical History:   Diagnosis Date   • Allergic rhinitis    • Anxiety    • Aspiration of liquid     and food per wife if he is eating too fast or talking when eating   • Asthma     as a child   • Atrial fibrillation ([address])    • CAD (coronary artery disease)    • Cancer of kidney ([address])    • COPD (chronic obstructive pulmonary disease) (HCC)    • CPAP (continuous positive airway pressure) dependence    • Dementia (HCC)     Frontal lobe   • Dementia (HCC)    • Diabetes mellitus ([address])    • Diabetes mellitus, type 2 (HCC)    • DJD (degenerative joint disease)    • Hypercholesterolemia    • Hyperlipidemia    • Hypertension    • Incisional hernia    • Kidney disease    • Melanoma ([address])     left leg   • Obesity    • Sleep apnea     wears CPAP   • TIA (transient ischemic attack)        Past Surgical History:   Procedure Laterality Date   • CARDIAC PACEMAKER PLACEMENT     • CHOLECYSTECTOMY     • COLONOSCOPY     • HERNIA REPAIR      Incisional hernia   • NEPHRECTOMY Left 2005   • SD ESOPHAGOGASTRODUODENOSCOPY SUBMUCOSAL INJECTION N/A 9/21/2016    Procedure: ESOPHAGOGASTRODUODENOSCOPY (EGD); Surgeon: Barrington Patel MD;  Location: [facility]; Service: Gastroenterology   • SD ESOPHAGOGASTRODUODENOSCOPY SUBMUCOSAL INJECTION N/A 2/7/2018    Procedure: ESOPHAGOGASTRODUODENOSCOPY (EGD); Surgeon: Barrington Patel MD;  Location: [facility]; Service: Gastroenterology   • SD ESOPHAGOGASTRODUODENOSCOPY SUBMUCOSAL INJECTION N/A 4/3/2019    Procedure: ESOPHAGOGASTRODUODENOSCOPY (EGD) WITH BOTOX;  Surgeon: Barrington Patel MD;  Location: [facility]; Service: Gastroenterology   • ROTATOR CUFF REPAIR     • TONSILLECTOMY         Family History   Problem Relation Age of Onset   • Brain cancer Father    • Lung cancer Father    • Diabetes Family    • Heart disease Family    • Hypertension Family    • Cancer Family         renal cell carcinoma   • Stroke Family    • Colon cancer Son      I have reviewed and agree with the history as documented.     E-Cigarette/Vaping   • E-Cigarette Use Never User      E-Cigarette/Vaping Substances   • Nicotine No    • THC No    • CBD No    • Flavoring No    • Other No    • Unknown No      Social History     Tobacco Use   • Smoking status: Never   • Smokeless tobacco: Never Vaping Use   • Vaping Use: Never used   Substance Use Topics   • Alcohol use: No   • Drug use: No        Review of Systems    Physical Exam  ED Triage Vitals   Temperature Pulse Respirations Blood Pressure SpO2   09/13/23 0202 09/13/23 0202 09/13/23 0202 09/13/23 0202 09/13/23 0202   97.5 °F (36.4 °C) 72 16 160/79 93 %      Temp Source Heart Rate Source Patient Position - Orthostatic VS BP Location FiO2 (%)   09/13/23 0202 -- -- -- --   Tympanic          Pain Score       09/13/23 0526       8             Orthostatic Vital Signs  Vitals:    09/13/23 0202 09/13/23 0400   BP: 160/79    Pulse: 72 73       Physical Exam  Vitals and nursing note reviewed. Constitutional:       Appearance: Normal appearance. HENT:      Head: Normocephalic and atraumatic. Mouth/Throat:      Mouth: Mucous membranes are moist.      Pharynx: Oropharynx is clear. Eyes:      Conjunctiva/sclera: Conjunctivae normal.   Cardiovascular:      Rate and Rhythm: Normal rate and regular rhythm. Heart sounds: Normal heart sounds. Pulmonary:      Effort: Pulmonary effort is normal. No respiratory distress. Breath sounds: Normal breath sounds. Abdominal:      General: Abdomen is flat. Palpations: Abdomen is soft. Tenderness: There is no abdominal tenderness. Musculoskeletal:         General: Tenderness (Right hip) present. No swelling, deformity or signs of injury. Normal range of motion. Cervical back: Normal range of motion. No tenderness. Skin:     General: Skin is warm and dry. Neurological:      General: No focal deficit present. Mental Status: He is alert. Mental status is at baseline. Cranial Nerves: No cranial nerve deficit. Sensory: No sensory deficit. Motor: No weakness.          ED Medications  Medications   tranexamic Acid 770 mg in sodium chloride 0.9 % 100 mL IVPB (770 mg Intravenous New Bag 9/13/23 0526)   enoxaparin (LOVENOX) subcutaneous injection 30 mg (has no administration in time range)   acetaminophen (TYLENOL) tablet 975 mg (975 mg Oral Given 9/13/23 0526)   oxyCODONE (ROXICODONE) split tablet 2.5 mg (has no administration in time range)   oxyCODONE (ROXICODONE) IR tablet 5 mg (has no administration in time range)   HYDROmorphone HCl (DILAUDID) injection 0.2 mg (has no administration in time range)   polyethylene glycol (MIRALAX) packet 17 g (has no administration in time range)   ARIPiprazole (ABILIFY) tablet 5 mg (has no administration in time range)   docusate sodium (COLACE) capsule 100 mg (has no administration in time range)   donepezil (ARICEPT) tablet 10 mg (has no administration in time range)   metoprolol tartrate (LOPRESSOR) tablet 50 mg (has no administration in time range)   insulin lispro (HumaLOG) 100 units/mL subcutaneous injection 1-6 Units (has no administration in time range)       Diagnostic Studies  Results Reviewed     Procedure Component Value Units Date/Time    CBC and differential [[id number]]  (Abnormal) Collected: 09/13/23 0521    Lab Status: Final result Specimen: Blood from Arm, Right Updated: 09/13/23 0534     WBC 10.62 Thousand/uL      RBC 4.76 Million/uL      Hemoglobin 14.0 g/dL      Hematocrit 42.1 %      MCV 88 fL      MCH 29.4 pg      MCHC 33.3 g/dL      RDW 12.8 %      MPV 10.5 fL      Platelets 647 Thousands/uL      nRBC 0 /100 WBCs      Neutrophils Relative 83 %      Immat GRANS % 0 %      Lymphocytes Relative 11 %      Monocytes Relative 5 %      Eosinophils Relative 1 %      Basophils Relative 0 %      Neutrophils Absolute 8.74 Thousands/µL      Immature Grans Absolute 0.04 Thousand/uL      Lymphocytes Absolute 1.20 Thousands/µL      Monocytes Absolute 0.54 Thousand/µL      Eosinophils Absolute 0.08 Thousand/µL      Basophils Absolute 0.02 Thousands/µL     Basic metabolic panel [[id number]] Collected: 09/13/23 0521    Lab Status:  In process Specimen: Blood from Arm, Right Updated: 09/13/23 0525    Magnesium [[id number]] Collected: 09/13/23 0521    Lab Status: In process Specimen: Blood from Arm, Right Updated: 09/13/23 0525    Phosphorus [[id number]] Collected: 09/13/23 0521    Lab Status: In process Specimen: Blood from Arm, Right Updated: 09/13/23 0525                 XR femur 2 vw right   ED Interpretation by Rob Ribeiro MD (09/13 0356)   Subcapsular femur fx      XR pelvis ap only 1 or 2 vw   ED Interpretation by Rob Ribeiro MD (09/13 1800)   Subcapsular femur fx      CT head without contrast   Final Result by Shawna Kelly MD (09/13 0325)      No acute intracranial abnormality. Workstation performed: [id number]               Procedures  Procedures      ED Course                             SBIRT 22yo+    Flowsheet Row Most Recent Value   Initial Alcohol Screen: US AUDIT-C     1. How often do you have a drink containing alcohol? 0 Filed at: 09/13/2023 0259   2. How many drinks containing alcohol do you have on a typical day you are drinking? 0 Filed at: 09/13/2023 0259   Audit-C Score 0 Filed at: 09/13/2023 3101   CLARK: How many times in the past year have you. .. Used an illegal drug or used a prescription medication for non-medical reasons? Never Filed at: 09/13/2023 0259                Medical Decision Making  Patient is a 80-year-old male presenting from an unwitnessed fall. Patient does not remember the fall due to his dementia. Patient believes he hit his head but is not sure. Complains of right hip pain. CT head ordered because unclear if patient hit head or not and baseline dementia. X-ray of the right hip due to the pain with movement and point tenderness. CT head shows no acute intracranial abnormality. X-ray shows right femoral neck fracture. Patient was discussed with trauma service, and they will admit the patient for further management. Amount and/or Complexity of Data Reviewed  Radiology: ordered and independent interpretation performed.       Risk  Decision regarding hospitalization. Disposition  Final diagnoses:   Fall, initial encounter   Closed fracture of neck of right femur, initial encounter [facility])     Time reflects when diagnosis was documented in both MDM as applicable and the Disposition within this note     Time User Action Codes Description Comment    9/13/2023  4:43 AM Bob Christopher Add [J91. XXXA] Fall, initial encounter     9/13/2023  4:44 AM Bob Christopher Add [S72.001A] Closed fracture of neck of right femur, initial encounter [facility])       ED Disposition     ED Disposition   Admit    Condition   Stable    Date/Time   Wed Sep 13, 2023  4:43 AM    Comment   Case was discussed with Dr. Odessa Celeste and the patient's admission status was agreed to be Admission Status: inpatient status to the service of Dr. Anselmo Humphreys. Follow-up Information    None         Patient's Medications   Discharge Prescriptions    No medications on file     No discharge procedures on file. PDMP Review       Value Time User    PDMP Reviewed  Yes 6/13/2023 12:11 PM Bahman Lima MD           ED Provider  Attending physically available and evaluated Theodoro Corporal. I managed the patient along with the ED Attending.     Electronically Signed by         An Connolly MD  09/13/23 0308 [facility], MD  09/13/23 8125

## 2023-09-13 NOTE — CONSULTS
Consultation - Nephrology   Pam Lloyd 80 y.o. male MRN: [id number]  Unit/Bed#: ED 03 Encounter: [id number]    ASSESSMENT and PLAN:  Chronic kidney disease IV:    Etiology: Suspect secondary to diabetic kidney disease, hypertensive nephrosclerosis, solitary kidney function history of nephrectomy and age-related nephron loss  • Baseline creatinine 2.0-2.5 however since June 2023 creatinine 1.5-1.8  • On ACE/ARB or diuretics at home  • Admission creatinine 1.57 on 9/13/2023  • Patient follows with Dr. Lilibeth James  JACQUELINE risk reduction recommendation in the setting of CKD:  · Recommended IVF pre and post surgery  · Not on ace/arb or diuretics  · Avoid NSAIDs to include Toradol, naproxen, Celebrex, Mobic or Aleve  · Avoid pertubation's of blood pressure or hypotension to prevent decreased renal perfusion  · Avoid IV contrast  · Check BMP in a.m. Solitary kidney with history of nephrectomy secondary to renal cell carcinoma    Blood pressure/hypertension:  • Current BP 170s over 80s  • Home medications include: Metoprolol 2 mg every 12 hours  • Current medications include: Toprol to 50 mg every 12 hours  • Maximize hemodynamics to maintain MAP >65  • Avoid hypotension or fluctuations in blood pressure  • Will continue to trend    Ambulatory dysfunction status post chemical fall complicated by right subscapular femur fracture  • Orthopedic consulted and following-anticipating OR in a.m. for right hip hemiarthroplasty    Frontotemporal dementia  • Consider geriatric consultation      Medical records through [address] has been reviewed for this patient encounter    HISTORY OF PRESENT ILLNESS:  Requesting Physician: Yumiko Talley DO  Reason for Consult:  JACQUELINE risk reduction recommendation in the setting of CKD    Pam Lloyd is a 80 y.o. male who has PMH of CKD IV, diabetes, atrial fibrillation, CAD, tachybradycardia syndrome status post pacemaker, frontotemporal dementia who presented to ER status post fall complicated by right subscapular femur fracture and require surgical repair in a.m. .  A renal consultation is requested today JACQUELINE risk reduction recommendations in the setting of CKD. Patient has dementia is at baseline. Unable to obtain a full review of system secondary to dementia and all information and chart. Patient appeared paranoid and was talking about other people at his facility looking in on him at all times. PAST MEDICAL HISTORY:  Past Medical History:   Diagnosis Date   • Allergic rhinitis    • Anxiety    • Aspiration of liquid     and food per wife if he is eating too fast or talking when eating   • Asthma     as a child   • Atrial fibrillation ([address])    • CAD (coronary artery disease)    • Cancer of kidney ([address])    • COPD (chronic obstructive pulmonary disease) (HCC)    • CPAP (continuous positive airway pressure) dependence    • Dementia ([address])     Frontal lobe   • Dementia (HCC)    • Diabetes mellitus ([address])    • Diabetes mellitus, type 2 (HCC)    • DJD (degenerative joint disease)    • Hypercholesterolemia    • Hyperlipidemia    • Hypertension    • Incisional hernia    • Kidney disease    • Melanoma ([address])     left leg   • Obesity    • Sleep apnea     wears CPAP   • TIA (transient ischemic attack)        PAST SURGICAL HISTORY:  Past Surgical History:   Procedure Laterality Date   • CARDIAC PACEMAKER PLACEMENT     • CHOLECYSTECTOMY     • COLONOSCOPY     • HERNIA REPAIR      Incisional hernia   • NEPHRECTOMY Left 2005   • [place] ESOPHAGOGASTRODUODENOSCOPY SUBMUCOSAL INJECTION N/A 9/21/2016    Procedure: ESOPHAGOGASTRODUODENOSCOPY (EGD); Surgeon: Farrukh Schaefer MD;  Location: [facility]; Service: Gastroenterology   • [place] ESOPHAGOGASTRODUODENOSCOPY SUBMUCOSAL INJECTION N/A 2/7/2018    Procedure: ESOPHAGOGASTRODUODENOSCOPY (EGD); Surgeon: Farrukh Schaefer MD;  Location: [facility];   Service: Gastroenterology   • [place] ESOPHAGOGASTRODUODENOSCOPY SUBMUCOSAL INJECTION N/A 4/3/2019    Procedure: ESOPHAGOGASTRODUODENOSCOPY (EGD) WITH BOTOX;  Surgeon: Rodolfo Chau MD;  Location:  [facility];   Service: Gastroenterology   • ROTATOR CUFF REPAIR     • TONSILLECTOMY         ALLERGIES:  Allergies   Allergen Reactions   • Ambien [Zolpidem Tartrate] Hallucinations   • Bextra [Valdecoxib] Hives   • Dust Mite Extract Sneezing   • Lyrica [Pregabalin] Confusion   • Mold Extract [Trichophyton] Sneezing   • Pollen Extract Sneezing   • Tree Extract Other (See Comments) and Sneezing     congestion       SOCIAL HISTORY:  Social History     Substance and Sexual Activity   Alcohol Use No     Social History     Substance and Sexual Activity   Drug Use No     Social History     Tobacco Use   Smoking Status Never   Smokeless Tobacco Never       FAMILY HISTORY:  Family History   Problem Relation Age of Onset   • Brain cancer Father    • Lung cancer Father    • Diabetes Family    • Heart disease Family    • Hypertension Family    • Cancer Family         renal cell carcinoma   • Stroke Family    • Colon cancer Son        MEDICATIONS:    Current Facility-Administered Medications:   •  acetaminophen (TYLENOL) tablet 975 mg, 975 mg, Oral, Q8H [address], Mikey Rojas MD, 975 mg at 09/13/23 6584  •  ARIPiprazole (ABILIFY) tablet 5 mg, 5 mg, Oral, Daily, Mikey Rojas MD, 5 mg at 09/13/23 2314  •  docusate sodium (COLACE) capsule 100 mg, 100 mg, Oral, BID, Mikey Rojas MD, 100 mg at 09/13/23 7921  •  donepezil (ARICEPT) tablet 10 mg, 10 mg, Oral, HS, Mikey Rojas MD  •  enoxaparin (LOVENOX) subcutaneous injection 30 mg, 30 mg, Subcutaneous, Q12H, Blanche Negrete MD, 30 mg at 09/13/23 0620  •  HYDROmorphone HCl (DILAUDID) injection 0.2 mg, 0.2 mg, Intravenous, Q4H PRN, Mikey Rojas MD, 0.2 mg at 09/13/23 1016  •  insulin lispro (HumaLOG) 100 units/mL subcutaneous injection 1-6 Units, 1-6 Units, Subcutaneous, Q6H [address], 2 Units at 09/13/23 1314 **AND** Fingerstick Glucose (POCT), , , Q6H, Mikey Rojas MD  • [START ON 9/14/2023] lactated ringers infusion, 75 mL/hr, Intravenous, Continuous, Maynor Hernandez MD  •  metoprolol tartrate (LOPRESSOR) tablet 50 mg, 50 mg, Oral, Q12H [address], Nena Martell MD, 50 mg at 09/13/23 1890  •  oxyCODONE (ROXICODONE) IR tablet 5 mg, 5 mg, Oral, Q4H PRN, Nena Martell MD, 5 mg at 09/13/23 0741  •  oxyCODONE (ROXICODONE) split tablet 2.5 mg, 2.5 mg, Oral, Q4H PRN, Nena Martell MD  •  polyethylene glycol (MIRALAX) packet 17 g, 17 g, Oral, Daily, Nena Martell MD  •  [facility]) tablet 8.6 mg, 1 tablet, Oral, HS, Gold Diamond MD    Current Outpatient Medications:   •  ARIPiprazole (ABILIFY) 5 mg tablet, Take 1 tablet (5 mg total) by mouth daily Do not start before June 14, 2023. (Patient taking differently: Take 5 mg by mouth daily Taking 10 mg by mouth daily.  @ 8:00), Disp: , Rfl: 0  •  atorvastatin (LIPITOR) 10 mg tablet, Take 1 tablet (10 mg total) by mouth daily, Disp: 90 tablet, Rfl: 1  •  Blood Glucose Monitoring Suppl (OneTouch Verio Flex System) w/Device KIT, Use as directed, Disp: , Rfl:   •  cholecalciferol (VITAMIN D3) 1,000 units tablet, Take 2,000 Units by mouth daily, Disp: , Rfl:   •  docusate sodium (COLACE) 100 mg capsule, Take 1 capsule (100 mg total) by mouth 2 (two) times a day, Disp: 60 capsule, Rfl: 0  •  donepezil (ARICEPT) 10 mg tablet, Take 1 tablet (10 mg total) by mouth daily at bedtime, Disp: 90 tablet, Rfl: 3  •  ferrous sulfate 325 (65 Fe) mg tablet, Take 325 mg by mouth as needed , Disp: , Rfl:   •  insulin detemir (LEVEMIR) 100 units/mL subcutaneous injection, Inject 10 Units under the skin 2 (two) times a day, Disp: 20 mL, Rfl: 0  •  Insulin Pen Needle (Novofine Pen Needle) 32G X 6 MM MISC, Use 4 (four) times a day, Disp: 200 each, Rfl: 5  •  Insulin Syringe-Needle U-100 (BD Insulin Syringe U/F) 30G X 1/2" 0.5 ML MISC, Use 4 (four) times a day, Disp: 400 each, Rfl: 1  •  Insulin Syringe-Needle U-100 (INSULIN SYRINGE .5CC/30GX5/16") 30G X 5/16" 0.5 ML MISC, 4 (four) times a day, Disp: , Rfl:   •  LORazepam Intensol 2 MG/ML concentrated solution, GIVE 0.25ML(S) PO/SL EVERY 4 HOURS AS NEEDED, Disp: , Rfl:   •  magnesium Oxide (MAG-OX) 400 mg TABS, Take 1 tablet (400 mg total) by mouth daily Do not start before June 14, 2023., Disp: 30 tablet, Rfl: 0  •  memantine (NAMENDA) 5 mg tablet, Take 5 mg by mouth daily, Disp: , Rfl:   •  metoprolol tartrate (LOPRESSOR) 100 mg tablet, 50mg in AM  and 100mg at bedtime, Disp: 90 tablet, Rfl: 0  •  NovoLOG FlexPen 100 units/mL injection pen, INJECT PER SLIDING SCALE FOUR TIMES PER DAY BEFORE MEALS AND AT BEDTIME UP TO 23 UNITS DAILY, Disp: , Rfl:   •  OneTouch Verio test strip, TEST BG FOUR TIMES PER DAY BEFORE MEALS AND AT BEDTIME, Disp: , Rfl:   •  propafenone (RYTHMOL) 225 mg tablet, Take 1 tablet (225 mg total) by mouth 3 (three) times a day, Disp: 90 tablet, Rfl: 3  •  senna (SENOKOT) 8.6 mg, Take 1 tablet (8.6 mg total) by mouth daily at bedtime, Disp: 30 tablet, Rfl: 0  •  sertraline (ZOLOFT) 100 mg tablet, Take 1 tablet (100 mg total) by mouth daily, Disp: 90 tablet, Rfl: 1      REVIEW OF SYSTEMS:  Patient seen and examined at bedside. Review of Systems   Unable to perform ROS: Dementia         PHYSICAL EXAM:  Current weight:    Vitals:    09/13/23 0735 09/13/23 0823 09/13/23 0921 09/13/23 1015   BP: (!) 177/73  119/76 170/84   BP Location: Left arm  Right arm Right arm   Pulse: 71 70 90 72   Resp: 16  18 18   Temp:       TempSrc:       SpO2: 98%  95% 96%       Physical Exam  Vitals and nursing note reviewed. Constitutional:       Appearance: Normal appearance. Comments: NAD   HENT:      Head: Normocephalic and atraumatic. Nose: Nose normal.      Mouth/Throat:      Mouth: Mucous membranes are dry. Pharynx: Oropharynx is clear. Eyes:      Extraocular Movements: Extraocular movements intact.       Conjunctiva/sclera: Conjunctivae normal.   Cardiovascular:      Rate and Rhythm: Normal rate and regular rhythm. Pulses: Normal pulses. Heart sounds: Normal heart sounds. Pulmonary:      Effort: Pulmonary effort is normal.      Breath sounds: Normal breath sounds. Comments: Decreased bases  Abdominal:      General: Bowel sounds are normal.      Palpations: Abdomen is soft. Musculoskeletal:         General: Normal range of motion. Cervical back: Normal range of motion and neck supple. Skin:     General: Skin is warm. Neurological:      Mental Status: He is alert. Mental status is at baseline. Psychiatric:         Mood and Affect: Mood normal.         Behavior: Behavior normal.           Invasive Devices:   Urethral Catheter Latex 16 Fr. (Active)   Amt returned on insertion(mL) 200 mL 09/13/23 0551       Lab Results:   Results from last 7 days   Lab Units 09/13/23  0521   WBC Thousand/uL 10.62*   HEMOGLOBIN g/dL 14.0   HEMATOCRIT % 42.1   PLATELETS Thousands/uL 167   SODIUM mmol/L 139   POTASSIUM mmol/L 4.0   CHLORIDE mmol/L 101   CO2 mmol/L 29   BUN mg/dL 20   CREATININE mg/dL 1.57*   CALCIUM mg/dL 9.2   MAGNESIUM mg/dL 1.9   PHOSPHORUS mg/dL 2.9         Portions of the record may have been created with voice recognition software. Occasional wrong word or "sound a like" substitutions may have occurred due to the inherent limitations of voice recognition software.  Read the chart carefully and recognize,

## 2023-09-13 NOTE — ASSESSMENT & PLAN NOTE
Lab Results   Component Value Date    HGBA1C 8.0 (H) 05/25/2023       No results for input(s): "POCGLU" in the last 72 hours.     Blood Sugar Average: Last 72 hrs:     Sliding scale insulin

## 2023-09-13 NOTE — PROGRESS NOTES
09/13/23 1500   Clinical Encounter Type   Visited With Patient     Jose Barrera anointed pt anointing and a blessing. Spiritual Care remains available.

## 2023-09-13 NOTE — SPEECH THERAPY NOTE
Speech Language/Pathology  Speech-Language Pathology Bedside Swallow Evaluation      Patient Name: Cynthia Springer    Today's Date: 9/13/2023     Problem List  Principal Problem:    Femur fracture, right ([address])  Active Problems:    Type 2 diabetes mellitus with kidney complication, with long-term current use of insulin (HCC)    Dementia with behavioral disturbance (HCC)    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HCC)      Past Medical History  Past Medical History:   Diagnosis Date   • Allergic rhinitis    • Anxiety    • Aspiration of liquid     and food per wife if he is eating too fast or talking when eating   • Asthma     as a child   • Atrial fibrillation ([address])    • CAD (coronary artery disease)    • Cancer of kidney ([address])    • COPD (chronic obstructive pulmonary disease) ([address])    • CPAP (continuous positive airway pressure) dependence    • Dementia ([address])     Frontal lobe   • Dementia ([address])    • Diabetes mellitus ([address])    • Diabetes mellitus, type 2 (HCC)    • DJD (degenerative joint disease)    • Hypercholesterolemia    • Hyperlipidemia    • Hypertension    • Incisional hernia    • Kidney disease    • Melanoma ([address])     left leg   • Obesity    • Sleep apnea     wears CPAP   • TIA (transient ischemic attack)        Past Surgical History  Past Surgical History:   Procedure Laterality Date   • CARDIAC PACEMAKER PLACEMENT     • CHOLECYSTECTOMY     • COLONOSCOPY     • HERNIA REPAIR      Incisional hernia   • NEPHRECTOMY Left 2005   • CT ESOPHAGOGASTRODUODENOSCOPY SUBMUCOSAL INJECTION N/A 9/21/2016    Procedure: ESOPHAGOGASTRODUODENOSCOPY (EGD); Surgeon: Mary Zeng MD;  Location: [facility]; Service: Gastroenterology   • CT ESOPHAGOGASTRODUODENOSCOPY SUBMUCOSAL INJECTION N/A 2/7/2018    Procedure: ESOPHAGOGASTRODUODENOSCOPY (EGD); Surgeon: Mary Zeng MD;  [facility];   Service: Gastroenterology   • CT ESOPHAGOGASTRODUODENOSCOPY SUBMUCOSAL INJECTION N/A 4/3/2019    Procedure: ESOPHAGOGASTRODUODENOSCOPY (EGD) WITH BOTOX; Surgeon: Yanni Nelson MD;  [facility]; Service: Gastroenterology   • ROTATOR CUFF REPAIR     • TONSILLECTOMY         Summary   Pt presented with functional appearing oral and pharyngeal stage swallowing skills with materials administered today which are his baseline diet. He has a h/o esophageal dysmotility and presbyesophagus which has been documented. Will continue to follow as needed after his surgery tomorrow. Risk/s for Aspiration: h/o dysphagia, cognition     Recommended Diet: mechanically altered/level 2 diet and thin liquids   Recommended Form of Meds: crushed with puree   Aspiration precautions and swallowing strategies: upright posture, only feed when fully alert, slow rate of feeding and alternating bites and sips  Other Recommendations: Continue frequent oral care, follow as able after hip surgery        Current Medical Status  Pt is a 80 y.o. male who presented to [address] [place] [address] 9/13/23 s/p fall at his facility. Now w/ displaced R femoral fx. Swallow eval ordered by nsg, pt w/ dysphagia prior to admit, ordered a regular diet. OR in a.m. for right hip hemiarthroplasty    Current Precautions:  Fall  Aspiration     Allergies:  No known food allergies    Past medical history:  Please see H&P for details    Special Studies:  MBS 6/5/23  Assessment Summary:    Pt presents with mild oropharyngeal dysphagia most notably characterized by prolonged mastication, mild oral residue, intermittently delayed swallow initiation to the pyriforms, diminished pharyngeal stripping wave and mild to moderate pharyngeal residue. Collectively this resulted in aspiration of thin liquids via consecutive straw sips. Pt had very small throat clear in response to aspiration. Throat clear was not effective at clearing aspirated material from the airway. Pt had no aspiration when taking large sips of thin liquid via cup or when taking barium pill with cup sips of thin.  Pt noted to have mild to moderate pharyngeal residue that increased as viscosity increased. Esophageal clearance was noted to be prolonged. Pill did not clear from thoracic esophagus during study despite thin liquid washes. Pt does have h/o presbyesophagus. Swallow was initiated with head of bolus at the pyriforms with thin liquids, at the posterior aspect of epiglottis with nectar liquids, and at the valleculae with pudding. Recommend continuing Level 3 Dental soft diet/thin liquids with NO straws. Encourage aspiration precautions and thorough oral care.        Social/Education/Vocational Hx:  Pt lives in [facility]    Swallow Information   Current Risks for Dysphagia & Aspiration: known history of dysphagia  Current Symptoms/Concerns: poor mentation  Current Diet: regular diet and thin liquids   Baseline Diet: mechanically altered/level 2 diet and thin liquids      Baseline Assessment   Behavior/Cognition: alert  Speech/Language Status: able to follow commands and limited verbal output  Patient Positioning: upright in bed  Pain Status/Interventions/Response to Interventions: stated his back end hurt       Swallow Mechanism Exam  Facial: symmetrical and left facial droop noted slightly, ?leaning  Labial: WFL  Lingual: unable to test 2/2 limited command following  Velum: unable to visualize  Mandible: unable to test 2/2 limited command following  Dentition: many missing, ?able dentures vs natural. He stated he had upper and lowers but there are no bottom teeth in place. Vocal quality:clear/adequate   Volitional Cough: unable to initiate volitional cough   Respiratory Status: on RA      Consistencies Assessed and Performance   Consistencies Administered: thin liquids, puree and mechanical soft solids    Oral Stage: WFL, minimal and decreased mastication-able to clear the oral cavity  Mastication was slow but  adequate with the materials administered today. Bolus formation and transfer were functional with no significant oral residue noted.   No overt s/s reduced oral control. Pharyngeal Stage: WFL  Swallow Mechanics:  Swallowing initiation appeared prompt. Laryngeal rise was palpated and judged to be within functional limits. No coughing, throat clearing, change in vocal quality or respiratory status noted today. Esophageal Concerns: H/o presbyesophagus, poor esophageal clearing. Strategies and Efficacy: alternate bites/sips    Summary and Recommendations (see above)    Results Reviewed with: patient, RN and MD     Treatment Recommended: will follow after surgery     Frequency of treatment: as needed     Patient Stated Goal: none stated    Dysphagia LTG  -Patient will demonstrate safe and effective oral intake (without overt s/s significant oral/pharyngeal dysphagia including s/s penetration or aspiration) for the highest appropriate diet level. Short Term Goals:    -Pt will tolerate Dysphagia 2/mechanical soft diet and  thin liquid with no significant s/s oral or pharyngeal dysphagia across 1-3 diagnostic session/s.     Speech Therapy Prognosis   Prognosis: fair    Prognosis Considerations: medical status and cognitive status

## 2023-09-13 NOTE — CONSULTS
Consultation - Geriatric Medicine   [employer] 80 y.o. male MRN: [id number]  Unit/Bed#: ED 03 Encounter: [id number]      Assessment/Plan     Ambulatory dysfunction with fall  -reportedly mechanical fall at long term care facility earlier today   -(-) head strike (-) loss of consciousness per pt but is unreliable historian  -injuries as outlined below  -Requires use of walker for ambulation at baseline  -hx recurrent falls numerous in past six months   -remains high risk future falls due to age, hx fall, deconditioning/debility and unfamiliar environment   -encourage good body mechanics and assist with all transfers  -keep personal items and call bell close to prevent reaching  -maintain environment free of fall hazards  -encourage appropriate footwear and adequate lighting at all times when out of bed  -PT and OT pending     Right subcapital femur fracture  -S/p fall as outlined above  -Noted on right femur XR obtained on admission  -NWB RLE  -Neurovascular checks per protocol  -Acute pain control  -Ortho on consult - rec operative intervention during current admit    Acute pain due to trauma   -recommend pain control per Geriatric pain protocol:  Tylenol 975mg Q8H scheduled  Roxicodone 2.5mg Q4H PRN moderate pain  Roxicodone 5mg Q4H PRN severe pain  Dilaudid 0.2mg Q4H PRN  -consider adjuncts such aslidocaine patch topically to appropriate areas  -encourage addition of non-pharmacologic pain treatment including ice and frequent repositioning  -recommend  bowel regimen to prevent and treat constipation due to increased risk with acute pain and opiate pain medications    Frontotemporal dementia  -moderate and progressive  -now dependent for iADLs and requires assist with ADLs at baseline   -currently residing in secure memory unit of long term care facility   -maintained on aripiprazole, donepezil, Zoloft, memantine and lorazepam at long term care [facility]  -memantine dose recently reduced as o/p due to poor renal function, continue to monitor closely and may need to consider ultimately dc if renal fxn declines further  -Cautious use due to possible tachybradycardia syndrome given risk of further contributing to bradycardia  -CTH obtained on admission images personally viewed, reveals mild frontal lobe atrophy out of proportion to rest of parenchyma as well as general diffuse chronic microangiopathic changes   -TSH WNL 3.06, B12 slightly low at 331, consider temp increase in supplementation to goal >400  -avoid addition of new or increasing dose antipsychotics given increased risk cardiovascular events in pts with underlying dementia as well as risk paradoxic rxn  -continue to encourage calm quite env to reduce risk overstimulation and redirect unwanted behaviors as first line     DM-II  -A1c 8.0  -Given age and comorbidities target A1c of approximately 8 is not unreasonable  -Maintained on basal bolus regimen as outpatient  -Target blood sugar during hospitalization 140-180 to reduce risk hypoglycemia  -Continue close follow-up with PCP for age-appropriate ongoing diabetic screening and cares    Impaired Vision  -recommend use of corrective lenses at all appropriate times  -encourage adequate lighting and encourage use of assistance with ambulation  -keep personal belongings close to person to avoid reaching  -encourage appropriate footwear at all times  -Consider large font for printed materials provided to patient     Impaired mastication  -Requires use of dentures  -encourage use all appropriate times  -ensure meal consistency appropriate for abilities  -continue aspiration precautions    Impaired Hearing  -Encourage use of hearing aids at all appropriate times  -encourage providers and caregivers to speak slowly and clearly directly to patient  -minimize background noise to encourage patient engagement  -consider use of hearing amplifier to reduce risk of straining to hear if hearing aids are not present or are not sufficient   -Encourage use of teach back method to ensure clear communication    Delirium precautions  -Patient is high risk of delirium due to age, fall, traumatic injuries, acute pain, dementia, hosp env  -Initiate delirium precautions  -maintain normal sleep/wake cycle  -minimize overnight interruptions, group overnight vitals/labs/nursing checks as medically appropriate   -dim lights, close blinds and turn off tv to minimize stimulation and encourage sleep environment in evenings  -ensure that pain is well controlled   -monitor for fecal and urinary retention which may precipitate delirium  -encourage early mobilization and ambulation with assist as cleared to safely do so  -provide frequent reorientation and redirection as indicated and appropriate   -encourage hydration and nutrition   -redirect unwanted behaviors as first line    Deconditioning/debility/frailty   -clinical frailty scale stage VI/VII, moderate to severely frail  -multifactorial including advanced and progressive dementia and multitude of chronic medical comorbidities in elderly individual with extremely limited physiologic and metabolic reserve  -Continue to encourage well-balanced nutrition, consider supplements between meals if oral intake is poor  -Optimize chronic conditions and address acute metabolic derangements as arise  -Continue to ensure that treatment interventions along with patient's wishes and goals of care    Home medication review   Personally confirmed with medication list obtained from [facility]:     Aripiprazole 10 Mg daily  Atorvastatin 10 Mg daily  Donepezil 10 Mg  Ferrous sulfate 325 Mg daily  Levemir 10U (ten) twice daily  Mag oxide 400 Mg daily  Memantine 5 Mg daily  Metoprolol tartrate 100 Mg daily at bedtime  Metoprolol tartrate 50 Mg daily in morning  ISS  Propafenone 225mg Q8H  Senna 8.6 Mg at bedtime  Sertraline 100 Mg daily  Docusate 100 Mg twice daily  Vitamin D 2000 units daily  Lorazepam 2mg/ml take 0.25mL Q4H PRN anxiety     Diet mechanical soft    Care coordination: rounded with Gasper Tong (RN) in ED     History of Present Illness   Physician Requesting Consult: Lavon Mota DO  Reason for Consult / Principal Problem: Fall  Hx and PE limited by: N/A  Additional history obtained from: Chart review and patient evaluation    HPI: Grupo Blue is a 80y.o. year old male with frontotemporal dementia with behavioral disturbance, tachybradycardia syndrome, type 2 diabetes with renal manifestation, coagulopathy, B12 urgency, CKD 4, hyperlipidemia, renal cell carcinoma, GERD, solitary kidney, CAD, and ambulatory dysfunction who is admitted to the the trauma service following unwitnessed fall at his long term care facility found to have right femur fracture on admission imaging, he is being seen in consultation by geriatrics for dementia with behavioral disturbance and high risk whelping delirium during hospitalization. He seen and examined at the bedside in ED where he is lying resting, he explains that he sustained a mechanical fall at his care facility when his legs suddenly went out from under him causing him to collapse landing on his right side. He denies history of or loss of consciousness but was unable to get up without assistance of facility care staff. He continues to endorse severe right hip and buttocks pain not yet responsive to any analgesic modalities trialed thus far. He reports prior to the fall being in his usual state of health of being constipated he offers no additional acute complaints. Prior to admission Andria Kearns was residing at [facility] in [address] [facility] where he receives some assistance with ADLs and is dependent for IADLs. He utilizes walker for ambulation and history of recurrent falls with at least 3-4 in the past 6 months. At baseline he is usually oriented to self and general situation with some fluctuation in mentation throughout the day.   He requires use of glasses hearing aids and dentures. Inpatient consult to Gerontology  Consult performed by: Mi Alatorre DO  Consult ordered by: An Love MD        Review of Systems   Constitutional: Negative. Negative for chills and fever. HENT: Positive for dental problem (denture). Eyes: Negative. Visual disturbance: glasses. Respiratory: Negative. Negative for shortness of breath. Cardiovascular: Negative. Gastrointestinal: Positive for constipation. Genitourinary: Negative. Musculoskeletal: Positive for arthralgias (R hip) and gait problem. Severe pain right side buttox since landing on it during fall    Skin: Negative. Neurological: Positive for weakness (BLE ). Negative for dizziness, light-headedness, numbness and headaches. Hematological: Negative. Psychiatric/Behavioral: Negative. Negative for sleep disturbance. All other systems reviewed and are negative.     Historical Information   Past Medical History:   Diagnosis Date   • Allergic rhinitis    • Anxiety    • Aspiration of liquid     and food per wife if he is eating too fast or talking when eating   • Asthma     as a child   • Atrial fibrillation ([address])    • CAD (coronary artery disease)    • Cancer of kidney ([address])    • COPD (chronic obstructive pulmonary disease) (HCC)    • CPAP (continuous positive airway pressure) dependence    • Dementia ([address])     Frontal lobe   • Dementia (HCC)    • Diabetes mellitus ([address])    • Diabetes mellitus, type 2 (HCC)    • DJD (degenerative joint disease)    • Hypercholesterolemia    • Hyperlipidemia    • Hypertension    • Incisional hernia    • Kidney disease    • Melanoma ([address])     left leg   • Obesity    • Sleep apnea     wears CPAP   • TIA (transient ischemic attack)      Past Surgical History:   Procedure Laterality Date   • CARDIAC PACEMAKER PLACEMENT     • CHOLECYSTECTOMY     • COLONOSCOPY     • HERNIA REPAIR      Incisional hernia   • NEPHRECTOMY Left 2005   • [place] ESOPHAGOGASTRODUODENOSCOPY SUBMUCOSAL INJECTION N/A 9/21/2016    Procedure: ESOPHAGOGASTRODUODENOSCOPY (EGD); Surgeon: Jace Morris MD;  Location: [facility]; Service: Gastroenterology   • [place] ESOPHAGOGASTRODUODENOSCOPY SUBMUCOSAL INJECTION N/A 2/7/2018    Procedure: ESOPHAGOGASTRODUODENOSCOPY (EGD); Surgeon: Jace Morris MD;  Location: [facility]; Service: Gastroenterology   • [place] ESOPHAGOGASTRODUODENOSCOPY SUBMUCOSAL INJECTION N/A 4/3/2019    Procedure: ESOPHAGOGASTRODUODENOSCOPY (EGD) WITH BOTOX;  Surgeon: Jace Morris MD;  Location: [facility];   Service: Gastroenterology   • ROTATOR CUFF REPAIR     • TONSILLECTOMY       Social History   Social History     Substance and Sexual Activity   Alcohol Use No     Social History     Substance and Sexual Activity   Drug Use No     Social History     Tobacco Use   Smoking Status Never   Smokeless Tobacco Never     Family History:   Family History   Problem Relation Age of Onset   • Brain cancer Father    • Lung cancer Father    • Diabetes Family    • Heart disease Family    • Hypertension Family    • Cancer Family         renal cell carcinoma   • Stroke Family    • Colon cancer Son      Meds/Allergies   all current active meds have been reviewed    Allergies   Allergen Reactions   • Ambien [Zolpidem Tartrate] Hallucinations   • Bextra [Valdecoxib] Hives   • Dust Mite Extract Sneezing   • Lyrica [Pregabalin] Confusion   • Mold Extract [Trichophyton] Sneezing   • Pollen Extract Sneezing   • Tree Extract Other (See Comments) and Sneezing     congestion     Objective     Intake/Output Summary (Last 24 hours) at 9/13/2023 1059  Last data filed at 9/13/2023 0751  Gross per 24 hour   Intake 100 ml   Output 200 ml   Net -100 ml     Invasive Devices     Peripheral Intravenous Line  Duration           Peripheral IV 09/13/23 Right Antecubital <1 day          Drain  Duration           Urethral Catheter Latex 16 Fr. <1 day                Physical Exam  Vitals and nursing note reviewed. Constitutional:       General: He is not in acute distress. Comments: Thin elderly male appears stated age    HENT:      Head: Normocephalic. Nose: Nose normal.      Mouth/Throat:      Mouth: Mucous membranes are dry. Eyes:      General: No scleral icterus. Right eye: No discharge. Left eye: No discharge. Conjunctiva/sclera: Conjunctivae normal.   Neck:      Comments: Voice hoarse   Cardiovascular:      Rate and Rhythm: Normal rate and regular rhythm. Pulmonary:      Effort: Pulmonary effort is normal. No respiratory distress. Comments: No rales or rhonci   Abdominal:      Palpations: Abdomen is soft. Comments: Bowel sounds slow but present    Musculoskeletal:      Cervical back: Neck supple. Right lower leg: No edema. Left lower leg: No edema. Comments: Reduced overall muscle mass    Skin:     General: Skin is warm and dry. Neurological:      Mental Status: He is alert.       Comments: Awake and alert, oriented to self and general situation but not specific details   Psychiatric:      Comments: Polite and cooperative at time evaluation        Lab Results:     I have personally reviewed pertinent lab results including the following:    Results from last 7 days   Lab Units 09/13/23  0521   WBC Thousand/uL 10.62*   HEMOGLOBIN g/dL 14.0   HEMATOCRIT % 42.1   PLATELETS Thousands/uL 167   NEUTROS PCT % 83*   MONOS PCT % 5   EOS PCT % 1     Results from last 7 days   Lab Units 09/13/23  0521   POTASSIUM mmol/L 4.0   CHLORIDE mmol/L 101   CO2 mmol/L 29   BUN mg/dL 20   CREATININE mg/dL 1.57*   CALCIUM mg/dL 9.2     I have personally reviewed the following imaging study reports in PACS:    9/13/23- CT w/o contrast. XR pelvis, R femur XR    Therapies:   PT: pending   OT: pending     VTE Prophylaxis: Sequential compression device (Venodyne)     Code Status: Level 1 - Full Code  Advance Directive and Living Will:      Power of : Yes  POLST: Family and Social Support: long term care facility staff     Goals of Care: pain control

## 2023-09-14 ENCOUNTER — ANESTHESIA EVENT (INPATIENT)
Dept: PERIOP | Facility: HOSPITAL | Age: 88
DRG: 522 | End: 2023-09-14
Payer: COMMERCIAL

## 2023-09-14 ENCOUNTER — ANESTHESIA (INPATIENT)
Dept: PERIOP | Facility: HOSPITAL | Age: 88
DRG: 522 | End: 2023-09-14
Payer: COMMERCIAL

## 2023-09-14 ENCOUNTER — APPOINTMENT (INPATIENT)
Dept: RADIOLOGY | Facility: HOSPITAL | Age: 88
DRG: 522 | End: 2023-09-14
Payer: COMMERCIAL

## 2023-09-14 ENCOUNTER — TELEPHONE (OUTPATIENT)
Age: 88
End: 2023-09-14

## 2023-09-14 PROBLEM — T83.018A URINARY CATHETER DYSFUNCTION (HCC): Status: ACTIVE | Noted: 2023-09-14

## 2023-09-14 LAB
ANION GAP SERPL CALCULATED.3IONS-SCNC: 8 MMOL/L
ATRIAL RATE: 71 BPM
BASOPHILS # BLD AUTO: 0.03 THOUSANDS/ÂΜL (ref 0–0.1)
BASOPHILS NFR BLD AUTO: 0 % (ref 0–1)
BUN SERPL-MCNC: 22 MG/DL (ref 5–25)
CALCIUM SERPL-MCNC: 9.2 MG/DL (ref 8.4–10.2)
CHLORIDE SERPL-SCNC: 102 MMOL/L (ref 96–108)
CO2 SERPL-SCNC: 27 MMOL/L (ref 21–32)
CREAT SERPL-MCNC: 1.63 MG/DL (ref 0.6–1.3)
EOSINOPHIL # BLD AUTO: 0.39 THOUSAND/ÂΜL (ref 0–0.61)
EOSINOPHIL NFR BLD AUTO: 3 % (ref 0–6)
ERYTHROCYTE [DISTWIDTH] IN BLOOD BY AUTOMATED COUNT: 12.8 % (ref 11.6–15.1)
GFR SERPL CREATININE-BSD FRML MDRD: 37 ML/MIN/1.73SQ M
GLUCOSE SERPL-MCNC: 165 MG/DL (ref 65–140)
GLUCOSE SERPL-MCNC: 171 MG/DL (ref 65–140)
GLUCOSE SERPL-MCNC: 210 MG/DL (ref 65–140)
GLUCOSE SERPL-MCNC: 214 MG/DL (ref 65–140)
GLUCOSE SERPL-MCNC: 236 MG/DL (ref 65–140)
HCT VFR BLD AUTO: 39.8 % (ref 36.5–49.3)
HGB BLD-MCNC: 13.4 G/DL (ref 12–17)
IMM GRANULOCYTES # BLD AUTO: 0.05 THOUSAND/UL (ref 0–0.2)
IMM GRANULOCYTES NFR BLD AUTO: 0 % (ref 0–2)
LYMPHOCYTES # BLD AUTO: 1.26 THOUSANDS/ÂΜL (ref 0.6–4.47)
LYMPHOCYTES NFR BLD AUTO: 10 % (ref 14–44)
MCH RBC QN AUTO: 28.4 PG (ref 26.8–34.3)
MCHC RBC AUTO-ENTMCNC: 33.7 G/DL (ref 31.4–37.4)
MCV RBC AUTO: 84 FL (ref 82–98)
MONOCYTES # BLD AUTO: 0.94 THOUSAND/ÂΜL (ref 0.17–1.22)
MONOCYTES NFR BLD AUTO: 7 % (ref 4–12)
NEUTROPHILS # BLD AUTO: 10.51 THOUSANDS/ÂΜL (ref 1.85–7.62)
NEUTS SEG NFR BLD AUTO: 80 % (ref 43–75)
NRBC BLD AUTO-RTO: 0 /100 WBCS
P AXIS: 82 DEGREES
PLATELET # BLD AUTO: 161 THOUSANDS/UL (ref 149–390)
PMV BLD AUTO: 11 FL (ref 8.9–12.7)
POTASSIUM SERPL-SCNC: 3.8 MMOL/L (ref 3.5–5.3)
PR INTERVAL: 228 MS
QRS AXIS: 70 DEGREES
QRSD INTERVAL: 100 MS
QT INTERVAL: 414 MS
QTC INTERVAL: 449 MS
RBC # BLD AUTO: 4.72 MILLION/UL (ref 3.88–5.62)
SODIUM SERPL-SCNC: 137 MMOL/L (ref 135–147)
T WAVE AXIS: 59 DEGREES
VENTRICULAR RATE: 71 BPM
WBC # BLD AUTO: 13.18 THOUSAND/UL (ref 4.31–10.16)

## 2023-09-14 PROCEDURE — NC001 PR NO CHARGE: Performed by: ORTHOPAEDIC SURGERY

## 2023-09-14 PROCEDURE — C1776 JOINT DEVICE (IMPLANTABLE): HCPCS | Performed by: ORTHOPAEDIC SURGERY

## 2023-09-14 PROCEDURE — NC001 PR NO CHARGE

## 2023-09-14 PROCEDURE — 0SRR0J9 REPLACEMENT OF RIGHT HIP JOINT, FEMORAL SURFACE WITH SYNTHETIC SUBSTITUTE, CEMENTED, OPEN APPROACH: ICD-10-PCS | Performed by: ORTHOPAEDIC SURGERY

## 2023-09-14 PROCEDURE — 82948 REAGENT STRIP/BLOOD GLUCOSE: CPT

## 2023-09-14 PROCEDURE — 80048 BASIC METABOLIC PNL TOTAL CA: CPT

## 2023-09-14 PROCEDURE — 93010 ELECTROCARDIOGRAM REPORT: CPT | Performed by: INTERNAL MEDICINE

## 2023-09-14 PROCEDURE — 99232 SBSQ HOSP IP/OBS MODERATE 35: CPT | Performed by: NURSE PRACTITIONER

## 2023-09-14 PROCEDURE — 99232 SBSQ HOSP IP/OBS MODERATE 35: CPT | Performed by: INTERNAL MEDICINE

## 2023-09-14 PROCEDURE — 27236 TREAT THIGH FRACTURE: CPT | Performed by: ORTHOPAEDIC SURGERY

## 2023-09-14 PROCEDURE — 73501 X-RAY EXAM HIP UNI 1 VIEW: CPT

## 2023-09-14 PROCEDURE — 85025 COMPLETE CBC W/AUTO DIFF WBC: CPT

## 2023-09-14 PROCEDURE — C1713 ANCHOR/SCREW BN/BN,TIS/BN: HCPCS | Performed by: ORTHOPAEDIC SURGERY

## 2023-09-14 PROCEDURE — 99222 1ST HOSP IP/OBS MODERATE 55: CPT | Performed by: UROLOGY

## 2023-09-14 DEVICE — SMARTSET HIGH PERFORMANCE MV MEDIUM VISCOSITY BONE CEMENT 40G
Type: IMPLANTABLE DEVICE | Site: HIP | Status: FUNCTIONAL
Brand: SMARTSET

## 2023-09-14 DEVICE — SELF CENTERING BI-POLAR HEAD 28MM ID 54MM OD
Type: IMPLANTABLE DEVICE | Site: HIP | Status: FUNCTIONAL
Brand: SELF CENTERING

## 2023-09-14 DEVICE — ARTICUL/EZE FEMORAL HEAD DIAMETER 28MM +1.5 12/14 TAPER
Type: IMPLANTABLE DEVICE | Site: HIP | Status: FUNCTIONAL
Brand: ARTICUL/EZE

## 2023-09-14 DEVICE — CEMENTRALIZER STEM CENTRALIZER 8.5MM CEMENTED
Type: IMPLANTABLE DEVICE | Site: HIP | Status: FUNCTIONAL
Brand: CEMENTRALIZER

## 2023-09-14 DEVICE — SUMMIT FEMORAL STEM 12/14 TAPER CEMENTED SIZE 3 STD 108MM
Type: IMPLANTABLE DEVICE | Site: HIP | Status: FUNCTIONAL
Brand: SUMMIT

## 2023-09-14 RX ORDER — SODIUM CHLORIDE, SODIUM LACTATE, POTASSIUM CHLORIDE, CALCIUM CHLORIDE 600; 310; 30; 20 MG/100ML; MG/100ML; MG/100ML; MG/100ML
INJECTION, SOLUTION INTRAVENOUS CONTINUOUS PRN
Status: DISCONTINUED | OUTPATIENT
Start: 2023-09-14 | End: 2023-09-14

## 2023-09-14 RX ORDER — SENNOSIDES 8.6 MG
8.6 TABLET ORAL
Status: DISCONTINUED | OUTPATIENT
Start: 2023-09-14 | End: 2023-09-14

## 2023-09-14 RX ORDER — CEFAZOLIN SODIUM 1 G/3ML
INJECTION, POWDER, FOR SOLUTION INTRAMUSCULAR; INTRAVENOUS AS NEEDED
Status: DISCONTINUED | OUTPATIENT
Start: 2023-09-14 | End: 2023-09-14

## 2023-09-14 RX ORDER — ENOXAPARIN SODIUM 100 MG/ML
30 INJECTION SUBCUTANEOUS EVERY 12 HOURS
Status: DISCONTINUED | OUTPATIENT
Start: 2023-09-14 | End: 2023-09-19 | Stop reason: HOSPADM

## 2023-09-14 RX ORDER — CEFAZOLIN SODIUM 2 G/50ML
2000 SOLUTION INTRAVENOUS
Status: DISCONTINUED | OUTPATIENT
Start: 2023-09-14 | End: 2023-09-14 | Stop reason: HOSPADM

## 2023-09-14 RX ORDER — DEXAMETHASONE SODIUM PHOSPHATE 10 MG/ML
INJECTION, SOLUTION INTRAMUSCULAR; INTRAVENOUS AS NEEDED
Status: DISCONTINUED | OUTPATIENT
Start: 2023-09-14 | End: 2023-09-14

## 2023-09-14 RX ORDER — CHLORHEXIDINE GLUCONATE ORAL RINSE 1.2 MG/ML
15 SOLUTION DENTAL ONCE
Status: COMPLETED | OUTPATIENT
Start: 2023-09-14 | End: 2023-09-14

## 2023-09-14 RX ORDER — FENTANYL CITRATE 50 UG/ML
INJECTION, SOLUTION INTRAMUSCULAR; INTRAVENOUS AS NEEDED
Status: DISCONTINUED | OUTPATIENT
Start: 2023-09-14 | End: 2023-09-14

## 2023-09-14 RX ORDER — SERTRALINE HYDROCHLORIDE 100 MG/1
100 TABLET, FILM COATED ORAL DAILY
Status: DISCONTINUED | OUTPATIENT
Start: 2023-09-15 | End: 2023-09-19 | Stop reason: HOSPADM

## 2023-09-14 RX ORDER — LIDOCAINE HYDROCHLORIDE 10 MG/ML
INJECTION, SOLUTION EPIDURAL; INFILTRATION; INTRACAUDAL; PERINEURAL AS NEEDED
Status: DISCONTINUED | OUTPATIENT
Start: 2023-09-14 | End: 2023-09-14

## 2023-09-14 RX ORDER — CEFAZOLIN SODIUM 2 G/50ML
2000 SOLUTION INTRAVENOUS EVERY 8 HOURS
Status: COMPLETED | OUTPATIENT
Start: 2023-09-14 | End: 2023-09-14

## 2023-09-14 RX ORDER — MELATONIN
2000 DAILY
Status: DISCONTINUED | OUTPATIENT
Start: 2023-09-15 | End: 2023-09-19 | Stop reason: HOSPADM

## 2023-09-14 RX ORDER — ATORVASTATIN CALCIUM 10 MG/1
10 TABLET, FILM COATED ORAL
Status: DISCONTINUED | OUTPATIENT
Start: 2023-09-14 | End: 2023-09-19 | Stop reason: HOSPADM

## 2023-09-14 RX ORDER — FENTANYL CITRATE/PF 50 MCG/ML
50 SYRINGE (ML) INJECTION
Status: DISCONTINUED | OUTPATIENT
Start: 2023-09-14 | End: 2023-09-14 | Stop reason: HOSPADM

## 2023-09-14 RX ORDER — TRANEXAMIC ACID 10 MG/ML
INJECTION, SOLUTION INTRAVENOUS AS NEEDED
Status: DISCONTINUED | OUTPATIENT
Start: 2023-09-14 | End: 2023-09-14

## 2023-09-14 RX ORDER — ROCURONIUM BROMIDE 10 MG/ML
INJECTION, SOLUTION INTRAVENOUS AS NEEDED
Status: DISCONTINUED | OUTPATIENT
Start: 2023-09-14 | End: 2023-09-14

## 2023-09-14 RX ORDER — MEMANTINE HYDROCHLORIDE 5 MG/1
5 TABLET ORAL DAILY
Status: DISCONTINUED | OUTPATIENT
Start: 2023-09-15 | End: 2023-09-19 | Stop reason: HOSPADM

## 2023-09-14 RX ORDER — PROPOFOL 10 MG/ML
INJECTION, EMULSION INTRAVENOUS AS NEEDED
Status: DISCONTINUED | OUTPATIENT
Start: 2023-09-14 | End: 2023-09-14

## 2023-09-14 RX ORDER — LABETALOL HYDROCHLORIDE 5 MG/ML
INJECTION, SOLUTION INTRAVENOUS AS NEEDED
Status: DISCONTINUED | OUTPATIENT
Start: 2023-09-14 | End: 2023-09-14

## 2023-09-14 RX ORDER — LANOLIN ALCOHOL/MO/W.PET/CERES
400 CREAM (GRAM) TOPICAL DAILY
Status: DISCONTINUED | OUTPATIENT
Start: 2023-09-15 | End: 2023-09-19 | Stop reason: HOSPADM

## 2023-09-14 RX ORDER — LABETALOL HYDROCHLORIDE 5 MG/ML
5 INJECTION, SOLUTION INTRAVENOUS
Status: DISCONTINUED | OUTPATIENT
Start: 2023-09-14 | End: 2023-09-14 | Stop reason: HOSPADM

## 2023-09-14 RX ORDER — ONDANSETRON 2 MG/ML
4 INJECTION INTRAMUSCULAR; INTRAVENOUS ONCE AS NEEDED
Status: DISCONTINUED | OUTPATIENT
Start: 2023-09-14 | End: 2023-09-14 | Stop reason: HOSPADM

## 2023-09-14 RX ORDER — HYDROMORPHONE HCL/PF 1 MG/ML
0.5 SYRINGE (ML) INJECTION
Status: DISCONTINUED | OUTPATIENT
Start: 2023-09-14 | End: 2023-09-14 | Stop reason: HOSPADM

## 2023-09-14 RX ORDER — ONDANSETRON 2 MG/ML
INJECTION INTRAMUSCULAR; INTRAVENOUS AS NEEDED
Status: DISCONTINUED | OUTPATIENT
Start: 2023-09-14 | End: 2023-09-14

## 2023-09-14 RX ORDER — PROMETHAZINE HYDROCHLORIDE 25 MG/ML
25 INJECTION, SOLUTION INTRAMUSCULAR; INTRAVENOUS ONCE AS NEEDED
Status: DISCONTINUED | OUTPATIENT
Start: 2023-09-14 | End: 2023-09-14 | Stop reason: HOSPADM

## 2023-09-14 RX ORDER — PROPAFENONE HYDROCHLORIDE 150 MG/1
225 TABLET, COATED ORAL 3 TIMES DAILY
Status: DISCONTINUED | OUTPATIENT
Start: 2023-09-14 | End: 2023-09-19 | Stop reason: HOSPADM

## 2023-09-14 RX ADMIN — SUGAMMADEX 161 MG: 100 INJECTION, SOLUTION INTRAVENOUS at 13:28

## 2023-09-14 RX ADMIN — ROCURONIUM BROMIDE 10 MG: 10 INJECTION, SOLUTION INTRAVENOUS at 12:14

## 2023-09-14 RX ADMIN — PHENYLEPHRINE HYDROCHLORIDE 50 MCG/MIN: 10 INJECTION INTRAVENOUS at 11:43

## 2023-09-14 RX ADMIN — ARIPIPRAZOLE 5 MG: 5 TABLET ORAL at 09:03

## 2023-09-14 RX ADMIN — CEFAZOLIN SODIUM 2000 MG: 2 SOLUTION INTRAVENOUS at 16:22

## 2023-09-14 RX ADMIN — TRANEXAMIC ACID 1000 MG: 10 INJECTION, SOLUTION INTRAVENOUS at 11:07

## 2023-09-14 RX ADMIN — DONEPEZIL HYDROCHLORIDE 10 MG: 10 TABLET ORAL at 22:06

## 2023-09-14 RX ADMIN — LABETALOL HYDROCHLORIDE 5 MG: 5 INJECTION, SOLUTION INTRAVENOUS at 13:38

## 2023-09-14 RX ADMIN — ROCURONIUM BROMIDE 10 MG: 10 INJECTION, SOLUTION INTRAVENOUS at 12:40

## 2023-09-14 RX ADMIN — DEXAMETHASONE SODIUM PHOSPHATE 10 MG: 10 INJECTION, SOLUTION INTRAMUSCULAR; INTRAVENOUS at 11:02

## 2023-09-14 RX ADMIN — PANTOPRAZOLE SODIUM 40 MG: 40 TABLET, DELAYED RELEASE ORAL at 06:21

## 2023-09-14 RX ADMIN — CEFAZOLIN 2000 MG: 1 INJECTION, POWDER, FOR SOLUTION INTRAMUSCULAR; INTRAVENOUS at 11:28

## 2023-09-14 RX ADMIN — METOPROLOL TARTRATE 50 MG: 50 TABLET, FILM COATED ORAL at 09:03

## 2023-09-14 RX ADMIN — INSULIN LISPRO 3 UNITS: 100 INJECTION, SOLUTION INTRAVENOUS; SUBCUTANEOUS at 17:39

## 2023-09-14 RX ADMIN — FENTANYL CITRATE 25 MCG: 50 INJECTION INTRAMUSCULAR; INTRAVENOUS at 11:53

## 2023-09-14 RX ADMIN — SODIUM CHLORIDE, SODIUM LACTATE, POTASSIUM CHLORIDE, AND CALCIUM CHLORIDE: .6; .31; .03; .02 INJECTION, SOLUTION INTRAVENOUS at 13:03

## 2023-09-14 RX ADMIN — ATORVASTATIN CALCIUM 10 MG: 10 TABLET, FILM COATED ORAL at 17:38

## 2023-09-14 RX ADMIN — ACETAMINOPHEN 975 MG: 325 TABLET, FILM COATED ORAL at 06:21

## 2023-09-14 RX ADMIN — METOPROLOL TARTRATE 50 MG: 50 TABLET, FILM COATED ORAL at 22:06

## 2023-09-14 RX ADMIN — OXYCODONE HYDROCHLORIDE 5 MG: 5 TABLET ORAL at 00:15

## 2023-09-14 RX ADMIN — ENOXAPARIN SODIUM 30 MG: 30 INJECTION SUBCUTANEOUS at 22:06

## 2023-09-14 RX ADMIN — FENTANYL CITRATE 50 MCG: 50 INJECTION INTRAMUSCULAR; INTRAVENOUS at 10:58

## 2023-09-14 RX ADMIN — CEFAZOLIN SODIUM 2000 MG: 2 SOLUTION INTRAVENOUS at 22:58

## 2023-09-14 RX ADMIN — ONDANSETRON 4 MG: 2 INJECTION INTRAMUSCULAR; INTRAVENOUS at 11:29

## 2023-09-14 RX ADMIN — FENTANYL CITRATE 25 MCG: 50 INJECTION INTRAMUSCULAR; INTRAVENOUS at 12:13

## 2023-09-14 RX ADMIN — INSULIN LISPRO 1 UNITS: 100 INJECTION, SOLUTION INTRAVENOUS; SUBCUTANEOUS at 06:26

## 2023-09-14 RX ADMIN — SODIUM CHLORIDE, SODIUM LACTATE, POTASSIUM CHLORIDE, AND CALCIUM CHLORIDE: .6; .31; .03; .02 INJECTION, SOLUTION INTRAVENOUS at 10:58

## 2023-09-14 RX ADMIN — ROCURONIUM BROMIDE 10 MG: 10 INJECTION, SOLUTION INTRAVENOUS at 11:43

## 2023-09-14 RX ADMIN — ROCURONIUM BROMIDE 50 MG: 10 INJECTION, SOLUTION INTRAVENOUS at 10:58

## 2023-09-14 RX ADMIN — OXYCODONE HYDROCHLORIDE 5 MG: 5 TABLET ORAL at 06:21

## 2023-09-14 RX ADMIN — ACETAMINOPHEN 975 MG: 325 TABLET, FILM COATED ORAL at 22:06

## 2023-09-14 RX ADMIN — INSULIN LISPRO 2 UNITS: 100 INJECTION, SOLUTION INTRAVENOUS; SUBCUTANEOUS at 00:08

## 2023-09-14 RX ADMIN — PROPAFENONE HYDROCHLORIDE 225 MG: 150 TABLET, FILM COATED ORAL at 22:07

## 2023-09-14 RX ADMIN — PROPOFOL 60 MG: 10 INJECTION, EMULSION INTRAVENOUS at 10:58

## 2023-09-14 RX ADMIN — CHLORHEXIDINE GLUCONATE 15 ML: 1.2 SOLUTION ORAL at 09:09

## 2023-09-14 RX ADMIN — SENNOSIDES 8.6 MG: 8.6 TABLET, FILM COATED ORAL at 22:06

## 2023-09-14 RX ADMIN — LIDOCAINE HYDROCHLORIDE 50 MG: 10 INJECTION, SOLUTION EPIDURAL; INFILTRATION; INTRACAUDAL at 10:58

## 2023-09-14 NOTE — ASSESSMENT & PLAN NOTE
· Domínguez catheter balloon was found midshaft in the patient not draining. Unable to deflate. · Urology was consulted and ultimately drained the balloon by inserting a 22-gauge needle. · 16-gauge Domínguez catheter was reinserted 9/14 in the OR. Pink tinged urine noted on arrival postoperatively.   · Continue to monitor

## 2023-09-14 NOTE — PHYSICAL THERAPY NOTE
Physical Therapy Cancellation Note      PT orders received and chart reviewed. Per ortho, pt pending OR for  R hip hemiarthroplasty. Will defer PT eval at this time and continue to follow and evaluate as appropriate post op.     Emmy Ache, PT, DPT

## 2023-09-14 NOTE — ASSESSMENT & PLAN NOTE
· Oriented to person only at baseline.   · Geriatrics consulted and note appreciated  · Continue home regimen including Aricept, Namenda, Abilify, Zoloft

## 2023-09-14 NOTE — OCCUPATIONAL THERAPY NOTE
Occupational Therapy cx        Patient Name: Gee Espino  [id number] Date: 9/14/2023 09/14/23 0800   OT Last Visit   OT Visit Date 09/14/23   Note Type   Note type Evaluation   Cancel Reasons Patient to operating room         ODALYS Ram, OTR/L

## 2023-09-14 NOTE — INTERIM OP NOTE
Interim OPERATIVE REPORT  PATIENT NAME: Randi Cabrera  : 1935  MRN: [id number]  Pt Location:  [facility] OR ROOM 04    Surgery Date: 2023    Surgeon(s) and Role:     * Sophia Barber MD - Primary     * Fernando Ferrer MD - Assisting     * Evelin Thomas PA-C - Assisting     Preop Diagnosis:  Closed fracture of neck of right femur, initial encounter ([address]) Shashank Macedo    Post-Op Diagnosis Codes:     * Closed fracture of neck of right femur, initial encounter ([address]) [S72.001A]    Procedure(s):  Right - HEMIARTHROPLASTY HIP (BIPOLAR)    Specimens:  * No specimens in log *    Estimated Blood Loss:   200 mL    Drains:  Urethral Catheter Coude 16 Fr.  (Active)   Site Assessment Clean;Skin intact 23 1400   Collection Container Standard drainage bag 23 1400   Securement Method Securing device (Describe) 23 1400   Number of days: 0       Anesthesia Type:   Choice     Operative Indications:  Closed fracture of neck of right femur, initial encounter ([address]) [S72.001A]    Operative Findings:  Displaced right femoral neck fracture     Complications:   None    Patient Disposition:  PACU       SIGNATURE: Sophia Barber MD  DATE: 2023  TIME: 2:37 PM

## 2023-09-14 NOTE — TELEPHONE ENCOUNTER
Caller: Tiffanie  (court appointed guardian)    Doctor:  Merlinda Blazing    Reason for call: Returning Dr Katharine Xiao call, can she be called back with update on how patient did during surgery    Call back#: [phone]

## 2023-09-14 NOTE — ANESTHESIA PREPROCEDURE EVALUATION
Procedure:  HEMIARTHROPLASTY HIP (BIPOLAR) (Right: Hip)    Relevant Problems   CARDIO   (+) Coronary artery disease involving native coronary artery of native heart without angina pectoris   (+) Essential hypertension   (+) Mixed hyperlipidemia   (+) Paroxysmal atrial fibrillation (HCC)   (+) 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HCC)      ENDO   (+) Type 2 diabetes mellitus with kidney complication, with long-term current use of insulin (HCC)      GI/HEPATIC   (+) GERD (gastroesophageal reflux disease)   (+) Hiatal hernia      /RENAL   (+) JACQUELINE (acute kidney injury) ([address])   (+) Benign prostate hyperplasia   (+) Chronic kidney disease (CKD), stage IV (severe) (HCC)   (+) 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, or unspecified chronic kidney disease   (+) Renal cell carcinoma (HCC)   (+) Solitary kidney      HEMATOLOGY   (+) Coagulation disorder (HCC)      MUSCULOSKELETAL   (+) Hiatal hernia   (+) Piriformis syndrome   (+) Sacroiliitis (HCC)      NEURO/PSYCH   (+) Dementia with behavioral disturbance (HCC)      PULMONARY   (+) Asthma   (+) Sleep apnea        Physical Exam    Airway    Mallampati score: I  TM Distance: >3 FB  Neck ROM: full     Dental   lower dentures,     Cardiovascular      Pulmonary      Other Findings        Anesthesia Plan  ASA Score- 3     Anesthesia Type- general with ASA Monitors. Additional Monitors:   Airway Plan: ETT. Plan Factors-    Chart reviewed. Patient summary reviewed. Induction- intravenous. Postoperative Plan- Plan for postoperative opioid use. Planned trial extubation    Informed Consent- Anesthetic plan and risks discussed with patient. I personally reviewed this patient with the CRNA. Discussed and agreed on the Anesthesia Plan with the CRNA. Ean Speaker

## 2023-09-14 NOTE — ASSESSMENT & PLAN NOTE
· Secondary to fall  · 9/3 XR showed: Acute mildly displaced right subcapital femur fracture  · Orthopedics consulted and note appreciated  · 9/14 right hemiarthroplasty  · NWB RLE  · Multimodal pain regimen  · DVT prophylaxis: Lovenox  · PT/OT

## 2023-09-14 NOTE — PROGRESS NOTES
[address] [facility]  Postop check/progress Note  Name: Maday Simon  MRN: [id number]  Unit/Bed#: [facility] 995-77 I Date of Admission: 9/13/2023   Date of Service: 9/14/2023 I Hospital Day: 1    Assessment/Plan   * Femur fracture, right ([address])  Assessment & Plan  · Secondary to fall  · 9/3 XR showed: Acute mildly displaced right subcapital femur fracture  · Orthopedics consulted and note appreciated  · 9/14 right hemiarthroplasty  · NWB RLE  · Multimodal pain regimen  · DVT prophylaxis: Lovenox  · PT/OT    Urinary catheter dysfunction ([address])  Assessment & Plan  · Domínguez catheter balloon was found midshaft in the patient not draining. Unable to deflate. · Urology was consulted and ultimately drained the balloon by inserting a 22-gauge needle. · 16-gauge Domínguez catheter was reinserted 9/14 in the OR. Pink tinged urine noted on arrival postoperatively. · Continue to monitor    Tachy-amanda syndrome [facility])  Assessment & Plan  · History of pacemaker placement  · Continue home metoprolol    Dementia with behavioral disturbance ([address])  Assessment & Plan  · Oriented to person only at baseline. · Geriatrics consulted and note appreciated  · Continue home regimen including Aricept, Namenda, Abilify, Zoloft      Type 2 diabetes mellitus with kidney complication, with long-term current use of insulin [facility])  Assessment & Plan  Lab Results   Component Value Date    HGBA1C 8.0 (H) 05/25/2023       Recent Labs     09/13/23  1122 09/13/23  1758 09/14/23  0004 09/14/23  0508   POCGLU 200* 204* 214* 165*       Blood Sugar Average: Last 72 hrs:  (P) 196     -Restart home insulin regimen following surgery.  -Continue on sliding scale insulin for now.     Mixed hyperlipidemia  Assessment & Plan  · Continue home atorvastatin    Essential hypertension  Assessment & Plan  · Continue home metoprolol             Bowel Regimen: Senna  VTE Prophylaxis:Sequential compression device (Venodyne)  and Enoxaparin (Lovenox) Disposition: Pending PT/OT evaluation    Subjective   Chief Complaint: "I have some pain over here"    Subjective: Patient is having some pain on his right side. He notes decreased range of motion of his legs due to abductor pillow. He denies any trouble breathing, chest pain, shortness of breath, abdominal pain, nausea, vomiting. No other complaints offered. ROS is limited due to dementia. Objective   Vitals:   Temp:  [97.2 °F (36.2 °C)-98.7 °F (37.1 °C)] 98.5 °F (36.9 °C)  HR:  [58-88] 77  Resp:  [12-20] 16  BP: (136-182)/() 136/78    I/O       09/12 0701  09/13 0700 09/13 0701 09/14 0700 09/14 0701  09/15 0700    P. O.  180 0    I.V. (mL/kg)   1000 (12.4)    IV Piggyback  1100     Total Intake(mL/kg)  1280 (15.9) 1000 (12.4)    Urine (mL/kg/hr) 200 800 (0.4) 1175 (1.6)    Blood   200    Total Output     Net -200 +480 -375                  Physical Exam:   GENERAL APPEARANCE: No acute distress  NEURO: GCS is 14 due to confusion. Light touch sensation intact throughout. HEENT: Normocephalic, atraumatic. Neck supple. CV: Regular rate and rhythm.  +2 radial dorsalis pedis pulses, bilaterally. LUNGS: Clear to auscultation, bilaterally. Chest wall is nontender. GI: Soft, nontender. : Pelvis is stable. MSK: Moving all extremities. Tenderness on palpation of right hip-no significant hematoma or effusion. No deformities noted. SKIN: Warm, dry. Right hip surgical dressing is clean, dry, intact.     Invasive Devices     Peripheral Intravenous Line  Duration           Peripheral IV 09/13/23 Right Antecubital <1 day    Peripheral IV 09/14/23 Right Hand <1 day          Drain  Duration           Urethral Catheter Coude 16 Fr. <1 day                      Lab Results:   Results: I have personally reviewed all pertinent laboratory/tests results, BMP/CMP:   Lab Results   Component Value Date    SODIUM 137 09/14/2023    K 3.8 09/14/2023     09/14/2023    CO2 27 09/14/2023    BUN 22 09/14/2023    CREATININE 1.63 (H) 09/14/2023    CALCIUM 9.2 09/14/2023    EGFR 37 09/14/2023    and CBC:   Lab Results   Component Value Date    WBC 13.18 (H) 09/14/2023    HGB 13.4 09/14/2023    HCT 39.8 09/14/2023    MCV 84 09/14/2023     09/14/2023    RBC 4.72 09/14/2023    MCH 28.4 09/14/2023    MCHC 33.7 09/14/2023    RDW 12.8 09/14/2023    MPV 11.0 09/14/2023    NRBC 0 09/14/2023     Imaging: I have personally reviewed pertinent reports.      Other Studies: none

## 2023-09-14 NOTE — PLAN OF CARE
Problem: Potential for Falls  Goal: Patient will remain free of falls  Description: INTERVENTIONS:  - Educate patient/family on patient safety including physical limitations  - Instruct patient to call for assistance with activity   - Consult OT/PT to assist with strengthening/mobility   - Keep Call bell within reach  - Keep bed low and locked with side rails adjusted as appropriate  - Keep care items and personal belongings within reach  - Initiate and maintain comfort rounds  - Make Fall Risk Sign visible to staff  - Offer Toileting every 2 Hours, in advance of need  - Initiate/Maintain bed alarm  - Apply yellow socks and bracelet for high fall risk patients  - Consider moving patient to room near nurses station  Outcome: Progressing     Problem: MOBILITY - ADULT  Goal: Maintain or return to baseline ADL function  Description: INTERVENTIONS:  -  Assess patient's ability to carry out ADLs; assess patient's baseline for ADL function and identify physical deficits which impact ability to perform ADLs (bathing, care of mouth/teeth, toileting, grooming, dressing, etc.)  - Assess/evaluate cause of self-care deficits   - Assess range of motion  - Assess patient's mobility; develop plan if impaired  - Assess patient's need for assistive devices and provide as appropriate  - Encourage maximum independence but intervene and supervise when necessary  - Involve family in performance of ADLs  - Assess for home care needs following discharge   - Consider OT consult to assist with ADL evaluation and planning for discharge  - Provide patient education as appropriate  Outcome: Progressing  Goal: Maintains/Returns to pre admission functional level  Description: INTERVENTIONS:  - Perform BMAT or MOVE assessment daily.   - Set and communicate daily mobility goal to care team and patient/family/caregiver. - Collaborate with rehabilitation services on mobility goals if consulted. - Reposition patient every 2 hours.   - Out of bed for toileting  - Record patient progress and toleration of activity level   Outcome: Progressing     Problem: Nutrition/Hydration-ADULT  Goal: Nutrient/Hydration intake appropriate for improving, restoring or maintaining nutritional needs  Description: Monitor and assess patient's nutrition/hydration status for malnutrition. Collaborate with interdisciplinary team and initiate plan and interventions as ordered. Monitor patient's weight and dietary intake as ordered or per policy. Utilize nutrition screening tool and intervene as necessary. Determine patient's food preferences and provide high-protein, high-caloric foods as appropriate.      INTERVENTIONS:  - Monitor oral intake, urinary output, labs, and treatment plans  - Assess nutrition and hydration status and recommend course of action  - Evaluate amount of meals eaten  - Assist patient with eating if necessary   - Allow adequate time for meals  - Recommend/ encourage appropriate diets, oral nutritional supplements, and vitamin/mineral supplements  - Order, calculate, and assess calorie counts as needed  - Recommend, monitor, and adjust tube feedings and TPN/PPN based on assessed needs  - Assess need for intravenous fluids  - Provide specific nutrition/hydration education as appropriate  - Include patient/family/caregiver in decisions related to nutrition  Outcome: Progressing

## 2023-09-14 NOTE — PROGRESS NOTES
Postop check note - Krystal Leroy 80 y.o. male MRN: [id number]      Subjective:  No acute events since surgery. Resting in bed comfortably. Denies any complaints. Objective:    Vitals:    09/14/23 1345   BP: 154/69   Pulse: 62   Resp: 18   Temp: (!) 97.2 °F (36.2 °C)   SpO2: 97%     Musculoskeletal: right Lower Extremity  · Dressing C/D/I  · Abduction pillow in place  · Does not participate in motor/sensory exam  · Ankle and toes move spontaneously  · Toes WWP with brisk capillary refill    Assessment:    80 y.o. male post op day #0 from Right cemented hip hemiarthroplasty for femoral neck fracture. Doing well postoperatively    Plan:  · Weightbearing lacerated right lower extremity   · Abduction pillow while in bed   · Posterior hip precautions   · PT OT evaluation   · Case management evaluation   · Pain control as needed per Geriatric pain protocol  · Appreciate urology follow-up for Domínguez catheter, this will remain in for now  · DVT prophylaxis with Lovenox  · Outpatient follow-up in 2 weeks with Dr. Jake Navarro MD    Please contact role [facility] for any questions or concerns.

## 2023-09-14 NOTE — ASSESSMENT & PLAN NOTE
Lab Results   Component Value Date    HGBA1C 8.0 (H) 05/25/2023       Recent Labs     09/13/23  1122 09/13/23  1758 09/14/23  0004 09/14/23  0508   POCGLU 200* 204* 214* 165*       Blood Sugar Average: Last 72 hrs:  (P) 196     -Restart home insulin regimen following surgery.  -Continue on sliding scale insulin for now.

## 2023-09-14 NOTE — PROGRESS NOTES
Progress Note - Geriatric Medicine   Reji Bueno 80 y.o. male MRN: [id number]  Unit/Bed#: [facility] 602-01 Encounter: [id number]      Assessment/Plan:    Ambulatory dysfunction with fall  -reportedly mechanical fall at long term care facility earlier on day of admit   -injuries as outlined below  -Requires use of walker for ambulation at baseline w multiple recent falls  -remains high risk future falls, cont fall precautions \  -PT/OT following      Right subcapital femur fracture  -S/p fall as outlined above  -Noted on right femur XR obtained on admission  -NWB RLE  -Neurovascular checks per protocol  -Acute pain control  -Ortho on consult - tentative OR today     Acute pain due to trauma   -continue acute multimodal pain control tapering off as pain improves with healing   -recommend  bowel regimen to prevent and treat constipation due to increased risk with acute pain and opiate pain medications     Frontotemporal dementia  -moderate and progressive  -now at baseline dependent for iADLs and requiring increasing assist with ADLs  -currently residing in secure memory unit of long term care facility   -maintained on aripiprazole, donepezil, Zoloft, memantine and lorazepam as managed by care facility   -memantine dose recently reduced as o/p due to poor renal function, continue to dose adjust as indicated   -Cautious use donepezil and namenda due to possible tachybradycardia syndrome given risk of further contributing to bradycardia  -CTH obtained on admission images personally viewed, reveals mild frontal lobe atrophy out of proportion to rest of parenchyma as well as general diffuse chronic microangiopathic changes   -TSH WNL 3.06, B12 slightly low at 331, consider temp increase in supplementation to goal >400  -avoid addition of new or increasing dose antipsychotics given increased risk cardiovascular events in pts with underlying dementia as well as risk paradoxic rxn  -continue to encourage calm quite env to reduce risk overstimulation and redirect unwanted behaviors as first line      DM-II  -A1c 8.0, given age and comorbidities target A1c of approximately 8 is not unreasonable  -Maintained on basal bolus insulin regimen as outpatient  -Target blood sugar during hospitalization 140-180 to reduce risk hypoglycemia, continues to be well controlled  -Continue close follow-up with PCP for age-appropriate ongoing diabetic screening and cares     Impaired Vision  -recommend use of corrective lenses at all appropriate times  -Consider large font for printed materials provided to patient      Impaired mastication  -Requires use of dentures  -encourage use all appropriate times  -ensure meal consistency appropriate for abilities  -continue aspiration precautions     Impaired Hearing  -Encourage use of hearing aids at all appropriate times  -encourage providers and caregivers to speak slowly and clearly directly to patient  -minimize background noise to encourage patient engagement  -consider use of hearing amplifier to reduce risk of straining to hear if hearing aids are not present or are not sufficient   -Encourage use of teach back method to ensure clear communication     High risk developing delirium   -Patient is high risk of delirium due to age, fall, traumatic injuries, acute pain, dementia, hosp env  -continue delirium precautions  -maintain normal sleep/wake cycle, ensure pain controlled, reorient frequently   -redirect unwanted behaviors as first line     Frailty syndrome in geriatric patient   -clinical frailty scale stage VI/VII, moderate to severely frail  -multifactorial including advanced and progressive dementia and multitude of chronic medical comorbidities in elderly individual with extremely limited physiologic and metabolic reserve  -Continue to encourage well-balanced nutrition, consider supplements between meals if oral intake is poor  -Optimize chronic conditions and address acute metabolic derangements as arise  -Continue to ensure that treatment interventions along with patient's wishes and goals of care     Care coordination: rounded with Lala Brand (RN)    Subjective:     Ludmila Degroot is seen and examined at bedside, he is sleepy from recently administered pain medication prior to which he was restless and confused per nursing. Tentative for OR with Ortho this am. No other acute events overnight. Review of Systems   Unable to perform ROS: Mental status change     Objective:     Vitals: Blood pressure 153/75, pulse 68, temperature (!) 97.2 °F (36.2 °C), resp. rate 18, height 5' 11" (1.803 m), weight 80.4 kg (177 lb 4 oz), SpO2 96 %. ,Body mass index is 24.72 kg/m². Intake/Output Summary (Last 24 hours) at 9/14/2023 0706  Last data filed at 9/13/2023 2300  Gross per 24 hour   Intake 1280 ml   Output 800 ml   Net 480 ml     Current Medications: Reviewed    Physical Exam:   Physical Exam  Vitals and nursing note reviewed. Constitutional:       Comments: Elderly male no acute distress    HENT:      Head: Normocephalic. Nose: Nose normal.      Mouth/Throat:      Mouth: Mucous membranes are dry. Comments: No dentures in place, membranes pale and tachy  Eyes:      General:         Right eye: No discharge. Left eye: No discharge. Neck:      Comments: Trachea midline   Cardiovascular:      Rate and Rhythm: Normal rate. Pulses: Normal pulses. Pulmonary:      Effort: Pulmonary effort is normal. No respiratory distress. Breath sounds: No wheezing. Abdominal:      General: There is no distension. Palpations: Abdomen is soft. Tenderness: There is no abdominal tenderness. Musculoskeletal:      Cervical back: Neck supple. Right lower leg: No edema. Left lower leg: No edema. Comments: RLE appears slightly shortened     Reduced overall muscle mass    Skin:     General: Skin is dry. Coloration: Skin is pale.    Neurological:      Comments: Groggy, awakens to tactile stimuli but does not maintain wakeful state answer ques or follow commands     Intermittent RUE resting tremor    Psychiatric:      Comments: Flat affect, withdrawn         Invasive Devices     Peripheral Intravenous Line  Duration           Peripheral IV 09/13/23 Right Antecubital <1 day          Drain  Duration           Urethral Catheter Latex 16 Fr. 1 day              Lab Results:     I have personally reviewed pertinent lab results including the following:    Results from last 7 days   Lab Units 09/14/23  0509 09/13/23 2239 09/13/23 0521   WBC Thousand/uL 13.18* 14.05* 10.62*   HEMOGLOBIN g/dL 13.4 13.4 14.0   HEMATOCRIT % 39.8 37.9 42.1   PLATELETS Thousands/uL 161 154 167   NEUTROS PCT % 80* 81* 83*   MONOS PCT % 7 7 5   EOS PCT % 3 1 1     Results from last 7 days   Lab Units 09/14/23  0509 09/13/23 2239 09/13/23  0521   POTASSIUM mmol/L 3.8 4.1 4.0   CHLORIDE mmol/L 102 102 101   CO2 mmol/L 27 28 29   BUN mg/dL 22 20 20   CREATININE mg/dL 1.63* 1.62* 1.57*   CALCIUM mg/dL 9.2 9.0 9.2     I have personally reviewed the following imaging study reports in PACS:    9/13/23 - CTH, XR pelvis, R femur XR

## 2023-09-14 NOTE — OP NOTE
OPERATIVE REPORT  PATIENT NAME: Grupo Blue  : 1935  MRN: [id number]  Pt Location:  [facility] OR ROOM 04    Surgery Date: 2023    Surgeon(s) and Role:     * Caprice Marroquin MD - Primary     * Katelynn Groves MD - Assisting     * Evelin Dallas PA-C - Assisting     Preop Diagnosis:  Closed fracture of neck of right femur, initial encounter ([address]) Andry Ruiz    Post-Op Diagnosis Codes:     * Closed fracture of neck of right femur, initial encounter ([address]) [S72.001A]    Procedure(s):  Right - HEMIARTHROPLASTY HIP (BIPOLAR)    Specimens:  * No specimens in log *    Estimated Blood Loss:   200 mL    Drains:  Urethral Catheter Coude 16 Fr. (Active)   Site Assessment Clean;Skin intact 23 1400   Collection Container Standard drainage bag 23 1400   Securement Method Securing device (Describe) 23 1400   Number of days: 0       Anesthesia Type:   Choice     Operative Indications:  Closed fracture of neck of right femur, initial encounter ([address]) [S72.001A]    Operative Findings:  Displaced right femoral neck fracture     Complications:   None    Hip Approach: Posterior    Procedure and Technique:  The patient was identified in the preoperative holding area where the right lower extremity was identified as the correct operative extremity. Patient's name and consent were also verified. The patient was then taken back to the operating room where the patient was placed under general anesthesia and then successfully transferred supine onto the operating room table. The patient was then placed into the lateral decubitus position with all bony prominences well-padded. Care was taken to offload and cushion the peroneal nerve of the well lower extremity. Upper arm was placed in an arm perkins, with an axillary roll placed between the patient and the operating room table. At this point the operative lower extremity was prepped and draped in standard sterile fashion.  A timeout was performed where the surgical, anesthesia, and nursing staff were all in agreement of the patient's name, operative extremity, procedure be performed, in addition to antibiotics and beta-blockers being addressed. Preoperative radiographs were available and were on display on the monitor within the operating room. We then turned our attention to the operative hip. Incision was made from the vastus ridge to approximately 3 finger breadths proximal to the greater trochanter. This was carried down through adipose tissue, through fascia, and onto bone. Gluteus eva was dissected bluntly with finger dissection. We then visualized the short external rotators, which were placed on stretch by internally rotating the lower extremity. These were then released using Bovie cautery. These were tagged with a #5 Ethibond, as was the capsule. Piriformis was incised and this allowed us to visualize the base of the femoral neck and the capsule was then released in order to visualize the femoral head as well as the fracture site. At that point we used an oscillating saw to make our cut approximately a finger-breadth above the lesser trochanter and the femur and angled away from the tip of the greater troch. Femoral head was removed and measured. At that point we turned our attention back to the proximal aspect of the femur, once again protecting the gluteus medius with a Hohmann retractor and elevating the femur. We used a box osteotome to obtain our starting point including approximately 10-15° of anteversion then used a canal finder to find the center of the femoral canal. At this point we began broaching. We started with the smallest size and progressed all the way up to a 3. This was found to be stable and well seated at that point removed the broach handle placed a standard trial femoral neck and a standard bipolar head. We reduced the hip carefully by pulling traction and externally rotating the hip.  At this point the hip was trialed and tested in different positions including adduction, position of sleep, flexion up to 90°, and in flexion and internal rotation plus adduction patient was found to be stable approximately 60-70°. Leg lengths were also noted to be equal. At this point we were satisfied with the implants. The trial components were extracted, and the surgical site was copiously irrigatated. The femoral canal was then prepped for cement. Cement was prepared on the back table in the standard fashion. Anesthesia was made aware and the femoral canal was cemented and pressurized. A size 3 stem was iimplanted carefully, once again taking care to control the patient's anteversion to approximately 15°. Once the cement was set, the metal (28mm + 1.5) was tamped into place, bipolar head (54mm) was added and noted to be stable and then once again reduced the hip. The aforementioned positions were once again trialed and again noted to be stable. At that point we irrigated copiously, closed the capsule and short external rotators using the #5 Ethibond. The fascia was closed with #2 Judythe Bradley, then closed subcutaneous tissue with 2-0 Vicryl and skin with staples. The incision was then covered with a Mepilex dressing. Abduction pillow was placed immediately after surgery patient was transferred without incident onto the hospital bed and transferred to PACU. I was present for the entire procedure.     Patient Disposition:  PACU     SIGNATURE: Orlin Mehta MD  DATE: September 14, 2023  TIME: 2:37 PM

## 2023-09-14 NOTE — PROGRESS NOTES
Progress Note - Orthopedics   Randi Cabrera 80 y.o. male MRN: [id number]  Unit/Bed#: [facility] 602-01      Subjective:    80 y.o.male with right displaced femoral neck fracture. No acute events, no new complaints. Patient doing well. Patinet not quite oriented this morning.    Labs:  0   Lab Value Date/Time    HCT 39.8 09/14/2023 0509    HCT 37.9 09/13/2023 2239    HCT 42.1 09/13/2023 0521    HCT 42.9 12/19/2015 0939    HCT 39.0 07/28/2015 0901    HCT 38.3 09/20/2014 1119    HGB 13.4 09/14/2023 0509    HGB 13.4 09/13/2023 2239    HGB 14.0 09/13/2023 0521    HGB 14.7 12/19/2015 0939    HGB 13.3 07/28/2015 0901    HGB 12.6 09/20/2014 1119    INR 0.99 09/13/2023 0559    INR 2.61 (H) 12/03/2015 1155    WBC 13.18 (H) 09/14/2023 0509    WBC 14.05 (H) 09/13/2023 2239    WBC 10.62 (H) 09/13/2023 0521    WBC 6.54 12/19/2015 0939    WBC 5.31 07/28/2015 0901    WBC 5.83 09/20/2014 1119    CRP <3.0 02/11/2017 1108       Meds:    Current Facility-Administered Medications:   •  acetaminophen (TYLENOL) tablet 975 mg, 975 mg, Oral, Q8H [address], Hannah Giron MD, 975 mg at 09/13/23 2232  •  ARIPiprazole (ABILIFY) tablet 5 mg, 5 mg, Oral, Daily, Hannah Giron MD, 5 mg at 09/13/23 8228  •  docusate sodium (COLACE) capsule 100 mg, 100 mg, Oral, BID, Hannah Giron MD, 100 mg at 09/13/23 7410  •  donepezil (ARICEPT) tablet 10 mg, 10 mg, Oral, HS, Hannah Giron MD, 10 mg at 09/13/23 2232  •  enoxaparin (LOVENOX) subcutaneous injection 30 mg, 30 mg, Subcutaneous, Q12H, Christine Chen MD, 30 mg at 09/13/23 0620  •  HYDROmorphone HCl (DILAUDID) injection 0.2 mg, 0.2 mg, Intravenous, Q4H PRN, Hannah Giron MD, 0.2 mg at 09/13/23 1016  •  insulin lispro (HumaLOG) 100 units/mL subcutaneous injection 1-6 Units, 1-6 Units, Subcutaneous, Q6H [address], 2 Units at 09/14/23 0008 **AND** Fingerstick Glucose (POCT), , , Q6H, Hannah Giron MD  •  Nursing Communication Measure and document PVi every 4 hours until patient goes to the OR., , , Until Discontinued **AND** Nursing Communication Notify physician if SpO2 <92% and stop IVF infusion. , , , Until Discontinued **AND** Nursing Communication Check SpHb q4h and notify provider if <8., , , Until Discontinued **AND** Nursing Communication Obtain vital signs, PVi, and SpHb immediately prior to transfer to operating room. , , , Until Discontinued **AND** [COMPLETED] lactated ringers bolus 1,000 mL, 1,000 mL, Intravenous, Once, Stopped at 09/13/23 1613 **AND** lactated ringers infusion, 4 mL/kg/hr (Ideal), Intravenous, Continuous, Halie Bourgeois MD, Last Rate: 301.2 mL/hr at 09/13/23 2020, 4 mL/kg/hr at 09/13/23 2020  •  metoprolol tartrate (LOPRESSOR) tablet 50 mg, 50 mg, Oral, Q12H [facility], Lin Tomlinson MD, 50 mg at 09/13/23 2235  •  oxyCODONE (ROXICODONE) IR tablet 5 mg, 5 mg, Oral, Q4H PRN, Lin Tomlinson MD, 5 mg at 09/14/23 0015  •  oxyCODONE (ROXICODONE) split tablet 2.5 mg, 2.5 mg, Oral, Q4H PRN, Lin Tomlinson MD  •  pantoprazole (PROTONIX) EC tablet 40 mg, 40 mg, Oral, Early Morning, Choco Faustin DO  •  polyethylene glycol (MIRALAX) packet 17 g, 17 g, Oral, Daily, Lin Tomlinson MD, 17 g at 09/13/23 1357  •  senna (SENOKOT) tablet 8.6 mg, 1 tablet, Oral, HS, Afshan Li MD    Blood Culture:   No results found for: "BLOODCX"    Wound Culture:   No results found for: "WOUNDCULT"    Ins and Outs:  I/O last 24 hours:   In: 1280 [P.O.:180; IV Piggyback:1100]  Out: 1000 [Urine:1000]          Physical:  Vitals:    09/14/23 0243   BP: 153/75   Pulse: 68   Resp: 18   Temp: (!) 97.2 °F (36.2 °C)   SpO2: 96%     Musculoskeletal: right lower extremity  • Skin intact  • Tender to palpation over hip  • ROM not assessed 2/2 known fracture  • Sensation intact T/LUIS/SA/DP/SP nerve distributions  • Positive ankle dorsi/plantar flexion, EHL/FHL  • 2+ DP/ PT pulse  • Musculature is soft and compressible, no pain with passive stretch  • Leg length equal      Assessment:    88 y.o.male with femoral neck fracture-right . Patient doing well.      Plan:  • Non weight bearing right lower extremity  • Analgesics for pain  • Domínguez Catheter   • To OR for hemiarthroplasty   • Dispo: Ortho will follow  Wai Dexter MD

## 2023-09-14 NOTE — PROGRESS NOTES
Progress note- Nephrology   [facility] [place] 80 y.o. male MRN: [id number]  Unit/Bed#: OR [place] Encounter: [id number]    ASSESSMENT and PLAN:  Chronic kidney disease IV:    Etiology: Suspect secondary to diabetic kidney disease, hypertensive nephrosclerosis, solitary kidney function history of nephrectomy and age-related nephron loss  • Baseline creatinine 2.0-2.5 however since June 2023 creatinine 1.5-1.8  • Not on ACE/ARB or diuretics at home  • Admission creatinine 1.57 on 9/13/2023   • Current creatinine today 1.63 and that baseline  • Patient follows with Dr. Hallie Dandy  JACQUELINE risk reduction recommendation in the setting of CKD:  • Recommended IVF pre and post surgery  • Not on ace/arb or diuretics  • Avoid NSAIDs to include Toradol, naproxen, Celebrex, Mobic or Aleve  • Avoid pertubation's of blood pressure or hypotension to prevent decreased renal perfusion  • Avoid IV contrast  • Check BMP in a.m.     Solitary kidney with history of nephrectomy secondary to renal cell carcinoma     Blood pressure/hypertension:  • Current BP 170s over 80s elevation in blood pressure could have pain component  • Home medications include:  Toprol XL 2 mg every 12 hours  • Current medications include: Toprol XL  50 mg every 12 hours  • Maximize hemodynamics to maintain MAP >65  • Avoid hypotension or fluctuations in blood pressure  • Will continue to trend     Ambulatory dysfunction status post chemical fall complicated by right subscapular femur fracture  • Orthopedic consulted and following-anticipating OR today for right hip hemiarthroplasty     Frontotemporal dementia  • Consider geriatric consultation      Medical records have been reviewed through [facility] and care everywhere for this patient encounter      Review of Systems  Patient seen and examined at bedside  Review of Systems   Unable to perform ROS: Dementia         Physical Exam:  Current Weight: Weight - Scale: 80.4 kg (177 lb 4 oz)  Vitals:    09/14/23 0243 09/14/23 0710 09/14/23 0942 09/14/23 0942   BP: 153/75 165/80 (!) 182/107 (!) 182/107   Pulse: 68 61 88 86   Resp: 18 17  17   Temp: (!) 97.2 °F (36.2 °C) 98.3 °F (36.8 °C) 97.7 °F (36.5 °C) 97.7 °F (36.5 °C)   TempSrc:       SpO2: 96% 94% 93% 93%   Weight:       Height:           Physical Exam  Vitals and nursing note reviewed. Constitutional:       Appearance: Normal appearance. HENT:      Head: Normocephalic and atraumatic. Nose: Nose normal.      Mouth/Throat:      Mouth: Mucous membranes are moist.      Pharynx: Oropharynx is clear. Eyes:      Extraocular Movements: Extraocular movements intact. Conjunctiva/sclera: Conjunctivae normal.   Cardiovascular:      Rate and Rhythm: Normal rate and regular rhythm. Pulses: Normal pulses. Heart sounds: Normal heart sounds. Pulmonary:      Effort: Pulmonary effort is normal.      Breath sounds: Normal breath sounds. Abdominal:      General: Bowel sounds are normal.      Palpations: Abdomen is soft. Musculoskeletal:         General: Normal range of motion. Cervical back: Normal range of motion. Skin:     General: Skin is warm and dry. Neurological:      Mental Status: He is alert. He is disoriented.    Psychiatric:         Mood and Affect: Mood normal.         Behavior: Behavior normal.            Medications:    Current Facility-Administered Medications:   •  [MAR Hold] acetaminophen (TYLENOL) tablet 975 mg, 975 mg, Oral, Q8H [facility], Carmen Stevens MD, 975 mg at 09/14/23 2811  •  [MAR Hold] ARIPiprazole (ABILIFY) tablet 5 mg, 5 mg, Oral, Daily, Carmen Stevens MD, 5 mg at 09/14/23 6345  •  ceFAZolin (ANCEF) IVPB (premix in dextrose) 2,000 mg 50 mL, 2,000 mg, Intravenous, On Call To OR, Lashonda Joiner MD  •  [facility] Hold] docusate sodium (COLACE) capsule 100 mg, 100 mg, Oral, BID, Carmen Stevens MD, 100 mg at 09/13/23 0823  •  [MAR Hold] donepezil (ARICEPT) tablet 10 mg, 10 mg, Oral, HS, Carmen Stevens MD, 10 mg at 09/13/23 8163  •  [MAR Hold] enoxaparin (LOVENOX) subcutaneous injection 30 mg, 30 mg, Subcutaneous, Q12H, Kevyn Parra MD, 30 mg at 09/13/23 0620  •  [MAR Hold] HYDROmorphone HCl (DILAUDID) injection 0.2 mg, 0.2 mg, Intravenous, Q4H PRN, Altagracia Aggarwal MD, 0.2 mg at 09/13/23 1016  •  [MAR Hold] insulin lispro (HumaLOG) 100 units/mL subcutaneous injection 1-6 Units, 1-6 Units, Subcutaneous, Q6H [address], 1 Units at 09/14/23 0626 **AND** Fingerstick Glucose (POCT), , , Q6H, Altagracia Aggarwal MD  •  Nursing Communication Measure and document PVi every 4 hours until patient goes to the OR., , , Until Discontinued **AND** Nursing Communication Notify physician if SpO2 <92% and stop IVF infusion. , , , Until Discontinued **AND** Nursing Communication Check SpHb q4h and notify provider if <8., , , Until Discontinued **AND** Nursing Communication Obtain vital signs, PVi, and SpHb immediately prior to transfer to operating room. , , , Until Discontinued **AND** [COMPLETED] lactated ringers bolus 1,000 mL, 1,000 mL, Intravenous, Once, Stopped at 09/13/23 1613 **AND** lactated ringers infusion, 4 mL/kg/hr (Ideal), Intravenous, Continuous, Zaria Aniceto Bourgeois MD, Last Rate: 301.2 mL/hr at 09/13/23 2020, 4 mL/kg/hr at 09/13/23 2020  •  [MAR Hold] metoprolol tartrate (LOPRESSOR) tablet 50 mg, 50 mg, Oral, Q12H [address], Altagracia Aggarwal MD, 50 mg at 09/14/23 0903  •  [MAR Hold] oxyCODONE (ROXICODONE) IR tablet 5 mg, 5 mg, Oral, Q4H PRN, Altagracia Aggarwal MD, 5 mg at 09/14/23 1703  •  [MAR Hold] oxyCODONE (ROXICODONE) split tablet 2.5 mg, 2.5 mg, Oral, Q4H PRN, Altagracia Aggarwal MD  •  [facility] Hold] pantoprazole (PROTONIX) EC tablet 40 mg, 40 mg, Oral, Early Morning, Mohini Keenan, DO, 40 mg at 09/14/23 1936  •  [MAR Hold] polyethylene glycol (MIRALAX) packet 17 g, 17 g, Oral, Daily, Altagracia Aggarwal MD, 17 g at 09/13/23 1357  •  [MAR Hold] senna (SENOKOT) tablet 8.6 mg, 1 tablet, Oral, HS, Layne Vera MD    Facility-Administered Medications Ordered in Other Encounters:   •  dexamethasone (PF) (DECADRON) injection, , Intravenous, PRN, Jasmin Vargas MD, 10 mg at 09/14/23 1058  •  fentanyl citrate (PF) 100 MCG/2ML, , Intravenous, PRN, Jasmin Vargas MD, 50 mcg at 09/14/23 1058  •  lactated ringers infusion, , Intravenous, Continuous PRN, Jasmin Vargas MD, New Bag at 09/14/23 1058  •  lidocaine (PF) (XYLOCAINE-MPF) 1 % injection, , Intravenous, PRN, Jasmin Vargas MD, 50 mg at 09/14/23 1058  •  propofol (DIPRIVAN) 200 MG/20ML bolus injection, , Intravenous, PRN, Jasmin Vargas MD, 60 mg at 09/14/23 1058  •  ROCuronium (ZEMURON) injection, , Intravenous, PRN, Jasmin Vargas MD, 50 mg at 09/14/23 1058  •  Tranexamic Acid-NaCl (CYKLOKAPRON) 1000-0.7 MG/100ML-% injection, , Intravenous, PRN, Ginny Friday, CRNA, 1,000 mg at 09/14/23 1107    Laboratory Results:  Results from last 7 days   Lab Units 09/14/23  0509 09/13/23  2239 09/13/23  0521   WBC Thousand/uL 13.18* 14.05* 10.62*   HEMOGLOBIN g/dL 13.4 13.4 14.0   HEMATOCRIT % 39.8 37.9 42.1   PLATELETS Thousands/uL 161 154 167   SODIUM mmol/L 137 136 139   POTASSIUM mmol/L 3.8 4.1 4.0   CHLORIDE mmol/L 102 102 101   CO2 mmol/L 27 28 29   BUN mg/dL 22 20 20   CREATININE mg/dL 1.63* 1.62* 1.57*   CALCIUM mg/dL 9.2 9.0 9.2   MAGNESIUM mg/dL  --   --  1.9   PHOSPHORUS mg/dL  --   --  2.9         Portions of the record may have been created with voice recognition software. Occasional wrong word or "sound a like" substitutions may have occurred due to the inherent limitations of voice recognition software.  Read the chart carefully and recognize,

## 2023-09-14 NOTE — UTILIZATION REVIEW
Initial Clinical Review    Admission: Date/Time/Statement:   Admission Orders (From admission, onward)     Ordered        09/13/23 0445  Inpatient Admission  Once                      Orders Placed This Encounter   Procedures   • Inpatient Admission     Standing Status:   Standing     Number of Occurrences:   1     Order Specific Question:   Level of Care     Answer:   Med Surg [16]     Order Specific Question:   Estimated length of stay     Answer:   More than 2 Midnights     Order Specific Question:   Certification     Answer:   I certify that inpatient services are medically necessary for this patient for a duration of greater than two midnights. See H&P and MD Progress Notes for additional information about the patient's course of treatment. ED Arrival Information     Expected   -    Arrival   9/13/2023 01:55    Acuity   Urgent            Means of arrival   Ambulance    Escorted by   [facility]    Service   Trauma    Admission type   Emergency            Arrival complaint   Fall           Chief Complaint   Patient presents with   • Fall     Out of bed. Complaining of right hip pain. -LOC, -thinners        Initial Presentation: 80 y.o. male to ED with right femur fracture. Pt unable to provide history but per EMS he was found down in his room next to his bed. PMH for Dementia, CAD, Afib with tachy amanda (s/p pacemaker), HTN and HLD. Admit Inpatient level of care for Tachy-amanda syndrome and Right Femur-fracture. Orthopedic consult. Continue pacemaker. Pain control. 9/13  Orthopedics cons; S/p Fall unwitnessed with right femoral neck fracture. Denies head strike and LOC. NWB RLE. Analgesics for pain. NPO for hemiarthroplasty. Domínguez cath. Nephrology cons; JACQUELINE risk reduction recommendation in the setting of CKD. Baseline creat 2-2.5 but noted since 2023 creat in the mid to high 1s. Current BP 170s over 80s. Continue home meds. Metoprolol, Toprol.    Check BMP in am. Not on ace/arb or diuretics. Recommend IV fluids pre and post, avoid relative hypotension, avoid NSAIDs PMH of Solitary kidney with nephrectomy secondary to renal cell carcinoma. Date: 9/14   Day 2:   Per Orthopedic; Plan for OR today. NPO    Per Nephrology; Continue IVFs pre and post, borderline hypotension, avoid NSAIDS, follow daily labs. Plan for OR today. BP elevated, continue with Toprol, avoid relative hypotensive given advances age and CKD. Urology cons; Domínguez could not be removed by pulling on it. Attempted to dilate alongside but not possible. Placed a 22 g needle into the lateral portion of urethra and the balloon immediately deflated. The catheter came out easily intact. then placed a new 16 Fr coude catheter and drained the distended bladder. Progress notes; POD 0. 16-gauge Domínguez catheter was reinserted 9/14 in the OR. Pink tinged urine noted on arrival postoperatively. Continue to monitor. NWB LLE. Multimodal pain regimen.      9/14 OR - S/p Right - HEMIARTHROPLASTY HIP (BIPOLAR)   Operative Findings:  Displaced right femoral neck fracture        Date: 9/15    Day 3: Has surpassed a 2nd midnight with active treatments and services, which include Fall with right femoral neck fracture, S/p OR, Post op care, pain control and safe d/c plan      ED Triage Vitals   Temperature Pulse Respirations Blood Pressure SpO2   09/13/23 0202 09/13/23 0202 09/13/23 0202 09/13/23 0202 09/13/23 0202   97.5 °F (36.4 °C) 72 16 160/79 93 %      Temp Source Heart Rate Source Patient Position - Orthostatic VS BP Location FiO2 (%)   09/13/23 0202 09/13/23 0735 09/13/23 0735 09/13/23 0735 --   Tympanic Monitor Lying Left arm       Pain Score       09/13/23 0526       8          Wt Readings from Last 1 Encounters:   09/13/23 80.4 kg (177 lb 4 oz)     Additional Vital Signs:   09/14/23 09:42:26 97.7 °F (36.5 °C) 88 -- 182/107   Abnormal  132 93 % -- -- -- -- -- --   09/14/23 07:10:57 98.3 °F (36.8 °C) 61 17 165/80 108 94 % -- -- -- -- -- --   09/14/23 02:43:57 97.2 °F (36.2 °C)   Abnormal  68 18 153/75 101 96 % -- -- -- -- -- --   09/13/23 22:34:53 98 °F (36.7 °C) 63 19 144/66 92 97 % -- -- -- -- -- --   09/13/23 2130 -- -- -- -- -- 97 % 28 -- 2 L/min Nasal cannula -- --   09/13/23 21:08:01 98.7 °F (37.1 °C) 75 18 137/69 92 88 %   Abnormal  -- -- -- -- -- --   09/13/23 16:49:17 97.4 °F (36.3 °C)   Abnormal  73 20 156/81 106 94 % -- -- -- -- -- --   09/13/23 1415 -- 66 18 169/82 118 97 % -- -- -- None (Room air) -- Lying   09/13/23 1406 98 °F (36.7 °C) -- -- -- -- -- -- -- -- -- -- --   09/13/23 1400 -- 94 18 186/101   Abnormal  134 94 % -- -- -- None (Room air)       Pertinent Labs/Diagnostic Test Results:   XR femur 2 vw right   ED Interpretation by Leann Young MD (09/13 1497)   Subcapsular femur fx      Final Result by Bg Gonzalez MD (09/13 0513)      Acute mildly displaced right subcapital femur fracture. Workstation performed: [id number]         XR pelvis ap only 1 or 2 vw   ED Interpretation by Leann Young MD (09/13 0873)   Subcapsular femur fx      Final Result by Reyna Arguello MD (05/30 0557)   Acute displaced subcapital fracture right hip      Workstation performed: [id number]         CT head without contrast   Final Result by Natalia Henderson MD (09/13 0991)      No acute intracranial abnormality.                   Workstation performed: [id number]               Results from last 7 days   Lab Units 09/14/23  0509 09/13/23  2239 09/13/23  0521   WBC Thousand/uL 13.18* 14.05* 10.62*   HEMOGLOBIN g/dL 13.4 13.4 14.0   HEMATOCRIT % 39.8 37.9 42.1   PLATELETS Thousands/uL 161 154 167   NEUTROS ABS Thousands/µL 10.51* 11.34* 8.74*         Results from last 7 days   Lab Units 09/14/23  0509 09/13/23  2239 09/13/23  0521   SODIUM mmol/L 137 136 139   POTASSIUM mmol/L 3.8 4.1 4.0   CHLORIDE mmol/L 102 102 101   CO2 mmol/L 27 28 29   ANION GAP mmol/L 8 6 9   BUN mg/dL 22 20 20   CREATININE mg/dL 1.63* 1.62* 1.57*   EGFR ml/min/1.73sq m 37 37 38   CALCIUM mg/dL 9.2 9.0 9.2   MAGNESIUM mg/dL  --   --  1.9   PHOSPHORUS mg/dL  --   --  2.9         Results from last 7 days   Lab Units 09/14/23  1350 09/14/23  0508 09/14/23  0004 09/13/23  1758 09/13/23  1122 09/13/23  0601   POC GLUCOSE mg/dl 210* 165* 214* 204* 200* 197*     Results from last 7 days   Lab Units 09/14/23  0509 09/13/23  2239 09/13/23  0521   GLUCOSE RANDOM mg/dL 171* 196* 200*       Results from last 7 days   Lab Units 09/13/23  0559   PROTIME seconds 13.3   INR  0.99   PTT seconds 25       ED Treatment:   Medication Administration from 09/13/2023 0155 to 09/13/2023 1639       Date/Time Order Dose Route Action     09/13/2023 0526 EDT tranexamic Acid 770 mg in sodium chloride 0.9 % 100 mL IVPB 770 mg Intravenous New Bag     09/13/2023 0620 EDT enoxaparin (LOVENOX) subcutaneous injection 30 mg 30 mg Subcutaneous Given     09/13/2023 1422 EDT acetaminophen (TYLENOL) tablet 975 mg 975 mg Oral Given     09/13/2023 0526 EDT acetaminophen (TYLENOL) tablet 975 mg 975 mg Oral Given     09/13/2023 0741 EDT oxyCODONE (ROXICODONE) IR tablet 5 mg 5 mg Oral Given     09/13/2023 1016 EDT HYDROmorphone HCl (DILAUDID) injection 0.2 mg 0.2 mg Intravenous Given     09/13/2023 1357 EDT polyethylene glycol (MIRALAX) packet 17 g 17 g Oral Given     09/13/2023 0823 EDT ARIPiprazole (ABILIFY) tablet 5 mg 5 mg Oral Given     09/13/2023 0555 EDT docusate sodium (COLACE) capsule 100 mg 100 mg Oral Given     09/13/2023 0823 EDT metoprolol tartrate (LOPRESSOR) tablet 50 mg 50 mg Oral Given     09/13/2023 1314 EDT insulin lispro (HumaLOG) 100 units/mL subcutaneous injection 1-6 Units 2 Units Subcutaneous Given     09/13/2023 0851 EDT insulin lispro (HumaLOG) 100 units/mL subcutaneous injection 1-6 Units 2 Units Subcutaneous Given     09/13/2023 0741 EDT Tranexamic Acid-NaCl (CYKLOKAPRON) 1000-0.7 MG/100ML-% injection 1,000 mg 1,000 mg Intravenous New Bag     09/13/2023 1413 EDT lactated ringers bolus 1,000 mL 1,000 mL Intravenous New Bag     09/13/2023 1412 EDT lactated ringers infusion 4 mL/kg/hr Intravenous New Bag        Past Medical History:   Diagnosis Date   • Allergic rhinitis    • Anxiety    • Aspiration of liquid     and food per wife if he is eating too fast or talking when eating   • Asthma     as a child   • Atrial fibrillation ([address])    • CAD (coronary artery disease)    • Cancer of kidney ([address])    • COPD (chronic obstructive pulmonary disease) (HCC)    • CPAP (continuous positive airway pressure) dependence    • Dementia (HCC)     Frontal lobe   • Dementia (HCC)    • Diabetes mellitus ([address])    • Diabetes mellitus, type 2 (HCC)    • DJD (degenerative joint disease)    • Hypercholesterolemia    • Hyperlipidemia    • Hypertension    • Incisional hernia    • Kidney disease    • Melanoma ([address])     left leg   • Obesity    • Sleep apnea     wears CPAP   • TIA (transient ischemic attack)      Present on Admission:  • Dementia with behavioral disturbance ([address])      Admitting Diagnosis: Closed fracture of neck of right femur, initial encounter ([address]) Hedaniie Pickles  Fall, initial encounter [W19. XXXA]  Unspecified multiple injuries, initial encounter [T07. XXXA]  Age/Sex: 80 y.o. male     Admission Orders:  Scheduled Medications:  acetaminophen, 975 mg, Oral, Q8H CARLOS   ARIPiprazole, 5 mg, Oral, Daily  cefazolin, 2,000 mg, Intravenous, On Call To OR  docusate sodium, 100 mg, Oral, BID   donepezil, 10 mg, Oral, HS  enoxaparin, 30 mg, Subcutaneous, Q12H  insulin lispro, 1-6 Units, Subcutaneous, Q6H CARLOS   metoprolol tartrate, 50 mg, Oral, Q12H CARLOS  pantoprazole, 40 mg, Oral, Early Morning  polyethylene glycol, 17 g, Oral, Daily   senna, 1 tablet, Oral, HS    ceFAZolin (ANCEF) IVPB (premix in dextrose) 2,000 mg 50 mL  Dose: 2,000 mg  Freq: Every 8 hours Route: IV 9/14 x2  Last Dose: 2,000 mg (09/14/23 2258)  Start: 09/14/23 1530 End: 09/14/23 2328    Continuous IV Infusions:  lactated ringers, 4 mL/kg/hr (Ideal), Intravenous, Continuous      PRN Meds:  fentaNYL, 50 mcg, Intravenous, Q5 Min PRN  HYDROmorphone, 0.5 mg, Intravenous, Q5 Min PRN  HYDROmorphone, 0.2 mg, Intravenous, Q4H PRN  labetalol, 5 mg, Intravenous, Q20 Min PRN  ondansetron, 4 mg, Intravenous, Once PRN   oxyCODONE, 5 mg, Oral, Q4H PRN 9/14 x2  oxyCODONE, 2.5 mg, Oral, Q4H PRN  promethazine, 25 mg, Intravenous, Once PRN        IP CONSULT TO ORTHOPEDIC SURGERY  IP CONSULT TO GERONTOLOGY  IP CONSULT TO INTERNAL MEDICINE  IP CONSULT TO CASE MANAGEMENT  IP CONSULT TO NEPHROLOGY  IP CONSULT TO UROLOGY    Network Utilization Review Department  ATTENTION: Please call with any questions or concerns to [phone] and carefully listen to the prompts so that you are directed to the right person. All voicemails are confidential.  Kathy Murillo all requests for admission clinical reviews, approved or denied determinations and any other requests to dedicated fax number below belonging to the campus where the patient is receiving treatment.  List of dedicated fax numbers for the Facilities:  [place] DENIALS (Administrative/Medical Necessity) [phone] [address] (Maternity/NICU/Pediatrics) [phone]   [address] [place] [address] [phone]   [facility] [address] [facility] [phone] [address] [address] [facility] [phone]   [address] [address] [phone]

## 2023-09-14 NOTE — CONSULTS
UROLOGY CONSULTATION NOTE     Patient Identifiers: Zahra Hurtado (MRN [id number])  Service Requesting Consultation: orthopedic/trauma  Service Providing Consultation:  Urology, Suni Sahni MD    Date of Service: 9/14/2023  Inpatient consult to Urology  Consult performed by: Suni Sahni MD  Consult ordered by: Jovan Sanatna MD          Reason for Consultation: retained eduardo catheter, urinary retention, BPH    History of Present Illness:     Zahra Hurtado is a 80 y.o. old with a history of BPH and dementia with a fractured hip and eduardo catheter in place. He was brought into OR for hip replacement and staff is unable to remove the catheter. They previously cut the end of the catheter to drain residual fluid from balloon however catheter is stuck with mass within the penile urethra. His guardian was called and agreed to potential lower tract intervention needed.       Past Medical, Past Surgical History:     Past Medical History:   Diagnosis Date   • Allergic rhinitis    • Anxiety    • Aspiration of liquid     and food per wife if he is eating too fast or talking when eating   • Asthma     as a child   • Atrial fibrillation ([address])    • CAD (coronary artery disease)    • Cancer of kidney ([address])    • COPD (chronic obstructive pulmonary disease) (HCC)    • CPAP (continuous positive airway pressure) dependence    • Dementia ([address])     Frontal lobe   • Dementia (HCC)    • Diabetes mellitus (HCC)    • Diabetes mellitus, type 2 (HCC)    • DJD (degenerative joint disease)    • Hypercholesterolemia    • Hyperlipidemia    • Hypertension    • Incisional hernia    • Kidney disease    • Melanoma ([address])     left leg   • Obesity    • Sleep apnea     wears CPAP   • TIA (transient ischemic attack)    :    Past Surgical History:   Procedure Laterality Date   • CARDIAC PACEMAKER PLACEMENT     • CHOLECYSTECTOMY     • COLONOSCOPY     • HERNIA REPAIR      Incisional hernia   • NEPHRECTOMY Left 2005   • WI ESOPHAGOGASTRODUODENOSCOPY SUBMUCOSAL INJECTION N/A 9/21/2016    Procedure: ESOPHAGOGASTRODUODENOSCOPY (EGD); Surgeon: Carlyle Rousseau MD;  [facility]; Service: Gastroenterology   • UT ESOPHAGOGASTRODUODENOSCOPY SUBMUCOSAL INJECTION N/A 2/7/2018    Procedure: ESOPHAGOGASTRODUODENOSCOPY (EGD); Surgeon: Carlyle Rousseau MD;  [facility]; Service: Gastroenterology   • UT ESOPHAGOGASTRODUODENOSCOPY SUBMUCOSAL INJECTION N/A 4/3/2019    Procedure: ESOPHAGOGASTRODUODENOSCOPY (EGD) WITH BOTOX;  Surgeon: Carlyle Rousseau MD;  [facility];   Service: Gastroenterology   • ROTATOR CUFF REPAIR     • TONSILLECTOMY     :    Medications, Allergies:     Current Facility-Administered Medications   Medication Dose Route Frequency   • [MAR Hold] acetaminophen (TYLENOL) tablet 975 mg  975 mg Oral Q8H [address]   • [MAR Hold] ARIPiprazole (ABILIFY) tablet 5 mg  5 mg Oral Daily   • ceFAZolin (ANCEF) IVPB (premix in dextrose) 2,000 mg 50 mL  2,000 mg Intravenous On Call To OR   • [MAR Hold] docusate sodium (COLACE) capsule 100 mg  100 mg Oral BID   • [MAR Hold] donepezil (ARICEPT) tablet 10 mg  10 mg Oral HS   • [MAR Hold] enoxaparin (LOVENOX) subcutaneous injection 30 mg  30 mg Subcutaneous Q12H   • [MAR Hold] HYDROmorphone HCl (DILAUDID) injection 0.2 mg  0.2 mg Intravenous Q4H PRN   • [MAR Hold] insulin lispro (HumaLOG) 100 units/mL subcutaneous injection 1-6 Units  1-6 Units Subcutaneous Q6H [address]   • lactated ringers infusion  4 mL/kg/hr (Ideal) Intravenous Continuous   • [MAR Hold] metoprolol tartrate (LOPRESSOR) tablet 50 mg  50 mg Oral Q12H [address]   • [MAR Hold] oxyCODONE (ROXICODONE) IR tablet 5 mg  5 mg Oral Q4H PRN   • [MAR Hold] oxyCODONE (ROXICODONE) split tablet 2.5 mg  2.5 mg Oral Q4H PRN   • [MAR Hold] pantoprazole (PROTONIX) EC tablet 40 mg  40 mg Oral Early Morning   • [MAR Hold] polyethylene glycol (MIRALAX) packet 17 g  17 g Oral Daily   • [MAR Hold] senna (SENOKOT) tablet 8.6 mg  1 tablet Oral HS Facility-Administered Medications Ordered in Other Encounters   Medication Dose Route Frequency   • ceFAZolin (ANCEF) injection   Intravenous PRN   • dexamethasone (PF) (DECADRON) injection   Intravenous PRN   • fentanyl citrate (PF) 100 MCG/2ML   Intravenous PRN   • lactated ringers infusion   Intravenous Continuous PRN   • lidocaine (PF) (XYLOCAINE-MPF) 1 % injection   Intravenous PRN   • propofol (DIPRIVAN) 200 MG/20ML bolus injection   Intravenous PRN   • ROCuronium (ZEMURON) injection   Intravenous PRN   • Tranexamic Acid-NaCl (CYKLOKAPRON) 1000-0.7 MG/100ML-% injection   Intravenous PRN       Allergies: Allergies   Allergen Reactions   • Ambien [Zolpidem Tartrate] Hallucinations   • Bextra [Valdecoxib] Hives   • Dust Mite Extract Sneezing   • Lyrica [Pregabalin] Confusion   • Mold Extract [Trichophyton] Sneezing   • Pollen Extract Sneezing   • Tree Extract Other (See Comments) and Sneezing     congestion   :    Social and Family History:   Social History:   Social History     Tobacco Use   • Smoking status: Never   • Smokeless tobacco: Never   Vaping Use   • Vaping Use: Never used   Substance Use Topics   • Alcohol use: No   • Drug use: No   .    Social History     Tobacco Use   Smoking Status Never   Smokeless Tobacco Never       Family History:  Family History   Problem Relation Age of Onset   • Brain cancer Father    • Lung cancer Father    • Diabetes Family    • Heart disease Family    • Hypertension Family    • Cancer Family         renal cell carcinoma   • Stroke Family    • Colon cancer Son    :     Review of Systems:   Under anesthesia    Physical Exam:   General: Patient is pleasant and in NAD. Awake and alert  BP (!) 182/107   Pulse 86   Temp 97.7 °F (36.5 °C)   Resp 17   Ht 5' 11" (1.803 m)   Wt 80.4 kg (177 lb 4 oz)   SpO2 93%   BMI 24.72 kg/m² Temp (24hrs), Av.9 °F (36.6 °C), Min:97.2 °F (36.2 °C), Max:98.7 °F (37.1 °C)  current;  Temperature: 97.7 °F (36.5 °C)  I/O last 24 hours: In: 1280 [P.O.:180; IV Piggyback:1100]  Out: 1600 [Urine:1600]  Abdomen significant suprapubic distention from full bladder. (Male): Penis circumcised, phallus normal, meatus patent. Testicles descended into scrotum bilaterally without masses nor tenderness. EDUARDO: protruding from meatus, palpable thick balloon portion within the anterior urethra. Unable to be withdrawn. Labs:     Lab Results   Component Value Date    HGB 13.4 09/14/2023    HCT 39.8 09/14/2023    WBC 13.18 (H) 09/14/2023     09/14/2023   ]    Lab Results   Component Value Date     01/02/2016    K 3.8 09/14/2023     09/14/2023    CO2 27 09/14/2023    BUN 22 09/14/2023    CREATININE 1.63 (H) 09/14/2023    CALCIUM 9.2 09/14/2023    GLUCOSE 294 (H) 04/09/2022   ]    Imaging:   I personally reviewed the images and report of the following studies, and reviewed them with the patient:    CT Pelvis: personally reviewed, enlarged prostate. PROCEDURE:  Eduardo could not be removed by pulling on it. Attempted to dilate alongside but not possible. As it felt like a portion of balloon inflated, I passed a guidewire into the balloon port hole in attempt to break up crystallization, but not successful. I then placed a 22 g needle into the lateral portion of urethra and the balloon immediately deflated. The catheter came out easily intact. I then placed a new 16 Fr coude catheter and drained the distended bladder. ASSESSMENT:     80 y.o. old male with  retained eduardo catheter, history of BPH. PLAN:     Keep eduardo catheter in place. Do not remove postop. When patient is back to full mobility (as much as preop), may attempt formal void trial, whether inpatient or at his facility. Thank you for allowing me to participate in this patients’ care. Please do not hesitate to call with any additional questions.   Leonora Hernandez MD

## 2023-09-14 NOTE — ANESTHESIA POSTPROCEDURE EVALUATION
Post-Op Assessment Note    CV Status:  Stable  Pain Score: 0    Pain management: adequate     Mental Status:  Sleepy   Hydration Status:  Euvolemic   PONV Controlled:  Controlled   Airway Patency:  Patent      Post Op Vitals Reviewed: Yes      Staff: CRNA         No notable events documented.     BP  154/69   Temp   98.5   Pulse  61   Resp   12   SpO2   99%

## 2023-09-14 NOTE — PROGRESS NOTES
Vitals taken before transport to or, bp elevated, this nurse noticed nothing in eduardo cath bag, Assessed patients abdomen. Distended and tender. Bladder scan 770. Unable to irrigate eduardo, noticed that balloon seemed to be in shaft of penis, Tried to deflate balloon, not able. Dr. Remedios Dallas on floor, also tried to troubleshoot, not able to remove. Patient sent down to or holding with eduardo tube taped to leg. Jorge Henderson TT urology to meet patient in or.

## 2023-09-14 NOTE — QUICK NOTE
Patient seen and examined     ASSESSMENT / Medical Decision Making (MDM):  80year old male, walker Ambulator at baseline, lives in facility, with history of fall, right hip pain/fracture. Physical exam showing no open wounds, right hip tenderness, inability to bear weight, no upper extremity injuries, no left lower extremity injuries, no right knee/ankle injuries, RLE NVI w/ intact EHL/FHL, GS/TA, palp DP 1+ WWP. Patient is AOx1      Imaging reviewed as above including right hip/femur and pelvis radiographs. Findings most notable for right hip/femoral neck fracture. Impression of Right hip fracture      Plan: The above clinical, physical and imaging findings were reviewed with the patient - unfortunately patient with poor understanding due to dementia. PIERCE Ruggiero was contacted via phone at [phone]. Alexi Jaramillo has a constellation of findings consistent with a right hip fracture after a fall. Fortunately he did not sustain any other traumatic injuries. In my opinion, no further diagnostic work-up is warranted at this time. He has pain and inability to bear weight. We discussed the pathology and underlying disease processes with hip fractures including osteoporosis, underlying metabolic abnormalities, etc.  Discussed treatment options. Reviewed the role of non operative treatment. Given Ayaan's degree of pain and inability to mobilize, recommend surgical intervention. Marisa Gonzalez agrees and would like Alexi Jaramillo to proceed with fracture stabilization/replacement surgery. We discussed surgical options. In my opinion, would be right femur/hip dexter-arthroplasty. Explained at length the rationale for surgical invention and postoperative expectations including pain, mobility and ambulatory function.        I reviewed with the patient possible risks of surgery which include bleeding, infection, tendon/artery/nerve injury, persistent pain, stiffness, weakness, incomplete relief of symptoms, worsening of symptoms, hardware complications, dislocation, instability, subluxation, posttraumatic arthritis, scarring, pain, contracture, heterotopic ossification, wound healing difficulties, need for repeat operation, RSD, DVT, PE, anesthesia complications, amputation, etc.  Also discussed the increased risk of dislocation due his underlying dementia. We also reviewed patient specific risks and mitigation, including his medical co-morbidities, diabetic control and kidney history. All questions were answered. [address] [place] [address] verbalized understanding and desire for Cory Braun to proceed with surgery. Informed consent was previously obtained. I again reviewed the risks/benefits of the surgery. Explained reasoning for surgical intervention, post op expectations and implications of hip fracture on overall/general health function. [facility] had no further questions. Discussed pre-operative clearances, medical optimization and risk stratification. Patient seen/evaluated by Nephrology and Geriatric Medicine. Discussed with Anesthesia team. Plan to proceed with surgical intervention.

## 2023-09-15 LAB
ANION GAP SERPL CALCULATED.3IONS-SCNC: 8 MMOL/L
BASOPHILS # BLD AUTO: 0.02 THOUSANDS/ÂΜL (ref 0–0.1)
BASOPHILS NFR BLD AUTO: 0 % (ref 0–1)
BUN SERPL-MCNC: 30 MG/DL (ref 5–25)
CALCIUM SERPL-MCNC: 8.8 MG/DL (ref 8.4–10.2)
CHLORIDE SERPL-SCNC: 104 MMOL/L (ref 96–108)
CO2 SERPL-SCNC: 27 MMOL/L (ref 21–32)
CREAT SERPL-MCNC: 1.78 MG/DL (ref 0.6–1.3)
EOSINOPHIL # BLD AUTO: 0.05 THOUSAND/ÂΜL (ref 0–0.61)
EOSINOPHIL NFR BLD AUTO: 0 % (ref 0–6)
ERYTHROCYTE [DISTWIDTH] IN BLOOD BY AUTOMATED COUNT: 13.2 % (ref 11.6–15.1)
GFR SERPL CREATININE-BSD FRML MDRD: 33 ML/MIN/1.73SQ M
GLUCOSE SERPL-MCNC: 154 MG/DL (ref 65–140)
GLUCOSE SERPL-MCNC: 181 MG/DL (ref 65–140)
GLUCOSE SERPL-MCNC: 184 MG/DL (ref 65–140)
GLUCOSE SERPL-MCNC: 216 MG/DL (ref 65–140)
GLUCOSE SERPL-MCNC: 256 MG/DL (ref 65–140)
GLUCOSE SERPL-MCNC: 259 MG/DL (ref 65–140)
HCT VFR BLD AUTO: 35.2 % (ref 36.5–49.3)
HGB BLD-MCNC: 11.7 G/DL (ref 12–17)
IMM GRANULOCYTES # BLD AUTO: 0.05 THOUSAND/UL (ref 0–0.2)
IMM GRANULOCYTES NFR BLD AUTO: 0 % (ref 0–2)
LYMPHOCYTES # BLD AUTO: 1.2 THOUSANDS/ÂΜL (ref 0.6–4.47)
LYMPHOCYTES NFR BLD AUTO: 9 % (ref 14–44)
MCH RBC QN AUTO: 29.3 PG (ref 26.8–34.3)
MCHC RBC AUTO-ENTMCNC: 33.2 G/DL (ref 31.4–37.4)
MCV RBC AUTO: 88 FL (ref 82–98)
MONOCYTES # BLD AUTO: 1.05 THOUSAND/ÂΜL (ref 0.17–1.22)
MONOCYTES NFR BLD AUTO: 8 % (ref 4–12)
NEUTROPHILS # BLD AUTO: 10.34 THOUSANDS/ÂΜL (ref 1.85–7.62)
NEUTS SEG NFR BLD AUTO: 83 % (ref 43–75)
NRBC BLD AUTO-RTO: 0 /100 WBCS
PLATELET # BLD AUTO: 150 THOUSANDS/UL (ref 149–390)
PMV BLD AUTO: 11.2 FL (ref 8.9–12.7)
POTASSIUM SERPL-SCNC: 3.9 MMOL/L (ref 3.5–5.3)
RBC # BLD AUTO: 3.99 MILLION/UL (ref 3.88–5.62)
SODIUM SERPL-SCNC: 139 MMOL/L (ref 135–147)
WBC # BLD AUTO: 12.71 THOUSAND/UL (ref 4.31–10.16)

## 2023-09-15 PROCEDURE — 99232 SBSQ HOSP IP/OBS MODERATE 35: CPT | Performed by: INTERNAL MEDICINE

## 2023-09-15 PROCEDURE — 92526 ORAL FUNCTION THERAPY: CPT

## 2023-09-15 PROCEDURE — 99024 POSTOP FOLLOW-UP VISIT: CPT | Performed by: ORTHOPAEDIC SURGERY

## 2023-09-15 PROCEDURE — 85025 COMPLETE CBC W/AUTO DIFF WBC: CPT | Performed by: ORTHOPAEDIC SURGERY

## 2023-09-15 PROCEDURE — 97167 OT EVAL HIGH COMPLEX 60 MIN: CPT

## 2023-09-15 PROCEDURE — 99232 SBSQ HOSP IP/OBS MODERATE 35: CPT | Performed by: NURSE PRACTITIONER

## 2023-09-15 PROCEDURE — 80048 BASIC METABOLIC PNL TOTAL CA: CPT | Performed by: ORTHOPAEDIC SURGERY

## 2023-09-15 PROCEDURE — 97163 PT EVAL HIGH COMPLEX 45 MIN: CPT

## 2023-09-15 PROCEDURE — 82948 REAGENT STRIP/BLOOD GLUCOSE: CPT

## 2023-09-15 RX ADMIN — MEMANTINE 5 MG: 5 TABLET ORAL at 08:38

## 2023-09-15 RX ADMIN — DONEPEZIL HYDROCHLORIDE 10 MG: 10 TABLET ORAL at 21:30

## 2023-09-15 RX ADMIN — DOCUSATE SODIUM 100 MG: 100 CAPSULE, LIQUID FILLED ORAL at 08:38

## 2023-09-15 RX ADMIN — Medication 2000 UNITS: at 08:38

## 2023-09-15 RX ADMIN — DOCUSATE SODIUM 100 MG: 100 CAPSULE, LIQUID FILLED ORAL at 17:22

## 2023-09-15 RX ADMIN — METOPROLOL TARTRATE 50 MG: 50 TABLET, FILM COATED ORAL at 08:38

## 2023-09-15 RX ADMIN — INSULIN LISPRO 1 UNITS: 100 INJECTION, SOLUTION INTRAVENOUS; SUBCUTANEOUS at 05:24

## 2023-09-15 RX ADMIN — PROPAFENONE HYDROCHLORIDE 225 MG: 150 TABLET, FILM COATED ORAL at 17:22

## 2023-09-15 RX ADMIN — INSULIN DETEMIR 10 UNITS: 100 INJECTION, SOLUTION SUBCUTANEOUS at 17:22

## 2023-09-15 RX ADMIN — POLYETHYLENE GLYCOL 3350 17 G: 17 POWDER, FOR SOLUTION ORAL at 08:38

## 2023-09-15 RX ADMIN — PROPAFENONE HYDROCHLORIDE 225 MG: 150 TABLET, FILM COATED ORAL at 21:32

## 2023-09-15 RX ADMIN — ACETAMINOPHEN 975 MG: 325 TABLET, FILM COATED ORAL at 05:22

## 2023-09-15 RX ADMIN — ENOXAPARIN SODIUM 30 MG: 30 INJECTION SUBCUTANEOUS at 11:41

## 2023-09-15 RX ADMIN — ACETAMINOPHEN 975 MG: 325 TABLET, FILM COATED ORAL at 21:30

## 2023-09-15 RX ADMIN — OXYCODONE HYDROCHLORIDE 5 MG: 5 TABLET ORAL at 08:38

## 2023-09-15 RX ADMIN — SENNOSIDES 8.6 MG: 8.6 TABLET, FILM COATED ORAL at 21:30

## 2023-09-15 RX ADMIN — MAGNESIUM OXIDE TAB 400 MG (241.3 MG ELEMENTAL MG) 400 MG: 400 (241.3 MG) TAB at 08:38

## 2023-09-15 RX ADMIN — INSULIN LISPRO 3 UNITS: 100 INJECTION, SOLUTION INTRAVENOUS; SUBCUTANEOUS at 00:24

## 2023-09-15 RX ADMIN — PANTOPRAZOLE SODIUM 40 MG: 40 TABLET, DELAYED RELEASE ORAL at 05:22

## 2023-09-15 RX ADMIN — INSULIN LISPRO 2 UNITS: 100 INJECTION, SOLUTION INTRAVENOUS; SUBCUTANEOUS at 17:22

## 2023-09-15 RX ADMIN — ARIPIPRAZOLE 5 MG: 5 TABLET ORAL at 08:39

## 2023-09-15 RX ADMIN — ATORVASTATIN CALCIUM 10 MG: 10 TABLET, FILM COATED ORAL at 17:22

## 2023-09-15 RX ADMIN — METOPROLOL TARTRATE 50 MG: 50 TABLET, FILM COATED ORAL at 21:30

## 2023-09-15 RX ADMIN — SERTRALINE 100 MG: 100 TABLET, FILM COATED ORAL at 08:38

## 2023-09-15 RX ADMIN — ACETAMINOPHEN 975 MG: 325 TABLET, FILM COATED ORAL at 13:53

## 2023-09-15 RX ADMIN — PROPAFENONE HYDROCHLORIDE 225 MG: 150 TABLET, FILM COATED ORAL at 08:38

## 2023-09-15 RX ADMIN — INSULIN LISPRO 3 UNITS: 100 INJECTION, SOLUTION INTRAVENOUS; SUBCUTANEOUS at 11:41

## 2023-09-15 NOTE — ASSESSMENT & PLAN NOTE
· Domínguez catheter balloon was found midshaft in the patient not draining. Unable to deflate. · Urology was consulted and ultimately drained the balloon by inserting a 22-gauge needle. · 16-gauge Domínguez catheter was reinserted 9/14 in the OR.    · Faiza urine noted today  · Continue to monitor

## 2023-09-15 NOTE — CASE MANAGEMENT
Case Management Discharge Planning Note    Patient name Gulshan Renee  Location [address] [address]/[facility] [id number] MRN [id number]  : 1935 Date 9/15/2023       Current Admission Date: 2023  Current Admission Diagnosis:Femur fracture, right 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   • Constipation 2023   • Unspecified mood (affective) disorder ([address]) 2023   • Medical clearance for psychiatric admission 2023   • Chronic kidney disease (CKD), stage IV (severe) ([address]) 2023   • Fall 2022   • Renal cell carcinoma ([address]) 2022   • Multiple rib fractures 2022   • Abnormal head CT 2022   • Tracheitis 11/15/2021   • Dementia with behavioral disturbance ([address])    • Recurrent falls 2020   • Long term current use of antiarrhythmic medical therapy 2020   • 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 2020   • Coagulation disorder ([address]) 2020   • Head injury 2020   • 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, or unspecified chronic kidney disease 2019   • Coronary artery disease involving native coronary artery of native heart without angina pectoris 2019   • Type 2 diabetes mellitus with kidney complication, with long-term current use of insulin ([address]) 2019   • Paroxysmal atrial fibrillation ([address]) 2018   • Mixed hyperlipidemia 2018   • 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[address]) 2018   • JACQUELINE (acute kidney injury) ([address]) 2018   • Essential hypertension 2018   • Solitary kidney 2018   • B12 deficiency 10/13/2016   • Allergic rhinitis 2016   • Neuropathy of right foot 2015   • Piriformis syndrome 2014   • Benign prostate hyperplasia 10/27/2014   • Chronic lumbar radiculopathy 2014   • Sacral insufficiency fracture 06/12/2014   • Asthma 05/07/2014   • Hearing loss 05/07/2014   • GERD (gastroesophageal reflux disease) 05/07/2014   • Hiatal hernia 05/07/2014   • Seasonal allergies 05/07/2014   • Sleep apnea 09/12/2013   • Nephrosclerosis 09/12/2013   • Sacroiliitis ([address]) 08/27/2013   • Elevated prostate specific antigen (PSA) 06/18/2013   • 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 [address] [address]  Phone: [phone] Fax: [facility], [address]  Phone: [phone] Fax: [phone]    Primary Care Provider: Michael Rubio MD    Primary Insurance: [facility]  Secondary Insurance:     DISCHARGE DETAILS:         Pt's dtr would like the pt to return to [facility] rather than pursue IP rehab. She is aware there will likely be moved to a higher level of care there. [facility] won't be able to move forward though, until the pt's guardian gives them their blessing. As long as the guardian is on board, then pt will be returning to [facility]. Pt will need an updated medical evaluation form completed.

## 2023-09-15 NOTE — SPEECH THERAPY NOTE
Speech Language/Pathology    Speech/Language Pathology Progress Note    Patient Name: Jamie Harkins's Date: 9/15/2023     Problem List  Principal Problem:    Femur fracture, right ([address])  Active Problems:    Essential hypertension    Mixed hyperlipidemia    Type 2 diabetes mellitus with kidney complication, with long-term current use of insulin (HCC)    Dementia with behavioral disturbance (HCC)    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HCC)    Urinary catheter dysfunction ([address])       Past Medical History  Past Medical History:   Diagnosis Date   • Allergic rhinitis    • Anxiety    • Aspiration of liquid     and food per wife if he is eating too fast or talking when eating   • Asthma     as a child   • Atrial fibrillation ([address])    • CAD (coronary artery disease)    • Cancer of kidney ([address])    • COPD (chronic obstructive pulmonary disease) ([address])    • CPAP (continuous positive airway pressure) dependence    • Dementia ([address])     Frontal lobe   • Dementia ([address])    • Diabetes mellitus ([address])    • Diabetes mellitus, type 2 (HCC)    • DJD (degenerative joint disease)    • Hypercholesterolemia    • Hyperlipidemia    • Hypertension    • Incisional hernia    • Kidney disease    • Melanoma ([address])     left leg   • Obesity    • Sleep apnea     wears CPAP   • TIA (transient ischemic attack)         Past Surgical History  Past Surgical History:   Procedure Laterality Date   • CARDIAC PACEMAKER PLACEMENT     • CHOLECYSTECTOMY     • COLONOSCOPY     • HERNIA REPAIR      Incisional hernia   • NEPHRECTOMY Left 2005   • ND ESOPHAGOGASTRODUODENOSCOPY SUBMUCOSAL INJECTION N/A 9/21/2016    Procedure: ESOPHAGOGASTRODUODENOSCOPY (EGD); Surgeon: Flaco Daniel MD;  [facility]; Service: Gastroenterology   • ND ESOPHAGOGASTRODUODENOSCOPY SUBMUCOSAL INJECTION N/A 2/7/2018    Procedure: ESOPHAGOGASTRODUODENOSCOPY (EGD); Surgeon: Flaco Daniel MD;  [facility];   Service: Gastroenterology   • ND ESOPHAGOGASTRODUODENOSCOPY SUBMUCOSAL INJECTION N/A 4/3/2019    Procedure: ESOPHAGOGASTRODUODENOSCOPY (EGD) WITH BOTOX;  Surgeon: Claudean Ober, MD;  [facility]: [facility]; Service: Gastroenterology   • ROTATOR CUFF REPAIR     • TONSILLECTOMY           Subjective:  Pt seen for dysphagia tx. Pt was positioned upright in bed. Pt pleasant and cooperative. Objective:  Reviewed chart. Pt had surgery yesterday, and is now on a surgical soft diet. Pt was on a [facility] soft/dysphagia 2 diet prior to admission (recommended by ST and also baseline diet). Pt had just received his breakfast tray; assisted him with feeding. Upper dentures in place; lower teeth are natural. Pt ate 2 oz applesauce with prompt oropharyngeal swallows and no overt s/s of aspiration. He drank consecutive sips of thin liquids without s/s of aspiration. He ate only 2 bites of egg during the session, and 1 bite of softened cheerios in milk. Mastication of the soft solids (especially the eggs, cheerios were somewhat better) was extremely prolonged and inefficient. Egg bolus remained on the tongue even after liquid wash. Pt was cued multiple times to swallow, which he eventually did. Mild lingual residue cleared with liquid wash. Discussed aspiration/reflux precautions and diet recommendations with RN and NP (NP to change diet in computer). Educated pt on these recommendations, but pt does not appear to understand. Assessment:  Pt with prolonged and inefficient mastication of soft solids. Bolus manipulation and transfer of pureed and liquids are much more efficient. No overt s/s of aspiration. Pt would benefit from diet change to [facility] soft or possibly even pureed diet. Pt has hx of esophageal dysfunction as well. Plan/Recommendations:  Change diet to [facility] soft/dysphagia 2, continue thin liquids. May need to change to pureed if pt not tolerating this diet. Aspiration and reflux precautions. Alternate bites/sips. Assist with feeding. Remain upright at least 30-45 minutes after meals.  ST to see for dysphagia tx.

## 2023-09-15 NOTE — PLAN OF CARE
Problem: OCCUPATIONAL THERAPY ADULT  Goal: Performs self-care activities at highest level of function for planned discharge setting. See evaluation for individualized goals. Description: Treatment Interventions: ADL retraining, Functional transfer training, Endurance training, Cognitive reorientation, Patient/family training, Equipment evaluation/education, Compensatory technique education, Activityengagement          See flowsheet documentation for full assessment, interventions and recommendations. Note: Limitation: Decreased ADL status, Decreased Safe judgement during ADL, Decreased cognition, Decreased endurance, Decreased self-care trans  Prognosis: Fair  Assessment: Pt is a 80 y.o. male who was admitted to [address] [place] [address] on 9/13/2023 with Femur fracture, right ([address]) s/p R hip hemiarthroplasty . Patient  has a past medical history of Allergic rhinitis, Anxiety, Aspiration of liquid, Asthma, Atrial fibrillation ([address]), CAD (coronary artery disease), Cancer of kidney ([address]), COPD (chronic obstructive pulmonary disease) ([address]), CPAP (continuous positive airway pressure) dependence, Dementia ([address]), Dementia ([address]), Diabetes mellitus ([address]), Diabetes mellitus, type 2 ([address]), DJD (degenerative joint disease), Hypercholesterolemia, Hyperlipidemia, Hypertension, Incisional hernia, Kidney disease, Melanoma ([address]), Obesity, Sleep apnea, and TIA (transient ischemic attack). At baseline pt was completing adls with assist from facility staff- ambulates with RW- meals/homemaking provided. Pt lives with spouse at [facility] secure dementia unit. Currently pt requires max assist for overall ADLS and mod to max a x 2  for functional mobility/transfers.  Pt currently presents with impairments in the following categories -limited home support, difficulty performing ADLS, limited insight into deficits, compliance, flat affect, decreased initiation and engagement  and health management  activity tolerance, endurance, standing balance/tolerance, sitting balance/tolerance, memory, insight, safety , judgement , attention , sequencing , task initiation  and task termination . These impairments, as well as pt's fatigue, pain, hip precautions, orthopedic restricitions , WBS , risk for falls and home environment  limit pt's ability to safely engage in all baseline areas of occupation, includingbathing, dressing, toileting, functional mobility/transfers, community mobility, social participation  and leisure activities  From OT standpoint, recommend inpt rehab unless facility is able to meet pt's care needs upon D/C. OT will continue to follow to address the below stated goals.      OT Discharge Recommendation: Post acute rehabilitation services

## 2023-09-15 NOTE — UTILIZATION REVIEW
NOTIFICATION OF INPATIENT ADMISSION   AUTHORIZATION REQUEST   SERVICING FACILITY:   [facility] [facility]  Address: [address] [facility], [address] [place] [address] Place [address]  Tax ID: [id number]  NPI: [id number] ATTENDING PROVIDER:  Attending Name and NPI#: Glenny Perkinsumada [[id number]]  Address: [address] [place] [address]  Phone: [phone]   ADMISSION INFORMATION:  [facility]  Place of Service Code: 21  Inpatient Admission Date/Time: 9/13/23  4:45 AM  Discharge Date/Time: No discharge date for patient encounter. Admitting Diagnosis Code/Description:  Closed fracture of neck of right femur, initial encounter ([facility]) Alexandria Esquivel  Fall, initial encounter [W19. XXXA]  Unspecified multiple injuries, initial encounter [T07. XXXA]     UTILIZATION REVIEW CONTACT:  Pamela Rubi Utilization   Network Utilization Review Department  Phone: [phone]  Fax: [phone]  Email: [email or web address]  Contact for approvals/pending authorizations, clinical reviews, and discharge. PHYSICIAN ADVISORY SERVICES:  Medical Necessity Denial & [id number] Review  Phone: [phone]  Fax: [phone]  Email: [email or web address]

## 2023-09-15 NOTE — ASSESSMENT & PLAN NOTE
Lab Results   Component Value Date    HGBA1C 8.0 (H) 05/25/2023       Recent Labs     09/15/23  0023 09/15/23  0520 09/15/23  0742 09/15/23  1056   POCGLU 256* 181* 184* 259*       Blood Sugar Average: Last 72 hrs:  (P) [id number]     -Restarted on home Levemir.  -Continue on sliding scale insulin

## 2023-09-15 NOTE — ASSESSMENT & PLAN NOTE
· Secondary to fall  · 9/3 XR showed: Acute mildly displaced right subcapital femur fracture  · Orthopedics consulted and note appreciated  · 9/14 right hemiarthroplasty  · WBAT RLE  · Multimodal pain regimen  · DVT prophylaxis: Lovenox  · PT/OT

## 2023-09-15 NOTE — PLAN OF CARE
Problem: PHYSICAL THERAPY ADULT  Goal: Performs mobility at highest level of function for planned discharge setting. See evaluation for individualized goals. Description: Treatment/Interventions: Functional transfer training, LE strengthening/ROM, Therapeutic exercise, Endurance training, Patient/family training, Equipment eval/education, Bed mobility, Gait training, Compensatory technique education, Spoke to nursing, OT, Spoke to case management          See flowsheet documentation for full assessment, interventions and recommendations. Note: Prognosis: Fair  Problem List: Decreased strength, Decreased range of motion, Decreased endurance, Impaired balance, Decreased mobility, Decreased coordination, Decreased cognition, Impaired judgement, Decreased safety awareness, Orthopedic restrictions, Pain  Assessment: Pt is 80 y.o. male seen for a high complexity PT evaluation s/p admit to [address] [place] [address] on 9/13/2023. Pt presenting s/p fall w/ R hip fx, s/p R hip hemiarthroplasty 9/14/23, WBAT RLE. Please see above for other active problem list / PMH. PT now consulted to assess functional mobility and needs for safe d/c planning. Pt is a resident of [address] [place] [address], at baseline pt was Erika w/ ambulation w/ RW vs rollator. Currently pt requires ModAx2 for bed skills; ModAx2 for functional transfers; MaxAx2 for limited ambulation w/ RW. Pt presents functioning below baseline and w/ overall mobility deficits 2* to: decreased LE strength; decreased R hip ROM; decreased endurance; impaired balance; gait deviations; pain; fatigue; impaired safety and judgement; bed/chair alarms; multiple lines. These impairments place pt at risk for falls. Pt will continue to benefit from skilled PT interventions to address stated impairments; to maximize functional potential; for ongoing pt/ family training; and DME needs. PT is currently recommending rehab.         PT Discharge Recommendation: Post acute rehabilitation services    See flowsheet documentation for full assessment.

## 2023-09-15 NOTE — PROGRESS NOTES
Progress note- Nephrology   Sammy Bowen 80 y.o. male MRN: [id number]  Unit/Bed#: [facility] 602-01 Encounter: [id number]      80-year-old male with a history of CKD IV, solitary kidney status post nephrectomy, dementia who presented to the ER status post fall placated by right suprascapular femur fracture.   Nephrology consulted and following for CKD    Chronic kidney disease IV:    Etiology: Suspect secondary to diabetic kidney disease, hypertensive nephrosclerosis, solitary kidney function history of nephrectomy and age-related nephron loss  • Baseline creatinine 2.0-2.5 however since June 2023 creatinine 1.5-1.8  • Not on ACE/ARB or diuretics at home  • Admission creatinine 1.57 on 9/13/2023   • Current creatinine today 1.74 and remains baseline  • Patient follows with Dr. Lyle Gibbs  • IV fluids per primary team  • Not on ace/arb or diuretics as outpatient  • Avoid NSAIDs to include Toradol, naproxen, Celebrex, Mobic or Aleve  • Avoid pertubation's of blood pressure or hypotension to prevent decreased renal perfusion  • Check BMP in a.m.     Solitary kidney with history of nephrectomy secondary to renal cell carcinoma     Blood pressure/hypertension:  • Current BP acceptable  • Home medications include:   Metoprolol 100 mg every 12 hours  • Current medications include: Metoprolol 50 mg every 12 hours  • Maximize hemodynamics to maintain MAP >65  • Avoid hypotension or fluctuations in blood pressure  • Will continue to trend     Ambulatory dysfunction status post chemical fall complicated by right subscapular femur fracture  • Orthopedic consulted and following  • Status post right hemiarthroplasty hip 9/14/2023  • Required urology assistance for Domínguez catheter removal and insertion during surgery  • Pain control per primary team     Frontotemporal dementia  • Consider geriatric consultation      Medical records have been reviewed through [facility] and care everywhere for this patient encounter    Review of Systems  Patient seen and examined at bedside. Patient more awake today and answering questions appropriately reports pain is controlled. Does not remember events or having surgery yesterday  Review of Systems   Constitutional: Negative. Negative for activity change, appetite change, chills, diaphoresis, fatigue and fever. HENT: Negative. Negative for congestion and facial swelling. Respiratory: Negative. Cardiovascular: Negative. Gastrointestinal: Negative. Endocrine: Negative. Genitourinary: Negative. Musculoskeletal: Negative. Skin: Negative. Allergic/Immunologic: Negative. Neurological: Negative. Hematological: Negative. Psychiatric/Behavioral: Negative. Physical Exam:  Current Weight: Weight - Scale: 80.4 kg (177 lb 4 oz)  Vitals:    09/14/23 2234 09/15/23 0331 09/15/23 0633 09/15/23 0832   BP: 148/71 143/70 148/71 151/70   BP Location:  Right arm     Pulse: 68 63 65 65   Resp: 16 18 18 17   Temp: 100.2 °F (37.9 °C) 99.4 °F (37.4 °C) 98.2 °F (36.8 °C) 99.2 °F (37.3 °C)   TempSrc:  Oral     SpO2: 97% 96% 97% 95%   Weight:       Height:           Physical Exam  Vitals and nursing note reviewed. Constitutional:       Appearance: Normal appearance. HENT:      Head: Normocephalic and atraumatic. Nose: Nose normal.      Mouth/Throat:      Mouth: Mucous membranes are moist.      Pharynx: Oropharynx is clear. Eyes:      Extraocular Movements: Extraocular movements intact. Conjunctiva/sclera: Conjunctivae normal.   Cardiovascular:      Rate and Rhythm: Normal rate and regular rhythm. Pulses: Normal pulses. Heart sounds: Normal heart sounds. Pulmonary:      Effort: Pulmonary effort is normal.      Breath sounds: Normal breath sounds. Abdominal:      General: Bowel sounds are normal.      Palpations: Abdomen is soft.    Genitourinary:     Comments: Domínguez catheter patent for clear taqueria urine  Musculoskeletal:         General: Normal range of motion. Cervical back: Normal range of motion and neck supple. Skin:     General: Skin is warm and dry. Neurological:      Mental Status: He is alert. He is disoriented. Psychiatric:         Mood and Affect: Mood normal.         Behavior: Behavior normal.            Medications:    Current Facility-Administered Medications:   •  acetaminophen (TYLENOL) tablet 975 mg, 975 mg, Oral, Q8H [address], Ed Marcum MD, 975 mg at 09/15/23 0522  •  ARIPiprazole (ABILIFY) tablet 5 mg, 5 mg, Oral, Daily, Clara Ram MD, 5 mg at 09/15/23 8180  •  atorvastatin (LIPITOR) tablet 10 mg, 10 mg, Oral, After Dinner, SHADY Mason, 10 mg at 09/14/23 1738  •  cholecalciferol (VITAMIN D3) tablet 2,000 Units, 2,000 Units, Oral, Daily, SHADY Mason, 2,000 Units at 09/15/23 2409  •  docusate sodium (COLACE) capsule 100 mg, 100 mg, Oral, BID, Clara Ram MD, 100 mg at 09/15/23 0658  •  donepezil (ARICEPT) tablet 10 mg, 10 mg, Oral, HS, [facility] BONITA Marcum MD, 10 mg at 09/14/23 2206  •  enoxaparin (LOVENOX) subcutaneous injection 30 mg, 30 mg, Subcutaneous, Q12H, Clara Ram MD, 30 mg at 09/14/23 2206  •  HYDROmorphone HCl (DILAUDID) injection 0.2 mg, 0.2 mg, Intravenous, Q4H PRN, Clara Ram MD, 0.2 mg at 09/13/23 1016  •  insulin lispro (HumaLOG) 100 units/mL subcutaneous injection 1-6 Units, 1-6 Units, Subcutaneous, Q6H [address], 1 Units at 09/15/23 0524 **AND** Fingerstick Glucose (POCT), , , Q6H, Clara Ram MD  •  Nursing Communication Measure and document PVi every 4 hours until patient goes to the OR., , , Until Discontinued **AND** Nursing Communication Notify physician if SpO2 <92% and stop IVF infusion. , , , Until Discontinued **AND** Nursing Communication Check SpHb q4h and notify provider if <8., , , Until Discontinued **AND** Nursing Communication Obtain vital signs, PVi, and SpHb immediately prior to transfer to operating room. , , , Until Discontinued **AND** [COMPLETED] lactated ringers bolus 1,000 mL, 1,000 mL, Intravenous, Once, Stopped at 09/13/23 1613 **AND** lactated ringers infusion, 4 mL/kg/hr (Ideal), Intravenous, Continuous, Shadi Dyer MD, Last Rate: 301.2 mL/hr at 09/13/23 2020, 4 mL/kg/hr at 09/13/23 2020  •  magnesium Oxide (MAG-OX) tablet 400 mg, 400 mg, Oral, Daily, Marlane Comp, CRNP, 400 mg at 09/15/23 4846  •  memantine (NAMENDA) tablet 5 mg, 5 mg, Oral, Daily, Marlane Comp, CRNP, 5 mg at 09/15/23 5677  •  metoprolol tartrate (LOPRESSOR) tablet 50 mg, 50 mg, Oral, Q12H [facility], Shadi Dyer MD, 50 mg at 09/15/23 5901  •  oxyCODONE (ROXICODONE) IR tablet 5 mg, 5 mg, Oral, Q4H PRN, Shadi Dyer MD, 5 mg at 09/15/23 5789  •  oxyCODONE (ROXICODONE) split tablet 2.5 mg, 2.5 mg, Oral, Q4H PRN, Shadi Dyer MD  •  pantoprazole (PROTONIX) EC tablet 40 mg, 40 mg, Oral, Early Morning, Shadi Dyer MD, 40 mg at 09/15/23 0522  •  polyethylene glycol (MIRALAX) packet 17 g, 17 g, Oral, Daily, Shadi Dyer MD, 17 g at 09/15/23 9494  •  propafenone (RYTHMOL) tablet 225 mg, 225 mg, Oral, TID, Marlane Comp, CRNP, 225 mg at 09/15/23 1287  •  senna (SENOKOT) tablet 8.6 mg, 1 tablet, Oral, HS, Shadi Dyer MD, 8.6 mg at 09/14/23 2206  •  sertraline (ZOLOFT) tablet 100 mg, 100 mg, Oral, Daily, Marlane Comp, CRNP, 100 mg at 09/15/23 2511    Laboratory Results:  Results from last 7 days   Lab Units 09/15/23  0455 09/14/23  0509 09/13/23  2239 09/13/23  0521   WBC Thousand/uL 12.71* 13.18* 14.05* 10.62*   HEMOGLOBIN g/dL 11.7* 13.4 13.4 14.0   HEMATOCRIT % 35.2* 39.8 37.9 42.1   PLATELETS Thousands/uL 150 161 154 167   SODIUM mmol/L 139 137 136 139   POTASSIUM mmol/L 3.9 3.8 4.1 4.0   CHLORIDE mmol/L 104 102 102 101   CO2 mmol/L 27 27 28 29   BUN mg/dL 30* 22 20 20   CREATININE mg/dL 1.78* 1.63* 1.62* 1.57*   CALCIUM mg/dL 8.8 9.2 9.0 9.2   MAGNESIUM mg/dL  --   --   --  1.9   PHOSPHORUS mg/dL  --   --   --  2.9         Portions of the record may have been created with voice recognition software. Occasional wrong word or "sound a like" substitutions may have occurred due to the inherent limitations of voice recognition software.  Read the chart carefully and recognize,

## 2023-09-15 NOTE — PROGRESS NOTES
09/15/23 1500   Clinical Encounter Type   Visited With Patient   Routine Visit Follow-up      Pastoral Care Progress Note    9/15/2023  Patient: Aime Hartley : 1935  Admission Date & Time: 20235  MRN: [id number] CSN: [id number]          Fr. [address] visit.

## 2023-09-15 NOTE — PLAN OF CARE
Problem: Potential for Falls  Goal: Patient will remain free of falls  Description: INTERVENTIONS:  - Educate patient/family on patient safety including physical limitations  - Instruct patient to call for assistance with activity   - Consult OT/PT to assist with strengthening/mobility   - Keep Call bell within reach  - Keep bed low and locked with side rails adjusted as appropriate  - Keep care items and personal belongings within reach  - Initiate and maintain comfort rounds  - Make Fall Risk Sign visible to staff  - Offer Toileting every 2 Hours, in advance of need  - Initiate/Maintain bed alarm  - Apply yellow socks and bracelet for high fall risk patients  - Consider moving patient to room near nurses station  Outcome: Progressing     Problem: MOBILITY - ADULT  Goal: Maintain or return to baseline ADL function  Description: INTERVENTIONS:  -  Assess patient's ability to carry out ADLs; assess patient's baseline for ADL function and identify physical deficits which impact ability to perform ADLs (bathing, care of mouth/teeth, toileting, grooming, dressing, etc.)  - Assess/evaluate cause of self-care deficits   - Assess range of motion  - Assess patient's mobility; develop plan if impaired  - Assess patient's need for assistive devices and provide as appropriate  - Encourage maximum independence but intervene and supervise when necessary  - Involve family in performance of ADLs  - Assess for home care needs following discharge   - Consider OT consult to assist with ADL evaluation and planning for discharge  - Provide patient education as appropriate  Outcome: Progressing  Goal: Maintains/Returns to pre admission functional level  Description: INTERVENTIONS:  - Perform BMAT or MOVE assessment daily.   - Set and communicate daily mobility goal to care team and patient/family/caregiver. - Collaborate with rehabilitation services on mobility goals if consulted. - Reposition patient every 2 hours.   - Out of bed for toileting  - Record patient progress and toleration of activity level   Outcome: Progressing     Problem: Nutrition/Hydration-ADULT  Goal: Nutrient/Hydration intake appropriate for improving, restoring or maintaining nutritional needs  Description: Monitor and assess patient's nutrition/hydration status for malnutrition. Collaborate with interdisciplinary team and initiate plan and interventions as ordered. Monitor patient's weight and dietary intake as ordered or per policy. Utilize nutrition screening tool and intervene as necessary. Determine patient's food preferences and provide high-protein, high-caloric foods as appropriate.      INTERVENTIONS:  - Monitor oral intake, urinary output, labs, and treatment plans  - Assess nutrition and hydration status and recommend course of action  - Evaluate amount of meals eaten  Problem: Potential for Falls  Goal: Patient will remain free of falls  Description: INTERVENTIONS:  - Educate patient/family on patient safety including physical limitations  - Instruct patient to call for assistance with activity   - Consult OT/PT to assist with strengthening/mobility   - Keep Call bell within reach  - Keep bed low and locked with side rails adjusted as appropriate  - Keep care items and personal belongings within reach  - Initiate and maintain comfort rounds  - Make Fall Risk Sign visible to staff  - Offer Toileting every 2 Hours, in advance of need  - Initiate/Maintain bed alarm  - Apply yellow socks and bracelet for high fall risk patients  - Consider moving patient to room near nurses station  Outcome: Progressing     Problem: MOBILITY - ADULT  Goal: Maintain or return to baseline ADL function  Description: INTERVENTIONS:  -  Assess patient's ability to carry out ADLs; assess patient's baseline for ADL function and identify physical deficits which impact ability to perform ADLs (bathing, care of mouth/teeth, toileting, grooming, dressing, etc.)  - Assess/evaluate cause of self-care deficits   - Assess range of motion  - Assess patient's mobility; develop plan if impaired  - Assess patient's need for assistive devices and provide as appropriate  - Encourage maximum independence but intervene and supervise when necessary  - Involve family in performance of ADLs  - Assess for home care needs following discharge   - Consider OT consult to assist with ADL evaluation and planning for discharge  - Provide patient education as appropriate  Outcome: Progressing  Goal: Maintains/Returns to pre admission functional level  Description: INTERVENTIONS:  - Perform BMAT or MOVE assessment daily.   - Set and communicate daily mobility goal to care team and patient/family/caregiver. - Collaborate with rehabilitation services on mobility goals if consulted. - Reposition patient every 2 hours. - Out of bed for toileting  - Record patient progress and toleration of activity level   Outcome: Progressing     Problem: Nutrition/Hydration-ADULT  Goal: Nutrient/Hydration intake appropriate for improving, restoring or maintaining nutritional needs  Description: Monitor and assess patient's nutrition/hydration status for malnutrition. Collaborate with interdisciplinary team and initiate plan and interventions as ordered. Monitor patient's weight and dietary intake as ordered or per policy. Utilize nutrition screening tool and intervene as necessary. Determine patient's food preferences and provide high-protein, high-caloric foods as appropriate.      INTERVENTIONS:  - Monitor oral intake, urinary output, labs, and treatment plans  - Assess nutrition and hydration status and recommend course of action  - Evaluate amount of meals eaten  - Assist patient with eating if necessary   - Allow adequate time for meals  - Recommend/ encourage appropriate diets, oral nutritional supplements, and vitamin/mineral supplements  - Order, calculate, and assess calorie counts as needed  - Recommend, monitor, and adjust tube feedings and TPN/PPN based on assessed needs  - Assess need for intravenous fluids  - Provide specific nutrition/hydration education as appropriate  - Include patient/family/caregiver in decisions related to nutrition  Outcome: Progressing     - Assist patient with eating if necessary   - Allow adequate time for meals  - Recommend/ encourage appropriate diets, oral nutritional supplements, and vitamin/mineral supplements  - Order, calculate, and assess calorie counts as needed  - Recommend, monitor, and adjust tube feedings and TPN/PPN based on assessed needs  - Assess need for intravenous fluids  - Provide specific nutrition/hydration education as appropriate  - Include patient/family/caregiver in decisions related to nutrition  Outcome: Progressing

## 2023-09-15 NOTE — PROGRESS NOTES
[address] [facility]  Progress Note  Name: Taylor Velez  MRN: [id number]  Unit/Bed#: [facility] 861-76 I Date of Admission: 9/13/2023   Date of Service: 9/15/2023 I Hospital Day: 2    Assessment/Plan   * Femur fracture, right ([address])  Assessment & Plan  · Secondary to fall  · 9/3 XR showed: Acute mildly displaced right subcapital femur fracture  · Orthopedics consulted and note appreciated  · 9/14 right hemiarthroplasty  · WBAT RLE  · Multimodal pain regimen  · DVT prophylaxis: Lovenox  · PT/OT    Urinary catheter dysfunction ([address])  Assessment & Plan  · Domínguez catheter balloon was found midshaft in the patient not draining. Unable to deflate. · Urology was consulted and ultimately drained the balloon by inserting a 22-gauge needle. · 16-gauge Domínguez catheter was reinserted 9/14 in the OR. 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   · Geriatrics consulted and note appreciated  · Continue home regimen including Aricept, Namenda, Abilify, Zoloft      Type 2 diabetes mellitus with kidney complication, with long-term current use of insulin [facility])  Assessment & Plan  Lab Results   Component Value Date    HGBA1C 8.0 (H) 05/25/2023       Recent Labs     09/15/23  0023 09/15/23  0520 09/15/23  0742 09/15/23  1056   POCGLU 256* 181* 184* 259*       Blood Sugar Average: Last 72 hrs:  (P) [id number]     -Restarted on home Levemir.  -Continue on sliding scale insulin    Mixed hyperlipidemia  Assessment & Plan  · Continue home atorvastatin    Essential hypertension  Assessment & Plan  · Continue home metoprolol             Bowel Regimen: MiraLAX, senna  VTE Prophylaxis:Sequential compression device (Venodyne)  and Enoxaparin (Lovenox)     Disposition: Pending placement    Subjective   Chief Complaint: "Who are you"    Subjective: ROS is limited due to dementia. Patient doing well today patient--he does not remember having surgery. No pain at rest.  Rounded with nursing staff-no concerns. Objective   Vitals:   Temp:  [97.2 °F (36.2 °C)-100.2 °F (37.9 °C)] 98.5 °F (36.9 °C)  HR:  [58-80] 77  Resp:  [12-19] 17  BP: (136-167)/(67-82) 150/72    I/O       09/13 0701  09/14 0700 09/14 0701  09/15 0700 09/15 0701  09/16 0700    P. O. 180 300 480    I.V. (mL/kg)  1000 (12.4)     IV Piggyback 1100      Total Intake(mL/kg) 1280 (15.9) 1300 (16.2) 480 (6)    Urine (mL/kg/hr) 800 (0.4) 2100 (1.1)     Blood  200     Total Output 800 2300     Net +480 -1000 +480                  Physical Exam:   GENERAL APPEARANCE: No acute distress  NEURO: GCS 14 due to confusion. HEENT:  Normocephalic, atraumatic. Neck supple. CV: Regular rate and rhythm.  +2 radial and dorsalis pedis pulses, bilaterally. LUNGS: Clear to auscultation, bilaterally. GI: Abdomen is soft nontender. : Pelvis stable  MSK: Mild swelling on right lateral hip. Tenderness on palpation. No significant hematoma or effusion appreciated. Patient is able to move all fingers and toes. Abductor pillow in place  SKIN: Warm, dry. Right hip surgical dressing is clean, dry, intact.     Invasive Devices     Peripheral Intravenous Line  Duration           Peripheral IV 09/13/23 Right Antecubital 1 day    Peripheral IV 09/14/23 Right Hand 1 day          Drain  Duration           Urethral Catheter Coude 16 Fr. 1 day                      Lab Results:   Results: I have personally reviewed all pertinent laboratory/tests results, BMP/CMP:   Lab Results   Component Value Date    SODIUM 139 09/15/2023    K 3.9 09/15/2023     09/15/2023    CO2 27 09/15/2023    BUN 30 (H) 09/15/2023    CREATININE 1.78 (H) 09/15/2023    CALCIUM 8.8 09/15/2023    EGFR 33 09/15/2023    and CBC:   Lab Results   Component Value Date    WBC 12.71 (H) 09/15/2023    HGB 11.7 (L) 09/15/2023    HCT 35.2 (L) 09/15/2023    MCV 88 09/15/2023     09/15/2023    RBC 3.99 09/15/2023    MCH 29.3 09/15/2023    MCHC 33.2 09/15/2023    RDW 13.2 09/15/2023    MPV 11.2 09/15/2023    NRBC 0 09/15/2023     Imaging: I have personally reviewed pertinent reports.      Other Studies: none

## 2023-09-15 NOTE — PHYSICAL THERAPY NOTE
Physical Therapy Evaluation    Patient's Name: [facility]    Admitting Diagnosis  Closed fracture of neck of right femur, initial encounter ([address]) Caridad Smith  Fall, initial encounter [W19. XXXA]  Unspecified multiple injuries, initial encounter [T07. XXXA]    Problem List  Patient Active Problem List   Diagnosis    Benign prostate hyperplasia    Elevated prostate specific antigen (PSA)    JACQUELINE (acute kidney injury) ([address])    Essential hypertension    Solitary kidney    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HCC)    Paroxysmal atrial fibrillation (HCC)    Mixed hyperlipidemia    Type 2 diabetes mellitus with kidney complication, with long-term current use of insulin (HCC)    Coronary artery disease involving native coronary artery of native heart without angina pectoris    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, or unspecified chronic kidney disease    Head injury    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    Coagulation disorder ([address])    Long term current use of antiarrhythmic medical therapy    Recurrent falls    Dementia with behavioral disturbance ([address])    Tracheitis    Multiple rib fractures    Abnormal head CT    Renal cell carcinoma ([address])    Fall    Medical clearance for psychiatric admission    Chronic kidney disease (CKD), stage IV (severe) ([facility])    Unspecified mood (affective) disorder ([facility])    Allergic rhinitis    Asthma    Sleep apnea    B12 deficiency    Chronic lumbar radiculopathy    Hearing loss    GERD (gastroesophageal reflux disease)    Hiatal hernia    Nephrosclerosis    Neuropathy of right foot    Organic impotence    Piriformis syndrome    Sacral disorder    Sacral insufficiency fracture    Sacroiliitis ([facility])    Seasonal allergies    Constipation    Femur fracture, right (HCC)    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[facility])    Urinary catheter dysfunction ([address])       Past Medical History  Past Medical History:   Diagnosis Date    Allergic rhinitis     Anxiety     Aspiration of liquid     and food per wife if he is eating too fast or talking when eating    Asthma     as a child    Atrial fibrillation ([address])     CAD (coronary artery disease)     Cancer of kidney (HCC)     COPD (chronic obstructive pulmonary disease) (HCC)     CPAP (continuous positive airway pressure) dependence     Dementia ([address])     Frontal lobe    Dementia (HCC)     Diabetes mellitus ([address])     Diabetes mellitus, type 2 (HCC)     DJD (degenerative joint disease)     Hypercholesterolemia     Hyperlipidemia     Hypertension     Incisional hernia     Kidney disease     Melanoma ([address])     left leg    Obesity     Sleep apnea     wears CPAP    TIA (transient ischemic attack)        Past Surgical History  Past Surgical History:   Procedure Laterality Date    CARDIAC PACEMAKER PLACEMENT      CHOLECYSTECTOMY      COLONOSCOPY      HERNIA REPAIR      Incisional hernia    NEPHRECTOMY Left 2005    GA ESOPHAGOGASTRODUODENOSCOPY SUBMUCOSAL INJECTION N/A 9/21/2016    Procedure: ESOPHAGOGASTRODUODENOSCOPY (EGD); Surgeon: Luis Walker MD;  Location: [facility]; Service: Gastroenterology    GA ESOPHAGOGASTRODUODENOSCOPY SUBMUCOSAL INJECTION N/A 2/7/2018    Procedure: ESOPHAGOGASTRODUODENOSCOPY (EGD); Surgeon: Luis Walker MD;  Location: [facility]; Service: Gastroenterology    GA ESOPHAGOGASTRODUODENOSCOPY SUBMUCOSAL INJECTION N/A 4/3/2019    Procedure: ESOPHAGOGASTRODUODENOSCOPY (EGD) WITH BOTOX;  Surgeon: Luis Walker MD;  Location: [facility];   Service: Gastroenterology    ROTATOR CUFF REPAIR      TONSILLECTOMY          09/15/23 0909   PT Last Visit   PT Visit Date 09/15/23   Note Type   Note type Evaluation   Pain Assessment   Pain Assessment Tool FLACC   Pain Location/Orientation Orientation: Right;Location: [facility]   Hospital Pain Intervention(s) Cold applied   Pain Rating: FLACC (Rest) - Face 0   Pain Rating: FLACC (Rest) - Legs 0   Pain Rating: FLACC (Rest) - Activity 0   Pain Rating: FLACC (Rest) - Cry 0   Pain Rating: FLACC (Rest) - Consolability 0   Score: FLACC (Rest) 0   Pain Rating: FLACC (Activity) - Face 1   Pain Rating: FLACC (Activity) - Legs 1   Pain Rating: FLACC (Activity) - Activity 1   Pain Rating: FLACC (Activity) - Cry 1   Pain Rating: FLACC (Activity) - Consolability 1   Score: FLACC (Activity) 5   Restrictions/Precautions   Weight Bearing Precautions Per Order Yes   RLE Weight Bearing Per Order WBAT   Braces or Orthoses   (hip abduction wedge)   Other Precautions Cognitive; Chair Alarm; Bed Alarm;Multiple lines; Fall Risk;Pain;Aspiration  (mechanical soft / thin liquids)   Home Living   Type of Home   ([employer])   Home Layout One level   [place]  (rollator)   Prior Function   Level of [place] Independent with functional mobility; Needs assistance with ADLs; Needs assistance with IADLS  (Erika w/ RW vs rollator)   Lives With Spouse; Facility staff   Receives Help From Family;Personal care attendant   IADLs Family/Friend/Other provides transportation; Family/Friend/Other provides meals; Family/Friend/Other provides medication management   Falls in the last 6 months 1 to 4   Vocational Retired   General   Family/Caregiver Present No   Cognition   Overall Cognitive Status Impaired   Arousal/Participation Responsive   Attention Attends with cues to redirect   Orientation Level Oriented to person;Oriented to place; Disoriented to time;Disoriented to situation   Comments pleasantly confused   Subjective   Subjective Pt agreeable to mobilize. RLE Assessment   RLE Assessment   (grossly 2/5)   LLE Assessment   LLE Assessment WFL   Coordination   Movements are Fluid and Coordinated 0   Coordination and Movement Description slow, antalgic, impaired motor planning   Bed Mobility   Supine to Sit 3  Moderate assistance   Additional items Assist x 2; Increased time required;Verbal cues;LE management;HOB elevated   Sit to Supine Unable to assess   Additional Comments Pt greeted in supine.    Transfers   Sit to Stand 3 Moderate assistance   Additional items Assist x 2; Increased time required;Verbal cues  (elevated bed)   Stand to Sit 3  Moderate assistance   Additional items Assist x 2; Increased time required;Verbal cues   Ambulation/Elevation   Gait pattern Excessively slow; Short stride; Antalgic; Improper Weight shift;Decreased foot clearance;Shuffling   Gait Assistance 2  Maximal assist   Additional items Assist x 2;Verbal cues; Tactile cues  (w/ manual assistance for movement of LLE)   Assistive Device Rolling walker   Distance 3' bed>chair   Balance   Static Sitting Fair   Dynamic Sitting Fair -   Static Standing Poor +   Dynamic Standing Poor   Ambulatory Poor  (RW)   Endurance Deficit   Endurance Deficit Yes   Endurance Deficit Description pain, weakness, fatigue   Activity Tolerance   Activity Tolerance Patient limited by pain; Patient limited by fatigue  (+ cognition)   Medical Staff Made Aware OT Cuco Baldwin CM Will   Nurse Made Aware yes - cleared for therapy   Assessment   Prognosis Fair   Problem List Decreased strength;Decreased range of motion;Decreased endurance; Impaired balance;Decreased mobility; Decreased coordination;Decreased cognition; Impaired judgement;Decreased safety awareness;Orthopedic restrictions;Pain   Assessment Pt is 80 y.o. male seen for a high complexity PT evaluation s/p admit to [facility] on 9/13/2023. Pt presenting s/p fall w/ R hip fx, s/p R hip hemiarthroplasty 9/14/23, WBAT RLE. Please see above for other active problem list / PMH. PT now consulted to assess functional mobility and needs for safe d/c planning. Pt is a resident of [address] [place] [address] Unit, at baseline pt was Erika w/ ambulation w/ RW vs rollator. Currently pt requires ModAx2 for bed skills; ModAx2 for functional transfers; MaxAx2 for limited ambulation w/ RW.  Pt presents functioning below baseline and w/ overall mobility deficits 2* to: decreased LE strength; decreased R hip ROM; decreased endurance; impaired balance; gait deviations; pain; fatigue; impaired safety and judgement; bed/chair alarms; multiple lines. These impairments place pt at risk for falls. Pt will continue to benefit from skilled PT interventions to address stated impairments; to maximize functional potential; for ongoing pt/ family training; and DME needs. PT is currently recommending rehab. Goals   Patient Goals none expressed   STG Expiration Date 09/29/23   Short Term Goal #1 In 14 days pt will complete: 1) Bed mobility skills with S to facilitate safe return to previous living environment. 2) Functional transfers with MinAx1 to facilitate safe return to previous living environment. 3) Ambulation with ' w/ CGA without LOB for safe ambulation in home/community environment. 4) Improve balance scores by 1 grade to decrease fall risk. 5) Improve LE strength grades by 1 to increase independence w/ all functional mobility, transfers and gait. 6) PT for ongoing pt and family education; DME needs and D/C planning to promote highest level of function in least restrictive environment. PT Treatment Day 0   Plan   Treatment/Interventions Functional transfer training;LE strengthening/ROM; Therapeutic exercise; Endurance training;Patient/family training;Equipment eval/education; Bed mobility;Gait training; Compensatory technique education;Spoke to nursing;OT;Spoke to case management   PT Frequency 3-5x/wk   Recommendation   PT Discharge Recommendation Post acute rehabilitation services   AM-PAC Basic Mobility Inpatient   Turning in Flat Bed Without Bedrails 2   Lying on Back to Sitting on Edge of Flat Bed Without Bedrails 1   Moving Bed to Chair 1   Standing Up From Chair Using Arms 1   Walk in Room 1   Climb 3-5 Stairs With Railing 1   Basic Mobility Inpatient Raw Score 7   Highest Level Of Mobility   JH-HLM Goal 2: Bed activities/Dependent transfer   JH-HLM Achieved 4: Move to chair/commode   End of Consult   Patient Position at End of Consult Bedside chair;Bed/Chair alarm activated; All needs within reach  (all lines in tact, CP to R hip)     Marti Oreilly, PT, DPT

## 2023-09-15 NOTE — PROGRESS NOTES
Progress Note - Orthopedics   Omar Kerns 80 y.o. male MRN: [id number]      Subjective:  No acute events overnight. No acute distress. Denies fevers, chills, SOB. Objective:  A 10 point ROS was performed; negative except as noted above. Labs:  Lab Results   Component Value Date/Time    WBC 13.18 (H) 09/14/2023 05:09 AM    WBC 6.54 12/19/2015 09:39 AM    HGB 13.4 09/14/2023 05:09 AM    HGB 14.7 12/19/2015 09:39 AM       Physical:  Vitals:    09/15/23 0331   BP: 143/70   Pulse: 63   Resp: 18   Temp: 99.4 °F (37.4 °C)   SpO2: 96%     Musculoskeletal: right Lower Extremity  • Dressing C/D/I  • Abduction pillow in place  • Does not participate in motor/sensory exam  • Ankle and toes move spontaneously  • Toes WWP with brisk capillary refill     Assessment:    80 y.o. male post op day #1 from Right cemented hip hemiarthroplasty for femoral neck fracture.   Doing well postoperatively     Plan:  • Weightbearing lacerated right lower extremity   • Abduction pillow while in bed   • Posterior hip precautions   • PT OT evaluation   • Case management evaluation   • Pain control as needed per Geriatric pain protocol  • Appreciate urology follow-up for Domínguez catheter, this will remain in for now  • DVT prophylaxis with Lovenox  • Outpatient follow-up in 2 weeks with Dr. Armando Schmidt MD

## 2023-09-15 NOTE — PROGRESS NOTES
Progress Note - Geriatric Medicine   [facility] [place] [place] 80 y.o. male MRN: [id number]  Unit/Bed#: [facility] 602-01 Encounter: [id number]      Assessment/Plan:    Ambulatory dysfunction with fall  -reportedly mechanical fall at long term care facility   -injuries as outlined below  -Requires use of walker for ambulation at baseline w multiple recent falls  -remains high risk future falls, cont fall precautions   -PT/OT following      Right subcapital femur fracture  -S/p fall as outlined above  -Noted on right femur XR obtained on admission  -s/p right hip hemiarthroplasty 9/14.  -post hip precautions  -cont acute pain control  -PT/OT     Acute pain due to trauma   -continue acute multimodal pain control tapering off as pain improves with healing   -recommend  bowel regimen to prevent and treat constipation due to increased risk with acute pain and opiate pain medications     Frontotemporal dementia  -moderate and progressive  -now at baseline dependent for iADLs and requiring increasing assist with ADLs  -currently residing in secure memory unit of long term care facility   -maintained on aripiprazole, donepezil, Zoloft, memantine and lorazepam as managed by care facility   -memantine dose recently reduced as o/p due to poor renal function, continue to dose adjust as indicated   -Cautious use donepezil and namenda due to possible exacerbation of tachy/amanda syn  -CTH obtained on admission images personally viewed, reveals mild frontal lobe atrophy out of proportion to rest of parenchyma as well as general diffuse chronic microangiopathic changes   -TSH WNL 3.06, B12 slightly low at 331, consider temp increase in supplementation to goal >400  -avoid addition of new or increasing dose antipsychotics given increased risk cardiovascular events in pts with underlying dementia as well as risk paradoxic rxn  -continue to encourage calm quite env to reduce risk overstimulation and redirect unwanted behaviors as first line    DM-II  -A1c 8.0, given age and comorbidities target A1c of approximately 8 is not unreasonable  -Maintained on basal bolus insulin regimen as outpatient  -Target blood sugar during hospitalization 140-180 to reduce risk hypoglycemia, continues to be well controlled  -Continue close follow-up with PCP for age-appropriate ongoing diabetic screening and cares     Impaired Vision  -recommend use of corrective lenses at all appropriate times  -Consider large font for printed materials provided to patient      Impaired mastication  -Requires use of dentures  -encourage use all appropriate times  -ensure meal consistency appropriate for abilities  -continue aspiration precautions     Impaired Hearing  -Encourage use of hearing aids at all appropriate times  -encourage providers and caregivers to speak slowly and clearly directly to patient  -minimize background noise to encourage patient engagement  -consider use of hearing amplifier to reduce risk of straining to hear if hearing aids are not present or are not sufficient   -Encourage use of teach back method to ensure clear communication     High risk developing delirium   -Patient is high risk of delirium due to age, fall, traumatic injuries, acute pain, dementia, hosp env  -continue delirium precautions  -maintain normal sleep/wake cycle, ensure pain controlled, reorient frequently   -redirect unwanted behaviors as first line     Frailty syndrome in geriatric patient   -clinical frailty scale stage VI/VII, moderate to severely frail  -Continue to encourage well-balanced nutrition, consider supplements between meals if oral intake is poor  -Optimize chronic conditions and address acute metabolic derangements as arise  -Continue to ensure that treatment interventions along with patient's wishes and goals of care    Care coordination: rounded with Abby (RN)    Subjective:     Hailee Knutson is seen and examined at bedside where he is sitting resting, he is pleasantly confused, he denies pain or acute complaints. Review of Systems   Unable to perform ROS: Dementia     Objective:     Vitals: Blood pressure 150/72, pulse 77, temperature 98.5 °F (36.9 °C), resp. rate 17, height 5' 11" (1.803 m), weight 80.4 kg (177 lb 4 oz), SpO2 94 %. ,Body mass index is 24.72 kg/m². Intake/Output Summary (Last 24 hours) at 9/15/2023 1356  Last data filed at 9/15/2023 1300  Gross per 24 hour   Intake 780 ml   Output 1200 ml   Net -420 ml     Current Medications: Reviewed    Physical Exam:   Physical Exam  Vitals and nursing note reviewed. Constitutional:       General: He is not in acute distress. Comments: Elderly male appears stated age    HENT:      Head: Normocephalic. Nose: Nose normal.      Mouth/Throat:      Mouth: Mucous membranes are moist.   Eyes:      General:         Right eye: No discharge. Left eye: No discharge. Neck:      Comments: Phonation norm   Cardiovascular:      Rate and Rhythm: Normal rate and regular rhythm. Pulmonary:      Effort: Pulmonary effort is normal. No respiratory distress. Breath sounds: No wheezing. Comments: Saturating well on room air   Abdominal:      General: Bowel sounds are normal. There is no distension. Palpations: Abdomen is soft. Tenderness: There is no abdominal tenderness. Musculoskeletal:      Cervical back: Neck supple. Right lower leg: No edema. Left lower leg: No edema. Comments: Reduced overall muscle mass    Skin:     General: Skin is warm and dry. Neurological:      Mental Status: He is alert.       Comments: Awake and alert, pleasantly confused   Psychiatric:      Comments: Calm and cooperative        Invasive Devices     Peripheral Intravenous Line  Duration           Peripheral IV 09/13/23 Right Antecubital 1 day    Peripheral IV 09/14/23 Right Hand 1 day          Drain  Duration           Urethral Catheter Coude 16 Fr. 1 day              I have personally reviewed pertinent lab results including the followin/15/23- CBC, BMP, glu    I have personally reviewed the following imaging study reports in PACS:    23- XR hip/pelvis R

## 2023-09-15 NOTE — OCCUPATIONAL THERAPY NOTE
Occupational Therapy Evaluation     Patient Name: Jamie Harkins's Date: 9/15/2023  Problem List  Principal Problem:    Femur fracture, right ([address])  Active Problems:    Essential hypertension    Mixed hyperlipidemia    Type 2 diabetes mellitus with kidney complication, with long-term current use of insulin (HCC)    Dementia with behavioral disturbance (HCC)    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HCC)    Urinary catheter dysfunction (HCC)    Past Medical History  Past Medical History:   Diagnosis Date    Allergic rhinitis     Anxiety     Aspiration of liquid     and food per wife if he is eating too fast or talking when eating    Asthma     as a child    Atrial fibrillation ([address])     CAD (coronary artery disease)     Cancer of kidney ([address])     COPD (chronic obstructive pulmonary disease) (HCC)     CPAP (continuous positive airway pressure) dependence     Dementia ([address])     Frontal lobe    Dementia (HCC)     Diabetes mellitus ([address])     Diabetes mellitus, type 2 ([address])     DJD (degenerative joint disease)     Hypercholesterolemia     Hyperlipidemia     Hypertension     Incisional hernia     Kidney disease     Melanoma ([address])     left leg    Obesity     Sleep apnea     wears CPAP    TIA (transient ischemic attack)      Past Surgical History  Past Surgical History:   Procedure Laterality Date    CARDIAC PACEMAKER PLACEMENT      CHOLECYSTECTOMY      COLONOSCOPY      HERNIA REPAIR      Incisional hernia    NEPHRECTOMY Left 2005    MN ESOPHAGOGASTRODUODENOSCOPY SUBMUCOSAL INJECTION N/A 9/21/2016    Procedure: ESOPHAGOGASTRODUODENOSCOPY (EGD); Surgeon: Flaco Daniel MD;  Location: [facility]; Service: Gastroenterology    MN ESOPHAGOGASTRODUODENOSCOPY SUBMUCOSAL INJECTION N/A 2/7/2018    Procedure: ESOPHAGOGASTRODUODENOSCOPY (EGD); Surgeon: Flaco Daniel MD;  Location: [facility];   Service: Gastroenterology    MN ESOPHAGOGASTRODUODENOSCOPY SUBMUCOSAL INJECTION N/A 4/3/2019    Procedure: ESOPHAGOGASTRODUODENOSCOPY (EGD) WITH BOTOX;  Surgeon: Akhil Collier MD;  Location: [facility]; Service: Gastroenterology    ROTATOR CUFF REPAIR      TONSILLECTOMY        09/15/23 0910   OT Last Visit   OT Visit Date 09/15/23   Note Type   Note type Evaluation   Pain Assessment   Pain Assessment Tool FLACC   Pain Location/Orientation Orientation: Right;Location: Hip   Pain Rating: FLACC (Rest) - Face 0   Pain Rating: FLACC (Rest) - Legs 0   Pain Rating: FLACC (Rest) - Activity 0   Pain Rating: FLACC (Rest) - Cry 0   Pain Rating: FLACC (Rest) - Consolability 0   Score: FLACC (Rest) 0   Pain Rating: FLACC (Activity) - Face 1   Pain Rating: FLACC (Activity) - Legs 0   Pain Rating: FLACC (Activity) - Activity 0   Pain Rating: FLACC (Activity) - Cry 1   Pain Rating: FLACC (Activity) - Consolability 1   Score: FLACC (Activity) 3   Restrictions/Precautions   Weight Bearing Precautions Per Order Yes   RUE Weight Bearing Per Order WBAT   LUE Weight Bearing Per Order WBAT   RLE Weight Bearing Per Order WBAT   LLE Weight Bearing Per Order WBAT   Braces or Orthoses Other (Comment)  (abduction wedge)   Other Precautions Cognitive; Chair Alarm; Bed Alarm;WBS;THR;Fall Risk;Pain   Home Living   Type of Home Assisted living  [facility] secure dementia unit)   Home Layout One level   Prior Function   Level of [place] Needs assistance with ADLs; Independent with functional mobility; Needs assistance with IADLS   Lives With Spouse; Facility staff   Receives Help From Family;Personal care attendant   IADLs Family/Friend/Other provides transportation; Family/Friend/Other provides meals; Family/Friend/Other provides medication management   Falls in the last 6 months 1 to 4   Vocational Retired   Lifestyle   Autonomy needs assist with adls - ambulates with RW -meals/homemaking provided   Reciprocal Relationships supportive family and facility staff   Service to Others retired   Intrinsic Gratification sedentary - unable to ID   Subjective   Subjective "When did it become daytime?" ADL   Eating Assistance 5  Supervision/Setup   Grooming Assistance 5  Supervision/Setup   UB Bathing Assistance 4  Minimal Assistance   LB Bathing Assistance 2  Maximal Assistance   UB Dressing Assistance 4  Minimal Assistance   LB Dressing Assistance 2  Maximal Assistance   Toileting Assistance  2  Maximal Assistance   Bed Mobility   Supine to Sit 3  Moderate assistance   Additional items Assist x 2   Transfers   Sit to Stand 3  Moderate assistance   Additional items Assist x 2   Stand to Sit 3  Moderate assistance   Additional items Assist x 2   Stand pivot 2  Maximal assistance   Additional items Assist x 2   Functional Mobility   Functional Mobility 2  Maximal assistance   Additional Comments x2 to take several steps steps around to chair   Additional items Rolling walker   Balance   Static Sitting Fair   Dynamic Sitting Fair -   Static Standing Poor +   Dynamic Standing Poor   Ambulatory Poor   Activity Tolerance   Activity Tolerance Patient limited by fatigue;Patient limited by pain;Treatment limited secondary to medical complications (Comment)   RUE Assessment   RUE Assessment WFL   LUE Assessment   LUE Assessment WFL   Cognition   Overall Cognitive Status Impaired   Arousal/Participation Arousable; Cooperative   Attention Attends with cues to redirect   Orientation Level Oriented to person;Oriented to place; Disoriented to time;Disoriented to situation   Memory Decreased short term memory;Decreased recall of recent events;Decreased recall of precautions;Decreased recall of biographical information   Following Commands Follows one step commands with increased time or repetition   Assessment   Limitation Decreased ADL status; Decreased Safe judgement during ADL;Decreased cognition;Decreased endurance;Decreased self-care trans   Prognosis Fair   Assessment Pt is a 80 y.o. male who was [facility] to [facility] on [facility]/13/2023 with Femur fracture, right (HCC) s/p R hip hemiarthroplasty .  Patient  has a past medical history of Allergic rhinitis, Anxiety, Aspiration of liquid, Asthma, Atrial fibrillation ([address]), CAD (coronary artery disease), Cancer of kidney ([address]), COPD (chronic obstructive pulmonary disease) ([address]), CPAP (continuous positive airway pressure) dependence, Dementia ([address]), Dementia ([address]), Diabetes mellitus ([address]), Diabetes mellitus, type 2 ([address]), DJD (degenerative joint disease), Hypercholesterolemia, Hyperlipidemia, Hypertension, Incisional hernia, Kidney disease, Melanoma ([address]), Obesity, Sleep apnea, and TIA (transient ischemic attack). At baseline pt was completing adls with assist from facility staff- ambulates with RW- meals/homemaking provided. Pt lives with spouse at [facility] secure dementia unit. Currently pt requires max assist for overall ADLS and mod to max a x 2  for functional mobility/transfers. Pt currently presents with impairments in the following categories -limited home support, difficulty performing ADLS, limited insight into deficits, compliance, flat affect, decreased initiation and engagement  and health management  activity tolerance, endurance, standing balance/tolerance, sitting balance/tolerance, memory, insight, safety , judgement , attention , sequencing , task initiation  and task termination . These impairments, as well as pt's fatigue, pain, hip precautions, orthopedic restricitions , WBS , risk for falls and home environment  limit pt's ability to safely engage in all baseline areas of occupation, includingbathing, dressing, toileting, functional mobility/transfers, community mobility, social participation  and leisure activities  From OT standpoint, recommend inpt rehab unless facility is able to meet pt's care needs upon D/C. OT will continue to follow to address the below stated goals.    Goals   Patient Goals none stated at this time 2* cognitive limitations   LTG Time Frame 10-14   Long Term Goal #1 refer to established goals below   Plan   Treatment Interventions ADL retraining;Functional transfer training; Endurance training;Cognitive reorientation;Patient/family training;Equipment evaluation/education; Compensatory technique education; Activityengagement   Goal Expiration Date 09/29/23   OT Frequency 2-3x/wk   Recommendation   OT Discharge Recommendation Post acute rehabilitation services   Additional Comments  OK to return to [facility] dementia unit if facility is able to meet pt's care needs with ongoing OT at facility   AM-PAC Daily Activity Inpatient   Lower Body Dressing 2   Bathing 2   Toileting 2   Upper Body Dressing 2   Grooming 3   Eating 3   Daily Activity Raw Score 14   Daily Activity Standardized Score (Calc for Raw Score >=11) 33.39   AM-PAC Applied Cognition Inpatient   Following a Speech/Presentation 1   Understanding Ordinary Conversation 3   Taking Medications 2   Remembering Where Things Are Placed or Put Away 2   Remembering List of 4-5 Errands 1   Taking Care of Complicated Tasks 1   Applied Cognition Raw Score 10   Applied Cognition Standardized Score 24.98   End of Consult   Education Provided Yes   Patient Position at End of Consult Bedside chair;Bed/Chair alarm activated; All needs within reach   Nurse Communication Nurse aware of consult       OCCUPATIONAL THERAPY GOALS:        *S feeding/grooming after setup  *Min a adls after setup with min cues to initiate, sequence and complete tasks and use of LHAE PRN  *Min a toileting and clothing management  *Min a bed mobility with fair to fair+ sitting balance on EOB to engage in light grooming/self care and enjoyable activities  *Min a transfers on/off all surfaces with fair to fair+ balance/safety  *Demonstrate fair  carryover with safe use of RW, THR prec and use of LHAE during functional tasks   *Increase dynamic balance to fair for improved safety during participation in adls and iadl tasks   *Increase activity tolerance to 25-30 min for participation in adls and enjoyable activities  *Assess DME needs  *Assist with safe d/c recommendations            The patient's raw score on the AM-PAC Daily Activity Inpatient Short Form is 14. A raw score of less than 19 suggests the patient may benefit from discharge to post-acute rehabilitation services. Please refer to the recommendation of the Occupational Therapist for safe discharge planning.       [facility]

## 2023-09-16 LAB
ANION GAP SERPL CALCULATED.3IONS-SCNC: 6 MMOL/L
BASOPHILS # BLD AUTO: 0.02 THOUSANDS/ÂΜL (ref 0–0.1)
BASOPHILS NFR BLD AUTO: 0 % (ref 0–1)
BUN SERPL-MCNC: 30 MG/DL (ref 5–25)
CALCIUM SERPL-MCNC: 8.7 MG/DL (ref 8.4–10.2)
CHLORIDE SERPL-SCNC: 101 MMOL/L (ref 96–108)
CO2 SERPL-SCNC: 30 MMOL/L (ref 21–32)
CREAT SERPL-MCNC: 1.45 MG/DL (ref 0.6–1.3)
EOSINOPHIL # BLD AUTO: 0.32 THOUSAND/ÂΜL (ref 0–0.61)
EOSINOPHIL NFR BLD AUTO: 4 % (ref 0–6)
ERYTHROCYTE [DISTWIDTH] IN BLOOD BY AUTOMATED COUNT: 13.2 % (ref 11.6–15.1)
GFR SERPL CREATININE-BSD FRML MDRD: 42 ML/MIN/1.73SQ M
GLUCOSE SERPL-MCNC: 133 MG/DL (ref 65–140)
GLUCOSE SERPL-MCNC: 163 MG/DL (ref 65–140)
GLUCOSE SERPL-MCNC: 170 MG/DL (ref 65–140)
GLUCOSE SERPL-MCNC: 174 MG/DL (ref 65–140)
GLUCOSE SERPL-MCNC: 186 MG/DL (ref 65–140)
GLUCOSE SERPL-MCNC: 187 MG/DL (ref 65–140)
HCT VFR BLD AUTO: 32.1 % (ref 36.5–49.3)
HGB BLD-MCNC: 10.7 G/DL (ref 12–17)
IMM GRANULOCYTES # BLD AUTO: 0.03 THOUSAND/UL (ref 0–0.2)
IMM GRANULOCYTES NFR BLD AUTO: 0 % (ref 0–2)
LYMPHOCYTES # BLD AUTO: 1.18 THOUSANDS/ÂΜL (ref 0.6–4.47)
LYMPHOCYTES NFR BLD AUTO: 14 % (ref 14–44)
MCH RBC QN AUTO: 29.2 PG (ref 26.8–34.3)
MCHC RBC AUTO-ENTMCNC: 33.3 G/DL (ref 31.4–37.4)
MCV RBC AUTO: 88 FL (ref 82–98)
MONOCYTES # BLD AUTO: 0.71 THOUSAND/ÂΜL (ref 0.17–1.22)
MONOCYTES NFR BLD AUTO: 8 % (ref 4–12)
NEUTROPHILS # BLD AUTO: 6.32 THOUSANDS/ÂΜL (ref 1.85–7.62)
NEUTS SEG NFR BLD AUTO: 74 % (ref 43–75)
NRBC BLD AUTO-RTO: 0 /100 WBCS
PLATELET # BLD AUTO: 145 THOUSANDS/UL (ref 149–390)
PMV BLD AUTO: 10.7 FL (ref 8.9–12.7)
POTASSIUM SERPL-SCNC: 3.4 MMOL/L (ref 3.5–5.3)
RBC # BLD AUTO: 3.66 MILLION/UL (ref 3.88–5.62)
SODIUM SERPL-SCNC: 137 MMOL/L (ref 135–147)
WBC # BLD AUTO: 8.58 THOUSAND/UL (ref 4.31–10.16)

## 2023-09-16 PROCEDURE — 87147 CULTURE TYPE IMMUNOLOGIC: CPT | Performed by: SURGERY

## 2023-09-16 PROCEDURE — 85025 COMPLETE CBC W/AUTO DIFF WBC: CPT | Performed by: NURSE PRACTITIONER

## 2023-09-16 PROCEDURE — 87081 CULTURE SCREEN ONLY: CPT | Performed by: SURGERY

## 2023-09-16 PROCEDURE — 80048 BASIC METABOLIC PNL TOTAL CA: CPT | Performed by: NURSE PRACTITIONER

## 2023-09-16 PROCEDURE — 82948 REAGENT STRIP/BLOOD GLUCOSE: CPT

## 2023-09-16 PROCEDURE — NC001 PR NO CHARGE: Performed by: SURGERY

## 2023-09-16 PROCEDURE — NC001 PR NO CHARGE: Performed by: ORTHOPAEDIC SURGERY

## 2023-09-16 PROCEDURE — 99232 SBSQ HOSP IP/OBS MODERATE 35: CPT | Performed by: INTERNAL MEDICINE

## 2023-09-16 RX ORDER — POTASSIUM CHLORIDE 20 MEQ/1
40 TABLET, EXTENDED RELEASE ORAL ONCE
Status: COMPLETED | OUTPATIENT
Start: 2023-09-16 | End: 2023-09-16

## 2023-09-16 RX ORDER — POTASSIUM CHLORIDE 14.9 MG/ML
20 INJECTION INTRAVENOUS ONCE
Status: COMPLETED | OUTPATIENT
Start: 2023-09-16 | End: 2023-09-16

## 2023-09-16 RX ADMIN — Medication 2000 UNITS: at 08:34

## 2023-09-16 RX ADMIN — ENOXAPARIN SODIUM 30 MG: 30 INJECTION SUBCUTANEOUS at 00:00

## 2023-09-16 RX ADMIN — INSULIN LISPRO 1 UNITS: 100 INJECTION, SOLUTION INTRAVENOUS; SUBCUTANEOUS at 00:00

## 2023-09-16 RX ADMIN — ACETAMINOPHEN 975 MG: 325 TABLET, FILM COATED ORAL at 21:14

## 2023-09-16 RX ADMIN — ATORVASTATIN CALCIUM 10 MG: 10 TABLET, FILM COATED ORAL at 17:51

## 2023-09-16 RX ADMIN — SENNOSIDES 8.6 MG: 8.6 TABLET, FILM COATED ORAL at 21:15

## 2023-09-16 RX ADMIN — INSULIN LISPRO 1 UNITS: 100 INJECTION, SOLUTION INTRAVENOUS; SUBCUTANEOUS at 12:07

## 2023-09-16 RX ADMIN — DOCUSATE SODIUM 100 MG: 100 CAPSULE, LIQUID FILLED ORAL at 17:51

## 2023-09-16 RX ADMIN — MEMANTINE 5 MG: 5 TABLET ORAL at 08:34

## 2023-09-16 RX ADMIN — PROPAFENONE HYDROCHLORIDE 225 MG: 150 TABLET, FILM COATED ORAL at 21:13

## 2023-09-16 RX ADMIN — POTASSIUM CHLORIDE 20 MEQ: 14.9 INJECTION, SOLUTION INTRAVENOUS at 16:59

## 2023-09-16 RX ADMIN — ACETAMINOPHEN 975 MG: 325 TABLET, FILM COATED ORAL at 05:24

## 2023-09-16 RX ADMIN — DONEPEZIL HYDROCHLORIDE 10 MG: 10 TABLET ORAL at 21:15

## 2023-09-16 RX ADMIN — INSULIN DETEMIR 10 UNITS: 100 INJECTION, SOLUTION SUBCUTANEOUS at 08:45

## 2023-09-16 RX ADMIN — DOCUSATE SODIUM 100 MG: 100 CAPSULE, LIQUID FILLED ORAL at 08:34

## 2023-09-16 RX ADMIN — INSULIN DETEMIR 10 UNITS: 100 INJECTION, SOLUTION SUBCUTANEOUS at 17:51

## 2023-09-16 RX ADMIN — PANTOPRAZOLE SODIUM 40 MG: 40 TABLET, DELAYED RELEASE ORAL at 05:25

## 2023-09-16 RX ADMIN — POTASSIUM CHLORIDE 40 MEQ: 1500 TABLET, EXTENDED RELEASE ORAL at 17:51

## 2023-09-16 RX ADMIN — MAGNESIUM OXIDE TAB 400 MG (241.3 MG ELEMENTAL MG) 400 MG: 400 (241.3 MG) TAB at 08:34

## 2023-09-16 RX ADMIN — METOPROLOL TARTRATE 50 MG: 50 TABLET, FILM COATED ORAL at 21:15

## 2023-09-16 RX ADMIN — ACETAMINOPHEN 975 MG: 325 TABLET, FILM COATED ORAL at 13:42

## 2023-09-16 RX ADMIN — METOPROLOL TARTRATE 50 MG: 50 TABLET, FILM COATED ORAL at 08:34

## 2023-09-16 RX ADMIN — PROPAFENONE HYDROCHLORIDE 225 MG: 150 TABLET, FILM COATED ORAL at 17:52

## 2023-09-16 RX ADMIN — PROPAFENONE HYDROCHLORIDE 225 MG: 150 TABLET, FILM COATED ORAL at 08:34

## 2023-09-16 RX ADMIN — POLYETHYLENE GLYCOL 3350 17 G: 17 POWDER, FOR SOLUTION ORAL at 08:34

## 2023-09-16 RX ADMIN — ARIPIPRAZOLE 5 MG: 5 TABLET ORAL at 08:34

## 2023-09-16 RX ADMIN — ENOXAPARIN SODIUM 30 MG: 30 INJECTION SUBCUTANEOUS at 11:24

## 2023-09-16 RX ADMIN — SERTRALINE 100 MG: 100 TABLET, FILM COATED ORAL at 08:34

## 2023-09-16 RX ADMIN — INSULIN LISPRO 1 UNITS: 100 INJECTION, SOLUTION INTRAVENOUS; SUBCUTANEOUS at 17:52

## 2023-09-16 NOTE — ASSESSMENT & PLAN NOTE
· Secondary to fall  · 9/3 XR showed: Acute mildly displaced right subcapital femur fracture  · Orthopedics consulted and note appreciated  · 9/14 right hemiarthroplasty  · WBAT RLE  · Multimodal pain regimen  · DVT prophylaxis: Lovenox  · PT/OT: rehab, will need rehab placement

## 2023-09-16 NOTE — PROGRESS NOTES
NEPHROLOGY PROGRESS NOTE   Liang Mchugh 80 y.o. male MRN: [id number]  Unit/Bed#: [facility] 602-01 Encounter: [id number]      ASSESSMENT & PLAN:  1 chronic kidney disease stage IV with previous creatinine around 2-2.5 however since 2023 has been in the mid to high ones. Follows with Dr. Leesa Willoughby as an outpatient  His kidney function remains stable with a creatinine below baseline at 1.45 this morning  Continue current regimen, avoid relative hypotension  Noted patient is not on any ACE, ARB, diuretics as an outpatient. Renal function remains stable, discussed with primary team, will sign off, call if any question. Continue follow-up with his outpatient nephrologist after discharge    #2 ambulatory dysfunction, status post mechanical fall complicated with right suprascapular femoral fracture status post right cemented hip hemiarthroplasty  Postoperative management as per orthopedics. Avoid NSAIDs given CKD    3 hypertension, blood pressure acceptable, continue current blood pressure medication, avoid relative hypotension given advanced age and CKD    #4 frontotemporal dementia    Discussed with primary team, kidney function is stable, no further recommendation from kidney standpoint of view, will sign off, call if any questions, they agree with the plan    SUBJE CTIVE:  Patient seen and examined, sitting on recliner, denies significant complaints, no chest pain, no shortness of breath, no nausea, no vomiting, no abdominal pain.       OBJECTIVE:  Current Weight: Weight - Scale: 79.2 kg (174 lb 9.7 oz)  Vitals:    09/16/23 0809   BP: 149/69   Pulse: 62   Resp: 18   Temp: 98.5 °F (36.9 °C)   SpO2: 94%       Intake/Output Summary (Last 24 hours) at 9/16/2023 1204  Last data filed at 9/16/2023 0900  Gross per 24 hour   Intake 1078 ml   Output 950 ml   Net 128 ml       General: conscious, cooperative, in not acute distress  Eyes: conjunctivae pink, anicteric sclerae  ENT: lips and mucous membranes moist, on room air  Neck: supple, no JVD  Chest: clear breath sounds bilateral, no crackles, ronchus or wheezings  CVS: distinct S1 & S2, normal rate, regular rhythm  Abdomen: soft, non-tender, non-distended, normoactive bowel sounds  Extremities: no significant edema of both legs  Skin: no rash  Neuro: awake, pleasantly demented, interactive        Medications:    Current Facility-Administered Medications:   •  acetaminophen (TYLENOL) tablet 975 mg, 975 mg, Oral, Q8H [address], Ed Marcum MD, 975 mg at 09/16/23 0524  •  ARIPiprazole (ABILIFY) tablet 5 mg, 5 mg, Oral, Daily, Krishna Brown MD, 5 mg at 09/16/23 5567  •  atorvastatin (LIPITOR) tablet 10 mg, 10 mg, Oral, After Dinner, SHADY Gongora, 10 mg at 09/15/23 1722  •  cholecalciferol (VITAMIN D3) tablet 2,000 Units, 2,000 Units, Oral, Daily, SHADY Gongora, 2,000 Units at 09/16/23 2305  •  docusate sodium (COLACE) capsule 100 mg, 100 mg, Oral, BID, Krishna Brown MD, 100 mg at 09/16/23 0834  •  donepezil (ARICEPT) tablet 10 mg, 10 mg, Oral, HS, Ed Marcum MD, 10 mg at 09/15/23 2130  •  enoxaparin (LOVENOX) subcutaneous injection 30 mg, 30 mg, Subcutaneous, Q12H, Ed Marcum MD, 30 mg at 09/16/23 1124  •  HYDROmorphone HCl (DILAUDID) injection 0.2 mg, 0.2 mg, Intravenous, Q4H PRN, [facility] Silvano Marcum MD, 0.2 mg at 09/13/23 1016  •  insulin detemir (LEVEMIR) subcutaneous injection 10 Units, 10 Units, Subcutaneous, BID, SHADY Gongora, 10 Units at 09/16/23 0845  •  insulin lispro (HumaLOG) 100 units/mL subcutaneous injection 1-6 Units, 1-6 Units, Subcutaneous, Q6H [address], 1 Units at 09/16/23 0000 **AND** Fingerstick Glucose (POCT), , , Q6H, Krishna Brown MD  •  Nursing Communication Measure and document PVi every 4 hours until patient goes to the OR., , , Until Discontinued **AND** Nursing Communication Notify physician if SpO2 <92% and stop IVF infusion. , , , Until Discontinued **AND** Nursing Communication Check SpHb q4h and notify provider if <8., , , Until Discontinued **AND** Nursing Communication Obtain vital signs, PVi, and SpHb immediately prior to transfer to operating room. , , , Until Discontinued **AND** [COMPLETED] lactated ringers bolus 1,000 mL, 1,000 mL, Intravenous, Once, Stopped at 09/13/23 1613 **AND** lactated ringers infusion, 4 mL/kg/hr (Ideal), Intravenous, Continuous, Arnol Griffith MD, Last Rate: 301.2 mL/hr at 09/13/23 2020, 4 mL/kg/hr at 09/13/23 2020  •  magnesium Oxide (MAG-OX) tablet 400 mg, 400 mg, Oral, Daily, SHADY Hall, 400 mg at 09/16/23 7070  •  memantine (NAMENDA) tablet 5 mg, 5 mg, Oral, Daily, SHADY Hall, 5 mg at 09/16/23 7356  •  metoprolol tartrate (LOPRESSOR) tablet 50 mg, 50 mg, Oral, Q12H [facility], Arnol Griffith MD, 50 mg at 09/16/23 0889  •  oxyCODONE (ROXICODONE) IR tablet 5 mg, 5 mg, Oral, Q4H PRN, Arnol Griffith MD, 5 mg at 09/15/23 1466  •  oxyCODONE (ROXICODONE) split tablet 2.5 mg, 2.5 mg, Oral, Q4H PRN, Arnol Griffith MD  •  pantoprazole (PROTONIX) EC tablet 40 mg, 40 mg, Oral, Early Morning, Ed Marcum MD, 40 mg at 09/16/23 0525  •  polyethylene glycol (MIRALAX) packet 17 g, 17 g, Oral, Daily, Arnol Griffith MD, 17 g at 09/16/23 3592  •  propafenone (RYTHMOL) tablet 225 mg, 225 mg, Oral, TID, SHADY Hall, 225 mg at 09/16/23 5907  •  senna (SENOKOT) tablet 8.6 mg, 1 tablet, Oral, HS, Ed Marcum MD, 8.6 mg at 09/15/23 2130  •  sertraline (ZOLOFT) tablet 100 mg, 100 mg, Oral, Daily, Rehan SHADY Franklin, 100 mg at 09/16/23 0834    Invasive Devices:   Urethral Catheter Coude 16 Fr. (Active)   Site Assessment Skin intact; Bleeding 09/16/23 0501   Domínguez Care Done 09/16/23 0900   Collection Container Standard drainage bag 09/16/23 0501   Securement Method Securing device (Describe) 09/16/23 0501   Output (mL) 300 mL 09/16/23 0501       Lab Results:   Results from last 7 days   Lab Units 09/16/23  1120 09/15/23  0455 09/14/23  0509 09/13/23  2239 09/13/23  0521   WBC Thousand/uL 8.58 12.71* 13.18*   < > 10.62*   HEMOGLOBIN g/dL 10.7* 11.7* 13.4   < > 14.0   HEMATOCRIT % 32.1* 35.2* 39.8   < > 42.1   PLATELETS Thousands/uL 145* 150 161   < > 167   SODIUM mmol/L 137 139 137   < > 139   POTASSIUM mmol/L 3.4* 3.9 3.8   < > 4.0   CHLORIDE mmol/L 101 104 102   < > 101   CO2 mmol/L 30 27 27   < > 29   BUN mg/dL 30* 30* 22   < > 20   CREATININE mg/dL 1.45* 1.78* 1.63*   < > 1.57*   CALCIUM mg/dL 8.7 8.8 9.2   < > 9.2   MAGNESIUM mg/dL  --   --   --   --  1.9   PHOSPHORUS mg/dL  --   --   --   --  2.9    < > = values in this interval not displayed. Previous work up:  See previous notes      Portions of the record may have been created with voice recognition software. Occasional wrong word or "sound a like" substitutions may have occurred due to the inherent limitations of voice recognition software. Read the chart carefully and recognize, using context, where substitutions have occurred. If you have any questions, please contact the dictating provider.

## 2023-09-16 NOTE — ASSESSMENT & PLAN NOTE
Lab Results   Component Value Date    HGBA1C 8.0 (H) 05/25/2023       Recent Labs     09/15/23  2359 09/16/23  0530 09/16/23  0807 09/16/23  1140   POCGLU 163* 133 170* 174*       Blood Sugar Average: Last 72 hrs:  (P) 197.625     -Restarted on home Levemir.  -Continue on sliding scale insulin

## 2023-09-16 NOTE — PROGRESS NOTES
Progress Note - Orthopedics   Zaire Lilly 80 y.o. male MRN: [id number]      Subjective:  No acute events overnight. No acute distress. Denies fevers, chills, SOB. Hgb not yet collected this AM.    Objective:  A 10 point ROS was performed; negative except as noted above. Labs:  Lab Results   Component Value Date/Time    WBC 12.71 (H) 09/15/2023 04:55 AM    WBC 6.54 12/19/2015 09:39 AM    HGB 11.7 (L) 09/15/2023 04:55 AM    HGB 14.7 12/19/2015 09:39 AM       Physical:  Vitals:    09/16/23 0809   BP: 149/69   Pulse: 62   Resp: 18   Temp: 98.5 °F (36.9 °C)   SpO2: 94%     Musculoskeletal: right Lower Extremity  • Dressing C/D/I  • Abduction pillow was not in place - replaced  • Does not participate in motor/sensory exam  • Ankle and toes move spontaneously  • Toes WWP with brisk capillary refill     Assessment:    80 y.o. male post op day #2 from Right cemented hip hemiarthroplasty for femoral neck fracture.   Doing well postoperatively     Plan:  • Weightbearing lacerated right lower extremity   • Abduction pillow while in bed and resting in chair required  • Posterior hip precautions   • PT OT evaluation   • Case management evaluation   • Pain control as needed per Geriatric pain protocol  • Appreciate urology follow-up for Domínguez catheter, this will remain in for now  • DVT prophylaxis with Lovenox  • Outpatient follow-up in 2 weeks with Dr. Jerene Felty, MD

## 2023-09-16 NOTE — PLAN OF CARE
Problem: Potential for Falls  Goal: Patient will remain free of falls  Description: INTERVENTIONS:  - Educate patient/family on patient safety including physical limitations  - Instruct patient to call for assistance with activity   - Consult OT/PT to assist with strengthening/mobility   - Keep Call bell within reach  - Keep bed low and locked with side rails adjusted as appropriate  - Keep care items and personal belongings within reach  - Initiate and maintain comfort rounds  - Make Fall Risk Sign visible to staff  - Offer Toileting every 2 Hours, in advance of need  - Initiate/Maintain bed alarm  - Apply yellow socks and bracelet for high fall risk patients  - Consider moving patient to room near nurses station  Outcome: Progressing     Problem: MOBILITY - ADULT  Goal: Maintain or return to baseline ADL function  Description: INTERVENTIONS:  -  Assess patient's ability to carry out ADLs; assess patient's baseline for ADL function and identify physical deficits which impact ability to perform ADLs (bathing, care of mouth/teeth, toileting, grooming, dressing, etc.)  - Assess/evaluate cause of self-care deficits   - Assess range of motion  - Assess patient's mobility; develop plan if impaired  - Assess patient's need for assistive devices and provide as appropriate  - Encourage maximum independence but intervene and supervise when necessary  - Involve family in performance of ADLs  - Assess for home care needs following discharge   - Consider OT consult to assist with ADL evaluation and planning for discharge  - Provide patient education as appropriate  Outcome: Progressing  Goal: Maintains/Returns to pre admission functional level  Description: INTERVENTIONS:  - Perform BMAT or MOVE assessment daily.   - Set and communicate daily mobility goal to care team and patient/family/caregiver. - Collaborate with rehabilitation services on mobility goals if consulted. - Reposition patient every 2 hours.   - Out of bed for toileting  - Record patient progress and toleration of activity level   Outcome: Progressing     Problem: Nutrition/Hydration-ADULT  Goal: Nutrient/Hydration intake appropriate for improving, restoring or maintaining nutritional needs  Description: Monitor and assess patient's nutrition/hydration status for malnutrition. Collaborate with interdisciplinary team and initiate plan and interventions as ordered. Monitor patient's weight and dietary intake as ordered or per policy. Utilize nutrition screening tool and intervene as necessary. Determine patient's food preferences and provide high-protein, high-caloric foods as appropriate.      INTERVENTIONS:  - Monitor oral intake, urinary output, labs, and treatment plans  - Assess nutrition and hydration status and recommend course of action  - Evaluate amount of meals eaten  - Assist patient with eating if necessary   - Allow adequate time for meals  - Recommend/ encourage appropriate diets, oral nutritional supplements, and vitamin/mineral supplements  - Order, calculate, and assess calorie counts as needed  - Recommend, monitor, and adjust tube feedings and TPN/PPN based on assessed needs  - Assess need for intravenous fluids  - Provide specific nutrition/hydration education as appropriate  - Include patient/family/caregiver in decisions related to nutrition  Outcome: Progressing     Problem: Prexisting or High Potential for Compromised Skin Integrity  Goal: Skin integrity is maintained or improved  Description: INTERVENTIONS:  - Identify patients at risk for skin breakdown  - Assess and monitor skin integrity  - Assess and monitor nutrition and hydration status  - Monitor labs   - Assess for incontinence   - Turn and reposition patient  - Assist with mobility/ambulation  - Relieve pressure over bony prominences  - Avoid friction and shearing  - Provide appropriate hygiene as needed including keeping skin clean and dry  - Evaluate need for skin moisturizer/barrier cream  - Collaborate with interdisciplinary team   - Patient/family teaching  - Consider wound care consult   Outcome: Progressing

## 2023-09-16 NOTE — PROGRESS NOTES
[facility] [facility]  Progress Note  Name: Ton Merlos  MRN: [id number]  Unit/Bed#: [facility] 762-86 I Date of Admission: 9/13/2023   Date of Service: 9/16/2023 I Hospital Day: 3    Assessment/Plan   Urinary catheter dysfunction [facility])  Assessment & Plan  · Domínguez catheter balloon was found midshaft in the patient not draining. Unable to deflate. · Urology was consulted and ultimately drained the balloon by inserting a 22-gauge needle. · 16-gauge Domínguez catheter was reinserted 9/14 in the OR. · Faiza urine noted   · Continue to monitor    Tachy-amanda syndrome [facility])  Assessment & Plan  · History of pacemaker placement  · Continue home metoprolol    Dementia with behavioral disturbance ([address])  Assessment & Plan  · Oriented to person only at baseline.   · Geriatrics consulted and note appreciated  · Continue home regimen including Aricept, Namenda, Abilify, Zoloft      Type 2 diabetes mellitus with kidney complication, with long-term current use of insulin [facility])  Assessment & Plan  Lab Results   Component Value Date    HGBA1C 8.0 (H) 05/25/2023       Recent Labs     09/15/23  2359 09/16/23  0530 09/16/23  0807 09/16/23  1140   POCGLU 163* 133 170* 174*       Blood Sugar Average: Last 72 hrs:  (P) 197.625     -Restarted on home Levemir.  -Continue on sliding scale insulin    Mixed hyperlipidemia  Assessment & Plan  · Continue home atorvastatin    Essential hypertension  Assessment & Plan  · Continue home metoprolol    * Femur fracture, right (HCC)  Assessment & Plan  · Secondary to fall  · 9/3 XR showed: Acute mildly displaced right subcapital femur fracture  · Orthopedics consulted and note appreciated  · 9/14 right hemiarthroplasty  · WBAT RLE  · Multimodal pain regimen  · DVT prophylaxis: Lovenox  · PT/OT: rehab, will need rehab placement             Bowel Regimen: miralax  VTE Prophylaxis:Enoxaparin (Lovenox)     Disposition: rehab    Subjective   Chief Complaint: none    Subjective: oriented to person not place or time. Pain controlled. Objective   Vitals:   Temp:  [98.5 °F (36.9 °C)-99.7 °F (37.6 °C)] 98.5 °F (36.9 °C)  HR:  [60-76] 62  Resp:  [16-18] 18  BP: (141-149)/(69-71) 149/69    I/O       09/14 0701  09/15 0700 09/15 0701  09/16 0700 09/16 0701  09/17 0700    P. O. 300 720 598    I.V. (mL/kg) 1000 (12.4)      IV Piggyback       Total Intake(mL/kg) 1300 (16.2) 720 (9.1) 598 (7.6)    Urine (mL/kg/hr) 2100 (1.1) 950 (0.5)     Stool  0     Blood 200      Total Output 2300 950     Net -1000 -230 +598           Unmeasured Stool Occurrence  1 x            Physical Exam:   GENERAL APPEARANCE:  NAD, non-toxic appearing  NEURO:  Awake, GCS 15, no focal deficits  HEENT:  Moist mucous membranes, NC/AT  CV:  Regular rate and rhythm  LUNGS:  Clear and equal breath sounds to auscultation bilaterally  GI:  Soft, nontender, and nondistended  MSK:  Moving all extremities. Motor/ sensory intact  SKIN:  Warm, dry, and intact      Invasive Devices     Peripheral Intravenous Line  Duration           Peripheral IV 09/13/23 Right Antecubital 2 days    Peripheral IV 09/14/23 Right Hand 2 days          Drain  Duration           Urethral Catheter Coude 16 Fr. 2 days                      Lab Results: Results: I have personally reviewed all pertinent laboratory/tests results  Imaging: I have personally reviewed pertinent reports.      Other Studies:

## 2023-09-17 LAB
ANION GAP SERPL CALCULATED.3IONS-SCNC: 9 MMOL/L
BASOPHILS # BLD AUTO: 0.03 THOUSANDS/ÂΜL (ref 0–0.1)
BASOPHILS NFR BLD AUTO: 0 % (ref 0–1)
BUN SERPL-MCNC: 28 MG/DL (ref 5–25)
CALCIUM SERPL-MCNC: 8.7 MG/DL (ref 8.4–10.2)
CHLORIDE SERPL-SCNC: 102 MMOL/L (ref 96–108)
CO2 SERPL-SCNC: 27 MMOL/L (ref 21–32)
CREAT SERPL-MCNC: 1.31 MG/DL (ref 0.6–1.3)
EOSINOPHIL # BLD AUTO: 0.35 THOUSAND/ÂΜL (ref 0–0.61)
EOSINOPHIL NFR BLD AUTO: 5 % (ref 0–6)
ERYTHROCYTE [DISTWIDTH] IN BLOOD BY AUTOMATED COUNT: 13.2 % (ref 11.6–15.1)
GFR SERPL CREATININE-BSD FRML MDRD: 48 ML/MIN/1.73SQ M
GLUCOSE SERPL-MCNC: 118 MG/DL (ref 65–140)
GLUCOSE SERPL-MCNC: 122 MG/DL (ref 65–140)
GLUCOSE SERPL-MCNC: 138 MG/DL (ref 65–140)
GLUCOSE SERPL-MCNC: 142 MG/DL (ref 65–140)
GLUCOSE SERPL-MCNC: 98 MG/DL (ref 65–140)
HCT VFR BLD AUTO: 32.3 % (ref 36.5–49.3)
HGB BLD-MCNC: 10.8 G/DL (ref 12–17)
IMM GRANULOCYTES # BLD AUTO: 0.03 THOUSAND/UL (ref 0–0.2)
IMM GRANULOCYTES NFR BLD AUTO: 0 % (ref 0–2)
LYMPHOCYTES # BLD AUTO: 1.17 THOUSANDS/ÂΜL (ref 0.6–4.47)
LYMPHOCYTES NFR BLD AUTO: 17 % (ref 14–44)
MCH RBC QN AUTO: 29.5 PG (ref 26.8–34.3)
MCHC RBC AUTO-ENTMCNC: 33.4 G/DL (ref 31.4–37.4)
MCV RBC AUTO: 88 FL (ref 82–98)
MONOCYTES # BLD AUTO: 0.65 THOUSAND/ÂΜL (ref 0.17–1.22)
MONOCYTES NFR BLD AUTO: 10 % (ref 4–12)
MRSA NOSE QL CULT: ABNORMAL
MRSA NOSE QL CULT: ABNORMAL
NEUTROPHILS # BLD AUTO: 4.61 THOUSANDS/ÂΜL (ref 1.85–7.62)
NEUTS SEG NFR BLD AUTO: 68 % (ref 43–75)
NRBC BLD AUTO-RTO: 0 /100 WBCS
PLATELET # BLD AUTO: 161 THOUSANDS/UL (ref 149–390)
PMV BLD AUTO: 10.3 FL (ref 8.9–12.7)
POTASSIUM SERPL-SCNC: 3.8 MMOL/L (ref 3.5–5.3)
RBC # BLD AUTO: 3.66 MILLION/UL (ref 3.88–5.62)
SODIUM SERPL-SCNC: 138 MMOL/L (ref 135–147)
WBC # BLD AUTO: 6.84 THOUSAND/UL (ref 4.31–10.16)

## 2023-09-17 PROCEDURE — 85025 COMPLETE CBC W/AUTO DIFF WBC: CPT | Performed by: STUDENT IN AN ORGANIZED HEALTH CARE EDUCATION/TRAINING PROGRAM

## 2023-09-17 PROCEDURE — 80048 BASIC METABOLIC PNL TOTAL CA: CPT | Performed by: STUDENT IN AN ORGANIZED HEALTH CARE EDUCATION/TRAINING PROGRAM

## 2023-09-17 PROCEDURE — NC001 PR NO CHARGE: Performed by: ORTHOPAEDIC SURGERY

## 2023-09-17 PROCEDURE — 82948 REAGENT STRIP/BLOOD GLUCOSE: CPT

## 2023-09-17 RX ADMIN — INSULIN DETEMIR 10 UNITS: 100 INJECTION, SOLUTION SUBCUTANEOUS at 08:09

## 2023-09-17 RX ADMIN — OXYCODONE HYDROCHLORIDE 5 MG: 5 TABLET ORAL at 03:17

## 2023-09-17 RX ADMIN — PANTOPRAZOLE SODIUM 40 MG: 40 TABLET, DELAYED RELEASE ORAL at 05:56

## 2023-09-17 RX ADMIN — SERTRALINE 100 MG: 100 TABLET, FILM COATED ORAL at 08:09

## 2023-09-17 RX ADMIN — POLYETHYLENE GLYCOL 3350 17 G: 17 POWDER, FOR SOLUTION ORAL at 08:08

## 2023-09-17 RX ADMIN — ENOXAPARIN SODIUM 30 MG: 30 INJECTION SUBCUTANEOUS at 22:04

## 2023-09-17 RX ADMIN — METOPROLOL TARTRATE 50 MG: 50 TABLET, FILM COATED ORAL at 08:09

## 2023-09-17 RX ADMIN — PROPAFENONE HYDROCHLORIDE 225 MG: 150 TABLET, FILM COATED ORAL at 08:10

## 2023-09-17 RX ADMIN — MEMANTINE 5 MG: 5 TABLET ORAL at 08:09

## 2023-09-17 RX ADMIN — DOCUSATE SODIUM 100 MG: 100 CAPSULE, LIQUID FILLED ORAL at 08:09

## 2023-09-17 RX ADMIN — SENNOSIDES 8.6 MG: 8.6 TABLET, FILM COATED ORAL at 22:00

## 2023-09-17 RX ADMIN — INSULIN DETEMIR 10 UNITS: 100 INJECTION, SOLUTION SUBCUTANEOUS at 17:28

## 2023-09-17 RX ADMIN — ENOXAPARIN SODIUM 30 MG: 30 INJECTION SUBCUTANEOUS at 12:09

## 2023-09-17 RX ADMIN — PROPAFENONE HYDROCHLORIDE 225 MG: 150 TABLET, FILM COATED ORAL at 22:02

## 2023-09-17 RX ADMIN — METOPROLOL TARTRATE 50 MG: 50 TABLET, FILM COATED ORAL at 22:00

## 2023-09-17 RX ADMIN — ACETAMINOPHEN 975 MG: 325 TABLET, FILM COATED ORAL at 22:00

## 2023-09-17 RX ADMIN — ACETAMINOPHEN 975 MG: 325 TABLET, FILM COATED ORAL at 13:38

## 2023-09-17 RX ADMIN — DOCUSATE SODIUM 100 MG: 100 CAPSULE, LIQUID FILLED ORAL at 17:27

## 2023-09-17 RX ADMIN — ARIPIPRAZOLE 5 MG: 5 TABLET ORAL at 08:10

## 2023-09-17 RX ADMIN — ATORVASTATIN CALCIUM 10 MG: 10 TABLET, FILM COATED ORAL at 17:27

## 2023-09-17 RX ADMIN — ENOXAPARIN SODIUM 30 MG: 30 INJECTION SUBCUTANEOUS at 00:12

## 2023-09-17 RX ADMIN — PROPAFENONE HYDROCHLORIDE 225 MG: 150 TABLET, FILM COATED ORAL at 17:28

## 2023-09-17 RX ADMIN — ACETAMINOPHEN 975 MG: 325 TABLET, FILM COATED ORAL at 05:57

## 2023-09-17 RX ADMIN — DONEPEZIL HYDROCHLORIDE 10 MG: 10 TABLET ORAL at 22:00

## 2023-09-17 RX ADMIN — Medication 2000 UNITS: at 08:09

## 2023-09-17 RX ADMIN — OXYCODONE HYDROCHLORIDE 5 MG: 5 TABLET ORAL at 13:39

## 2023-09-17 RX ADMIN — MAGNESIUM OXIDE TAB 400 MG (241.3 MG ELEMENTAL MG) 400 MG: 400 (241.3 MG) TAB at 08:09

## 2023-09-17 NOTE — PROGRESS NOTES
[address] [facility]  Progress Note  Name: Chritsian Puckett  MRN: [id number]  Unit/Bed#: [facility] 871-95 I Date of Admission: 9/13/2023   Date of Service: 9/17/2023 I Hospital Day: 4    Assessment/Plan   Urinary catheter dysfunction [facility])  Assessment & Plan  · Domínguez catheter balloon was found midshaft in the patient not draining. Unable to deflate. · Urology was consulted and ultimately drained the balloon by inserting a 22-gauge needle. · 16-gauge Domínguez catheter was reinserted 9/14 in the OR. · Fiaza urine noted   · Continue to monitor    Tachy-amanda syndrome [facility])  Assessment & Plan  · History of pacemaker placement  · Continue home metoprolol    Dementia with behavioral disturbance ([address])  Assessment & Plan  · Oriented to person only at baseline.   · Geriatrics consulted and note appreciated  · Continue home regimen including Aricept, Namenda, Abilify, Zoloft      Type 2 diabetes mellitus with kidney complication, with long-term current use of insulin [facility])  Assessment & Plan  Lab Results   Component Value Date    HGBA1C 8.0 (H) 05/25/2023       Recent Labs     09/16/23  1140 09/16/23  1707 09/17/23  0011 09/17/23  0556   POCGLU 174* 187* 122 118       Blood Sugar Average: Last 72 hrs:  (P) 186.75     -Restarted on home Levemir.  -Continue on sliding scale insulin    Mixed hyperlipidemia  Assessment & Plan  · Continue home atorvastatin    Essential hypertension  Assessment & Plan  · Continue home metoprolol    * Femur fracture, right (HCC)  Assessment & Plan  · Secondary to fall  · 9/3 XR showed: Acute mildly displaced right subcapital femur fracture  · Orthopedics consulted and note appreciated  · 9/14 right hemiarthroplasty  · WBAT RLE  · Multimodal pain regimen  · DVT prophylaxis: Lovenox  · PT/OT: rehab, will need rehab placement             Bowel Regimen: miralax  VTE Prophylaxis:Enoxaparin (Lovenox)     Disposition: rehab    Subjective   Chief Complaint: none    Subjective: pain controlled     Objective   Vitals:   Temp:  [98.1 °F (36.7 °C)-98.5 °F (36.9 °C)] 98.1 °F (36.7 °C)  HR:  [62-66] 66  Resp:  [18] 18  BP: (145-149)/(69-75) 145/74    I/O       09/15 0701  09/16 0700 09/16 0701  09/17 0700    P. O. 720 598    IV Piggyback  100    Total Intake(mL/kg) 720 (9.1) 698 (8.9)    Urine (mL/kg/hr) 950 (0.5) 1150 (0.6)    Stool 0     Total Output 950 1150    Net -230 -452          Unmeasured Stool Occurrence 1 x            Physical Exam:   GENERAL APPEARANCE:  NAD, non-toxic appearing  NEURO:  Awake, GCS 15, no focal deficits  HEENT:  Moist mucous membranes, NC/AT  CV:  Regular rate and rhythm  LUNGS:  Clear and equal breath sounds to auscultation bilaterally  GI:  Soft, nontender, and nondistended  MSK:  Moving all extremities, motor/ neuro intact  SKIN:  Warm, dry, and intact      Invasive Devices     Peripheral Intravenous Line  Duration           Peripheral IV 09/13/23 Right Antecubital 3 days    Peripheral IV 09/14/23 Right Hand 2 days          Drain  Duration           Urethral Catheter Coude 16 Fr. 2 days                      Lab Results: Results: I have personally reviewed all pertinent laboratory/tests results  Imaging: I have personally reviewed pertinent reports.      Other Studies:

## 2023-09-17 NOTE — PROGRESS NOTES
Progress Note - Orthopedics   Charlotte Severs 80 y.o. male MRN: [id number]  Unit/Bed#: [facility] 602-01      Subjective:    80 y.o.male POD 3 R hip dexter. No acute events, no new complaints. Patient doing well. Pain well controlled. Denies fevers, chills, CP, SOB, N/V, numbness or tingling. Patient reports no issues with urination or bowel movements. Patient states he is doing well.     Labs:  0   Lab Value Date/Time    HCT 32.3 (L) 09/17/2023 0409    HCT 32.1 (L) 09/16/2023 1120    HCT 35.2 (L) 09/15/2023 0455    HCT 42.9 12/19/2015 0939    HCT 39.0 07/28/2015 0901    HCT 38.3 09/20/2014 1119    HGB 10.8 (L) 09/17/2023 0409    HGB 10.7 (L) 09/16/2023 1120    HGB 11.7 (L) 09/15/2023 0455    HGB 14.7 12/19/2015 0939    HGB 13.3 07/28/2015 0901    HGB 12.6 09/20/2014 1119    INR 0.99 09/13/2023 0559    INR 2.61 (H) 12/03/2015 1155    WBC 6.84 09/17/2023 0409    WBC 8.58 09/16/2023 1120    WBC 12.71 (H) 09/15/2023 0455    WBC 6.54 12/19/2015 0939    WBC 5.31 07/28/2015 0901    WBC 5.83 09/20/2014 1119    CRP <3.0 02/11/2017 1108       Meds:    Current Facility-Administered Medications:   •  acetaminophen (TYLENOL) tablet 975 mg, 975 mg, Oral, Q8H [address], Ed Marcum MD, 975 mg at 09/17/23 0557  •  ARIPiprazole (ABILIFY) tablet 5 mg, 5 mg, Oral, Daily, Ed Marcum MD, 5 mg at 09/16/23 4032  •  atorvastatin (LIPITOR) tablet 10 mg, 10 mg, Oral, After Dinner, SHADY Cobb, 10 mg at 09/16/23 1751  •  cholecalciferol (VITAMIN D3) tablet 2,000 Units, 2,000 Units, Oral, Daily, SHADY Cobb, 2,000 Units at 09/16/23 1051  •  docusate sodium (COLACE) capsule 100 mg, 100 mg, Oral, BID, Sofi Gael Marcum MD, 100 mg at 09/16/23 1751  •  donepezil (ARICEPT) tablet 10 mg, 10 mg, Oral, HS, Wilmarcharo Marcum MD, 10 mg at 09/16/23 2115  •  enoxaparin (LOVENOX) subcutaneous injection 30 mg, 30 mg, Subcutaneous, Q12H, Judith Chatterjee MD, 30 mg at 09/17/23 0012  •  HYDROmorphone HCl (DILAUDID) injection 0.2 mg, 0.2 mg, Intravenous, Q4H PRN, Render MD Carito, 0.2 mg at 09/13/23 1016  •  insulin detemir (LEVEMIR) subcutaneous injection 10 Units, 10 Units, Subcutaneous, BID, SHADY Villa, 10 Units at 09/16/23 1751  •  insulin lispro (HumaLOG) 100 units/mL subcutaneous injection 1-6 Units, 1-6 Units, Subcutaneous, Q6H [address], 1 Units at 09/16/23 1752 **AND** Fingerstick Glucose (POCT), , , Q6H, Render MD Carito  •  Nursing Communication Measure and document PVi every 4 hours until patient goes to the OR., , , Until Discontinued **AND** Nursing Communication Notify physician if SpO2 <92% and stop IVF infusion. , , , Until Discontinued **AND** Nursing Communication Check SpHb q4h and notify provider if <8., , , Until Discontinued **AND** Nursing Communication Obtain vital signs, PVi, and SpHb immediately prior to transfer to operating room. , , , Until Discontinued **AND** [COMPLETED] lactated ringers bolus 1,000 mL, 1,000 mL, Intravenous, Once, Stopped at 09/13/23 1613 **AND** lactated ringers infusion, 4 mL/kg/hr (Ideal), Intravenous, Continuous, Render MD Carito, Last Rate: 301.2 mL/hr at 09/13/23 2020, 4 mL/kg/hr at 09/13/23 2020  •  magnesium Oxide (MAG-OX) tablet 400 mg, 400 mg, Oral, Daily, SHADY Villa, 400 mg at 09/16/23 6877  •  memantine (NAMENDA) tablet 5 mg, 5 mg, Oral, Daily, SHADY Villa, 5 mg at 09/16/23 2289  •  metoprolol tartrate (LOPRESSOR) tablet 50 mg, 50 mg, Oral, Q12H [address], Render MD Carito, 50 mg at 09/16/23 2115  •  oxyCODONE (ROXICODONE) IR tablet 5 mg, 5 mg, Oral, Q4H PRN, Render MD Carito, 5 mg at 09/17/23 1336  •  oxyCODONE (ROXICODONE) split tablet 2.5 mg, 2.5 mg, Oral, Q4H PRN, Ella Arzate MD  •  pantoprazole (PROTONIX) EC tablet 40 mg, 40 mg, Oral, Early Morning, Ed Marcum MD, 40 mg at 09/17/23 0556  •  polyethylene glycol (MIRALAX) packet 17 g, 17 g, Oral, Daily, Ella Arzate MD, 17 g at 09/16/23 0834  •  propafenone (RYTHMOL) tablet 225 mg, 225 mg, Oral, TID, MICAH GarciaNP, 225 mg at 09/16/23 2113  •  senna (SENOKOT) tablet 8.6 mg, 1 tablet, Oral, HS, Mono Pillion Silvano Marcum MD, 8.6 mg at 09/16/23 2115  •  sertraline (ZOLOFT) tablet 100 mg, 100 mg, Oral, Daily, Tarango Dat CRNP, 100 mg at 09/16/23 2430    Blood Culture:   No results found for: "Saulo Xavier"    Wound Culture:   No results found for: "WOUNDCULT"    Ins and Outs:  I/O last 24 hours: In: 698 [P.O.:598; IV Piggyback:100]  Out: 1000 [Urine:1000]          Physical:  Vitals:    09/16/23 2208   BP: 145/74   Pulse: 66   Resp:    Temp: 98.1 °F (36.7 °C)   SpO2: 98%   Musculoskeletal: right Lower Extremity  • Dressing C/D/I  • Abduction pillow in place   • Does not participate in motor/sensory exam  • Ankle and toes move spontaneously  · Toes WWP with brisk capillary refill    Assessment:    88 y.o.male POD 3 R hip dexter. . Patient doing well.      Plan:  · WBAT RLE  · Abduction pillow while in bed  · Posterior Hip precautions  · Will monitor for ABLA and administer IVF/prbc as indicated for Greater than 2 gram drop or Hgb < 7  · PT/OT  · Pain control  · DVT ppx LVX  · Dispo: Ortho will follow    Hiro Orellana MD

## 2023-09-17 NOTE — ASSESSMENT & PLAN NOTE
· Domínguez catheter balloon was found midshaft in the patient not draining. Unable to deflate. · Urology was consulted and ultimately drained the balloon by inserting a 22-gauge needle. · 16-gauge Domínguez catheter was reinserted 9/14 in the OR.    · Faiza urine noted   · Continue to monitor

## 2023-09-17 NOTE — ASSESSMENT & PLAN NOTE
Lab Results   Component Value Date    HGBA1C 8.0 (H) 05/25/2023       Recent Labs     09/16/23  1140 09/16/23  1707 09/17/23  0011 09/17/23  0556   POCGLU 174* 187* 122 118       Blood Sugar Average: Last 72 hrs:  (P) 186.75     -Restarted on home Levemir.  -Continue on sliding scale insulin

## 2023-09-18 LAB
GLUCOSE SERPL-MCNC: 112 MG/DL (ref 65–140)
GLUCOSE SERPL-MCNC: 118 MG/DL (ref 65–140)
GLUCOSE SERPL-MCNC: 149 MG/DL (ref 65–140)
GLUCOSE SERPL-MCNC: 155 MG/DL (ref 65–140)
GLUCOSE SERPL-MCNC: 182 MG/DL (ref 65–140)

## 2023-09-18 PROCEDURE — 97116 GAIT TRAINING THERAPY: CPT

## 2023-09-18 PROCEDURE — 82948 REAGENT STRIP/BLOOD GLUCOSE: CPT

## 2023-09-18 PROCEDURE — 97530 THERAPEUTIC ACTIVITIES: CPT

## 2023-09-18 PROCEDURE — 97110 THERAPEUTIC EXERCISES: CPT

## 2023-09-18 PROCEDURE — 97535 SELF CARE MNGMENT TRAINING: CPT

## 2023-09-18 PROCEDURE — 92526 ORAL FUNCTION THERAPY: CPT

## 2023-09-18 PROCEDURE — 99232 SBSQ HOSP IP/OBS MODERATE 35: CPT | Performed by: SURGERY

## 2023-09-18 RX ADMIN — DOCUSATE SODIUM 100 MG: 100 CAPSULE, LIQUID FILLED ORAL at 17:48

## 2023-09-18 RX ADMIN — PANTOPRAZOLE SODIUM 40 MG: 40 TABLET, DELAYED RELEASE ORAL at 05:40

## 2023-09-18 RX ADMIN — METOPROLOL TARTRATE 50 MG: 50 TABLET, FILM COATED ORAL at 21:27

## 2023-09-18 RX ADMIN — ENOXAPARIN SODIUM 30 MG: 30 INJECTION SUBCUTANEOUS at 12:23

## 2023-09-18 RX ADMIN — INSULIN LISPRO 1 UNITS: 100 INJECTION, SOLUTION INTRAVENOUS; SUBCUTANEOUS at 12:23

## 2023-09-18 RX ADMIN — INSULIN DETEMIR 10 UNITS: 100 INJECTION, SOLUTION SUBCUTANEOUS at 17:48

## 2023-09-18 RX ADMIN — PROPAFENONE HYDROCHLORIDE 225 MG: 150 TABLET, FILM COATED ORAL at 08:08

## 2023-09-18 RX ADMIN — MAGNESIUM OXIDE TAB 400 MG (241.3 MG ELEMENTAL MG) 400 MG: 400 (241.3 MG) TAB at 08:07

## 2023-09-18 RX ADMIN — DOCUSATE SODIUM 100 MG: 100 CAPSULE, LIQUID FILLED ORAL at 08:07

## 2023-09-18 RX ADMIN — PROPAFENONE HYDROCHLORIDE 225 MG: 150 TABLET, FILM COATED ORAL at 16:46

## 2023-09-18 RX ADMIN — SERTRALINE 100 MG: 100 TABLET, FILM COATED ORAL at 08:07

## 2023-09-18 RX ADMIN — ATORVASTATIN CALCIUM 10 MG: 10 TABLET, FILM COATED ORAL at 17:48

## 2023-09-18 RX ADMIN — ACETAMINOPHEN 975 MG: 325 TABLET, FILM COATED ORAL at 05:40

## 2023-09-18 RX ADMIN — ARIPIPRAZOLE 5 MG: 5 TABLET ORAL at 08:08

## 2023-09-18 RX ADMIN — MEMANTINE 5 MG: 5 TABLET ORAL at 08:07

## 2023-09-18 RX ADMIN — METOPROLOL TARTRATE 50 MG: 50 TABLET, FILM COATED ORAL at 08:07

## 2023-09-18 RX ADMIN — INSULIN DETEMIR 10 UNITS: 100 INJECTION, SOLUTION SUBCUTANEOUS at 08:12

## 2023-09-18 RX ADMIN — PROPAFENONE HYDROCHLORIDE 225 MG: 150 TABLET, FILM COATED ORAL at 21:25

## 2023-09-18 RX ADMIN — ACETAMINOPHEN 975 MG: 325 TABLET, FILM COATED ORAL at 21:24

## 2023-09-18 RX ADMIN — POLYETHYLENE GLYCOL 3350 17 G: 17 POWDER, FOR SOLUTION ORAL at 08:07

## 2023-09-18 RX ADMIN — DONEPEZIL HYDROCHLORIDE 10 MG: 10 TABLET ORAL at 21:25

## 2023-09-18 RX ADMIN — Medication 2000 UNITS: at 08:07

## 2023-09-18 NOTE — CASE MANAGEMENT
Case Management Discharge Planning Note    Patient name Charlotte Severs  Location [address] [place] [address] [address] 922-10 MRN [id number]  : 1935 Date 2023       Current Admission Date: 2023  Current Admission Diagnosis:Femur fracture, right [facility])   Patient Active Problem List    Diagnosis Date Noted   • Urinary catheter dysfunction ([address]) 2023   • Femur fracture, right ([address]) 2023   • 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[address]) 2023   • Constipation 2023   • Unspecified mood (affective) disorder ([address]) 2023   • Medical clearance for psychiatric admission 2023   • Chronic kidney disease (CKD), stage IV (severe) ([address]) 2023   • Fall 2022   • Renal cell carcinoma ([address]) 2022   • Multiple rib fractures 2022   • Abnormal head CT 2022   • Tracheitis 11/15/2021   • Dementia with behavioral disturbance ([address])    • Recurrent falls 2020   • Long term current use of antiarrhythmic medical therapy 2020   • 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 2020   • Coagulation disorder ([address]) 2020   • Head injury 2020   • 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, or unspecified chronic kidney disease 2019   • Coronary artery disease involving native coronary artery of native heart without angina pectoris 2019   • Type 2 diabetes mellitus with kidney complication, with long-term current use of insulin ([address]) 2019   • Paroxysmal atrial fibrillation ([address]) 2018   • Mixed hyperlipidemia 2018   • 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[address]) 2018   • JACQUELINE (acute kidney injury) ([address]) 2018   • Essential hypertension 2018   • Solitary kidney 2018   • B12 deficiency 10/13/2016   • Allergic rhinitis 2016   • Neuropathy of right foot 2015   • Piriformis syndrome 2014   • Benign prostate hyperplasia 10/27/2014   • Chronic lumbar radiculopathy 2014   • Sacral insufficiency fracture 06/12/2014   • Asthma 05/07/2014   • Hearing loss 05/07/2014   • GERD (gastroesophageal reflux disease) 05/07/2014   • Hiatal hernia 05/07/2014   • Seasonal allergies 05/07/2014   • Sleep apnea 09/12/2013   • Nephrosclerosis 09/12/2013   • Sacroiliitis ([address]) 08/27/2013   • Elevated prostate specific antigen (PSA) 06/18/2013   • 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   • Sacral disorder 05/07/2013      LOS (days): 5  Geometric Mean LOS (GMLOS) (days): 4.00  Days to GMLOS:-1.3     OBJECTIVE:  Risk of Unplanned Readmission Score: 48.96         Current admission status: Inpatient   Preferred Pharmacy:   [facility]  [address] [address]  Phone: [phone] Fax: [address] [facility], [address]  Phone: [phone] Fax: [phone]    Primary Care Provider: Cherry Chicas MD    Primary Insurance: [facility] [employer] [employer]  Secondary Insurance:     DISCHARGE DETAILS:    CM spoke to pt's guardian. She states she is in agreement with return to [facility] and that [facility] is to come out today and assess the pt for eventual home hospice care. Paola was encouraged to call Makayla Spear at [facility] [phone] to coordinate this move.  PAOLA did send a referral to them via [employer]

## 2023-09-18 NOTE — PLAN OF CARE
Problem: PHYSICAL THERAPY ADULT  Goal: Performs mobility at highest level of function for planned discharge setting. See evaluation for individualized goals. Description: Treatment/Interventions: Functional transfer training, LE strengthening/ROM, Therapeutic exercise, Endurance training, Patient/family training, Equipment eval/education, Bed mobility, Gait training, Compensatory technique education, Spoke to nursing, OT, Spoke to case management          See flowsheet documentation for full assessment, interventions and recommendations. Outcome: Progressing  Note: Prognosis: Fair  Problem List: Decreased strength, Decreased range of motion, Decreased endurance, Impaired balance, Decreased mobility, Decreased coordination, Decreased cognition, Impaired judgement, Decreased safety awareness, Impaired vision, Decreased skin integrity, Orthopedic restrictions, Pain  Assessment: pt seen for PT session today w/ focus on SAMUEL HEP w/ active assist in supine + seated positions. bed mobility continunes to requrie modA x2. once positioned pt w/ improved sitting balance ~10 minsw/ stable vitals. sit> stand from elevated bed surfaces requiring less assist and pt was able to ambualte 4' forward today w/ modA x2 and use of RW. additional stands w/ mod/ maxA x2 from recliner w/ standing tolerance of 2 then 1 min for pericare + pad changes. Time for repositioning w/ LE elevated in chair and chair alarm intact. PT recommending rehab on d/c- however family may want pt to return to [facility]. CM following for d/c planning recommendations. PT will follow and porgress as able while inpt/.        PT Discharge Recommendation: Post acute rehabilitation services    See flowsheet documentation for full assessment.

## 2023-09-18 NOTE — ASSESSMENT & PLAN NOTE
Lab Results   Component Value Date    HGBA1C 8.0 (H) 05/25/2023       Recent Labs     09/18/23  0024 09/18/23  0539 09/18/23  0744 09/18/23  1133   POCGLU 112 118 155* 182*       Blood Sugar Average: Last 72 hrs:  (P) [id number]     -Restarted on home Levemir.  -Continue on sliding scale insulin

## 2023-09-18 NOTE — SPEECH THERAPY NOTE
Speech Language/Pathology  Speech-Language Pathology Progress Note      Patient Name: Adan Vazquez    TQKFX'S Date: 9/18/2023      Subjective:  Pt was awake and alert. He was sitting up in chair at bedside. Patient was looking for his wife, redirected that she is on her way here. Objective:  Pt was seen today for dysphagia therapy. Current diet is dysphagia 2 mechanical soft with thin liquids. Pt was on room air. Focus of today's session was to maximize PO intake safety. Textures offered today included [facility] soft solid and thin liquid via straw. Swallow function:   Bolus retrieval was/were adequate. Manipulation was/were slow and there is mild R sided residue on the lingual surface at times as well as the R buccal cavity. . Pharyngeal swallow initiation was present. Hyolaryngeal excursion was [place]. He is able to clear the oral residue w/ alternated sips of thin as well as tsps pudding. He does not self feed when prompted. Pt needs frequent redirection during the meal to stop talking with food in his mouth. Cough x2 when not fully attending. Assessment:  Pt is tolerating his baseline diet. Has h/o presbyesophagus, needs small amounts and alternated bites w/ sips. May need to be fed more often. Plan:  Continue dysphagia 2 mechanical soft and thin liquids. No additional ST f/u needed at this time. Please re consult with any new changes or concerns.

## 2023-09-18 NOTE — PLAN OF CARE
Problem: OCCUPATIONAL THERAPY ADULT  Goal: Performs self-care activities at highest level of function for planned discharge setting. See evaluation for individualized goals. Description: Treatment Interventions: ADL retraining, Functional transfer training, Endurance training, Cognitive reorientation, Patient/family training, Equipment evaluation/education, Compensatory technique education, Activityengagement          See flowsheet documentation for full assessment, interventions and recommendations.    Outcome: Progressing  Note: Limitation: Decreased ADL status, Decreased Safe judgement during ADL, Decreased cognition, Decreased endurance, Decreased self-care trans  Prognosis: Fair  Assessment: Pt seen for OT session - functionally improved requiring min a for UB and max a for LB adls, mod a x 2 for bed mobility and transfers however continues to have no recall of THR prec and requires max cues for carryover with same during functional tasks - overall fair tolerance to activity - continue to recommend inpt rehab when medically cleared- OT to continue to follow to address goals as stated on eval     OT Discharge Recommendation: Post acute rehabilitation services

## 2023-09-18 NOTE — CASE MANAGEMENT
Case Management Discharge Planning Note    Patient name Alex Alexis  Location [address] [place] [address] Road 602/[facility] 391-11 MRN [id number]  : 1935 Date 2023       Current Admission Date: 2023  Current Admission Diagnosis:Femur fracture, right [facility])   Patient Active Problem List    Diagnosis Date Noted   • Urinary catheter dysfunction ([address]) 2023   • Femur fracture, right ([address]) 2023   • 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[address]) 2023   • Constipation 2023   • Unspecified mood (affective) disorder ([address]) 2023   • Medical clearance for psychiatric admission 2023   • Chronic kidney disease (CKD), stage IV (severe) ([address]) 2023   • Fall 2022   • Renal cell carcinoma ([address]) 2022   • Multiple rib fractures 2022   • Abnormal head CT 2022   • Tracheitis 11/15/2021   • Dementia with behavioral disturbance ([address])    • Recurrent falls 2020   • Long term current use of antiarrhythmic medical therapy 2020   • 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 2020   • Coagulation disorder ([address]) 2020   • Head injury 2020   • 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, or unspecified chronic kidney disease 2019   • Coronary artery disease involving native coronary artery of native heart without angina pectoris 2019   • Type 2 diabetes mellitus with kidney complication, with long-term current use of insulin ([address]) 2019   • Paroxysmal atrial fibrillation ([address]) 2018   • Mixed hyperlipidemia 2018   • 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[address]) 2018   • JACQUELINE (acute kidney injury) ([address]) 2018   • Essential hypertension 2018   • Solitary kidney 2018   • B12 deficiency 10/13/2016   • Allergic rhinitis 2016   • Neuropathy of right foot 2015   • Piriformis syndrome 2014   • Benign prostate hyperplasia 10/27/2014   • Chronic lumbar radiculopathy 2014   • Sacral insufficiency fracture 06/12/2014   • Asthma 05/07/2014   • Hearing loss 05/07/2014   • GERD (gastroesophageal reflux disease) 05/07/2014   • Hiatal hernia 05/07/2014   • Seasonal allergies 05/07/2014   • Sleep apnea 09/12/2013   • Nephrosclerosis 09/12/2013   • Sacroiliitis ([address]) 08/27/2013   • Elevated prostate specific antigen (PSA) 06/18/2013   • 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   • Sacral disorder 05/07/2013      LOS (days): 5  Geometric Mean LOS (GMLOS) (days): 4.00  Days to GMLOS:-1.4     OBJECTIVE:  Risk of Unplanned Readmission Score: 48.96         Current admission status: Inpatient   Preferred Pharmacy:   [facility] 700 [facility]  [facility]  [address] [address] [address]  Phone: [phone] Fax: 350 [facility], [address] [place] [address]  Phone: [phone] Fax: [phone]    Primary Care Provider: Anna Chi MD    Primary Insurance: [facility]  Secondary Insurance:     DISCHARGE DETAILS:    Pt can d/c back to [facility] tomorrow after 1000. Cm submitted for BLS transport tomorrow.  Pt will be opened by [facility] upon arrival

## 2023-09-18 NOTE — CASE MANAGEMENT
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   • Constipation 2023   • Unspecified mood (affective) disorder ([address]) 2023   • Medical clearance for psychiatric admission 2023   • Chronic kidney disease (CKD), stage IV (severe) ([address]) 2023   • Fall 2022   • Renal cell carcinoma ([address]) 2022   • Multiple rib fractures 2022   • Abnormal head CT 2022   • Tracheitis 11/15/2021   • Dementia with behavioral disturbance ([address])    • Recurrent falls 2020   • Long term current use of antiarrhythmic medical therapy 2020   • 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 2020   • Coagulation disorder ([address]) 2020   • Head injury 2020   • 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, or unspecified chronic kidney disease 2019   • Coronary artery disease involving native coronary artery of native heart without angina pectoris 2019   • Type 2 diabetes mellitus with kidney complication, with long-term current use of insulin ([address]) 2019   • Paroxysmal atrial fibrillation ([address]) 2018   • Mixed hyperlipidemia 2018   • 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[address]) 2018   • JACQUELINE (acute kidney injury) ([address]) 2018   • Essential hypertension 2018   • Solitary kidney 2018   • B12 deficiency 10/13/2016   • Allergic rhinitis 2016   • Neuropathy of right foot 2015   • Piriformis syndrome 2014   • Benign prostate hyperplasia 10/27/2014   • Chronic lumbar radiculopathy 2014   • Sacral insufficiency fracture 06/12/2014   • Asthma 05/07/2014   • Hearing loss 05/07/2014   • GERD (gastroesophageal reflux disease) 05/07/2014   • Hiatal hernia 05/07/2014   • Seasonal allergies 05/07/2014   • Sleep apnea 09/12/2013   • Nephrosclerosis 09/12/2013   • Sacroiliitis ([address]) 08/27/2013   • Elevated prostate specific antigen (PSA) 06/18/2013   • 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   • Sacral disorder 05/07/2013      LOS (days): 5  Geometric Mean LOS (GMLOS) (days): 4.00  Days to GMLOS:-1.2     OBJECTIVE:  Risk of Unplanned Readmission Score: 48.88         Current admission status: Inpatient   Preferred Pharmacy:   820 [facility] 700 [facility]  700 [facility]  [address] [address] [address]  Phone: [phone] Fax: 350 [facility], [address] 3[address] [facility]  Phone: [phone] Fax: [phone]    Primary Care Provider: Anna Chi MD    Primary Insurance: [facility]  Secondary Insurance:     DISCHARGE DETAILS:    Pt's family wants a return to [facility]. PAOLA spoke to [facility] liaison and they're in agreement but need his medical evaluation form signed.   PAOLA also was told the family may be interested in hospice, which CM called and left voicemail for the pt's guardian and will follow up

## 2023-09-18 NOTE — PHYSICAL THERAPY NOTE
PHYSICAL THERAPY EVALUATION  NAME:  Gee Holiday  DATE: 09/18/23    AGE:   80 y.o. Mrn:   [id number]  ADMIT DX:  Closed fracture of neck of right femur, initial encounter ([address]) [S72.001A]  Unspecified multiple injuries, initial encounter [T07. XXXA]    Past Medical History:   Diagnosis Date    Allergic rhinitis     Anxiety     Aspiration of liquid     and food per wife if he is eating too fast or talking when eating    Asthma     as a child    Atrial fibrillation ([address])     CAD (coronary artery disease)     Cancer of kidney (HCC)     COPD (chronic obstructive pulmonary disease) (HCC)     CPAP (continuous positive airway pressure) dependence     Dementia ([address])     Frontal lobe    Dementia (HCC)     Diabetes mellitus ([address])     Diabetes mellitus, type 2 (HCC)     DJD (degenerative joint disease)     Hypercholesterolemia     Hyperlipidemia     Hypertension     Incisional hernia     Kidney disease     Melanoma ([address])     left leg    Obesity     Sleep apnea     wears CPAP    TIA (transient ischemic attack)      Past Surgical History:   Procedure Laterality Date    CARDIAC PACEMAKER PLACEMENT      CHOLECYSTECTOMY      COLONOSCOPY      HERNIA REPAIR      Incisional hernia    NEPHRECTOMY Left 2005    [place] ESOPHAGOGASTRODUODENOSCOPY SUBMUCOSAL INJECTION N/A 9/21/2016    Procedure: ESOPHAGOGASTRODUODENOSCOPY (EGD); Surgeon: Terrance Flores MD;  [facility]: [facility]; Service: Gastroenterology    WV ESOPHAGOGASTRODUODENOSCOPY SUBMUCOSAL INJECTION N/A 2/7/2018    Procedure: ESOPHAGOGASTRODUODENOSCOPY (EGD); Surgeon: Terrance Flores MD;  [facility]: [facility]; Service: Gastroenterology    WV ESOPHAGOGASTRODUODENOSCOPY SUBMUCOSAL INJECTION N/A 4/3/2019    Procedure: ESOPHAGOGASTRODUODENOSCOPY (EGD) WITH BOTOX;  Surgeon: Terrance Flores MD;  [facility]: [facility]; Service: Gastroenterology    WV HEMIARTHROPLASTY HIP PARTIAL Right 9/14/2023    Procedure: HEMIARTHROPLASTY HIP (BIPOLAR);   Surgeon: Michael Dunn MD;  [facility]; Service: Orthopedics    ROTATOR CUFF REPAIR      TONSILLECTOMY         Length Of Stay: 5  PHYSICAL THERAPY EVALUATION :    09/18/23 0939   PT Last Visit   PT Visit Date 09/18/23   Note Type   Note Type Treatment   Pain Assessment   Pain Assessment Tool FLACC   Pain Rating: FLACC (Rest) - Face 0   Pain Rating: FLACC (Rest) - Legs 0   Pain Rating: FLACC (Rest) - Activity 0   Pain Rating: FLACC (Rest) - Cry 0   Pain Rating: FLACC (Rest) - Consolability 0   Score: FLACC (Rest) 0   Pain Rating: FLACC (Activity) - Face 1   Pain Rating: FLACC (Activity) - Legs 0   Pain Rating: FLACC (Activity) - Activity 0   Pain Rating: FLACC (Activity) - Cry 1   Pain Rating: FLACC (Activity) - Consolability 0   Score: FLACC (Activity) 2   Restrictions/Precautions   Weight Bearing Precautions Per Order Yes   RLE Weight Bearing Per Order (S)  WBAT   Braces or Orthoses   (abduction wedge)   Other Precautions Impulsive;Cognitive; Chair Alarm; Bed Alarm;Multiple lines; Fall Risk;Pain;WBS;THR   General   Chart Reviewed Yes   Response to Previous Treatment Patient unable to report, no changes reported from family or staff   Cognition   Overall Cognitive Status Impaired   Arousal/Participation Alert;Arousable; Cooperative   Attention Attends with cues to redirect   Orientation Level Oriented to person;Oriented to place; Disoriented to time;Disoriented to situation   Memory Decreased short term memory;Decreased recall of recent events;Decreased recall of precautions   Following Commands Follows one step commands inconsistently   Comments pleasantly confused throughout session- time for processing. lack of awareness of deficits and unable to  recall reasons for hospitalization or recall any SAMUEL precautions.    Subjective   Subjective pt presents supine in bed and is agreeable to PT session   Bed Mobility   Supine to Sit 3  Moderate assistance   Additional items Assist x 2   Additional Comments pt sits EOB e/ F balance once positioned. sit<>stnad from elevated bed surfaces x2 w/ Sabra x2 + cues   Transfers   Sit to Stand 3  Moderate assistance  (performed from recliner x3 w/ modA x2 for pericare and pad changes)   Additional items Assist x 2   Stand to Sit 3  Moderate assistance   Additional items Assist x 2   Ambulation/Elevation   Gait pattern Forward Flexion; Wide HIEU; Improper Weight shift;Decreased foot clearance;Decreased R stance; Step to;Excessively slow;Knees flexed   Gait Assistance 3  Moderate assist   Additional items Assist x 2   Assistive Device Rolling walker   Distance 3' forward step to gait w/ modA x2- requring assist for advancement of R LE + stabilization in stance on R   Balance   Static Sitting Fair   Dynamic Sitting Fair -   Static Standing Poor +   Dynamic Standing Poor   Ambulatory Poor  (rw)   Endurance Deficit   Endurance Deficit Yes   Endurance Deficit Description pain weakness; fatigue   Activity Tolerance   Activity Tolerance Patient limited by fatigue;Patient limited by pain   Medical Staff Made Aware OT/CM and RN for d/c planning recommendations   Nurse Made Aware yes   Exercises   THR Supine;Sitting;10 reps;AAROM;AROM; Right  (in both supine + seated positions w/ max cues.)   Assessment   Prognosis Fair   Problem List Decreased strength;Decreased range of motion;Decreased endurance; Impaired balance;Decreased mobility; Decreased coordination;Decreased cognition; Impaired judgement;Decreased safety awareness; Impaired vision;Decreased skin integrity;Orthopedic restrictions;Pain   Assessment pt seen for PT session today w/ focus on SAMUEL HEP w/ active assist in supine + seated positions. bed mobility continunes to requrie modA x2. once positioned pt w/ improved sitting balance ~10 minsw/ stable vitals. sit> stand from elevated bed surfaces requiring less assist and pt was able to ambualte 4' forward today w/ modA x2 and use of RW. additional stands w/ mod/ maxA x2 from recliner w/ standing tolerance of 2 then 1 min for pericare + pad changes. Time for repositioning w/ LE elevated in chair and chair alarm intact. PT recommending rehab on d/c- however family may want pt to return to [facility]. CM following for d/c planning recommendations. PT will follow and porgress as able while inpt/.   Goals   Patient Goals to go and see his wife. STG Expiration Date 09/29/23   PT Treatment Day 1   Plan   Treatment/Interventions ADL retraining;Functional transfer training;LE strengthening/ROM; Elevations; Therapeutic exercise; Endurance training;Cognitive reorientation;Patient/family training;Equipment eval/education; Bed mobility;Gait training; Compensatory technique education;Spoke to nursing;Spoke to case management;Spoke to advanced practitioner;OT   Progress Slow progress, cognitive deficits   PT Frequency 3-5x/wk   Recommendation   PT Discharge Recommendation Post acute rehabilitation services   Equipment Recommended Walker   AM-PAC Basic Mobility Inpatient   Turning in Flat Bed Without Bedrails 2   Lying on Back to Sitting on Edge of Flat Bed Without Bedrails 2   Moving Bed to Chair 2   Standing Up From Chair Using Arms 2   Walk in Room 1   Climb 3-5 Stairs With Railing 1   Basic Mobility Inpatient Raw Score 10   Highest Level Of Mobility   -HLM Goal 4: Move to chair/commode   JH-HLM Achieved 5: Stand (1 or more minutes)   End of Consult   Patient Position at End of Consult Bedside chair;Bed/Chair alarm activated; All needs within reach   The patient's AM-PAC Basic Mobility Inpatient Short Form Raw Score is 10. A Raw score of less than or equal to 16 suggests the patient may benefit from discharge to post-acute rehabilitation services. Please also refer to the recommendation of the Physical Therapist for safe discharge planning.

## 2023-09-18 NOTE — PROGRESS NOTES
[address] [facility]  Progress Note  Name: Venkat Watters  MRN: [id number]  [facility] I Date of Admission: 9/13/2023   Date of Service: 9/18/2023 I Hospital Day: 5    Assessment/Plan   Urinary catheter dysfunction [facility])  Assessment & Plan  · Domínguez catheter balloon was found midshaft in the patient not draining. Unable to deflate. · Urology was consulted and ultimately drained the balloon by inserting a 22-gauge needle. · 16-gauge Domínguez catheter was reinserted 9/14 in the OR. · Faiza urine noted   · Continue to monitor    Tachy-amanda syndrome [facility])  Assessment & Plan  · History of pacemaker placement  · Continue home metoprolol    Dementia with behavioral disturbance ([address])  Assessment & Plan  · Oriented to person only at baseline.   · Geriatrics consulted and note appreciated  · Continue home regimen including Aricept, Namenda, Abilify, Zoloft      Type 2 diabetes mellitus with kidney complication, with long-term current use of insulin [facility])  Assessment & Plan  Lab Results   Component Value Date    HGBA1C 8.0 (H) 05/25/2023       Recent Labs     09/18/23  0024 09/18/23  0539 09/18/23  0744 09/18/23  1133   POCGLU 112 118 155* 182*       Blood Sugar Average: Last 72 hrs:  (P) 167.2365032188268311     -Restarted on home Levemir.  -Continue on sliding scale insulin    Mixed hyperlipidemia  Assessment & Plan  · Continue home atorvastatin    Essential hypertension  Assessment & Plan  · Continue home metoprolol    * Femur fracture, right (HCC)  Assessment & Plan  · Secondary to fall  · 9/3 XR showed: Acute mildly displaced right subcapital femur fracture  · Orthopedics consulted and note appreciated  · 9/14 right hemiarthroplasty  · WBAT RLE  · Multimodal pain regimen  · DVT prophylaxis: Lovenox  · PT/OT: rehab, will need rehab placement             Bowel Regimen: colace  VTE Prophylaxis:Enoxaparin (Lovenox)     Disposition: dc back to facility tomorrow    Subjective   Chief Complaint: none    Subjective: pain controlled. Objective   Vitals:   Temp:  [97.5 °F (36.4 °C)-98.6 °F (37 °C)] 97.9 °F (36.6 °C)  HR:  [60-72] 65  Resp:  [16-20] 19  BP: (123-172)/(62-84) 147/75    I/O       09/16 0701  09/17 0700 09/17 0701  09/18 0700 09/18 0701  09/19 0700    P. O. 598 662 240    IV Piggyback 100      Total Intake(mL/kg) 698 (8.9) 662 (8.5) 240 (3.1)    Urine (mL/kg/hr) 1150 (0.6) 2080 (1.1)     Stool  0     Total Output 1150 2080     Net -452 -1418 +240           Unmeasured Stool Occurrence  1 x            Physical Exam:   GENERAL APPEARANCE:  NAD, non-toxic appearing  NEURO:  Awake, GCS 15, no focal deficits  HEENT:  Moist mucous membranes, NC/AT  CV:  Regular rate and rhythm  LUNGS:  Clear and equal breath sounds to auscultation bilaterally  GI:  Soft, nontender, and nondistended  MSK:  Moving all extremities, motor/ sensory intact  SKIN:  Warm, dry, and intact      Invasive Devices     Peripheral Intravenous Line  Duration           Peripheral IV 09/18/23 Dorsal (posterior); Left Forearm <1 day          Drain  Duration           Urethral Catheter Coude 16 Fr. 4 days                      Lab Results: Results: I have personally reviewed all pertinent laboratory/tests results  Imaging: I have personally reviewed pertinent reports.      Other Studies:

## 2023-09-18 NOTE — OCCUPATIONAL THERAPY NOTE
Occupational Therapy Progress Note     Patient Name: Carlos Mason  Today's Date: 9/18/2023  Problem List  Principal Problem:    Femur fracture, right ([address])  Active Problems:    Essential hypertension    Mixed hyperlipidemia    Type 2 diabetes mellitus with kidney complication, with long-term current use of insulin (HCC)    Dementia with behavioral disturbance (HCC)    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HCC)    Urinary catheter dysfunction ([address])        09/18/23 0925   OT Last Visit   OT Visit Date 09/18/23   Note Type   Note Type Treatment   Pain Assessment   Pain Assessment Tool FLACC   Pain Rating: FLACC (Rest) - Face 0   Pain Rating: FLACC (Rest) - Legs 0   Pain Rating: FLACC (Rest) - Activity 0   Pain Rating: FLACC (Rest) - Cry 0   Pain Rating: FLACC (Rest) - Consolability 0   Score: FLACC (Rest) 0   Pain Rating: FLACC (Activity) - Face 1   Pain Rating: FLACC (Activity) - Legs 0   Pain Rating: FLACC (Activity) - Activity 0   Pain Rating: FLACC (Activity) - Cry 1   Pain Rating: FLACC (Activity) - Consolability 1   Score: FLACC (Activity) 3   Restrictions/Precautions   Weight Bearing Precautions Per Order Yes   RUE Weight Bearing Per Order WBAT   LUE Weight Bearing Per Order WBAT   RLE Weight Bearing Per Order WBAT   LLE Weight Bearing Per Order WBAT   Braces or Orthoses   (abduction wedge)   Other Precautions Cognitive; Chair Alarm; Bed Alarm; Impulsive; Fall Risk;Pain   Lifestyle   Autonomy required assist from facility staff for adls, ambulates with RW - meals/homemaking provided by facility   Reciprocal Relationships supportive family and facility staff   Service to Others retired   Intrinsic Gratification sedentary - cannot ID interests   ADL   Eating Assistance 5  Supervision/Setup   Grooming Assistance 4  Minimal Assistance   [address]  [address] [facility] [address]  [address] [place] [address] Assistance  2  Maximal Assistance   Bed Mobility   Supine to Sit 3  Moderate assistance   Additional items Assist x 2   Transfers   Sit to Stand 3  Moderate assistance   Additional items Assist x 2   Stand to Sit 3  Moderate assistance   Additional items Assist x 2   Functional Mobility   Functional Mobility 3  Moderate assistance   Additional Comments x2   Additional items Rolling walker   Subjective   Subjective offers no c/o   Cognition   Overall Cognitive Status Impaired   Arousal/Participation Arousable; Cooperative   Attention Attends with cues to redirect   Orientation Level Oriented to person   Memory Decreased short term memory;Decreased recall of recent events;Decreased recall of precautions   Following Commands Follows one step commands with increased time or repetition   Comments no recall of THR prec   Activity Tolerance   Activity Tolerance Patient limited by fatigue;Patient limited by pain   Assessment   Assessment Pt seen for OT session - functionally improved requiring min a for UB and max a for LB adls, mod a x 2 for bed mobility and transfers however continues to have no recall of THR prec and requires max cues for carryover with same during functional tasks - overall fair tolerance to activity - continue to recommend inpt rehab when medically cleared- OT to continue to follow to address goals as stated on eval   Plan   Treatment Interventions ADL retraining;Functional transfer training; Endurance training;Cognitive reorientation;Patient/family training;Equipment evaluation/education; Compensatory technique education; Activityengagement   Goal Expiration Date 09/29/23   OT Treatment Day 1   OT Frequency 2-3x/wk   Recommendation   OT Discharge Recommendation Post acute rehabilitation services   Additional Comments  OK to return to [facility] if care needs can be met   AM-PAC Daily Activity Inpatient   Lower Body Dressing 2   Bathing 2   Toileting 2   Upper Body Dressing 3   Grooming 3   Eating 4   Daily Activity Raw Score 16   Daily Activity Standardized Score (Calc for Raw Score >=11) 35.96   AM-PAC Applied Cognition Inpatient   Following a Speech/Presentation 1   Understanding Ordinary Conversation 3   Taking Medications 2   Remembering Where Things Are Placed or Put Away 2   Remembering List of 4-5 Errands 1   Taking Care of Complicated Tasks 1   Applied Cognition Raw Score 10   Applied Cognition Standardized Score 24.98   End of Consult   Education Provided Yes   Patient Position at End of Consult Bedside chair;Bed/Chair alarm activated; All needs within reach   Nurse Communication Nurse aware of consult       The patient's raw score on the AM-PAC Daily Activity Inpatient Short Form is 16. A raw score of less than 19 suggests the patient may benefit from discharge to post-acute rehabilitation services. Please refer to the recommendation of the Occupational Therapist for safe discharge planning.       [facility]

## 2023-09-19 ENCOUNTER — APPOINTMENT (INPATIENT)
Dept: RADIOLOGY | Facility: HOSPITAL | Age: 88
DRG: 522 | End: 2023-09-19
Payer: COMMERCIAL

## 2023-09-19 VITALS
TEMPERATURE: 97.1 F | OXYGEN SATURATION: 100 % | WEIGHT: 170.19 LBS | SYSTOLIC BLOOD PRESSURE: 146 MMHG | BODY MASS INDEX: 23.83 KG/M2 | DIASTOLIC BLOOD PRESSURE: 88 MMHG | RESPIRATION RATE: 18 BRPM | HEIGHT: 71 IN | HEART RATE: 61 BPM

## 2023-09-19 LAB
GLUCOSE SERPL-MCNC: 107 MG/DL (ref 65–140)
GLUCOSE SERPL-MCNC: 149 MG/DL (ref 65–140)

## 2023-09-19 PROCEDURE — 99238 HOSP IP/OBS DSCHRG MGMT 30/<: CPT | Performed by: NURSE PRACTITIONER

## 2023-09-19 PROCEDURE — 82948 REAGENT STRIP/BLOOD GLUCOSE: CPT

## 2023-09-19 PROCEDURE — 72170 X-RAY EXAM OF PELVIS: CPT

## 2023-09-19 PROCEDURE — 99232 SBSQ HOSP IP/OBS MODERATE 35: CPT | Performed by: INTERNAL MEDICINE

## 2023-09-19 RX ORDER — OXYCODONE HYDROCHLORIDE 5 MG/1
TABLET ORAL
Qty: 20 TABLET | Refills: 0 | Status: SHIPPED | OUTPATIENT
Start: 2023-09-19

## 2023-09-19 RX ORDER — ACETAMINOPHEN 325 MG/1
975 TABLET ORAL EVERY 8 HOURS SCHEDULED
Refills: 0
Start: 2023-09-19

## 2023-09-19 RX ORDER — ENOXAPARIN SODIUM 100 MG/ML
40 INJECTION SUBCUTANEOUS DAILY
Qty: 11.2 ML | Refills: 0
Start: 2023-09-19 | End: 2023-10-17

## 2023-09-19 RX ADMIN — MEMANTINE 5 MG: 5 TABLET ORAL at 08:37

## 2023-09-19 RX ADMIN — INSULIN DETEMIR 10 UNITS: 100 INJECTION, SOLUTION SUBCUTANEOUS at 08:43

## 2023-09-19 RX ADMIN — MAGNESIUM OXIDE TAB 400 MG (241.3 MG ELEMENTAL MG) 400 MG: 400 (241.3 MG) TAB at 08:38

## 2023-09-19 RX ADMIN — DOCUSATE SODIUM 100 MG: 100 CAPSULE, LIQUID FILLED ORAL at 08:38

## 2023-09-19 RX ADMIN — METOPROLOL TARTRATE 50 MG: 50 TABLET, FILM COATED ORAL at 08:38

## 2023-09-19 RX ADMIN — SERTRALINE 100 MG: 100 TABLET, FILM COATED ORAL at 08:37

## 2023-09-19 RX ADMIN — PANTOPRAZOLE SODIUM 40 MG: 40 TABLET, DELAYED RELEASE ORAL at 05:29

## 2023-09-19 RX ADMIN — ARIPIPRAZOLE 5 MG: 5 TABLET ORAL at 08:38

## 2023-09-19 RX ADMIN — ACETAMINOPHEN 975 MG: 325 TABLET, FILM COATED ORAL at 05:28

## 2023-09-19 RX ADMIN — PROPAFENONE HYDROCHLORIDE 225 MG: 150 TABLET, FILM COATED ORAL at 08:38

## 2023-09-19 RX ADMIN — ENOXAPARIN SODIUM 30 MG: 30 INJECTION SUBCUTANEOUS at 00:00

## 2023-09-19 RX ADMIN — POLYETHYLENE GLYCOL 3350 17 G: 17 POWDER, FOR SOLUTION ORAL at 08:37

## 2023-09-19 RX ADMIN — Medication 2000 UNITS: at 08:37

## 2023-09-19 NOTE — ASSESSMENT & PLAN NOTE
Lab Results   Component Value Date    HGBA1C 8.0 (H) 05/25/2023       Recent Labs     09/18/23  1133 09/18/23  1758 09/19/23  0001 09/19/23  0528   POCGLU 182* 149* 149* 107       Blood Sugar Average: Last 72 hrs:  (P) [id number]     -Restarted on home Levemir.  -Continue on sliding scale insulin

## 2023-09-19 NOTE — PLAN OF CARE
Problem: Potential for Falls  Goal: Patient will remain free of falls  Description: INTERVENTIONS:  - Educate patient/family on patient safety including physical limitations  - Instruct patient to call for assistance with activity   - Consult OT/PT to assist with strengthening/mobility   - Keep Call bell within reach  - Keep bed low and locked with side rails adjusted as appropriate  - Keep care items and personal belongings within reach  - Initiate and maintain comfort rounds  - Make Fall Risk Sign visible to staff  - Offer Toileting every 2 Hours, in advance of need  - Initiate/Maintain bed alarm  - Apply yellow socks and bracelet for high fall risk patients  - Consider moving patient to room near nurses station  9/19/2023 1004 by Sabi Crisostomo RN  Outcome: Adequate for Discharge  9/19/2023 0721 by Sabi Crisostomo RN  Outcome: Progressing     Problem: MOBILITY - ADULT  Goal: Maintain or return to baseline ADL function  Description: INTERVENTIONS:  -  Assess patient's ability to carry out ADLs; assess patient's baseline for ADL function and identify physical deficits which impact ability to perform ADLs (bathing, care of mouth/teeth, toileting, grooming, dressing, etc.)  - Assess/evaluate cause of self-care deficits   - Assess range of motion  - Assess patient's mobility; develop plan if impaired  - Assess patient's need for assistive devices and provide as appropriate  - Encourage maximum independence but intervene and supervise when necessary  - Involve family in performance of ADLs  - Assess for home care needs following discharge   - Consider OT consult to assist with ADL evaluation and planning for discharge  - Provide patient education as appropriate  9/19/2023 1004 by Sabi Crisostomo RN  Outcome: Adequate for Discharge  9/19/2023 0721 by Sabi Crisostomo RN  Outcome: Progressing  Goal: Maintains/Returns to pre admission functional level  Description: INTERVENTIONS:  - Perform BMAT or MOVE assessment daily.   - Set and communicate daily mobility goal to care team and patient/family/caregiver. - Collaborate with rehabilitation services on mobility goals if consulted. - Reposition patient every 2 hours. - Out of bed for toileting  - Record patient progress and toleration of activity level   9/19/2023 1004 by Yash Ramires RN  Outcome: Adequate for Discharge  9/19/2023 0721 by Yash Ramires RN  Outcome: Progressing     Problem: Nutrition/Hydration-ADULT  Goal: Nutrient/Hydration intake appropriate for improving, restoring or maintaining nutritional needs  Description: Monitor and assess patient's nutrition/hydration status for malnutrition. Collaborate with interdisciplinary team and initiate plan and interventions as ordered. Monitor patient's weight and dietary intake as ordered or per policy. Utilize nutrition screening tool and intervene as necessary. Determine patient's food preferences and provide high-protein, high-caloric foods as appropriate.      INTERVENTIONS:  - Monitor oral intake, urinary output, labs, and treatment plans  - Assess nutrition and hydration status and recommend course of action  - Evaluate amount of meals eaten  - Assist patient with eating if necessary   - Allow adequate time for meals  - Recommend/ encourage appropriate diets, oral nutritional supplements, and vitamin/mineral supplements  - Order, calculate, and assess calorie counts as needed  - Recommend, monitor, and adjust tube feedings and TPN/PPN based on assessed needs  - Assess need for intravenous fluids  - Provide specific nutrition/hydration education as appropriate  - Include patient/family/caregiver in decisions related to nutrition  9/19/2023 1004 by Yash Ramires RN  Outcome: Adequate for Discharge  9/19/2023 0721 by Yash Ramires RN  Outcome: Progressing     Problem: Nutrition/Hydration-ADULT  Goal: Nutrient/Hydration intake appropriate for improving, restoring or maintaining nutritional needs  Description: Monitor and assess patient's nutrition/hydration status for malnutrition. Collaborate with interdisciplinary team and initiate plan and interventions as ordered. Monitor patient's weight and dietary intake as ordered or per policy. Utilize nutrition screening tool and intervene as necessary. Determine patient's food preferences and provide high-protein, high-caloric foods as appropriate.      INTERVENTIONS:  - Monitor oral intake, urinary output, labs, and treatment plans  - Assess nutrition and hydration status and recommend course of action  - Evaluate amount of meals eaten  - Assist patient with eating if necessary   - Allow adequate time for meals  - Recommend/ encourage appropriate diets, oral nutritional supplements, and vitamin/mineral supplements  - Order, calculate, and assess calorie counts as needed  - Recommend, monitor, and adjust tube feedings and TPN/PPN based on assessed needs  - Assess need for intravenous fluids  - Provide specific nutrition/hydration education as appropriate  - Include patient/family/caregiver in decisions related to nutrition  9/19/2023 1004 by Trey Aguilar RN  Outcome: Adequate for Discharge  9/19/2023 0721 by Trey Aguilar RN  Outcome: Progressing     Problem: Prexisting or High Potential for Compromised Skin Integrity  Goal: Skin integrity is maintained or improved  Description: INTERVENTIONS:  - Identify patients at risk for skin breakdown  - Assess and monitor skin integrity  - Assess and monitor nutrition and hydration status  - Monitor labs   - Assess for incontinence   - Turn and reposition patient  - Assist with mobility/ambulation  - Relieve pressure over bony prominences  - Avoid friction and shearing  - Provide appropriate hygiene as needed including keeping skin clean and dry  - Evaluate need for skin moisturizer/barrier cream  - Collaborate with interdisciplinary team   - Patient/family teaching  - Consider wound care consult   9/19/2023 1004 by Melissa Barriga RN  Outcome: Adequate for Discharge  9/19/2023 0721 by Melissa Barriga RN  Outcome: Progressing     Problem: GENITOURINARY - ADULT  Goal: Maintains or returns to baseline urinary function  Description: INTERVENTIONS:  - Assess urinary function  - Encourage oral fluids to ensure adequate hydration if ordered  - Administer IV fluids as ordered to ensure adequate hydration  - Administer ordered medications as needed  - Offer frequent toileting  - Follow urinary retention protocol if ordered  9/19/2023 1004 by Melissa Barriga RN  Outcome: Adequate for Discharge  9/19/2023 0721 by Melissa Barriga RN  Outcome: Progressing  Goal: Absence of urinary retention  Description: INTERVENTIONS:  - Assess patient’s ability to void and empty bladder  - Monitor I/O  - Bladder scan as needed  - Discuss with physician/AP medications to alleviate retention as needed  - Discuss catheterization for long term situations as appropriate  9/19/2023 1004 by Melissa Barriga RN  Outcome: Adequate for Discharge  9/19/2023 0721 by Melissa Barriga RN  Outcome: Progressing  Goal: Urinary catheter remains patent  Description: INTERVENTIONS:  - Assess patency of urinary catheter  - If patient has a chronic eduardo, consider changing catheter if non-functioning  - Follow guidelines for intermittent irrigation of non-functioning urinary catheter  9/19/2023 1004 by Melissa Barriga RN  Outcome: Adequate for Discharge  9/19/2023 0721 by Melissa Barriga RN  Outcome: Progressing

## 2023-09-19 NOTE — ASSESSMENT & PLAN NOTE
· Secondary to fall  · 9/3 XR showed: Acute mildly displaced right subcapital femur fracture  · Orthopedics consulted and note appreciated  · 9/14 right hemiarthroplasty  · WBAT RLE  · Multimodal pain regimen  · DVT prophylaxis: Lovenox x28 days  · PT/OT: rehab

## 2023-09-19 NOTE — PROGRESS NOTES
Progress Note - Geriatric Medicine   [employer] 80 y.o. male MRN: [id number]  Unit/Bed#: [facility] 602-01 Encounter: [id number]      Assessment/Plan:    Ambulatory dysfunction with fall  -reportedly mechanical fall at long term care facility   -injuries as outlined below  -Requires use of walker for ambulation at baseline, hx multiple recent falls  -remains high risk future falls, cont fall precautions   -PT/OT following     Right subcapital femur fracture  -S/p fall as outlined above  -Noted on right femur XR obtained on initial presentation   -s/p right hip hemiarthroplasty 9/14  -post hip precautions  -cont acute pain control  -PT/OT      Acute pain due to trauma   -continue acute multimodal pain control tapering off as pain improves with healing   -recommend  bowel regimen to prevent and treat constipation due to increased risk with acute pain and opiate pain medications    Acute blood loss anemia  -evidenced by >2g drop in Hb from 14 to 10.7  -Continue monitor for ongoing acute blood loss anemia  -IVF resuscitation as indicated     Frontotemporal dementia  -moderate and progressive  -at baseline dependent for iADLs requiring increasing assist with ADLs  -currently residing in secure memory unit of long term care facility   -maintained on aripiprazole, donepezil, Zoloft, memantine and lorazepam as managed by care facility   -memantine dose recently reduced as o/p due to poor renal function, continue to dose adjust as indicated   -Cautious use donepezil and namenda due to possible exacerbation of tachy/amanda syn  -CTH obtained on admission images personally viewed, reveals mild frontal lobe atrophy out of proportion to rest of parenchyma as well as general diffuse chronic microangiopathic changes   -TSH WNL 3.06, B12 slightly low at 331, consider temp increase in supplementation to goal >400  -avoid addition of new or increasing dose antipsychotics given increased risk cardiovascular events in pts with underlying dementia as well as risk paradoxic rxn  -continue to encourage calm quite env to reduce risk overstimulation and redirect unwanted behaviors as first line   -Anticipate return to previous facility on hospital discharge     DM-II  -A1c 8.0, given age and comorbidities target A1c of approximately 8 is reasonable  -Maintained on basal bolus insulin regimen as outpatient  -Target blood sugar during hospitalization 140-180 to reduce risk hypoglycemia, continues to be well controlled  -Continue close follow-up with PCP for age-appropriate ongoing diabetic screening and cares     Impaired Vision  -recommend use of corrective lenses at all appropriate times  -Consider large font for printed materials provided to patient      Impaired mastication  -Requires use of dentures  -encourage use all appropriate times  -ensure meal consistency appropriate for abilities  -continue aspiration precautions     Impaired Hearing  -Encourage use of hearing aids at all appropriate times  -encourage providers and caregivers to speak slowly and clearly directly to patient  -minimize background noise to encourage patient engagement  -consider use of hearing amplifier to reduce risk of straining to hear if hearing aids are not present or are not sufficient   -Encourage use of teach back method to ensure clear communication     High risk developing delirium   -Patient is high risk of delirium due to age, fall, traumatic injuries, acute pain, dementia, hosp env  -continue delirium precautions  -maintain normal sleep/wake cycle, ensure pain controlled, reorient frequently   -redirect unwanted behaviors as first line     Frailty syndrome in geriatric patient   -clinical frailty scale stage VI/VII, moderate to severely frail  -Continue to encourage well-balanced nutrition, consider supplements between meals if oral intake is poor  -Optimize chronic conditions and address acute metabolic derangements as arise  -Continue to ensure that treatment interventions along with patient's wishes and goals of care    Subjective:     Shoaib Montano is seen and examined at bedside where he is lying resting, he is sleeping and does not offer any acute complaints. Review of Systems   Unable to perform ROS: Dementia     Objective:     Vitals: Blood pressure 146/88, pulse 61, temperature (!) 97.1 °F (36.2 °C), resp. rate 18, height 5' 11" (1.803 m), weight 77.2 kg (170 lb 3.1 oz), SpO2 100 %. ,Body mass index is 23.74 kg/m². Intake/Output Summary (Last 24 hours) at 9/19/2023 0952  Last data filed at 9/19/2023 0920  Gross per 24 hour   Intake 480 ml   Output 2050 ml   Net -1570 ml     Current Medications: Reviewed    Physical Exam:   Physical Exam  Vitals and nursing note reviewed. Constitutional:       Comments: Frail chronically ill appearing elderly male   HENT:      Head: Normocephalic. Nose: Nose normal.      Mouth/Throat:      Mouth: Mucous membranes are dry. Eyes:      General:         Right eye: No discharge. Left eye: No discharge. Cardiovascular:      Rate and Rhythm: Normal rate and regular rhythm. Pulmonary:      Effort: Pulmonary effort is normal. No respiratory distress. Abdominal:      General: There is no distension. Palpations: Abdomen is soft. Musculoskeletal:      Cervical back: Neck supple. Right lower leg: No edema. Left lower leg: No edema. Comments: Reduced overall muscle mass    Abductor pillow between knees bilaterally    Skin:     General: Skin is warm and dry. Neurological:      Comments: Sleeping, does not awaken to voice or light tactile stimuli       Psychiatric:      Comments: Does not appear restless         Invasive Devices     Peripheral Intravenous Line  Duration           Peripheral IV 09/18/23 Dorsal (posterior); Left Forearm 1 day          Drain  Duration           Urethral Catheter Coude 16 Fr. 4 days              Lab Results:     I have personally reviewed pertinent lab results including the following:    Results from last 7 days   Lab Units 09/17/23  0409 09/16/23  1120 09/15/23  0455   WBC Thousand/uL 6.84 8.58 12.71*   HEMOGLOBIN g/dL 10.8* 10.7* 11.7*   HEMATOCRIT % 32.3* 32.1* 35.2*   PLATELETS Thousands/uL 161 145* 150   NEUTROS PCT % 68 74 83*   MONOS PCT % 10 8 8   EOS PCT % 5 4 0     Results from last 7 days   Lab Units 09/17/23  0409 09/16/23  1120 09/15/23  0455   POTASSIUM mmol/L 3.8 3.4* 3.9   CHLORIDE mmol/L 102 101 104   CO2 mmol/L 27 30 27   BUN mg/dL 28* 30* 30*   CREATININE mg/dL 1.31* 1.45* 1.78*   CALCIUM mg/dL 8.7 8.7 8.8     I have personally reviewed the following imaging study reports in PACS:    9/19/23- XR pelvis

## 2023-09-19 NOTE — PLAN OF CARE
Problem: Potential for Falls  Goal: Patient will remain free of falls  Description: INTERVENTIONS:  - Educate patient/family on patient safety including physical limitations  - Instruct patient to call for assistance with activity   - Consult OT/PT to assist with strengthening/mobility   - Keep Call bell within reach  - Keep bed low and locked with side rails adjusted as appropriate  - Keep care items and personal belongings within reach  - Initiate and maintain comfort rounds  - Make Fall Risk Sign visible to staff  - Offer Toileting every 2 Hours, in advance of need  - Initiate/Maintain bed alarm  - Apply yellow socks and bracelet for high fall risk patients  - Consider moving patient to room near nurses station  Outcome: Progressing     Problem: MOBILITY - ADULT  Goal: Maintain or return to baseline ADL function  Description: INTERVENTIONS:  -  Assess patient's ability to carry out ADLs; assess patient's baseline for ADL function and identify physical deficits which impact ability to perform ADLs (bathing, care of mouth/teeth, toileting, grooming, dressing, etc.)  - Assess/evaluate cause of self-care deficits   - Assess range of motion  - Assess patient's mobility; develop plan if impaired  - Assess patient's need for assistive devices and provide as appropriate  - Encourage maximum independence but intervene and supervise when necessary  - Involve family in performance of ADLs  - Assess for home care needs following discharge   - Consider OT consult to assist with ADL evaluation and planning for discharge  - Provide patient education as appropriate  Outcome: Progressing  Goal: Maintains/Returns to pre admission functional level  Description: INTERVENTIONS:  - Perform BMAT or MOVE assessment daily.   - Set and communicate daily mobility goal to care team and patient/family/caregiver. - Collaborate with rehabilitation services on mobility goals if consulted. - Reposition patient every 2 hours.   - Out of bed for toileting  - Record patient progress and toleration of activity level   Outcome: Progressing     Problem: Nutrition/Hydration-ADULT  Goal: Nutrient/Hydration intake appropriate for improving, restoring or maintaining nutritional needs  Description: Monitor and assess patient's nutrition/hydration status for malnutrition. Collaborate with interdisciplinary team and initiate plan and interventions as ordered. Monitor patient's weight and dietary intake as ordered or per policy. Utilize nutrition screening tool and intervene as necessary. Determine patient's food preferences and provide high-protein, high-caloric foods as appropriate.      INTERVENTIONS:  - Monitor oral intake, urinary output, labs, and treatment plans  - Assess nutrition and hydration status and recommend course of action  - Evaluate amount of meals eaten  - Assist patient with eating if necessary   - Allow adequate time for meals  - Recommend/ encourage appropriate diets, oral nutritional supplements, and vitamin/mineral supplements  - Order, calculate, and assess calorie counts as needed  - Recommend, monitor, and adjust tube feedings and TPN/PPN based on assessed needs  - Assess need for intravenous fluids  - Provide specific nutrition/hydration education as appropriate  - Include patient/family/caregiver in decisions related to nutrition  Outcome: Progressing     Problem: Prexisting or High Potential for Compromised Skin Integrity  Goal: Skin integrity is maintained or improved  Description: INTERVENTIONS:  - Identify patients at risk for skin breakdown  - Assess and monitor skin integrity  - Assess and monitor nutrition and hydration status  - Monitor labs   - Assess for incontinence   - Turn and reposition patient  - Assist with mobility/ambulation  - Relieve pressure over bony prominences  - Avoid friction and shearing  - Provide appropriate hygiene as needed including keeping skin clean and dry  - Evaluate need for skin moisturizer/barrier cream  - Collaborate with interdisciplinary team   - Patient/family teaching  - Consider wound care consult   Outcome: Progressing     Problem: GENITOURINARY - ADULT  Goal: Maintains or returns to baseline urinary function  Description: INTERVENTIONS:  - Assess urinary function  - Encourage oral fluids to ensure adequate hydration if ordered  - Administer IV fluids as ordered to ensure adequate hydration  - Administer ordered medications as needed  - Offer frequent toileting  - Follow urinary retention protocol if ordered  Outcome: Progressing  Goal: Absence of urinary retention  Description: INTERVENTIONS:  - Assess patient’s ability to void and empty bladder  - Monitor I/O  - Bladder scan as needed  - Discuss with physician/AP medications to alleviate retention as needed  - Discuss catheterization for long term situations as appropriate  Outcome: Progressing  Goal: Urinary catheter remains patent  Description: INTERVENTIONS:  - Assess patency of urinary catheter  - If patient has a chronic eduardo, consider changing catheter if non-functioning  - Follow guidelines for intermittent irrigation of non-functioning urinary catheter  Outcome: Progressing

## 2023-09-19 NOTE — DISCHARGE SUMMARY
[address] [facility]  Discharge- Jamie Medina 1935, 80 y.o. male MRN: [id number]  Unit/Bed#: [facility] 602-01 Encounter: [id number]  Primary Care Provider: Rodolfo Galindo MD   Date and time admitted to hospital: 9/13/2023  1:55 AM    * Femur fracture, right ([address])  Assessment & Plan  · Secondary to fall  · 9/3 XR showed: Acute mildly displaced right subcapital femur fracture  · Orthopedics consulted and note appreciated  · 9/14 right hemiarthroplasty  · WBAT RLE  · Multimodal pain regimen  · DVT prophylaxis: Lovenox x28 days  · PT/OT: rehab    Urinary catheter dysfunction ([address])  Assessment & Plan  · Domínguez catheter balloon was found midshaft in the patient not draining. Unable to deflate. · Urology was consulted and ultimately drained the balloon by inserting a 22-gauge needle. · 16-gauge Domínguez catheter was reinserted 9/14 in the OR. · Yellow/Faiza urine noted   · Continue to monitor  · Per urology Domínguez catheter is not to be removed until patient is back to full mobility. Voiding trial to be completed at rehab. Follow-up with urology as needed. 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   · Geriatrics consulted and note appreciated  · Continue home regimen including Aricept, Namenda, Abilify, Zoloft      Type 2 diabetes mellitus with kidney complication, with long-term current use of insulin [facility])  Assessment & Plan  Lab Results   Component Value Date    HGBA1C 8.0 (H) 05/25/2023       Recent Labs     09/18/23  1133 09/18/23  1758 09/19/23  0001 09/19/23  0528   POCGLU 182* 149* 149* 107       Blood Sugar Average: Last 72 hrs:  (P) [id number]     -Restarted on home Levemir.  -Continue on sliding scale insulin    Mixed hyperlipidemia  Assessment & Plan  · Continue home atorvastatin    Essential hypertension  Assessment & Plan  · Continue home metoprolol              Medical Problems     Resolved Problems  Date Reviewed: 9/19/2023   None         Admission Date:   Admission Orders (From admission, onward)     Ordered        09/13/23 0445  Inpatient Admission  Once                        Admitting Diagnosis: Closed fracture of neck of right femur, initial encounter ([address] ) [S72.001A]  Unspecified multiple injuries, initial encounter [T07. XXXA]    HPI: Per Dr Ty Boudreaux H&P "Clari Yu is a 80 y.o. male with PMH of dementia, CAD, afib with tachy amanda (s/p pacemaker), HTN, and HLD who presents with right femur fracture. Patient unable to provide history but per EMS he was found down in his room next to his bed."     Subjective: "Oh, hi"    Patient denies any complaints this morning. ROS is limited due to dementia. Objective:  General: No acute distress  Neuro: GCS is 14 due to confusion  HEENT: Normocephalic, atraumatic. Neck supple. Cardiac: Regular rate and rhythm.  +2 radial and dorsalis pedis pulses, bilaterally. Lungs: Clear to auscultation, bilaterally. Abdomen: Soft, nontender. Pelvis: Stable. MSK: No significant tenderness on palpation of right hip. Limited range of motion of right hip. All other extremities are nontender. No significant hematoma or effusion appreciated surrounding the right hip. MSK: Right hip surgical dressings clean, dry, intact. Procedures Performed: No orders of the defined types were placed in this encounter. Summary of Hospital Course: This an 80-year-old male who suffered a fall resulting in a right hip fracture. He was taken to the OR on 9/14 by orthopedics. Patient did well postoperatively. His labs and vital signs have been monitored closely which showed shown stability. Prior to surgery, catheter was noted to have the balloon inflated in the mid shaft of his penis. Ultimately urology was consulted and intervened in the OR to put the balloon down. Coudé catheter was placed in the OR. Domínguez catheter is to remain until patient is back to his full mobility. He will follow-up with urology as an outpatient as needed-pending results of voiding trial.  Prior to discharge patient was evaluated by PT/OT and recommended for rehab. Rehab placement obtained at [facility]. Significant Findings, Care, Treatment and Services Provided:   XR hip/pelv 1 vw right if performed    Result Date: 9/14/2023  Impression: Right hip replacement Workstation performed: [id number]     XR femur 2 vw right    Result Date: 9/13/2023  Impression: Acute mildly displaced right subcapital femur fracture. Workstation performed: [id number]     XR pelvis ap only 1 or 2 vw    Result Date: 9/13/2023  Impression: Acute displaced subcapital fracture right hip Workstation performed: [id number]     CT head without contrast    Result Date: 9/13/2023  Impression: No acute intracranial abnormality. Workstation performed: [id number]       Complications: none    Condition at Discharge: good         Discharge instructions/Information to patient and family:   See after visit summary for information provided to patient and family. Provisions for Follow-Up Care:  See after visit summary for information related to follow-up care and any pertinent home health orders. PCP: Anitra Soni MD    Disposition: Home    Planned Readmission: No    Discharge Statement   I spent 25 minutes discharging the patient. This time was spent on the day of discharge. I had direct contact with the patient on the day of discharge. Additional documentation is required if more than 30 minutes were spent on discharge. Called daughter, Teresa Lee, updated on patients status--confirms that patient is to be discharged back to [facility]. No questions at the time of discharge. Discharge Medications:  See after visit summary for reconciled discharge medications provided to patient and family.

## 2023-09-19 NOTE — DISCHARGE INSTR - AVS FIRST PAGE
Continue with Domínguez catheter until patient is ambulatory, then complete voiding trial.  Follow-up with urology as needed    Discharge Instructions - Orthopedics  Aime Hartley 80 y.o. male MRN: [id number]  [facility]    Weight Bearing Status:                                           Weight bearing as tolerated to right lower extremity    DVT prophylaxis:  Lovenox 40mg daily at discharge     Pain:  Continue analgesics as directed    Dressing Instructions:   Please keep clean, dry and intact until follow up     Appt Instructions: If you do not have your appointment, please call the clinic at [phone]  Otherwise follow up as scheduled. Contact the office sooner if you experience any increased numbness/tingling in the extremities.

## 2023-09-19 NOTE — ASSESSMENT & PLAN NOTE
· Domínguez catheter balloon was found midshaft in the patient not draining. Unable to deflate. · Urology was consulted and ultimately drained the balloon by inserting a 22-gauge needle. · 16-gauge Domínguez catheter was reinserted 9/14 in the OR. · Yellow/Faiza urine noted   · Continue to monitor  · Per urology Domínguez catheter is not to be removed until patient is back to full mobility. Voiding trial to be completed at rehab. Follow-up with urology as needed.

## 2023-10-03 ENCOUNTER — TELEPHONE (OUTPATIENT)
Dept: OBGYN CLINIC | Facility: HOSPITAL | Age: 88
End: 2023-10-03

## 2023-10-10 LAB
DME PARACHUTE DELIVERY DATE ACTUAL: NORMAL
DME PARACHUTE DELIVERY DATE REQUESTED: NORMAL
DME PARACHUTE ITEM DESCRIPTION: NORMAL
DME PARACHUTE ORDER STATUS: NORMAL
DME PARACHUTE SUPPLIER NAME: NORMAL
DME PARACHUTE SUPPLIER PHONE: NORMAL

## 2023-11-01 NOTE — NURSING NOTE
Morning lispro held d/t parameters not being met 59 y/o female, single, noncaregiver, disabled, lives with [facility], history of Schizophrenia vs Schizoaffectove d/o, multiple past admissions at [facility] (most recent 2020 for decompensated psychosis), and also was hospitalized medically for lactic acidosis and followed by CL team in July 2023, followed by psychiatrist Dr. Love at [facility] (pt has no h/o developmental disability), prescribed Clozaril 100mg qhs , no h/o self-injurious behavior or suicide attempts, no h/o violence or legal issues, PMH Type II DM, HLD, asthma, no h/o substance abuse, BIBEMS activated by rosalizy (Rahat) for disorganization.  The patient is grossly disorganized in thoughts on admission.    9/29: The patient continues to be disorganized, but is taking medications.  Will continue clozapine.  9/30: Psychotic disorganized cont meds encourage cooperation with proper vital signs  10/1: disorganized, but more verbal, cooperative, no SI/HI.   10/2: The patient is a little more organized, but still internally preoccupied.  Sedated from clozapine.  Will continue current dose, consider increase if psychosis continues.  10/3: Patient remains disorganized but behaviorally controlled on the unit. Records show that patient was previously well maintained on Clozaril 350mg. Will titrate Clozaril today and monitor for efficacy and side effects.  10/4: Patient shows slight improvement with organization but continues to be disorganized in thought process. Will continue current dose of clozapine with plans to slowly titrate while monitoring for efficacy and side effects.  10/5: Patient was irritable today and less cooperative than previous but continues to be disorganized during the day. Plan to continue titrating clozapine while monitoring for efficacy and side effects.  10/6: Patient shows improvement in mood and thought process and shows improvement in articulating auditory hallucinations. Will continue current dose of clozapine with plans to slowly titrate while monitoring for efficacy and side effects.  10/7: Patient remains disorganized, resistive with treatment, no reports of AH today, continue clozapine titration    10/8: Remains psychotic, suspicious, delusional about her identity, continue clozapine   10/9: Patient remains disorganized but denies auditory hallucinations overnight. BP soft with orthostatics (+), will continue current dose of clozapine with plans to slowly titrate while monitoring for efficacy and side effects.  10/10: Patient remains disorganized and paranoid on the unit but with improved linear thinking since admission, though overall remains disorganized with loose associations. BP remains soft, will encourage PO intake, compression stockings and will continue current dose of clozapine with plans to slowly titrate while monitoring for efficacy and side effects.  10/11: Patient remains disorganized on the unit and requires encouragement for PO intake. Refusing compression stockings. BP remains soft, will check CMP and start salt tablet prior to titrating clozapine.  10/12: Patient remains disorganized on the unit and requires encouragement for PO intake. CMP within normal limits and orthostatics negative, plan to titrate clozapine tonight and monitor for efficacy and side effects.  10/13: Remains disorganized  10/16: Patient remains disorganized and with soft BP. Requires encouragement for PO intake. Will continue current dose of clozapine with plans to slowly titrate while monitoring for efficacy and side effects.  10/17: Patient remains disorganized. BP stable, will titrate clozapine tonight and monitor for efficacy and side effects.  10/18: Patient remains disorganized. BP stable, tolerating clozapine titration well. Will continue current dose with plans to titrate if continuing to tolerate.  10/19: Tolerating meds well. Continue current dose. Found to have Vit D deficiency - will start supplementation  10/20: Patient remains disorganized but with improved mood, tolerating medication well. Will titrate clozapine.  10/23: Patient remains disorganized but continues to tolerate medication. Will titrate clozapine tonight  10/24: More visible, still disorganized, AH ongoing  10/25: Patient shows improved thought process but still endorses AH. Will titrate clozapine tonight.  10/26: Overall disorganized but improvement with AM sedation. Plan to titrate clozapine tomorrow.   10/27: Will hold off on clozaril titration since we have ordered crushed meds due to possibility of noncompliance on the unit given low clozaril level - no appearance of excessive sedation  10/30: Limited improvement since crushing medications. Will recheck clozaril level tomorrow; left msg with guardian  10/31: Clozapine level pending, some improvement in unit activity engagement, left msg for guardian to discuss baseline and possible discharge  11/1: Clozapine level went from <68 to 1201 on same dose of medications - only difference was it was given crushed. Will recheck tomorrow in case of lab error. Will hold dose for tonight, and start at 100mg po qhs tomorrow; message left with guardian to obtain more information regarding baseline    Plan:  No 1:1 needed, as the patient is calm, no SI.  -Continue clozapine 275 hs  	Check CBC Tuesdays  - Started Fludrocortisone 0.1mg daily with food for orthostatic hypotension; refusing AYAN stockings  - Continue NaCl 1g BID  -Continue Senna and Miralax prn for constipation  -Continue metformin, glimepiride and Linagliptin for DM  	Check fingersticks qid  -Lipitor for cholesterol  -Albuterol prn for asthma  - Vit D deficiency - started supplementation  -Patient provided verbal consent to speak with Zara Giang and patient's outpatient provider.

## 2024-01-23 NOTE — CONSULTS
Consultation - Geriatric Medicine   Barbara Herman 80 y o  male MRN: [id number]  Unit/Bed#: [facility] 621-01 Encounter: [id number]      Assessment/Plan     Ambulatory dysfunction with suspected fall  -unwitnessed but suspected fall PTA per family and injuries identified on admission  -injuries as outlined below  -Requires use of walker for ambulation at baseline  --hx recurrent falls with at least one additional  in past six months  -high risk future falls due to age, hx fall, deconditioning/debility and unfamiliar environment   -encourage good body mechanics and assist with all transfers  -keep personal items and call bell close to prevent reaching  -maintain environment free of fall hazards  -encourage appropriate footwear and adequate lighting at all times when out of bed  -recommend home fall risk assessment, personal fall alert system and wander guard on returning home  -PT and OT following    Multiple right-sided rib fractures (5-10)  -s/p suspected fall as outlined above  -CT chest admission reports fracture of right-sided ribs 5 through 10  -follow-up chest x-ray reports no underlying pneumothorax  -saturating well on room air  -continue acute pain control  -aggressive pulmonary toilet and ISS    Acute pain due to trauma  -recommend pain control per Geriatric pain protocol:  Tylenol 975mg Q8H scheduled  Roxicodone 2 5mg Q4H PRN moderate pain  Roxicodone 5mg Q4H PRN severe pain  Dilaudid 0 2mg Q4H PRN  -consider adjuncts such as lidocaine patch topically  -encourage addition of non-pharmacologic pain treatment including ice and frequent repositioning  -recommend  bowel regimen to prevent and treat constipation due to increased risk with acute pain and opiate pain medications    Frontotemporal dementia  -mild to moderate and progressive  -A&O x1-2 at baseline, requires minimal assistance with ADLs, requires severe assist with IADLs  -wife primary caregiver with support from family residing locally  -high risk Subjective:      Patient ID: Dai Zarate is a 69 y.o. female.    HPI  New patient  Here with two daughters and ex   Daughters providing history   Pt started with forgetfulness age 65 when she retired  Searching for words and how to spell words  Slowly got worse   She has been evaluated at  neurology   Has taken aricept  no help   Had pet brain which showed alzheimer's dementia   She also has diabetes, hyperlipidemia   no longer taking any meds   She has lost a lot of weight and sugars have been ok  She is getting shots from eye doc for retinopathy  In the last few months Dai has really advanced   gets aggressive and anxious at times  does not sleep   lays down and is right back up in a few minutes   she is frightened at times and gets very anxious   Has not had blood work in last few months  Daughters are doing their best to keep their mom home but they realize they cannot do this for prolonged time  Paliative care is getting involved next week  She is supposed to go to see neurology later today but want to do more testing and not seeing much need for this     Review of Systems   Constitutional:  Positive for activity change and appetite change.   HENT: Negative.     Respiratory: Negative.     Cardiovascular: Negative.    Gastrointestinal: Negative.    Genitourinary:         Incontinent of urine     Constipation at times  Wears adult diaper but she does tell her family when she has to urinate   Skin: Negative.    Neurological: Negative.    Psychiatric/Behavioral:  Positive for agitation, behavioral problems, confusion, decreased concentration, hallucinations and sleep disturbance. The patient is nervous/anxious.         Visual hallucinations   swats at things not present     YOB: 1954    Date of Visit:  1/23/2024    No Known Allergies    Outpatient Medications Marked as Taking for the 1/23/24 encounter (Office Visit) with Ajit Granados, DO   Medication Sig Dispense Refill     caregiver burnout, continue psychosocial supports  -MOCA 20/30 (8/2021)  -CTH obtained on admission personally reviewed, at least moderate chronic microangiopathic changes appreciated  -maintained on Aricept and Namenda at baseline   -underlying dementia increases risk of developing encephalopathy/delirium during hospitalization, continue strict delirium precautions  -no recent TSH or B12, consider checking with routine labs  -encourage use of sensory assist devices such as glasses and hearing aids/amplifier to reduce risk sensory impairment contributing to isolation, confusion and precipitous cognitive decline  -avoid use of medications with strong sedating and anticholinergic profiles due to risk of worsening confusion  -minimize transitions between rooms, units, facilities especially late afternoon/early evening as these are highest risk times to precipitate sundowning symptoms  -maintain calm quiet environment reduce risk overstimulation  -reorient frequently as appropriate and indicated    Paroxysmal atrial fibrillation  -home regimen includes rate control with metoprolol and anticoagulation with Coumadin  -INR 1 6 on admission, home Coumadin continued on admission, recommend INR recheck  -continue to assess risk versus benefit of systemic anticoagulation in older adult with dementia and recurrent falls    DM-II with long-term use of insulin  -A1c 7 2, given age and comorbidities goal approximately 7 5-8 would not be unreasonable  -goal blood sugar during hospitalization 140-180 to reduce risk hypoglycemia  -continue close outpatient follow-up with PCP for routine diabetic screenings and cares    Impaired Vision  -recommend use of corrective lenses at all appropriate times  -encourage adequate lighting and encourage use of assistance with ambulation  -keep personal belongings close to person to avoid reaching  -encourage appropriate footwear at all times  -recommend large font for printed materials provided to patient    Impaired mastication  -Requires use of dentures  -ensure meal consistency appropriate for abilities  -continue aspiration precautions    Impaired Hearing  -mildly impaired however patient denies use of hearing aid  -encourage providers and caregivers to speak slowly and clearly directly to patient  -minimize background noise to encourage patient engagement  -recommend use of hearing amplifier to reduce risk of straining to hear   -consider outpatient audiology evaluation  -encourage use of teach back method to ensure clear communication    Delirium precautions  -Patient is high risk of delirium due to age, suspected fall, traumatic injuries, acute pain, hospitalization, underlying dementia  -Initiate delirium precautions  -maintain normal sleep/wake cycle  -minimize overnight interruptions, group overnight vitals/labs/nursing checks as possible  -dim lights, close blinds and turn off tv to minimize stimulation and encourage sleep environment in evenings  -ensure that pain is well controlled  -monitor for fecal and urinary retention which may precipitate delirium  -continue early mobilization and ambulation with assistance as cleared by Trauma to safely do  -provide frequent reorientation and redirection  -encourage family and friends at the bedside to help help calm patient if anxious  -avoid medications which may precipitate or worsen delirium such as tramadol, benzodiazepine, anticholinergics, and benadryl  -encourage hydration and nutrition   -redirect unwanted behaviors as first line    Deconditioning/debility/frailty  -clinical frailty scale stage 5, mildly frail  -multifactorial including age, dementia, CAD, and multiple additional chronic medical comorbidities now fall and acute traumatic injury superimposed on elderly individual with limited physiologic and metabolic reserve  -albumin 3 8, encourage well-balanced nutrition, consider nutritional supplements between meals if oral intake is poor  -continue optimization chronic medical conditions and address acute metabolic derangements as arise    Home medication review    [facility] start pharmacy [phone]:    Amlodipine 5 mg daily  Lipitor 10 mg daily  Donepezil 10 mg daily  Tricor 145 mg daily  NovoLog 8U (eight) BID with meals  Insulin glargine 30U (thirty) BID  Losartan 25 mg daily  Memantine 28 mg daily  Metoprolol tartrate 100 mg daily  Zoloft 100 mg daily  Coumadin - dosing per INR    Care coordination:  Rounded with João Tejeda (RN)    History of Present Illness   Physician Requesting Consult: Areta Landau, MD  Reason for Consult / Principal Problem:  Fall  Hx and PE limited by:  Dementia  Additional history obtained from: Chart review and patient evaluation    HPI: Boo Dorantes is a 80y o  year old male paroxysmal AFib, impaired hearing, GERD, depression, CKD-IV, asthma, anxiety, DM-II with diabetic nephropathy, CAD, and frontal temporal dementia who is admitted to the trauma service with fall found to have multiple right-sided rib fractures on admission imaging is being seen in consultation by Geriatrics for dementia and high risk of developing delirium during hospitalization  He seen examined at bedside, he explains that he had a fall at home leading to admission but is unable to provide further details, he has underlying dementia and is unreliable historian, per admission documentation he was admitted following suspected fall after he wanted away from home and was found by police  On admission imaging he was found have multiple right-sided rib fractures  He resides home with his wife who is his primary caretaker with assistance from adult children who reside locally and check in on him throughout the day  He requires use of walker for ambulation at baseline has at least 1 additional fall in the past 6 months  He is usually alert and oriented to self and general situation with fluctuations    He is mostly independent with ADLs and requires assist for IADLs, he requires use of glasses and dentures, impaired of hearing however denies use of hearing aids  Denies memory concerns and does not appear to have insight into his deficits  Inpatient consult to Gerontology  Consult performed by: Artur Alexander DO  Consult ordered by: Jessica Bal MD        Review of Systems   Constitutional: Positive for appetite change (Poor)  Negative for chills and fever  HENT: Positive for dental problem ( wears dentures)  Eyes: Positive for visual disturbance ( wears glasses)  Respiratory: Negative  Negative for chest tightness and shortness of breath  Cardiovascular: Negative  Gastrointestinal: Negative  Endocrine: Negative  Genitourinary: Negative  Musculoskeletal: Negative  Right-sided chest wall pain   Skin: Negative  Neurological: Negative  Negative for dizziness, light-headedness and headaches  Hematological: Negative  Psychiatric/Behavioral: Negative  All other systems reviewed and are negative      Historical Information   Past Medical History:   Diagnosis Date    Allergic rhinitis     Anxiety     Aspiration of liquid     and food per wife if he is eating too fast or talking when eating    Asthma     as a child    Atrial fibrillation ([facility]     CAD (coronary artery disease)     Cancer of kidney ([facility]     [facility]chronic obstructive [facility]) [facility])     CPAP (continuous positive airway pressure) dependence     Dementia ([facility]     Frontal lobe    Dementia ([address]     Diabetes mellitus ([address]     Diabetes mellitus, type 2 ([address] )     DJD (degenerative joint disease)     Hypercholesterolemia     Hyperlipidemia     Hypertension     Incisional hernia     Kidney disease     Melanoma ([address]     left leg    Obesity     Sleep apnea     wears CPAP    TIA (transient ischemic attack)      Past Surgical History:   Procedure Laterality Date    CARDIAC PACEMAKER PLACEMENT      CHOLECYSTECTOMY      COLONOSCOPY      HERNIA REPAIR      Incisional hernia    NEPHRECTOMY Left 2005    MO ESOPHAGOGASTRODUODENOSCOPY SUBMUCOSAL INJECTION N/A 9/21/2016    Procedure: ESOPHAGOGASTRODUODENOSCOPY (EGD); Surgeon: Jeny Gastelum MD;  Location: [facility]; Service: Gastroenterology    MO ESOPHAGOGASTRODUODENOSCOPY SUBMUCOSAL INJECTION N/A 2/7/2018    Procedure: ESOPHAGOGASTRODUODENOSCOPY (EGD); Surgeon: Jeny Gastelum MD;  Location: [facility]; Service: Gastroenterology    MO ESOPHAGOGASTRODUODENOSCOPY SUBMUCOSAL INJECTION N/A 4/3/2019    Procedure: ESOPHAGOGASTRODUODENOSCOPY (EGD) WITH BOTOX;  Surgeon: Jeny Gastelum MD;  Location: [facility];   Service: Gastroenterology    ROTATOR CUFF REPAIR      TONSILLECTOMY       Social History   Social History     Substance and Sexual Activity   Alcohol Use No     Social History     Substance and Sexual Activity   Drug Use No     Social History     Tobacco Use   Smoking Status Never Smoker   Smokeless Tobacco Never Used     Family History:   Family History   Problem Relation Age of Onset    Brain cancer Father     Lung cancer Father     Diabetes Family     Heart disease Family     Hypertension Family     Cancer Family         renal cell carcinoma    Stroke Family     Colon cancer Son      Meds/Allergies   all current active meds have been reviewed    Allergies   Allergen Reactions    Ambien [Zolpidem Tartrate] Hallucinations    Bextra [Valdecoxib] Hives    Dust Mite Extract Sneezing    Lyrica [Pregabalin] Confusion    Mold Extract [Trichophyton] Sneezing    Pollen Extract Sneezing    Tree Extract Other (See Comments) and Sneezing     congestion     Objective     Intake/Output Summary (Last 24 hours) at 4/11/2022 0620  Last data filed at 4/10/2022 1852  Gross per 24 hour   Intake 225 ml   Output 700 ml   Net -475 ml     Invasive Devices  Report    Peripheral Intravenous Line            Peripheral IV 04/09/22 Right Antecubital 1 day              Physical Exam  Vitals and nursing note reviewed  Constitutional:       General: He is not in acute distress  Appearance: Normal appearance  Comments: Appears stated age, no acute distress   HENT:      Head: Normocephalic  Nose: Nose normal       Mouth/Throat:      Mouth: Mucous membranes are moist       Comments: Poor dentition, missing multiple teeth  Eyes:      General: No scleral icterus  Right eye: No discharge  Left eye: No discharge  Conjunctiva/sclera: Conjunctivae normal    Neck:      Comments: Trachea appears midline, phonation normal  Cardiovascular:      Rate and Rhythm: Normal rate and regular rhythm  Pulses: Normal pulses  Pulmonary:      Effort: Pulmonary effort is normal  No respiratory distress  Breath sounds: No wheezing  Abdominal:      General: Bowel sounds are normal  There is no distension  Palpations: Abdomen is soft  Tenderness: There is no abdominal tenderness  Musculoskeletal:      Cervical back: Neck supple  Right lower leg: No edema  Left lower leg: No edema  Comments: Mildly reduced overall muscle mass   Skin:     General: Skin is warm and dry  Comments: Superficial abrasions right shin   Neurological:      Mental Status: He is alert  Comments: Awake and alert, oriented to person and situation otherwise pleasantly confused   Psychiatric:         Mood and Affect: Mood normal          Behavior: Behavior normal       Comments: Polite pleasant cooperative       Lab Results:   I have personally reviewed pertinent lab results      I have personally reviewed the following imaging study reports in PACS:    4/9/22-CT head without contrast, CT chest abdomen pelvis with contrast  4/10/22-chest x-ray    Therapies:   PT:  Following  OT:  Following    VTE Prophylaxis: Warfarin (Coumadin)    Code Status: Level 1 - Full Code  Advance Directive and Living Will:      Power of :    POLST:      Family and Social Support:   Living Arrangements: Lives w/ Spouse/significant other  Support Systems: Self; Spouse/significant other  Discharge planning discussed with[de-identified] pt's wife  Freedom of Choice: Yes    Goals of Care: Go home

## 2024-03-27 NOTE — ED ATTENDING ATTESTATION
9/13/2023  ILiz MD, saw and evaluated the patient. I have discussed the patient with the resident/non-physician practitioner and agree with the resident's/non-physician practitioner's findings, Plan of Care, and MDM as documented in the resident's/non-physician practitioner's note, except where noted. All available labs and Radiology studies were reviewed. I was present for key portions of any procedure(s) performed by the resident/non-physician practitioner and I was immediately available to provide assistance. At this point I agree with the current assessment done in the Emergency Department. I have conducted an independent evaluation of this patient a history and physical is as follows:    ED Course  ED Course as of 09/13/23 0257   Wed Sep 13, 2023   0245 Per resident h&p 79 YO M found on floor next to bed unwitnessed fall; no AC no AP; R Hip pain; mild intensity +dementia O: head atraumatic; painful R trochanter area I/P CTH; XR hip     Emergency Department Note- James Banuelos 80 y.o. male MRN: [id number]    Unit/Bed#: [facility] Encounter: [id number]    James Banuelos is a 80 y.o. male who presents with   Chief Complaint   Patient presents with   • Fall     Out of bed. Complaining of right hip pain. -LOC, -thinners          History of Present Illness   HPI:  James Banuelos is a 80 y.o. male who presents for evaluation of:  Fall out of bed unwitnessed at his nursing home. Patient is complaining of right hip pain. He denies head strike. He does not take anticoagulants or antiplatelet medications. He denies loss of consciousness. He denies headache, neck pain, nausea and vomiting. Further history and review of systems is unobtainable secondary to dementia.     Review of Systems   Unable to perform ROS: Dementia       Historical Information   Past Medical History:   Diagnosis Date   • Allergic rhinitis    • Anxiety    • Aspiration of liquid     and food per wife if he is eating too fast or talking when eating   • Asthma     as a child   • Atrial fibrillation ([address])    • CAD (coronary artery disease)    • Cancer of kidney ([address])    • COPD (chronic obstructive pulmonary disease) (HCC)    • CPAP (continuous positive airway pressure) dependence    • Dementia (HCC)     Frontal lobe   • Dementia (HCC)    • Diabetes mellitus ([address])    • Diabetes mellitus, type 2 (HCC)    • DJD (degenerative joint disease)    • Hypercholesterolemia    • Hyperlipidemia    • Hypertension    • Incisional hernia    • Kidney disease    • Melanoma ([address])     left leg   • Obesity    • Sleep apnea     wears CPAP   • TIA (transient ischemic attack)      Past Surgical History:   Procedure Laterality Date   • CARDIAC PACEMAKER PLACEMENT     • CHOLECYSTECTOMY     • COLONOSCOPY     • HERNIA REPAIR      Incisional hernia   • NEPHRECTOMY Left 2005   • GA ESOPHAGOGASTRODUODENOSCOPY SUBMUCOSAL INJECTION N/A 9/21/2016    Procedure: ESOPHAGOGASTRODUODENOSCOPY (EGD); Surgeon: Ashley Torres MD;  Location: [facility]; Service: Gastroenterology   • GA ESOPHAGOGASTRODUODENOSCOPY SUBMUCOSAL INJECTION N/A 2/7/2018    Procedure: ESOPHAGOGASTRODUODENOSCOPY (EGD); Surgeon: Ashley Torres MD;  Location: [facility]; Service: Gastroenterology   • GA ESOPHAGOGASTRODUODENOSCOPY SUBMUCOSAL INJECTION N/A 4/3/2019    Procedure: ESOPHAGOGASTRODUODENOSCOPY (EGD) WITH BOTOX;  Surgeon: Ashley Torres MD;  Location: [facility];   Service: Gastroenterology   • ROTATOR CUFF REPAIR     • TONSILLECTOMY       Social History   Social History     Substance and Sexual Activity   Alcohol Use No     Social History     Substance and Sexual Activity   Drug Use No     Social History     Tobacco Use   Smoking Status Never   Smokeless Tobacco Never     Family History:   Family History   Problem Relation Age of Onset   • Brain cancer Father    • Lung cancer Father    • Diabetes Family    • Heart disease Family    • Hypertension Family    • Cancer Family         renal cell carcinoma   • Stroke Family    • Colon cancer Son        Meds/Allergies   PTA meds:   Prior to Admission Medications   Prescriptions Last Dose Informant Patient Reported? Taking? ARIPiprazole (ABILIFY) 5 mg tablet  Outside Facility (Specify) No No   Sig: Take 1 tablet (5 mg total) by mouth daily Do not start before 2023. Patient taking differently: Take 5 mg by mouth daily Taking 10 mg by mouth daily.  @ 8:00   Blood Glucose Monitoring Suppl (OneTouch Verio Flex System) w/Device KIT  Outside Facility (Specify) Yes No   Sig: Use as directed   Insulin Pen Needle (Novofine Pen Needle) 32G X 6 MM MISC  Outside Facility (Specify) No No   Sig: Use 4 (four) times a day   Insulin Syringe-Needle U-100 (BD Insulin Syringe U/F) 30G X 1/2" 0.5 ML MISC  Outside Facility (Specify) No No   Sig: Use 4 (four) times a day   Insulin Syringe-Needle U-100 (INSULIN SYRINGE .5CC/30GX5/16") 30G X 5/16" 0.5 ML MISC  Outside Facility (Specify) Yes No   Si (four) times a day   LORazepam Intensol 2 MG/ML concentrated solution  Outside Facility (Specify) Yes No   Sig: GIVE 0.25ML(S) PO/SL EVERY 4 HOURS AS NEEDED   NovoLOG FlexPen 100 units/mL injection pen  Outside Facility (Specify) Yes No   Sig: INJECT PER SLIDING SCALE FOUR TIMES PER DAY BEFORE MEALS AND AT BEDTIME UP TO 23 UNITS DAILY   OneTouch Verio test strip  Outside Facility (Specify) Yes No   Sig: TEST BG FOUR TIMES PER DAY BEFORE MEALS AND AT BEDTIME   atorvastatin (LIPITOR) 10 mg tablet  Outside Facility (Specify) No No   Sig: Take 1 tablet (10 mg total) by mouth daily   cholecalciferol (VITAMIN D3) 1,000 units tablet  Outside Facility (Specify) Yes No   Sig: Take 2,000 Units by mouth daily   docusate sodium (COLACE) 100 mg capsule  Outside Facility (Specify) No No   Sig: Take 1 capsule (100 mg total) by mouth 2 (two) times a day   donepezil (ARICEPT) 10 mg tablet  Outside Facility (Specify) No No   Sig: Take 1 tablet (10 mg total) by mouth daily at bedtime   ferrous sulfate 325 (65 Fe) mg tablet  Outside Facility (Specify) Yes No   Sig: Take 325 mg by mouth as needed    insulin detemir (LEVEMIR) 100 units/mL subcutaneous injection  Outside Facility (Specify) No No   Sig: Inject 10 Units under the skin 2 (two) times a day   magnesium Oxide (MAG-OX) 400 mg TABS  Outside Facility (Specify) No No   Sig: Take 1 tablet (400 mg total) by mouth daily Do not start before 2023.    memantine (NAMENDA) 5 mg tablet  Outside Facility (Specify) Yes No   Sig: Take 5 mg by mouth daily   metoprolol tartrate (LOPRESSOR) 100 mg tablet  Outside Facility (Specify) No No   Simg in AM  and 100mg at bedtime   propafenone (RYTHMOL) 225 mg tablet  Outside Facility (Specify) No No   Sig: Take 1 tablet (225 mg total) by mouth 3 (three) times a day   senna (SENOKOT) 8.6 mg  Outside Facility (Specify) No No   Sig: Take 1 tablet (8.6 mg total) by mouth daily at bedtime   sertraline (ZOLOFT) 100 mg tablet  Outside Facility (Specify) No No   Sig: Take 1 tablet (100 mg total) by mouth daily      Facility-Administered Medications: None     Allergies   Allergen Reactions   • Ambien [Zolpidem Tartrate] Hallucinations   • Bextra [Valdecoxib] Hives   • Dust Mite Extract Sneezing   • Lyrica [Pregabalin] Confusion   • Mold Extract [Trichophyton] Sneezing   • Pollen Extract Sneezing   • Tree Extract Other (See Comments) and Sneezing     congestion       Objective   First Vitals:   Blood Pressure: 160/79 (23)  Pulse: 72 (23)  Temperature: 97.5 °F (36.4 °C) (23)  Temp Source: Tympanic (23)  Respirations: 16 (23)  SpO2: 93 % (23)    Current Vitals:   Blood Pressure: 160/79 (23)  Pulse: 72 (23)  Temperature: 97.5 °F (36.4 °C) (23)  Temp Source: Tympanic (23)  Respirations: 16 (23)  SpO2: 93 % (23)    No intake or output data in the 24 hours ending 23    Invasive Devices     None Physical Exam  Vitals and nursing note reviewed. Constitutional:       General: He is not in acute distress. Appearance: Normal appearance. He is well-developed. HENT:      Head: Normocephalic and atraumatic. Right Ear: External ear normal.      Left Ear: External ear normal.      Nose: Nose normal.      Mouth/Throat:      Pharynx: No oropharyngeal exudate. Eyes:      Conjunctiva/sclera: Conjunctivae normal.      Pupils: Pupils are equal, round, and reactive to light. Cardiovascular:      Rate and Rhythm: Normal rate and regular rhythm. Pulmonary:      Effort: Pulmonary effort is normal. No respiratory distress. Abdominal:      General: Abdomen is flat. There is no distension. Palpations: Abdomen is soft. Musculoskeletal:         General: Tenderness (x right hip) present. No deformity. Normal range of motion. Cervical back: Normal range of motion and neck supple. Skin:     General: Skin is warm and dry. Capillary Refill: Capillary refill takes less than 2 seconds. Neurological:      General: No focal deficit present. Mental Status: He is alert. Mental status is at baseline. He is disoriented. Coordination: Coordination normal.   Psychiatric:         Mood and Affect: Mood normal.         Behavior: Behavior normal.      Comments: Thought content, judgment, and decision-making capacity are impaired secondary to dementia           Medical Decision Makin. Fall with right hip and head strike not on anticoagulation or antiplatelet medications.:  ECG rule out arrhythmia; plan to obtain CT scan of head. Plan to obtain x-ray of right hip. Pain control as needed.     Recent Results (from the past 36 hour(s))   ECG 12 lead    Collection Time: 23  2:03 AM   Result Value Ref Range    Ventricular Rate 71 BPM    Atrial Rate 71 BPM    IA Interval 228 ms    QRSD Interval 100 ms    QT Interval 414 ms    QTC Interval 449 ms    P Axis 82 degrees    QRS Axis 70 degrees    T Wave [place] 59 degrees     CT head without contrast    (Results Pending)   XR hip/pelv 2-3 vws right    (Results Pending)         Portions of the record may have been created with voice recognition software. Occasional wrong word or "sound a like" substitutions may have occurred due to the inherent limitations of voice recognition software. Read the chart carefully and recognize, using context, where substitutions have occurred.           Critical Care Time  Procedures - Patient found on fall by  prior to presentation  - Denies any acute bony pain  - CTH umremarkable  - TTE unremarkable  > orthostatics unremarkable

## 2024-10-12 NOTE — PROGRESS NOTES
Daily Note     Today's date: 7/3/2018  Patient name: Rohini Montiel  : 1935  MRN: [id number]  Referring provider: Toby Phelps MD  Dx:   Encounter Diagnosis     ICD-10-CM    1  Low back pain, unspecified back pain laterality, unspecified chronicity, with sciatica presence unspecified M54 5                   Subjective: Reports R sided LBP today  No pain in hips  LBP more with prolonged standing      Objective: See treatment diary below  Manual   7/3                     B hip PROM  10min                      s/l hip flex/ quad stretch  [id number]                      90/90 stretch  man 10                                                                           Exercise Diary   7/3                     Lifecycle  10min                     Sit to stands                       Standing hip abd/ext                       Lumbar roll outs with pball                       HS stretch  90/90 x10                     Gastroc stretch                       SLS                       Clamshells                        LTR  10                      sktc  10sec x10                      seated LB flexion  5sec x10                                                                                                                                                                                                                                                   Modalities                                                                                            Assessment: Tolerated treatment well  Able to abolish R side LBP with manual and active ex  Handouts were issued for home ex   Will look to add light strengthening is ready to tolerate next session as pt was fatigued post today's session      Plan: Progress as able patient

## 2024-10-14 NOTE — TREATMENT TEAM
06/02/23 0831   Team Meeting   Meeting Type Daily Rounds   Initial Conference Date 06/02/23   Team Members Present   Team Members Present Physician;Nurse;Occupational Therapist;;; Other (Discipline and Name)   Physician Team Member Dr Austyn Warner Team Member [address] Work Team Member [place]   OT Team Member Lisa Valerio   Other (Discipline and Name) Saintair Arrington- pharmacist   Patient/Family Present   Patient Present No   Patient's Family Present No     No behaviors, slept, pleasantly confused, redirectable, slept <-- Click to add NO pertinent Past Medical History

## (undated) DEVICE — 3M™ IOBAN™ 2 ANTIMICROBIAL INCISE DRAPE 6650EZ: Brand: IOBAN™ 2

## (undated) DEVICE — CHLORAPREP HI-LITE 26ML ORANGE

## (undated) DEVICE — NEEDLE SCLERO INTERJECT 25G 240MM

## (undated) DEVICE — SUT VICRYL 2-0 CTB-1 36 IN JB945

## (undated) DEVICE — BOWL AND CEMENT CARTRIDGE WITH BREAKAWAY FEMORAL NOZZLE: Brand: ACM

## (undated) DEVICE — HANDPIECE SET WITH RETRACTABLE COAXIAL FAN SPRAY TIP AND SUCTION TUBE: Brand: INTERPULSE

## (undated) DEVICE — GLOVE INDICATOR PI UNDERGLOVE SZ 8 BLUE

## (undated) DEVICE — SYRINGE 50ML LL

## (undated) DEVICE — 2108 SERIES SAGITTAL BLADE (18.6 X 0.64 X 61.1MM)

## (undated) DEVICE — DRAPE SHEET X-LG

## (undated) DEVICE — ABDUCTION PILLOW FOAM POSITIONER: Brand: CARDINAL HEALTH

## (undated) DEVICE — DRESSING MEPILEX AG BORDER 4 X 10 IN

## (undated) DEVICE — CAPIT HIP STEM CEMENTED

## (undated) DEVICE — DISPOSABLE EQUIPMENT COVER: Brand: SMALL TOWEL DRAPE

## (undated) DEVICE — BETHLEHEM TOTAL HIP, KIT: Brand: CARDINAL HEALTH

## (undated) DEVICE — IMPERVIOUS STOCKINETTE: Brand: DEROYAL

## (undated) DEVICE — Device

## (undated) DEVICE — DRAPE SHEET THREE QUARTER

## (undated) DEVICE — CAPIT HIP BIPOLAR HEAD POR PRIMARY

## (undated) DEVICE — HOOD: Brand: T7PLUS

## (undated) DEVICE — SUT VICRYL PLUS 0 CTB-1 27 IN VCPB260H

## (undated) DEVICE — DRAPE EQUIPMENT RF WAND

## (undated) DEVICE — GLOVE SRG BIOGEL 7.5

## (undated) DEVICE — U-DRAPE: Brand: CONVERTORS

## (undated) DEVICE — PENCIL ELECTROSURG E-Z CLEAN -0035H